# Patient Record
Sex: FEMALE | Race: WHITE | NOT HISPANIC OR LATINO | URBAN - METROPOLITAN AREA
[De-identification: names, ages, dates, MRNs, and addresses within clinical notes are randomized per-mention and may not be internally consistent; named-entity substitution may affect disease eponyms.]

---

## 2019-07-31 ENCOUNTER — EMERGENCY (EMERGENCY)
Facility: HOSPITAL | Age: 84
LOS: 0 days | Discharge: HOME | End: 2019-07-31
Attending: EMERGENCY MEDICINE | Admitting: EMERGENCY MEDICINE
Payer: MEDICARE

## 2019-07-31 VITALS
TEMPERATURE: 98 F | OXYGEN SATURATION: 97 % | WEIGHT: 139.99 LBS | HEART RATE: 66 BPM | SYSTOLIC BLOOD PRESSURE: 151 MMHG | DIASTOLIC BLOOD PRESSURE: 84 MMHG | HEIGHT: 63 IN | RESPIRATION RATE: 20 BRPM

## 2019-07-31 DIAGNOSIS — M79.669 PAIN IN UNSPECIFIED LOWER LEG: ICD-10-CM

## 2019-07-31 DIAGNOSIS — M79.651 PAIN IN RIGHT THIGH: ICD-10-CM

## 2019-07-31 DIAGNOSIS — M25.561 PAIN IN RIGHT KNEE: ICD-10-CM

## 2019-07-31 PROCEDURE — 99284 EMERGENCY DEPT VISIT MOD MDM: CPT

## 2019-07-31 PROCEDURE — 73562 X-RAY EXAM OF KNEE 3: CPT | Mod: 26,50

## 2019-07-31 PROCEDURE — 73552 X-RAY EXAM OF FEMUR 2/>: CPT | Mod: 26,RT

## 2019-07-31 PROCEDURE — 93970 EXTREMITY STUDY: CPT | Mod: 26

## 2019-07-31 NOTE — ED PROVIDER NOTE - CARE PROVIDER_API CALL
Zacarias Diallo (MD)  Orthopaedic Surgery  [address] [place] [address]  Phone: [phone]  Fax: [phone]  Follow Up Time:

## 2019-07-31 NOTE — ED ADULT NURSE NOTE - NSIMPLEMENTINTERV_GEN_ALL_ED
Implemented All Universal Safety Interventions:  Julian to call system. Call bell, personal items and telephone within reach. Instruct patient to call for assistance. Room bathroom lighting operational. Non-slip footwear when patient is off stretcher. Physically safe environment: no spills, clutter or unnecessary equipment. Stretcher in lowest position, wheels locked, appropriate side rails in place.

## 2019-07-31 NOTE — ED PROVIDER NOTE - OBJECTIVE STATEMENT
right thigh and knee pain with wt bearing and movement for 1 week, No pain at rest, No trauma, no fever, no hip or back pain,

## 2019-07-31 NOTE — ED PROVIDER NOTE - MUSCULOSKELETAL MINIMAL EXAM
tenderness over right medial thigh and knee, FROM, ambulating well in ED with normal gait . no swelling, no redness, strong pulses distal

## 2020-12-01 PROBLEM — Z00.00 ENCOUNTER FOR PREVENTIVE HEALTH EXAMINATION: Status: ACTIVE | Noted: 2020-01-01

## 2021-01-01 ENCOUNTER — APPOINTMENT (OUTPATIENT)
Dept: INFUSION THERAPY | Facility: CLINIC | Age: 86
End: 2021-01-01

## 2021-01-01 ENCOUNTER — APPOINTMENT (OUTPATIENT)
Dept: HEMATOLOGY ONCOLOGY | Facility: CLINIC | Age: 86
End: 2021-01-01
Payer: MEDICARE

## 2021-01-01 ENCOUNTER — APPOINTMENT (OUTPATIENT)
Dept: HEMATOLOGY ONCOLOGY | Facility: CLINIC | Age: 86
End: 2021-01-01

## 2021-01-01 ENCOUNTER — LABORATORY RESULT (OUTPATIENT)
Age: 86
End: 2021-01-01

## 2021-01-01 ENCOUNTER — INPATIENT (INPATIENT)
Facility: HOSPITAL | Age: 86
LOS: 58 days | End: 2021-05-08
Attending: STUDENT IN AN ORGANIZED HEALTH CARE EDUCATION/TRAINING PROGRAM | Admitting: INTERNAL MEDICINE
Payer: MEDICARE

## 2021-01-01 ENCOUNTER — NON-APPOINTMENT (OUTPATIENT)
Age: 86
End: 2021-01-01

## 2021-01-01 ENCOUNTER — OUTPATIENT (OUTPATIENT)
Dept: OUTPATIENT SERVICES | Facility: HOSPITAL | Age: 86
LOS: 1 days | Discharge: HOME | End: 2021-01-01

## 2021-01-01 ENCOUNTER — RESULT REVIEW (OUTPATIENT)
Age: 86
End: 2021-01-01

## 2021-01-01 VITALS
SYSTOLIC BLOOD PRESSURE: 95 MMHG | HEART RATE: 97 BPM | RESPIRATION RATE: 20 BRPM | OXYGEN SATURATION: 94 % | TEMPERATURE: 97 F | DIASTOLIC BLOOD PRESSURE: 54 MMHG

## 2021-01-01 VITALS
RESPIRATION RATE: 14 BRPM | SYSTOLIC BLOOD PRESSURE: 112 MMHG | WEIGHT: 132 LBS | TEMPERATURE: 98.2 F | BODY MASS INDEX: 29.69 KG/M2 | HEIGHT: 56 IN | DIASTOLIC BLOOD PRESSURE: 73 MMHG | HEART RATE: 80 BPM

## 2021-01-01 VITALS
RESPIRATION RATE: 18 BRPM | SYSTOLIC BLOOD PRESSURE: 111 MMHG | DIASTOLIC BLOOD PRESSURE: 66 MMHG | TEMPERATURE: 97.5 F | HEART RATE: 82 BPM

## 2021-01-01 DIAGNOSIS — R41.89 OTHER SYMPTOMS AND SIGNS INVOLVING COGNITIVE FUNCTIONS AND AWARENESS: ICD-10-CM

## 2021-01-01 DIAGNOSIS — R06.00 DYSPNEA, UNSPECIFIED: ICD-10-CM

## 2021-01-01 DIAGNOSIS — Z78.9 OTHER SPECIFIED HEALTH STATUS: ICD-10-CM

## 2021-01-01 DIAGNOSIS — R63.4 ABNORMAL WEIGHT LOSS: ICD-10-CM

## 2021-01-01 DIAGNOSIS — J96.01 ACUTE RESPIRATORY FAILURE WITH HYPOXIA: ICD-10-CM

## 2021-01-01 DIAGNOSIS — D64.9 ANEMIA, UNSPECIFIED: ICD-10-CM

## 2021-01-01 DIAGNOSIS — A41.9 SEPSIS, UNSPECIFIED ORGANISM: ICD-10-CM

## 2021-01-01 DIAGNOSIS — R53.83 OTHER FATIGUE: ICD-10-CM

## 2021-01-01 DIAGNOSIS — Z51.5 ENCOUNTER FOR PALLIATIVE CARE: ICD-10-CM

## 2021-01-01 DIAGNOSIS — R63.0 ANOREXIA: ICD-10-CM

## 2021-01-01 DIAGNOSIS — Z11.59 ENCOUNTER FOR SCREENING FOR OTHER VIRAL DISEASES: ICD-10-CM

## 2021-01-01 LAB
-  IMIPENEM: SIGNIFICANT CHANGE UP
-  PIPERACILLIN/TAZOBACTAM: SIGNIFICANT CHANGE UP
A1C WITH ESTIMATED AVERAGE GLUCOSE RESULT: 6.1 % — HIGH (ref 4–5.6)
ALBUMIN SERPL ELPH-MCNC: 1.2 G/DL — LOW (ref 3.5–5.2)
ALBUMIN SERPL ELPH-MCNC: 1.6 G/DL — LOW (ref 3.5–5.2)
ALBUMIN SERPL ELPH-MCNC: 1.7 G/DL — LOW (ref 3.5–5.2)
ALBUMIN SERPL ELPH-MCNC: 1.8 G/DL — LOW (ref 3.5–5.2)
ALBUMIN SERPL ELPH-MCNC: 1.9 G/DL — LOW (ref 3.5–5.2)
ALBUMIN SERPL ELPH-MCNC: 2 G/DL — LOW (ref 3.5–5.2)
ALBUMIN SERPL ELPH-MCNC: 2.1 G/DL — LOW (ref 3.5–5.2)
ALBUMIN SERPL ELPH-MCNC: 2.2 G/DL — LOW (ref 3.5–5.2)
ALBUMIN SERPL ELPH-MCNC: 2.3 G/DL — LOW (ref 3.5–5.2)
ALBUMIN SERPL ELPH-MCNC: 2.4 G/DL — LOW (ref 3.5–5.2)
ALBUMIN SERPL ELPH-MCNC: 2.5 G/DL — LOW (ref 3.5–5.2)
ALBUMIN SERPL ELPH-MCNC: 2.6 G/DL — LOW (ref 3.5–5.2)
ALBUMIN SERPL ELPH-MCNC: 2.7 G/DL — LOW (ref 3.5–5.2)
ALBUMIN SERPL ELPH-MCNC: 3 G/DL — LOW (ref 3.5–5.2)
ALBUMIN SERPL ELPH-MCNC: 3 G/DL — LOW (ref 3.5–5.2)
ALBUMIN SERPL ELPH-MCNC: 3.1 G/DL — LOW (ref 3.5–5.2)
ALBUMIN SERPL ELPH-MCNC: 3.2 G/DL — LOW (ref 3.5–5.2)
ALBUMIN SERPL ELPH-MCNC: 3.5 G/DL — SIGNIFICANT CHANGE UP (ref 3.5–5.2)
ALBUMIN SERPL ELPH-MCNC: 4 G/DL
ALBUMIN SERPL ELPH-MCNC: 4.1 G/DL — SIGNIFICANT CHANGE UP (ref 3.5–5.2)
ALP BLD-CCNC: 73 U/L
ALP SERPL-CCNC: 100 U/L — SIGNIFICANT CHANGE UP (ref 30–115)
ALP SERPL-CCNC: 125 U/L — HIGH (ref 30–115)
ALP SERPL-CCNC: 135 U/L — HIGH (ref 30–115)
ALP SERPL-CCNC: 151 U/L — HIGH (ref 30–115)
ALP SERPL-CCNC: 171 U/L — HIGH (ref 30–115)
ALP SERPL-CCNC: 176 U/L — HIGH (ref 30–115)
ALP SERPL-CCNC: 191 U/L — HIGH (ref 30–115)
ALP SERPL-CCNC: 201 U/L — HIGH (ref 30–115)
ALP SERPL-CCNC: 205 U/L — HIGH (ref 30–115)
ALP SERPL-CCNC: 217 U/L — HIGH (ref 30–115)
ALP SERPL-CCNC: 222 U/L — HIGH (ref 30–115)
ALP SERPL-CCNC: 224 U/L — HIGH (ref 30–115)
ALP SERPL-CCNC: 231 U/L — HIGH (ref 30–115)
ALP SERPL-CCNC: 235 U/L — HIGH (ref 30–115)
ALP SERPL-CCNC: 251 U/L — HIGH (ref 30–115)
ALP SERPL-CCNC: 260 U/L — HIGH (ref 30–115)
ALP SERPL-CCNC: 262 U/L — HIGH (ref 30–115)
ALP SERPL-CCNC: 266 U/L — HIGH (ref 30–115)
ALP SERPL-CCNC: 269 U/L — HIGH (ref 30–115)
ALP SERPL-CCNC: 273 U/L — HIGH (ref 30–115)
ALP SERPL-CCNC: 284 U/L — HIGH (ref 30–115)
ALP SERPL-CCNC: 291 U/L — HIGH (ref 30–115)
ALP SERPL-CCNC: 295 U/L — HIGH (ref 30–115)
ALP SERPL-CCNC: 300 U/L — HIGH (ref 30–115)
ALP SERPL-CCNC: 325 U/L — HIGH (ref 30–115)
ALP SERPL-CCNC: 330 U/L — HIGH (ref 30–115)
ALP SERPL-CCNC: 335 U/L — HIGH (ref 30–115)
ALP SERPL-CCNC: 338 U/L — HIGH (ref 30–115)
ALP SERPL-CCNC: 342 U/L — HIGH (ref 30–115)
ALP SERPL-CCNC: 356 U/L — HIGH (ref 30–115)
ALP SERPL-CCNC: 361 U/L — HIGH (ref 30–115)
ALP SERPL-CCNC: 367 U/L — HIGH (ref 30–115)
ALP SERPL-CCNC: 377 U/L — HIGH (ref 30–115)
ALP SERPL-CCNC: 392 U/L — HIGH (ref 30–115)
ALP SERPL-CCNC: 393 U/L — HIGH (ref 30–115)
ALP SERPL-CCNC: 398 U/L — HIGH (ref 30–115)
ALP SERPL-CCNC: 416 U/L — HIGH (ref 30–115)
ALP SERPL-CCNC: 46 U/L — SIGNIFICANT CHANGE UP (ref 30–115)
ALP SERPL-CCNC: 60 U/L — SIGNIFICANT CHANGE UP (ref 30–115)
ALP SERPL-CCNC: 75 U/L — SIGNIFICANT CHANGE UP (ref 30–115)
ALP SERPL-CCNC: 79 U/L — SIGNIFICANT CHANGE UP (ref 30–115)
ALP SERPL-CCNC: 81 U/L — SIGNIFICANT CHANGE UP (ref 30–115)
ALP SERPL-CCNC: 82 U/L — SIGNIFICANT CHANGE UP (ref 30–115)
ALP SERPL-CCNC: 84 U/L — SIGNIFICANT CHANGE UP (ref 30–115)
ALP SERPL-CCNC: 94 U/L — SIGNIFICANT CHANGE UP (ref 30–115)
ALP SERPL-CCNC: 97 U/L — SIGNIFICANT CHANGE UP (ref 30–115)
ALP SERPL-CCNC: 98 U/L — SIGNIFICANT CHANGE UP (ref 30–115)
ALT FLD-CCNC: 10 U/L — SIGNIFICANT CHANGE UP (ref 0–41)
ALT FLD-CCNC: 11 U/L — SIGNIFICANT CHANGE UP (ref 0–41)
ALT FLD-CCNC: 11 U/L — SIGNIFICANT CHANGE UP (ref 0–41)
ALT FLD-CCNC: 12 U/L — SIGNIFICANT CHANGE UP (ref 0–41)
ALT FLD-CCNC: 13 U/L — SIGNIFICANT CHANGE UP (ref 0–41)
ALT FLD-CCNC: 15 U/L — SIGNIFICANT CHANGE UP (ref 0–41)
ALT FLD-CCNC: 16 U/L — SIGNIFICANT CHANGE UP (ref 0–41)
ALT FLD-CCNC: 17 U/L — SIGNIFICANT CHANGE UP (ref 0–41)
ALT FLD-CCNC: 18 U/L — SIGNIFICANT CHANGE UP (ref 0–41)
ALT FLD-CCNC: 19 U/L — SIGNIFICANT CHANGE UP (ref 0–41)
ALT FLD-CCNC: 20 U/L — SIGNIFICANT CHANGE UP (ref 0–41)
ALT FLD-CCNC: 21 U/L — SIGNIFICANT CHANGE UP (ref 0–41)
ALT FLD-CCNC: 21 U/L — SIGNIFICANT CHANGE UP (ref 0–41)
ALT FLD-CCNC: 22 U/L — SIGNIFICANT CHANGE UP (ref 0–41)
ALT FLD-CCNC: 23 U/L — SIGNIFICANT CHANGE UP (ref 0–41)
ALT FLD-CCNC: 24 U/L — SIGNIFICANT CHANGE UP (ref 0–41)
ALT FLD-CCNC: 24 U/L — SIGNIFICANT CHANGE UP (ref 0–41)
ALT FLD-CCNC: 25 U/L — SIGNIFICANT CHANGE UP (ref 0–41)
ALT FLD-CCNC: 27 U/L — SIGNIFICANT CHANGE UP (ref 0–41)
ALT FLD-CCNC: 29 U/L — SIGNIFICANT CHANGE UP (ref 0–41)
ALT FLD-CCNC: 29 U/L — SIGNIFICANT CHANGE UP (ref 0–41)
ALT FLD-CCNC: 30 U/L — SIGNIFICANT CHANGE UP (ref 0–41)
ALT FLD-CCNC: 31 U/L — SIGNIFICANT CHANGE UP (ref 0–41)
ALT FLD-CCNC: 32 U/L — SIGNIFICANT CHANGE UP (ref 0–41)
ALT FLD-CCNC: 32 U/L — SIGNIFICANT CHANGE UP (ref 0–41)
ALT FLD-CCNC: 39 U/L — SIGNIFICANT CHANGE UP (ref 0–41)
ALT FLD-CCNC: 41 U/L — SIGNIFICANT CHANGE UP (ref 0–41)
ALT FLD-CCNC: <5 U/L — SIGNIFICANT CHANGE UP (ref 0–41)
ALT SERPL-CCNC: 16 U/L
ANION GAP SERPL CALC-SCNC: 10 MMOL/L — SIGNIFICANT CHANGE UP (ref 7–14)
ANION GAP SERPL CALC-SCNC: 11 MMOL/L — SIGNIFICANT CHANGE UP (ref 7–14)
ANION GAP SERPL CALC-SCNC: 12 MMOL/L — SIGNIFICANT CHANGE UP (ref 7–14)
ANION GAP SERPL CALC-SCNC: 13 MMOL/L — SIGNIFICANT CHANGE UP (ref 7–14)
ANION GAP SERPL CALC-SCNC: 14 MMOL/L
ANION GAP SERPL CALC-SCNC: 14 MMOL/L — SIGNIFICANT CHANGE UP (ref 7–14)
ANION GAP SERPL CALC-SCNC: 15 MMOL/L — HIGH (ref 7–14)
ANION GAP SERPL CALC-SCNC: 16 MMOL/L — HIGH (ref 7–14)
ANION GAP SERPL CALC-SCNC: 5 MMOL/L — LOW (ref 7–14)
ANION GAP SERPL CALC-SCNC: 6 MMOL/L — LOW (ref 7–14)
ANION GAP SERPL CALC-SCNC: 7 MMOL/L — SIGNIFICANT CHANGE UP (ref 7–14)
ANION GAP SERPL CALC-SCNC: 8 MMOL/L — SIGNIFICANT CHANGE UP (ref 7–14)
ANION GAP SERPL CALC-SCNC: 9 MMOL/L — SIGNIFICANT CHANGE UP (ref 7–14)
ANISOCYTOSIS BLD QL: SLIGHT — SIGNIFICANT CHANGE UP
ANISOCYTOSIS BLD QL: SLIGHT — SIGNIFICANT CHANGE UP
APPEARANCE UR: ABNORMAL
APPEARANCE UR: CLEAR — SIGNIFICANT CHANGE UP
APTT BLD: 33.7 SEC — SIGNIFICANT CHANGE UP (ref 27–39.2)
APTT BLD: 33.8 SEC — SIGNIFICANT CHANGE UP (ref 27–39.2)
APTT BLD: 34.4 SEC — SIGNIFICANT CHANGE UP (ref 27–39.2)
APTT BLD: 35.4 SEC — SIGNIFICANT CHANGE UP (ref 27–39.2)
APTT BLD: 36.4 SEC — SIGNIFICANT CHANGE UP (ref 27–39.2)
APTT BLD: 36.8 SEC — SIGNIFICANT CHANGE UP (ref 27–39.2)
APTT BLD: 37.9 SEC — SIGNIFICANT CHANGE UP (ref 27–39.2)
APTT BLD: 41.1 SEC — HIGH (ref 27–39.2)
APTT BLD: 46 SEC — HIGH (ref 27–39.2)
AST SERPL-CCNC: 20 U/L — SIGNIFICANT CHANGE UP (ref 0–41)
AST SERPL-CCNC: 22 U/L — SIGNIFICANT CHANGE UP (ref 0–41)
AST SERPL-CCNC: 23 U/L — SIGNIFICANT CHANGE UP (ref 0–41)
AST SERPL-CCNC: 23 U/L — SIGNIFICANT CHANGE UP (ref 0–41)
AST SERPL-CCNC: 24 U/L — SIGNIFICANT CHANGE UP (ref 0–41)
AST SERPL-CCNC: 24 U/L — SIGNIFICANT CHANGE UP (ref 0–41)
AST SERPL-CCNC: 25 U/L — SIGNIFICANT CHANGE UP (ref 0–41)
AST SERPL-CCNC: 27 U/L — SIGNIFICANT CHANGE UP (ref 0–41)
AST SERPL-CCNC: 28 U/L — SIGNIFICANT CHANGE UP (ref 0–41)
AST SERPL-CCNC: 28 U/L — SIGNIFICANT CHANGE UP (ref 0–41)
AST SERPL-CCNC: 30 U/L — SIGNIFICANT CHANGE UP (ref 0–41)
AST SERPL-CCNC: 31 U/L — SIGNIFICANT CHANGE UP (ref 0–41)
AST SERPL-CCNC: 31 U/L — SIGNIFICANT CHANGE UP (ref 0–41)
AST SERPL-CCNC: 32 U/L
AST SERPL-CCNC: 32 U/L — SIGNIFICANT CHANGE UP (ref 0–41)
AST SERPL-CCNC: 32 U/L — SIGNIFICANT CHANGE UP (ref 0–41)
AST SERPL-CCNC: 33 U/L — SIGNIFICANT CHANGE UP (ref 0–41)
AST SERPL-CCNC: 34 U/L — SIGNIFICANT CHANGE UP (ref 0–41)
AST SERPL-CCNC: 35 U/L — SIGNIFICANT CHANGE UP (ref 0–41)
AST SERPL-CCNC: 35 U/L — SIGNIFICANT CHANGE UP (ref 0–41)
AST SERPL-CCNC: 37 U/L — SIGNIFICANT CHANGE UP (ref 0–41)
AST SERPL-CCNC: 38 U/L — SIGNIFICANT CHANGE UP (ref 0–41)
AST SERPL-CCNC: 38 U/L — SIGNIFICANT CHANGE UP (ref 0–41)
AST SERPL-CCNC: 40 U/L — SIGNIFICANT CHANGE UP (ref 0–41)
AST SERPL-CCNC: 42 U/L — HIGH (ref 0–41)
AST SERPL-CCNC: 42 U/L — HIGH (ref 0–41)
AST SERPL-CCNC: 45 U/L — HIGH (ref 0–41)
AST SERPL-CCNC: 47 U/L — HIGH (ref 0–41)
AST SERPL-CCNC: 49 U/L — HIGH (ref 0–41)
AST SERPL-CCNC: 50 U/L — HIGH (ref 0–41)
AST SERPL-CCNC: 51 U/L — HIGH (ref 0–41)
AST SERPL-CCNC: 51 U/L — HIGH (ref 0–41)
AST SERPL-CCNC: 52 U/L — HIGH (ref 0–41)
AST SERPL-CCNC: 54 U/L — HIGH (ref 0–41)
AST SERPL-CCNC: 56 U/L — HIGH (ref 0–41)
AST SERPL-CCNC: 56 U/L — HIGH (ref 0–41)
AST SERPL-CCNC: 57 U/L — HIGH (ref 0–41)
AST SERPL-CCNC: 58 U/L — HIGH (ref 0–41)
AST SERPL-CCNC: 58 U/L — HIGH (ref 0–41)
AST SERPL-CCNC: 60 U/L — HIGH (ref 0–41)
AST SERPL-CCNC: 63 U/L — HIGH (ref 0–41)
AST SERPL-CCNC: 67 U/L — HIGH (ref 0–41)
AST SERPL-CCNC: 68 U/L — HIGH (ref 0–41)
AST SERPL-CCNC: 89 U/L — HIGH (ref 0–41)
BACTERIA # UR AUTO: NEGATIVE — SIGNIFICANT CHANGE UP
BACTERIA # UR AUTO: NEGATIVE — SIGNIFICANT CHANGE UP
BASE EXCESS BLDA CALC-SCNC: -2 MMOL/L — SIGNIFICANT CHANGE UP (ref -2–2)
BASE EXCESS BLDA CALC-SCNC: -2.3 MMOL/L — LOW (ref -2–2)
BASE EXCESS BLDA CALC-SCNC: -2.7 MMOL/L — LOW (ref -2–2)
BASE EXCESS BLDA CALC-SCNC: -2.7 MMOL/L — LOW (ref -2–2)
BASE EXCESS BLDA CALC-SCNC: -3.2 MMOL/L — LOW (ref -2–2)
BASE EXCESS BLDA CALC-SCNC: -4.5 MMOL/L — LOW (ref -2–2)
BASE EXCESS BLDA CALC-SCNC: -5.9 MMOL/L — LOW (ref -2–2)
BASE EXCESS BLDA CALC-SCNC: 1.6 MMOL/L — SIGNIFICANT CHANGE UP (ref -2–2)
BASE EXCESS BLDA CALC-SCNC: 12.3 MMOL/L — HIGH (ref -2–2)
BASE EXCESS BLDA CALC-SCNC: 13.9 MMOL/L — HIGH (ref -2–2)
BASE EXCESS BLDA CALC-SCNC: 18.8 MMOL/L — HIGH (ref -2–2)
BASE EXCESS BLDA CALC-SCNC: 20.1 MMOL/L — HIGH (ref -2–2)
BASE EXCESS BLDA CALC-SCNC: 22.9 MMOL/L — HIGH (ref -2–2)
BASE EXCESS BLDA CALC-SCNC: 23.2 MMOL/L — HIGH (ref -2–2)
BASE EXCESS BLDA CALC-SCNC: 3.3 MMOL/L — HIGH (ref -2–2)
BASE EXCESS BLDA CALC-SCNC: 4.1 MMOL/L — HIGH (ref -2–2)
BASE EXCESS BLDA CALC-SCNC: 4.2 MMOL/L — HIGH (ref -2–2)
BASE EXCESS BLDA CALC-SCNC: 4.3 MMOL/L — HIGH (ref -2–2)
BASE EXCESS BLDA CALC-SCNC: 4.8 MMOL/L — HIGH (ref -2–2)
BASE EXCESS BLDA CALC-SCNC: 5 MMOL/L — HIGH (ref -2–2)
BASE EXCESS BLDA CALC-SCNC: 5.1 MMOL/L — HIGH (ref -2–2)
BASE EXCESS BLDA CALC-SCNC: 5.2 MMOL/L — HIGH (ref -2–2)
BASE EXCESS BLDA CALC-SCNC: 5.6 MMOL/L — HIGH (ref -2–2)
BASE EXCESS BLDA CALC-SCNC: 6.3 MMOL/L — HIGH (ref -2–2)
BASE EXCESS BLDA CALC-SCNC: 8.9 MMOL/L — HIGH (ref -2–2)
BASE EXCESS BLDA CALC-SCNC: 9.2 MMOL/L — HIGH (ref -2–2)
BASE EXCESS BLDA CALC-SCNC: 9.9 MMOL/L — HIGH (ref -2–2)
BASE EXCESS BLDV CALC-SCNC: 1.1 MMOL/L — SIGNIFICANT CHANGE UP (ref -2–2)
BASOPHILS # BLD AUTO: 0 K/UL — SIGNIFICANT CHANGE UP (ref 0–0.2)
BASOPHILS # BLD AUTO: 0.01 K/UL — SIGNIFICANT CHANGE UP (ref 0–0.2)
BASOPHILS # BLD AUTO: 0.02 K/UL — SIGNIFICANT CHANGE UP (ref 0–0.2)
BASOPHILS # BLD AUTO: 0.03 K/UL — SIGNIFICANT CHANGE UP (ref 0–0.2)
BASOPHILS # BLD AUTO: 0.04 K/UL — SIGNIFICANT CHANGE UP (ref 0–0.2)
BASOPHILS # BLD AUTO: 0.05 K/UL — SIGNIFICANT CHANGE UP (ref 0–0.2)
BASOPHILS # BLD AUTO: 0.06 K/UL — SIGNIFICANT CHANGE UP (ref 0–0.2)
BASOPHILS # BLD AUTO: 0.07 K/UL — SIGNIFICANT CHANGE UP (ref 0–0.2)
BASOPHILS # BLD AUTO: 0.07 K/UL — SIGNIFICANT CHANGE UP (ref 0–0.2)
BASOPHILS # BLD AUTO: 0.08 K/UL — SIGNIFICANT CHANGE UP (ref 0–0.2)
BASOPHILS # BLD AUTO: 0.09 K/UL — SIGNIFICANT CHANGE UP (ref 0–0.2)
BASOPHILS # BLD AUTO: 0.1 K/UL — SIGNIFICANT CHANGE UP (ref 0–0.2)
BASOPHILS # BLD AUTO: 0.15 K/UL — SIGNIFICANT CHANGE UP (ref 0–0.2)
BASOPHILS # BLD AUTO: 0.17 K/UL — SIGNIFICANT CHANGE UP (ref 0–0.2)
BASOPHILS # BLD AUTO: 0.19 K/UL — SIGNIFICANT CHANGE UP (ref 0–0.2)
BASOPHILS # BLD AUTO: 0.22 K/UL — HIGH (ref 0–0.2)
BASOPHILS # BLD AUTO: 0.28 K/UL — HIGH (ref 0–0.2)
BASOPHILS # BLD AUTO: 0.29 K/UL — HIGH (ref 0–0.2)
BASOPHILS NFR BLD AUTO: 0 % — SIGNIFICANT CHANGE UP (ref 0–1)
BASOPHILS NFR BLD AUTO: 0.1 % — SIGNIFICANT CHANGE UP (ref 0–1)
BASOPHILS NFR BLD AUTO: 0.2 % — SIGNIFICANT CHANGE UP (ref 0–1)
BASOPHILS NFR BLD AUTO: 0.3 % — SIGNIFICANT CHANGE UP (ref 0–1)
BASOPHILS NFR BLD AUTO: 0.4 % — SIGNIFICANT CHANGE UP (ref 0–1)
BASOPHILS NFR BLD AUTO: 0.5 % — SIGNIFICANT CHANGE UP (ref 0–1)
BASOPHILS NFR BLD AUTO: 0.9 % — SIGNIFICANT CHANGE UP (ref 0–1)
BASOPHILS NFR BLD AUTO: 0.9 % — SIGNIFICANT CHANGE UP (ref 0–1)
BILIRUB DIRECT SERPL-MCNC: 0.3 MG/DL — HIGH (ref 0–0.2)
BILIRUB DIRECT SERPL-MCNC: 0.4 MG/DL — HIGH (ref 0–0.2)
BILIRUB DIRECT SERPL-MCNC: 0.5 MG/DL — HIGH (ref 0–0.2)
BILIRUB DIRECT SERPL-MCNC: 0.5 MG/DL — HIGH (ref 0–0.2)
BILIRUB DIRECT SERPL-MCNC: 0.6 MG/DL — HIGH (ref 0–0.2)
BILIRUB DIRECT SERPL-MCNC: 1.3 MG/DL — HIGH (ref 0–0.2)
BILIRUB DIRECT SERPL-MCNC: 1.7 MG/DL — HIGH (ref 0–0.2)
BILIRUB DIRECT SERPL-MCNC: 2 MG/DL — HIGH (ref 0–0.2)
BILIRUB INDIRECT FLD-MCNC: 0 MG/DL — LOW (ref 0.2–1.2)
BILIRUB INDIRECT FLD-MCNC: 0.1 MG/DL — LOW (ref 0.2–1.2)
BILIRUB INDIRECT FLD-MCNC: 0.2 MG/DL — SIGNIFICANT CHANGE UP (ref 0.2–1.2)
BILIRUB INDIRECT FLD-MCNC: 0.2 MG/DL — SIGNIFICANT CHANGE UP (ref 0.2–1.2)
BILIRUB INDIRECT FLD-MCNC: 0.3 MG/DL — SIGNIFICANT CHANGE UP (ref 0.2–1.2)
BILIRUB INDIRECT FLD-MCNC: 0.3 MG/DL — SIGNIFICANT CHANGE UP (ref 0.2–1.2)
BILIRUB INDIRECT FLD-MCNC: 0.6 MG/DL — SIGNIFICANT CHANGE UP (ref 0.2–1.2)
BILIRUB INDIRECT FLD-MCNC: 0.6 MG/DL — SIGNIFICANT CHANGE UP (ref 0.2–1.2)
BILIRUB SERPL-MCNC: 0.2 MG/DL — SIGNIFICANT CHANGE UP (ref 0.2–1.2)
BILIRUB SERPL-MCNC: 0.3 MG/DL
BILIRUB SERPL-MCNC: 0.3 MG/DL — SIGNIFICANT CHANGE UP (ref 0.2–1.2)
BILIRUB SERPL-MCNC: 0.4 MG/DL — SIGNIFICANT CHANGE UP (ref 0.2–1.2)
BILIRUB SERPL-MCNC: 0.5 MG/DL — SIGNIFICANT CHANGE UP (ref 0.2–1.2)
BILIRUB SERPL-MCNC: 0.6 MG/DL — SIGNIFICANT CHANGE UP (ref 0.2–1.2)
BILIRUB SERPL-MCNC: 0.7 MG/DL — SIGNIFICANT CHANGE UP (ref 0.2–1.2)
BILIRUB SERPL-MCNC: 0.7 MG/DL — SIGNIFICANT CHANGE UP (ref 0.2–1.2)
BILIRUB SERPL-MCNC: 0.8 MG/DL — SIGNIFICANT CHANGE UP (ref 0.2–1.2)
BILIRUB SERPL-MCNC: 0.9 MG/DL — SIGNIFICANT CHANGE UP (ref 0.2–1.2)
BILIRUB SERPL-MCNC: 1.4 MG/DL — HIGH (ref 0.2–1.2)
BILIRUB SERPL-MCNC: 1.6 MG/DL — HIGH (ref 0.2–1.2)
BILIRUB SERPL-MCNC: 2.3 MG/DL — HIGH (ref 0.2–1.2)
BILIRUB SERPL-MCNC: 2.6 MG/DL — HIGH (ref 0.2–1.2)
BILIRUB UR-MCNC: NEGATIVE — SIGNIFICANT CHANGE UP
BILIRUB UR-MCNC: NEGATIVE — SIGNIFICANT CHANGE UP
BLD GP AB SCN SERPL QL: SIGNIFICANT CHANGE UP
BUN SERPL-MCNC: 100 MG/DL — CRITICAL HIGH (ref 10–20)
BUN SERPL-MCNC: 101 MG/DL — CRITICAL HIGH (ref 10–20)
BUN SERPL-MCNC: 102 MG/DL — CRITICAL HIGH (ref 10–20)
BUN SERPL-MCNC: 104 MG/DL — CRITICAL HIGH (ref 10–20)
BUN SERPL-MCNC: 105 MG/DL — CRITICAL HIGH (ref 10–20)
BUN SERPL-MCNC: 107 MG/DL — CRITICAL HIGH (ref 10–20)
BUN SERPL-MCNC: 107 MG/DL — CRITICAL HIGH (ref 10–20)
BUN SERPL-MCNC: 108 MG/DL — CRITICAL HIGH (ref 10–20)
BUN SERPL-MCNC: 109 MG/DL — CRITICAL HIGH (ref 10–20)
BUN SERPL-MCNC: 11 MG/DL — SIGNIFICANT CHANGE UP (ref 10–20)
BUN SERPL-MCNC: 111 MG/DL — CRITICAL HIGH (ref 10–20)
BUN SERPL-MCNC: 112 MG/DL — CRITICAL HIGH (ref 10–20)
BUN SERPL-MCNC: 115 MG/DL — CRITICAL HIGH (ref 10–20)
BUN SERPL-MCNC: 117 MG/DL — CRITICAL HIGH (ref 10–20)
BUN SERPL-MCNC: 119 MG/DL — CRITICAL HIGH (ref 10–20)
BUN SERPL-MCNC: 12 MG/DL — SIGNIFICANT CHANGE UP (ref 10–20)
BUN SERPL-MCNC: 13 MG/DL — SIGNIFICANT CHANGE UP (ref 10–20)
BUN SERPL-MCNC: 14 MG/DL — SIGNIFICANT CHANGE UP (ref 10–20)
BUN SERPL-MCNC: 15 MG/DL — SIGNIFICANT CHANGE UP (ref 10–20)
BUN SERPL-MCNC: 16 MG/DL — SIGNIFICANT CHANGE UP (ref 10–20)
BUN SERPL-MCNC: 17 MG/DL — SIGNIFICANT CHANGE UP (ref 10–20)
BUN SERPL-MCNC: 17 MG/DL — SIGNIFICANT CHANGE UP (ref 10–20)
BUN SERPL-MCNC: 18 MG/DL — SIGNIFICANT CHANGE UP (ref 10–20)
BUN SERPL-MCNC: 19 MG/DL — SIGNIFICANT CHANGE UP (ref 10–20)
BUN SERPL-MCNC: 20 MG/DL
BUN SERPL-MCNC: 20 MG/DL — SIGNIFICANT CHANGE UP (ref 10–20)
BUN SERPL-MCNC: 20 MG/DL — SIGNIFICANT CHANGE UP (ref 10–20)
BUN SERPL-MCNC: 21 MG/DL — HIGH (ref 10–20)
BUN SERPL-MCNC: 22 MG/DL — HIGH (ref 10–20)
BUN SERPL-MCNC: 22 MG/DL — HIGH (ref 10–20)
BUN SERPL-MCNC: 25 MG/DL — HIGH (ref 10–20)
BUN SERPL-MCNC: 27 MG/DL — HIGH (ref 10–20)
BUN SERPL-MCNC: 39 MG/DL — HIGH (ref 10–20)
BUN SERPL-MCNC: 49 MG/DL — HIGH (ref 10–20)
BUN SERPL-MCNC: 50 MG/DL — HIGH (ref 10–20)
BUN SERPL-MCNC: 50 MG/DL — HIGH (ref 10–20)
BUN SERPL-MCNC: 52 MG/DL — HIGH (ref 10–20)
BUN SERPL-MCNC: 53 MG/DL — HIGH (ref 10–20)
BUN SERPL-MCNC: 55 MG/DL — HIGH (ref 10–20)
BUN SERPL-MCNC: 56 MG/DL — HIGH (ref 10–20)
BUN SERPL-MCNC: 56 MG/DL — HIGH (ref 10–20)
BUN SERPL-MCNC: 57 MG/DL — HIGH (ref 10–20)
BUN SERPL-MCNC: 58 MG/DL — HIGH (ref 10–20)
BUN SERPL-MCNC: 59 MG/DL — HIGH (ref 10–20)
BUN SERPL-MCNC: 64 MG/DL — CRITICAL HIGH (ref 10–20)
BUN SERPL-MCNC: 65 MG/DL — CRITICAL HIGH (ref 10–20)
BUN SERPL-MCNC: 66 MG/DL — CRITICAL HIGH (ref 10–20)
BUN SERPL-MCNC: 73 MG/DL — CRITICAL HIGH (ref 10–20)
BUN SERPL-MCNC: 74 MG/DL — CRITICAL HIGH (ref 10–20)
BUN SERPL-MCNC: 76 MG/DL — CRITICAL HIGH (ref 10–20)
BUN SERPL-MCNC: 76 MG/DL — CRITICAL HIGH (ref 10–20)
BUN SERPL-MCNC: 77 MG/DL — CRITICAL HIGH (ref 10–20)
BUN SERPL-MCNC: 79 MG/DL — CRITICAL HIGH (ref 10–20)
BUN SERPL-MCNC: 79 MG/DL — CRITICAL HIGH (ref 10–20)
BUN SERPL-MCNC: 80 MG/DL — CRITICAL HIGH (ref 10–20)
BUN SERPL-MCNC: 82 MG/DL — CRITICAL HIGH (ref 10–20)
BUN SERPL-MCNC: 86 MG/DL — CRITICAL HIGH (ref 10–20)
BUN SERPL-MCNC: 88 MG/DL — CRITICAL HIGH (ref 10–20)
BUN SERPL-MCNC: 88 MG/DL — CRITICAL HIGH (ref 10–20)
BUN SERPL-MCNC: 93 MG/DL — CRITICAL HIGH (ref 10–20)
BUN SERPL-MCNC: 95 MG/DL — CRITICAL HIGH (ref 10–20)
BUN SERPL-MCNC: 98 MG/DL — CRITICAL HIGH (ref 10–20)
BUN SERPL-MCNC: 99 MG/DL — CRITICAL HIGH (ref 10–20)
BUN SERPL-MCNC: 99 MG/DL — CRITICAL HIGH (ref 10–20)
BURR CELLS BLD QL SMEAR: PRESENT — SIGNIFICANT CHANGE UP
BURR CELLS BLD QL SMEAR: PRESENT — SIGNIFICANT CHANGE UP
C DIFF BY PCR RESULT: NEGATIVE — SIGNIFICANT CHANGE UP
C DIFF TOX GENS STL QL NAA+PROBE: SIGNIFICANT CHANGE UP
CA-I SERPL-SCNC: 1.11 MMOL/L — LOW (ref 1.12–1.3)
CALCIUM SERPL-MCNC: 3.3 MG/DL — CRITICAL LOW (ref 8.5–10.1)
CALCIUM SERPL-MCNC: 6.5 MG/DL — LOW (ref 8.5–10.1)
CALCIUM SERPL-MCNC: 7.2 MG/DL — LOW (ref 8.5–10.1)
CALCIUM SERPL-MCNC: 7.4 MG/DL — LOW (ref 8.5–10.1)
CALCIUM SERPL-MCNC: 7.6 MG/DL — LOW (ref 8.5–10.1)
CALCIUM SERPL-MCNC: 7.7 MG/DL — LOW (ref 8.5–10.1)
CALCIUM SERPL-MCNC: 7.8 MG/DL — LOW (ref 8.5–10.1)
CALCIUM SERPL-MCNC: 7.9 MG/DL — LOW (ref 8.5–10.1)
CALCIUM SERPL-MCNC: 8 MG/DL — LOW (ref 8.5–10.1)
CALCIUM SERPL-MCNC: 8.1 MG/DL — LOW (ref 8.5–10.1)
CALCIUM SERPL-MCNC: 8.2 MG/DL — LOW (ref 8.5–10.1)
CALCIUM SERPL-MCNC: 8.3 MG/DL — LOW (ref 8.5–10.1)
CALCIUM SERPL-MCNC: 8.4 MG/DL — LOW (ref 8.5–10.1)
CALCIUM SERPL-MCNC: 8.5 MG/DL — SIGNIFICANT CHANGE UP (ref 8.5–10.1)
CALCIUM SERPL-MCNC: 8.5 MG/DL — SIGNIFICANT CHANGE UP (ref 8.5–10.1)
CALCIUM SERPL-MCNC: 8.6 MG/DL — SIGNIFICANT CHANGE UP (ref 8.5–10.1)
CALCIUM SERPL-MCNC: 8.6 MG/DL — SIGNIFICANT CHANGE UP (ref 8.5–10.1)
CALCIUM SERPL-MCNC: 8.7 MG/DL — SIGNIFICANT CHANGE UP (ref 8.5–10.1)
CALCIUM SERPL-MCNC: 8.8 MG/DL
CALCIUM SERPL-MCNC: 8.8 MG/DL — SIGNIFICANT CHANGE UP (ref 8.5–10.1)
CALCIUM SERPL-MCNC: 8.9 MG/DL — SIGNIFICANT CHANGE UP (ref 8.5–10.1)
CALCIUM SERPL-MCNC: 9 MG/DL — SIGNIFICANT CHANGE UP (ref 8.5–10.1)
CALCIUM SERPL-MCNC: 9.2 MG/DL — SIGNIFICANT CHANGE UP (ref 8.5–10.1)
CHLORIDE SERPL-SCNC: 100 MMOL/L
CHLORIDE SERPL-SCNC: 101 MMOL/L — SIGNIFICANT CHANGE UP (ref 98–110)
CHLORIDE SERPL-SCNC: 101 MMOL/L — SIGNIFICANT CHANGE UP (ref 98–110)
CHLORIDE SERPL-SCNC: 102 MMOL/L — SIGNIFICANT CHANGE UP (ref 98–110)
CHLORIDE SERPL-SCNC: 103 MMOL/L — SIGNIFICANT CHANGE UP (ref 98–110)
CHLORIDE SERPL-SCNC: 103 MMOL/L — SIGNIFICANT CHANGE UP (ref 98–110)
CHLORIDE SERPL-SCNC: 104 MMOL/L — SIGNIFICANT CHANGE UP (ref 98–110)
CHLORIDE SERPL-SCNC: 105 MMOL/L — SIGNIFICANT CHANGE UP (ref 98–110)
CHLORIDE SERPL-SCNC: 106 MMOL/L — SIGNIFICANT CHANGE UP (ref 98–110)
CHLORIDE SERPL-SCNC: 107 MMOL/L — SIGNIFICANT CHANGE UP (ref 98–110)
CHLORIDE SERPL-SCNC: 108 MMOL/L — SIGNIFICANT CHANGE UP (ref 98–110)
CHLORIDE SERPL-SCNC: 108 MMOL/L — SIGNIFICANT CHANGE UP (ref 98–110)
CHLORIDE SERPL-SCNC: 110 MMOL/L — SIGNIFICANT CHANGE UP (ref 98–110)
CHLORIDE SERPL-SCNC: 111 MMOL/L — HIGH (ref 98–110)
CHLORIDE SERPL-SCNC: 112 MMOL/L — HIGH (ref 98–110)
CHLORIDE SERPL-SCNC: 113 MMOL/L — HIGH (ref 98–110)
CHLORIDE SERPL-SCNC: 114 MMOL/L — HIGH (ref 98–110)
CHLORIDE SERPL-SCNC: 115 MMOL/L — HIGH (ref 98–110)
CHLORIDE SERPL-SCNC: 116 MMOL/L — HIGH (ref 98–110)
CHLORIDE SERPL-SCNC: 117 MMOL/L — HIGH (ref 98–110)
CHLORIDE SERPL-SCNC: 118 MMOL/L — HIGH (ref 98–110)
CHLORIDE SERPL-SCNC: 118 MMOL/L — HIGH (ref 98–110)
CHLORIDE SERPL-SCNC: 122 MMOL/L — HIGH (ref 98–110)
CHLORIDE SERPL-SCNC: 130 MMOL/L — HIGH (ref 98–110)
CHLORIDE SERPL-SCNC: 93 MMOL/L — LOW (ref 98–110)
CHLORIDE SERPL-SCNC: 93 MMOL/L — LOW (ref 98–110)
CHLORIDE SERPL-SCNC: 94 MMOL/L — LOW (ref 98–110)
CHLORIDE SERPL-SCNC: 94 MMOL/L — LOW (ref 98–110)
CHLORIDE SERPL-SCNC: 96 MMOL/L — LOW (ref 98–110)
CHLORIDE SERPL-SCNC: 97 MMOL/L — LOW (ref 98–110)
CHLORIDE SERPL-SCNC: 97 MMOL/L — LOW (ref 98–110)
CHLORIDE SERPL-SCNC: 98 MMOL/L — SIGNIFICANT CHANGE UP (ref 98–110)
CHLORIDE SERPL-SCNC: 99 MMOL/L — SIGNIFICANT CHANGE UP (ref 98–110)
CHLORIDE UR-SCNC: 103 — SIGNIFICANT CHANGE UP
CO2 SERPL-SCNC: 10 MMOL/L — LOW (ref 17–32)
CO2 SERPL-SCNC: 15 MMOL/L — LOW (ref 17–32)
CO2 SERPL-SCNC: 19 MMOL/L — SIGNIFICANT CHANGE UP (ref 17–32)
CO2 SERPL-SCNC: 19 MMOL/L — SIGNIFICANT CHANGE UP (ref 17–32)
CO2 SERPL-SCNC: 20 MMOL/L — SIGNIFICANT CHANGE UP (ref 17–32)
CO2 SERPL-SCNC: 21 MMOL/L — SIGNIFICANT CHANGE UP (ref 17–32)
CO2 SERPL-SCNC: 22 MMOL/L — SIGNIFICANT CHANGE UP (ref 17–32)
CO2 SERPL-SCNC: 23 MMOL/L
CO2 SERPL-SCNC: 23 MMOL/L — SIGNIFICANT CHANGE UP (ref 17–32)
CO2 SERPL-SCNC: 24 MMOL/L — SIGNIFICANT CHANGE UP (ref 17–32)
CO2 SERPL-SCNC: 25 MMOL/L — SIGNIFICANT CHANGE UP (ref 17–32)
CO2 SERPL-SCNC: 26 MMOL/L — SIGNIFICANT CHANGE UP (ref 17–32)
CO2 SERPL-SCNC: 27 MMOL/L — SIGNIFICANT CHANGE UP (ref 17–32)
CO2 SERPL-SCNC: 28 MMOL/L — SIGNIFICANT CHANGE UP (ref 17–32)
CO2 SERPL-SCNC: 29 MMOL/L — SIGNIFICANT CHANGE UP (ref 17–32)
CO2 SERPL-SCNC: 30 MMOL/L — SIGNIFICANT CHANGE UP (ref 17–32)
CO2 SERPL-SCNC: 30 MMOL/L — SIGNIFICANT CHANGE UP (ref 17–32)
CO2 SERPL-SCNC: 31 MMOL/L — SIGNIFICANT CHANGE UP (ref 17–32)
CO2 SERPL-SCNC: 31 MMOL/L — SIGNIFICANT CHANGE UP (ref 17–32)
CO2 SERPL-SCNC: 32 MMOL/L — SIGNIFICANT CHANGE UP (ref 17–32)
CO2 SERPL-SCNC: 34 MMOL/L — HIGH (ref 17–32)
CO2 SERPL-SCNC: 36 MMOL/L — HIGH (ref 17–32)
CO2 SERPL-SCNC: 39 MMOL/L — HIGH (ref 17–32)
CO2 SERPL-SCNC: 42 MMOL/L — CRITICAL HIGH (ref 17–32)
CO2 SERPL-SCNC: 43 MMOL/L — CRITICAL HIGH (ref 17–32)
CO2 SERPL-SCNC: 45 MMOL/L — CRITICAL HIGH (ref 17–32)
COLOR SPEC: YELLOW — SIGNIFICANT CHANGE UP
COLOR SPEC: YELLOW — SIGNIFICANT CHANGE UP
CORTICOSTEROID BINDING GLOBULIN RESULT: 1.7 MG/DL — SIGNIFICANT CHANGE UP
CORTIS F/TOTAL MFR SERPL: 58 % — SIGNIFICANT CHANGE UP
CORTIS SERPL-MCNC: 27 UG/DL — HIGH
CORTISOL, FREE RESULT: 16 UG/DL — HIGH
CREAT ?TM UR-MCNC: 166 MG/DL — SIGNIFICANT CHANGE UP
CREAT ?TM UR-MCNC: 6 MG/DL — SIGNIFICANT CHANGE UP
CREAT SERPL-MCNC: 0.5 MG/DL — LOW (ref 0.7–1.5)
CREAT SERPL-MCNC: 0.6 MG/DL — LOW (ref 0.7–1.5)
CREAT SERPL-MCNC: 0.7 MG/DL — SIGNIFICANT CHANGE UP (ref 0.7–1.5)
CREAT SERPL-MCNC: 0.8 MG/DL — SIGNIFICANT CHANGE UP (ref 0.7–1.5)
CREAT SERPL-MCNC: 0.9 MG/DL — SIGNIFICANT CHANGE UP (ref 0.7–1.5)
CREAT SERPL-MCNC: 0.9 MG/DL — SIGNIFICANT CHANGE UP (ref 0.7–1.5)
CREAT SERPL-MCNC: 1 MG/DL — SIGNIFICANT CHANGE UP (ref 0.7–1.5)
CREAT SERPL-MCNC: 1.1 MG/DL — SIGNIFICANT CHANGE UP (ref 0.7–1.5)
CREAT SERPL-MCNC: 1.2 MG/DL
CREAT SERPL-MCNC: 1.2 MG/DL — SIGNIFICANT CHANGE UP (ref 0.7–1.5)
CREAT SERPL-MCNC: 1.2 MG/DL — SIGNIFICANT CHANGE UP (ref 0.7–1.5)
CREAT SERPL-MCNC: 1.3 MG/DL — SIGNIFICANT CHANGE UP (ref 0.7–1.5)
CREAT SERPL-MCNC: 1.3 MG/DL — SIGNIFICANT CHANGE UP (ref 0.7–1.5)
CREAT SERPL-MCNC: 1.4 MG/DL — SIGNIFICANT CHANGE UP (ref 0.7–1.5)
CREAT SERPL-MCNC: 1.4 MG/DL — SIGNIFICANT CHANGE UP (ref 0.7–1.5)
CREAT SERPL-MCNC: 1.5 MG/DL — SIGNIFICANT CHANGE UP (ref 0.7–1.5)
CREAT SERPL-MCNC: 1.5 MG/DL — SIGNIFICANT CHANGE UP (ref 0.7–1.5)
CREAT SERPL-MCNC: 1.6 MG/DL — HIGH (ref 0.7–1.5)
CREAT SERPL-MCNC: 1.7 MG/DL — HIGH (ref 0.7–1.5)
CREAT SERPL-MCNC: 1.8 MG/DL — HIGH (ref 0.7–1.5)
CREAT SERPL-MCNC: 1.9 MG/DL — HIGH (ref 0.7–1.5)
CREAT SERPL-MCNC: 2 MG/DL — HIGH (ref 0.7–1.5)
CREAT SERPL-MCNC: 2 MG/DL — HIGH (ref 0.7–1.5)
CREAT SERPL-MCNC: 2.1 MG/DL — HIGH (ref 0.7–1.5)
CREAT SERPL-MCNC: 2.2 MG/DL — HIGH (ref 0.7–1.5)
CREAT SERPL-MCNC: 2.3 MG/DL — HIGH (ref 0.7–1.5)
CREAT SERPL-MCNC: 2.3 MG/DL — HIGH (ref 0.7–1.5)
CREAT SERPL-MCNC: 2.4 MG/DL — HIGH (ref 0.7–1.5)
CREAT SERPL-MCNC: 2.5 MG/DL — HIGH (ref 0.7–1.5)
CREAT SERPL-MCNC: 2.6 MG/DL — HIGH (ref 0.7–1.5)
CREAT SERPL-MCNC: 2.7 MG/DL — HIGH (ref 0.7–1.5)
CREAT SERPL-MCNC: 2.7 MG/DL — HIGH (ref 0.7–1.5)
CREAT SERPL-MCNC: 2.8 MG/DL — HIGH (ref 0.7–1.5)
CREAT SERPL-MCNC: <0.5 MG/DL — LOW (ref 0.7–1.5)
CULTURE RESULTS: SIGNIFICANT CHANGE UP
D DIMER BLD IA.RAPID-MCNC: 3198 NG/ML DDU — HIGH (ref 0–230)
D DIMER BLD IA.RAPID-MCNC: 3605 NG/ML DDU — HIGH (ref 0–230)
DIFF PNL FLD: ABNORMAL
DIFF PNL FLD: SIGNIFICANT CHANGE UP
EOSINOPHIL # BLD AUTO: 0 K/UL — SIGNIFICANT CHANGE UP (ref 0–0.7)
EOSINOPHIL # BLD AUTO: 0 K/UL — SIGNIFICANT CHANGE UP (ref 0–0.7)
EOSINOPHIL # BLD AUTO: 0.02 K/UL — SIGNIFICANT CHANGE UP (ref 0–0.7)
EOSINOPHIL # BLD AUTO: 0.03 K/UL — SIGNIFICANT CHANGE UP (ref 0–0.7)
EOSINOPHIL # BLD AUTO: 0.03 K/UL — SIGNIFICANT CHANGE UP (ref 0–0.7)
EOSINOPHIL # BLD AUTO: 0.04 K/UL — SIGNIFICANT CHANGE UP (ref 0–0.7)
EOSINOPHIL # BLD AUTO: 0.04 K/UL — SIGNIFICANT CHANGE UP (ref 0–0.7)
EOSINOPHIL # BLD AUTO: 0.06 K/UL — SIGNIFICANT CHANGE UP (ref 0–0.7)
EOSINOPHIL # BLD AUTO: 0.07 K/UL — SIGNIFICANT CHANGE UP (ref 0–0.7)
EOSINOPHIL # BLD AUTO: 0.07 K/UL — SIGNIFICANT CHANGE UP (ref 0–0.7)
EOSINOPHIL # BLD AUTO: 0.08 K/UL — SIGNIFICANT CHANGE UP (ref 0–0.7)
EOSINOPHIL # BLD AUTO: 0.09 K/UL — SIGNIFICANT CHANGE UP (ref 0–0.7)
EOSINOPHIL # BLD AUTO: 0.1 K/UL — SIGNIFICANT CHANGE UP (ref 0–0.7)
EOSINOPHIL # BLD AUTO: 0.12 K/UL — SIGNIFICANT CHANGE UP (ref 0–0.7)
EOSINOPHIL # BLD AUTO: 0.13 K/UL — SIGNIFICANT CHANGE UP (ref 0–0.7)
EOSINOPHIL # BLD AUTO: 0.17 K/UL — SIGNIFICANT CHANGE UP (ref 0–0.7)
EOSINOPHIL # BLD AUTO: 0.18 K/UL — SIGNIFICANT CHANGE UP (ref 0–0.7)
EOSINOPHIL # BLD AUTO: 0.18 K/UL — SIGNIFICANT CHANGE UP (ref 0–0.7)
EOSINOPHIL # BLD AUTO: 0.19 K/UL — SIGNIFICANT CHANGE UP (ref 0–0.7)
EOSINOPHIL # BLD AUTO: 0.2 K/UL — SIGNIFICANT CHANGE UP (ref 0–0.7)
EOSINOPHIL # BLD AUTO: 0.2 K/UL — SIGNIFICANT CHANGE UP (ref 0–0.7)
EOSINOPHIL # BLD AUTO: 0.22 K/UL — SIGNIFICANT CHANGE UP (ref 0–0.7)
EOSINOPHIL # BLD AUTO: 0.22 K/UL — SIGNIFICANT CHANGE UP (ref 0–0.7)
EOSINOPHIL # BLD AUTO: 0.23 K/UL — SIGNIFICANT CHANGE UP (ref 0–0.7)
EOSINOPHIL # BLD AUTO: 0.25 K/UL — SIGNIFICANT CHANGE UP (ref 0–0.7)
EOSINOPHIL # BLD AUTO: 0.25 K/UL — SIGNIFICANT CHANGE UP (ref 0–0.7)
EOSINOPHIL # BLD AUTO: 0.27 K/UL — SIGNIFICANT CHANGE UP (ref 0–0.7)
EOSINOPHIL # BLD AUTO: 0.28 K/UL — SIGNIFICANT CHANGE UP (ref 0–0.7)
EOSINOPHIL # BLD AUTO: 0.29 K/UL — SIGNIFICANT CHANGE UP (ref 0–0.7)
EOSINOPHIL # BLD AUTO: 0.3 K/UL — SIGNIFICANT CHANGE UP (ref 0–0.7)
EOSINOPHIL # BLD AUTO: 0.31 K/UL — SIGNIFICANT CHANGE UP (ref 0–0.7)
EOSINOPHIL # BLD AUTO: 0.34 K/UL — SIGNIFICANT CHANGE UP (ref 0–0.7)
EOSINOPHIL # BLD AUTO: 0.42 K/UL — SIGNIFICANT CHANGE UP (ref 0–0.7)
EOSINOPHIL # BLD AUTO: 0.44 K/UL — SIGNIFICANT CHANGE UP (ref 0–0.7)
EOSINOPHIL # BLD AUTO: 0.45 K/UL — SIGNIFICANT CHANGE UP (ref 0–0.7)
EOSINOPHIL # BLD AUTO: 0.46 K/UL — SIGNIFICANT CHANGE UP (ref 0–0.7)
EOSINOPHIL # BLD AUTO: 0.47 K/UL — SIGNIFICANT CHANGE UP (ref 0–0.7)
EOSINOPHIL # BLD AUTO: 0.54 K/UL — SIGNIFICANT CHANGE UP (ref 0–0.7)
EOSINOPHIL # BLD AUTO: 0.59 K/UL — SIGNIFICANT CHANGE UP (ref 0–0.7)
EOSINOPHIL # BLD AUTO: 0.6 K/UL — SIGNIFICANT CHANGE UP (ref 0–0.7)
EOSINOPHIL # BLD AUTO: 0.61 K/UL — SIGNIFICANT CHANGE UP (ref 0–0.7)
EOSINOPHIL # BLD AUTO: 0.66 K/UL — SIGNIFICANT CHANGE UP (ref 0–0.7)
EOSINOPHIL # BLD AUTO: 0.67 K/UL — SIGNIFICANT CHANGE UP (ref 0–0.7)
EOSINOPHIL # BLD AUTO: 0.67 K/UL — SIGNIFICANT CHANGE UP (ref 0–0.7)
EOSINOPHIL # BLD AUTO: 0.71 K/UL — HIGH (ref 0–0.7)
EOSINOPHIL # BLD AUTO: 0.75 K/UL — HIGH (ref 0–0.7)
EOSINOPHIL # BLD AUTO: 0.76 K/UL — HIGH (ref 0–0.7)
EOSINOPHIL # BLD AUTO: 0.77 K/UL — HIGH (ref 0–0.7)
EOSINOPHIL # BLD AUTO: 0.81 K/UL — HIGH (ref 0–0.7)
EOSINOPHIL # BLD AUTO: 0.82 K/UL — HIGH (ref 0–0.7)
EOSINOPHIL # BLD AUTO: 0.9 K/UL — HIGH (ref 0–0.7)
EOSINOPHIL # BLD AUTO: 1.15 K/UL — HIGH (ref 0–0.7)
EOSINOPHIL # BLD AUTO: 1.2 K/UL — HIGH (ref 0–0.7)
EOSINOPHIL # BLD AUTO: 1.38 K/UL — HIGH (ref 0–0.7)
EOSINOPHIL # BLD AUTO: 1.43 K/UL — HIGH (ref 0–0.7)
EOSINOPHIL NFR BLD AUTO: 0 % — SIGNIFICANT CHANGE UP (ref 0–8)
EOSINOPHIL NFR BLD AUTO: 0.1 % — SIGNIFICANT CHANGE UP (ref 0–8)
EOSINOPHIL NFR BLD AUTO: 0.2 % — SIGNIFICANT CHANGE UP (ref 0–8)
EOSINOPHIL NFR BLD AUTO: 0.3 % — SIGNIFICANT CHANGE UP (ref 0–8)
EOSINOPHIL NFR BLD AUTO: 0.4 % — SIGNIFICANT CHANGE UP (ref 0–8)
EOSINOPHIL NFR BLD AUTO: 0.5 % — SIGNIFICANT CHANGE UP (ref 0–8)
EOSINOPHIL NFR BLD AUTO: 0.6 % — SIGNIFICANT CHANGE UP (ref 0–8)
EOSINOPHIL NFR BLD AUTO: 0.6 % — SIGNIFICANT CHANGE UP (ref 0–8)
EOSINOPHIL NFR BLD AUTO: 0.7 % — SIGNIFICANT CHANGE UP (ref 0–8)
EOSINOPHIL NFR BLD AUTO: 0.8 % — SIGNIFICANT CHANGE UP (ref 0–8)
EOSINOPHIL NFR BLD AUTO: 0.8 % — SIGNIFICANT CHANGE UP (ref 0–8)
EOSINOPHIL NFR BLD AUTO: 0.9 % — SIGNIFICANT CHANGE UP (ref 0–8)
EOSINOPHIL NFR BLD AUTO: 0.9 % — SIGNIFICANT CHANGE UP (ref 0–8)
EOSINOPHIL NFR BLD AUTO: 1 % — SIGNIFICANT CHANGE UP (ref 0–8)
EOSINOPHIL NFR BLD AUTO: 1.1 % — SIGNIFICANT CHANGE UP (ref 0–8)
EOSINOPHIL NFR BLD AUTO: 1.2 % — SIGNIFICANT CHANGE UP (ref 0–8)
EOSINOPHIL NFR BLD AUTO: 1.3 % — SIGNIFICANT CHANGE UP (ref 0–8)
EOSINOPHIL NFR BLD AUTO: 1.6 % — SIGNIFICANT CHANGE UP (ref 0–8)
EOSINOPHIL NFR BLD AUTO: 1.8 % — SIGNIFICANT CHANGE UP (ref 0–8)
EOSINOPHIL NFR BLD AUTO: 2.1 % — SIGNIFICANT CHANGE UP (ref 0–8)
EOSINOPHIL NFR BLD AUTO: 2.1 % — SIGNIFICANT CHANGE UP (ref 0–8)
EOSINOPHIL NFR BLD AUTO: 2.3 % — SIGNIFICANT CHANGE UP (ref 0–8)
EOSINOPHIL NFR BLD AUTO: 2.4 % — SIGNIFICANT CHANGE UP (ref 0–8)
EOSINOPHIL NFR BLD AUTO: 2.4 % — SIGNIFICANT CHANGE UP (ref 0–8)
EOSINOPHIL NFR BLD AUTO: 2.6 % — SIGNIFICANT CHANGE UP (ref 0–8)
EOSINOPHIL NFR BLD AUTO: 2.8 % — SIGNIFICANT CHANGE UP (ref 0–8)
EOSINOPHIL NFR BLD AUTO: 3.3 % — SIGNIFICANT CHANGE UP (ref 0–8)
EOSINOPHIL NFR BLD AUTO: 3.4 % — SIGNIFICANT CHANGE UP (ref 0–8)
EOSINOPHIL NFR BLD AUTO: 3.5 % — SIGNIFICANT CHANGE UP (ref 0–8)
EOSINOPHIL NFR BLD AUTO: 3.7 % — SIGNIFICANT CHANGE UP (ref 0–8)
EOSINOPHIL NFR BLD AUTO: 3.8 % — SIGNIFICANT CHANGE UP (ref 0–8)
EOSINOPHIL NFR BLD AUTO: 3.9 % — SIGNIFICANT CHANGE UP (ref 0–8)
EOSINOPHIL NFR BLD AUTO: 3.9 % — SIGNIFICANT CHANGE UP (ref 0–8)
EOSINOPHIL NFR BLD AUTO: 4 % — SIGNIFICANT CHANGE UP (ref 0–8)
EOSINOPHIL NFR BLD AUTO: 4.1 % — SIGNIFICANT CHANGE UP (ref 0–8)
EOSINOPHIL NFR BLD AUTO: 4.2 % — SIGNIFICANT CHANGE UP (ref 0–8)
EOSINOPHIL NFR BLD AUTO: 4.4 % — SIGNIFICANT CHANGE UP (ref 0–8)
EOSINOPHIL NFR BLD AUTO: 4.6 % — SIGNIFICANT CHANGE UP (ref 0–8)
EOSINOPHIL NFR BLD AUTO: 4.6 % — SIGNIFICANT CHANGE UP (ref 0–8)
EOSINOPHIL NFR BLD AUTO: 4.7 % — SIGNIFICANT CHANGE UP (ref 0–8)
EOSINOPHIL NFR BLD AUTO: 5.1 % — SIGNIFICANT CHANGE UP (ref 0–8)
EOSINOPHIL NFR BLD AUTO: 5.5 % — SIGNIFICANT CHANGE UP (ref 0–8)
EOSINOPHIL NFR BLD AUTO: 5.9 % — SIGNIFICANT CHANGE UP (ref 0–8)
EOSINOPHIL NFR BLD AUTO: 6.3 % — SIGNIFICANT CHANGE UP (ref 0–8)
EOSINOPHIL NFR BLD AUTO: 7.8 % — SIGNIFICANT CHANGE UP (ref 0–8)
EPI CELLS # UR: 1 /HPF — SIGNIFICANT CHANGE UP (ref 0–5)
EPI CELLS # UR: 1 /HPF — SIGNIFICANT CHANGE UP (ref 0–5)
ESTIMATED AVERAGE GLUCOSE: 128 MG/DL — HIGH (ref 68–114)
FERRITIN SERPL-MCNC: 1281 NG/ML — HIGH (ref 15–150)
FERRITIN SERPL-MCNC: 90 NG/ML
FIBRINOGEN PPP-MCNC: 574 MG/DL — HIGH (ref 204.4–570.6)
FOLATE SERPL-MCNC: 8.4 NG/ML — SIGNIFICANT CHANGE UP
FOLATE SERPL-MCNC: >20 NG/ML
FUNGITELL: 46 PG/ML — SIGNIFICANT CHANGE UP
FUNGITELL: <31 PG/ML — SIGNIFICANT CHANGE UP
GALACTOMANNAN AG SERPL-ACNC: <0.5 INDEX — SIGNIFICANT CHANGE UP
GAS PNL BLDA: SIGNIFICANT CHANGE UP
GAS PNL BLDV: 137 MMOL/L — SIGNIFICANT CHANGE UP (ref 136–145)
GAS PNL BLDV: SIGNIFICANT CHANGE UP
GIANT PLATELETS BLD QL SMEAR: PRESENT — SIGNIFICANT CHANGE UP
GIANT PLATELETS BLD QL SMEAR: PRESENT — SIGNIFICANT CHANGE UP
GLUCOSE BLDC GLUCOMTR-MCNC: 107 MG/DL — HIGH (ref 70–99)
GLUCOSE BLDC GLUCOMTR-MCNC: 109 MG/DL — HIGH (ref 70–99)
GLUCOSE BLDC GLUCOMTR-MCNC: 111 MG/DL — HIGH (ref 70–99)
GLUCOSE BLDC GLUCOMTR-MCNC: 115 MG/DL — HIGH (ref 70–99)
GLUCOSE BLDC GLUCOMTR-MCNC: 117 MG/DL — HIGH (ref 70–99)
GLUCOSE BLDC GLUCOMTR-MCNC: 117 MG/DL — HIGH (ref 70–99)
GLUCOSE BLDC GLUCOMTR-MCNC: 120 MG/DL — HIGH (ref 70–99)
GLUCOSE BLDC GLUCOMTR-MCNC: 125 MG/DL — HIGH (ref 70–99)
GLUCOSE BLDC GLUCOMTR-MCNC: 126 MG/DL — HIGH (ref 70–99)
GLUCOSE BLDC GLUCOMTR-MCNC: 126 MG/DL — HIGH (ref 70–99)
GLUCOSE BLDC GLUCOMTR-MCNC: 128 MG/DL — HIGH (ref 70–99)
GLUCOSE BLDC GLUCOMTR-MCNC: 129 MG/DL — HIGH (ref 70–99)
GLUCOSE BLDC GLUCOMTR-MCNC: 129 MG/DL — HIGH (ref 70–99)
GLUCOSE BLDC GLUCOMTR-MCNC: 131 MG/DL — HIGH (ref 70–99)
GLUCOSE BLDC GLUCOMTR-MCNC: 132 MG/DL — HIGH (ref 70–99)
GLUCOSE BLDC GLUCOMTR-MCNC: 132 MG/DL — HIGH (ref 70–99)
GLUCOSE BLDC GLUCOMTR-MCNC: 133 MG/DL — HIGH (ref 70–99)
GLUCOSE BLDC GLUCOMTR-MCNC: 134 MG/DL — HIGH (ref 70–99)
GLUCOSE BLDC GLUCOMTR-MCNC: 135 MG/DL — HIGH (ref 70–99)
GLUCOSE BLDC GLUCOMTR-MCNC: 135 MG/DL — HIGH (ref 70–99)
GLUCOSE BLDC GLUCOMTR-MCNC: 136 MG/DL — HIGH (ref 70–99)
GLUCOSE BLDC GLUCOMTR-MCNC: 137 MG/DL — HIGH (ref 70–99)
GLUCOSE BLDC GLUCOMTR-MCNC: 138 MG/DL — HIGH (ref 70–99)
GLUCOSE BLDC GLUCOMTR-MCNC: 139 MG/DL — HIGH (ref 70–99)
GLUCOSE BLDC GLUCOMTR-MCNC: 140 MG/DL — HIGH (ref 70–99)
GLUCOSE BLDC GLUCOMTR-MCNC: 141 MG/DL — HIGH (ref 70–99)
GLUCOSE BLDC GLUCOMTR-MCNC: 141 MG/DL — HIGH (ref 70–99)
GLUCOSE BLDC GLUCOMTR-MCNC: 142 MG/DL — HIGH (ref 70–99)
GLUCOSE BLDC GLUCOMTR-MCNC: 142 MG/DL — HIGH (ref 70–99)
GLUCOSE BLDC GLUCOMTR-MCNC: 143 MG/DL — HIGH (ref 70–99)
GLUCOSE BLDC GLUCOMTR-MCNC: 143 MG/DL — HIGH (ref 70–99)
GLUCOSE BLDC GLUCOMTR-MCNC: 146 MG/DL — HIGH (ref 70–99)
GLUCOSE BLDC GLUCOMTR-MCNC: 147 MG/DL — HIGH (ref 70–99)
GLUCOSE BLDC GLUCOMTR-MCNC: 148 MG/DL — HIGH (ref 70–99)
GLUCOSE BLDC GLUCOMTR-MCNC: 149 MG/DL — HIGH (ref 70–99)
GLUCOSE BLDC GLUCOMTR-MCNC: 149 MG/DL — HIGH (ref 70–99)
GLUCOSE BLDC GLUCOMTR-MCNC: 150 MG/DL — HIGH (ref 70–99)
GLUCOSE BLDC GLUCOMTR-MCNC: 150 MG/DL — HIGH (ref 70–99)
GLUCOSE BLDC GLUCOMTR-MCNC: 151 MG/DL — HIGH (ref 70–99)
GLUCOSE BLDC GLUCOMTR-MCNC: 152 MG/DL — HIGH (ref 70–99)
GLUCOSE BLDC GLUCOMTR-MCNC: 154 MG/DL — HIGH (ref 70–99)
GLUCOSE BLDC GLUCOMTR-MCNC: 155 MG/DL — HIGH (ref 70–99)
GLUCOSE BLDC GLUCOMTR-MCNC: 156 MG/DL — HIGH (ref 70–99)
GLUCOSE BLDC GLUCOMTR-MCNC: 156 MG/DL — HIGH (ref 70–99)
GLUCOSE BLDC GLUCOMTR-MCNC: 157 MG/DL — HIGH (ref 70–99)
GLUCOSE BLDC GLUCOMTR-MCNC: 158 MG/DL — HIGH (ref 70–99)
GLUCOSE BLDC GLUCOMTR-MCNC: 160 MG/DL — HIGH (ref 70–99)
GLUCOSE BLDC GLUCOMTR-MCNC: 160 MG/DL — HIGH (ref 70–99)
GLUCOSE BLDC GLUCOMTR-MCNC: 161 MG/DL — HIGH (ref 70–99)
GLUCOSE BLDC GLUCOMTR-MCNC: 161 MG/DL — HIGH (ref 70–99)
GLUCOSE BLDC GLUCOMTR-MCNC: 162 MG/DL — HIGH (ref 70–99)
GLUCOSE BLDC GLUCOMTR-MCNC: 163 MG/DL — HIGH (ref 70–99)
GLUCOSE BLDC GLUCOMTR-MCNC: 163 MG/DL — HIGH (ref 70–99)
GLUCOSE BLDC GLUCOMTR-MCNC: 164 MG/DL — HIGH (ref 70–99)
GLUCOSE BLDC GLUCOMTR-MCNC: 165 MG/DL — HIGH (ref 70–99)
GLUCOSE BLDC GLUCOMTR-MCNC: 166 MG/DL — HIGH (ref 70–99)
GLUCOSE BLDC GLUCOMTR-MCNC: 167 MG/DL — HIGH (ref 70–99)
GLUCOSE BLDC GLUCOMTR-MCNC: 168 MG/DL — HIGH (ref 70–99)
GLUCOSE BLDC GLUCOMTR-MCNC: 169 MG/DL — HIGH (ref 70–99)
GLUCOSE BLDC GLUCOMTR-MCNC: 170 MG/DL — HIGH (ref 70–99)
GLUCOSE BLDC GLUCOMTR-MCNC: 171 MG/DL — HIGH (ref 70–99)
GLUCOSE BLDC GLUCOMTR-MCNC: 172 MG/DL — HIGH (ref 70–99)
GLUCOSE BLDC GLUCOMTR-MCNC: 174 MG/DL — HIGH (ref 70–99)
GLUCOSE BLDC GLUCOMTR-MCNC: 175 MG/DL — HIGH (ref 70–99)
GLUCOSE BLDC GLUCOMTR-MCNC: 176 MG/DL — HIGH (ref 70–99)
GLUCOSE BLDC GLUCOMTR-MCNC: 178 MG/DL — HIGH (ref 70–99)
GLUCOSE BLDC GLUCOMTR-MCNC: 181 MG/DL — HIGH (ref 70–99)
GLUCOSE BLDC GLUCOMTR-MCNC: 181 MG/DL — HIGH (ref 70–99)
GLUCOSE BLDC GLUCOMTR-MCNC: 182 MG/DL — HIGH (ref 70–99)
GLUCOSE BLDC GLUCOMTR-MCNC: 182 MG/DL — HIGH (ref 70–99)
GLUCOSE BLDC GLUCOMTR-MCNC: 183 MG/DL — HIGH (ref 70–99)
GLUCOSE BLDC GLUCOMTR-MCNC: 184 MG/DL — HIGH (ref 70–99)
GLUCOSE BLDC GLUCOMTR-MCNC: 185 MG/DL — HIGH (ref 70–99)
GLUCOSE BLDC GLUCOMTR-MCNC: 185 MG/DL — HIGH (ref 70–99)
GLUCOSE BLDC GLUCOMTR-MCNC: 186 MG/DL — HIGH (ref 70–99)
GLUCOSE BLDC GLUCOMTR-MCNC: 187 MG/DL — HIGH (ref 70–99)
GLUCOSE BLDC GLUCOMTR-MCNC: 188 MG/DL — HIGH (ref 70–99)
GLUCOSE BLDC GLUCOMTR-MCNC: 189 MG/DL — HIGH (ref 70–99)
GLUCOSE BLDC GLUCOMTR-MCNC: 190 MG/DL — HIGH (ref 70–99)
GLUCOSE BLDC GLUCOMTR-MCNC: 191 MG/DL — HIGH (ref 70–99)
GLUCOSE BLDC GLUCOMTR-MCNC: 191 MG/DL — HIGH (ref 70–99)
GLUCOSE BLDC GLUCOMTR-MCNC: 192 MG/DL — HIGH (ref 70–99)
GLUCOSE BLDC GLUCOMTR-MCNC: 192 MG/DL — HIGH (ref 70–99)
GLUCOSE BLDC GLUCOMTR-MCNC: 193 MG/DL — HIGH (ref 70–99)
GLUCOSE BLDC GLUCOMTR-MCNC: 193 MG/DL — HIGH (ref 70–99)
GLUCOSE BLDC GLUCOMTR-MCNC: 194 MG/DL — HIGH (ref 70–99)
GLUCOSE BLDC GLUCOMTR-MCNC: 195 MG/DL — HIGH (ref 70–99)
GLUCOSE BLDC GLUCOMTR-MCNC: 196 MG/DL — HIGH (ref 70–99)
GLUCOSE BLDC GLUCOMTR-MCNC: 197 MG/DL — HIGH (ref 70–99)
GLUCOSE BLDC GLUCOMTR-MCNC: 198 MG/DL — HIGH (ref 70–99)
GLUCOSE BLDC GLUCOMTR-MCNC: 199 MG/DL — HIGH (ref 70–99)
GLUCOSE BLDC GLUCOMTR-MCNC: 200 MG/DL — HIGH (ref 70–99)
GLUCOSE BLDC GLUCOMTR-MCNC: 201 MG/DL — HIGH (ref 70–99)
GLUCOSE BLDC GLUCOMTR-MCNC: 202 MG/DL — HIGH (ref 70–99)
GLUCOSE BLDC GLUCOMTR-MCNC: 203 MG/DL — HIGH (ref 70–99)
GLUCOSE BLDC GLUCOMTR-MCNC: 205 MG/DL — HIGH (ref 70–99)
GLUCOSE BLDC GLUCOMTR-MCNC: 206 MG/DL — HIGH (ref 70–99)
GLUCOSE BLDC GLUCOMTR-MCNC: 207 MG/DL — HIGH (ref 70–99)
GLUCOSE BLDC GLUCOMTR-MCNC: 207 MG/DL — HIGH (ref 70–99)
GLUCOSE BLDC GLUCOMTR-MCNC: 209 MG/DL — HIGH (ref 70–99)
GLUCOSE BLDC GLUCOMTR-MCNC: 213 MG/DL — HIGH (ref 70–99)
GLUCOSE BLDC GLUCOMTR-MCNC: 215 MG/DL — HIGH (ref 70–99)
GLUCOSE BLDC GLUCOMTR-MCNC: 216 MG/DL — HIGH (ref 70–99)
GLUCOSE BLDC GLUCOMTR-MCNC: 217 MG/DL — HIGH (ref 70–99)
GLUCOSE BLDC GLUCOMTR-MCNC: 219 MG/DL — HIGH (ref 70–99)
GLUCOSE BLDC GLUCOMTR-MCNC: 219 MG/DL — HIGH (ref 70–99)
GLUCOSE BLDC GLUCOMTR-MCNC: 221 MG/DL — HIGH (ref 70–99)
GLUCOSE BLDC GLUCOMTR-MCNC: 222 MG/DL — HIGH (ref 70–99)
GLUCOSE BLDC GLUCOMTR-MCNC: 225 MG/DL — HIGH (ref 70–99)
GLUCOSE BLDC GLUCOMTR-MCNC: 228 MG/DL — HIGH (ref 70–99)
GLUCOSE BLDC GLUCOMTR-MCNC: 231 MG/DL — HIGH (ref 70–99)
GLUCOSE BLDC GLUCOMTR-MCNC: 231 MG/DL — HIGH (ref 70–99)
GLUCOSE BLDC GLUCOMTR-MCNC: 234 MG/DL — HIGH (ref 70–99)
GLUCOSE BLDC GLUCOMTR-MCNC: 235 MG/DL — HIGH (ref 70–99)
GLUCOSE BLDC GLUCOMTR-MCNC: 238 MG/DL — HIGH (ref 70–99)
GLUCOSE BLDC GLUCOMTR-MCNC: 240 MG/DL — HIGH (ref 70–99)
GLUCOSE BLDC GLUCOMTR-MCNC: 252 MG/DL — HIGH (ref 70–99)
GLUCOSE BLDC GLUCOMTR-MCNC: 253 MG/DL — HIGH (ref 70–99)
GLUCOSE BLDC GLUCOMTR-MCNC: 260 MG/DL — HIGH (ref 70–99)
GLUCOSE BLDC GLUCOMTR-MCNC: 261 MG/DL — HIGH (ref 70–99)
GLUCOSE BLDC GLUCOMTR-MCNC: 278 MG/DL — HIGH (ref 70–99)
GLUCOSE BLDC GLUCOMTR-MCNC: 280 MG/DL — HIGH (ref 70–99)
GLUCOSE BLDC GLUCOMTR-MCNC: 299 MG/DL — HIGH (ref 70–99)
GLUCOSE BLDC GLUCOMTR-MCNC: 31 MG/DL — CRITICAL LOW (ref 70–99)
GLUCOSE BLDC GLUCOMTR-MCNC: 44 MG/DL — CRITICAL LOW (ref 70–99)
GLUCOSE BLDC GLUCOMTR-MCNC: 584 MG/DL — CRITICAL HIGH (ref 70–99)
GLUCOSE BLDC GLUCOMTR-MCNC: 66 MG/DL — LOW (ref 70–99)
GLUCOSE BLDC GLUCOMTR-MCNC: 74 MG/DL — SIGNIFICANT CHANGE UP (ref 70–99)
GLUCOSE BLDC GLUCOMTR-MCNC: 79 MG/DL — SIGNIFICANT CHANGE UP (ref 70–99)
GLUCOSE BLDC GLUCOMTR-MCNC: 80 MG/DL — SIGNIFICANT CHANGE UP (ref 70–99)
GLUCOSE BLDC GLUCOMTR-MCNC: 83 MG/DL — SIGNIFICANT CHANGE UP (ref 70–99)
GLUCOSE BLDC GLUCOMTR-MCNC: 86 MG/DL — SIGNIFICANT CHANGE UP (ref 70–99)
GLUCOSE BLDC GLUCOMTR-MCNC: 86 MG/DL — SIGNIFICANT CHANGE UP (ref 70–99)
GLUCOSE BLDC GLUCOMTR-MCNC: 88 MG/DL — SIGNIFICANT CHANGE UP (ref 70–99)
GLUCOSE BLDC GLUCOMTR-MCNC: 88 MG/DL — SIGNIFICANT CHANGE UP (ref 70–99)
GLUCOSE BLDC GLUCOMTR-MCNC: 89 MG/DL — SIGNIFICANT CHANGE UP (ref 70–99)
GLUCOSE BLDC GLUCOMTR-MCNC: 89 MG/DL — SIGNIFICANT CHANGE UP (ref 70–99)
GLUCOSE BLDC GLUCOMTR-MCNC: 91 MG/DL — SIGNIFICANT CHANGE UP (ref 70–99)
GLUCOSE BLDC GLUCOMTR-MCNC: 91 MG/DL — SIGNIFICANT CHANGE UP (ref 70–99)
GLUCOSE BLDC GLUCOMTR-MCNC: 92 MG/DL — SIGNIFICANT CHANGE UP (ref 70–99)
GLUCOSE BLDC GLUCOMTR-MCNC: 95 MG/DL — SIGNIFICANT CHANGE UP (ref 70–99)
GLUCOSE SERPL-MCNC: 104 MG/DL — HIGH (ref 70–99)
GLUCOSE SERPL-MCNC: 105 MG/DL — HIGH (ref 70–99)
GLUCOSE SERPL-MCNC: 107 MG/DL — HIGH (ref 70–99)
GLUCOSE SERPL-MCNC: 108 MG/DL — HIGH (ref 70–99)
GLUCOSE SERPL-MCNC: 108 MG/DL — HIGH (ref 70–99)
GLUCOSE SERPL-MCNC: 116 MG/DL — HIGH (ref 70–99)
GLUCOSE SERPL-MCNC: 117 MG/DL — HIGH (ref 70–99)
GLUCOSE SERPL-MCNC: 117 MG/DL — HIGH (ref 70–99)
GLUCOSE SERPL-MCNC: 118 MG/DL
GLUCOSE SERPL-MCNC: 118 MG/DL — HIGH (ref 70–99)
GLUCOSE SERPL-MCNC: 120 MG/DL — HIGH (ref 70–99)
GLUCOSE SERPL-MCNC: 123 MG/DL — HIGH (ref 70–99)
GLUCOSE SERPL-MCNC: 125 MG/DL — HIGH (ref 70–99)
GLUCOSE SERPL-MCNC: 126 MG/DL — HIGH (ref 70–99)
GLUCOSE SERPL-MCNC: 128 MG/DL — HIGH (ref 70–99)
GLUCOSE SERPL-MCNC: 129 MG/DL — HIGH (ref 70–99)
GLUCOSE SERPL-MCNC: 130 MG/DL — HIGH (ref 70–99)
GLUCOSE SERPL-MCNC: 136 MG/DL — HIGH (ref 70–99)
GLUCOSE SERPL-MCNC: 138 MG/DL — HIGH (ref 70–99)
GLUCOSE SERPL-MCNC: 143 MG/DL — HIGH (ref 70–99)
GLUCOSE SERPL-MCNC: 143 MG/DL — HIGH (ref 70–99)
GLUCOSE SERPL-MCNC: 146 MG/DL — HIGH (ref 70–99)
GLUCOSE SERPL-MCNC: 152 MG/DL — HIGH (ref 70–99)
GLUCOSE SERPL-MCNC: 153 MG/DL — HIGH (ref 70–99)
GLUCOSE SERPL-MCNC: 154 MG/DL — HIGH (ref 70–99)
GLUCOSE SERPL-MCNC: 156 MG/DL — HIGH (ref 70–99)
GLUCOSE SERPL-MCNC: 157 MG/DL — HIGH (ref 70–99)
GLUCOSE SERPL-MCNC: 157 MG/DL — HIGH (ref 70–99)
GLUCOSE SERPL-MCNC: 158 MG/DL — HIGH (ref 70–99)
GLUCOSE SERPL-MCNC: 159 MG/DL — HIGH (ref 70–99)
GLUCOSE SERPL-MCNC: 160 MG/DL — HIGH (ref 70–99)
GLUCOSE SERPL-MCNC: 164 MG/DL — HIGH (ref 70–99)
GLUCOSE SERPL-MCNC: 166 MG/DL — HIGH (ref 70–99)
GLUCOSE SERPL-MCNC: 166 MG/DL — HIGH (ref 70–99)
GLUCOSE SERPL-MCNC: 167 MG/DL — HIGH (ref 70–99)
GLUCOSE SERPL-MCNC: 167 MG/DL — HIGH (ref 70–99)
GLUCOSE SERPL-MCNC: 169 MG/DL — HIGH (ref 70–99)
GLUCOSE SERPL-MCNC: 170 MG/DL — HIGH (ref 70–99)
GLUCOSE SERPL-MCNC: 172 MG/DL — HIGH (ref 70–99)
GLUCOSE SERPL-MCNC: 173 MG/DL — HIGH (ref 70–99)
GLUCOSE SERPL-MCNC: 174 MG/DL — HIGH (ref 70–99)
GLUCOSE SERPL-MCNC: 175 MG/DL — HIGH (ref 70–99)
GLUCOSE SERPL-MCNC: 176 MG/DL — HIGH (ref 70–99)
GLUCOSE SERPL-MCNC: 176 MG/DL — HIGH (ref 70–99)
GLUCOSE SERPL-MCNC: 177 MG/DL — HIGH (ref 70–99)
GLUCOSE SERPL-MCNC: 178 MG/DL — HIGH (ref 70–99)
GLUCOSE SERPL-MCNC: 180 MG/DL — HIGH (ref 70–99)
GLUCOSE SERPL-MCNC: 182 MG/DL — HIGH (ref 70–99)
GLUCOSE SERPL-MCNC: 183 MG/DL — HIGH (ref 70–99)
GLUCOSE SERPL-MCNC: 187 MG/DL — HIGH (ref 70–99)
GLUCOSE SERPL-MCNC: 190 MG/DL — HIGH (ref 70–99)
GLUCOSE SERPL-MCNC: 191 MG/DL — HIGH (ref 70–99)
GLUCOSE SERPL-MCNC: 194 MG/DL — HIGH (ref 70–99)
GLUCOSE SERPL-MCNC: 198 MG/DL — HIGH (ref 70–99)
GLUCOSE SERPL-MCNC: 199 MG/DL — HIGH (ref 70–99)
GLUCOSE SERPL-MCNC: 202 MG/DL — HIGH (ref 70–99)
GLUCOSE SERPL-MCNC: 203 MG/DL — HIGH (ref 70–99)
GLUCOSE SERPL-MCNC: 205 MG/DL — HIGH (ref 70–99)
GLUCOSE SERPL-MCNC: 207 MG/DL — HIGH (ref 70–99)
GLUCOSE SERPL-MCNC: 210 MG/DL — HIGH (ref 70–99)
GLUCOSE SERPL-MCNC: 212 MG/DL — HIGH (ref 70–99)
GLUCOSE SERPL-MCNC: 213 MG/DL — HIGH (ref 70–99)
GLUCOSE SERPL-MCNC: 225 MG/DL — HIGH (ref 70–99)
GLUCOSE SERPL-MCNC: 260 MG/DL — HIGH (ref 70–99)
GLUCOSE SERPL-MCNC: 32 MG/DL — CRITICAL LOW (ref 70–99)
GLUCOSE SERPL-MCNC: 86 MG/DL — SIGNIFICANT CHANGE UP (ref 70–99)
GLUCOSE SERPL-MCNC: 86 MG/DL — SIGNIFICANT CHANGE UP (ref 70–99)
GLUCOSE UR QL: NEGATIVE — SIGNIFICANT CHANGE UP
GLUCOSE UR QL: NEGATIVE — SIGNIFICANT CHANGE UP
GRAM STN FLD: SIGNIFICANT CHANGE UP
HCO3 BLDA-SCNC: 20 MMOL/L — LOW (ref 23–27)
HCO3 BLDA-SCNC: 22 MMOL/L — LOW (ref 23–27)
HCO3 BLDA-SCNC: 22 MMOL/L — LOW (ref 23–27)
HCO3 BLDA-SCNC: 24 MMOL/L — SIGNIFICANT CHANGE UP (ref 21–29)
HCO3 BLDA-SCNC: 25 MMOL/L — SIGNIFICANT CHANGE UP (ref 23–27)
HCO3 BLDA-SCNC: 28 MMOL/L — HIGH (ref 23–27)
HCO3 BLDA-SCNC: 28 MMOL/L — HIGH (ref 23–27)
HCO3 BLDA-SCNC: 29 MMOL/L — HIGH (ref 23–27)
HCO3 BLDA-SCNC: 29 MMOL/L — HIGH (ref 23–27)
HCO3 BLDA-SCNC: 30 MMOL/L — HIGH (ref 23–27)
HCO3 BLDA-SCNC: 31 MMOL/L — HIGH (ref 21–29)
HCO3 BLDA-SCNC: 32 MMOL/L — HIGH (ref 23–27)
HCO3 BLDA-SCNC: 33 MMOL/L — HIGH (ref 23–27)
HCO3 BLDA-SCNC: 33 MMOL/L — HIGH (ref 23–27)
HCO3 BLDA-SCNC: 36 MMOL/L — HIGH (ref 23–27)
HCO3 BLDA-SCNC: 39 MMOL/L — HIGH (ref 23–27)
HCO3 BLDA-SCNC: 39 MMOL/L — HIGH (ref 23–27)
HCO3 BLDA-SCNC: 44 MMOL/L — CRITICAL HIGH (ref 23–27)
HCO3 BLDA-SCNC: 48 MMOL/L — CRITICAL HIGH (ref 23–27)
HCO3 BLDA-SCNC: 50 MMOL/L — CRITICAL HIGH (ref 23–27)
HCO3 BLDA-SCNC: 50 MMOL/L — CRITICAL HIGH (ref 23–27)
HCO3 BLDV-SCNC: 29 MMOL/L — SIGNIFICANT CHANGE UP (ref 22–29)
HCT VFR BLD CALC: 21.9 % — LOW (ref 37–47)
HCT VFR BLD CALC: 22 % — LOW (ref 37–47)
HCT VFR BLD CALC: 22.7 % — LOW (ref 37–47)
HCT VFR BLD CALC: 23 % — LOW (ref 37–47)
HCT VFR BLD CALC: 23.1 % — LOW (ref 37–47)
HCT VFR BLD CALC: 23.4 % — LOW (ref 37–47)
HCT VFR BLD CALC: 23.7 % — LOW (ref 37–47)
HCT VFR BLD CALC: 23.7 % — LOW (ref 37–47)
HCT VFR BLD CALC: 23.9 % — LOW (ref 37–47)
HCT VFR BLD CALC: 24 % — LOW (ref 37–47)
HCT VFR BLD CALC: 24 % — LOW (ref 37–47)
HCT VFR BLD CALC: 24.1 % — LOW (ref 37–47)
HCT VFR BLD CALC: 24.3 % — LOW (ref 37–47)
HCT VFR BLD CALC: 24.4 % — LOW (ref 37–47)
HCT VFR BLD CALC: 24.5 % — LOW (ref 37–47)
HCT VFR BLD CALC: 24.8 % — LOW (ref 37–47)
HCT VFR BLD CALC: 24.8 % — LOW (ref 37–47)
HCT VFR BLD CALC: 24.9 % — LOW (ref 37–47)
HCT VFR BLD CALC: 25 % — LOW (ref 37–47)
HCT VFR BLD CALC: 25.2 % — LOW (ref 37–47)
HCT VFR BLD CALC: 25.2 % — LOW (ref 37–47)
HCT VFR BLD CALC: 25.3 % — LOW (ref 37–47)
HCT VFR BLD CALC: 25.4 % — LOW (ref 37–47)
HCT VFR BLD CALC: 25.8 % — LOW (ref 37–47)
HCT VFR BLD CALC: 25.9 % — LOW (ref 37–47)
HCT VFR BLD CALC: 26.2 % — LOW (ref 37–47)
HCT VFR BLD CALC: 26.3 % — LOW (ref 37–47)
HCT VFR BLD CALC: 26.3 % — LOW (ref 37–47)
HCT VFR BLD CALC: 26.5 % — LOW (ref 37–47)
HCT VFR BLD CALC: 26.9 % — LOW (ref 37–47)
HCT VFR BLD CALC: 27.4 % — LOW (ref 37–47)
HCT VFR BLD CALC: 27.5 % — LOW (ref 37–47)
HCT VFR BLD CALC: 27.6 % — LOW (ref 37–47)
HCT VFR BLD CALC: 27.7 % — LOW (ref 37–47)
HCT VFR BLD CALC: 27.8 % — LOW (ref 37–47)
HCT VFR BLD CALC: 28 % — LOW (ref 37–47)
HCT VFR BLD CALC: 28.2 % — LOW (ref 37–47)
HCT VFR BLD CALC: 28.2 % — LOW (ref 37–47)
HCT VFR BLD CALC: 28.3 % — LOW (ref 37–47)
HCT VFR BLD CALC: 28.6 % — LOW (ref 37–47)
HCT VFR BLD CALC: 28.9 % — LOW (ref 37–47)
HCT VFR BLD CALC: 29 % — LOW (ref 37–47)
HCT VFR BLD CALC: 29.1 % — LOW (ref 37–47)
HCT VFR BLD CALC: 29.2 % — LOW (ref 37–47)
HCT VFR BLD CALC: 29.5 % — LOW (ref 37–47)
HCT VFR BLD CALC: 29.7 % — LOW (ref 37–47)
HCT VFR BLD CALC: 30.3 % — LOW (ref 37–47)
HCT VFR BLD CALC: 31 % — LOW (ref 37–47)
HCT VFR BLD CALC: 31.9 % — LOW (ref 37–47)
HCT VFR BLD CALC: 32 % — LOW (ref 37–47)
HCT VFR BLD CALC: 32.3 % — LOW (ref 37–47)
HCT VFR BLD CALC: 32.3 % — LOW (ref 37–47)
HCT VFR BLD CALC: 32.4 % — LOW (ref 37–47)
HCT VFR BLD CALC: 32.7 % — LOW (ref 37–47)
HCT VFR BLD CALC: 32.8 % — LOW (ref 37–47)
HCT VFR BLD CALC: 33 % — LOW (ref 37–47)
HCT VFR BLD CALC: 33 % — LOW (ref 37–47)
HCT VFR BLD CALC: 33.3 % — LOW (ref 37–47)
HCT VFR BLD CALC: 33.4 % — LOW (ref 37–47)
HCT VFR BLD CALC: 33.5 % — LOW (ref 37–47)
HCT VFR BLD CALC: 33.8 % — LOW (ref 37–47)
HCT VFR BLD CALC: 33.8 % — LOW (ref 37–47)
HCT VFR BLD CALC: 34 % — LOW (ref 37–47)
HCT VFR BLD CALC: 35 % — LOW (ref 37–47)
HCT VFR BLD CALC: 35.6 %
HCT VFR BLDA CALC: 55.2 % — HIGH (ref 34–44)
HEPARIN-PF4 AB RESULT: <0.6 U/ML — SIGNIFICANT CHANGE UP (ref 0–0.9)
HGB A MFR BLD: 97.5 %
HGB A2 MFR BLD: 2.5 %
HGB BLD CALC-MCNC: 18 G/DL — SIGNIFICANT CHANGE UP (ref 14–18)
HGB BLD-MCNC: 10 G/DL — LOW (ref 12–16)
HGB BLD-MCNC: 10.2 G/DL — LOW (ref 12–16)
HGB BLD-MCNC: 10.3 G/DL — LOW (ref 12–16)
HGB BLD-MCNC: 10.3 G/DL — LOW (ref 12–16)
HGB BLD-MCNC: 10.4 G/DL — LOW (ref 12–16)
HGB BLD-MCNC: 10.5 G/DL — LOW (ref 12–16)
HGB BLD-MCNC: 10.6 G/DL — LOW (ref 12–16)
HGB BLD-MCNC: 10.8 G/DL — LOW (ref 12–16)
HGB BLD-MCNC: 10.8 G/DL — LOW (ref 12–16)
HGB BLD-MCNC: 10.9 G/DL
HGB BLD-MCNC: 11.2 G/DL — LOW (ref 12–16)
HGB BLD-MCNC: 6.4 G/DL — CRITICAL LOW (ref 12–16)
HGB BLD-MCNC: 6.7 G/DL — CRITICAL LOW (ref 12–16)
HGB BLD-MCNC: 6.8 G/DL — CRITICAL LOW (ref 12–16)
HGB BLD-MCNC: 6.9 G/DL — CRITICAL LOW (ref 12–16)
HGB BLD-MCNC: 7 G/DL — LOW (ref 12–16)
HGB BLD-MCNC: 7.1 G/DL — LOW (ref 12–16)
HGB BLD-MCNC: 7.2 G/DL — LOW (ref 12–16)
HGB BLD-MCNC: 7.3 G/DL — LOW (ref 12–16)
HGB BLD-MCNC: 7.4 G/DL — LOW (ref 12–16)
HGB BLD-MCNC: 7.4 G/DL — LOW (ref 12–16)
HGB BLD-MCNC: 7.5 G/DL — LOW (ref 12–16)
HGB BLD-MCNC: 7.6 G/DL — LOW (ref 12–16)
HGB BLD-MCNC: 7.7 G/DL — LOW (ref 12–16)
HGB BLD-MCNC: 7.8 G/DL — LOW (ref 12–16)
HGB BLD-MCNC: 7.9 G/DL — LOW (ref 12–16)
HGB BLD-MCNC: 8 G/DL — LOW (ref 12–16)
HGB BLD-MCNC: 8.1 G/DL — LOW (ref 12–16)
HGB BLD-MCNC: 8.2 G/DL — LOW (ref 12–16)
HGB BLD-MCNC: 8.3 G/DL — LOW (ref 12–16)
HGB BLD-MCNC: 8.4 G/DL — LOW (ref 12–16)
HGB BLD-MCNC: 8.4 G/DL — LOW (ref 12–16)
HGB BLD-MCNC: 8.5 G/DL — LOW (ref 12–16)
HGB BLD-MCNC: 8.6 G/DL — LOW (ref 12–16)
HGB BLD-MCNC: 8.7 G/DL — LOW (ref 12–16)
HGB BLD-MCNC: 8.8 G/DL — LOW (ref 12–16)
HGB BLD-MCNC: 8.9 G/DL — LOW (ref 12–16)
HGB BLD-MCNC: 9 G/DL — LOW (ref 12–16)
HGB BLD-MCNC: 9 G/DL — LOW (ref 12–16)
HGB BLD-MCNC: 9.2 G/DL — LOW (ref 12–16)
HGB BLD-MCNC: 9.5 G/DL — LOW (ref 12–16)
HGB BLD-MCNC: 9.7 G/DL — LOW (ref 12–16)
HGB BLD-MCNC: 9.8 G/DL — LOW (ref 12–16)
HGB BLD-MCNC: 9.8 G/DL — LOW (ref 12–16)
HGB BLD-MCNC: 9.9 G/DL — LOW (ref 12–16)
HGB BLD-MCNC: 9.9 G/DL — LOW (ref 12–16)
HGB FRACT BLD-IMP: NORMAL
HOROWITZ INDEX BLDA+IHG-RTO: 100 — SIGNIFICANT CHANGE UP
HOROWITZ INDEX BLDA+IHG-RTO: 100 — SIGNIFICANT CHANGE UP
HOROWITZ INDEX BLDA+IHG-RTO: 21 — SIGNIFICANT CHANGE UP
HOROWITZ INDEX BLDA+IHG-RTO: 40 — SIGNIFICANT CHANGE UP
HOROWITZ INDEX BLDA+IHG-RTO: 50 — SIGNIFICANT CHANGE UP
HOROWITZ INDEX BLDA+IHG-RTO: 75 — SIGNIFICANT CHANGE UP
HOROWITZ INDEX BLDA+IHG-RTO: 80 — SIGNIFICANT CHANGE UP
HYALINE CASTS # UR AUTO: 3 /LPF — SIGNIFICANT CHANGE UP (ref 0–7)
HYALINE CASTS # UR AUTO: 6 /LPF — SIGNIFICANT CHANGE UP (ref 0–7)
HYPOCHROMIA BLD QL: SLIGHT — SIGNIFICANT CHANGE UP
IMM GRANULOCYTES NFR BLD AUTO: 0.4 % — HIGH (ref 0.1–0.3)
IMM GRANULOCYTES NFR BLD AUTO: 0.5 % — HIGH (ref 0.1–0.3)
IMM GRANULOCYTES NFR BLD AUTO: 0.6 % — HIGH (ref 0.1–0.3)
IMM GRANULOCYTES NFR BLD AUTO: 0.7 % — HIGH (ref 0.1–0.3)
IMM GRANULOCYTES NFR BLD AUTO: 0.8 % — HIGH (ref 0.1–0.3)
IMM GRANULOCYTES NFR BLD AUTO: 0.9 % — HIGH (ref 0.1–0.3)
IMM GRANULOCYTES NFR BLD AUTO: 1 % — HIGH (ref 0.1–0.3)
IMM GRANULOCYTES NFR BLD AUTO: 1.1 % — HIGH (ref 0.1–0.3)
IMM GRANULOCYTES NFR BLD AUTO: 1.2 % — HIGH (ref 0.1–0.3)
IMM GRANULOCYTES NFR BLD AUTO: 1.3 % — HIGH (ref 0.1–0.3)
IMM GRANULOCYTES NFR BLD AUTO: 1.4 % — HIGH (ref 0.1–0.3)
IMM GRANULOCYTES NFR BLD AUTO: 1.4 % — HIGH (ref 0.1–0.3)
IMM GRANULOCYTES NFR BLD AUTO: 1.6 % — HIGH (ref 0.1–0.3)
IMM GRANULOCYTES NFR BLD AUTO: 1.7 % — HIGH (ref 0.1–0.3)
IMM GRANULOCYTES NFR BLD AUTO: 1.8 % — HIGH (ref 0.1–0.3)
IMM GRANULOCYTES NFR BLD AUTO: 1.9 % — HIGH (ref 0.1–0.3)
IMM GRANULOCYTES NFR BLD AUTO: 2 % — HIGH (ref 0.1–0.3)
IMM GRANULOCYTES NFR BLD AUTO: 2.1 % — HIGH (ref 0.1–0.3)
IMM GRANULOCYTES NFR BLD AUTO: 2.2 % — HIGH (ref 0.1–0.3)
IMM GRANULOCYTES NFR BLD AUTO: 2.4 % — HIGH (ref 0.1–0.3)
IMM GRANULOCYTES NFR BLD AUTO: 2.5 % — HIGH (ref 0.1–0.3)
IMM GRANULOCYTES NFR BLD AUTO: 2.6 % — HIGH (ref 0.1–0.3)
IMM GRANULOCYTES NFR BLD AUTO: 3.1 % — HIGH (ref 0.1–0.3)
IMM GRANULOCYTES NFR BLD AUTO: 3.5 % — HIGH (ref 0.1–0.3)
IMM GRANULOCYTES NFR BLD AUTO: 4.2 % — HIGH (ref 0.1–0.3)
INR BLD: 1.02 RATIO — SIGNIFICANT CHANGE UP (ref 0.65–1.3)
INR BLD: 1.04 RATIO — SIGNIFICANT CHANGE UP (ref 0.65–1.3)
INR BLD: 1.06 RATIO — SIGNIFICANT CHANGE UP (ref 0.65–1.3)
INR BLD: 1.06 RATIO — SIGNIFICANT CHANGE UP (ref 0.65–1.3)
INR BLD: 1.07 RATIO — SIGNIFICANT CHANGE UP (ref 0.65–1.3)
INR BLD: 1.08 RATIO — SIGNIFICANT CHANGE UP (ref 0.65–1.3)
INR BLD: 1.1 RATIO — SIGNIFICANT CHANGE UP (ref 0.65–1.3)
INR BLD: 1.15 RATIO — SIGNIFICANT CHANGE UP (ref 0.65–1.3)
INR BLD: 1.25 RATIO — SIGNIFICANT CHANGE UP (ref 0.65–1.3)
INR BLD: 1.29 RATIO — SIGNIFICANT CHANGE UP (ref 0.65–1.3)
INR BLD: 1.3 RATIO — SIGNIFICANT CHANGE UP (ref 0.65–1.3)
INR BLD: 1.31 RATIO — HIGH (ref 0.65–1.3)
INR BLD: 1.5 RATIO — HIGH (ref 0.65–1.3)
INR BLD: 1.57 RATIO — HIGH (ref 0.65–1.3)
INR BLD: 1.59 RATIO — HIGH (ref 0.65–1.3)
INR BLD: 1.63 RATIO — HIGH (ref 0.65–1.3)
INR BLD: 1.63 RATIO — HIGH (ref 0.65–1.3)
IRON SATN MFR SERPL: 10 % — LOW (ref 15–50)
IRON SATN MFR SERPL: 13 UG/DL — LOW (ref 35–150)
IRON SATN MFR SERPL: 13 UG/DL — LOW (ref 35–150)
IRON SATN MFR SERPL: 28 % — SIGNIFICANT CHANGE UP (ref 15–50)
IRON SATN MFR SERPL: 30 UG/DL — LOW (ref 35–150)
IRON SATN MFR SERPL: 5 %
IRON SERPL-MCNC: 17 UG/DL
KETONES UR-MCNC: NEGATIVE — SIGNIFICANT CHANGE UP
KETONES UR-MCNC: SIGNIFICANT CHANGE UP
LACTATE BLDV-MCNC: 3.1 MMOL/L — HIGH (ref 0.5–1.6)
LACTATE SERPL-SCNC: 1.1 MMOL/L — SIGNIFICANT CHANGE UP (ref 0.7–2)
LDH SERPL-CCNC: 475
LEGIONELLA AG UR QL: NEGATIVE — SIGNIFICANT CHANGE UP
LEGIONELLA AG UR QL: NEGATIVE — SIGNIFICANT CHANGE UP
LEUKOCYTE ESTERASE UR-ACNC: NEGATIVE — SIGNIFICANT CHANGE UP
LEUKOCYTE ESTERASE UR-ACNC: NEGATIVE — SIGNIFICANT CHANGE UP
LYMPHOCYTES # BLD AUTO: 0.28 K/UL — LOW (ref 1.2–3.4)
LYMPHOCYTES # BLD AUTO: 0.9 % — LOW (ref 20.5–51.1)
LYMPHOCYTES # BLD AUTO: 1.23 K/UL — SIGNIFICANT CHANGE UP (ref 1.2–3.4)
LYMPHOCYTES # BLD AUTO: 1.32 K/UL — SIGNIFICANT CHANGE UP (ref 1.2–3.4)
LYMPHOCYTES # BLD AUTO: 1.34 K/UL — SIGNIFICANT CHANGE UP (ref 1.2–3.4)
LYMPHOCYTES # BLD AUTO: 1.36 K/UL — SIGNIFICANT CHANGE UP (ref 1.2–3.4)
LYMPHOCYTES # BLD AUTO: 1.45 K/UL — SIGNIFICANT CHANGE UP (ref 1.2–3.4)
LYMPHOCYTES # BLD AUTO: 1.48 K/UL — SIGNIFICANT CHANGE UP (ref 1.2–3.4)
LYMPHOCYTES # BLD AUTO: 1.52 K/UL — SIGNIFICANT CHANGE UP (ref 1.2–3.4)
LYMPHOCYTES # BLD AUTO: 1.63 K/UL — SIGNIFICANT CHANGE UP (ref 1.2–3.4)
LYMPHOCYTES # BLD AUTO: 1.74 K/UL — SIGNIFICANT CHANGE UP (ref 1.2–3.4)
LYMPHOCYTES # BLD AUTO: 1.76 K/UL — SIGNIFICANT CHANGE UP (ref 1.2–3.4)
LYMPHOCYTES # BLD AUTO: 1.81 K/UL — SIGNIFICANT CHANGE UP (ref 1.2–3.4)
LYMPHOCYTES # BLD AUTO: 1.87 K/UL — SIGNIFICANT CHANGE UP (ref 1.2–3.4)
LYMPHOCYTES # BLD AUTO: 1.92 K/UL — SIGNIFICANT CHANGE UP (ref 1.2–3.4)
LYMPHOCYTES # BLD AUTO: 1.95 K/UL — SIGNIFICANT CHANGE UP (ref 1.2–3.4)
LYMPHOCYTES # BLD AUTO: 1.95 K/UL — SIGNIFICANT CHANGE UP (ref 1.2–3.4)
LYMPHOCYTES # BLD AUTO: 1.96 K/UL — SIGNIFICANT CHANGE UP (ref 1.2–3.4)
LYMPHOCYTES # BLD AUTO: 1.97 K/UL — SIGNIFICANT CHANGE UP (ref 1.2–3.4)
LYMPHOCYTES # BLD AUTO: 10.4 % — LOW (ref 20.5–51.1)
LYMPHOCYTES # BLD AUTO: 10.7 % — LOW (ref 20.5–51.1)
LYMPHOCYTES # BLD AUTO: 10.8 % — LOW (ref 20.5–51.1)
LYMPHOCYTES # BLD AUTO: 10.9 % — LOW (ref 20.5–51.1)
LYMPHOCYTES # BLD AUTO: 10.9 % — LOW (ref 20.5–51.1)
LYMPHOCYTES # BLD AUTO: 11 % — LOW (ref 20.5–51.1)
LYMPHOCYTES # BLD AUTO: 11.1 % — LOW (ref 20.5–51.1)
LYMPHOCYTES # BLD AUTO: 11.1 % — LOW (ref 20.5–51.1)
LYMPHOCYTES # BLD AUTO: 11.4 % — LOW (ref 20.5–51.1)
LYMPHOCYTES # BLD AUTO: 11.6 % — LOW (ref 20.5–51.1)
LYMPHOCYTES # BLD AUTO: 11.8 % — LOW (ref 20.5–51.1)
LYMPHOCYTES # BLD AUTO: 11.9 % — LOW (ref 20.5–51.1)
LYMPHOCYTES # BLD AUTO: 12.2 % — LOW (ref 20.5–51.1)
LYMPHOCYTES # BLD AUTO: 12.3 % — LOW (ref 20.5–51.1)
LYMPHOCYTES # BLD AUTO: 12.4 % — LOW (ref 20.5–51.1)
LYMPHOCYTES # BLD AUTO: 12.7 % — LOW (ref 20.5–51.1)
LYMPHOCYTES # BLD AUTO: 13 % — LOW (ref 20.5–51.1)
LYMPHOCYTES # BLD AUTO: 13.4 % — LOW (ref 20.5–51.1)
LYMPHOCYTES # BLD AUTO: 13.4 % — LOW (ref 20.5–51.1)
LYMPHOCYTES # BLD AUTO: 13.5 % — LOW (ref 20.5–51.1)
LYMPHOCYTES # BLD AUTO: 13.6 % — LOW (ref 20.5–51.1)
LYMPHOCYTES # BLD AUTO: 13.7 % — LOW (ref 20.5–51.1)
LYMPHOCYTES # BLD AUTO: 13.9 % — LOW (ref 20.5–51.1)
LYMPHOCYTES # BLD AUTO: 14 % — LOW (ref 20.5–51.1)
LYMPHOCYTES # BLD AUTO: 14.4 % — LOW (ref 20.5–51.1)
LYMPHOCYTES # BLD AUTO: 14.4 % — LOW (ref 20.5–51.1)
LYMPHOCYTES # BLD AUTO: 14.7 % — LOW (ref 20.5–51.1)
LYMPHOCYTES # BLD AUTO: 15.2 % — LOW (ref 20.5–51.1)
LYMPHOCYTES # BLD AUTO: 15.3 % — LOW (ref 20.5–51.1)
LYMPHOCYTES # BLD AUTO: 15.6 % — LOW (ref 20.5–51.1)
LYMPHOCYTES # BLD AUTO: 16.6 % — LOW (ref 20.5–51.1)
LYMPHOCYTES # BLD AUTO: 17 % — LOW (ref 20.5–51.1)
LYMPHOCYTES # BLD AUTO: 17.7 % — LOW (ref 20.5–51.1)
LYMPHOCYTES # BLD AUTO: 17.7 % — LOW (ref 20.5–51.1)
LYMPHOCYTES # BLD AUTO: 2.06 K/UL — SIGNIFICANT CHANGE UP (ref 1.2–3.4)
LYMPHOCYTES # BLD AUTO: 2.07 K/UL — SIGNIFICANT CHANGE UP (ref 1.2–3.4)
LYMPHOCYTES # BLD AUTO: 2.07 K/UL — SIGNIFICANT CHANGE UP (ref 1.2–3.4)
LYMPHOCYTES # BLD AUTO: 2.16 K/UL — SIGNIFICANT CHANGE UP (ref 1.2–3.4)
LYMPHOCYTES # BLD AUTO: 2.19 K/UL — SIGNIFICANT CHANGE UP (ref 1.2–3.4)
LYMPHOCYTES # BLD AUTO: 2.21 K/UL — SIGNIFICANT CHANGE UP (ref 1.2–3.4)
LYMPHOCYTES # BLD AUTO: 2.22 K/UL — SIGNIFICANT CHANGE UP (ref 1.2–3.4)
LYMPHOCYTES # BLD AUTO: 2.24 K/UL — SIGNIFICANT CHANGE UP (ref 1.2–3.4)
LYMPHOCYTES # BLD AUTO: 2.25 K/UL — SIGNIFICANT CHANGE UP (ref 1.2–3.4)
LYMPHOCYTES # BLD AUTO: 2.34 K/UL — SIGNIFICANT CHANGE UP (ref 1.2–3.4)
LYMPHOCYTES # BLD AUTO: 2.35 K/UL — SIGNIFICANT CHANGE UP (ref 1.2–3.4)
LYMPHOCYTES # BLD AUTO: 2.37 K/UL — SIGNIFICANT CHANGE UP (ref 1.2–3.4)
LYMPHOCYTES # BLD AUTO: 2.39 K/UL — SIGNIFICANT CHANGE UP (ref 1.2–3.4)
LYMPHOCYTES # BLD AUTO: 2.41 K/UL — SIGNIFICANT CHANGE UP (ref 1.2–3.4)
LYMPHOCYTES # BLD AUTO: 2.42 K/UL — SIGNIFICANT CHANGE UP (ref 1.2–3.4)
LYMPHOCYTES # BLD AUTO: 2.44 K/UL — SIGNIFICANT CHANGE UP (ref 1.2–3.4)
LYMPHOCYTES # BLD AUTO: 2.46 K/UL — SIGNIFICANT CHANGE UP (ref 1.2–3.4)
LYMPHOCYTES # BLD AUTO: 2.49 K/UL — SIGNIFICANT CHANGE UP (ref 1.2–3.4)
LYMPHOCYTES # BLD AUTO: 2.53 K/UL — SIGNIFICANT CHANGE UP (ref 1.2–3.4)
LYMPHOCYTES # BLD AUTO: 2.54 K/UL — SIGNIFICANT CHANGE UP (ref 1.2–3.4)
LYMPHOCYTES # BLD AUTO: 2.6 K/UL — SIGNIFICANT CHANGE UP (ref 1.2–3.4)
LYMPHOCYTES # BLD AUTO: 2.64 K/UL — SIGNIFICANT CHANGE UP (ref 1.2–3.4)
LYMPHOCYTES # BLD AUTO: 2.64 K/UL — SIGNIFICANT CHANGE UP (ref 1.2–3.4)
LYMPHOCYTES # BLD AUTO: 2.65 K/UL — SIGNIFICANT CHANGE UP (ref 1.2–3.4)
LYMPHOCYTES # BLD AUTO: 2.66 K/UL — SIGNIFICANT CHANGE UP (ref 1.2–3.4)
LYMPHOCYTES # BLD AUTO: 2.67 K/UL — SIGNIFICANT CHANGE UP (ref 1.2–3.4)
LYMPHOCYTES # BLD AUTO: 2.75 K/UL — SIGNIFICANT CHANGE UP (ref 1.2–3.4)
LYMPHOCYTES # BLD AUTO: 2.75 K/UL — SIGNIFICANT CHANGE UP (ref 1.2–3.4)
LYMPHOCYTES # BLD AUTO: 2.79 K/UL — SIGNIFICANT CHANGE UP (ref 1.2–3.4)
LYMPHOCYTES # BLD AUTO: 2.79 K/UL — SIGNIFICANT CHANGE UP (ref 1.2–3.4)
LYMPHOCYTES # BLD AUTO: 2.9 K/UL — SIGNIFICANT CHANGE UP (ref 1.2–3.4)
LYMPHOCYTES # BLD AUTO: 2.93 K/UL — SIGNIFICANT CHANGE UP (ref 1.2–3.4)
LYMPHOCYTES # BLD AUTO: 3.14 K/UL — SIGNIFICANT CHANGE UP (ref 1.2–3.4)
LYMPHOCYTES # BLD AUTO: 3.17 K/UL — SIGNIFICANT CHANGE UP (ref 1.2–3.4)
LYMPHOCYTES # BLD AUTO: 3.21 K/UL — SIGNIFICANT CHANGE UP (ref 1.2–3.4)
LYMPHOCYTES # BLD AUTO: 3.72 K/UL — HIGH (ref 1.2–3.4)
LYMPHOCYTES # BLD AUTO: 3.83 K/UL — HIGH (ref 1.2–3.4)
LYMPHOCYTES # BLD AUTO: 4.4 K/UL — HIGH (ref 1.2–3.4)
LYMPHOCYTES # BLD AUTO: 5.02 K/UL — HIGH (ref 1.2–3.4)
LYMPHOCYTES # BLD AUTO: 5.4 % — LOW (ref 20.5–51.1)
LYMPHOCYTES # BLD AUTO: 6 % — LOW (ref 20.5–51.1)
LYMPHOCYTES # BLD AUTO: 6.3 % — LOW (ref 20.5–51.1)
LYMPHOCYTES # BLD AUTO: 6.4 % — LOW (ref 20.5–51.1)
LYMPHOCYTES # BLD AUTO: 6.9 % — LOW (ref 20.5–51.1)
LYMPHOCYTES # BLD AUTO: 7 % — LOW (ref 20.5–51.1)
LYMPHOCYTES # BLD AUTO: 7.1 % — LOW (ref 20.5–51.1)
LYMPHOCYTES # BLD AUTO: 7.2 % — LOW (ref 20.5–51.1)
LYMPHOCYTES # BLD AUTO: 7.3 % — LOW (ref 20.5–51.1)
LYMPHOCYTES # BLD AUTO: 7.9 % — LOW (ref 20.5–51.1)
LYMPHOCYTES # BLD AUTO: 8 % — LOW (ref 20.5–51.1)
LYMPHOCYTES # BLD AUTO: 9.4 % — LOW (ref 20.5–51.1)
LYMPHOCYTES # BLD AUTO: 9.4 % — LOW (ref 20.5–51.1)
LYMPHOCYTES # BLD AUTO: 9.5 % — LOW (ref 20.5–51.1)
LYMPHOCYTES # BLD AUTO: 9.6 % — LOW (ref 20.5–51.1)
LYMPHOCYTES # BLD AUTO: 9.7 % — LOW (ref 20.5–51.1)
MACROCYTES BLD QL: SLIGHT — SIGNIFICANT CHANGE UP
MAGNESIUM SERPL-MCNC: 1 MG/DL — LOW (ref 1.8–2.4)
MAGNESIUM SERPL-MCNC: 1.6 MG/DL — LOW (ref 1.8–2.4)
MAGNESIUM SERPL-MCNC: 1.6 MG/DL — LOW (ref 1.8–2.4)
MAGNESIUM SERPL-MCNC: 1.7 MG/DL — LOW (ref 1.8–2.4)
MAGNESIUM SERPL-MCNC: 1.8 MG/DL — SIGNIFICANT CHANGE UP (ref 1.8–2.4)
MAGNESIUM SERPL-MCNC: 1.9 MG/DL — SIGNIFICANT CHANGE UP (ref 1.8–2.4)
MAGNESIUM SERPL-MCNC: 2 MG/DL — SIGNIFICANT CHANGE UP (ref 1.8–2.4)
MAGNESIUM SERPL-MCNC: 2.1 MG/DL — SIGNIFICANT CHANGE UP (ref 1.8–2.4)
MAGNESIUM SERPL-MCNC: 2.2 MG/DL — SIGNIFICANT CHANGE UP (ref 1.8–2.4)
MAGNESIUM SERPL-MCNC: 2.3 MG/DL — SIGNIFICANT CHANGE UP (ref 1.8–2.4)
MAGNESIUM SERPL-MCNC: 2.4 MG/DL — SIGNIFICANT CHANGE UP (ref 1.8–2.4)
MAGNESIUM SERPL-MCNC: 2.5 MG/DL — HIGH (ref 1.8–2.4)
MAGNESIUM SERPL-MCNC: 2.5 MG/DL — HIGH (ref 1.8–2.4)
MANUAL SMEAR VERIFICATION: SIGNIFICANT CHANGE UP
MCHC RBC-ENTMCNC: 22.5 PG
MCHC RBC-ENTMCNC: 22.7 PG — LOW (ref 27–31)
MCHC RBC-ENTMCNC: 22.7 PG — LOW (ref 27–31)
MCHC RBC-ENTMCNC: 22.8 PG — LOW (ref 27–31)
MCHC RBC-ENTMCNC: 22.9 PG — LOW (ref 27–31)
MCHC RBC-ENTMCNC: 23.1 PG — LOW (ref 27–31)
MCHC RBC-ENTMCNC: 23.2 PG — LOW (ref 27–31)
MCHC RBC-ENTMCNC: 23.4 PG — LOW (ref 27–31)
MCHC RBC-ENTMCNC: 23.5 PG — LOW (ref 27–31)
MCHC RBC-ENTMCNC: 24.2 PG — LOW (ref 27–31)
MCHC RBC-ENTMCNC: 24.2 PG — LOW (ref 27–31)
MCHC RBC-ENTMCNC: 24.7 PG — LOW (ref 27–31)
MCHC RBC-ENTMCNC: 24.8 PG — LOW (ref 27–31)
MCHC RBC-ENTMCNC: 24.9 PG — LOW (ref 27–31)
MCHC RBC-ENTMCNC: 25 PG — LOW (ref 27–31)
MCHC RBC-ENTMCNC: 25.1 PG — LOW (ref 27–31)
MCHC RBC-ENTMCNC: 25.2 PG — LOW (ref 27–31)
MCHC RBC-ENTMCNC: 25.3 PG — LOW (ref 27–31)
MCHC RBC-ENTMCNC: 25.5 PG — LOW (ref 27–31)
MCHC RBC-ENTMCNC: 26.1 PG — LOW (ref 27–31)
MCHC RBC-ENTMCNC: 26.5 PG — LOW (ref 27–31)
MCHC RBC-ENTMCNC: 26.5 PG — LOW (ref 27–31)
MCHC RBC-ENTMCNC: 26.9 PG — LOW (ref 27–31)
MCHC RBC-ENTMCNC: 27.1 PG — SIGNIFICANT CHANGE UP (ref 27–31)
MCHC RBC-ENTMCNC: 27.1 PG — SIGNIFICANT CHANGE UP (ref 27–31)
MCHC RBC-ENTMCNC: 27.2 PG — SIGNIFICANT CHANGE UP (ref 27–31)
MCHC RBC-ENTMCNC: 27.2 PG — SIGNIFICANT CHANGE UP (ref 27–31)
MCHC RBC-ENTMCNC: 27.5 PG — SIGNIFICANT CHANGE UP (ref 27–31)
MCHC RBC-ENTMCNC: 27.7 PG — SIGNIFICANT CHANGE UP (ref 27–31)
MCHC RBC-ENTMCNC: 27.7 PG — SIGNIFICANT CHANGE UP (ref 27–31)
MCHC RBC-ENTMCNC: 27.8 PG — SIGNIFICANT CHANGE UP (ref 27–31)
MCHC RBC-ENTMCNC: 27.8 PG — SIGNIFICANT CHANGE UP (ref 27–31)
MCHC RBC-ENTMCNC: 27.9 PG — SIGNIFICANT CHANGE UP (ref 27–31)
MCHC RBC-ENTMCNC: 28 PG — SIGNIFICANT CHANGE UP (ref 27–31)
MCHC RBC-ENTMCNC: 28.1 PG — SIGNIFICANT CHANGE UP (ref 27–31)
MCHC RBC-ENTMCNC: 28.2 G/DL — LOW (ref 32–37)
MCHC RBC-ENTMCNC: 28.2 PG — SIGNIFICANT CHANGE UP (ref 27–31)
MCHC RBC-ENTMCNC: 28.3 G/DL — LOW (ref 32–37)
MCHC RBC-ENTMCNC: 28.3 PG — SIGNIFICANT CHANGE UP (ref 27–31)
MCHC RBC-ENTMCNC: 28.4 PG — SIGNIFICANT CHANGE UP (ref 27–31)
MCHC RBC-ENTMCNC: 28.4 PG — SIGNIFICANT CHANGE UP (ref 27–31)
MCHC RBC-ENTMCNC: 28.5 G/DL — LOW (ref 32–37)
MCHC RBC-ENTMCNC: 28.5 G/DL — LOW (ref 32–37)
MCHC RBC-ENTMCNC: 28.5 PG — SIGNIFICANT CHANGE UP (ref 27–31)
MCHC RBC-ENTMCNC: 28.8 PG — SIGNIFICANT CHANGE UP (ref 27–31)
MCHC RBC-ENTMCNC: 28.9 PG — SIGNIFICANT CHANGE UP (ref 27–31)
MCHC RBC-ENTMCNC: 28.9 PG — SIGNIFICANT CHANGE UP (ref 27–31)
MCHC RBC-ENTMCNC: 29 G/DL — LOW (ref 32–37)
MCHC RBC-ENTMCNC: 29 PG — SIGNIFICANT CHANGE UP (ref 27–31)
MCHC RBC-ENTMCNC: 29.1 PG — SIGNIFICANT CHANGE UP (ref 27–31)
MCHC RBC-ENTMCNC: 29.2 G/DL — LOW (ref 32–37)
MCHC RBC-ENTMCNC: 29.2 G/DL — LOW (ref 32–37)
MCHC RBC-ENTMCNC: 29.2 PG — SIGNIFICANT CHANGE UP (ref 27–31)
MCHC RBC-ENTMCNC: 29.4 G/DL — LOW (ref 32–37)
MCHC RBC-ENTMCNC: 29.5 G/DL — LOW (ref 32–37)
MCHC RBC-ENTMCNC: 29.6 G/DL — LOW (ref 32–37)
MCHC RBC-ENTMCNC: 29.6 G/DL — LOW (ref 32–37)
MCHC RBC-ENTMCNC: 29.7 G/DL — LOW (ref 32–37)
MCHC RBC-ENTMCNC: 29.8 G/DL — LOW (ref 32–37)
MCHC RBC-ENTMCNC: 29.9 G/DL — LOW (ref 32–37)
MCHC RBC-ENTMCNC: 29.9 G/DL — LOW (ref 32–37)
MCHC RBC-ENTMCNC: 30 G/DL — LOW (ref 32–37)
MCHC RBC-ENTMCNC: 30.1 G/DL — LOW (ref 32–37)
MCHC RBC-ENTMCNC: 30.2 G/DL — LOW (ref 32–37)
MCHC RBC-ENTMCNC: 30.3 G/DL — LOW (ref 32–37)
MCHC RBC-ENTMCNC: 30.4 G/DL — LOW (ref 32–37)
MCHC RBC-ENTMCNC: 30.5 G/DL — LOW (ref 32–37)
MCHC RBC-ENTMCNC: 30.6 G/DL
MCHC RBC-ENTMCNC: 30.6 G/DL — LOW (ref 32–37)
MCHC RBC-ENTMCNC: 30.7 G/DL — LOW (ref 32–37)
MCHC RBC-ENTMCNC: 30.8 G/DL — LOW (ref 32–37)
MCHC RBC-ENTMCNC: 30.9 G/DL — LOW (ref 32–37)
MCHC RBC-ENTMCNC: 31 G/DL — LOW (ref 32–37)
MCHC RBC-ENTMCNC: 31 G/DL — LOW (ref 32–37)
MCHC RBC-ENTMCNC: 31.3 G/DL — LOW (ref 32–37)
MCHC RBC-ENTMCNC: 31.4 G/DL — LOW (ref 32–37)
MCHC RBC-ENTMCNC: 31.5 G/DL — LOW (ref 32–37)
MCHC RBC-ENTMCNC: 31.6 G/DL — LOW (ref 32–37)
MCHC RBC-ENTMCNC: 31.7 G/DL — LOW (ref 32–37)
MCHC RBC-ENTMCNC: 31.8 G/DL — LOW (ref 32–37)
MCHC RBC-ENTMCNC: 31.9 G/DL — LOW (ref 32–37)
MCHC RBC-ENTMCNC: 32 G/DL — SIGNIFICANT CHANGE UP (ref 32–37)
MCHC RBC-ENTMCNC: 32.2 G/DL — SIGNIFICANT CHANGE UP (ref 32–37)
MCHC RBC-ENTMCNC: 32.8 G/DL — SIGNIFICANT CHANGE UP (ref 32–37)
MCHC RBC-ENTMCNC: 32.8 G/DL — SIGNIFICANT CHANGE UP (ref 32–37)
MCV RBC AUTO: 72.7 FL — LOW (ref 81–99)
MCV RBC AUTO: 72.8 FL — LOW (ref 81–99)
MCV RBC AUTO: 73.2 FL — LOW (ref 81–99)
MCV RBC AUTO: 73.2 FL — LOW (ref 81–99)
MCV RBC AUTO: 73.4 FL
MCV RBC AUTO: 73.6 FL — LOW (ref 81–99)
MCV RBC AUTO: 73.7 FL — LOW (ref 81–99)
MCV RBC AUTO: 73.8 FL — LOW (ref 81–99)
MCV RBC AUTO: 73.8 FL — LOW (ref 81–99)
MCV RBC AUTO: 74 FL — LOW (ref 81–99)
MCV RBC AUTO: 74.2 FL — LOW (ref 81–99)
MCV RBC AUTO: 74.3 FL — LOW (ref 81–99)
MCV RBC AUTO: 74.7 FL — LOW (ref 81–99)
MCV RBC AUTO: 75.5 FL — LOW (ref 81–99)
MCV RBC AUTO: 76.5 FL — LOW (ref 81–99)
MCV RBC AUTO: 76.8 FL — LOW (ref 81–99)
MCV RBC AUTO: 78 FL — LOW (ref 81–99)
MCV RBC AUTO: 78.1 FL — LOW (ref 81–99)
MCV RBC AUTO: 78.2 FL — LOW (ref 81–99)
MCV RBC AUTO: 78.3 FL — LOW (ref 81–99)
MCV RBC AUTO: 78.4 FL — LOW (ref 81–99)
MCV RBC AUTO: 78.6 FL — LOW (ref 81–99)
MCV RBC AUTO: 79.9 FL — LOW (ref 81–99)
MCV RBC AUTO: 83 FL — SIGNIFICANT CHANGE UP (ref 81–99)
MCV RBC AUTO: 85.6 FL — SIGNIFICANT CHANGE UP (ref 81–99)
MCV RBC AUTO: 85.9 FL — SIGNIFICANT CHANGE UP (ref 81–99)
MCV RBC AUTO: 87.3 FL — SIGNIFICANT CHANGE UP (ref 81–99)
MCV RBC AUTO: 88.3 FL — SIGNIFICANT CHANGE UP (ref 81–99)
MCV RBC AUTO: 88.6 FL — SIGNIFICANT CHANGE UP (ref 81–99)
MCV RBC AUTO: 88.7 FL — SIGNIFICANT CHANGE UP (ref 81–99)
MCV RBC AUTO: 88.7 FL — SIGNIFICANT CHANGE UP (ref 81–99)
MCV RBC AUTO: 89 FL — SIGNIFICANT CHANGE UP (ref 81–99)
MCV RBC AUTO: 89.3 FL — SIGNIFICANT CHANGE UP (ref 81–99)
MCV RBC AUTO: 89.4 FL — SIGNIFICANT CHANGE UP (ref 81–99)
MCV RBC AUTO: 89.6 FL — SIGNIFICANT CHANGE UP (ref 81–99)
MCV RBC AUTO: 90 FL — SIGNIFICANT CHANGE UP (ref 81–99)
MCV RBC AUTO: 90.2 FL — SIGNIFICANT CHANGE UP (ref 81–99)
MCV RBC AUTO: 90.5 FL — SIGNIFICANT CHANGE UP (ref 81–99)
MCV RBC AUTO: 91.3 FL — SIGNIFICANT CHANGE UP (ref 81–99)
MCV RBC AUTO: 91.9 FL — SIGNIFICANT CHANGE UP (ref 81–99)
MCV RBC AUTO: 92 FL — SIGNIFICANT CHANGE UP (ref 81–99)
MCV RBC AUTO: 92.1 FL — SIGNIFICANT CHANGE UP (ref 81–99)
MCV RBC AUTO: 92.2 FL — SIGNIFICANT CHANGE UP (ref 81–99)
MCV RBC AUTO: 92.4 FL — SIGNIFICANT CHANGE UP (ref 81–99)
MCV RBC AUTO: 92.7 FL — SIGNIFICANT CHANGE UP (ref 81–99)
MCV RBC AUTO: 92.7 FL — SIGNIFICANT CHANGE UP (ref 81–99)
MCV RBC AUTO: 92.8 FL — SIGNIFICANT CHANGE UP (ref 81–99)
MCV RBC AUTO: 92.9 FL — SIGNIFICANT CHANGE UP (ref 81–99)
MCV RBC AUTO: 92.9 FL — SIGNIFICANT CHANGE UP (ref 81–99)
MCV RBC AUTO: 93 FL — SIGNIFICANT CHANGE UP (ref 81–99)
MCV RBC AUTO: 93.6 FL — SIGNIFICANT CHANGE UP (ref 81–99)
MCV RBC AUTO: 93.8 FL — SIGNIFICANT CHANGE UP (ref 81–99)
MCV RBC AUTO: 94.1 FL — SIGNIFICANT CHANGE UP (ref 81–99)
MCV RBC AUTO: 94.8 FL — SIGNIFICANT CHANGE UP (ref 81–99)
MCV RBC AUTO: 95.1 FL — SIGNIFICANT CHANGE UP (ref 81–99)
MCV RBC AUTO: 95.1 FL — SIGNIFICANT CHANGE UP (ref 81–99)
MCV RBC AUTO: 95.3 FL — SIGNIFICANT CHANGE UP (ref 81–99)
MCV RBC AUTO: 95.4 FL — SIGNIFICANT CHANGE UP (ref 81–99)
MCV RBC AUTO: 95.4 FL — SIGNIFICANT CHANGE UP (ref 81–99)
MCV RBC AUTO: 96.1 FL — SIGNIFICANT CHANGE UP (ref 81–99)
MCV RBC AUTO: 96.3 FL — SIGNIFICANT CHANGE UP (ref 81–99)
MCV RBC AUTO: 96.3 FL — SIGNIFICANT CHANGE UP (ref 81–99)
MCV RBC AUTO: 96.5 FL — SIGNIFICANT CHANGE UP (ref 81–99)
MCV RBC AUTO: 96.6 FL — SIGNIFICANT CHANGE UP (ref 81–99)
MCV RBC AUTO: 96.8 FL — SIGNIFICANT CHANGE UP (ref 81–99)
MCV RBC AUTO: 97.3 FL — SIGNIFICANT CHANGE UP (ref 81–99)
MCV RBC AUTO: 97.5 FL — SIGNIFICANT CHANGE UP (ref 81–99)
MCV RBC AUTO: 98.3 FL — SIGNIFICANT CHANGE UP (ref 81–99)
MCV RBC AUTO: 98.8 FL — SIGNIFICANT CHANGE UP (ref 81–99)
METAMYELOCYTES # FLD: 0.9 % — HIGH (ref 0–0)
METAMYELOCYTES # FLD: 1.7 % — HIGH (ref 0–0)
METHOD TYPE: SIGNIFICANT CHANGE UP
MONOCYTES # BLD AUTO: 0.26 K/UL — SIGNIFICANT CHANGE UP (ref 0.1–0.6)
MONOCYTES # BLD AUTO: 0.4 K/UL — SIGNIFICANT CHANGE UP (ref 0.1–0.6)
MONOCYTES # BLD AUTO: 0.41 K/UL — SIGNIFICANT CHANGE UP (ref 0.1–0.6)
MONOCYTES # BLD AUTO: 0.43 K/UL — SIGNIFICANT CHANGE UP (ref 0.1–0.6)
MONOCYTES # BLD AUTO: 0.46 K/UL — SIGNIFICANT CHANGE UP (ref 0.1–0.6)
MONOCYTES # BLD AUTO: 0.5 K/UL — SIGNIFICANT CHANGE UP (ref 0.1–0.6)
MONOCYTES # BLD AUTO: 0.52 K/UL — SIGNIFICANT CHANGE UP (ref 0.1–0.6)
MONOCYTES # BLD AUTO: 0.57 K/UL — SIGNIFICANT CHANGE UP (ref 0.1–0.6)
MONOCYTES # BLD AUTO: 0.63 K/UL — HIGH (ref 0.1–0.6)
MONOCYTES # BLD AUTO: 0.65 K/UL — HIGH (ref 0.1–0.6)
MONOCYTES # BLD AUTO: 0.73 K/UL — HIGH (ref 0.1–0.6)
MONOCYTES # BLD AUTO: 0.8 K/UL — HIGH (ref 0.1–0.6)
MONOCYTES # BLD AUTO: 0.8 K/UL — HIGH (ref 0.1–0.6)
MONOCYTES # BLD AUTO: 0.81 K/UL — HIGH (ref 0.1–0.6)
MONOCYTES # BLD AUTO: 0.83 K/UL — HIGH (ref 0.1–0.6)
MONOCYTES # BLD AUTO: 0.85 K/UL — HIGH (ref 0.1–0.6)
MONOCYTES # BLD AUTO: 0.87 K/UL — HIGH (ref 0.1–0.6)
MONOCYTES # BLD AUTO: 0.92 K/UL — HIGH (ref 0.1–0.6)
MONOCYTES # BLD AUTO: 0.93 K/UL — HIGH (ref 0.1–0.6)
MONOCYTES # BLD AUTO: 0.96 K/UL — HIGH (ref 0.1–0.6)
MONOCYTES # BLD AUTO: 0.97 K/UL — HIGH (ref 0.1–0.6)
MONOCYTES # BLD AUTO: 0.98 K/UL — HIGH (ref 0.1–0.6)
MONOCYTES # BLD AUTO: 1 K/UL — HIGH (ref 0.1–0.6)
MONOCYTES # BLD AUTO: 1 K/UL — HIGH (ref 0.1–0.6)
MONOCYTES # BLD AUTO: 1.02 K/UL — HIGH (ref 0.1–0.6)
MONOCYTES # BLD AUTO: 1.03 K/UL — HIGH (ref 0.1–0.6)
MONOCYTES # BLD AUTO: 1.06 K/UL — HIGH (ref 0.1–0.6)
MONOCYTES # BLD AUTO: 1.07 K/UL — HIGH (ref 0.1–0.6)
MONOCYTES # BLD AUTO: 1.07 K/UL — HIGH (ref 0.1–0.6)
MONOCYTES # BLD AUTO: 1.08 K/UL — HIGH (ref 0.1–0.6)
MONOCYTES # BLD AUTO: 1.11 K/UL — HIGH (ref 0.1–0.6)
MONOCYTES # BLD AUTO: 1.11 K/UL — HIGH (ref 0.1–0.6)
MONOCYTES # BLD AUTO: 1.15 K/UL — HIGH (ref 0.1–0.6)
MONOCYTES # BLD AUTO: 1.16 K/UL — HIGH (ref 0.1–0.6)
MONOCYTES # BLD AUTO: 1.19 K/UL — HIGH (ref 0.1–0.6)
MONOCYTES # BLD AUTO: 1.21 K/UL — HIGH (ref 0.1–0.6)
MONOCYTES # BLD AUTO: 1.23 K/UL — HIGH (ref 0.1–0.6)
MONOCYTES # BLD AUTO: 1.39 K/UL — HIGH (ref 0.1–0.6)
MONOCYTES # BLD AUTO: 1.45 K/UL — HIGH (ref 0.1–0.6)
MONOCYTES # BLD AUTO: 1.49 K/UL — HIGH (ref 0.1–0.6)
MONOCYTES # BLD AUTO: 1.53 K/UL — HIGH (ref 0.1–0.6)
MONOCYTES # BLD AUTO: 1.58 K/UL — HIGH (ref 0.1–0.6)
MONOCYTES # BLD AUTO: 1.61 K/UL — HIGH (ref 0.1–0.6)
MONOCYTES # BLD AUTO: 1.87 K/UL — HIGH (ref 0.1–0.6)
MONOCYTES # BLD AUTO: 1.9 K/UL — HIGH (ref 0.1–0.6)
MONOCYTES # BLD AUTO: 1.91 K/UL — HIGH (ref 0.1–0.6)
MONOCYTES # BLD AUTO: 1.92 K/UL — HIGH (ref 0.1–0.6)
MONOCYTES # BLD AUTO: 1.98 K/UL — HIGH (ref 0.1–0.6)
MONOCYTES # BLD AUTO: 2.03 K/UL — HIGH (ref 0.1–0.6)
MONOCYTES # BLD AUTO: 2.04 K/UL — HIGH (ref 0.1–0.6)
MONOCYTES # BLD AUTO: 2.13 K/UL — HIGH (ref 0.1–0.6)
MONOCYTES # BLD AUTO: 2.37 K/UL — HIGH (ref 0.1–0.6)
MONOCYTES # BLD AUTO: 2.64 K/UL — HIGH (ref 0.1–0.6)
MONOCYTES # BLD AUTO: 3.56 K/UL — HIGH (ref 0.1–0.6)
MONOCYTES NFR BLD AUTO: 1.5 % — LOW (ref 1.7–9.3)
MONOCYTES NFR BLD AUTO: 1.8 % — SIGNIFICANT CHANGE UP (ref 1.7–9.3)
MONOCYTES NFR BLD AUTO: 10.2 % — HIGH (ref 1.7–9.3)
MONOCYTES NFR BLD AUTO: 2.1 % — SIGNIFICANT CHANGE UP (ref 1.7–9.3)
MONOCYTES NFR BLD AUTO: 2.1 % — SIGNIFICANT CHANGE UP (ref 1.7–9.3)
MONOCYTES NFR BLD AUTO: 2.3 % — SIGNIFICANT CHANGE UP (ref 1.7–9.3)
MONOCYTES NFR BLD AUTO: 2.6 % — SIGNIFICANT CHANGE UP (ref 1.7–9.3)
MONOCYTES NFR BLD AUTO: 2.6 % — SIGNIFICANT CHANGE UP (ref 1.7–9.3)
MONOCYTES NFR BLD AUTO: 3.2 % — SIGNIFICANT CHANGE UP (ref 1.7–9.3)
MONOCYTES NFR BLD AUTO: 3.3 % — SIGNIFICANT CHANGE UP (ref 1.7–9.3)
MONOCYTES NFR BLD AUTO: 3.5 % — SIGNIFICANT CHANGE UP (ref 1.7–9.3)
MONOCYTES NFR BLD AUTO: 3.9 % — SIGNIFICANT CHANGE UP (ref 1.7–9.3)
MONOCYTES NFR BLD AUTO: 4.2 % — SIGNIFICANT CHANGE UP (ref 1.7–9.3)
MONOCYTES NFR BLD AUTO: 4.2 % — SIGNIFICANT CHANGE UP (ref 1.7–9.3)
MONOCYTES NFR BLD AUTO: 4.3 % — SIGNIFICANT CHANGE UP (ref 1.7–9.3)
MONOCYTES NFR BLD AUTO: 4.3 % — SIGNIFICANT CHANGE UP (ref 1.7–9.3)
MONOCYTES NFR BLD AUTO: 4.4 % — SIGNIFICANT CHANGE UP (ref 1.7–9.3)
MONOCYTES NFR BLD AUTO: 4.5 % — SIGNIFICANT CHANGE UP (ref 1.7–9.3)
MONOCYTES NFR BLD AUTO: 4.8 % — SIGNIFICANT CHANGE UP (ref 1.7–9.3)
MONOCYTES NFR BLD AUTO: 4.9 % — SIGNIFICANT CHANGE UP (ref 1.7–9.3)
MONOCYTES NFR BLD AUTO: 5 % — SIGNIFICANT CHANGE UP (ref 1.7–9.3)
MONOCYTES NFR BLD AUTO: 5 % — SIGNIFICANT CHANGE UP (ref 1.7–9.3)
MONOCYTES NFR BLD AUTO: 5.1 % — SIGNIFICANT CHANGE UP (ref 1.7–9.3)
MONOCYTES NFR BLD AUTO: 5.3 % — SIGNIFICANT CHANGE UP (ref 1.7–9.3)
MONOCYTES NFR BLD AUTO: 5.3 % — SIGNIFICANT CHANGE UP (ref 1.7–9.3)
MONOCYTES NFR BLD AUTO: 5.4 % — SIGNIFICANT CHANGE UP (ref 1.7–9.3)
MONOCYTES NFR BLD AUTO: 5.5 % — SIGNIFICANT CHANGE UP (ref 1.7–9.3)
MONOCYTES NFR BLD AUTO: 5.6 % — SIGNIFICANT CHANGE UP (ref 1.7–9.3)
MONOCYTES NFR BLD AUTO: 5.6 % — SIGNIFICANT CHANGE UP (ref 1.7–9.3)
MONOCYTES NFR BLD AUTO: 5.7 % — SIGNIFICANT CHANGE UP (ref 1.7–9.3)
MONOCYTES NFR BLD AUTO: 5.7 % — SIGNIFICANT CHANGE UP (ref 1.7–9.3)
MONOCYTES NFR BLD AUTO: 5.8 % — SIGNIFICANT CHANGE UP (ref 1.7–9.3)
MONOCYTES NFR BLD AUTO: 5.8 % — SIGNIFICANT CHANGE UP (ref 1.7–9.3)
MONOCYTES NFR BLD AUTO: 5.9 % — SIGNIFICANT CHANGE UP (ref 1.7–9.3)
MONOCYTES NFR BLD AUTO: 5.9 % — SIGNIFICANT CHANGE UP (ref 1.7–9.3)
MONOCYTES NFR BLD AUTO: 6.1 % — SIGNIFICANT CHANGE UP (ref 1.7–9.3)
MONOCYTES NFR BLD AUTO: 6.4 % — SIGNIFICANT CHANGE UP (ref 1.7–9.3)
MONOCYTES NFR BLD AUTO: 6.5 % — SIGNIFICANT CHANGE UP (ref 1.7–9.3)
MONOCYTES NFR BLD AUTO: 6.7 % — SIGNIFICANT CHANGE UP (ref 1.7–9.3)
MONOCYTES NFR BLD AUTO: 7 % — SIGNIFICANT CHANGE UP (ref 1.7–9.3)
MONOCYTES NFR BLD AUTO: 7 % — SIGNIFICANT CHANGE UP (ref 1.7–9.3)
MONOCYTES NFR BLD AUTO: 7.3 % — SIGNIFICANT CHANGE UP (ref 1.7–9.3)
MONOCYTES NFR BLD AUTO: 7.4 % — SIGNIFICANT CHANGE UP (ref 1.7–9.3)
MONOCYTES NFR BLD AUTO: 7.5 % — SIGNIFICANT CHANGE UP (ref 1.7–9.3)
MONOCYTES NFR BLD AUTO: 7.9 % — SIGNIFICANT CHANGE UP (ref 1.7–9.3)
MONOCYTES NFR BLD AUTO: 8.1 % — SIGNIFICANT CHANGE UP (ref 1.7–9.3)
MONOCYTES NFR BLD AUTO: 8.2 % — SIGNIFICANT CHANGE UP (ref 1.7–9.3)
MONOCYTES NFR BLD AUTO: 9.4 % — HIGH (ref 1.7–9.3)
MONOCYTES NFR BLD AUTO: 9.6 % — HIGH (ref 1.7–9.3)
MRSA PCR RESULT.: NEGATIVE — SIGNIFICANT CHANGE UP
MYELOCYTES NFR BLD: 0.9 % — HIGH (ref 0–0)
NEUTROPHILS # BLD AUTO: 10.62 K/UL — HIGH (ref 1.4–6.5)
NEUTROPHILS # BLD AUTO: 10.69 K/UL — HIGH (ref 1.4–6.5)
NEUTROPHILS # BLD AUTO: 11.11 K/UL — HIGH (ref 1.4–6.5)
NEUTROPHILS # BLD AUTO: 11.38 K/UL — HIGH (ref 1.4–6.5)
NEUTROPHILS # BLD AUTO: 11.98 K/UL — HIGH (ref 1.4–6.5)
NEUTROPHILS # BLD AUTO: 12.33 K/UL — HIGH (ref 1.4–6.5)
NEUTROPHILS # BLD AUTO: 12.36 K/UL — HIGH (ref 1.4–6.5)
NEUTROPHILS # BLD AUTO: 12.47 K/UL — HIGH (ref 1.4–6.5)
NEUTROPHILS # BLD AUTO: 12.78 K/UL — HIGH (ref 1.4–6.5)
NEUTROPHILS # BLD AUTO: 12.87 K/UL — HIGH (ref 1.4–6.5)
NEUTROPHILS # BLD AUTO: 12.95 K/UL — HIGH (ref 1.4–6.5)
NEUTROPHILS # BLD AUTO: 13.31 K/UL — HIGH (ref 1.4–6.5)
NEUTROPHILS # BLD AUTO: 13.38 K/UL — HIGH (ref 1.4–6.5)
NEUTROPHILS # BLD AUTO: 13.45 K/UL — HIGH (ref 1.4–6.5)
NEUTROPHILS # BLD AUTO: 13.46 K/UL — HIGH (ref 1.4–6.5)
NEUTROPHILS # BLD AUTO: 13.68 K/UL — HIGH (ref 1.4–6.5)
NEUTROPHILS # BLD AUTO: 13.87 K/UL — HIGH (ref 1.4–6.5)
NEUTROPHILS # BLD AUTO: 14.03 K/UL — HIGH (ref 1.4–6.5)
NEUTROPHILS # BLD AUTO: 14.29 K/UL — HIGH (ref 1.4–6.5)
NEUTROPHILS # BLD AUTO: 14.37 K/UL — HIGH (ref 1.4–6.5)
NEUTROPHILS # BLD AUTO: 14.76 K/UL — HIGH (ref 1.4–6.5)
NEUTROPHILS # BLD AUTO: 15.52 K/UL — HIGH (ref 1.4–6.5)
NEUTROPHILS # BLD AUTO: 15.68 K/UL — HIGH (ref 1.4–6.5)
NEUTROPHILS # BLD AUTO: 15.76 K/UL — HIGH (ref 1.4–6.5)
NEUTROPHILS # BLD AUTO: 15.86 K/UL — HIGH (ref 1.4–6.5)
NEUTROPHILS # BLD AUTO: 16.19 K/UL — HIGH (ref 1.4–6.5)
NEUTROPHILS # BLD AUTO: 16.55 K/UL — HIGH (ref 1.4–6.5)
NEUTROPHILS # BLD AUTO: 17.04 K/UL — HIGH (ref 1.4–6.5)
NEUTROPHILS # BLD AUTO: 17.05 K/UL — HIGH (ref 1.4–6.5)
NEUTROPHILS # BLD AUTO: 17.61 K/UL — HIGH (ref 1.4–6.5)
NEUTROPHILS # BLD AUTO: 18.53 K/UL — HIGH (ref 1.4–6.5)
NEUTROPHILS # BLD AUTO: 18.67 K/UL — HIGH (ref 1.4–6.5)
NEUTROPHILS # BLD AUTO: 19.03 K/UL — HIGH (ref 1.4–6.5)
NEUTROPHILS # BLD AUTO: 19.47 K/UL — HIGH (ref 1.4–6.5)
NEUTROPHILS # BLD AUTO: 20 K/UL — HIGH (ref 1.4–6.5)
NEUTROPHILS # BLD AUTO: 20.23 K/UL — HIGH (ref 1.4–6.5)
NEUTROPHILS # BLD AUTO: 21.66 K/UL — HIGH (ref 1.4–6.5)
NEUTROPHILS # BLD AUTO: 22.17 K/UL — HIGH (ref 1.4–6.5)
NEUTROPHILS # BLD AUTO: 22.26 K/UL — HIGH (ref 1.4–6.5)
NEUTROPHILS # BLD AUTO: 23.4 K/UL — HIGH (ref 1.4–6.5)
NEUTROPHILS # BLD AUTO: 23.97 K/UL — HIGH (ref 1.4–6.5)
NEUTROPHILS # BLD AUTO: 25.08 K/UL — HIGH (ref 1.4–6.5)
NEUTROPHILS # BLD AUTO: 25.95 K/UL — HIGH (ref 1.4–6.5)
NEUTROPHILS # BLD AUTO: 26.75 K/UL — HIGH (ref 1.4–6.5)
NEUTROPHILS # BLD AUTO: 27.03 K/UL — HIGH (ref 1.4–6.5)
NEUTROPHILS # BLD AUTO: 27.45 K/UL — HIGH (ref 1.4–6.5)
NEUTROPHILS # BLD AUTO: 29.35 K/UL — HIGH (ref 1.4–6.5)
NEUTROPHILS # BLD AUTO: 30.06 K/UL — HIGH (ref 1.4–6.5)
NEUTROPHILS # BLD AUTO: 32.18 K/UL — HIGH (ref 1.4–6.5)
NEUTROPHILS # BLD AUTO: 32.7 K/UL — HIGH (ref 1.4–6.5)
NEUTROPHILS # BLD AUTO: 43.15 K/UL — HIGH (ref 1.4–6.5)
NEUTROPHILS # BLD AUTO: 52.26 K/UL — HIGH (ref 1.4–6.5)
NEUTROPHILS # BLD AUTO: 7.41 K/UL — HIGH (ref 1.4–6.5)
NEUTROPHILS # BLD AUTO: 7.57 K/UL — HIGH (ref 1.4–6.5)
NEUTROPHILS # BLD AUTO: 7.68 K/UL — HIGH (ref 1.4–6.5)
NEUTROPHILS # BLD AUTO: 9.55 K/UL — HIGH (ref 1.4–6.5)
NEUTROPHILS # BLD AUTO: 9.57 K/UL — HIGH (ref 1.4–6.5)
NEUTROPHILS NFR BLD AUTO: 65.8 % — SIGNIFICANT CHANGE UP (ref 42.2–75.2)
NEUTROPHILS NFR BLD AUTO: 69.8 % — SIGNIFICANT CHANGE UP (ref 42.2–75.2)
NEUTROPHILS NFR BLD AUTO: 69.8 % — SIGNIFICANT CHANGE UP (ref 42.2–75.2)
NEUTROPHILS NFR BLD AUTO: 69.9 % — SIGNIFICANT CHANGE UP (ref 42.2–75.2)
NEUTROPHILS NFR BLD AUTO: 70.2 % — SIGNIFICANT CHANGE UP (ref 42.2–75.2)
NEUTROPHILS NFR BLD AUTO: 71.5 % — SIGNIFICANT CHANGE UP (ref 42.2–75.2)
NEUTROPHILS NFR BLD AUTO: 71.6 % — SIGNIFICANT CHANGE UP (ref 42.2–75.2)
NEUTROPHILS NFR BLD AUTO: 73.1 % — SIGNIFICANT CHANGE UP (ref 42.2–75.2)
NEUTROPHILS NFR BLD AUTO: 73.3 % — SIGNIFICANT CHANGE UP (ref 42.2–75.2)
NEUTROPHILS NFR BLD AUTO: 74.1 % — SIGNIFICANT CHANGE UP (ref 42.2–75.2)
NEUTROPHILS NFR BLD AUTO: 74.4 % — SIGNIFICANT CHANGE UP (ref 42.2–75.2)
NEUTROPHILS NFR BLD AUTO: 74.9 % — SIGNIFICANT CHANGE UP (ref 42.2–75.2)
NEUTROPHILS NFR BLD AUTO: 75.5 % — HIGH (ref 42.2–75.2)
NEUTROPHILS NFR BLD AUTO: 75.6 % — HIGH (ref 42.2–75.2)
NEUTROPHILS NFR BLD AUTO: 75.7 % — HIGH (ref 42.2–75.2)
NEUTROPHILS NFR BLD AUTO: 75.8 % — HIGH (ref 42.2–75.2)
NEUTROPHILS NFR BLD AUTO: 76.1 % — HIGH (ref 42.2–75.2)
NEUTROPHILS NFR BLD AUTO: 76.6 % — HIGH (ref 42.2–75.2)
NEUTROPHILS NFR BLD AUTO: 77.4 % — HIGH (ref 42.2–75.2)
NEUTROPHILS NFR BLD AUTO: 77.5 % — HIGH (ref 42.2–75.2)
NEUTROPHILS NFR BLD AUTO: 77.6 % — HIGH (ref 42.2–75.2)
NEUTROPHILS NFR BLD AUTO: 78 % — HIGH (ref 42.2–75.2)
NEUTROPHILS NFR BLD AUTO: 78.7 % — HIGH (ref 42.2–75.2)
NEUTROPHILS NFR BLD AUTO: 79 % — HIGH (ref 42.2–75.2)
NEUTROPHILS NFR BLD AUTO: 79.2 % — HIGH (ref 42.2–75.2)
NEUTROPHILS NFR BLD AUTO: 79.2 % — HIGH (ref 42.2–75.2)
NEUTROPHILS NFR BLD AUTO: 79.6 % — HIGH (ref 42.2–75.2)
NEUTROPHILS NFR BLD AUTO: 79.6 % — HIGH (ref 42.2–75.2)
NEUTROPHILS NFR BLD AUTO: 79.9 % — HIGH (ref 42.2–75.2)
NEUTROPHILS NFR BLD AUTO: 80.5 % — HIGH (ref 42.2–75.2)
NEUTROPHILS NFR BLD AUTO: 80.6 % — HIGH (ref 42.2–75.2)
NEUTROPHILS NFR BLD AUTO: 80.8 % — HIGH (ref 42.2–75.2)
NEUTROPHILS NFR BLD AUTO: 80.9 % — HIGH (ref 42.2–75.2)
NEUTROPHILS NFR BLD AUTO: 81 % — HIGH (ref 42.2–75.2)
NEUTROPHILS NFR BLD AUTO: 81.6 % — HIGH (ref 42.2–75.2)
NEUTROPHILS NFR BLD AUTO: 81.7 % — HIGH (ref 42.2–75.2)
NEUTROPHILS NFR BLD AUTO: 82.3 % — HIGH (ref 42.2–75.2)
NEUTROPHILS NFR BLD AUTO: 82.5 % — HIGH (ref 42.2–75.2)
NEUTROPHILS NFR BLD AUTO: 82.6 % — HIGH (ref 42.2–75.2)
NEUTROPHILS NFR BLD AUTO: 82.7 % — HIGH (ref 42.2–75.2)
NEUTROPHILS NFR BLD AUTO: 83.3 % — HIGH (ref 42.2–75.2)
NEUTROPHILS NFR BLD AUTO: 83.6 % — HIGH (ref 42.2–75.2)
NEUTROPHILS NFR BLD AUTO: 83.8 % — HIGH (ref 42.2–75.2)
NEUTROPHILS NFR BLD AUTO: 84.3 % — HIGH (ref 42.2–75.2)
NEUTROPHILS NFR BLD AUTO: 84.3 % — HIGH (ref 42.2–75.2)
NEUTROPHILS NFR BLD AUTO: 84.9 % — HIGH (ref 42.2–75.2)
NEUTROPHILS NFR BLD AUTO: 85.6 % — HIGH (ref 42.2–75.2)
NEUTROPHILS NFR BLD AUTO: 86.3 % — HIGH (ref 42.2–75.2)
NEUTROPHILS NFR BLD AUTO: 87.1 % — HIGH (ref 42.2–75.2)
NEUTROPHILS NFR BLD AUTO: 87.5 % — HIGH (ref 42.2–75.2)
NEUTROPHILS NFR BLD AUTO: 87.7 % — HIGH (ref 42.2–75.2)
NEUTROPHILS NFR BLD AUTO: 88.2 % — HIGH (ref 42.2–75.2)
NEUTROPHILS NFR BLD AUTO: 88.2 % — HIGH (ref 42.2–75.2)
NEUTROPHILS NFR BLD AUTO: 89.2 % — HIGH (ref 42.2–75.2)
NEUTROPHILS NFR BLD AUTO: 90.8 % — HIGH (ref 42.2–75.2)
NEUTS BAND # BLD: 9.5 % — HIGH (ref 0–6)
NIGHT BLUE STAIN TISS: SIGNIFICANT CHANGE UP
NITRITE UR-MCNC: NEGATIVE — SIGNIFICANT CHANGE UP
NITRITE UR-MCNC: NEGATIVE — SIGNIFICANT CHANGE UP
NRBC # BLD: 0 /100 WBCS — SIGNIFICANT CHANGE UP (ref 0–0)
NRBC # BLD: 1 /100 WBCS — HIGH (ref 0–0)
NRBC # BLD: 1 /100 — HIGH (ref 0–0)
NRBC # BLD: 10 /100 WBCS — HIGH (ref 0–0)
NRBC # BLD: 2 /100 WBCS — HIGH (ref 0–0)
NRBC # BLD: 3 /100 WBCS — HIGH (ref 0–0)
NRBC # BLD: 4 /100 WBCS — HIGH (ref 0–0)
NRBC # BLD: 6 /100 WBCS — HIGH (ref 0–0)
NRBC # BLD: 6 /100 WBCS — HIGH (ref 0–0)
NRBC # BLD: 8 /100 WBCS — HIGH (ref 0–0)
NRBC # BLD: SIGNIFICANT CHANGE UP /100 WBCS (ref 0–0)
NT-PROBNP SERPL-SCNC: 1169 PG/ML — HIGH (ref 0–300)
NT-PROBNP SERPL-SCNC: HIGH PG/ML (ref 0–300)
OB PNL STL: NEGATIVE — SIGNIFICANT CHANGE UP
OB PNL STL: POSITIVE
OSMOLALITY UR: 324 MOS/KG — SIGNIFICANT CHANGE UP (ref 50–1200)
PCO2 BLDA: 36 MMHG — LOW (ref 38–42)
PCO2 BLDA: 42 MMHG — SIGNIFICANT CHANGE UP
PCO2 BLDA: 42 MMHG — SIGNIFICANT CHANGE UP (ref 38–42)
PCO2 BLDA: 43 MMHG — HIGH (ref 38–42)
PCO2 BLDA: 44 MMHG — HIGH (ref 38–42)
PCO2 BLDA: 45 MMHG — HIGH (ref 38–42)
PCO2 BLDA: 48 MMHG — HIGH (ref 38–42)
PCO2 BLDA: 48 MMHG — HIGH (ref 38–42)
PCO2 BLDA: 52 MMHG — HIGH (ref 38–42)
PCO2 BLDA: 53 MMHG — HIGH (ref 38–42)
PCO2 BLDA: 53 MMHG — HIGH (ref 38–42)
PCO2 BLDA: 54 MMHG — HIGH (ref 38–42)
PCO2 BLDA: 55 MMHG — HIGH (ref 38–42)
PCO2 BLDA: 55 MMHG — HIGH (ref 38–42)
PCO2 BLDA: 58 MMHG — HIGH (ref 38–42)
PCO2 BLDA: 60 MMHG — HIGH (ref 38–42)
PCO2 BLDA: 61 MMHG — CRITICAL HIGH (ref 38–42)
PCO2 BLDA: 61 MMHG — CRITICAL HIGH (ref 38–42)
PCO2 BLDA: 62 MMHG — CRITICAL HIGH (ref 38–42)
PCO2 BLDA: 63 MMHG — CRITICAL HIGH (ref 38–42)
PCO2 BLDA: 64 MMHG — CRITICAL HIGH (ref 38–42)
PCO2 BLDA: 72 MMHG — CRITICAL HIGH (ref 38–42)
PCO2 BLDA: 74 MMHG — CRITICAL HIGH (ref 38–42)
PCO2 BLDA: 74 MMHG — CRITICAL HIGH (ref 38–42)
PCO2 BLDA: 87 MMHG — CRITICAL HIGH (ref 38–42)
PCO2 BLDV: 56 MMHG — HIGH (ref 41–51)
PF4 HEPARIN CMPLX AB SER-ACNC: NEGATIVE — SIGNIFICANT CHANGE UP
PH BLD: 7.43 — HIGH (ref 7.38–7.42)
PH BLD: 7.45 — HIGH (ref 7.38–7.42)
PH BLDA: 7.21 — LOW (ref 7.38–7.42)
PH BLDA: 7.25 — LOW (ref 7.38–7.42)
PH BLDA: 7.25 — LOW (ref 7.38–7.42)
PH BLDA: 7.26 — LOW (ref 7.38–7.42)
PH BLDA: 7.26 — LOW (ref 7.38–7.42)
PH BLDA: 7.27 — LOW (ref 7.38–7.42)
PH BLDA: 7.28 — LOW (ref 7.38–7.42)
PH BLDA: 7.31 — LOW (ref 7.38–7.42)
PH BLDA: 7.31 — LOW (ref 7.38–7.42)
PH BLDA: 7.34 — LOW (ref 7.38–7.42)
PH BLDA: 7.35 — LOW (ref 7.38–7.42)
PH BLDA: 7.36 — LOW (ref 7.38–7.42)
PH BLDA: 7.36 — LOW (ref 7.38–7.42)
PH BLDA: 7.37 — LOW (ref 7.38–7.42)
PH BLDA: 7.38 — SIGNIFICANT CHANGE UP (ref 7.38–7.42)
PH BLDA: 7.38 — SIGNIFICANT CHANGE UP (ref 7.38–7.42)
PH BLDA: 7.4 — SIGNIFICANT CHANGE UP (ref 7.38–7.42)
PH BLDA: 7.41 — SIGNIFICANT CHANGE UP (ref 7.38–7.42)
PH BLDA: 7.41 — SIGNIFICANT CHANGE UP (ref 7.38–7.42)
PH BLDA: 7.43 — HIGH (ref 7.38–7.42)
PH BLDA: 7.44 — HIGH (ref 7.38–7.42)
PH BLDA: 7.44 — HIGH (ref 7.38–7.42)
PH BLDA: 7.45 — HIGH (ref 7.38–7.42)
PH BLDA: 7.48 — HIGH (ref 7.38–7.42)
PH BLDA: 7.48 — HIGH (ref 7.38–7.42)
PH BLDV: 7.32 — SIGNIFICANT CHANGE UP (ref 7.26–7.43)
PH UR: 6 — SIGNIFICANT CHANGE UP (ref 5–8)
PH UR: 6 — SIGNIFICANT CHANGE UP (ref 5–8)
PHOSPHATE SERPL-MCNC: 3.1 MG/DL — SIGNIFICANT CHANGE UP (ref 2.1–4.9)
PHOSPHATE SERPL-MCNC: 3.2 MG/DL — SIGNIFICANT CHANGE UP (ref 2.1–4.9)
PHOSPHATE SERPL-MCNC: 3.4 MG/DL — SIGNIFICANT CHANGE UP (ref 2.1–4.9)
PHOSPHATE SERPL-MCNC: 3.5 MG/DL — SIGNIFICANT CHANGE UP (ref 2.1–4.9)
PHOSPHATE SERPL-MCNC: 3.6 MG/DL — SIGNIFICANT CHANGE UP (ref 2.1–4.9)
PHOSPHATE SERPL-MCNC: 4.2 MG/DL — SIGNIFICANT CHANGE UP (ref 2.1–4.9)
PHOSPHATE SERPL-MCNC: 4.2 MG/DL — SIGNIFICANT CHANGE UP (ref 2.1–4.9)
PHOSPHATE SERPL-MCNC: 4.4 MG/DL — SIGNIFICANT CHANGE UP (ref 2.1–4.9)
PHOSPHATE SERPL-MCNC: 4.8 MG/DL — SIGNIFICANT CHANGE UP (ref 2.1–4.9)
PHOSPHATE SERPL-MCNC: 4.8 MG/DL — SIGNIFICANT CHANGE UP (ref 2.1–4.9)
PHOSPHATE SERPL-MCNC: 5.3 MG/DL — HIGH (ref 2.1–4.9)
PHOSPHATE SERPL-MCNC: 5.9 MG/DL — HIGH (ref 2.1–4.9)
PLAT MORPH BLD: ABNORMAL
PLAT MORPH BLD: NORMAL — SIGNIFICANT CHANGE UP
PLAT MORPH BLD: NORMAL — SIGNIFICANT CHANGE UP
PLATELET # BLD AUTO: 106 K/UL — LOW (ref 130–400)
PLATELET # BLD AUTO: 116 K/UL — LOW (ref 130–400)
PLATELET # BLD AUTO: 131 K/UL — SIGNIFICANT CHANGE UP (ref 130–400)
PLATELET # BLD AUTO: 138 K/UL — SIGNIFICANT CHANGE UP (ref 130–400)
PLATELET # BLD AUTO: 148 K/UL — SIGNIFICANT CHANGE UP (ref 130–400)
PLATELET # BLD AUTO: 151 K/UL — SIGNIFICANT CHANGE UP (ref 130–400)
PLATELET # BLD AUTO: 166 K/UL — SIGNIFICANT CHANGE UP (ref 130–400)
PLATELET # BLD AUTO: 174 K/UL — SIGNIFICANT CHANGE UP (ref 130–400)
PLATELET # BLD AUTO: 192 K/UL — SIGNIFICANT CHANGE UP (ref 130–400)
PLATELET # BLD AUTO: 201 K/UL — SIGNIFICANT CHANGE UP (ref 130–400)
PLATELET # BLD AUTO: 209 K/UL — SIGNIFICANT CHANGE UP (ref 130–400)
PLATELET # BLD AUTO: 218 K/UL — SIGNIFICANT CHANGE UP (ref 130–400)
PLATELET # BLD AUTO: 219 K/UL — SIGNIFICANT CHANGE UP (ref 130–400)
PLATELET # BLD AUTO: 232 K/UL — SIGNIFICANT CHANGE UP (ref 130–400)
PLATELET # BLD AUTO: 235 K/UL — SIGNIFICANT CHANGE UP (ref 130–400)
PLATELET # BLD AUTO: 244 K/UL — SIGNIFICANT CHANGE UP (ref 130–400)
PLATELET # BLD AUTO: 251 K/UL — SIGNIFICANT CHANGE UP (ref 130–400)
PLATELET # BLD AUTO: 251 K/UL — SIGNIFICANT CHANGE UP (ref 130–400)
PLATELET # BLD AUTO: 268 K/UL — SIGNIFICANT CHANGE UP (ref 130–400)
PLATELET # BLD AUTO: 274 K/UL — SIGNIFICANT CHANGE UP (ref 130–400)
PLATELET # BLD AUTO: 280 K/UL — SIGNIFICANT CHANGE UP (ref 130–400)
PLATELET # BLD AUTO: 282 K/UL
PLATELET # BLD AUTO: 295 K/UL — SIGNIFICANT CHANGE UP (ref 130–400)
PLATELET # BLD AUTO: 298 K/UL — SIGNIFICANT CHANGE UP (ref 130–400)
PLATELET # BLD AUTO: 300 K/UL — SIGNIFICANT CHANGE UP (ref 130–400)
PLATELET # BLD AUTO: 314 K/UL — SIGNIFICANT CHANGE UP (ref 130–400)
PLATELET # BLD AUTO: 318 K/UL — SIGNIFICANT CHANGE UP (ref 130–400)
PLATELET # BLD AUTO: 322 K/UL — SIGNIFICANT CHANGE UP (ref 130–400)
PLATELET # BLD AUTO: 323 K/UL — SIGNIFICANT CHANGE UP (ref 130–400)
PLATELET # BLD AUTO: 334 K/UL — SIGNIFICANT CHANGE UP (ref 130–400)
PLATELET # BLD AUTO: 336 K/UL — SIGNIFICANT CHANGE UP (ref 130–400)
PLATELET # BLD AUTO: 343 K/UL — SIGNIFICANT CHANGE UP (ref 130–400)
PLATELET # BLD AUTO: 347 K/UL — SIGNIFICANT CHANGE UP (ref 130–400)
PLATELET # BLD AUTO: 352 K/UL — SIGNIFICANT CHANGE UP (ref 130–400)
PLATELET # BLD AUTO: 362 K/UL — SIGNIFICANT CHANGE UP (ref 130–400)
PLATELET # BLD AUTO: 377 K/UL — SIGNIFICANT CHANGE UP (ref 130–400)
PLATELET # BLD AUTO: 380 K/UL — SIGNIFICANT CHANGE UP (ref 130–400)
PLATELET # BLD AUTO: 42 K/UL — LOW (ref 130–400)
PLATELET # BLD AUTO: 420 K/UL — HIGH (ref 130–400)
PLATELET # BLD AUTO: 430 K/UL — HIGH (ref 130–400)
PLATELET # BLD AUTO: 452 K/UL — HIGH (ref 130–400)
PLATELET # BLD AUTO: 456 K/UL — HIGH (ref 130–400)
PLATELET # BLD AUTO: 462 K/UL — HIGH (ref 130–400)
PLATELET # BLD AUTO: 467 K/UL — HIGH (ref 130–400)
PLATELET # BLD AUTO: 47 K/UL — LOW (ref 130–400)
PLATELET # BLD AUTO: 48 K/UL — LOW (ref 130–400)
PLATELET # BLD AUTO: 482 K/UL — HIGH (ref 130–400)
PLATELET # BLD AUTO: 484 K/UL — HIGH (ref 130–400)
PLATELET # BLD AUTO: 499 K/UL — HIGH (ref 130–400)
PLATELET # BLD AUTO: 504 K/UL — HIGH (ref 130–400)
PLATELET # BLD AUTO: 523 K/UL — HIGH (ref 130–400)
PLATELET # BLD AUTO: 526 K/UL — HIGH (ref 130–400)
PLATELET # BLD AUTO: 53 K/UL — LOW (ref 130–400)
PLATELET # BLD AUTO: 536 K/UL — HIGH (ref 130–400)
PLATELET # BLD AUTO: 567 K/UL — HIGH (ref 130–400)
PLATELET # BLD AUTO: 567 K/UL — HIGH (ref 130–400)
PLATELET # BLD AUTO: 57 K/UL — LOW (ref 130–400)
PLATELET # BLD AUTO: 60 K/UL — LOW (ref 130–400)
PLATELET # BLD AUTO: 61 K/UL — LOW (ref 130–400)
PLATELET # BLD AUTO: 639 K/UL — HIGH (ref 130–400)
PLATELET # BLD AUTO: 643 K/UL — HIGH (ref 130–400)
PLATELET # BLD AUTO: 680 K/UL — HIGH (ref 130–400)
PLATELET # BLD AUTO: 694 K/UL — HIGH (ref 130–400)
PLATELET # BLD AUTO: 698 K/UL — HIGH (ref 130–400)
PLATELET # BLD AUTO: 715 K/UL — HIGH (ref 130–400)
PLATELET # BLD AUTO: 724 K/UL — HIGH (ref 130–400)
PLATELET # BLD AUTO: 74 K/UL — LOW (ref 130–400)
PLATELET # BLD AUTO: 740 K/UL — HIGH (ref 130–400)
PLATELET # BLD AUTO: 76 K/UL — LOW (ref 130–400)
PLATELET # BLD AUTO: 761 K/UL — HIGH (ref 130–400)
PLATELET # BLD AUTO: 773 K/UL — HIGH (ref 130–400)
PLATELET # BLD AUTO: 825 K/UL — HIGH (ref 130–400)
PLATELET # BLD AUTO: 96 K/UL — LOW (ref 130–400)
PMV BLD: 10.3 FL
PO2 BLDA: 101 MMHG — HIGH (ref 78–95)
PO2 BLDA: 113 MMHG — HIGH (ref 78–95)
PO2 BLDA: 116 MMHG — HIGH (ref 78–95)
PO2 BLDA: 120 MMHG — HIGH (ref 78–95)
PO2 BLDA: 242 MMHG — HIGH (ref 78–95)
PO2 BLDA: 45 MMHG — LOW (ref 78–95)
PO2 BLDA: 53 MMHG — LOW (ref 78–95)
PO2 BLDA: 65 MMHG — LOW (ref 78–95)
PO2 BLDA: 67 MMHG — LOW (ref 78–95)
PO2 BLDA: 68 MMHG — LOW (ref 78–95)
PO2 BLDA: 69 MMHG — LOW (ref 78–95)
PO2 BLDA: 71 MMHG — LOW (ref 78–95)
PO2 BLDA: 73 MMHG — LOW (ref 78–95)
PO2 BLDA: 75 MMHG — LOW (ref 78–95)
PO2 BLDA: 79 MMHG — SIGNIFICANT CHANGE UP (ref 78–95)
PO2 BLDA: 80 MMHG — SIGNIFICANT CHANGE UP (ref 78–95)
PO2 BLDA: 82 MMHG — SIGNIFICANT CHANGE UP (ref 78–95)
PO2 BLDA: 84 MMHG — SIGNIFICANT CHANGE UP (ref 78–95)
PO2 BLDA: 85 MMHG — SIGNIFICANT CHANGE UP (ref 78–95)
PO2 BLDA: 86 MMHG — SIGNIFICANT CHANGE UP (ref 78–95)
PO2 BLDA: 86 MMHG — SIGNIFICANT CHANGE UP (ref 78–95)
PO2 BLDA: 88 MMHG — SIGNIFICANT CHANGE UP (ref 78–95)
PO2 BLDA: 90 MMHG — SIGNIFICANT CHANGE UP (ref 78–95)
PO2 BLDA: 97 MMHG — HIGH (ref 78–95)
PO2 BLDV: 16 MMHG — LOW (ref 20–40)
POIKILOCYTOSIS BLD QL AUTO: SIGNIFICANT CHANGE UP
POIKILOCYTOSIS BLD QL AUTO: SLIGHT — SIGNIFICANT CHANGE UP
POLYCHROMASIA BLD QL SMEAR: SIGNIFICANT CHANGE UP
POLYCHROMASIA BLD QL SMEAR: SIGNIFICANT CHANGE UP
POLYCHROMASIA BLD QL SMEAR: SLIGHT — SIGNIFICANT CHANGE UP
POTASSIUM BLDV-SCNC: 4.4 MMOL/L — SIGNIFICANT CHANGE UP (ref 3.3–5.6)
POTASSIUM SERPL-MCNC: 1.9 MMOL/L — CRITICAL LOW (ref 3.5–5)
POTASSIUM SERPL-MCNC: 3.1 MMOL/L — LOW (ref 3.5–5)
POTASSIUM SERPL-MCNC: 3.3 MMOL/L — LOW (ref 3.5–5)
POTASSIUM SERPL-MCNC: 3.3 MMOL/L — LOW (ref 3.5–5)
POTASSIUM SERPL-MCNC: 3.4 MMOL/L — LOW (ref 3.5–5)
POTASSIUM SERPL-MCNC: 3.4 MMOL/L — LOW (ref 3.5–5)
POTASSIUM SERPL-MCNC: 3.5 MMOL/L — SIGNIFICANT CHANGE UP (ref 3.5–5)
POTASSIUM SERPL-MCNC: 3.6 MMOL/L — SIGNIFICANT CHANGE UP (ref 3.5–5)
POTASSIUM SERPL-MCNC: 3.8 MMOL/L — SIGNIFICANT CHANGE UP (ref 3.5–5)
POTASSIUM SERPL-MCNC: 3.9 MMOL/L — SIGNIFICANT CHANGE UP (ref 3.5–5)
POTASSIUM SERPL-MCNC: 3.9 MMOL/L — SIGNIFICANT CHANGE UP (ref 3.5–5)
POTASSIUM SERPL-MCNC: 4 MMOL/L — SIGNIFICANT CHANGE UP (ref 3.5–5)
POTASSIUM SERPL-MCNC: 4.1 MMOL/L — SIGNIFICANT CHANGE UP (ref 3.5–5)
POTASSIUM SERPL-MCNC: 4.2 MMOL/L — SIGNIFICANT CHANGE UP (ref 3.5–5)
POTASSIUM SERPL-MCNC: 4.3 MMOL/L — SIGNIFICANT CHANGE UP (ref 3.5–5)
POTASSIUM SERPL-MCNC: 4.4 MMOL/L — SIGNIFICANT CHANGE UP (ref 3.5–5)
POTASSIUM SERPL-MCNC: 4.5 MMOL/L — SIGNIFICANT CHANGE UP (ref 3.5–5)
POTASSIUM SERPL-MCNC: 4.6 MMOL/L — SIGNIFICANT CHANGE UP (ref 3.5–5)
POTASSIUM SERPL-MCNC: 4.7 MMOL/L — SIGNIFICANT CHANGE UP (ref 3.5–5)
POTASSIUM SERPL-MCNC: 4.8 MMOL/L — SIGNIFICANT CHANGE UP (ref 3.5–5)
POTASSIUM SERPL-MCNC: 4.9 MMOL/L — SIGNIFICANT CHANGE UP (ref 3.5–5)
POTASSIUM SERPL-MCNC: 4.9 MMOL/L — SIGNIFICANT CHANGE UP (ref 3.5–5)
POTASSIUM SERPL-MCNC: 5 MMOL/L — SIGNIFICANT CHANGE UP (ref 3.5–5)
POTASSIUM SERPL-MCNC: 5.2 MMOL/L — HIGH (ref 3.5–5)
POTASSIUM SERPL-MCNC: 6 MMOL/L — CRITICAL HIGH (ref 3.5–5)
POTASSIUM SERPL-SCNC: 1.9 MMOL/L — CRITICAL LOW (ref 3.5–5)
POTASSIUM SERPL-SCNC: 3.1 MMOL/L — LOW (ref 3.5–5)
POTASSIUM SERPL-SCNC: 3.3 MMOL/L — LOW (ref 3.5–5)
POTASSIUM SERPL-SCNC: 3.3 MMOL/L — LOW (ref 3.5–5)
POTASSIUM SERPL-SCNC: 3.4 MMOL/L — LOW (ref 3.5–5)
POTASSIUM SERPL-SCNC: 3.4 MMOL/L — LOW (ref 3.5–5)
POTASSIUM SERPL-SCNC: 3.5 MMOL/L — SIGNIFICANT CHANGE UP (ref 3.5–5)
POTASSIUM SERPL-SCNC: 3.6 MMOL/L — SIGNIFICANT CHANGE UP (ref 3.5–5)
POTASSIUM SERPL-SCNC: 3.8 MMOL/L — SIGNIFICANT CHANGE UP (ref 3.5–5)
POTASSIUM SERPL-SCNC: 3.9 MMOL/L — SIGNIFICANT CHANGE UP (ref 3.5–5)
POTASSIUM SERPL-SCNC: 3.9 MMOL/L — SIGNIFICANT CHANGE UP (ref 3.5–5)
POTASSIUM SERPL-SCNC: 4 MMOL/L
POTASSIUM SERPL-SCNC: 4 MMOL/L — SIGNIFICANT CHANGE UP (ref 3.5–5)
POTASSIUM SERPL-SCNC: 4.1 MMOL/L — SIGNIFICANT CHANGE UP (ref 3.5–5)
POTASSIUM SERPL-SCNC: 4.2 MMOL/L — SIGNIFICANT CHANGE UP (ref 3.5–5)
POTASSIUM SERPL-SCNC: 4.3 MMOL/L — SIGNIFICANT CHANGE UP (ref 3.5–5)
POTASSIUM SERPL-SCNC: 4.4 MMOL/L — SIGNIFICANT CHANGE UP (ref 3.5–5)
POTASSIUM SERPL-SCNC: 4.5 MMOL/L — SIGNIFICANT CHANGE UP (ref 3.5–5)
POTASSIUM SERPL-SCNC: 4.6 MMOL/L — SIGNIFICANT CHANGE UP (ref 3.5–5)
POTASSIUM SERPL-SCNC: 4.7 MMOL/L — SIGNIFICANT CHANGE UP (ref 3.5–5)
POTASSIUM SERPL-SCNC: 4.8 MMOL/L — SIGNIFICANT CHANGE UP (ref 3.5–5)
POTASSIUM SERPL-SCNC: 4.9 MMOL/L — SIGNIFICANT CHANGE UP (ref 3.5–5)
POTASSIUM SERPL-SCNC: 4.9 MMOL/L — SIGNIFICANT CHANGE UP (ref 3.5–5)
POTASSIUM SERPL-SCNC: 5 MMOL/L — SIGNIFICANT CHANGE UP (ref 3.5–5)
POTASSIUM SERPL-SCNC: 5.2 MMOL/L — HIGH (ref 3.5–5)
POTASSIUM SERPL-SCNC: 6 MMOL/L — CRITICAL HIGH (ref 3.5–5)
PREALB SERPL-MCNC: 10 MG/DL — LOW (ref 20–40)
PREALB SERPL-MCNC: 5 MG/DL — LOW (ref 20–40)
PROCALCITONIN SERPL-MCNC: 0.29 NG/ML — HIGH (ref 0.02–0.1)
PROCALCITONIN SERPL-MCNC: 0.31 NG/ML — HIGH (ref 0.02–0.1)
PROCALCITONIN SERPL-MCNC: 0.34 NG/ML — HIGH (ref 0.02–0.1)
PROCALCITONIN SERPL-MCNC: 0.49 NG/ML — HIGH (ref 0.02–0.1)
PROCALCITONIN SERPL-MCNC: 0.58 NG/ML — HIGH (ref 0.02–0.1)
PROCALCITONIN SERPL-MCNC: 0.6 NG/ML — HIGH (ref 0.02–0.1)
PROCALCITONIN SERPL-MCNC: 1.75 NG/ML — HIGH (ref 0.02–0.1)
PROCALCITONIN SERPL-MCNC: 2.29 NG/ML — HIGH (ref 0.02–0.1)
PROMYELOCYTES # FLD: 0.9 % — HIGH (ref 0–0)
PROT ?TM UR-MCNC: 16 MG/DLG/24H — SIGNIFICANT CHANGE UP
PROT ?TM UR-MCNC: 16 MG/DLG/24H — SIGNIFICANT CHANGE UP
PROT SERPL-MCNC: 2 G/DL — LOW (ref 6–8)
PROT SERPL-MCNC: 4 G/DL — LOW (ref 6–8)
PROT SERPL-MCNC: 4.7 G/DL — LOW (ref 6–8)
PROT SERPL-MCNC: 4.8 G/DL — LOW (ref 6–8)
PROT SERPL-MCNC: 4.9 G/DL — LOW (ref 6–8)
PROT SERPL-MCNC: 5 G/DL — LOW (ref 6–8)
PROT SERPL-MCNC: 5.1 G/DL — LOW (ref 6–8)
PROT SERPL-MCNC: 5.3 G/DL — LOW (ref 6–8)
PROT SERPL-MCNC: 5.4 G/DL — LOW (ref 6–8)
PROT SERPL-MCNC: 5.5 G/DL — LOW (ref 6–8)
PROT SERPL-MCNC: 5.5 G/DL — LOW (ref 6–8)
PROT SERPL-MCNC: 5.6 G/DL — LOW (ref 6–8)
PROT SERPL-MCNC: 5.7 G/DL — LOW (ref 6–8)
PROT SERPL-MCNC: 5.7 G/DL — LOW (ref 6–8)
PROT SERPL-MCNC: 5.8 G/DL — LOW (ref 6–8)
PROT SERPL-MCNC: 5.9 G/DL — LOW (ref 6–8)
PROT SERPL-MCNC: 5.9 G/DL — LOW (ref 6–8)
PROT SERPL-MCNC: 6 G/DL — SIGNIFICANT CHANGE UP (ref 6–8)
PROT SERPL-MCNC: 6.1 G/DL — SIGNIFICANT CHANGE UP (ref 6–8)
PROT SERPL-MCNC: 6.1 G/DL — SIGNIFICANT CHANGE UP (ref 6–8)
PROT SERPL-MCNC: 6.2 G/DL — SIGNIFICANT CHANGE UP (ref 6–8)
PROT SERPL-MCNC: 6.5 G/DL — SIGNIFICANT CHANGE UP (ref 6–8)
PROT SERPL-MCNC: 7.2 G/DL — SIGNIFICANT CHANGE UP (ref 6–8)
PROT SERPL-MCNC: 7.8 G/DL — SIGNIFICANT CHANGE UP (ref 6–8)
PROT SERPL-MCNC: 7.9 G/DL
PROT UR-MCNC: ABNORMAL
PROT UR-MCNC: ABNORMAL
PROT/CREAT UR-RTO: 2.7 RATIO — HIGH (ref 0–0.2)
PROTHROM AB SERPL-ACNC: 11.7 SEC — SIGNIFICANT CHANGE UP (ref 9.95–12.87)
PROTHROM AB SERPL-ACNC: 12 SEC — SIGNIFICANT CHANGE UP (ref 9.95–12.87)
PROTHROM AB SERPL-ACNC: 12.2 SEC — SIGNIFICANT CHANGE UP (ref 9.95–12.87)
PROTHROM AB SERPL-ACNC: 12.2 SEC — SIGNIFICANT CHANGE UP (ref 9.95–12.87)
PROTHROM AB SERPL-ACNC: 12.3 SEC — SIGNIFICANT CHANGE UP (ref 9.95–12.87)
PROTHROM AB SERPL-ACNC: 12.4 SEC — SIGNIFICANT CHANGE UP (ref 9.95–12.87)
PROTHROM AB SERPL-ACNC: 12.6 SEC — SIGNIFICANT CHANGE UP (ref 9.95–12.87)
PROTHROM AB SERPL-ACNC: 13.2 SEC — HIGH (ref 9.95–12.87)
PROTHROM AB SERPL-ACNC: 14.4 SEC — HIGH (ref 9.95–12.87)
PROTHROM AB SERPL-ACNC: 14.8 SEC — HIGH (ref 9.95–12.87)
PROTHROM AB SERPL-ACNC: 14.9 SEC — HIGH (ref 9.95–12.87)
PROTHROM AB SERPL-ACNC: 15.1 SEC — HIGH (ref 9.95–12.87)
PROTHROM AB SERPL-ACNC: 17.2 SEC — HIGH (ref 9.95–12.87)
PROTHROM AB SERPL-ACNC: 18 SEC — HIGH (ref 9.95–12.87)
PROTHROM AB SERPL-ACNC: 18.3 SEC — HIGH (ref 9.95–12.87)
PROTHROM AB SERPL-ACNC: 18.7 SEC — HIGH (ref 9.95–12.87)
PROTHROM AB SERPL-ACNC: 18.8 SEC — HIGH (ref 9.95–12.87)
RAPID RVP RESULT: SIGNIFICANT CHANGE UP
RBC # BLD: 2.31 M/UL — LOW (ref 4.2–5.4)
RBC # BLD: 2.37 M/UL — LOW (ref 4.2–5.4)
RBC # BLD: 2.39 M/UL — LOW (ref 4.2–5.4)
RBC # BLD: 2.41 M/UL — LOW (ref 4.2–5.4)
RBC # BLD: 2.42 M/UL — LOW (ref 4.2–5.4)
RBC # BLD: 2.46 M/UL — LOW (ref 4.2–5.4)
RBC # BLD: 2.46 M/UL — LOW (ref 4.2–5.4)
RBC # BLD: 2.51 M/UL — LOW (ref 4.2–5.4)
RBC # BLD: 2.54 M/UL — LOW (ref 4.2–5.4)
RBC # BLD: 2.57 M/UL — LOW (ref 4.2–5.4)
RBC # BLD: 2.57 M/UL — LOW (ref 4.2–5.4)
RBC # BLD: 2.58 M/UL — LOW (ref 4.2–5.4)
RBC # BLD: 2.59 M/UL — LOW (ref 4.2–5.4)
RBC # BLD: 2.59 M/UL — LOW (ref 4.2–5.4)
RBC # BLD: 2.6 M/UL — LOW (ref 4.2–5.4)
RBC # BLD: 2.63 M/UL — LOW (ref 4.2–5.4)
RBC # BLD: 2.67 M/UL — LOW (ref 4.2–5.4)
RBC # BLD: 2.67 M/UL — LOW (ref 4.2–5.4)
RBC # BLD: 2.68 M/UL — LOW (ref 4.2–5.4)
RBC # BLD: 2.7 M/UL — LOW (ref 4.2–5.4)
RBC # BLD: 2.7 M/UL — LOW (ref 4.2–5.4)
RBC # BLD: 2.73 M/UL — LOW (ref 4.2–5.4)
RBC # BLD: 2.73 M/UL — LOW (ref 4.2–5.4)
RBC # BLD: 2.74 M/UL — LOW (ref 4.2–5.4)
RBC # BLD: 2.75 M/UL — LOW (ref 4.2–5.4)
RBC # BLD: 2.76 M/UL — LOW (ref 4.2–5.4)
RBC # BLD: 2.78 M/UL — LOW (ref 4.2–5.4)
RBC # BLD: 2.83 M/UL — LOW (ref 4.2–5.4)
RBC # BLD: 2.85 M/UL — LOW (ref 4.2–5.4)
RBC # BLD: 2.85 M/UL — LOW (ref 4.2–5.4)
RBC # BLD: 2.88 M/UL — LOW (ref 4.2–5.4)
RBC # BLD: 2.91 M/UL — LOW (ref 4.2–5.4)
RBC # BLD: 2.97 M/UL — LOW (ref 4.2–5.4)
RBC # BLD: 3.05 M/UL — LOW (ref 4.2–5.4)
RBC # BLD: 3.07 M/UL — LOW (ref 4.2–5.4)
RBC # BLD: 3.09 M/UL — LOW (ref 4.2–5.4)
RBC # BLD: 3.09 M/UL — LOW (ref 4.2–5.4)
RBC # BLD: 3.1 M/UL — LOW (ref 4.2–5.4)
RBC # BLD: 3.1 M/UL — LOW (ref 4.2–5.4)
RBC # BLD: 3.17 M/UL — LOW (ref 4.2–5.4)
RBC # BLD: 3.18 M/UL — LOW (ref 4.2–5.4)
RBC # BLD: 3.21 M/UL — LOW (ref 4.2–5.4)
RBC # BLD: 3.23 M/UL — LOW (ref 4.2–5.4)
RBC # BLD: 3.24 M/UL — LOW (ref 4.2–5.4)
RBC # BLD: 3.27 M/UL — LOW (ref 4.2–5.4)
RBC # BLD: 3.33 M/UL — LOW (ref 4.2–5.4)
RBC # BLD: 3.41 M/UL — LOW (ref 4.2–5.4)
RBC # BLD: 3.45 M/UL — LOW (ref 4.2–5.4)
RBC # BLD: 3.47 M/UL — LOW (ref 4.2–5.4)
RBC # BLD: 3.57 M/UL — LOW (ref 4.2–5.4)
RBC # BLD: 3.77 M/UL — LOW (ref 4.2–5.4)
RBC # BLD: 3.89 M/UL — LOW (ref 4.2–5.4)
RBC # BLD: 3.93 M/UL — LOW (ref 4.2–5.4)
RBC # BLD: 3.94 M/UL — LOW (ref 4.2–5.4)
RBC # BLD: 4.1 M/UL — LOW (ref 4.2–5.4)
RBC # BLD: 4.13 M/UL — LOW (ref 4.2–5.4)
RBC # BLD: 4.14 M/UL — LOW (ref 4.2–5.4)
RBC # BLD: 4.18 M/UL — LOW (ref 4.2–5.4)
RBC # BLD: 4.32 M/UL — SIGNIFICANT CHANGE UP (ref 4.2–5.4)
RBC # BLD: 4.32 M/UL — SIGNIFICANT CHANGE UP (ref 4.2–5.4)
RBC # BLD: 4.34 M/UL — SIGNIFICANT CHANGE UP (ref 4.2–5.4)
RBC # BLD: 4.34 M/UL — SIGNIFICANT CHANGE UP (ref 4.2–5.4)
RBC # BLD: 4.35 M/UL — SIGNIFICANT CHANGE UP (ref 4.2–5.4)
RBC # BLD: 4.4 M/UL — SIGNIFICANT CHANGE UP (ref 4.2–5.4)
RBC # BLD: 4.41 M/UL — SIGNIFICANT CHANGE UP (ref 4.2–5.4)
RBC # BLD: 4.46 M/UL — SIGNIFICANT CHANGE UP (ref 4.2–5.4)
RBC # BLD: 4.48 M/UL — SIGNIFICANT CHANGE UP (ref 4.2–5.4)
RBC # BLD: 4.51 M/UL — SIGNIFICANT CHANGE UP (ref 4.2–5.4)
RBC # BLD: 4.55 M/UL — SIGNIFICANT CHANGE UP (ref 4.2–5.4)
RBC # BLD: 4.61 M/UL — SIGNIFICANT CHANGE UP (ref 4.2–5.4)
RBC # BLD: 4.85 M/UL
RBC # FLD: 17.2 % — HIGH (ref 11.5–14.5)
RBC # FLD: 17.3 % — HIGH (ref 11.5–14.5)
RBC # FLD: 17.3 % — HIGH (ref 11.5–14.5)
RBC # FLD: 17.4 % — HIGH (ref 11.5–14.5)
RBC # FLD: 17.4 % — HIGH (ref 11.5–14.5)
RBC # FLD: 17.5 % — HIGH (ref 11.5–14.5)
RBC # FLD: 17.5 % — HIGH (ref 11.5–14.5)
RBC # FLD: 17.6 % — HIGH (ref 11.5–14.5)
RBC # FLD: 17.7 % — HIGH (ref 11.5–14.5)
RBC # FLD: 17.8 % — HIGH (ref 11.5–14.5)
RBC # FLD: 17.9 % — HIGH (ref 11.5–14.5)
RBC # FLD: 17.9 % — HIGH (ref 11.5–14.5)
RBC # FLD: 18 % — HIGH (ref 11.5–14.5)
RBC # FLD: 18.3 %
RBC # FLD: 18.5 % — HIGH (ref 11.5–14.5)
RBC # FLD: 18.6 % — HIGH (ref 11.5–14.5)
RBC # FLD: 18.6 % — HIGH (ref 11.5–14.5)
RBC # FLD: 18.7 % — HIGH (ref 11.5–14.5)
RBC # FLD: 18.7 % — HIGH (ref 11.5–14.5)
RBC # FLD: 18.8 % — HIGH (ref 11.5–14.5)
RBC # FLD: 18.8 % — HIGH (ref 11.5–14.5)
RBC # FLD: 19 % — HIGH (ref 11.5–14.5)
RBC # FLD: 19.1 % — HIGH (ref 11.5–14.5)
RBC # FLD: 19.1 % — HIGH (ref 11.5–14.5)
RBC # FLD: 19.3 % — HIGH (ref 11.5–14.5)
RBC # FLD: 19.4 % — HIGH (ref 11.5–14.5)
RBC # FLD: 19.6 % — HIGH (ref 11.5–14.5)
RBC # FLD: 19.6 % — HIGH (ref 11.5–14.5)
RBC # FLD: 19.8 % — HIGH (ref 11.5–14.5)
RBC # FLD: 19.9 % — HIGH (ref 11.5–14.5)
RBC # FLD: 20 % — HIGH (ref 11.5–14.5)
RBC # FLD: 20.2 % — HIGH (ref 11.5–14.5)
RBC # FLD: 20.3 % — HIGH (ref 11.5–14.5)
RBC # FLD: 20.3 % — HIGH (ref 11.5–14.5)
RBC # FLD: 20.4 % — HIGH (ref 11.5–14.5)
RBC # FLD: 20.5 % — HIGH (ref 11.5–14.5)
RBC # FLD: 20.5 % — HIGH (ref 11.5–14.5)
RBC # FLD: 20.6 % — HIGH (ref 11.5–14.5)
RBC # FLD: 20.7 % — HIGH (ref 11.5–14.5)
RBC # FLD: 20.9 % — HIGH (ref 11.5–14.5)
RBC # FLD: 21.1 % — HIGH (ref 11.5–14.5)
RBC # FLD: 21.2 % — HIGH (ref 11.5–14.5)
RBC # FLD: 21.6 % — HIGH (ref 11.5–14.5)
RBC # FLD: 21.8 % — HIGH (ref 11.5–14.5)
RBC # FLD: 22.4 % — HIGH (ref 11.5–14.5)
RBC # FLD: 22.5 % — HIGH (ref 11.5–14.5)
RBC # FLD: 24.2 % — HIGH (ref 11.5–14.5)
RBC # FLD: 24.7 % — HIGH (ref 11.5–14.5)
RBC # FLD: 24.7 % — HIGH (ref 11.5–14.5)
RBC # FLD: 24.8 % — HIGH (ref 11.5–14.5)
RBC # FLD: 26.1 % — HIGH (ref 11.5–14.5)
RBC # FLD: 27.9 % — HIGH (ref 11.5–14.5)
RBC # FLD: 28 % — HIGH (ref 11.5–14.5)
RBC # FLD: 28.1 % — HIGH (ref 11.5–14.5)
RBC # FLD: 29.2 % — HIGH (ref 11.5–14.5)
RBC BLD AUTO: ABNORMAL
RBC BLD AUTO: ABNORMAL
RBC BLD AUTO: NORMAL — SIGNIFICANT CHANGE UP
RBC CASTS # UR COMP ASSIST: 1 /HPF — SIGNIFICANT CHANGE UP (ref 0–4)
RBC CASTS # UR COMP ASSIST: 13 /HPF — HIGH (ref 0–4)
RETICS # AUTO: 0.7 %
RETICS AGGREG/RBC NFR: 31.5 K/UL
S PNEUM AG UR QL: NEGATIVE — SIGNIFICANT CHANGE UP
S PNEUM AG UR QL: NEGATIVE — SIGNIFICANT CHANGE UP
SAO2 % BLDA: 100 % — HIGH (ref 94–98)
SAO2 % BLDA: 63 % — CRITICAL LOW (ref 94–98)
SAO2 % BLDA: 81 % — LOW (ref 94–98)
SAO2 % BLDA: 86 % — LOW (ref 94–98)
SAO2 % BLDA: 94 % — SIGNIFICANT CHANGE UP (ref 94–98)
SAO2 % BLDA: 94 % — SIGNIFICANT CHANGE UP (ref 94–98)
SAO2 % BLDA: 95 % — SIGNIFICANT CHANGE UP (ref 94–98)
SAO2 % BLDA: 96 % — SIGNIFICANT CHANGE UP (ref 92–96)
SAO2 % BLDA: 96 % — SIGNIFICANT CHANGE UP (ref 94–98)
SAO2 % BLDA: 96 % — SIGNIFICANT CHANGE UP (ref 94–98)
SAO2 % BLDA: 97 % — SIGNIFICANT CHANGE UP (ref 94–98)
SAO2 % BLDA: 98 % — SIGNIFICANT CHANGE UP (ref 94–98)
SAO2 % BLDA: 99 % — HIGH (ref 92–96)
SAO2 % BLDA: 99 % — HIGH (ref 94–98)
SAO2 % BLDA: 99 % — HIGH (ref 94–98)
SAO2 % BLDV: 11 % — SIGNIFICANT CHANGE UP
SARS-COV-2 RNA SPEC QL NAA+PROBE: SIGNIFICANT CHANGE UP
SODIUM SERPL-SCNC: 132 MMOL/L — LOW (ref 135–146)
SODIUM SERPL-SCNC: 133 MMOL/L — LOW (ref 135–146)
SODIUM SERPL-SCNC: 134 MMOL/L — LOW (ref 135–146)
SODIUM SERPL-SCNC: 135 MMOL/L — SIGNIFICANT CHANGE UP (ref 135–146)
SODIUM SERPL-SCNC: 136 MMOL/L — SIGNIFICANT CHANGE UP (ref 135–146)
SODIUM SERPL-SCNC: 137 MMOL/L
SODIUM SERPL-SCNC: 137 MMOL/L — SIGNIFICANT CHANGE UP (ref 135–146)
SODIUM SERPL-SCNC: 138 MMOL/L — SIGNIFICANT CHANGE UP (ref 135–146)
SODIUM SERPL-SCNC: 139 MMOL/L — SIGNIFICANT CHANGE UP (ref 135–146)
SODIUM SERPL-SCNC: 140 MMOL/L — SIGNIFICANT CHANGE UP (ref 135–146)
SODIUM SERPL-SCNC: 141 MMOL/L — SIGNIFICANT CHANGE UP (ref 135–146)
SODIUM SERPL-SCNC: 142 MMOL/L — SIGNIFICANT CHANGE UP (ref 135–146)
SODIUM SERPL-SCNC: 142 MMOL/L — SIGNIFICANT CHANGE UP (ref 135–146)
SODIUM SERPL-SCNC: 143 MMOL/L — SIGNIFICANT CHANGE UP (ref 135–146)
SODIUM SERPL-SCNC: 144 MMOL/L — SIGNIFICANT CHANGE UP (ref 135–146)
SODIUM SERPL-SCNC: 145 MMOL/L — SIGNIFICANT CHANGE UP (ref 135–146)
SODIUM SERPL-SCNC: 146 MMOL/L — SIGNIFICANT CHANGE UP (ref 135–146)
SODIUM SERPL-SCNC: 147 MMOL/L — HIGH (ref 135–146)
SODIUM SERPL-SCNC: 147 MMOL/L — HIGH (ref 135–146)
SODIUM SERPL-SCNC: 148 MMOL/L — HIGH (ref 135–146)
SODIUM SERPL-SCNC: 149 MMOL/L — HIGH (ref 135–146)
SODIUM SERPL-SCNC: 150 MMOL/L — HIGH (ref 135–146)
SODIUM SERPL-SCNC: 151 MMOL/L — HIGH (ref 135–146)
SODIUM SERPL-SCNC: 152 MMOL/L — HIGH (ref 135–146)
SODIUM SERPL-SCNC: 154 MMOL/L — HIGH (ref 135–146)
SODIUM SERPL-SCNC: 154 MMOL/L — HIGH (ref 135–146)
SODIUM SERPL-SCNC: 156 MMOL/L — HIGH (ref 135–146)
SODIUM SERPL-SCNC: 156 MMOL/L — HIGH (ref 135–146)
SODIUM UR-SCNC: 93 MMOL/L — SIGNIFICANT CHANGE UP
SODIUM UR-SCNC: <20 MMOL/L — SIGNIFICANT CHANGE UP
SP GR SPEC: 1.02 — SIGNIFICANT CHANGE UP (ref 1.01–1.03)
SP GR SPEC: 1.03 — SIGNIFICANT CHANGE UP (ref 1.01–1.03)
SPECIMEN SOURCE: SIGNIFICANT CHANGE UP
SURGICAL PATHOLOGY STUDY: SIGNIFICANT CHANGE UP
T3 SERPL-MCNC: 60 NG/DL — LOW (ref 80–200)
T4 AB SER-ACNC: 4.7 UG/DL — SIGNIFICANT CHANGE UP (ref 4.6–12)
TIBC SERPL-MCNC: 107 UG/DL — LOW (ref 220–430)
TIBC SERPL-MCNC: 125 UG/DL — LOW (ref 220–430)
TIBC SERPL-MCNC: 352 UG/DL
TROPONIN T SERPL-MCNC: <0.01 NG/ML — SIGNIFICANT CHANGE UP
TSH SERPL-ACNC: 0.71 UIU/ML
TSH SERPL-MCNC: 1.76 UIU/ML — SIGNIFICANT CHANGE UP (ref 0.27–4.2)
UIBC SERPL-MCNC: 112 UG/DL — SIGNIFICANT CHANGE UP (ref 110–370)
UIBC SERPL-MCNC: 335 UG/DL
UIBC SERPL-MCNC: 77 UG/DL — LOW (ref 110–370)
UROBILINOGEN FLD QL: ABNORMAL
UROBILINOGEN FLD QL: SIGNIFICANT CHANGE UP
VANCOMYCIN TROUGH SERPL-MCNC: 16.9 UG/ML — HIGH (ref 5–10)
VANCOMYCIN TROUGH SERPL-MCNC: 26.9 UG/ML — HIGH (ref 5–10)
VANCOMYCIN TROUGH SERPL-MCNC: 6.7 UG/ML — SIGNIFICANT CHANGE UP (ref 5–10)
VARIANT LYMPHS # BLD: 2.6 % — SIGNIFICANT CHANGE UP (ref 0–5)
VIT B12 SERPL-MCNC: 1383 PG/ML
VIT B12 SERPL-MCNC: >2000 PG/ML — HIGH (ref 232–1245)
WBC # BLD: 10.62 K/UL — SIGNIFICANT CHANGE UP (ref 4.8–10.8)
WBC # BLD: 10.84 K/UL — HIGH (ref 4.8–10.8)
WBC # BLD: 11.67 K/UL — HIGH (ref 4.8–10.8)
WBC # BLD: 12.24 K/UL — HIGH (ref 4.8–10.8)
WBC # BLD: 12.58 K/UL — HIGH (ref 4.8–10.8)
WBC # BLD: 12.68 K/UL — HIGH (ref 4.8–10.8)
WBC # BLD: 13.49 K/UL — HIGH (ref 4.8–10.8)
WBC # BLD: 13.7 K/UL — HIGH (ref 4.8–10.8)
WBC # BLD: 13.8 K/UL — HIGH (ref 4.8–10.8)
WBC # BLD: 15.03 K/UL — HIGH (ref 4.8–10.8)
WBC # BLD: 15.4 K/UL — HIGH (ref 4.8–10.8)
WBC # BLD: 15.54 K/UL — HIGH (ref 4.8–10.8)
WBC # BLD: 15.81 K/UL — HIGH (ref 4.8–10.8)
WBC # BLD: 15.91 K/UL — HIGH (ref 4.8–10.8)
WBC # BLD: 16.39 K/UL — HIGH (ref 4.8–10.8)
WBC # BLD: 16.49 K/UL — HIGH (ref 4.8–10.8)
WBC # BLD: 16.5 K/UL — HIGH (ref 4.8–10.8)
WBC # BLD: 16.74 K/UL — HIGH (ref 4.8–10.8)
WBC # BLD: 16.89 K/UL — HIGH (ref 4.8–10.8)
WBC # BLD: 16.94 K/UL — HIGH (ref 4.8–10.8)
WBC # BLD: 17.28 K/UL — HIGH (ref 4.8–10.8)
WBC # BLD: 17.32 K/UL — HIGH (ref 4.8–10.8)
WBC # BLD: 17.35 K/UL — HIGH (ref 4.8–10.8)
WBC # BLD: 17.37 K/UL — HIGH (ref 4.8–10.8)
WBC # BLD: 17.59 K/UL — HIGH (ref 4.8–10.8)
WBC # BLD: 17.63 K/UL — HIGH (ref 4.8–10.8)
WBC # BLD: 17.65 K/UL — HIGH (ref 4.8–10.8)
WBC # BLD: 17.87 K/UL — HIGH (ref 4.8–10.8)
WBC # BLD: 17.95 K/UL — HIGH (ref 4.8–10.8)
WBC # BLD: 18.08 K/UL — HIGH (ref 4.8–10.8)
WBC # BLD: 18.52 K/UL — HIGH (ref 4.8–10.8)
WBC # BLD: 18.93 K/UL — HIGH (ref 4.8–10.8)
WBC # BLD: 19.39 K/UL — HIGH (ref 4.8–10.8)
WBC # BLD: 19.88 K/UL — HIGH (ref 4.8–10.8)
WBC # BLD: 19.9 K/UL — HIGH (ref 4.8–10.8)
WBC # BLD: 19.99 K/UL — HIGH (ref 4.8–10.8)
WBC # BLD: 20.04 K/UL — HIGH (ref 4.8–10.8)
WBC # BLD: 20.45 K/UL — HIGH (ref 4.8–10.8)
WBC # BLD: 20.63 K/UL — HIGH (ref 4.8–10.8)
WBC # BLD: 22.03 K/UL — HIGH (ref 4.8–10.8)
WBC # BLD: 22.28 K/UL — HIGH (ref 4.8–10.8)
WBC # BLD: 22.52 K/UL — HIGH (ref 4.8–10.8)
WBC # BLD: 22.71 K/UL — HIGH (ref 4.8–10.8)
WBC # BLD: 22.87 K/UL — HIGH (ref 4.8–10.8)
WBC # BLD: 23.02 K/UL — HIGH (ref 4.8–10.8)
WBC # BLD: 23.52 K/UL — HIGH (ref 4.8–10.8)
WBC # BLD: 23.58 K/UL — HIGH (ref 4.8–10.8)
WBC # BLD: 23.6 K/UL — HIGH (ref 4.8–10.8)
WBC # BLD: 23.98 K/UL — HIGH (ref 4.8–10.8)
WBC # BLD: 24.78 K/UL — HIGH (ref 4.8–10.8)
WBC # BLD: 24.84 K/UL — HIGH (ref 4.8–10.8)
WBC # BLD: 25.59 K/UL — HIGH (ref 4.8–10.8)
WBC # BLD: 27.18 K/UL — HIGH (ref 4.8–10.8)
WBC # BLD: 27.35 K/UL — HIGH (ref 4.8–10.8)
WBC # BLD: 27.92 K/UL — HIGH (ref 4.8–10.8)
WBC # BLD: 28.4 K/UL — HIGH (ref 4.8–10.8)
WBC # BLD: 29.29 K/UL — HIGH (ref 4.8–10.8)
WBC # BLD: 29.41 K/UL — HIGH (ref 4.8–10.8)
WBC # BLD: 30.22 K/UL — HIGH (ref 4.8–10.8)
WBC # BLD: 30.85 K/UL — HIGH (ref 4.8–10.8)
WBC # BLD: 31.29 K/UL — HIGH (ref 4.8–10.8)
WBC # BLD: 31.29 K/UL — HIGH (ref 4.8–10.8)
WBC # BLD: 31.76 K/UL — HIGH (ref 4.8–10.8)
WBC # BLD: 34.51 K/UL — HIGH (ref 4.8–10.8)
WBC # BLD: 35.31 K/UL — HIGH (ref 4.8–10.8)
WBC # BLD: 35.35 K/UL — HIGH (ref 4.8–10.8)
WBC # BLD: 35.37 K/UL — HIGH (ref 4.8–10.8)
WBC # BLD: 38.79 K/UL — HIGH (ref 4.8–10.8)
WBC # BLD: 40.32 K/UL — CRITICAL HIGH (ref 4.8–10.8)
WBC # BLD: 45.51 K/UL — CRITICAL HIGH (ref 4.8–10.8)
WBC # BLD: 49.96 K/UL — CRITICAL HIGH (ref 4.8–10.8)
WBC # BLD: 62.73 K/UL — CRITICAL HIGH (ref 4.8–10.8)
WBC # FLD AUTO: 10.62 K/UL — SIGNIFICANT CHANGE UP (ref 4.8–10.8)
WBC # FLD AUTO: 10.84 K/UL — HIGH (ref 4.8–10.8)
WBC # FLD AUTO: 11.67 K/UL — HIGH (ref 4.8–10.8)
WBC # FLD AUTO: 12.24 K/UL — HIGH (ref 4.8–10.8)
WBC # FLD AUTO: 12.58 K/UL — HIGH (ref 4.8–10.8)
WBC # FLD AUTO: 12.68 K/UL — HIGH (ref 4.8–10.8)
WBC # FLD AUTO: 13.49 K/UL — HIGH (ref 4.8–10.8)
WBC # FLD AUTO: 13.7 K/UL — HIGH (ref 4.8–10.8)
WBC # FLD AUTO: 13.8 K/UL — HIGH (ref 4.8–10.8)
WBC # FLD AUTO: 15.03 K/UL — HIGH (ref 4.8–10.8)
WBC # FLD AUTO: 15.4 K/UL — HIGH (ref 4.8–10.8)
WBC # FLD AUTO: 15.54 K/UL — HIGH (ref 4.8–10.8)
WBC # FLD AUTO: 15.81 K/UL — HIGH (ref 4.8–10.8)
WBC # FLD AUTO: 15.91 K/UL — HIGH (ref 4.8–10.8)
WBC # FLD AUTO: 16.39 K/UL — HIGH (ref 4.8–10.8)
WBC # FLD AUTO: 16.49 K/UL — HIGH (ref 4.8–10.8)
WBC # FLD AUTO: 16.5 K/UL — HIGH (ref 4.8–10.8)
WBC # FLD AUTO: 16.74 K/UL — HIGH (ref 4.8–10.8)
WBC # FLD AUTO: 16.89 K/UL — HIGH (ref 4.8–10.8)
WBC # FLD AUTO: 16.94 K/UL — HIGH (ref 4.8–10.8)
WBC # FLD AUTO: 17.28 K/UL — HIGH (ref 4.8–10.8)
WBC # FLD AUTO: 17.32 K/UL — HIGH (ref 4.8–10.8)
WBC # FLD AUTO: 17.35 K/UL — HIGH (ref 4.8–10.8)
WBC # FLD AUTO: 17.37 K/UL — HIGH (ref 4.8–10.8)
WBC # FLD AUTO: 17.59 K/UL — HIGH (ref 4.8–10.8)
WBC # FLD AUTO: 17.63 K/UL — HIGH (ref 4.8–10.8)
WBC # FLD AUTO: 17.65 K/UL — HIGH (ref 4.8–10.8)
WBC # FLD AUTO: 17.87 K/UL — HIGH (ref 4.8–10.8)
WBC # FLD AUTO: 17.95 K/UL — HIGH (ref 4.8–10.8)
WBC # FLD AUTO: 18.08 K/UL — HIGH (ref 4.8–10.8)
WBC # FLD AUTO: 18.52 K/UL — HIGH (ref 4.8–10.8)
WBC # FLD AUTO: 18.93 K/UL — HIGH (ref 4.8–10.8)
WBC # FLD AUTO: 19.39 K/UL — HIGH (ref 4.8–10.8)
WBC # FLD AUTO: 19.88 K/UL — HIGH (ref 4.8–10.8)
WBC # FLD AUTO: 19.9 K/UL — HIGH (ref 4.8–10.8)
WBC # FLD AUTO: 19.99 K/UL — HIGH (ref 4.8–10.8)
WBC # FLD AUTO: 20.04 K/UL — HIGH (ref 4.8–10.8)
WBC # FLD AUTO: 20.45 K/UL — HIGH (ref 4.8–10.8)
WBC # FLD AUTO: 20.63 K/UL — HIGH (ref 4.8–10.8)
WBC # FLD AUTO: 22.03 K/UL — HIGH (ref 4.8–10.8)
WBC # FLD AUTO: 22.28 K/UL — HIGH (ref 4.8–10.8)
WBC # FLD AUTO: 22.52 K/UL — HIGH (ref 4.8–10.8)
WBC # FLD AUTO: 22.71 K/UL — HIGH (ref 4.8–10.8)
WBC # FLD AUTO: 22.87 K/UL — HIGH (ref 4.8–10.8)
WBC # FLD AUTO: 23.02 K/UL — HIGH (ref 4.8–10.8)
WBC # FLD AUTO: 23.52 K/UL — HIGH (ref 4.8–10.8)
WBC # FLD AUTO: 23.58 K/UL — HIGH (ref 4.8–10.8)
WBC # FLD AUTO: 23.6 K/UL — HIGH (ref 4.8–10.8)
WBC # FLD AUTO: 23.98 K/UL — HIGH (ref 4.8–10.8)
WBC # FLD AUTO: 24.78 K/UL — HIGH (ref 4.8–10.8)
WBC # FLD AUTO: 24.84 K/UL — HIGH (ref 4.8–10.8)
WBC # FLD AUTO: 25.59 K/UL — HIGH (ref 4.8–10.8)
WBC # FLD AUTO: 27.18 K/UL — HIGH (ref 4.8–10.8)
WBC # FLD AUTO: 27.35 K/UL — HIGH (ref 4.8–10.8)
WBC # FLD AUTO: 27.92 K/UL — HIGH (ref 4.8–10.8)
WBC # FLD AUTO: 28.4 K/UL — HIGH (ref 4.8–10.8)
WBC # FLD AUTO: 29.29 K/UL — HIGH (ref 4.8–10.8)
WBC # FLD AUTO: 29.41 K/UL — HIGH (ref 4.8–10.8)
WBC # FLD AUTO: 30.22 K/UL — HIGH (ref 4.8–10.8)
WBC # FLD AUTO: 30.85 K/UL — HIGH (ref 4.8–10.8)
WBC # FLD AUTO: 31.29 K/UL — HIGH (ref 4.8–10.8)
WBC # FLD AUTO: 31.29 K/UL — HIGH (ref 4.8–10.8)
WBC # FLD AUTO: 31.76 K/UL — HIGH (ref 4.8–10.8)
WBC # FLD AUTO: 34.51 K/UL — HIGH (ref 4.8–10.8)
WBC # FLD AUTO: 35.31 K/UL — HIGH (ref 4.8–10.8)
WBC # FLD AUTO: 35.35 K/UL — HIGH (ref 4.8–10.8)
WBC # FLD AUTO: 35.37 K/UL — HIGH (ref 4.8–10.8)
WBC # FLD AUTO: 38.79 K/UL — HIGH (ref 4.8–10.8)
WBC # FLD AUTO: 40.32 K/UL — CRITICAL HIGH (ref 4.8–10.8)
WBC # FLD AUTO: 45.51 K/UL — CRITICAL HIGH (ref 4.8–10.8)
WBC # FLD AUTO: 49.96 K/UL — CRITICAL HIGH (ref 4.8–10.8)
WBC # FLD AUTO: 62.73 K/UL — CRITICAL HIGH (ref 4.8–10.8)
WBC # FLD AUTO: 9.51 K/UL
WBC UR QL: 5 /HPF — SIGNIFICANT CHANGE UP (ref 0–5)
WBC UR QL: 6 /HPF — HIGH (ref 0–5)

## 2021-01-01 PROCEDURE — 71045 X-RAY EXAM CHEST 1 VIEW: CPT | Mod: 26

## 2021-01-01 PROCEDURE — 71045 X-RAY EXAM CHEST 1 VIEW: CPT | Mod: 26,77

## 2021-01-01 PROCEDURE — 99232 SBSQ HOSP IP/OBS MODERATE 35: CPT

## 2021-01-01 PROCEDURE — 31600 PLANNED TRACHEOSTOMY: CPT | Mod: 78

## 2021-01-01 PROCEDURE — 71250 CT THORAX DX C-: CPT | Mod: 26

## 2021-01-01 PROCEDURE — 99291 CRITICAL CARE FIRST HOUR: CPT

## 2021-01-01 PROCEDURE — 93010 ELECTROCARDIOGRAM REPORT: CPT

## 2021-01-01 PROCEDURE — 99497 ADVNCD CARE PLAN 30 MIN: CPT | Mod: 25

## 2021-01-01 PROCEDURE — 74176 CT ABD & PELVIS W/O CONTRAST: CPT | Mod: 26

## 2021-01-01 PROCEDURE — 99233 SBSQ HOSP IP/OBS HIGH 50: CPT

## 2021-01-01 PROCEDURE — 32652 THORACOSCOPY REM TOTL CORTEX: CPT | Mod: AS

## 2021-01-01 PROCEDURE — 99498 ADVNCD CARE PLAN ADDL 30 MIN: CPT

## 2021-01-01 PROCEDURE — 35600 OPEN HRV UXTR ART 1 SGM CAB: CPT | Mod: 80,LT

## 2021-01-01 PROCEDURE — 99231 SBSQ HOSP IP/OBS SF/LOW 25: CPT

## 2021-01-01 PROCEDURE — 76705 ECHO EXAM OF ABDOMEN: CPT | Mod: 26

## 2021-01-01 PROCEDURE — 36556 INSERT NON-TUNNEL CV CATH: CPT

## 2021-01-01 PROCEDURE — 11042 DBRDMT SUBQ TIS 1ST 20SQCM/<: CPT

## 2021-01-01 PROCEDURE — 31645 BRNCHSC W/THER ASPIR 1ST: CPT

## 2021-01-01 PROCEDURE — 99291 CRITICAL CARE FIRST HOUR: CPT | Mod: 25

## 2021-01-01 PROCEDURE — 33534 CABG ARTERIAL TWO: CPT | Mod: 80

## 2021-01-01 PROCEDURE — 93306 TTE W/DOPPLER COMPLETE: CPT | Mod: 26

## 2021-01-01 PROCEDURE — ZZZZZ: CPT

## 2021-01-01 PROCEDURE — 99221 1ST HOSP IP/OBS SF/LOW 40: CPT

## 2021-01-01 PROCEDURE — 36620 INSERTION CATHETER ARTERY: CPT

## 2021-01-01 PROCEDURE — 99222 1ST HOSP IP/OBS MODERATE 55: CPT

## 2021-01-01 PROCEDURE — 31624 DX BRONCHOSCOPE/LAVAGE: CPT

## 2021-01-01 PROCEDURE — 32652 THORACOSCOPY REM TOTL CORTEX: CPT

## 2021-01-01 PROCEDURE — 31646 BRNCHSC W/THER ASPIR SBSQ: CPT

## 2021-01-01 PROCEDURE — 70450 CT HEAD/BRAIN W/O DYE: CPT | Mod: 26

## 2021-01-01 PROCEDURE — 33517 CABG ARTERY-VEIN SINGLE: CPT | Mod: 80

## 2021-01-01 PROCEDURE — 99204 OFFICE O/P NEW MOD 45 MIN: CPT

## 2021-01-01 PROCEDURE — 43246 EGD PLACE GASTROSTOMY TUBE: CPT | Mod: 78

## 2021-01-01 PROCEDURE — 99223 1ST HOSP IP/OBS HIGH 75: CPT

## 2021-01-01 PROCEDURE — 93970 EXTREMITY STUDY: CPT | Mod: 26

## 2021-01-01 PROCEDURE — 88305 TISSUE EXAM BY PATHOLOGIST: CPT | Mod: 26

## 2021-01-01 RX ORDER — VANCOMYCIN HCL 1 G
1250 VIAL (EA) INTRAVENOUS ONCE
Refills: 0 | Status: COMPLETED | OUTPATIENT
Start: 2021-01-01 | End: 2021-01-01

## 2021-01-01 RX ORDER — LINEZOLID 600 MG/300ML
600 INJECTION, SOLUTION INTRAVENOUS EVERY 12 HOURS
Refills: 0 | Status: DISCONTINUED | OUTPATIENT
Start: 2021-01-01 | End: 2021-01-01

## 2021-01-01 RX ORDER — FENTANYL CITRATE 50 UG/ML
0.5 INJECTION INTRAVENOUS
Qty: 2500 | Refills: 0 | Status: DISCONTINUED | OUTPATIENT
Start: 2021-01-01 | End: 2021-01-01

## 2021-01-01 RX ORDER — ONDANSETRON 8 MG/1
4 TABLET, FILM COATED ORAL EVERY 4 HOURS
Refills: 0 | Status: DISCONTINUED | OUTPATIENT
Start: 2021-01-01 | End: 2021-01-01

## 2021-01-01 RX ORDER — ACETAMINOPHEN 500 MG
975 TABLET ORAL ONCE
Refills: 0 | Status: COMPLETED | OUTPATIENT
Start: 2021-01-01 | End: 2021-01-01

## 2021-01-01 RX ORDER — DEXTROSE 50 % IN WATER 50 %
15 SYRINGE (ML) INTRAVENOUS ONCE
Refills: 0 | Status: DISCONTINUED | OUTPATIENT
Start: 2021-01-01 | End: 2021-01-01

## 2021-01-01 RX ORDER — CEFAZOLIN SODIUM 1 G
1000 VIAL (EA) INJECTION EVERY 8 HOURS
Refills: 0 | Status: COMPLETED | OUTPATIENT
Start: 2021-01-01 | End: 2021-01-01

## 2021-01-01 RX ORDER — LINEZOLID 600 MG/300ML
600 INJECTION, SOLUTION INTRAVENOUS ONCE
Refills: 0 | Status: COMPLETED | OUTPATIENT
Start: 2021-01-01 | End: 2021-01-01

## 2021-01-01 RX ORDER — IRON SUCROSE 20 MG/ML
200 INJECTION, SOLUTION INTRAVENOUS ONCE
Refills: 0 | Status: COMPLETED | OUTPATIENT
Start: 2021-01-01 | End: 2021-01-01

## 2021-01-01 RX ORDER — DEXMEDETOMIDINE HYDROCHLORIDE IN 0.9% SODIUM CHLORIDE 4 UG/ML
0.2 INJECTION INTRAVENOUS
Qty: 400 | Refills: 0 | Status: DISCONTINUED | OUTPATIENT
Start: 2021-01-01 | End: 2021-01-01

## 2021-01-01 RX ORDER — ENOXAPARIN SODIUM 100 MG/ML
40 INJECTION SUBCUTANEOUS DAILY
Refills: 0 | Status: DISCONTINUED | OUTPATIENT
Start: 2021-01-01 | End: 2021-01-01

## 2021-01-01 RX ORDER — POTASSIUM CHLORIDE 20 MEQ
20 PACKET (EA) ORAL
Refills: 0 | Status: COMPLETED | OUTPATIENT
Start: 2021-01-01 | End: 2021-01-01

## 2021-01-01 RX ORDER — CASPOFUNGIN ACETATE 7 MG/ML
INJECTION, POWDER, LYOPHILIZED, FOR SOLUTION INTRAVENOUS
Refills: 0 | Status: DISCONTINUED | OUTPATIENT
Start: 2021-01-01 | End: 2021-01-01

## 2021-01-01 RX ORDER — DEXTROSE 50 % IN WATER 50 %
25 SYRINGE (ML) INTRAVENOUS ONCE
Refills: 0 | Status: DISCONTINUED | OUTPATIENT
Start: 2021-01-01 | End: 2021-01-01

## 2021-01-01 RX ORDER — DONEPEZIL HYDROCHLORIDE 10 MG/1
1 TABLET, FILM COATED ORAL
Qty: 0 | Refills: 0 | DISCHARGE

## 2021-01-01 RX ORDER — VASOPRESSIN 20 [USP'U]/ML
0.03 INJECTION INTRAVENOUS
Qty: 50 | Refills: 0 | Status: DISCONTINUED | OUTPATIENT
Start: 2021-01-01 | End: 2021-01-01

## 2021-01-01 RX ORDER — MIDAZOLAM HYDROCHLORIDE 1 MG/ML
2 INJECTION, SOLUTION INTRAMUSCULAR; INTRAVENOUS ONCE
Refills: 0 | Status: DISCONTINUED | OUTPATIENT
Start: 2021-01-01 | End: 2021-01-01

## 2021-01-01 RX ORDER — POTASSIUM CHLORIDE 20 MEQ
20 PACKET (EA) ORAL ONCE
Refills: 0 | Status: COMPLETED | OUTPATIENT
Start: 2021-01-01 | End: 2021-01-01

## 2021-01-01 RX ORDER — FERROUS SULFATE 325(65) MG
325 TABLET ORAL DAILY
Refills: 0 | Status: DISCONTINUED | OUTPATIENT
Start: 2021-01-01 | End: 2021-01-01

## 2021-01-01 RX ORDER — MEROPENEM 1 G/30ML
1000 INJECTION INTRAVENOUS ONCE
Refills: 0 | Status: COMPLETED | OUTPATIENT
Start: 2021-01-01 | End: 2021-01-01

## 2021-01-01 RX ORDER — ALBUMIN HUMAN 25 %
500 VIAL (ML) INTRAVENOUS ONCE
Refills: 0 | Status: COMPLETED | OUTPATIENT
Start: 2021-01-01

## 2021-01-01 RX ORDER — VANCOMYCIN HCL 1 G
1000 VIAL (EA) INTRAVENOUS ONCE
Refills: 0 | Status: COMPLETED | OUTPATIENT
Start: 2021-01-01 | End: 2021-01-01

## 2021-01-01 RX ORDER — QUETIAPINE FUMARATE 200 MG/1
50 TABLET, FILM COATED ORAL AT BEDTIME
Refills: 0 | Status: DISCONTINUED | OUTPATIENT
Start: 2021-01-01 | End: 2021-01-01

## 2021-01-01 RX ORDER — FUROSEMIDE 40 MG
40 TABLET ORAL ONCE
Refills: 0 | Status: COMPLETED | OUTPATIENT
Start: 2021-01-01 | End: 2021-01-01

## 2021-01-01 RX ORDER — VASOPRESSIN 20 [USP'U]/ML
0.04 INJECTION INTRAVENOUS
Qty: 50 | Refills: 0 | Status: DISCONTINUED | OUTPATIENT
Start: 2021-01-01 | End: 2021-01-01

## 2021-01-01 RX ORDER — FENTANYL CITRATE 50 UG/ML
50 INJECTION INTRAVENOUS ONCE
Refills: 0 | Status: DISCONTINUED | OUTPATIENT
Start: 2021-01-01 | End: 2021-01-01

## 2021-01-01 RX ORDER — ENOXAPARIN SODIUM 100 MG/ML
40 INJECTION SUBCUTANEOUS ONCE
Refills: 0 | Status: COMPLETED | OUTPATIENT
Start: 2021-01-01 | End: 2021-01-01

## 2021-01-01 RX ORDER — CHLORHEXIDINE GLUCONATE 213 G/1000ML
1 SOLUTION TOPICAL
Refills: 0 | Status: COMPLETED | OUTPATIENT
Start: 2021-01-01 | End: 2021-01-01

## 2021-01-01 RX ORDER — MAGNESIUM SULFATE 500 MG/ML
1 VIAL (ML) INJECTION ONCE
Refills: 0 | Status: COMPLETED | OUTPATIENT
Start: 2021-01-01 | End: 2021-01-01

## 2021-01-01 RX ORDER — SODIUM CHLORIDE 9 MG/ML
1000 INJECTION, SOLUTION INTRAVENOUS
Refills: 0 | Status: DISCONTINUED | OUTPATIENT
Start: 2021-01-01 | End: 2021-01-01

## 2021-01-01 RX ORDER — ALBUMIN HUMAN 25 %
500 VIAL (ML) INTRAVENOUS ONCE
Refills: 0 | Status: COMPLETED | OUTPATIENT
Start: 2021-01-01 | End: 2021-01-01

## 2021-01-01 RX ORDER — IPRATROPIUM BROMIDE 0.2 MG/ML
2 SOLUTION, NON-ORAL INHALATION EVERY 6 HOURS
Refills: 0 | Status: DISCONTINUED | OUTPATIENT
Start: 2021-01-01 | End: 2021-01-01

## 2021-01-01 RX ORDER — VANCOMYCIN HCL 1 G
1250 VIAL (EA) INTRAVENOUS EVERY 12 HOURS
Refills: 0 | Status: DISCONTINUED | OUTPATIENT
Start: 2021-01-01 | End: 2021-01-01

## 2021-01-01 RX ORDER — MIDAZOLAM HYDROCHLORIDE 1 MG/ML
2 INJECTION, SOLUTION INTRAMUSCULAR; INTRAVENOUS
Refills: 0 | Status: DISCONTINUED | OUTPATIENT
Start: 2021-01-01 | End: 2021-01-01

## 2021-01-01 RX ORDER — FENTANYL CITRATE 50 UG/ML
0.51 INJECTION INTRAVENOUS
Qty: 2500 | Refills: 0 | Status: DISCONTINUED | OUTPATIENT
Start: 2021-01-01 | End: 2021-01-01

## 2021-01-01 RX ORDER — DEXMEDETOMIDINE HYDROCHLORIDE IN 0.9% SODIUM CHLORIDE 4 UG/ML
0.2 INJECTION INTRAVENOUS
Qty: 200 | Refills: 0 | Status: DISCONTINUED | OUTPATIENT
Start: 2021-01-01 | End: 2021-01-01

## 2021-01-01 RX ORDER — SODIUM CHLORIDE 9 MG/ML
1000 INJECTION, SOLUTION INTRAVENOUS ONCE
Refills: 0 | Status: COMPLETED | OUTPATIENT
Start: 2021-01-01 | End: 2021-01-01

## 2021-01-01 RX ORDER — FENTANYL CITRATE 50 UG/ML
1 INJECTION INTRAVENOUS
Refills: 0 | Status: DISCONTINUED | OUTPATIENT
Start: 2021-01-01 | End: 2021-01-01

## 2021-01-01 RX ORDER — DEXTROSE 50 % IN WATER 50 %
12.5 SYRINGE (ML) INTRAVENOUS ONCE
Refills: 0 | Status: DISCONTINUED | OUTPATIENT
Start: 2021-01-01 | End: 2021-01-01

## 2021-01-01 RX ORDER — GLUCAGON INJECTION, SOLUTION 0.5 MG/.1ML
1 INJECTION, SOLUTION SUBCUTANEOUS ONCE
Refills: 0 | Status: DISCONTINUED | OUTPATIENT
Start: 2021-01-01 | End: 2021-01-01

## 2021-01-01 RX ORDER — MEROPENEM 1 G/30ML
1000 INJECTION INTRAVENOUS EVERY 8 HOURS
Refills: 0 | Status: DISCONTINUED | OUTPATIENT
Start: 2021-01-01 | End: 2021-01-01

## 2021-01-01 RX ORDER — MEROPENEM 1 G/30ML
1000 INJECTION INTRAVENOUS EVERY 12 HOURS
Refills: 0 | Status: DISCONTINUED | OUTPATIENT
Start: 2021-01-01 | End: 2021-01-01

## 2021-01-01 RX ORDER — CEFTRIAXONE 500 MG/1
1000 INJECTION, POWDER, FOR SOLUTION INTRAMUSCULAR; INTRAVENOUS ONCE
Refills: 0 | Status: COMPLETED | OUTPATIENT
Start: 2021-01-01 | End: 2021-01-01

## 2021-01-01 RX ORDER — METRONIDAZOLE 500 MG
500 TABLET ORAL EVERY 8 HOURS
Refills: 0 | Status: DISCONTINUED | OUTPATIENT
Start: 2021-01-01 | End: 2021-01-01

## 2021-01-01 RX ORDER — HEPARIN SODIUM 5000 [USP'U]/ML
5000 INJECTION INTRAVENOUS; SUBCUTANEOUS EVERY 8 HOURS
Refills: 0 | Status: DISCONTINUED | OUTPATIENT
Start: 2021-01-01 | End: 2021-01-01

## 2021-01-01 RX ORDER — SODIUM CHLORIDE 9 MG/ML
1000 INJECTION INTRAMUSCULAR; INTRAVENOUS; SUBCUTANEOUS ONCE
Refills: 0 | Status: COMPLETED | OUTPATIENT
Start: 2021-01-01 | End: 2021-01-01

## 2021-01-01 RX ORDER — MELOXICAM 15 MG/1
1 TABLET ORAL
Qty: 0 | Refills: 0 | DISCHARGE

## 2021-01-01 RX ORDER — FERROUS SULFATE 325(65) MG
300 TABLET ORAL DAILY
Refills: 0 | Status: DISCONTINUED | OUTPATIENT
Start: 2021-01-01 | End: 2021-01-01

## 2021-01-01 RX ORDER — FUROSEMIDE 40 MG
20 TABLET ORAL ONCE
Refills: 0 | Status: COMPLETED | OUTPATIENT
Start: 2021-01-01 | End: 2021-01-01

## 2021-01-01 RX ORDER — MEROPENEM 1 G/30ML
INJECTION INTRAVENOUS
Refills: 0 | Status: DISCONTINUED | OUTPATIENT
Start: 2021-01-01 | End: 2021-01-01

## 2021-01-01 RX ORDER — VANCOMYCIN HCL 1 G
750 VIAL (EA) INTRAVENOUS ONCE
Refills: 0 | Status: DISCONTINUED | OUTPATIENT
Start: 2021-01-01 | End: 2021-01-01

## 2021-01-01 RX ORDER — ZOLPIDEM TARTRATE 10 MG/1
5 TABLET ORAL ONCE
Refills: 0 | Status: DISCONTINUED | OUTPATIENT
Start: 2021-01-01 | End: 2021-01-01

## 2021-01-01 RX ORDER — CEFEPIME 1 G/1
2000 INJECTION, POWDER, FOR SOLUTION INTRAMUSCULAR; INTRAVENOUS ONCE
Refills: 0 | Status: COMPLETED | OUTPATIENT
Start: 2021-01-01 | End: 2021-01-01

## 2021-01-01 RX ORDER — GABAPENTIN 400 MG/1
100 CAPSULE ORAL THREE TIMES A DAY
Refills: 0 | Status: DISCONTINUED | OUTPATIENT
Start: 2021-01-01 | End: 2021-01-01

## 2021-01-01 RX ORDER — POTASSIUM CHLORIDE 20 MEQ
40 PACKET (EA) ORAL EVERY 4 HOURS
Refills: 0 | Status: COMPLETED | OUTPATIENT
Start: 2021-01-01 | End: 2021-01-01

## 2021-01-01 RX ORDER — CEFEPIME 1 G/1
INJECTION, POWDER, FOR SOLUTION INTRAMUSCULAR; INTRAVENOUS
Refills: 0 | Status: DISCONTINUED | OUTPATIENT
Start: 2021-01-01 | End: 2021-01-01

## 2021-01-01 RX ORDER — LANOLIN ALCOHOL/MO/W.PET/CERES
3 CREAM (GRAM) TOPICAL AT BEDTIME
Refills: 0 | Status: DISCONTINUED | OUTPATIENT
Start: 2021-01-01 | End: 2021-01-01

## 2021-01-01 RX ORDER — CISATRACURIUM BESYLATE 2 MG/ML
3 INJECTION INTRAVENOUS
Qty: 200 | Refills: 0 | Status: DISCONTINUED | OUTPATIENT
Start: 2021-01-01 | End: 2021-01-01

## 2021-01-01 RX ORDER — NYSTATIN CREAM 100000 [USP'U]/G
1 CREAM TOPICAL
Refills: 0 | Status: DISCONTINUED | OUTPATIENT
Start: 2021-01-01 | End: 2021-01-01

## 2021-01-01 RX ORDER — VECURONIUM BROMIDE 20 MG/1
5 INJECTION, POWDER, FOR SOLUTION INTRAVENOUS ONCE
Refills: 0 | Status: COMPLETED | OUTPATIENT
Start: 2021-01-01 | End: 2021-01-01

## 2021-01-01 RX ORDER — CHLORHEXIDINE GLUCONATE 213 G/1000ML
5 SOLUTION TOPICAL
Refills: 0 | Status: DISCONTINUED | OUTPATIENT
Start: 2021-01-01 | End: 2021-01-01

## 2021-01-01 RX ORDER — SODIUM CHLORIDE 9 MG/ML
500 INJECTION, SOLUTION INTRAVENOUS ONCE
Refills: 0 | Status: COMPLETED | OUTPATIENT
Start: 2021-01-01 | End: 2021-01-01

## 2021-01-01 RX ORDER — CASPOFUNGIN ACETATE 7 MG/ML
50 INJECTION, POWDER, LYOPHILIZED, FOR SOLUTION INTRAVENOUS ONCE
Refills: 0 | Status: DISCONTINUED | OUTPATIENT
Start: 2021-01-01 | End: 2021-01-01

## 2021-01-01 RX ORDER — ZOLPIDEM TARTRATE 10 MG/1
1 TABLET ORAL
Qty: 0 | Refills: 0 | DISCHARGE

## 2021-01-01 RX ORDER — POLYETHYLENE GLYCOL 3350 17 G/17G
17 POWDER, FOR SOLUTION ORAL DAILY
Refills: 0 | Status: DISCONTINUED | OUTPATIENT
Start: 2021-01-01 | End: 2021-01-01

## 2021-01-01 RX ORDER — SODIUM BICARBONATE 1 MEQ/ML
50 SYRINGE (ML) INTRAVENOUS ONCE
Refills: 0 | Status: COMPLETED | OUTPATIENT
Start: 2021-01-01 | End: 2021-01-01

## 2021-01-01 RX ORDER — VANCOMYCIN HCL 1 G
125 VIAL (EA) INTRAVENOUS EVERY 6 HOURS
Refills: 0 | Status: DISCONTINUED | OUTPATIENT
Start: 2021-01-01 | End: 2021-01-01

## 2021-01-01 RX ORDER — FUROSEMIDE 40 MG
40 TABLET ORAL DAILY
Refills: 0 | Status: DISCONTINUED | OUTPATIENT
Start: 2021-01-01 | End: 2021-01-01

## 2021-01-01 RX ORDER — PHENYLEPHRINE HYDROCHLORIDE 10 MG/ML
0.1 INJECTION INTRAVENOUS
Qty: 40 | Refills: 0 | Status: DISCONTINUED | OUTPATIENT
Start: 2021-01-01 | End: 2021-01-01

## 2021-01-01 RX ORDER — POTASSIUM CHLORIDE 20 MEQ
20 PACKET (EA) ORAL DAILY
Refills: 0 | Status: DISCONTINUED | OUTPATIENT
Start: 2021-01-01 | End: 2021-01-01

## 2021-01-01 RX ORDER — VANCOMYCIN HCL 1 G
750 VIAL (EA) INTRAVENOUS EVERY 12 HOURS
Refills: 0 | Status: DISCONTINUED | OUTPATIENT
Start: 2021-01-01 | End: 2021-01-01

## 2021-01-01 RX ORDER — QUETIAPINE FUMARATE 200 MG/1
25 TABLET, FILM COATED ORAL AT BEDTIME
Refills: 0 | Status: DISCONTINUED | OUTPATIENT
Start: 2021-01-01 | End: 2021-01-01

## 2021-01-01 RX ORDER — CEFEPIME 1 G/1
2000 INJECTION, POWDER, FOR SOLUTION INTRAMUSCULAR; INTRAVENOUS EVERY 12 HOURS
Refills: 0 | Status: DISCONTINUED | OUTPATIENT
Start: 2021-01-01 | End: 2021-01-01

## 2021-01-01 RX ORDER — ACETAMINOPHEN 500 MG
650 TABLET ORAL ONCE
Refills: 0 | Status: COMPLETED | OUTPATIENT
Start: 2021-01-01 | End: 2021-01-01

## 2021-01-01 RX ORDER — MIDAZOLAM HYDROCHLORIDE 1 MG/ML
0.02 INJECTION, SOLUTION INTRAMUSCULAR; INTRAVENOUS
Qty: 100 | Refills: 0 | Status: DISCONTINUED | OUTPATIENT
Start: 2021-01-01 | End: 2021-01-01

## 2021-01-01 RX ORDER — FENTANYL CITRATE 50 UG/ML
25 INJECTION INTRAVENOUS
Refills: 0 | Status: DISCONTINUED | OUTPATIENT
Start: 2021-01-01 | End: 2021-01-01

## 2021-01-01 RX ORDER — FENTANYL CITRATE 50 UG/ML
20 INJECTION INTRAVENOUS ONCE
Refills: 0 | Status: DISCONTINUED | OUTPATIENT
Start: 2021-01-01 | End: 2021-01-01

## 2021-01-01 RX ORDER — ALBUTEROL 90 UG/1
2 AEROSOL, METERED ORAL EVERY 6 HOURS
Refills: 0 | Status: DISCONTINUED | OUTPATIENT
Start: 2021-01-01 | End: 2021-01-01

## 2021-01-01 RX ORDER — FENTANYL CITRATE 50 UG/ML
50 INJECTION INTRAVENOUS EVERY 4 HOURS
Refills: 0 | Status: DISCONTINUED | OUTPATIENT
Start: 2021-01-01 | End: 2021-01-01

## 2021-01-01 RX ORDER — DEXTROSE 50 % IN WATER 50 %
50 SYRINGE (ML) INTRAVENOUS ONCE
Refills: 0 | Status: COMPLETED | OUTPATIENT
Start: 2021-01-01 | End: 2021-01-01

## 2021-01-01 RX ORDER — PANTOPRAZOLE SODIUM 20 MG/1
40 TABLET, DELAYED RELEASE ORAL
Refills: 0 | Status: DISCONTINUED | OUTPATIENT
Start: 2021-01-01 | End: 2021-01-01

## 2021-01-01 RX ORDER — NOREPINEPHRINE BITARTRATE/D5W 8 MG/250ML
0.05 PLASTIC BAG, INJECTION (ML) INTRAVENOUS
Qty: 8 | Refills: 0 | Status: DISCONTINUED | OUTPATIENT
Start: 2021-01-01 | End: 2021-01-01

## 2021-01-01 RX ORDER — MEPERIDINE HYDROCHLORIDE 50 MG/ML
25 INJECTION INTRAMUSCULAR; INTRAVENOUS; SUBCUTANEOUS ONCE
Refills: 0 | Status: DISCONTINUED | OUTPATIENT
Start: 2021-01-01 | End: 2021-01-01

## 2021-01-01 RX ORDER — GUAIFENESIN/DEXTROMETHORPHAN 600MG-30MG
10 TABLET, EXTENDED RELEASE 12 HR ORAL EVERY 4 HOURS
Refills: 0 | Status: DISCONTINUED | OUTPATIENT
Start: 2021-01-01 | End: 2021-01-01

## 2021-01-01 RX ORDER — NOREPINEPHRINE BITARTRATE/D5W 8 MG/250ML
0.09 PLASTIC BAG, INJECTION (ML) INTRAVENOUS
Qty: 16 | Refills: 0 | Status: DISCONTINUED | OUTPATIENT
Start: 2021-01-01 | End: 2021-01-01

## 2021-01-01 RX ORDER — MORPHINE SULFATE 50 MG/1
2 CAPSULE, EXTENDED RELEASE ORAL EVERY 6 HOURS
Refills: 0 | Status: DISCONTINUED | OUTPATIENT
Start: 2021-01-01 | End: 2021-01-01

## 2021-01-01 RX ORDER — INSULIN LISPRO 100/ML
VIAL (ML) SUBCUTANEOUS EVERY 4 HOURS
Refills: 0 | Status: DISCONTINUED | OUTPATIENT
Start: 2021-01-01 | End: 2021-01-01

## 2021-01-01 RX ORDER — HEPARIN SODIUM 5000 [USP'U]/ML
5000 INJECTION INTRAVENOUS; SUBCUTANEOUS EVERY 12 HOURS
Refills: 0 | Status: DISCONTINUED | OUTPATIENT
Start: 2021-01-01 | End: 2021-01-01

## 2021-01-01 RX ORDER — MAGNESIUM SULFATE 500 MG/ML
2 VIAL (ML) INJECTION
Refills: 0 | Status: COMPLETED | OUTPATIENT
Start: 2021-01-01 | End: 2021-01-01

## 2021-01-01 RX ORDER — FAMOTIDINE 10 MG/ML
20 INJECTION INTRAVENOUS EVERY 12 HOURS
Refills: 0 | Status: DISCONTINUED | OUTPATIENT
Start: 2021-01-01 | End: 2021-01-01

## 2021-01-01 RX ORDER — MAGNESIUM SULFATE 500 MG/ML
2 VIAL (ML) INJECTION ONCE
Refills: 0 | Status: COMPLETED | OUTPATIENT
Start: 2021-01-01 | End: 2021-01-01

## 2021-01-01 RX ORDER — MORPHINE SULFATE 50 MG/1
4 CAPSULE, EXTENDED RELEASE ORAL EVERY 4 HOURS
Refills: 0 | Status: DISCONTINUED | OUTPATIENT
Start: 2021-01-01 | End: 2021-01-01

## 2021-01-01 RX ORDER — SODIUM CHLORIDE 9 MG/ML
2000 INJECTION, SOLUTION INTRAVENOUS ONCE
Refills: 0 | Status: COMPLETED | OUTPATIENT
Start: 2021-01-01 | End: 2021-01-01

## 2021-01-01 RX ORDER — BUPIVACAINE 13.3 MG/ML
20 INJECTION, SUSPENSION, LIPOSOMAL INFILTRATION ONCE
Refills: 0 | Status: DISCONTINUED | OUTPATIENT
Start: 2021-01-01 | End: 2021-01-01

## 2021-01-01 RX ORDER — LINEZOLID 600 MG/300ML
INJECTION, SOLUTION INTRAVENOUS
Refills: 0 | Status: DISCONTINUED | OUTPATIENT
Start: 2021-01-01 | End: 2021-01-01

## 2021-01-01 RX ORDER — DONEPEZIL HYDROCHLORIDE 10 MG/1
5 TABLET, FILM COATED ORAL AT BEDTIME
Refills: 0 | Status: DISCONTINUED | OUTPATIENT
Start: 2021-01-01 | End: 2021-01-01

## 2021-01-01 RX ORDER — MEROPENEM 1 G/30ML
1000 INJECTION INTRAVENOUS EVERY 24 HOURS
Refills: 0 | Status: DISCONTINUED | OUTPATIENT
Start: 2021-01-01 | End: 2021-01-01

## 2021-01-01 RX ORDER — ACETAMINOPHEN 500 MG
1000 TABLET ORAL ONCE
Refills: 0 | Status: COMPLETED | OUTPATIENT
Start: 2021-01-01 | End: 2021-01-01

## 2021-01-01 RX ORDER — PHENYLEPHRINE HYDROCHLORIDE 10 MG/ML
0.1 INJECTION INTRAVENOUS
Qty: 160 | Refills: 0 | Status: DISCONTINUED | OUTPATIENT
Start: 2021-01-01 | End: 2021-01-01

## 2021-01-01 RX ORDER — GUAIFENESIN/DEXTROMETHORPHAN 600MG-30MG
5 TABLET, EXTENDED RELEASE 12 HR ORAL ONCE
Refills: 0 | Status: COMPLETED | OUTPATIENT
Start: 2021-01-01 | End: 2021-01-01

## 2021-01-01 RX ORDER — VANCOMYCIN HCL 1 G
VIAL (EA) INTRAVENOUS
Refills: 0 | Status: DISCONTINUED | OUTPATIENT
Start: 2021-01-01 | End: 2021-01-01

## 2021-01-01 RX ORDER — QUETIAPINE FUMARATE 200 MG/1
50 TABLET, FILM COATED ORAL
Refills: 0 | Status: DISCONTINUED | OUTPATIENT
Start: 2021-01-01 | End: 2021-01-01

## 2021-01-01 RX ORDER — VANCOMYCIN HCL 1 G
1000 VIAL (EA) INTRAVENOUS DAILY
Refills: 0 | Status: DISCONTINUED | OUTPATIENT
Start: 2021-01-01 | End: 2021-01-01

## 2021-01-01 RX ORDER — ALBUMIN HUMAN 25 %
1000 VIAL (ML) INTRAVENOUS ONCE
Refills: 0 | Status: COMPLETED | OUTPATIENT
Start: 2021-01-01 | End: 2021-01-01

## 2021-01-01 RX ORDER — FENTANYL CITRATE 50 UG/ML
2 INJECTION INTRAVENOUS ONCE
Refills: 0 | Status: DISCONTINUED | OUTPATIENT
Start: 2021-01-01 | End: 2021-01-01

## 2021-01-01 RX ORDER — CHLORHEXIDINE GLUCONATE 213 G/1000ML
5 SOLUTION TOPICAL
Refills: 0 | Status: COMPLETED | OUTPATIENT
Start: 2021-01-01 | End: 2021-01-01

## 2021-01-01 RX ORDER — PHENYLEPHRINE HYDROCHLORIDE 10 MG/ML
1 INJECTION INTRAVENOUS
Qty: 160 | Refills: 0 | Status: DISCONTINUED | OUTPATIENT
Start: 2021-01-01 | End: 2021-01-01

## 2021-01-01 RX ORDER — AMIODARONE HYDROCHLORIDE 400 MG/1
1 TABLET ORAL
Qty: 900 | Refills: 0 | Status: DISCONTINUED | OUTPATIENT
Start: 2021-01-01 | End: 2021-01-01

## 2021-01-01 RX ORDER — PANTOPRAZOLE SODIUM 20 MG/1
8 TABLET, DELAYED RELEASE ORAL
Qty: 80 | Refills: 0 | Status: DISCONTINUED | OUTPATIENT
Start: 2021-01-01 | End: 2021-01-01

## 2021-01-01 RX ORDER — CISATRACURIUM BESYLATE 2 MG/ML
7 INJECTION INTRAVENOUS ONCE
Refills: 0 | Status: COMPLETED | OUTPATIENT
Start: 2021-01-01 | End: 2021-01-01

## 2021-01-01 RX ORDER — PROPOFOL 10 MG/ML
10.1 INJECTION, EMULSION INTRAVENOUS
Qty: 1000 | Refills: 0 | Status: DISCONTINUED | OUTPATIENT
Start: 2021-01-01 | End: 2021-01-01

## 2021-01-01 RX ORDER — DIATRIZOATE MEGLUMINE 180 MG/ML
900 INJECTION, SOLUTION INTRAVESICAL ONCE
Refills: 0 | Status: DISCONTINUED | OUTPATIENT
Start: 2021-01-01 | End: 2021-01-01

## 2021-01-01 RX ORDER — CASPOFUNGIN ACETATE 7 MG/ML
70 INJECTION, POWDER, LYOPHILIZED, FOR SOLUTION INTRAVENOUS ONCE
Refills: 0 | Status: COMPLETED | OUTPATIENT
Start: 2021-01-01 | End: 2021-01-01

## 2021-01-01 RX ORDER — AZITHROMYCIN 500 MG/1
500 TABLET, FILM COATED ORAL ONCE
Refills: 0 | Status: COMPLETED | OUTPATIENT
Start: 2021-01-01 | End: 2021-01-01

## 2021-01-01 RX ORDER — INSULIN LISPRO 100/ML
VIAL (ML) SUBCUTANEOUS
Refills: 0 | Status: DISCONTINUED | OUTPATIENT
Start: 2021-01-01 | End: 2021-01-01

## 2021-01-01 RX ORDER — SENNA PLUS 8.6 MG/1
2 TABLET ORAL AT BEDTIME
Refills: 0 | Status: DISCONTINUED | OUTPATIENT
Start: 2021-01-01 | End: 2021-01-01

## 2021-01-01 RX ORDER — GABAPENTIN 400 MG/1
1 CAPSULE ORAL
Qty: 0 | Refills: 0 | DISCHARGE

## 2021-01-01 RX ORDER — FLUCONAZOLE 150 MG/1
400 TABLET ORAL EVERY 24 HOURS
Refills: 0 | Status: DISCONTINUED | OUTPATIENT
Start: 2021-01-01 | End: 2021-01-01

## 2021-01-01 RX ORDER — SODIUM CHLORIDE 9 MG/ML
1000 INJECTION INTRAMUSCULAR; INTRAVENOUS; SUBCUTANEOUS
Refills: 0 | Status: DISCONTINUED | OUTPATIENT
Start: 2021-01-01 | End: 2021-01-01

## 2021-01-01 RX ORDER — INSULIN HUMAN 100 [IU]/ML
1 INJECTION, SOLUTION SUBCUTANEOUS
Qty: 100 | Refills: 0 | Status: DISCONTINUED | OUTPATIENT
Start: 2021-01-01 | End: 2021-01-01

## 2021-01-01 RX ORDER — GABAPENTIN 400 MG/1
300 CAPSULE ORAL EVERY 8 HOURS
Refills: 0 | Status: DISCONTINUED | OUTPATIENT
Start: 2021-01-01 | End: 2021-01-01

## 2021-01-01 RX ORDER — FLUCONAZOLE 150 MG/1
TABLET ORAL
Refills: 0 | Status: ACTIVE | OUTPATIENT
Start: 2021-01-01

## 2021-01-01 RX ORDER — COLLAGENASE CLOSTRIDIUM HIST. 250 UNIT/G
1 OINTMENT (GRAM) TOPICAL DAILY
Refills: 0 | Status: DISCONTINUED | OUTPATIENT
Start: 2021-01-01 | End: 2021-01-01

## 2021-01-01 RX ORDER — INSULIN LISPRO 100/ML
VIAL (ML) SUBCUTANEOUS EVERY 6 HOURS
Refills: 0 | Status: DISCONTINUED | OUTPATIENT
Start: 2021-01-01 | End: 2021-01-01

## 2021-01-01 RX ORDER — MULTIVIT-MIN/FERROUS GLUCONATE 9 MG/15 ML
15 LIQUID (ML) ORAL DAILY
Refills: 0 | Status: DISCONTINUED | OUTPATIENT
Start: 2021-01-01 | End: 2021-01-01

## 2021-01-01 RX ORDER — IOHEXOL 300 MG/ML
30 INJECTION, SOLUTION INTRAVENOUS ONCE
Refills: 0 | Status: COMPLETED | OUTPATIENT
Start: 2021-01-01 | End: 2021-01-01

## 2021-01-01 RX ORDER — INSULIN HUMAN 100 [IU]/ML
10 INJECTION, SOLUTION SUBCUTANEOUS ONCE
Refills: 0 | Status: COMPLETED | OUTPATIENT
Start: 2021-01-01 | End: 2021-01-01

## 2021-01-01 RX ORDER — PANTOPRAZOLE SODIUM 20 MG/1
40 TABLET, DELAYED RELEASE ORAL EVERY 12 HOURS
Refills: 0 | Status: DISCONTINUED | OUTPATIENT
Start: 2021-01-01 | End: 2021-01-01

## 2021-01-01 RX ORDER — SODIUM CHLORIDE 9 MG/ML
500 INJECTION INTRAMUSCULAR; INTRAVENOUS; SUBCUTANEOUS ONCE
Refills: 0 | Status: COMPLETED | OUTPATIENT
Start: 2021-01-01 | End: 2021-01-01

## 2021-01-01 RX ORDER — PROPOFOL 10 MG/ML
10 INJECTION, EMULSION INTRAVENOUS
Qty: 1000 | Refills: 0 | Status: DISCONTINUED | OUTPATIENT
Start: 2021-01-01 | End: 2021-01-01

## 2021-01-01 RX ORDER — FLUCONAZOLE 150 MG/1
400 TABLET ORAL ONCE
Refills: 0 | Status: COMPLETED | OUTPATIENT
Start: 2021-01-01 | End: 2021-01-01

## 2021-01-01 RX ORDER — AMIODARONE HYDROCHLORIDE 400 MG/1
150 TABLET ORAL ONCE
Refills: 0 | Status: COMPLETED | OUTPATIENT
Start: 2021-01-01 | End: 2021-01-01

## 2021-01-01 RX ORDER — CHLORHEXIDINE GLUCONATE 213 G/1000ML
1 SOLUTION TOPICAL
Refills: 0 | Status: DISCONTINUED | OUTPATIENT
Start: 2021-01-01 | End: 2021-01-01

## 2021-01-01 RX ORDER — CHLORHEXIDINE GLUCONATE 213 G/1000ML
15 SOLUTION TOPICAL EVERY 12 HOURS
Refills: 0 | Status: DISCONTINUED | OUTPATIENT
Start: 2021-01-01 | End: 2021-01-01

## 2021-01-01 RX ORDER — SODIUM CHLORIDE 9 MG/ML
1000 INJECTION INTRAMUSCULAR; INTRAVENOUS; SUBCUTANEOUS
Refills: 0 | Status: ACTIVE | OUTPATIENT
Start: 2021-01-01 | End: 2022-02-26

## 2021-01-01 RX ORDER — GABAPENTIN 400 MG/1
200 CAPSULE ORAL THREE TIMES A DAY
Refills: 0 | Status: DISCONTINUED | OUTPATIENT
Start: 2021-01-01 | End: 2021-01-01

## 2021-01-01 RX ORDER — SODIUM CHLORIDE 9 MG/ML
1000 INJECTION, SOLUTION INTRAVENOUS ONCE
Refills: 0 | Status: DISCONTINUED | OUTPATIENT
Start: 2021-01-01 | End: 2021-01-01

## 2021-01-01 RX ORDER — SODIUM BICARBONATE 1 MEQ/ML
50 SYRINGE (ML) INTRAVENOUS
Refills: 0 | Status: DISCONTINUED | OUTPATIENT
Start: 2021-01-01 | End: 2021-01-01

## 2021-01-01 RX ORDER — POTASSIUM CHLORIDE 20 MEQ
40 PACKET (EA) ORAL ONCE
Refills: 0 | Status: COMPLETED | OUTPATIENT
Start: 2021-01-01 | End: 2021-01-01

## 2021-01-01 RX ORDER — ALBUMIN HUMAN 25 %
100 VIAL (ML) INTRAVENOUS EVERY 6 HOURS
Refills: 0 | Status: COMPLETED | OUTPATIENT
Start: 2021-01-01 | End: 2021-01-01

## 2021-01-01 RX ORDER — INSULIN GLARGINE 100 [IU]/ML
20 INJECTION, SOLUTION SUBCUTANEOUS EVERY MORNING
Refills: 0 | Status: DISCONTINUED | OUTPATIENT
Start: 2021-01-01 | End: 2021-01-01

## 2021-01-01 RX ORDER — SODIUM ZIRCONIUM CYCLOSILICATE 10 G/10G
10 POWDER, FOR SUSPENSION ORAL ONCE
Refills: 0 | Status: COMPLETED | OUTPATIENT
Start: 2021-01-01 | End: 2021-01-01

## 2021-01-01 RX ORDER — CASPOFUNGIN ACETATE 7 MG/ML
50 INJECTION, POWDER, LYOPHILIZED, FOR SOLUTION INTRAVENOUS EVERY 24 HOURS
Refills: 0 | Status: DISCONTINUED | OUTPATIENT
Start: 2021-01-01 | End: 2021-01-01

## 2021-01-01 RX ORDER — FUROSEMIDE 40 MG
40 TABLET ORAL
Refills: 0 | Status: DISCONTINUED | OUTPATIENT
Start: 2021-01-01 | End: 2021-01-01

## 2021-01-01 RX ORDER — ACETAMINOPHEN 500 MG
650 TABLET ORAL EVERY 6 HOURS
Refills: 0 | Status: DISCONTINUED | OUTPATIENT
Start: 2021-01-01 | End: 2021-01-01

## 2021-01-01 RX ORDER — VANCOMYCIN HCL 1 G
1500 VIAL (EA) INTRAVENOUS ONCE
Refills: 0 | Status: COMPLETED | OUTPATIENT
Start: 2021-01-01 | End: 2021-01-01

## 2021-01-01 RX ORDER — NOREPINEPHRINE BITARTRATE/D5W 8 MG/250ML
0.1 PLASTIC BAG, INJECTION (ML) INTRAVENOUS
Qty: 16 | Refills: 0 | Status: DISCONTINUED | OUTPATIENT
Start: 2021-01-01 | End: 2021-01-01

## 2021-01-01 RX ADMIN — HEPARIN SODIUM 5000 UNIT(S): 5000 INJECTION INTRAVENOUS; SUBCUTANEOUS at 21:15

## 2021-01-01 RX ADMIN — Medication 100 MILLIGRAM(S): at 21:52

## 2021-01-01 RX ADMIN — NYSTATIN CREAM 1 APPLICATION(S): 100000 CREAM TOPICAL at 17:17

## 2021-01-01 RX ADMIN — Medication 650 MILLIGRAM(S): at 19:30

## 2021-01-01 RX ADMIN — FAMOTIDINE 20 MILLIGRAM(S): 10 INJECTION INTRAVENOUS at 06:10

## 2021-01-01 RX ADMIN — Medication 100 MILLIGRAM(S): at 13:14

## 2021-01-01 RX ADMIN — Medication 1 APPLICATION(S): at 12:48

## 2021-01-01 RX ADMIN — CHLORHEXIDINE GLUCONATE 1 APPLICATION(S): 213 SOLUTION TOPICAL at 06:24

## 2021-01-01 RX ADMIN — GABAPENTIN 100 MILLIGRAM(S): 400 CAPSULE ORAL at 06:42

## 2021-01-01 RX ADMIN — CHLORHEXIDINE GLUCONATE 5 MILLILITER(S): 213 SOLUTION TOPICAL at 05:38

## 2021-01-01 RX ADMIN — GABAPENTIN 100 MILLIGRAM(S): 400 CAPSULE ORAL at 16:04

## 2021-01-01 RX ADMIN — Medication 1 APPLICATION(S): at 16:00

## 2021-01-01 RX ADMIN — Medication 3 MILLIGRAM(S): at 21:34

## 2021-01-01 RX ADMIN — FENTANYL CITRATE 3.38 MICROGRAM(S)/KG/HR: 50 INJECTION INTRAVENOUS at 00:13

## 2021-01-01 RX ADMIN — LINEZOLID 300 MILLIGRAM(S): 600 INJECTION, SOLUTION INTRAVENOUS at 17:09

## 2021-01-01 RX ADMIN — GABAPENTIN 100 MILLIGRAM(S): 400 CAPSULE ORAL at 05:20

## 2021-01-01 RX ADMIN — MORPHINE SULFATE 2 MILLIGRAM(S): 50 CAPSULE, EXTENDED RELEASE ORAL at 18:22

## 2021-01-01 RX ADMIN — Medication 2: at 17:16

## 2021-01-01 RX ADMIN — Medication 40 MILLIGRAM(S): at 15:00

## 2021-01-01 RX ADMIN — SENNA PLUS 2 TABLET(S): 8.6 TABLET ORAL at 21:09

## 2021-01-01 RX ADMIN — MIDAZOLAM HYDROCHLORIDE 2 MILLIGRAM(S): 1 INJECTION, SOLUTION INTRAMUSCULAR; INTRAVENOUS at 03:44

## 2021-01-01 RX ADMIN — Medication 15 MILLILITER(S): at 15:03

## 2021-01-01 RX ADMIN — FENTANYL CITRATE 1 PATCH: 50 INJECTION INTRAVENOUS at 19:41

## 2021-01-01 RX ADMIN — SENNA PLUS 2 TABLET(S): 8.6 TABLET ORAL at 21:42

## 2021-01-01 RX ADMIN — MEROPENEM 100 MILLIGRAM(S): 1 INJECTION INTRAVENOUS at 04:29

## 2021-01-01 RX ADMIN — Medication 300 MILLIGRAM(S): at 12:49

## 2021-01-01 RX ADMIN — FENTANYL CITRATE 50 MICROGRAM(S): 50 INJECTION INTRAVENOUS at 20:34

## 2021-01-01 RX ADMIN — ALBUTEROL 2 PUFF(S): 90 AEROSOL, METERED ORAL at 17:19

## 2021-01-01 RX ADMIN — CHLORHEXIDINE GLUCONATE 1 APPLICATION(S): 213 SOLUTION TOPICAL at 06:27

## 2021-01-01 RX ADMIN — ALBUTEROL 2 PUFF(S): 90 AEROSOL, METERED ORAL at 21:02

## 2021-01-01 RX ADMIN — Medication 250 MILLILITER(S): at 17:05

## 2021-01-01 RX ADMIN — Medication 15 MILLILITER(S): at 11:49

## 2021-01-01 RX ADMIN — FAMOTIDINE 20 MILLIGRAM(S): 10 INJECTION INTRAVENOUS at 18:38

## 2021-01-01 RX ADMIN — Medication 50 MILLILITER(S): at 05:11

## 2021-01-01 RX ADMIN — CHLORHEXIDINE GLUCONATE 1 APPLICATION(S): 213 SOLUTION TOPICAL at 06:21

## 2021-01-01 RX ADMIN — Medication 1 APPLICATION(S): at 05:08

## 2021-01-01 RX ADMIN — Medication 2: at 06:27

## 2021-01-01 RX ADMIN — Medication 1 APPLICATION(S): at 17:19

## 2021-01-01 RX ADMIN — CEFEPIME 100 MILLIGRAM(S): 1 INJECTION, POWDER, FOR SOLUTION INTRAMUSCULAR; INTRAVENOUS at 17:50

## 2021-01-01 RX ADMIN — Medication 1 APPLICATION(S): at 06:28

## 2021-01-01 RX ADMIN — ALBUTEROL 2 PUFF(S): 90 AEROSOL, METERED ORAL at 05:04

## 2021-01-01 RX ADMIN — FAMOTIDINE 20 MILLIGRAM(S): 10 INJECTION INTRAVENOUS at 06:36

## 2021-01-01 RX ADMIN — Medication 650 MILLIGRAM(S): at 11:30

## 2021-01-01 RX ADMIN — MEROPENEM 100 MILLIGRAM(S): 1 INJECTION INTRAVENOUS at 22:40

## 2021-01-01 RX ADMIN — FAMOTIDINE 20 MILLIGRAM(S): 10 INJECTION INTRAVENOUS at 05:30

## 2021-01-01 RX ADMIN — CASPOFUNGIN ACETATE 260 MILLIGRAM(S): 7 INJECTION, POWDER, LYOPHILIZED, FOR SOLUTION INTRAVENOUS at 10:10

## 2021-01-01 RX ADMIN — SODIUM ZIRCONIUM CYCLOSILICATE 10 GRAM(S): 10 POWDER, FOR SUSPENSION ORAL at 12:16

## 2021-01-01 RX ADMIN — CEFEPIME 100 MILLIGRAM(S): 1 INJECTION, POWDER, FOR SOLUTION INTRAMUSCULAR; INTRAVENOUS at 05:41

## 2021-01-01 RX ADMIN — Medication 2: at 02:15

## 2021-01-01 RX ADMIN — QUETIAPINE FUMARATE 50 MILLIGRAM(S): 200 TABLET, FILM COATED ORAL at 05:05

## 2021-01-01 RX ADMIN — Medication 15 MILLILITER(S): at 18:53

## 2021-01-01 RX ADMIN — Medication 100 MILLIGRAM(S): at 21:51

## 2021-01-01 RX ADMIN — Medication 100 MILLIGRAM(S): at 06:02

## 2021-01-01 RX ADMIN — SENNA PLUS 2 TABLET(S): 8.6 TABLET ORAL at 21:34

## 2021-01-01 RX ADMIN — Medication 1 APPLICATION(S): at 05:21

## 2021-01-01 RX ADMIN — GABAPENTIN 100 MILLIGRAM(S): 400 CAPSULE ORAL at 05:26

## 2021-01-01 RX ADMIN — Medication 1 TABLET(S): at 13:58

## 2021-01-01 RX ADMIN — CHLORHEXIDINE GLUCONATE 5 MILLILITER(S): 213 SOLUTION TOPICAL at 17:01

## 2021-01-01 RX ADMIN — ALBUTEROL 2 PUFF(S): 90 AEROSOL, METERED ORAL at 07:56

## 2021-01-01 RX ADMIN — CASPOFUNGIN ACETATE 260 MILLIGRAM(S): 7 INJECTION, POWDER, LYOPHILIZED, FOR SOLUTION INTRAVENOUS at 09:35

## 2021-01-01 RX ADMIN — Medication 2: at 11:44

## 2021-01-01 RX ADMIN — Medication 1 APPLICATION(S): at 21:33

## 2021-01-01 RX ADMIN — CEFEPIME 100 MILLIGRAM(S): 1 INJECTION, POWDER, FOR SOLUTION INTRAMUSCULAR; INTRAVENOUS at 06:29

## 2021-01-01 RX ADMIN — MORPHINE SULFATE 4 MILLIGRAM(S): 50 CAPSULE, EXTENDED RELEASE ORAL at 17:41

## 2021-01-01 RX ADMIN — FENTANYL CITRATE 1 PATCH: 50 INJECTION INTRAVENOUS at 11:00

## 2021-01-01 RX ADMIN — Medication 20 MILLIEQUIVALENT(S): at 17:24

## 2021-01-01 RX ADMIN — Medication 2 PUFF(S): at 15:54

## 2021-01-01 RX ADMIN — ALBUTEROL 2 PUFF(S): 90 AEROSOL, METERED ORAL at 18:42

## 2021-01-01 RX ADMIN — ENOXAPARIN SODIUM 40 MILLIGRAM(S): 100 INJECTION SUBCUTANEOUS at 13:33

## 2021-01-01 RX ADMIN — Medication 1 APPLICATION(S): at 14:15

## 2021-01-01 RX ADMIN — PANTOPRAZOLE SODIUM 40 MILLIGRAM(S): 20 TABLET, DELAYED RELEASE ORAL at 17:57

## 2021-01-01 RX ADMIN — PANTOPRAZOLE SODIUM 40 MILLIGRAM(S): 20 TABLET, DELAYED RELEASE ORAL at 17:08

## 2021-01-01 RX ADMIN — QUETIAPINE FUMARATE 50 MILLIGRAM(S): 200 TABLET, FILM COATED ORAL at 17:17

## 2021-01-01 RX ADMIN — Medication 1 APPLICATION(S): at 13:25

## 2021-01-01 RX ADMIN — Medication 15 MILLILITER(S): at 11:37

## 2021-01-01 RX ADMIN — Medication 4: at 17:39

## 2021-01-01 RX ADMIN — Medication 2: at 05:28

## 2021-01-01 RX ADMIN — Medication 2: at 06:41

## 2021-01-01 RX ADMIN — Medication 50 MILLIEQUIVALENT(S): at 05:41

## 2021-01-01 RX ADMIN — Medication 40 MILLIEQUIVALENT(S): at 22:41

## 2021-01-01 RX ADMIN — FENTANYL CITRATE 3.38 MICROGRAM(S)/KG/HR: 50 INJECTION INTRAVENOUS at 14:46

## 2021-01-01 RX ADMIN — FAMOTIDINE 20 MILLIGRAM(S): 10 INJECTION INTRAVENOUS at 17:58

## 2021-01-01 RX ADMIN — Medication 1 APPLICATION(S): at 05:09

## 2021-01-01 RX ADMIN — Medication 100 MILLIGRAM(S): at 21:41

## 2021-01-01 RX ADMIN — Medication 2 PUFF(S): at 14:54

## 2021-01-01 RX ADMIN — Medication 2 PUFF(S): at 02:55

## 2021-01-01 RX ADMIN — Medication 2: at 14:45

## 2021-01-01 RX ADMIN — SODIUM CHLORIDE 100 MILLILITER(S): 9 INJECTION, SOLUTION INTRAVENOUS at 06:34

## 2021-01-01 RX ADMIN — PANTOPRAZOLE SODIUM 40 MILLIGRAM(S): 20 TABLET, DELAYED RELEASE ORAL at 06:01

## 2021-01-01 RX ADMIN — Medication 100 MILLIGRAM(S): at 06:13

## 2021-01-01 RX ADMIN — Medication 1 APPLICATION(S): at 23:15

## 2021-01-01 RX ADMIN — GABAPENTIN 100 MILLIGRAM(S): 400 CAPSULE ORAL at 06:11

## 2021-01-01 RX ADMIN — Medication 100 MILLIEQUIVALENT(S): at 05:39

## 2021-01-01 RX ADMIN — NYSTATIN CREAM 1 APPLICATION(S): 100000 CREAM TOPICAL at 17:59

## 2021-01-01 RX ADMIN — SODIUM CHLORIDE 100 MILLILITER(S): 9 INJECTION, SOLUTION INTRAVENOUS at 20:38

## 2021-01-01 RX ADMIN — Medication 1 APPLICATION(S): at 17:11

## 2021-01-01 RX ADMIN — PANTOPRAZOLE SODIUM 40 MILLIGRAM(S): 20 TABLET, DELAYED RELEASE ORAL at 06:43

## 2021-01-01 RX ADMIN — Medication 40 MILLIGRAM(S): at 05:39

## 2021-01-01 RX ADMIN — Medication 100 MILLIGRAM(S): at 05:44

## 2021-01-01 RX ADMIN — CHLORHEXIDINE GLUCONATE 5 MILLILITER(S): 213 SOLUTION TOPICAL at 17:24

## 2021-01-01 RX ADMIN — Medication 1 APPLICATION(S): at 22:42

## 2021-01-01 RX ADMIN — Medication 2 PUFF(S): at 21:04

## 2021-01-01 RX ADMIN — Medication 2 PUFF(S): at 20:06

## 2021-01-01 RX ADMIN — Medication 50 GRAM(S): at 10:29

## 2021-01-01 RX ADMIN — ALBUTEROL 2 PUFF(S): 90 AEROSOL, METERED ORAL at 00:30

## 2021-01-01 RX ADMIN — DEXMEDETOMIDINE HYDROCHLORIDE IN 0.9% SODIUM CHLORIDE 3 MICROGRAM(S)/KG/HR: 4 INJECTION INTRAVENOUS at 22:00

## 2021-01-01 RX ADMIN — CEFEPIME 100 MILLIGRAM(S): 1 INJECTION, POWDER, FOR SOLUTION INTRAMUSCULAR; INTRAVENOUS at 05:00

## 2021-01-01 RX ADMIN — Medication 100 MILLIGRAM(S): at 13:31

## 2021-01-01 RX ADMIN — Medication 2 PUFF(S): at 14:39

## 2021-01-01 RX ADMIN — Medication 1 APPLICATION(S): at 12:45

## 2021-01-01 RX ADMIN — DEXMEDETOMIDINE HYDROCHLORIDE IN 0.9% SODIUM CHLORIDE 3 MICROGRAM(S)/KG/HR: 4 INJECTION INTRAVENOUS at 21:41

## 2021-01-01 RX ADMIN — Medication 2 PUFF(S): at 21:15

## 2021-01-01 RX ADMIN — Medication 1 APPLICATION(S): at 01:03

## 2021-01-01 RX ADMIN — ALBUTEROL 2 PUFF(S): 90 AEROSOL, METERED ORAL at 11:21

## 2021-01-01 RX ADMIN — NYSTATIN CREAM 1 APPLICATION(S): 100000 CREAM TOPICAL at 05:48

## 2021-01-01 RX ADMIN — Medication 1 APPLICATION(S): at 13:50

## 2021-01-01 RX ADMIN — Medication 4: at 18:14

## 2021-01-01 RX ADMIN — Medication 1: at 02:38

## 2021-01-01 RX ADMIN — NYSTATIN CREAM 1 APPLICATION(S): 100000 CREAM TOPICAL at 06:43

## 2021-01-01 RX ADMIN — MEROPENEM 100 MILLIGRAM(S): 1 INJECTION INTRAVENOUS at 22:00

## 2021-01-01 RX ADMIN — CEFEPIME 100 MILLIGRAM(S): 1 INJECTION, POWDER, FOR SOLUTION INTRAMUSCULAR; INTRAVENOUS at 05:45

## 2021-01-01 RX ADMIN — ALBUTEROL 2 PUFF(S): 90 AEROSOL, METERED ORAL at 09:02

## 2021-01-01 RX ADMIN — PANTOPRAZOLE SODIUM 40 MILLIGRAM(S): 20 TABLET, DELAYED RELEASE ORAL at 05:41

## 2021-01-01 RX ADMIN — ALBUTEROL 2 PUFF(S): 90 AEROSOL, METERED ORAL at 08:45

## 2021-01-01 RX ADMIN — LINEZOLID 300 MILLIGRAM(S): 600 INJECTION, SOLUTION INTRAVENOUS at 17:58

## 2021-01-01 RX ADMIN — FENTANYL CITRATE 3.38 MICROGRAM(S)/KG/HR: 50 INJECTION INTRAVENOUS at 06:28

## 2021-01-01 RX ADMIN — MEROPENEM 100 MILLIGRAM(S): 1 INJECTION INTRAVENOUS at 05:22

## 2021-01-01 RX ADMIN — Medication 1 APPLICATION(S): at 17:37

## 2021-01-01 RX ADMIN — Medication 50 MILLIEQUIVALENT(S): at 05:31

## 2021-01-01 RX ADMIN — Medication 40 MILLIGRAM(S): at 05:40

## 2021-01-01 RX ADMIN — POLYETHYLENE GLYCOL 3350 17 GRAM(S): 17 POWDER, FOR SOLUTION ORAL at 11:13

## 2021-01-01 RX ADMIN — DONEPEZIL HYDROCHLORIDE 5 MILLIGRAM(S): 10 TABLET, FILM COATED ORAL at 21:31

## 2021-01-01 RX ADMIN — Medication 100 MILLIGRAM(S): at 14:21

## 2021-01-01 RX ADMIN — NYSTATIN CREAM 1 APPLICATION(S): 100000 CREAM TOPICAL at 18:17

## 2021-01-01 RX ADMIN — Medication 1 APPLICATION(S): at 07:19

## 2021-01-01 RX ADMIN — CHLORHEXIDINE GLUCONATE 1 APPLICATION(S): 213 SOLUTION TOPICAL at 06:11

## 2021-01-01 RX ADMIN — Medication 1 APPLICATION(S): at 05:28

## 2021-01-01 RX ADMIN — PANTOPRAZOLE SODIUM 40 MILLIGRAM(S): 20 TABLET, DELAYED RELEASE ORAL at 18:27

## 2021-01-01 RX ADMIN — VASOPRESSIN 2.4 UNIT(S)/MIN: 20 INJECTION INTRAVENOUS at 07:45

## 2021-01-01 RX ADMIN — PANTOPRAZOLE SODIUM 40 MILLIGRAM(S): 20 TABLET, DELAYED RELEASE ORAL at 06:31

## 2021-01-01 RX ADMIN — Medication 1 APPLICATION(S): at 06:25

## 2021-01-01 RX ADMIN — Medication 2: at 14:56

## 2021-01-01 RX ADMIN — PANTOPRAZOLE SODIUM 40 MILLIGRAM(S): 20 TABLET, DELAYED RELEASE ORAL at 05:25

## 2021-01-01 RX ADMIN — Medication 1 APPLICATION(S): at 06:02

## 2021-01-01 RX ADMIN — QUETIAPINE FUMARATE 50 MILLIGRAM(S): 200 TABLET, FILM COATED ORAL at 05:10

## 2021-01-01 RX ADMIN — Medication 100 MILLIGRAM(S): at 05:40

## 2021-01-01 RX ADMIN — LINEZOLID 300 MILLIGRAM(S): 600 INJECTION, SOLUTION INTRAVENOUS at 01:00

## 2021-01-01 RX ADMIN — Medication 300 MILLIGRAM(S): at 12:09

## 2021-01-01 RX ADMIN — Medication 1 APPLICATION(S): at 13:05

## 2021-01-01 RX ADMIN — CHLORHEXIDINE GLUCONATE 1 APPLICATION(S): 213 SOLUTION TOPICAL at 05:09

## 2021-01-01 RX ADMIN — Medication 1 APPLICATION(S): at 05:25

## 2021-01-01 RX ADMIN — Medication 1 APPLICATION(S): at 14:54

## 2021-01-01 RX ADMIN — Medication 1 APPLICATION(S): at 06:39

## 2021-01-01 RX ADMIN — Medication 325 MILLIGRAM(S): at 11:16

## 2021-01-01 RX ADMIN — ALBUTEROL 2 PUFF(S): 90 AEROSOL, METERED ORAL at 20:06

## 2021-01-01 RX ADMIN — ENOXAPARIN SODIUM 40 MILLIGRAM(S): 100 INJECTION SUBCUTANEOUS at 11:18

## 2021-01-01 RX ADMIN — Medication 2 MILLIGRAM(S): at 22:00

## 2021-01-01 RX ADMIN — CHLORHEXIDINE GLUCONATE 15 MILLILITER(S): 213 SOLUTION TOPICAL at 17:47

## 2021-01-01 RX ADMIN — ALBUTEROL 2 PUFF(S): 90 AEROSOL, METERED ORAL at 09:32

## 2021-01-01 RX ADMIN — Medication 3 MILLIGRAM(S): at 21:42

## 2021-01-01 RX ADMIN — Medication 2 PUFF(S): at 08:09

## 2021-01-01 RX ADMIN — Medication 15 MILLILITER(S): at 11:20

## 2021-01-01 RX ADMIN — Medication 1 APPLICATION(S): at 17:15

## 2021-01-01 RX ADMIN — CHLORHEXIDINE GLUCONATE 5 MILLILITER(S): 213 SOLUTION TOPICAL at 17:14

## 2021-01-01 RX ADMIN — Medication 100 MILLIGRAM(S): at 05:35

## 2021-01-01 RX ADMIN — Medication 1 APPLICATION(S): at 05:07

## 2021-01-01 RX ADMIN — LINEZOLID 300 MILLIGRAM(S): 600 INJECTION, SOLUTION INTRAVENOUS at 06:34

## 2021-01-01 RX ADMIN — Medication 650 MILLIGRAM(S): at 00:49

## 2021-01-01 RX ADMIN — PANTOPRAZOLE SODIUM 40 MILLIGRAM(S): 20 TABLET, DELAYED RELEASE ORAL at 18:24

## 2021-01-01 RX ADMIN — FENTANYL CITRATE 50 MICROGRAM(S): 50 INJECTION INTRAVENOUS at 11:15

## 2021-01-01 RX ADMIN — Medication 2 MILLIGRAM(S): at 18:42

## 2021-01-01 RX ADMIN — ENOXAPARIN SODIUM 40 MILLIGRAM(S): 100 INJECTION SUBCUTANEOUS at 12:46

## 2021-01-01 RX ADMIN — ALBUTEROL 2 PUFF(S): 90 AEROSOL, METERED ORAL at 20:31

## 2021-01-01 RX ADMIN — Medication 1 APPLICATION(S): at 14:44

## 2021-01-01 RX ADMIN — CHLORHEXIDINE GLUCONATE 5 MILLILITER(S): 213 SOLUTION TOPICAL at 06:29

## 2021-01-01 RX ADMIN — QUETIAPINE FUMARATE 50 MILLIGRAM(S): 200 TABLET, FILM COATED ORAL at 18:38

## 2021-01-01 RX ADMIN — Medication 100 MILLIGRAM(S): at 06:36

## 2021-01-01 RX ADMIN — HEPARIN SODIUM 5000 UNIT(S): 5000 INJECTION INTRAVENOUS; SUBCUTANEOUS at 08:10

## 2021-01-01 RX ADMIN — CEFEPIME 100 MILLIGRAM(S): 1 INJECTION, POWDER, FOR SOLUTION INTRAMUSCULAR; INTRAVENOUS at 15:15

## 2021-01-01 RX ADMIN — Medication 2 PUFF(S): at 08:35

## 2021-01-01 RX ADMIN — Medication 1: at 05:39

## 2021-01-01 RX ADMIN — Medication 300 MILLIGRAM(S): at 11:14

## 2021-01-01 RX ADMIN — Medication 1: at 05:25

## 2021-01-01 RX ADMIN — PROPOFOL 3.6 MICROGRAM(S)/KG/MIN: 10 INJECTION, EMULSION INTRAVENOUS at 20:37

## 2021-01-01 RX ADMIN — CHLORHEXIDINE GLUCONATE 1 APPLICATION(S): 213 SOLUTION TOPICAL at 05:32

## 2021-01-01 RX ADMIN — HEPARIN SODIUM 5000 UNIT(S): 5000 INJECTION INTRAVENOUS; SUBCUTANEOUS at 05:45

## 2021-01-01 RX ADMIN — Medication 250 MILLILITER(S): at 14:42

## 2021-01-01 RX ADMIN — Medication 100 MILLIGRAM(S): at 22:01

## 2021-01-01 RX ADMIN — DONEPEZIL HYDROCHLORIDE 5 MILLIGRAM(S): 10 TABLET, FILM COATED ORAL at 23:30

## 2021-01-01 RX ADMIN — HEPARIN SODIUM 5000 UNIT(S): 5000 INJECTION INTRAVENOUS; SUBCUTANEOUS at 17:08

## 2021-01-01 RX ADMIN — Medication 2: at 06:04

## 2021-01-01 RX ADMIN — PANTOPRAZOLE SODIUM 40 MILLIGRAM(S): 20 TABLET, DELAYED RELEASE ORAL at 17:50

## 2021-01-01 RX ADMIN — MORPHINE SULFATE 2 MILLIGRAM(S): 50 CAPSULE, EXTENDED RELEASE ORAL at 18:31

## 2021-01-01 RX ADMIN — QUETIAPINE FUMARATE 50 MILLIGRAM(S): 200 TABLET, FILM COATED ORAL at 18:13

## 2021-01-01 RX ADMIN — Medication 1 APPLICATION(S): at 14:48

## 2021-01-01 RX ADMIN — DONEPEZIL HYDROCHLORIDE 5 MILLIGRAM(S): 10 TABLET, FILM COATED ORAL at 22:37

## 2021-01-01 RX ADMIN — VASOPRESSIN 2.4 UNIT(S)/MIN: 20 INJECTION INTRAVENOUS at 08:05

## 2021-01-01 RX ADMIN — HEPARIN SODIUM 5000 UNIT(S): 5000 INJECTION INTRAVENOUS; SUBCUTANEOUS at 06:37

## 2021-01-01 RX ADMIN — Medication 1 APPLICATION(S): at 18:23

## 2021-01-01 RX ADMIN — GABAPENTIN 100 MILLIGRAM(S): 400 CAPSULE ORAL at 15:13

## 2021-01-01 RX ADMIN — Medication 300 MILLIGRAM(S): at 12:39

## 2021-01-01 RX ADMIN — QUETIAPINE FUMARATE 50 MILLIGRAM(S): 200 TABLET, FILM COATED ORAL at 06:31

## 2021-01-01 RX ADMIN — QUETIAPINE FUMARATE 50 MILLIGRAM(S): 200 TABLET, FILM COATED ORAL at 06:25

## 2021-01-01 RX ADMIN — ALBUTEROL 2 PUFF(S): 90 AEROSOL, METERED ORAL at 09:22

## 2021-01-01 RX ADMIN — ALBUTEROL 2 PUFF(S): 90 AEROSOL, METERED ORAL at 05:57

## 2021-01-01 RX ADMIN — Medication 2 PUFF(S): at 20:32

## 2021-01-01 RX ADMIN — CHLORHEXIDINE GLUCONATE 1 APPLICATION(S): 213 SOLUTION TOPICAL at 06:32

## 2021-01-01 RX ADMIN — POLYETHYLENE GLYCOL 3350 17 GRAM(S): 17 POWDER, FOR SOLUTION ORAL at 11:37

## 2021-01-01 RX ADMIN — Medication 2 PUFF(S): at 19:36

## 2021-01-01 RX ADMIN — Medication 50 MILLILITER(S): at 06:38

## 2021-01-01 RX ADMIN — NYSTATIN CREAM 1 APPLICATION(S): 100000 CREAM TOPICAL at 05:42

## 2021-01-01 RX ADMIN — CHLORHEXIDINE GLUCONATE 5 MILLILITER(S): 213 SOLUTION TOPICAL at 05:20

## 2021-01-01 RX ADMIN — NYSTATIN CREAM 1 APPLICATION(S): 100000 CREAM TOPICAL at 06:15

## 2021-01-01 RX ADMIN — MEROPENEM 100 MILLIGRAM(S): 1 INJECTION INTRAVENOUS at 04:47

## 2021-01-01 RX ADMIN — Medication 2: at 15:09

## 2021-01-01 RX ADMIN — NYSTATIN CREAM 1 APPLICATION(S): 100000 CREAM TOPICAL at 18:03

## 2021-01-01 RX ADMIN — ALBUTEROL 2 PUFF(S): 90 AEROSOL, METERED ORAL at 02:56

## 2021-01-01 RX ADMIN — Medication 50 MILLILITER(S): at 17:01

## 2021-01-01 RX ADMIN — Medication 1 APPLICATION(S): at 18:17

## 2021-01-01 RX ADMIN — FENTANYL CITRATE 3 MICROGRAM(S)/KG/HR: 50 INJECTION INTRAVENOUS at 14:00

## 2021-01-01 RX ADMIN — CEFEPIME 100 MILLIGRAM(S): 1 INJECTION, POWDER, FOR SOLUTION INTRAMUSCULAR; INTRAVENOUS at 17:24

## 2021-01-01 RX ADMIN — PANTOPRAZOLE SODIUM 40 MILLIGRAM(S): 20 TABLET, DELAYED RELEASE ORAL at 18:14

## 2021-01-01 RX ADMIN — Medication 1 APPLICATION(S): at 13:08

## 2021-01-01 RX ADMIN — VECURONIUM BROMIDE 5 MILLIGRAM(S): 20 INJECTION, POWDER, FOR SOLUTION INTRAVENOUS at 07:45

## 2021-01-01 RX ADMIN — Medication 15 MILLILITER(S): at 17:57

## 2021-01-01 RX ADMIN — Medication 1 APPLICATION(S): at 18:06

## 2021-01-01 RX ADMIN — NYSTATIN CREAM 1 APPLICATION(S): 100000 CREAM TOPICAL at 06:02

## 2021-01-01 RX ADMIN — ALBUTEROL 2 PUFF(S): 90 AEROSOL, METERED ORAL at 12:11

## 2021-01-01 RX ADMIN — MEROPENEM 100 MILLIGRAM(S): 1 INJECTION INTRAVENOUS at 04:57

## 2021-01-01 RX ADMIN — CHLORHEXIDINE GLUCONATE 1 APPLICATION(S): 213 SOLUTION TOPICAL at 05:27

## 2021-01-01 RX ADMIN — PANTOPRAZOLE SODIUM 40 MILLIGRAM(S): 20 TABLET, DELAYED RELEASE ORAL at 18:17

## 2021-01-01 RX ADMIN — CHLORHEXIDINE GLUCONATE 1 APPLICATION(S): 213 SOLUTION TOPICAL at 05:41

## 2021-01-01 RX ADMIN — PHENYLEPHRINE HYDROCHLORIDE 2.25 MICROGRAM(S)/KG/MIN: 10 INJECTION INTRAVENOUS at 14:22

## 2021-01-01 RX ADMIN — Medication 1 APPLICATION(S): at 14:53

## 2021-01-01 RX ADMIN — LINEZOLID 300 MILLIGRAM(S): 600 INJECTION, SOLUTION INTRAVENOUS at 17:56

## 2021-01-01 RX ADMIN — CHLORHEXIDINE GLUCONATE 5 MILLILITER(S): 213 SOLUTION TOPICAL at 05:50

## 2021-01-01 RX ADMIN — Medication 1 APPLICATION(S): at 21:44

## 2021-01-01 RX ADMIN — Medication 100 MILLIGRAM(S): at 21:38

## 2021-01-01 RX ADMIN — Medication 1: at 17:39

## 2021-01-01 RX ADMIN — IRON SUCROSE 200 MILLIGRAM(S): 20 INJECTION, SOLUTION INTRAVENOUS at 15:20

## 2021-01-01 RX ADMIN — CHLORHEXIDINE GLUCONATE 1 APPLICATION(S): 213 SOLUTION TOPICAL at 05:36

## 2021-01-01 RX ADMIN — FAMOTIDINE 20 MILLIGRAM(S): 10 INJECTION INTRAVENOUS at 06:09

## 2021-01-01 RX ADMIN — Medication 1 APPLICATION(S): at 12:30

## 2021-01-01 RX ADMIN — NYSTATIN CREAM 1 APPLICATION(S): 100000 CREAM TOPICAL at 05:12

## 2021-01-01 RX ADMIN — Medication 50 MILLIEQUIVALENT(S): at 05:23

## 2021-01-01 RX ADMIN — Medication 2: at 18:20

## 2021-01-01 RX ADMIN — MORPHINE SULFATE 2 MILLIGRAM(S): 50 CAPSULE, EXTENDED RELEASE ORAL at 10:25

## 2021-01-01 RX ADMIN — FENTANYL CITRATE 3.38 MICROGRAM(S)/KG/HR: 50 INJECTION INTRAVENOUS at 05:27

## 2021-01-01 RX ADMIN — Medication 1 APPLICATION(S): at 22:29

## 2021-01-01 RX ADMIN — Medication 2 PUFF(S): at 13:46

## 2021-01-01 RX ADMIN — Medication 1 APPLICATION(S): at 14:46

## 2021-01-01 RX ADMIN — Medication 2 PUFF(S): at 07:27

## 2021-01-01 RX ADMIN — Medication 1 APPLICATION(S): at 06:43

## 2021-01-01 RX ADMIN — FAMOTIDINE 20 MILLIGRAM(S): 10 INJECTION INTRAVENOUS at 05:06

## 2021-01-01 RX ADMIN — Medication 1 APPLICATION(S): at 05:47

## 2021-01-01 RX ADMIN — NYSTATIN CREAM 1 APPLICATION(S): 100000 CREAM TOPICAL at 18:30

## 2021-01-01 RX ADMIN — CEFEPIME 100 MILLIGRAM(S): 1 INJECTION, POWDER, FOR SOLUTION INTRAMUSCULAR; INTRAVENOUS at 10:58

## 2021-01-01 RX ADMIN — ALBUTEROL 2 PUFF(S): 90 AEROSOL, METERED ORAL at 10:20

## 2021-01-01 RX ADMIN — Medication 325 MILLIGRAM(S): at 13:33

## 2021-01-01 RX ADMIN — Medication 100 MILLIGRAM(S): at 21:15

## 2021-01-01 RX ADMIN — Medication 325 MILLIGRAM(S): at 12:44

## 2021-01-01 RX ADMIN — SENNA PLUS 2 TABLET(S): 8.6 TABLET ORAL at 22:21

## 2021-01-01 RX ADMIN — Medication 0: at 11:49

## 2021-01-01 RX ADMIN — CHLORHEXIDINE GLUCONATE 5 MILLILITER(S): 213 SOLUTION TOPICAL at 05:36

## 2021-01-01 RX ADMIN — Medication 50 MILLIEQUIVALENT(S): at 05:54

## 2021-01-01 RX ADMIN — LINEZOLID 300 MILLIGRAM(S): 600 INJECTION, SOLUTION INTRAVENOUS at 17:51

## 2021-01-01 RX ADMIN — Medication 100 MILLIGRAM(S): at 21:44

## 2021-01-01 RX ADMIN — QUETIAPINE FUMARATE 50 MILLIGRAM(S): 200 TABLET, FILM COATED ORAL at 18:56

## 2021-01-01 RX ADMIN — Medication 15 MILLILITER(S): at 18:05

## 2021-01-01 RX ADMIN — CHLORHEXIDINE GLUCONATE 5 MILLILITER(S): 213 SOLUTION TOPICAL at 05:35

## 2021-01-01 RX ADMIN — NYSTATIN CREAM 1 APPLICATION(S): 100000 CREAM TOPICAL at 17:02

## 2021-01-01 RX ADMIN — Medication 1 APPLICATION(S): at 05:23

## 2021-01-01 RX ADMIN — ALBUTEROL 2 PUFF(S): 90 AEROSOL, METERED ORAL at 22:11

## 2021-01-01 RX ADMIN — HEPARIN SODIUM 5000 UNIT(S): 5000 INJECTION INTRAVENOUS; SUBCUTANEOUS at 17:57

## 2021-01-01 RX ADMIN — Medication 2 MILLIGRAM(S): at 05:36

## 2021-01-01 RX ADMIN — Medication 1 APPLICATION(S): at 05:38

## 2021-01-01 RX ADMIN — Medication 20 MILLIGRAM(S): at 10:28

## 2021-01-01 RX ADMIN — CHLORHEXIDINE GLUCONATE 5 MILLILITER(S): 213 SOLUTION TOPICAL at 06:11

## 2021-01-01 RX ADMIN — Medication 2: at 06:48

## 2021-01-01 RX ADMIN — Medication 20 MILLIEQUIVALENT(S): at 11:23

## 2021-01-01 RX ADMIN — Medication 1 APPLICATION(S): at 05:10

## 2021-01-01 RX ADMIN — CHLORHEXIDINE GLUCONATE 1 APPLICATION(S): 213 SOLUTION TOPICAL at 05:07

## 2021-01-01 RX ADMIN — Medication 2: at 11:30

## 2021-01-01 RX ADMIN — Medication 100 MILLIGRAM(S): at 05:51

## 2021-01-01 RX ADMIN — Medication 300 MILLIGRAM(S): at 12:31

## 2021-01-01 RX ADMIN — CHLORHEXIDINE GLUCONATE 1 APPLICATION(S): 213 SOLUTION TOPICAL at 07:02

## 2021-01-01 RX ADMIN — Medication 1 APPLICATION(S): at 22:41

## 2021-01-01 RX ADMIN — Medication 100 MILLIGRAM(S): at 07:00

## 2021-01-01 RX ADMIN — PANTOPRAZOLE SODIUM 40 MILLIGRAM(S): 20 TABLET, DELAYED RELEASE ORAL at 18:53

## 2021-01-01 RX ADMIN — Medication 2: at 06:36

## 2021-01-01 RX ADMIN — PANTOPRAZOLE SODIUM 40 MILLIGRAM(S): 20 TABLET, DELAYED RELEASE ORAL at 18:54

## 2021-01-01 RX ADMIN — Medication 50 GRAM(S): at 04:32

## 2021-01-01 RX ADMIN — SODIUM CHLORIDE 1000 MILLILITER(S): 9 INJECTION, SOLUTION INTRAVENOUS at 17:00

## 2021-01-01 RX ADMIN — Medication 650 MILLIGRAM(S): at 11:00

## 2021-01-01 RX ADMIN — Medication 100 MILLIEQUIVALENT(S): at 06:04

## 2021-01-01 RX ADMIN — LINEZOLID 300 MILLIGRAM(S): 600 INJECTION, SOLUTION INTRAVENOUS at 06:05

## 2021-01-01 RX ADMIN — Medication 15 MILLILITER(S): at 11:59

## 2021-01-01 RX ADMIN — Medication 50 MILLIEQUIVALENT(S): at 08:00

## 2021-01-01 RX ADMIN — CEFEPIME 100 MILLIGRAM(S): 1 INJECTION, POWDER, FOR SOLUTION INTRAMUSCULAR; INTRAVENOUS at 06:11

## 2021-01-01 RX ADMIN — QUETIAPINE FUMARATE 50 MILLIGRAM(S): 200 TABLET, FILM COATED ORAL at 06:52

## 2021-01-01 RX ADMIN — Medication 300 MILLIGRAM(S): at 12:00

## 2021-01-01 RX ADMIN — PANTOPRAZOLE SODIUM 40 MILLIGRAM(S): 20 TABLET, DELAYED RELEASE ORAL at 06:14

## 2021-01-01 RX ADMIN — DONEPEZIL HYDROCHLORIDE 5 MILLIGRAM(S): 10 TABLET, FILM COATED ORAL at 22:03

## 2021-01-01 RX ADMIN — Medication 1: at 18:01

## 2021-01-01 RX ADMIN — Medication 1 TABLET(S): at 11:17

## 2021-01-01 RX ADMIN — Medication 1 APPLICATION(S): at 18:52

## 2021-01-01 RX ADMIN — Medication 325 MILLIGRAM(S): at 13:17

## 2021-01-01 RX ADMIN — Medication 1 APPLICATION(S): at 05:36

## 2021-01-01 RX ADMIN — CHLORHEXIDINE GLUCONATE 1 APPLICATION(S): 213 SOLUTION TOPICAL at 05:04

## 2021-01-01 RX ADMIN — Medication 300 MILLIGRAM(S): at 12:27

## 2021-01-01 RX ADMIN — Medication 300 MILLIGRAM(S): at 11:31

## 2021-01-01 RX ADMIN — Medication 100 MILLIGRAM(S): at 14:23

## 2021-01-01 RX ADMIN — ENOXAPARIN SODIUM 40 MILLIGRAM(S): 100 INJECTION SUBCUTANEOUS at 11:31

## 2021-01-01 RX ADMIN — Medication 2 MILLIGRAM(S): at 06:31

## 2021-01-01 RX ADMIN — Medication 15 MILLILITER(S): at 11:18

## 2021-01-01 RX ADMIN — Medication 2 PUFF(S): at 15:02

## 2021-01-01 RX ADMIN — Medication 250 MILLILITER(S): at 09:30

## 2021-01-01 RX ADMIN — Medication 1 APPLICATION(S): at 17:18

## 2021-01-01 RX ADMIN — GABAPENTIN 100 MILLIGRAM(S): 400 CAPSULE ORAL at 22:29

## 2021-01-01 RX ADMIN — CHLORHEXIDINE GLUCONATE 5 MILLILITER(S): 213 SOLUTION TOPICAL at 17:30

## 2021-01-01 RX ADMIN — LINEZOLID 300 MILLIGRAM(S): 600 INJECTION, SOLUTION INTRAVENOUS at 18:47

## 2021-01-01 RX ADMIN — LINEZOLID 300 MILLIGRAM(S): 600 INJECTION, SOLUTION INTRAVENOUS at 18:31

## 2021-01-01 RX ADMIN — Medication 1: at 11:43

## 2021-01-01 RX ADMIN — CHLORHEXIDINE GLUCONATE 5 MILLILITER(S): 213 SOLUTION TOPICAL at 17:59

## 2021-01-01 RX ADMIN — Medication 2 PUFF(S): at 09:08

## 2021-01-01 RX ADMIN — Medication 0: at 18:37

## 2021-01-01 RX ADMIN — QUETIAPINE FUMARATE 50 MILLIGRAM(S): 200 TABLET, FILM COATED ORAL at 18:32

## 2021-01-01 RX ADMIN — PANTOPRAZOLE SODIUM 40 MILLIGRAM(S): 20 TABLET, DELAYED RELEASE ORAL at 17:00

## 2021-01-01 RX ADMIN — FENTANYL CITRATE 50 MICROGRAM(S): 50 INJECTION INTRAVENOUS at 20:45

## 2021-01-01 RX ADMIN — QUETIAPINE FUMARATE 50 MILLIGRAM(S): 200 TABLET, FILM COATED ORAL at 06:13

## 2021-01-01 RX ADMIN — Medication 2 PUFF(S): at 14:22

## 2021-01-01 RX ADMIN — ALBUTEROL 2 PUFF(S): 90 AEROSOL, METERED ORAL at 14:38

## 2021-01-01 RX ADMIN — MEROPENEM 100 MILLIGRAM(S): 1 INJECTION INTRAVENOUS at 06:02

## 2021-01-01 RX ADMIN — Medication 2: at 17:38

## 2021-01-01 RX ADMIN — Medication 1 APPLICATION(S): at 18:30

## 2021-01-01 RX ADMIN — Medication 10 MILLILITER(S): at 23:24

## 2021-01-01 RX ADMIN — Medication 1 APPLICATION(S): at 05:39

## 2021-01-01 RX ADMIN — CEFEPIME 100 MILLIGRAM(S): 1 INJECTION, POWDER, FOR SOLUTION INTRAMUSCULAR; INTRAVENOUS at 17:02

## 2021-01-01 RX ADMIN — Medication 300 MILLIGRAM(S): at 11:45

## 2021-01-01 RX ADMIN — Medication 1 APPLICATION(S): at 05:30

## 2021-01-01 RX ADMIN — Medication 2: at 00:00

## 2021-01-01 RX ADMIN — Medication 50 MILLIEQUIVALENT(S): at 06:31

## 2021-01-01 RX ADMIN — Medication 1 TABLET(S): at 11:16

## 2021-01-01 RX ADMIN — SENNA PLUS 2 TABLET(S): 8.6 TABLET ORAL at 21:40

## 2021-01-01 RX ADMIN — Medication 2: at 23:52

## 2021-01-01 RX ADMIN — SENNA PLUS 2 TABLET(S): 8.6 TABLET ORAL at 22:24

## 2021-01-01 RX ADMIN — MEROPENEM 100 MILLIGRAM(S): 1 INJECTION INTRAVENOUS at 05:40

## 2021-01-01 RX ADMIN — MEROPENEM 100 MILLIGRAM(S): 1 INJECTION INTRAVENOUS at 04:53

## 2021-01-01 RX ADMIN — Medication 4: at 18:28

## 2021-01-01 RX ADMIN — Medication 1 APPLICATION(S): at 05:19

## 2021-01-01 RX ADMIN — Medication 20 MILLIGRAM(S): at 09:33

## 2021-01-01 RX ADMIN — NYSTATIN CREAM 1 APPLICATION(S): 100000 CREAM TOPICAL at 17:40

## 2021-01-01 RX ADMIN — Medication 2: at 01:15

## 2021-01-01 RX ADMIN — Medication 1: at 03:18

## 2021-01-01 RX ADMIN — HEPARIN SODIUM 5000 UNIT(S): 5000 INJECTION INTRAVENOUS; SUBCUTANEOUS at 13:01

## 2021-01-01 RX ADMIN — CHLORHEXIDINE GLUCONATE 15 MILLILITER(S): 213 SOLUTION TOPICAL at 06:29

## 2021-01-01 RX ADMIN — Medication 2 PUFF(S): at 09:31

## 2021-01-01 RX ADMIN — MEROPENEM 100 MILLIGRAM(S): 1 INJECTION INTRAVENOUS at 05:05

## 2021-01-01 RX ADMIN — Medication 1 APPLICATION(S): at 22:21

## 2021-01-01 RX ADMIN — MORPHINE SULFATE 2 MILLIGRAM(S): 50 CAPSULE, EXTENDED RELEASE ORAL at 03:53

## 2021-01-01 RX ADMIN — CHLORHEXIDINE GLUCONATE 1 APPLICATION(S): 213 SOLUTION TOPICAL at 05:50

## 2021-01-01 RX ADMIN — HEPARIN SODIUM 5000 UNIT(S): 5000 INJECTION INTRAVENOUS; SUBCUTANEOUS at 05:35

## 2021-01-01 RX ADMIN — MORPHINE SULFATE 2 MILLIGRAM(S): 50 CAPSULE, EXTENDED RELEASE ORAL at 11:00

## 2021-01-01 RX ADMIN — IOHEXOL 30 MILLILITER(S): 300 INJECTION, SOLUTION INTRAVENOUS at 13:51

## 2021-01-01 RX ADMIN — FAMOTIDINE 20 MILLIGRAM(S): 10 INJECTION INTRAVENOUS at 05:27

## 2021-01-01 RX ADMIN — FAMOTIDINE 20 MILLIGRAM(S): 10 INJECTION INTRAVENOUS at 18:44

## 2021-01-01 RX ADMIN — SENNA PLUS 2 TABLET(S): 8.6 TABLET ORAL at 21:38

## 2021-01-01 RX ADMIN — Medication 125 MILLIGRAM(S): at 23:32

## 2021-01-01 RX ADMIN — ALBUTEROL 2 PUFF(S): 90 AEROSOL, METERED ORAL at 20:28

## 2021-01-01 RX ADMIN — CHLORHEXIDINE GLUCONATE 1 APPLICATION(S): 213 SOLUTION TOPICAL at 05:51

## 2021-01-01 RX ADMIN — MEROPENEM 100 MILLIGRAM(S): 1 INJECTION INTRAVENOUS at 05:06

## 2021-01-01 RX ADMIN — GABAPENTIN 100 MILLIGRAM(S): 400 CAPSULE ORAL at 14:41

## 2021-01-01 RX ADMIN — SODIUM CHLORIDE 100 MILLILITER(S): 9 INJECTION, SOLUTION INTRAVENOUS at 02:03

## 2021-01-01 RX ADMIN — ENOXAPARIN SODIUM 40 MILLIGRAM(S): 100 INJECTION SUBCUTANEOUS at 11:49

## 2021-01-01 RX ADMIN — GABAPENTIN 100 MILLIGRAM(S): 400 CAPSULE ORAL at 14:24

## 2021-01-01 RX ADMIN — Medication 50 MILLILITER(S): at 06:30

## 2021-01-01 RX ADMIN — Medication 250 MILLIGRAM(S): at 17:18

## 2021-01-01 RX ADMIN — Medication 2: at 18:55

## 2021-01-01 RX ADMIN — HEPARIN SODIUM 5000 UNIT(S): 5000 INJECTION INTRAVENOUS; SUBCUTANEOUS at 14:06

## 2021-01-01 RX ADMIN — CEFEPIME 100 MILLIGRAM(S): 1 INJECTION, POWDER, FOR SOLUTION INTRAMUSCULAR; INTRAVENOUS at 06:07

## 2021-01-01 RX ADMIN — HEPARIN SODIUM 5000 UNIT(S): 5000 INJECTION INTRAVENOUS; SUBCUTANEOUS at 17:10

## 2021-01-01 RX ADMIN — PANTOPRAZOLE SODIUM 40 MILLIGRAM(S): 20 TABLET, DELAYED RELEASE ORAL at 06:29

## 2021-01-01 RX ADMIN — Medication 1 APPLICATION(S): at 22:52

## 2021-01-01 RX ADMIN — FENTANYL CITRATE 3 MICROGRAM(S)/KG/HR: 50 INJECTION INTRAVENOUS at 08:16

## 2021-01-01 RX ADMIN — Medication 1 APPLICATION(S): at 11:31

## 2021-01-01 RX ADMIN — POLYETHYLENE GLYCOL 3350 17 GRAM(S): 17 POWDER, FOR SOLUTION ORAL at 12:45

## 2021-01-01 RX ADMIN — Medication 1: at 22:24

## 2021-01-01 RX ADMIN — GABAPENTIN 100 MILLIGRAM(S): 400 CAPSULE ORAL at 05:40

## 2021-01-01 RX ADMIN — Medication 1 APPLICATION(S): at 18:20

## 2021-01-01 RX ADMIN — Medication 100 MILLIEQUIVALENT(S): at 05:00

## 2021-01-01 RX ADMIN — ONDANSETRON 4 MILLIGRAM(S): 8 TABLET, FILM COATED ORAL at 06:08

## 2021-01-01 RX ADMIN — MIDAZOLAM HYDROCHLORIDE 2 MILLIGRAM(S): 1 INJECTION, SOLUTION INTRAMUSCULAR; INTRAVENOUS at 12:15

## 2021-01-01 RX ADMIN — SENNA PLUS 2 TABLET(S): 8.6 TABLET ORAL at 21:54

## 2021-01-01 RX ADMIN — Medication 2: at 13:39

## 2021-01-01 RX ADMIN — CEFEPIME 100 MILLIGRAM(S): 1 INJECTION, POWDER, FOR SOLUTION INTRAMUSCULAR; INTRAVENOUS at 17:20

## 2021-01-01 RX ADMIN — FAMOTIDINE 20 MILLIGRAM(S): 10 INJECTION INTRAVENOUS at 06:08

## 2021-01-01 RX ADMIN — GABAPENTIN 100 MILLIGRAM(S): 400 CAPSULE ORAL at 13:57

## 2021-01-01 RX ADMIN — POLYETHYLENE GLYCOL 3350 17 GRAM(S): 17 POWDER, FOR SOLUTION ORAL at 11:49

## 2021-01-01 RX ADMIN — Medication 2 PUFF(S): at 01:03

## 2021-01-01 RX ADMIN — ALBUTEROL 2 PUFF(S): 90 AEROSOL, METERED ORAL at 08:36

## 2021-01-01 RX ADMIN — QUETIAPINE FUMARATE 50 MILLIGRAM(S): 200 TABLET, FILM COATED ORAL at 18:03

## 2021-01-01 RX ADMIN — ALBUTEROL 2 PUFF(S): 90 AEROSOL, METERED ORAL at 09:08

## 2021-01-01 RX ADMIN — Medication 250 MILLIGRAM(S): at 03:57

## 2021-01-01 RX ADMIN — Medication 50 MILLIEQUIVALENT(S): at 06:33

## 2021-01-01 RX ADMIN — Medication 100 MILLIGRAM(S): at 22:37

## 2021-01-01 RX ADMIN — Medication 2 PUFF(S): at 08:48

## 2021-01-01 RX ADMIN — NYSTATIN CREAM 1 APPLICATION(S): 100000 CREAM TOPICAL at 18:56

## 2021-01-01 RX ADMIN — Medication 1 APPLICATION(S): at 21:45

## 2021-01-01 RX ADMIN — Medication 2: at 00:34

## 2021-01-01 RX ADMIN — ALBUTEROL 2 PUFF(S): 90 AEROSOL, METERED ORAL at 07:29

## 2021-01-01 RX ADMIN — VASOPRESSIN 2.4 UNIT(S)/MIN: 20 INJECTION INTRAVENOUS at 18:19

## 2021-01-01 RX ADMIN — HEPARIN SODIUM 5000 UNIT(S): 5000 INJECTION INTRAVENOUS; SUBCUTANEOUS at 15:35

## 2021-01-01 RX ADMIN — Medication 1 APPLICATION(S): at 18:51

## 2021-01-01 RX ADMIN — CHLORHEXIDINE GLUCONATE 1 APPLICATION(S): 213 SOLUTION TOPICAL at 05:31

## 2021-01-01 RX ADMIN — Medication 1 APPLICATION(S): at 21:19

## 2021-01-01 RX ADMIN — CEFEPIME 100 MILLIGRAM(S): 1 INJECTION, POWDER, FOR SOLUTION INTRAMUSCULAR; INTRAVENOUS at 17:54

## 2021-01-01 RX ADMIN — Medication 50 MILLIEQUIVALENT(S): at 07:15

## 2021-01-01 RX ADMIN — IRON SUCROSE 200 MILLIGRAM(S): 20 INJECTION, SOLUTION INTRAVENOUS at 16:00

## 2021-01-01 RX ADMIN — Medication 2 PUFF(S): at 11:22

## 2021-01-01 RX ADMIN — CHLORHEXIDINE GLUCONATE 5 MILLILITER(S): 213 SOLUTION TOPICAL at 17:51

## 2021-01-01 RX ADMIN — Medication 1 APPLICATION(S): at 17:27

## 2021-01-01 RX ADMIN — NYSTATIN CREAM 1 APPLICATION(S): 100000 CREAM TOPICAL at 05:26

## 2021-01-01 RX ADMIN — NYSTATIN CREAM 1 APPLICATION(S): 100000 CREAM TOPICAL at 18:31

## 2021-01-01 RX ADMIN — Medication 15 MILLILITER(S): at 12:37

## 2021-01-01 RX ADMIN — Medication 1 TABLET(S): at 12:26

## 2021-01-01 RX ADMIN — NYSTATIN CREAM 1 APPLICATION(S): 100000 CREAM TOPICAL at 05:09

## 2021-01-01 RX ADMIN — HEPARIN SODIUM 5000 UNIT(S): 5000 INJECTION INTRAVENOUS; SUBCUTANEOUS at 13:07

## 2021-01-01 RX ADMIN — Medication 100 MILLIGRAM(S): at 13:17

## 2021-01-01 RX ADMIN — Medication 2: at 11:39

## 2021-01-01 RX ADMIN — Medication 100 MILLIGRAM(S): at 06:11

## 2021-01-01 RX ADMIN — Medication 2 MILLIGRAM(S): at 10:45

## 2021-01-01 RX ADMIN — FENTANYL CITRATE 1 PATCH: 50 INJECTION INTRAVENOUS at 12:31

## 2021-01-01 RX ADMIN — MEROPENEM 100 MILLIGRAM(S): 1 INJECTION INTRAVENOUS at 14:20

## 2021-01-01 RX ADMIN — MORPHINE SULFATE 2 MILLIGRAM(S): 50 CAPSULE, EXTENDED RELEASE ORAL at 23:51

## 2021-01-01 RX ADMIN — Medication 2 PUFF(S): at 21:10

## 2021-01-01 RX ADMIN — HEPARIN SODIUM 5000 UNIT(S): 5000 INJECTION INTRAVENOUS; SUBCUTANEOUS at 15:46

## 2021-01-01 RX ADMIN — FENTANYL CITRATE 50 MICROGRAM(S): 50 INJECTION INTRAVENOUS at 08:45

## 2021-01-01 RX ADMIN — SODIUM CHLORIDE 10 MILLILITER(S): 9 INJECTION INTRAMUSCULAR; INTRAVENOUS; SUBCUTANEOUS at 21:05

## 2021-01-01 RX ADMIN — Medication 1 APPLICATION(S): at 05:40

## 2021-01-01 RX ADMIN — NYSTATIN CREAM 1 APPLICATION(S): 100000 CREAM TOPICAL at 06:25

## 2021-01-01 RX ADMIN — Medication 20 MILLIEQUIVALENT(S): at 14:08

## 2021-01-01 RX ADMIN — LINEZOLID 300 MILLIGRAM(S): 600 INJECTION, SOLUTION INTRAVENOUS at 05:26

## 2021-01-01 RX ADMIN — CHLORHEXIDINE GLUCONATE 5 MILLILITER(S): 213 SOLUTION TOPICAL at 17:58

## 2021-01-01 RX ADMIN — Medication 1: at 18:03

## 2021-01-01 RX ADMIN — QUETIAPINE FUMARATE 50 MILLIGRAM(S): 200 TABLET, FILM COATED ORAL at 17:48

## 2021-01-01 RX ADMIN — CEFEPIME 100 MILLIGRAM(S): 1 INJECTION, POWDER, FOR SOLUTION INTRAMUSCULAR; INTRAVENOUS at 17:11

## 2021-01-01 RX ADMIN — ALBUTEROL 2 PUFF(S): 90 AEROSOL, METERED ORAL at 15:01

## 2021-01-01 RX ADMIN — CHLORHEXIDINE GLUCONATE 1 APPLICATION(S): 213 SOLUTION TOPICAL at 06:10

## 2021-01-01 RX ADMIN — NYSTATIN CREAM 1 APPLICATION(S): 100000 CREAM TOPICAL at 17:28

## 2021-01-01 RX ADMIN — GABAPENTIN 100 MILLIGRAM(S): 400 CAPSULE ORAL at 05:24

## 2021-01-01 RX ADMIN — Medication 100 MILLIGRAM(S): at 13:23

## 2021-01-01 RX ADMIN — Medication 2 PUFF(S): at 19:30

## 2021-01-01 RX ADMIN — Medication 5.63 MICROGRAM(S)/KG/MIN: at 03:58

## 2021-01-01 RX ADMIN — NYSTATIN CREAM 1 APPLICATION(S): 100000 CREAM TOPICAL at 05:30

## 2021-01-01 RX ADMIN — Medication 100 MILLIEQUIVALENT(S): at 11:15

## 2021-01-01 RX ADMIN — Medication 100 MILLIGRAM(S): at 21:05

## 2021-01-01 RX ADMIN — SODIUM CHLORIDE 1000 MILLILITER(S): 9 INJECTION, SOLUTION INTRAVENOUS at 22:27

## 2021-01-01 RX ADMIN — Medication 2 MILLIGRAM(S): at 05:06

## 2021-01-01 RX ADMIN — Medication 1: at 07:00

## 2021-01-01 RX ADMIN — Medication 1: at 17:25

## 2021-01-01 RX ADMIN — Medication 4: at 17:00

## 2021-01-01 RX ADMIN — Medication 1 APPLICATION(S): at 18:37

## 2021-01-01 RX ADMIN — FENTANYL CITRATE 3.38 MICROGRAM(S)/KG/HR: 50 INJECTION INTRAVENOUS at 20:55

## 2021-01-01 RX ADMIN — Medication 4: at 23:48

## 2021-01-01 RX ADMIN — Medication 4: at 11:54

## 2021-01-01 RX ADMIN — FAMOTIDINE 20 MILLIGRAM(S): 10 INJECTION INTRAVENOUS at 17:12

## 2021-01-01 RX ADMIN — CEFEPIME 100 MILLIGRAM(S): 1 INJECTION, POWDER, FOR SOLUTION INTRAMUSCULAR; INTRAVENOUS at 17:44

## 2021-01-01 RX ADMIN — MEROPENEM 100 MILLIGRAM(S): 1 INJECTION INTRAVENOUS at 19:33

## 2021-01-01 RX ADMIN — Medication 1 APPLICATION(S): at 21:16

## 2021-01-01 RX ADMIN — MEROPENEM 100 MILLIGRAM(S): 1 INJECTION INTRAVENOUS at 06:23

## 2021-01-01 RX ADMIN — DEXMEDETOMIDINE HYDROCHLORIDE IN 0.9% SODIUM CHLORIDE 3 MICROGRAM(S)/KG/HR: 4 INJECTION INTRAVENOUS at 23:51

## 2021-01-01 RX ADMIN — Medication 2 PUFF(S): at 05:57

## 2021-01-01 RX ADMIN — ALBUTEROL 2 PUFF(S): 90 AEROSOL, METERED ORAL at 21:16

## 2021-01-01 RX ADMIN — Medication 100 MILLIGRAM(S): at 15:17

## 2021-01-01 RX ADMIN — Medication 4: at 06:25

## 2021-01-01 RX ADMIN — Medication 2: at 18:51

## 2021-01-01 RX ADMIN — Medication 2: at 12:32

## 2021-01-01 RX ADMIN — Medication 2: at 23:09

## 2021-01-01 RX ADMIN — ALBUTEROL 2 PUFF(S): 90 AEROSOL, METERED ORAL at 08:17

## 2021-01-01 RX ADMIN — Medication 2 PUFF(S): at 09:17

## 2021-01-01 RX ADMIN — NYSTATIN CREAM 1 APPLICATION(S): 100000 CREAM TOPICAL at 18:23

## 2021-01-01 RX ADMIN — LINEZOLID 300 MILLIGRAM(S): 600 INJECTION, SOLUTION INTRAVENOUS at 06:54

## 2021-01-01 RX ADMIN — Medication 4: at 11:50

## 2021-01-01 RX ADMIN — Medication 4: at 18:52

## 2021-01-01 RX ADMIN — PHENYLEPHRINE HYDROCHLORIDE 11.1 MICROGRAM(S)/KG/MIN: 10 INJECTION INTRAVENOUS at 18:24

## 2021-01-01 RX ADMIN — Medication 1 TABLET(S): at 11:42

## 2021-01-01 RX ADMIN — QUETIAPINE FUMARATE 50 MILLIGRAM(S): 200 TABLET, FILM COATED ORAL at 05:34

## 2021-01-01 RX ADMIN — ALBUTEROL 2 PUFF(S): 90 AEROSOL, METERED ORAL at 17:07

## 2021-01-01 RX ADMIN — PANTOPRAZOLE SODIUM 40 MILLIGRAM(S): 20 TABLET, DELAYED RELEASE ORAL at 17:39

## 2021-01-01 RX ADMIN — SODIUM CHLORIDE 100 MILLILITER(S): 9 INJECTION, SOLUTION INTRAVENOUS at 10:00

## 2021-01-01 RX ADMIN — Medication 50 MILLIEQUIVALENT(S): at 06:43

## 2021-01-01 RX ADMIN — Medication 1 APPLICATION(S): at 11:14

## 2021-01-01 RX ADMIN — CEFEPIME 100 MILLIGRAM(S): 1 INJECTION, POWDER, FOR SOLUTION INTRAMUSCULAR; INTRAVENOUS at 19:00

## 2021-01-01 RX ADMIN — Medication 40 MILLIGRAM(S): at 05:50

## 2021-01-01 RX ADMIN — AMIODARONE HYDROCHLORIDE 600 MILLIGRAM(S): 400 TABLET ORAL at 12:20

## 2021-01-01 RX ADMIN — Medication 1 APPLICATION(S): at 14:25

## 2021-01-01 RX ADMIN — Medication 1 APPLICATION(S): at 05:43

## 2021-01-01 RX ADMIN — Medication 50 MILLILITER(S): at 23:00

## 2021-01-01 RX ADMIN — Medication 325 MILLIGRAM(S): at 11:32

## 2021-01-01 RX ADMIN — Medication 2: at 19:04

## 2021-01-01 RX ADMIN — Medication 2 PUFF(S): at 07:56

## 2021-01-01 RX ADMIN — CHLORHEXIDINE GLUCONATE 1 APPLICATION(S): 213 SOLUTION TOPICAL at 06:12

## 2021-01-01 RX ADMIN — MEROPENEM 100 MILLIGRAM(S): 1 INJECTION INTRAVENOUS at 21:14

## 2021-01-01 RX ADMIN — Medication 2: at 11:52

## 2021-01-01 RX ADMIN — Medication 1 APPLICATION(S): at 12:05

## 2021-01-01 RX ADMIN — Medication 300 MILLIGRAM(S): at 11:17

## 2021-01-01 RX ADMIN — SODIUM CHLORIDE 1000 MILLILITER(S): 9 INJECTION INTRAMUSCULAR; INTRAVENOUS; SUBCUTANEOUS at 14:00

## 2021-01-01 RX ADMIN — Medication 1 APPLICATION(S): at 00:15

## 2021-01-01 RX ADMIN — Medication 1 APPLICATION(S): at 05:41

## 2021-01-01 RX ADMIN — Medication 50 GRAM(S): at 06:43

## 2021-01-01 RX ADMIN — MORPHINE SULFATE 2 MILLIGRAM(S): 50 CAPSULE, EXTENDED RELEASE ORAL at 00:30

## 2021-01-01 RX ADMIN — Medication 975 MILLIGRAM(S): at 02:13

## 2021-01-01 RX ADMIN — Medication 100 MILLIGRAM(S): at 21:13

## 2021-01-01 RX ADMIN — CHLORHEXIDINE GLUCONATE 15 MILLILITER(S): 213 SOLUTION TOPICAL at 18:37

## 2021-01-01 RX ADMIN — Medication 1 APPLICATION(S): at 15:14

## 2021-01-01 RX ADMIN — Medication 4: at 11:37

## 2021-01-01 RX ADMIN — CEFEPIME 100 MILLIGRAM(S): 1 INJECTION, POWDER, FOR SOLUTION INTRAMUSCULAR; INTRAVENOUS at 17:42

## 2021-01-01 RX ADMIN — Medication 50 MILLIEQUIVALENT(S): at 06:37

## 2021-01-01 RX ADMIN — Medication 3: at 14:37

## 2021-01-01 RX ADMIN — ALBUTEROL 2 PUFF(S): 90 AEROSOL, METERED ORAL at 08:54

## 2021-01-01 RX ADMIN — CHLORHEXIDINE GLUCONATE 1 APPLICATION(S): 213 SOLUTION TOPICAL at 06:02

## 2021-01-01 RX ADMIN — Medication 1 TABLET(S): at 13:16

## 2021-01-01 RX ADMIN — Medication 1 TABLET(S): at 11:01

## 2021-01-01 RX ADMIN — Medication 2 PUFF(S): at 08:30

## 2021-01-01 RX ADMIN — Medication 40 MILLIGRAM(S): at 05:26

## 2021-01-01 RX ADMIN — Medication 50 MILLILITER(S): at 00:27

## 2021-01-01 RX ADMIN — MEROPENEM 100 MILLIGRAM(S): 1 INJECTION INTRAVENOUS at 05:25

## 2021-01-01 RX ADMIN — Medication 40 MILLIGRAM(S): at 05:41

## 2021-01-01 RX ADMIN — Medication 50 MILLIEQUIVALENT(S): at 05:55

## 2021-01-01 RX ADMIN — Medication 2 MILLIGRAM(S): at 03:30

## 2021-01-01 RX ADMIN — GABAPENTIN 100 MILLIGRAM(S): 400 CAPSULE ORAL at 06:29

## 2021-01-01 RX ADMIN — Medication 1 APPLICATION(S): at 06:10

## 2021-01-01 RX ADMIN — MORPHINE SULFATE 2 MILLIGRAM(S): 50 CAPSULE, EXTENDED RELEASE ORAL at 10:32

## 2021-01-01 RX ADMIN — MEROPENEM 100 MILLIGRAM(S): 1 INJECTION INTRAVENOUS at 21:37

## 2021-01-01 RX ADMIN — Medication 650 MILLIGRAM(S): at 20:53

## 2021-01-01 RX ADMIN — Medication 1 APPLICATION(S): at 06:24

## 2021-01-01 RX ADMIN — ENOXAPARIN SODIUM 40 MILLIGRAM(S): 100 INJECTION SUBCUTANEOUS at 13:21

## 2021-01-01 RX ADMIN — PANTOPRAZOLE SODIUM 40 MILLIGRAM(S): 20 TABLET, DELAYED RELEASE ORAL at 05:09

## 2021-01-01 RX ADMIN — Medication 2: at 23:32

## 2021-01-01 RX ADMIN — Medication 6: at 00:29

## 2021-01-01 RX ADMIN — Medication 100 MILLIGRAM(S): at 05:39

## 2021-01-01 RX ADMIN — Medication 2 PUFF(S): at 17:06

## 2021-01-01 RX ADMIN — MEROPENEM 100 MILLIGRAM(S): 1 INJECTION INTRAVENOUS at 06:42

## 2021-01-01 RX ADMIN — SODIUM CHLORIDE 75 MILLILITER(S): 9 INJECTION, SOLUTION INTRAVENOUS at 02:51

## 2021-01-01 RX ADMIN — Medication 15 MILLILITER(S): at 13:37

## 2021-01-01 RX ADMIN — ALBUTEROL 2 PUFF(S): 90 AEROSOL, METERED ORAL at 21:00

## 2021-01-01 RX ADMIN — Medication 300 MILLIGRAM(S): at 11:24

## 2021-01-01 RX ADMIN — NYSTATIN CREAM 1 APPLICATION(S): 100000 CREAM TOPICAL at 18:38

## 2021-01-01 RX ADMIN — FENTANYL CITRATE 3 MICROGRAM(S)/KG/HR: 50 INJECTION INTRAVENOUS at 20:08

## 2021-01-01 RX ADMIN — Medication 15 MILLILITER(S): at 14:11

## 2021-01-01 RX ADMIN — Medication 50 MILLILITER(S): at 13:56

## 2021-01-01 RX ADMIN — CHLORHEXIDINE GLUCONATE 5 MILLILITER(S): 213 SOLUTION TOPICAL at 06:33

## 2021-01-01 RX ADMIN — Medication 325 MILLIGRAM(S): at 11:37

## 2021-01-01 RX ADMIN — Medication 1 APPLICATION(S): at 13:18

## 2021-01-01 RX ADMIN — MORPHINE SULFATE 2 MILLIGRAM(S): 50 CAPSULE, EXTENDED RELEASE ORAL at 18:14

## 2021-01-01 RX ADMIN — ALBUTEROL 2 PUFF(S): 90 AEROSOL, METERED ORAL at 14:54

## 2021-01-01 RX ADMIN — Medication 1 APPLICATION(S): at 05:37

## 2021-01-01 RX ADMIN — LINEZOLID 300 MILLIGRAM(S): 600 INJECTION, SOLUTION INTRAVENOUS at 17:07

## 2021-01-01 RX ADMIN — CHLORHEXIDINE GLUCONATE 1 APPLICATION(S): 213 SOLUTION TOPICAL at 05:13

## 2021-01-01 RX ADMIN — INSULIN GLARGINE 20 UNIT(S): 100 INJECTION, SOLUTION SUBCUTANEOUS at 13:47

## 2021-01-01 RX ADMIN — MEROPENEM 100 MILLIGRAM(S): 1 INJECTION INTRAVENOUS at 06:30

## 2021-01-01 RX ADMIN — MEROPENEM 100 MILLIGRAM(S): 1 INJECTION INTRAVENOUS at 04:09

## 2021-01-01 RX ADMIN — GABAPENTIN 100 MILLIGRAM(S): 400 CAPSULE ORAL at 05:35

## 2021-01-01 RX ADMIN — PROPOFOL 3.6 MICROGRAM(S)/KG/MIN: 10 INJECTION, EMULSION INTRAVENOUS at 08:47

## 2021-01-01 RX ADMIN — NYSTATIN CREAM 1 APPLICATION(S): 100000 CREAM TOPICAL at 18:16

## 2021-01-01 RX ADMIN — Medication 1 APPLICATION(S): at 17:59

## 2021-01-01 RX ADMIN — CHLORHEXIDINE GLUCONATE 5 MILLILITER(S): 213 SOLUTION TOPICAL at 17:33

## 2021-01-01 RX ADMIN — QUETIAPINE FUMARATE 50 MILLIGRAM(S): 200 TABLET, FILM COATED ORAL at 19:20

## 2021-01-01 RX ADMIN — MEROPENEM 100 MILLIGRAM(S): 1 INJECTION INTRAVENOUS at 05:18

## 2021-01-01 RX ADMIN — PANTOPRAZOLE SODIUM 40 MILLIGRAM(S): 20 TABLET, DELAYED RELEASE ORAL at 18:31

## 2021-01-01 RX ADMIN — DEXMEDETOMIDINE HYDROCHLORIDE IN 0.9% SODIUM CHLORIDE 3 MICROGRAM(S)/KG/HR: 4 INJECTION INTRAVENOUS at 06:51

## 2021-01-01 RX ADMIN — Medication 2: at 06:33

## 2021-01-01 RX ADMIN — POLYETHYLENE GLYCOL 3350 17 GRAM(S): 17 POWDER, FOR SOLUTION ORAL at 12:23

## 2021-01-01 RX ADMIN — Medication 50 MILLIEQUIVALENT(S): at 05:35

## 2021-01-01 RX ADMIN — Medication 325 MILLIGRAM(S): at 11:17

## 2021-01-01 RX ADMIN — Medication 100 MILLIGRAM(S): at 22:20

## 2021-01-01 RX ADMIN — Medication 125 MILLIGRAM(S): at 00:41

## 2021-01-01 RX ADMIN — QUETIAPINE FUMARATE 50 MILLIGRAM(S): 200 TABLET, FILM COATED ORAL at 06:29

## 2021-01-01 RX ADMIN — CHLORHEXIDINE GLUCONATE 1 APPLICATION(S): 213 SOLUTION TOPICAL at 06:33

## 2021-01-01 RX ADMIN — Medication 50 MILLIEQUIVALENT(S): at 06:49

## 2021-01-01 RX ADMIN — SENNA PLUS 2 TABLET(S): 8.6 TABLET ORAL at 21:50

## 2021-01-01 RX ADMIN — Medication 100 MILLIGRAM(S): at 23:03

## 2021-01-01 RX ADMIN — Medication 2 PUFF(S): at 02:20

## 2021-01-01 RX ADMIN — FAMOTIDINE 20 MILLIGRAM(S): 10 INJECTION INTRAVENOUS at 17:01

## 2021-01-01 RX ADMIN — ENOXAPARIN SODIUM 40 MILLIGRAM(S): 100 INJECTION SUBCUTANEOUS at 11:16

## 2021-01-01 RX ADMIN — LINEZOLID 300 MILLIGRAM(S): 600 INJECTION, SOLUTION INTRAVENOUS at 11:30

## 2021-01-01 RX ADMIN — Medication 300 MILLIGRAM(S): at 12:24

## 2021-01-01 RX ADMIN — ENOXAPARIN SODIUM 40 MILLIGRAM(S): 100 INJECTION SUBCUTANEOUS at 11:22

## 2021-01-01 RX ADMIN — CHLORHEXIDINE GLUCONATE 1 APPLICATION(S): 213 SOLUTION TOPICAL at 05:40

## 2021-01-01 RX ADMIN — ENOXAPARIN SODIUM 40 MILLIGRAM(S): 100 INJECTION SUBCUTANEOUS at 12:23

## 2021-01-01 RX ADMIN — Medication 50 MILLIEQUIVALENT(S): at 17:15

## 2021-01-01 RX ADMIN — DONEPEZIL HYDROCHLORIDE 5 MILLIGRAM(S): 10 TABLET, FILM COATED ORAL at 21:09

## 2021-01-01 RX ADMIN — ONDANSETRON 4 MILLIGRAM(S): 8 TABLET, FILM COATED ORAL at 18:40

## 2021-01-01 RX ADMIN — HEPARIN SODIUM 5000 UNIT(S): 5000 INJECTION INTRAVENOUS; SUBCUTANEOUS at 05:20

## 2021-01-01 RX ADMIN — NYSTATIN CREAM 1 APPLICATION(S): 100000 CREAM TOPICAL at 05:28

## 2021-01-01 RX ADMIN — DEXMEDETOMIDINE HYDROCHLORIDE IN 0.9% SODIUM CHLORIDE 3 MICROGRAM(S)/KG/HR: 4 INJECTION INTRAVENOUS at 06:33

## 2021-01-01 RX ADMIN — Medication 2 MILLIGRAM(S): at 06:54

## 2021-01-01 RX ADMIN — MEROPENEM 100 MILLIGRAM(S): 1 INJECTION INTRAVENOUS at 05:38

## 2021-01-01 RX ADMIN — Medication 1 APPLICATION(S): at 14:07

## 2021-01-01 RX ADMIN — Medication 100 MILLIEQUIVALENT(S): at 06:32

## 2021-01-01 RX ADMIN — Medication 40 MILLIGRAM(S): at 06:51

## 2021-01-01 RX ADMIN — Medication 300 MILLIGRAM(S): at 11:38

## 2021-01-01 RX ADMIN — Medication 2: at 10:18

## 2021-01-01 RX ADMIN — FENTANYL CITRATE 20 MICROGRAM(S): 50 INJECTION INTRAVENOUS at 12:31

## 2021-01-01 RX ADMIN — Medication 2 PUFF(S): at 03:40

## 2021-01-01 RX ADMIN — HEPARIN SODIUM 5000 UNIT(S): 5000 INJECTION INTRAVENOUS; SUBCUTANEOUS at 21:43

## 2021-01-01 RX ADMIN — Medication 100 MILLIGRAM(S): at 05:26

## 2021-01-01 RX ADMIN — Medication 1: at 15:16

## 2021-01-01 RX ADMIN — MEROPENEM 100 MILLIGRAM(S): 1 INJECTION INTRAVENOUS at 05:00

## 2021-01-01 RX ADMIN — Medication 100 MILLIEQUIVALENT(S): at 04:00

## 2021-01-01 RX ADMIN — PANTOPRAZOLE SODIUM 40 MILLIGRAM(S): 20 TABLET, DELAYED RELEASE ORAL at 06:22

## 2021-01-01 RX ADMIN — HEPARIN SODIUM 5000 UNIT(S): 5000 INJECTION INTRAVENOUS; SUBCUTANEOUS at 17:00

## 2021-01-01 RX ADMIN — Medication 1 APPLICATION(S): at 23:00

## 2021-01-01 RX ADMIN — Medication 40 MILLIGRAM(S): at 06:29

## 2021-01-01 RX ADMIN — PANTOPRAZOLE SODIUM 40 MILLIGRAM(S): 20 TABLET, DELAYED RELEASE ORAL at 06:24

## 2021-01-01 RX ADMIN — Medication 40 MILLIGRAM(S): at 18:31

## 2021-01-01 RX ADMIN — Medication 1 APPLICATION(S): at 06:15

## 2021-01-01 RX ADMIN — Medication 1: at 10:00

## 2021-01-01 RX ADMIN — ALBUTEROL 2 PUFF(S): 90 AEROSOL, METERED ORAL at 14:31

## 2021-01-01 RX ADMIN — CHLORHEXIDINE GLUCONATE 1 APPLICATION(S): 213 SOLUTION TOPICAL at 06:07

## 2021-01-01 RX ADMIN — Medication 1 APPLICATION(S): at 12:23

## 2021-01-01 RX ADMIN — Medication 125 MILLIGRAM(S): at 05:38

## 2021-01-01 RX ADMIN — MEROPENEM 100 MILLIGRAM(S): 1 INJECTION INTRAVENOUS at 22:20

## 2021-01-01 RX ADMIN — ALBUTEROL 2 PUFF(S): 90 AEROSOL, METERED ORAL at 21:10

## 2021-01-01 RX ADMIN — PANTOPRAZOLE SODIUM 40 MILLIGRAM(S): 20 TABLET, DELAYED RELEASE ORAL at 05:28

## 2021-01-01 RX ADMIN — CEFEPIME 100 MILLIGRAM(S): 1 INJECTION, POWDER, FOR SOLUTION INTRAMUSCULAR; INTRAVENOUS at 06:18

## 2021-01-01 RX ADMIN — CHLORHEXIDINE GLUCONATE 15 MILLILITER(S): 213 SOLUTION TOPICAL at 06:15

## 2021-01-01 RX ADMIN — GABAPENTIN 100 MILLIGRAM(S): 400 CAPSULE ORAL at 22:30

## 2021-01-01 RX ADMIN — ALBUTEROL 2 PUFF(S): 90 AEROSOL, METERED ORAL at 13:42

## 2021-01-01 RX ADMIN — MEROPENEM 100 MILLIGRAM(S): 1 INJECTION INTRAVENOUS at 04:38

## 2021-01-01 RX ADMIN — DONEPEZIL HYDROCHLORIDE 5 MILLIGRAM(S): 10 TABLET, FILM COATED ORAL at 21:38

## 2021-01-01 RX ADMIN — Medication 100 MILLIGRAM(S): at 22:52

## 2021-01-01 RX ADMIN — Medication 20 MILLIEQUIVALENT(S): at 12:05

## 2021-01-01 RX ADMIN — Medication 100 MILLIGRAM(S): at 16:53

## 2021-01-01 RX ADMIN — Medication 2: at 01:06

## 2021-01-01 RX ADMIN — ALBUTEROL 2 PUFF(S): 90 AEROSOL, METERED ORAL at 12:00

## 2021-01-01 RX ADMIN — CHLORHEXIDINE GLUCONATE 1 APPLICATION(S): 213 SOLUTION TOPICAL at 06:25

## 2021-01-01 RX ADMIN — HEPARIN SODIUM 5000 UNIT(S): 5000 INJECTION INTRAVENOUS; SUBCUTANEOUS at 21:30

## 2021-01-01 RX ADMIN — PANTOPRAZOLE SODIUM 40 MILLIGRAM(S): 20 TABLET, DELAYED RELEASE ORAL at 06:37

## 2021-01-01 RX ADMIN — ALBUTEROL 2 PUFF(S): 90 AEROSOL, METERED ORAL at 11:10

## 2021-01-01 RX ADMIN — NYSTATIN CREAM 1 APPLICATION(S): 100000 CREAM TOPICAL at 05:40

## 2021-01-01 RX ADMIN — Medication 325 MILLIGRAM(S): at 11:48

## 2021-01-01 RX ADMIN — Medication 20 MILLIEQUIVALENT(S): at 13:34

## 2021-01-01 RX ADMIN — PANTOPRAZOLE SODIUM 40 MILLIGRAM(S): 20 TABLET, DELAYED RELEASE ORAL at 05:48

## 2021-01-01 RX ADMIN — Medication 2: at 12:20

## 2021-01-01 RX ADMIN — Medication 100 GRAM(S): at 09:54

## 2021-01-01 RX ADMIN — ALBUTEROL 2 PUFF(S): 90 AEROSOL, METERED ORAL at 03:40

## 2021-01-01 RX ADMIN — NYSTATIN CREAM 1 APPLICATION(S): 100000 CREAM TOPICAL at 05:08

## 2021-01-01 RX ADMIN — Medication 5.57 MICROGRAM(S)/KG/MIN: at 15:13

## 2021-01-01 RX ADMIN — PANTOPRAZOLE SODIUM 40 MILLIGRAM(S): 20 TABLET, DELAYED RELEASE ORAL at 17:26

## 2021-01-01 RX ADMIN — MEROPENEM 100 MILLIGRAM(S): 1 INJECTION INTRAVENOUS at 06:16

## 2021-01-01 RX ADMIN — MORPHINE SULFATE 2 MILLIGRAM(S): 50 CAPSULE, EXTENDED RELEASE ORAL at 19:03

## 2021-01-01 RX ADMIN — CHLORHEXIDINE GLUCONATE 5 MILLILITER(S): 213 SOLUTION TOPICAL at 05:30

## 2021-01-01 RX ADMIN — Medication 1 APPLICATION(S): at 17:02

## 2021-01-01 RX ADMIN — Medication 1: at 18:24

## 2021-01-01 RX ADMIN — CHLORHEXIDINE GLUCONATE 1 APPLICATION(S): 213 SOLUTION TOPICAL at 05:38

## 2021-01-01 RX ADMIN — Medication 2 PUFF(S): at 13:38

## 2021-01-01 RX ADMIN — Medication 300 MILLIGRAM(S): at 12:48

## 2021-01-01 RX ADMIN — Medication 300 MILLIGRAM(S): at 13:47

## 2021-01-01 RX ADMIN — PANTOPRAZOLE SODIUM 40 MILLIGRAM(S): 20 TABLET, DELAYED RELEASE ORAL at 05:12

## 2021-01-01 RX ADMIN — QUETIAPINE FUMARATE 50 MILLIGRAM(S): 200 TABLET, FILM COATED ORAL at 17:57

## 2021-01-01 RX ADMIN — PANTOPRAZOLE SODIUM 40 MILLIGRAM(S): 20 TABLET, DELAYED RELEASE ORAL at 05:10

## 2021-01-01 RX ADMIN — ALBUTEROL 2 PUFF(S): 90 AEROSOL, METERED ORAL at 15:20

## 2021-01-01 RX ADMIN — Medication 125 MILLIGRAM(S): at 11:48

## 2021-01-01 RX ADMIN — GABAPENTIN 100 MILLIGRAM(S): 400 CAPSULE ORAL at 13:32

## 2021-01-01 RX ADMIN — Medication 1 APPLICATION(S): at 18:29

## 2021-01-01 RX ADMIN — INSULIN HUMAN 1 UNIT(S)/HR: 100 INJECTION, SOLUTION SUBCUTANEOUS at 09:35

## 2021-01-01 RX ADMIN — Medication 1: at 14:29

## 2021-01-01 RX ADMIN — Medication 1: at 22:17

## 2021-01-01 RX ADMIN — FENTANYL CITRATE 3 MICROGRAM(S)/KG/HR: 50 INJECTION INTRAVENOUS at 22:26

## 2021-01-01 RX ADMIN — Medication 5 MILLILITER(S): at 00:12

## 2021-01-01 RX ADMIN — HEPARIN SODIUM 5000 UNIT(S): 5000 INJECTION INTRAVENOUS; SUBCUTANEOUS at 05:42

## 2021-01-01 RX ADMIN — Medication 3 MILLIGRAM(S): at 21:30

## 2021-01-01 RX ADMIN — NYSTATIN CREAM 1 APPLICATION(S): 100000 CREAM TOPICAL at 17:10

## 2021-01-01 RX ADMIN — Medication 50 MILLIEQUIVALENT(S): at 05:34

## 2021-01-01 RX ADMIN — ALBUTEROL 2 PUFF(S): 90 AEROSOL, METERED ORAL at 01:58

## 2021-01-01 RX ADMIN — Medication 1 APPLICATION(S): at 11:55

## 2021-01-01 RX ADMIN — PANTOPRAZOLE SODIUM 40 MILLIGRAM(S): 20 TABLET, DELAYED RELEASE ORAL at 18:32

## 2021-01-01 RX ADMIN — PANTOPRAZOLE SODIUM 40 MILLIGRAM(S): 20 TABLET, DELAYED RELEASE ORAL at 06:55

## 2021-01-01 RX ADMIN — GABAPENTIN 100 MILLIGRAM(S): 400 CAPSULE ORAL at 13:17

## 2021-01-01 RX ADMIN — GABAPENTIN 100 MILLIGRAM(S): 400 CAPSULE ORAL at 23:45

## 2021-01-01 RX ADMIN — GABAPENTIN 100 MILLIGRAM(S): 400 CAPSULE ORAL at 13:37

## 2021-01-01 RX ADMIN — FENTANYL CITRATE 3.38 MICROGRAM(S)/KG/HR: 50 INJECTION INTRAVENOUS at 08:45

## 2021-01-01 RX ADMIN — Medication 5.63 MICROGRAM(S)/KG/MIN: at 16:41

## 2021-01-01 RX ADMIN — Medication 1: at 17:03

## 2021-01-01 RX ADMIN — Medication 2 PUFF(S): at 18:42

## 2021-01-01 RX ADMIN — ALBUTEROL 2 PUFF(S): 90 AEROSOL, METERED ORAL at 15:38

## 2021-01-01 RX ADMIN — Medication 1 APPLICATION(S): at 22:05

## 2021-01-01 RX ADMIN — HEPARIN SODIUM 5000 UNIT(S): 5000 INJECTION INTRAVENOUS; SUBCUTANEOUS at 05:33

## 2021-01-01 RX ADMIN — DEXMEDETOMIDINE HYDROCHLORIDE IN 0.9% SODIUM CHLORIDE 3 MICROGRAM(S)/KG/HR: 4 INJECTION INTRAVENOUS at 21:01

## 2021-01-01 RX ADMIN — NYSTATIN CREAM 1 APPLICATION(S): 100000 CREAM TOPICAL at 17:26

## 2021-01-01 RX ADMIN — PANTOPRAZOLE SODIUM 40 MILLIGRAM(S): 20 TABLET, DELAYED RELEASE ORAL at 18:56

## 2021-01-01 RX ADMIN — CEFEPIME 100 MILLIGRAM(S): 1 INJECTION, POWDER, FOR SOLUTION INTRAMUSCULAR; INTRAVENOUS at 05:48

## 2021-01-01 RX ADMIN — DONEPEZIL HYDROCHLORIDE 5 MILLIGRAM(S): 10 TABLET, FILM COATED ORAL at 22:55

## 2021-01-01 RX ADMIN — CHLORHEXIDINE GLUCONATE 5 MILLILITER(S): 213 SOLUTION TOPICAL at 18:00

## 2021-01-01 RX ADMIN — Medication 15 MILLILITER(S): at 12:10

## 2021-01-01 RX ADMIN — Medication 1 APPLICATION(S): at 21:58

## 2021-01-01 RX ADMIN — PANTOPRAZOLE SODIUM 40 MILLIGRAM(S): 20 TABLET, DELAYED RELEASE ORAL at 05:05

## 2021-01-01 RX ADMIN — SODIUM CHLORIDE 1000 MILLILITER(S): 9 INJECTION INTRAMUSCULAR; INTRAVENOUS; SUBCUTANEOUS at 17:30

## 2021-01-01 RX ADMIN — Medication 2 PUFF(S): at 14:44

## 2021-01-01 RX ADMIN — Medication 1 APPLICATION(S): at 15:17

## 2021-01-01 RX ADMIN — Medication 50 MILLIEQUIVALENT(S): at 06:55

## 2021-01-01 RX ADMIN — Medication 300 MILLIGRAM(S): at 12:06

## 2021-01-01 RX ADMIN — Medication 15 MILLILITER(S): at 11:14

## 2021-01-01 RX ADMIN — Medication 50 MILLILITER(S): at 19:00

## 2021-01-01 RX ADMIN — Medication 3 MILLIGRAM(S): at 23:04

## 2021-01-01 RX ADMIN — MORPHINE SULFATE 2 MILLIGRAM(S): 50 CAPSULE, EXTENDED RELEASE ORAL at 11:57

## 2021-01-01 RX ADMIN — Medication 2 PUFF(S): at 09:55

## 2021-01-01 RX ADMIN — INSULIN GLARGINE 20 UNIT(S): 100 INJECTION, SOLUTION SUBCUTANEOUS at 13:41

## 2021-01-01 RX ADMIN — Medication 1: at 07:29

## 2021-01-01 RX ADMIN — SODIUM CHLORIDE 1000 MILLILITER(S): 9 INJECTION, SOLUTION INTRAVENOUS at 03:50

## 2021-01-01 RX ADMIN — CHLORHEXIDINE GLUCONATE 5 MILLILITER(S): 213 SOLUTION TOPICAL at 17:55

## 2021-01-01 RX ADMIN — PANTOPRAZOLE SODIUM 40 MILLIGRAM(S): 20 TABLET, DELAYED RELEASE ORAL at 17:17

## 2021-01-01 RX ADMIN — Medication 325 MILLIGRAM(S): at 11:12

## 2021-01-01 RX ADMIN — Medication 1 APPLICATION(S): at 13:21

## 2021-01-01 RX ADMIN — Medication 15 MILLILITER(S): at 11:45

## 2021-01-01 RX ADMIN — Medication 2: at 13:00

## 2021-01-01 RX ADMIN — Medication 3 MILLIGRAM(S): at 21:39

## 2021-01-01 RX ADMIN — QUETIAPINE FUMARATE 50 MILLIGRAM(S): 200 TABLET, FILM COATED ORAL at 18:53

## 2021-01-01 RX ADMIN — Medication 300 MILLIGRAM(S): at 11:29

## 2021-01-01 RX ADMIN — Medication 2 PUFF(S): at 05:03

## 2021-01-01 RX ADMIN — QUETIAPINE FUMARATE 50 MILLIGRAM(S): 200 TABLET, FILM COATED ORAL at 06:14

## 2021-01-01 RX ADMIN — Medication 1 APPLICATION(S): at 12:34

## 2021-01-01 RX ADMIN — LINEZOLID 300 MILLIGRAM(S): 600 INJECTION, SOLUTION INTRAVENOUS at 17:00

## 2021-01-01 RX ADMIN — NYSTATIN CREAM 1 APPLICATION(S): 100000 CREAM TOPICAL at 17:51

## 2021-01-01 RX ADMIN — Medication 1 APPLICATION(S): at 17:57

## 2021-01-01 RX ADMIN — NYSTATIN CREAM 1 APPLICATION(S): 100000 CREAM TOPICAL at 17:18

## 2021-01-01 RX ADMIN — Medication 15 MILLILITER(S): at 12:27

## 2021-01-01 RX ADMIN — Medication 1 APPLICATION(S): at 06:32

## 2021-01-01 RX ADMIN — Medication 100 MILLIGRAM(S): at 13:32

## 2021-01-01 RX ADMIN — CEFEPIME 100 MILLIGRAM(S): 1 INJECTION, POWDER, FOR SOLUTION INTRAMUSCULAR; INTRAVENOUS at 06:08

## 2021-01-01 RX ADMIN — Medication 10 MILLILITER(S): at 05:59

## 2021-01-01 RX ADMIN — FENTANYL CITRATE 3 MICROGRAM(S)/KG/HR: 50 INJECTION INTRAVENOUS at 14:59

## 2021-01-01 RX ADMIN — HEPARIN SODIUM 5000 UNIT(S): 5000 INJECTION INTRAVENOUS; SUBCUTANEOUS at 14:47

## 2021-01-01 RX ADMIN — Medication 300 MILLIGRAM(S): at 11:52

## 2021-01-01 RX ADMIN — CEFEPIME 100 MILLIGRAM(S): 1 INJECTION, POWDER, FOR SOLUTION INTRAMUSCULAR; INTRAVENOUS at 18:11

## 2021-01-01 RX ADMIN — DEXMEDETOMIDINE HYDROCHLORIDE IN 0.9% SODIUM CHLORIDE 3 MICROGRAM(S)/KG/HR: 4 INJECTION INTRAVENOUS at 06:44

## 2021-01-01 RX ADMIN — ALBUTEROL 2 PUFF(S): 90 AEROSOL, METERED ORAL at 13:45

## 2021-01-01 RX ADMIN — Medication 2: at 07:14

## 2021-01-01 RX ADMIN — Medication 1 APPLICATION(S): at 06:35

## 2021-01-01 RX ADMIN — Medication 0: at 11:18

## 2021-01-01 RX ADMIN — PANTOPRAZOLE SODIUM 40 MILLIGRAM(S): 20 TABLET, DELAYED RELEASE ORAL at 06:51

## 2021-01-01 RX ADMIN — ALBUTEROL 2 PUFF(S): 90 AEROSOL, METERED ORAL at 19:44

## 2021-01-01 RX ADMIN — PANTOPRAZOLE SODIUM 40 MILLIGRAM(S): 20 TABLET, DELAYED RELEASE ORAL at 05:33

## 2021-01-01 RX ADMIN — ALBUTEROL 2 PUFF(S): 90 AEROSOL, METERED ORAL at 13:38

## 2021-01-01 RX ADMIN — Medication 2 MILLIGRAM(S): at 11:37

## 2021-01-01 RX ADMIN — Medication 1 APPLICATION(S): at 21:17

## 2021-01-01 RX ADMIN — PANTOPRAZOLE SODIUM 40 MILLIGRAM(S): 20 TABLET, DELAYED RELEASE ORAL at 05:06

## 2021-01-01 RX ADMIN — LINEZOLID 300 MILLIGRAM(S): 600 INJECTION, SOLUTION INTRAVENOUS at 06:33

## 2021-01-01 RX ADMIN — GABAPENTIN 100 MILLIGRAM(S): 400 CAPSULE ORAL at 21:51

## 2021-01-01 RX ADMIN — CHLORHEXIDINE GLUCONATE 5 MILLILITER(S): 213 SOLUTION TOPICAL at 06:39

## 2021-01-01 RX ADMIN — Medication 1 APPLICATION(S): at 17:36

## 2021-01-01 RX ADMIN — FENTANYL CITRATE 1 PATCH: 50 INJECTION INTRAVENOUS at 17:17

## 2021-01-01 RX ADMIN — CHLORHEXIDINE GLUCONATE 1 APPLICATION(S): 213 SOLUTION TOPICAL at 05:23

## 2021-01-01 RX ADMIN — MEROPENEM 100 MILLIGRAM(S): 1 INJECTION INTRAVENOUS at 11:02

## 2021-01-01 RX ADMIN — DEXMEDETOMIDINE HYDROCHLORIDE IN 0.9% SODIUM CHLORIDE 3.38 MICROGRAM(S)/KG/HR: 4 INJECTION INTRAVENOUS at 16:24

## 2021-01-01 RX ADMIN — FENTANYL CITRATE 1 PATCH: 50 INJECTION INTRAVENOUS at 09:21

## 2021-01-01 RX ADMIN — Medication 1 APPLICATION(S): at 12:38

## 2021-01-01 RX ADMIN — Medication 100 MILLIGRAM(S): at 14:01

## 2021-01-01 RX ADMIN — ENOXAPARIN SODIUM 40 MILLIGRAM(S): 100 INJECTION SUBCUTANEOUS at 13:16

## 2021-01-01 RX ADMIN — MEROPENEM 100 MILLIGRAM(S): 1 INJECTION INTRAVENOUS at 06:51

## 2021-01-01 RX ADMIN — CHLORHEXIDINE GLUCONATE 5 MILLILITER(S): 213 SOLUTION TOPICAL at 17:25

## 2021-01-01 RX ADMIN — Medication 325 MILLIGRAM(S): at 11:22

## 2021-01-01 RX ADMIN — Medication 100 MILLIGRAM(S): at 06:29

## 2021-01-01 RX ADMIN — ALBUTEROL 2 PUFF(S): 90 AEROSOL, METERED ORAL at 14:22

## 2021-01-01 RX ADMIN — CHLORHEXIDINE GLUCONATE 5 MILLILITER(S): 213 SOLUTION TOPICAL at 05:05

## 2021-01-01 RX ADMIN — Medication 50 MILLIEQUIVALENT(S): at 05:28

## 2021-01-01 RX ADMIN — Medication 2: at 12:16

## 2021-01-01 RX ADMIN — Medication 4: at 22:38

## 2021-01-01 RX ADMIN — MEROPENEM 100 MILLIGRAM(S): 1 INJECTION INTRAVENOUS at 17:48

## 2021-01-01 RX ADMIN — Medication 40 MILLIGRAM(S): at 07:35

## 2021-01-01 RX ADMIN — CHLORHEXIDINE GLUCONATE 5 MILLILITER(S): 213 SOLUTION TOPICAL at 17:50

## 2021-01-01 RX ADMIN — Medication 975 MILLIGRAM(S): at 22:27

## 2021-01-01 RX ADMIN — Medication 2 PUFF(S): at 02:52

## 2021-01-01 RX ADMIN — Medication 100 MILLIEQUIVALENT(S): at 09:54

## 2021-01-01 RX ADMIN — ALBUTEROL 2 PUFF(S): 90 AEROSOL, METERED ORAL at 07:55

## 2021-01-01 RX ADMIN — Medication 2 PUFF(S): at 21:00

## 2021-01-01 RX ADMIN — NYSTATIN CREAM 1 APPLICATION(S): 100000 CREAM TOPICAL at 19:06

## 2021-01-01 RX ADMIN — Medication 100 MILLIGRAM(S): at 21:09

## 2021-01-01 RX ADMIN — Medication 1 APPLICATION(S): at 22:39

## 2021-01-01 RX ADMIN — ALBUTEROL 2 PUFF(S): 90 AEROSOL, METERED ORAL at 20:43

## 2021-01-01 RX ADMIN — FAMOTIDINE 20 MILLIGRAM(S): 10 INJECTION INTRAVENOUS at 18:18

## 2021-01-01 RX ADMIN — Medication 1 APPLICATION(S): at 06:41

## 2021-01-01 RX ADMIN — Medication 650 MILLIGRAM(S): at 17:01

## 2021-01-01 RX ADMIN — QUETIAPINE FUMARATE 50 MILLIGRAM(S): 200 TABLET, FILM COATED ORAL at 05:06

## 2021-01-01 RX ADMIN — NYSTATIN CREAM 1 APPLICATION(S): 100000 CREAM TOPICAL at 06:39

## 2021-01-01 RX ADMIN — Medication 2: at 11:19

## 2021-01-01 RX ADMIN — Medication 300 MILLIGRAM(S): at 10:26

## 2021-01-01 RX ADMIN — GABAPENTIN 100 MILLIGRAM(S): 400 CAPSULE ORAL at 05:12

## 2021-01-01 RX ADMIN — Medication 2 PUFF(S): at 17:58

## 2021-01-01 RX ADMIN — VASOPRESSIN 2.4 UNIT(S)/MIN: 20 INJECTION INTRAVENOUS at 21:05

## 2021-01-01 RX ADMIN — Medication 1 APPLICATION(S): at 17:52

## 2021-01-01 RX ADMIN — Medication 250 MILLIGRAM(S): at 05:00

## 2021-01-01 RX ADMIN — Medication 1 APPLICATION(S): at 06:42

## 2021-01-01 RX ADMIN — Medication 50 GRAM(S): at 10:45

## 2021-01-01 RX ADMIN — HEPARIN SODIUM 5000 UNIT(S): 5000 INJECTION INTRAVENOUS; SUBCUTANEOUS at 05:25

## 2021-01-01 RX ADMIN — FAMOTIDINE 20 MILLIGRAM(S): 10 INJECTION INTRAVENOUS at 17:20

## 2021-01-01 RX ADMIN — CHLORHEXIDINE GLUCONATE 5 MILLILITER(S): 213 SOLUTION TOPICAL at 05:27

## 2021-01-01 RX ADMIN — Medication 1: at 06:45

## 2021-01-01 RX ADMIN — Medication 1 TABLET(S): at 12:49

## 2021-01-01 RX ADMIN — Medication 1 APPLICATION(S): at 18:16

## 2021-01-01 RX ADMIN — Medication 2 PUFF(S): at 14:16

## 2021-01-01 RX ADMIN — Medication 125 MILLIGRAM(S): at 19:32

## 2021-01-01 RX ADMIN — Medication 100 MILLIGRAM(S): at 16:03

## 2021-01-01 RX ADMIN — GABAPENTIN 100 MILLIGRAM(S): 400 CAPSULE ORAL at 13:05

## 2021-01-01 RX ADMIN — Medication 1: at 14:06

## 2021-01-01 RX ADMIN — ALBUTEROL 2 PUFF(S): 90 AEROSOL, METERED ORAL at 20:42

## 2021-01-01 RX ADMIN — Medication 1 APPLICATION(S): at 18:03

## 2021-01-01 RX ADMIN — FENTANYL CITRATE 3 MICROGRAM(S)/KG/HR: 50 INJECTION INTRAVENOUS at 02:19

## 2021-01-01 RX ADMIN — Medication 1 APPLICATION(S): at 13:46

## 2021-01-01 RX ADMIN — INSULIN GLARGINE 20 UNIT(S): 100 INJECTION, SOLUTION SUBCUTANEOUS at 12:44

## 2021-01-01 RX ADMIN — FAMOTIDINE 20 MILLIGRAM(S): 10 INJECTION INTRAVENOUS at 17:26

## 2021-01-01 RX ADMIN — Medication 325 MILLIGRAM(S): at 11:00

## 2021-01-01 RX ADMIN — POLYETHYLENE GLYCOL 3350 17 GRAM(S): 17 POWDER, FOR SOLUTION ORAL at 11:42

## 2021-01-01 RX ADMIN — Medication 1 APPLICATION(S): at 22:31

## 2021-01-01 RX ADMIN — HEPARIN SODIUM 5000 UNIT(S): 5000 INJECTION INTRAVENOUS; SUBCUTANEOUS at 21:00

## 2021-01-01 RX ADMIN — Medication 1 APPLICATION(S): at 05:50

## 2021-01-01 RX ADMIN — Medication 100 MILLIEQUIVALENT(S): at 04:32

## 2021-01-01 RX ADMIN — CHLORHEXIDINE GLUCONATE 5 MILLILITER(S): 213 SOLUTION TOPICAL at 17:02

## 2021-01-01 RX ADMIN — CHLORHEXIDINE GLUCONATE 5 MILLILITER(S): 213 SOLUTION TOPICAL at 18:20

## 2021-01-01 RX ADMIN — Medication 10 MILLILITER(S): at 21:31

## 2021-01-01 RX ADMIN — NYSTATIN CREAM 1 APPLICATION(S): 100000 CREAM TOPICAL at 06:24

## 2021-01-01 RX ADMIN — DONEPEZIL HYDROCHLORIDE 5 MILLIGRAM(S): 10 TABLET, FILM COATED ORAL at 21:51

## 2021-01-01 RX ADMIN — LINEZOLID 300 MILLIGRAM(S): 600 INJECTION, SOLUTION INTRAVENOUS at 17:48

## 2021-01-01 RX ADMIN — CEFEPIME 100 MILLIGRAM(S): 1 INJECTION, POWDER, FOR SOLUTION INTRAMUSCULAR; INTRAVENOUS at 17:59

## 2021-01-01 RX ADMIN — Medication 1 APPLICATION(S): at 13:57

## 2021-01-01 RX ADMIN — GABAPENTIN 100 MILLIGRAM(S): 400 CAPSULE ORAL at 06:08

## 2021-01-01 RX ADMIN — Medication 2 PUFF(S): at 09:22

## 2021-01-01 RX ADMIN — CHLORHEXIDINE GLUCONATE 5 MILLILITER(S): 213 SOLUTION TOPICAL at 06:08

## 2021-01-01 RX ADMIN — INSULIN GLARGINE 20 UNIT(S): 100 INJECTION, SOLUTION SUBCUTANEOUS at 11:19

## 2021-01-01 RX ADMIN — Medication 15 MILLILITER(S): at 18:29

## 2021-01-01 RX ADMIN — MEROPENEM 100 MILLIGRAM(S): 1 INJECTION INTRAVENOUS at 13:23

## 2021-01-01 RX ADMIN — Medication 50 MILLILITER(S): at 12:45

## 2021-01-01 RX ADMIN — Medication 1 APPLICATION(S): at 18:01

## 2021-01-01 RX ADMIN — CHLORHEXIDINE GLUCONATE 1 APPLICATION(S): 213 SOLUTION TOPICAL at 05:48

## 2021-01-01 RX ADMIN — DEXMEDETOMIDINE HYDROCHLORIDE IN 0.9% SODIUM CHLORIDE 3 MICROGRAM(S)/KG/HR: 4 INJECTION INTRAVENOUS at 03:00

## 2021-01-01 RX ADMIN — CHLORHEXIDINE GLUCONATE 1 APPLICATION(S): 213 SOLUTION TOPICAL at 05:47

## 2021-01-01 RX ADMIN — Medication 2 PUFF(S): at 13:41

## 2021-01-01 RX ADMIN — CEFEPIME 100 MILLIGRAM(S): 1 INJECTION, POWDER, FOR SOLUTION INTRAMUSCULAR; INTRAVENOUS at 17:18

## 2021-01-01 RX ADMIN — GABAPENTIN 100 MILLIGRAM(S): 400 CAPSULE ORAL at 22:44

## 2021-01-01 RX ADMIN — Medication 2: at 02:00

## 2021-01-01 RX ADMIN — Medication 250 MILLIGRAM(S): at 12:51

## 2021-01-01 RX ADMIN — CEFEPIME 100 MILLIGRAM(S): 1 INJECTION, POWDER, FOR SOLUTION INTRAMUSCULAR; INTRAVENOUS at 05:50

## 2021-01-01 RX ADMIN — Medication 1: at 14:17

## 2021-01-01 RX ADMIN — PANTOPRAZOLE SODIUM 40 MILLIGRAM(S): 20 TABLET, DELAYED RELEASE ORAL at 18:49

## 2021-01-01 RX ADMIN — Medication 2 PUFF(S): at 22:12

## 2021-01-01 RX ADMIN — QUETIAPINE FUMARATE 50 MILLIGRAM(S): 200 TABLET, FILM COATED ORAL at 06:37

## 2021-01-01 RX ADMIN — POLYETHYLENE GLYCOL 3350 17 GRAM(S): 17 POWDER, FOR SOLUTION ORAL at 13:57

## 2021-01-01 RX ADMIN — LINEZOLID 300 MILLIGRAM(S): 600 INJECTION, SOLUTION INTRAVENOUS at 06:51

## 2021-01-01 RX ADMIN — Medication 650 MILLIGRAM(S): at 19:14

## 2021-01-01 RX ADMIN — GABAPENTIN 100 MILLIGRAM(S): 400 CAPSULE ORAL at 05:36

## 2021-01-01 RX ADMIN — Medication 1 APPLICATION(S): at 13:45

## 2021-01-01 RX ADMIN — PANTOPRAZOLE SODIUM 40 MILLIGRAM(S): 20 TABLET, DELAYED RELEASE ORAL at 05:40

## 2021-01-01 RX ADMIN — Medication 100 MILLIGRAM(S): at 12:23

## 2021-01-01 RX ADMIN — Medication 15 MILLILITER(S): at 12:39

## 2021-01-01 RX ADMIN — Medication 50 MILLILITER(S): at 10:11

## 2021-01-01 RX ADMIN — PANTOPRAZOLE SODIUM 40 MILLIGRAM(S): 20 TABLET, DELAYED RELEASE ORAL at 19:20

## 2021-01-01 RX ADMIN — Medication 1 APPLICATION(S): at 14:00

## 2021-01-01 RX ADMIN — Medication 325 MILLIGRAM(S): at 11:49

## 2021-01-01 RX ADMIN — NYSTATIN CREAM 1 APPLICATION(S): 100000 CREAM TOPICAL at 17:57

## 2021-01-01 RX ADMIN — SENNA PLUS 2 TABLET(S): 8.6 TABLET ORAL at 22:37

## 2021-01-01 RX ADMIN — Medication 4: at 07:07

## 2021-01-01 RX ADMIN — PANTOPRAZOLE SODIUM 40 MILLIGRAM(S): 20 TABLET, DELAYED RELEASE ORAL at 18:39

## 2021-01-01 RX ADMIN — Medication 1 APPLICATION(S): at 12:36

## 2021-01-01 RX ADMIN — ALBUTEROL 2 PUFF(S): 90 AEROSOL, METERED ORAL at 17:58

## 2021-01-01 RX ADMIN — Medication 10 MILLILITER(S): at 20:40

## 2021-01-01 RX ADMIN — NYSTATIN CREAM 1 APPLICATION(S): 100000 CREAM TOPICAL at 17:06

## 2021-01-01 RX ADMIN — Medication 1 APPLICATION(S): at 22:03

## 2021-01-01 RX ADMIN — Medication 2: at 12:04

## 2021-01-01 RX ADMIN — QUETIAPINE FUMARATE 50 MILLIGRAM(S): 200 TABLET, FILM COATED ORAL at 05:55

## 2021-01-01 RX ADMIN — Medication 50 MILLIEQUIVALENT(S): at 07:06

## 2021-01-01 RX ADMIN — ALBUTEROL 2 PUFF(S): 90 AEROSOL, METERED ORAL at 02:20

## 2021-01-01 RX ADMIN — SENNA PLUS 2 TABLET(S): 8.6 TABLET ORAL at 23:04

## 2021-01-01 RX ADMIN — CASPOFUNGIN ACETATE 70 MILLIGRAM(S): 7 INJECTION, POWDER, LYOPHILIZED, FOR SOLUTION INTRAVENOUS at 11:21

## 2021-01-01 RX ADMIN — DEXMEDETOMIDINE HYDROCHLORIDE IN 0.9% SODIUM CHLORIDE 3 MICROGRAM(S)/KG/HR: 4 INJECTION INTRAVENOUS at 18:41

## 2021-01-01 RX ADMIN — NYSTATIN CREAM 1 APPLICATION(S): 100000 CREAM TOPICAL at 18:53

## 2021-01-01 RX ADMIN — Medication 100 MILLIGRAM(S): at 05:22

## 2021-01-01 RX ADMIN — Medication 50 MILLIEQUIVALENT(S): at 10:00

## 2021-01-01 RX ADMIN — Medication 650 MILLIGRAM(S): at 19:55

## 2021-01-01 RX ADMIN — Medication 15 MILLILITER(S): at 11:29

## 2021-01-01 RX ADMIN — Medication 300 MILLIGRAM(S): at 11:37

## 2021-01-01 RX ADMIN — Medication 100 MILLIGRAM(S): at 13:51

## 2021-01-01 RX ADMIN — FENTANYL CITRATE 50 MICROGRAM(S): 50 INJECTION INTRAVENOUS at 04:45

## 2021-01-01 RX ADMIN — Medication 1 APPLICATION(S): at 16:28

## 2021-01-01 RX ADMIN — CASPOFUNGIN ACETATE 260 MILLIGRAM(S): 7 INJECTION, POWDER, LYOPHILIZED, FOR SOLUTION INTRAVENOUS at 11:21

## 2021-01-01 RX ADMIN — CEFEPIME 100 MILLIGRAM(S): 1 INJECTION, POWDER, FOR SOLUTION INTRAMUSCULAR; INTRAVENOUS at 05:04

## 2021-01-01 RX ADMIN — ALBUTEROL 2 PUFF(S): 90 AEROSOL, METERED ORAL at 19:46

## 2021-01-01 RX ADMIN — CASPOFUNGIN ACETATE 260 MILLIGRAM(S): 7 INJECTION, POWDER, LYOPHILIZED, FOR SOLUTION INTRAVENOUS at 10:16

## 2021-01-01 RX ADMIN — ALBUTEROL 2 PUFF(S): 90 AEROSOL, METERED ORAL at 20:49

## 2021-01-01 RX ADMIN — PANTOPRAZOLE SODIUM 40 MILLIGRAM(S): 20 TABLET, DELAYED RELEASE ORAL at 06:13

## 2021-01-01 RX ADMIN — LINEZOLID 300 MILLIGRAM(S): 600 INJECTION, SOLUTION INTRAVENOUS at 05:25

## 2021-01-01 RX ADMIN — DONEPEZIL HYDROCHLORIDE 5 MILLIGRAM(S): 10 TABLET, FILM COATED ORAL at 22:00

## 2021-01-01 RX ADMIN — Medication 125 MILLIGRAM(S): at 05:27

## 2021-01-01 RX ADMIN — NYSTATIN CREAM 1 APPLICATION(S): 100000 CREAM TOPICAL at 06:36

## 2021-01-01 RX ADMIN — Medication 100 MILLIGRAM(S): at 05:49

## 2021-01-01 RX ADMIN — Medication 2 PUFF(S): at 08:13

## 2021-01-01 RX ADMIN — NYSTATIN CREAM 1 APPLICATION(S): 100000 CREAM TOPICAL at 05:39

## 2021-01-01 RX ADMIN — Medication 1 APPLICATION(S): at 11:27

## 2021-01-01 RX ADMIN — Medication 2 PUFF(S): at 20:51

## 2021-01-01 RX ADMIN — Medication 1 APPLICATION(S): at 11:17

## 2021-01-01 RX ADMIN — Medication 2 PUFF(S): at 21:01

## 2021-01-01 RX ADMIN — Medication 50 GRAM(S): at 16:45

## 2021-01-01 RX ADMIN — Medication 1: at 10:46

## 2021-01-01 RX ADMIN — GABAPENTIN 100 MILLIGRAM(S): 400 CAPSULE ORAL at 13:47

## 2021-01-01 RX ADMIN — Medication 500 MILLILITER(S): at 15:51

## 2021-01-01 RX ADMIN — Medication 2 PUFF(S): at 14:41

## 2021-01-01 RX ADMIN — Medication 2 MILLIGRAM(S): at 22:40

## 2021-01-01 RX ADMIN — SODIUM CHLORIDE 50 MILLILITER(S): 9 INJECTION, SOLUTION INTRAVENOUS at 11:00

## 2021-01-01 RX ADMIN — ENOXAPARIN SODIUM 40 MILLIGRAM(S): 100 INJECTION SUBCUTANEOUS at 11:42

## 2021-01-01 RX ADMIN — GABAPENTIN 100 MILLIGRAM(S): 400 CAPSULE ORAL at 22:54

## 2021-01-01 RX ADMIN — Medication 1 APPLICATION(S): at 17:51

## 2021-01-01 RX ADMIN — CEFEPIME 100 MILLIGRAM(S): 1 INJECTION, POWDER, FOR SOLUTION INTRAMUSCULAR; INTRAVENOUS at 06:40

## 2021-01-01 RX ADMIN — Medication 3: at 01:51

## 2021-01-01 RX ADMIN — Medication 5.63 MICROGRAM(S)/KG/MIN: at 05:41

## 2021-01-01 RX ADMIN — PANTOPRAZOLE SODIUM 40 MILLIGRAM(S): 20 TABLET, DELAYED RELEASE ORAL at 05:35

## 2021-01-01 RX ADMIN — Medication 2: at 06:57

## 2021-01-01 RX ADMIN — VECURONIUM BROMIDE 5 MILLIGRAM(S): 20 INJECTION, POWDER, FOR SOLUTION INTRAVENOUS at 08:45

## 2021-01-01 RX ADMIN — CISATRACURIUM BESYLATE 10.7 MICROGRAM(S)/KG/MIN: 2 INJECTION INTRAVENOUS at 10:11

## 2021-01-01 RX ADMIN — ALBUTEROL 2 PUFF(S): 90 AEROSOL, METERED ORAL at 08:27

## 2021-01-01 RX ADMIN — FENTANYL CITRATE 3 MICROGRAM(S)/KG/HR: 50 INJECTION INTRAVENOUS at 16:28

## 2021-01-01 RX ADMIN — PANTOPRAZOLE SODIUM 40 MILLIGRAM(S): 20 TABLET, DELAYED RELEASE ORAL at 17:56

## 2021-01-01 RX ADMIN — Medication 1 APPLICATION(S): at 03:31

## 2021-01-01 RX ADMIN — NYSTATIN CREAM 1 APPLICATION(S): 100000 CREAM TOPICAL at 05:37

## 2021-01-01 RX ADMIN — ALBUTEROL 2 PUFF(S): 90 AEROSOL, METERED ORAL at 08:39

## 2021-01-01 RX ADMIN — Medication 100 MILLIGRAM(S): at 05:37

## 2021-01-01 RX ADMIN — Medication 2 PUFF(S): at 08:21

## 2021-01-01 RX ADMIN — Medication 2 PUFF(S): at 12:02

## 2021-01-01 RX ADMIN — Medication 10 MILLILITER(S): at 17:00

## 2021-01-01 RX ADMIN — Medication 125 MILLIGRAM(S): at 12:48

## 2021-01-01 RX ADMIN — Medication 2 MILLIGRAM(S): at 14:20

## 2021-01-01 RX ADMIN — Medication 650 MILLIGRAM(S): at 21:31

## 2021-01-01 RX ADMIN — Medication 20 MILLIEQUIVALENT(S): at 09:20

## 2021-01-01 RX ADMIN — Medication 3: at 21:18

## 2021-01-01 RX ADMIN — FENTANYL CITRATE 50 MICROGRAM(S): 50 INJECTION INTRAVENOUS at 00:45

## 2021-01-01 RX ADMIN — CHLORHEXIDINE GLUCONATE 1 APPLICATION(S): 213 SOLUTION TOPICAL at 06:39

## 2021-01-01 RX ADMIN — MORPHINE SULFATE 4 MILLIGRAM(S): 50 CAPSULE, EXTENDED RELEASE ORAL at 08:14

## 2021-01-01 RX ADMIN — NYSTATIN CREAM 1 APPLICATION(S): 100000 CREAM TOPICAL at 05:36

## 2021-01-01 RX ADMIN — Medication 1 APPLICATION(S): at 14:11

## 2021-01-01 RX ADMIN — Medication 2: at 18:14

## 2021-01-01 RX ADMIN — Medication 300 MILLIGRAM(S): at 12:54

## 2021-01-01 RX ADMIN — ALBUTEROL 2 PUFF(S): 90 AEROSOL, METERED ORAL at 13:46

## 2021-01-01 RX ADMIN — CHLORHEXIDINE GLUCONATE 1 APPLICATION(S): 213 SOLUTION TOPICAL at 06:37

## 2021-01-01 RX ADMIN — Medication 50 MILLIEQUIVALENT(S): at 05:43

## 2021-01-01 RX ADMIN — CEFTRIAXONE 1000 MILLIGRAM(S): 500 INJECTION, POWDER, FOR SOLUTION INTRAMUSCULAR; INTRAVENOUS at 02:13

## 2021-01-01 RX ADMIN — ALBUTEROL 2 PUFF(S): 90 AEROSOL, METERED ORAL at 07:26

## 2021-01-01 RX ADMIN — Medication 1 APPLICATION(S): at 06:33

## 2021-01-01 RX ADMIN — Medication 100 MILLIEQUIVALENT(S): at 09:36

## 2021-01-01 RX ADMIN — GABAPENTIN 100 MILLIGRAM(S): 400 CAPSULE ORAL at 21:14

## 2021-01-01 RX ADMIN — MEROPENEM 100 MILLIGRAM(S): 1 INJECTION INTRAVENOUS at 13:22

## 2021-01-01 RX ADMIN — ALBUTEROL 2 PUFF(S): 90 AEROSOL, METERED ORAL at 02:55

## 2021-01-01 RX ADMIN — LINEZOLID 300 MILLIGRAM(S): 600 INJECTION, SOLUTION INTRAVENOUS at 05:34

## 2021-01-01 RX ADMIN — LINEZOLID 300 MILLIGRAM(S): 600 INJECTION, SOLUTION INTRAVENOUS at 21:13

## 2021-01-01 RX ADMIN — Medication 1 APPLICATION(S): at 13:11

## 2021-01-01 RX ADMIN — PANTOPRAZOLE SODIUM 40 MILLIGRAM(S): 20 TABLET, DELAYED RELEASE ORAL at 17:18

## 2021-01-01 RX ADMIN — Medication 1 APPLICATION(S): at 12:00

## 2021-01-01 RX ADMIN — Medication 1 APPLICATION(S): at 13:22

## 2021-01-01 RX ADMIN — Medication 1 APPLICATION(S): at 18:56

## 2021-01-01 RX ADMIN — QUETIAPINE FUMARATE 50 MILLIGRAM(S): 200 TABLET, FILM COATED ORAL at 06:22

## 2021-01-01 RX ADMIN — Medication 50 MILLIEQUIVALENT(S): at 06:09

## 2021-01-01 RX ADMIN — Medication 1 APPLICATION(S): at 13:55

## 2021-01-01 RX ADMIN — Medication 2: at 02:16

## 2021-01-01 RX ADMIN — Medication 100 MILLIGRAM(S): at 22:15

## 2021-01-01 RX ADMIN — Medication 100 MILLIGRAM(S): at 13:38

## 2021-01-01 RX ADMIN — PANTOPRAZOLE SODIUM 40 MILLIGRAM(S): 20 TABLET, DELAYED RELEASE ORAL at 05:55

## 2021-01-01 RX ADMIN — ALBUTEROL 2 PUFF(S): 90 AEROSOL, METERED ORAL at 14:14

## 2021-01-01 RX ADMIN — Medication 3: at 10:07

## 2021-01-01 RX ADMIN — PHENYLEPHRINE HYDROCHLORIDE 11.1 MICROGRAM(S)/KG/MIN: 10 INJECTION INTRAVENOUS at 13:36

## 2021-01-01 RX ADMIN — Medication 15 MILLILITER(S): at 12:34

## 2021-01-01 RX ADMIN — QUETIAPINE FUMARATE 50 MILLIGRAM(S): 200 TABLET, FILM COATED ORAL at 06:49

## 2021-01-01 RX ADMIN — ALBUTEROL 2 PUFF(S): 90 AEROSOL, METERED ORAL at 09:54

## 2021-01-01 RX ADMIN — Medication 125 MILLIGRAM(S): at 00:40

## 2021-01-01 RX ADMIN — CHLORHEXIDINE GLUCONATE 1 APPLICATION(S): 213 SOLUTION TOPICAL at 21:34

## 2021-01-01 RX ADMIN — CEFEPIME 100 MILLIGRAM(S): 1 INJECTION, POWDER, FOR SOLUTION INTRAMUSCULAR; INTRAVENOUS at 06:27

## 2021-01-01 RX ADMIN — Medication 1 APPLICATION(S): at 11:21

## 2021-01-01 RX ADMIN — SODIUM CHLORIDE 2000 MILLILITER(S): 9 INJECTION, SOLUTION INTRAVENOUS at 02:01

## 2021-01-01 RX ADMIN — Medication 1 APPLICATION(S): at 21:39

## 2021-01-01 RX ADMIN — Medication 2 PUFF(S): at 15:20

## 2021-01-01 RX ADMIN — Medication 2 PUFF(S): at 17:55

## 2021-01-01 RX ADMIN — Medication 50 MILLIEQUIVALENT(S): at 07:08

## 2021-01-01 RX ADMIN — Medication 2 PUFF(S): at 20:42

## 2021-01-01 RX ADMIN — IRON SUCROSE 110 MILLIGRAM(S): 20 INJECTION, SOLUTION INTRAVENOUS at 11:55

## 2021-01-01 RX ADMIN — Medication 1 APPLICATION(S): at 20:29

## 2021-01-01 RX ADMIN — Medication 15 MILLILITER(S): at 15:07

## 2021-01-01 RX ADMIN — Medication 300 MILLIGRAM(S): at 17:00

## 2021-01-01 RX ADMIN — Medication 10 MILLILITER(S): at 12:43

## 2021-01-01 RX ADMIN — CHLORHEXIDINE GLUCONATE 5 MILLILITER(S): 213 SOLUTION TOPICAL at 17:08

## 2021-01-01 RX ADMIN — Medication 650 MILLIGRAM(S): at 03:49

## 2021-01-01 RX ADMIN — NYSTATIN CREAM 1 APPLICATION(S): 100000 CREAM TOPICAL at 05:33

## 2021-01-01 RX ADMIN — CHLORHEXIDINE GLUCONATE 5 MILLILITER(S): 213 SOLUTION TOPICAL at 05:46

## 2021-01-01 RX ADMIN — CHLORHEXIDINE GLUCONATE 5 MILLILITER(S): 213 SOLUTION TOPICAL at 17:11

## 2021-01-01 RX ADMIN — Medication 100 MILLIGRAM(S): at 21:50

## 2021-01-01 RX ADMIN — MEROPENEM 100 MILLIGRAM(S): 1 INJECTION INTRAVENOUS at 23:15

## 2021-01-01 RX ADMIN — Medication 1 TABLET(S): at 13:17

## 2021-01-01 RX ADMIN — CHLORHEXIDINE GLUCONATE 5 MILLILITER(S): 213 SOLUTION TOPICAL at 05:39

## 2021-01-01 RX ADMIN — Medication 1 APPLICATION(S): at 12:49

## 2021-01-01 RX ADMIN — Medication 2: at 18:02

## 2021-01-01 RX ADMIN — CHLORHEXIDINE GLUCONATE 1 APPLICATION(S): 213 SOLUTION TOPICAL at 06:42

## 2021-01-01 RX ADMIN — Medication 2: at 06:25

## 2021-01-01 RX ADMIN — Medication 1 APPLICATION(S): at 21:40

## 2021-01-01 RX ADMIN — PANTOPRAZOLE SODIUM 40 MILLIGRAM(S): 20 TABLET, DELAYED RELEASE ORAL at 05:21

## 2021-01-01 RX ADMIN — Medication 2 PUFF(S): at 14:33

## 2021-01-01 RX ADMIN — Medication 15 MILLILITER(S): at 11:32

## 2021-01-01 RX ADMIN — MIDAZOLAM HYDROCHLORIDE 2 MILLIGRAM(S): 1 INJECTION, SOLUTION INTRAMUSCULAR; INTRAVENOUS at 07:40

## 2021-01-01 RX ADMIN — MEROPENEM 100 MILLIGRAM(S): 1 INJECTION INTRAVENOUS at 17:38

## 2021-01-01 RX ADMIN — Medication 50 MILLIEQUIVALENT(S): at 09:00

## 2021-01-01 RX ADMIN — Medication 1 APPLICATION(S): at 06:13

## 2021-01-01 RX ADMIN — Medication 2 PUFF(S): at 09:02

## 2021-01-01 RX ADMIN — Medication 2 PUFF(S): at 02:43

## 2021-01-01 RX ADMIN — CASPOFUNGIN ACETATE 260 MILLIGRAM(S): 7 INJECTION, POWDER, LYOPHILIZED, FOR SOLUTION INTRAVENOUS at 09:08

## 2021-01-01 RX ADMIN — Medication 1: at 03:04

## 2021-01-01 RX ADMIN — Medication 1 APPLICATION(S): at 22:01

## 2021-01-01 RX ADMIN — Medication 15 MILLILITER(S): at 18:07

## 2021-01-01 RX ADMIN — Medication 50 MILLILITER(S): at 00:31

## 2021-01-01 RX ADMIN — GABAPENTIN 100 MILLIGRAM(S): 400 CAPSULE ORAL at 22:03

## 2021-01-01 RX ADMIN — Medication 325 MILLIGRAM(S): at 12:23

## 2021-01-01 RX ADMIN — Medication 2 PUFF(S): at 15:31

## 2021-01-01 RX ADMIN — QUETIAPINE FUMARATE 50 MILLIGRAM(S): 200 TABLET, FILM COATED ORAL at 18:49

## 2021-01-01 RX ADMIN — Medication 650 MILLIGRAM(S): at 18:59

## 2021-01-01 RX ADMIN — ENOXAPARIN SODIUM 40 MILLIGRAM(S): 100 INJECTION SUBCUTANEOUS at 11:01

## 2021-01-01 RX ADMIN — CHLORHEXIDINE GLUCONATE 1 APPLICATION(S): 213 SOLUTION TOPICAL at 06:38

## 2021-01-01 RX ADMIN — CEFEPIME 100 MILLIGRAM(S): 1 INJECTION, POWDER, FOR SOLUTION INTRAMUSCULAR; INTRAVENOUS at 17:08

## 2021-01-01 RX ADMIN — Medication 2 PUFF(S): at 02:21

## 2021-01-01 RX ADMIN — QUETIAPINE FUMARATE 50 MILLIGRAM(S): 200 TABLET, FILM COATED ORAL at 17:50

## 2021-01-01 RX ADMIN — PROPOFOL 3.6 MICROGRAM(S)/KG/MIN: 10 INJECTION, EMULSION INTRAVENOUS at 07:11

## 2021-01-01 RX ADMIN — Medication 325 MILLIGRAM(S): at 13:01

## 2021-01-01 RX ADMIN — ALBUTEROL 2 PUFF(S): 90 AEROSOL, METERED ORAL at 13:12

## 2021-01-01 RX ADMIN — PANTOPRAZOLE SODIUM 40 MILLIGRAM(S): 20 TABLET, DELAYED RELEASE ORAL at 17:38

## 2021-01-01 RX ADMIN — DEXMEDETOMIDINE HYDROCHLORIDE IN 0.9% SODIUM CHLORIDE 3 MICROGRAM(S)/KG/HR: 4 INJECTION INTRAVENOUS at 06:12

## 2021-01-01 RX ADMIN — ALBUTEROL 2 PUFF(S): 90 AEROSOL, METERED ORAL at 13:34

## 2021-01-01 RX ADMIN — MEROPENEM 100 MILLIGRAM(S): 1 INJECTION INTRAVENOUS at 05:56

## 2021-01-01 RX ADMIN — MORPHINE SULFATE 2 MILLIGRAM(S): 50 CAPSULE, EXTENDED RELEASE ORAL at 10:12

## 2021-01-01 RX ADMIN — CHLORHEXIDINE GLUCONATE 5 MILLILITER(S): 213 SOLUTION TOPICAL at 06:01

## 2021-01-01 RX ADMIN — CASPOFUNGIN ACETATE 260 MILLIGRAM(S): 7 INJECTION, POWDER, LYOPHILIZED, FOR SOLUTION INTRAVENOUS at 13:01

## 2021-01-01 RX ADMIN — GABAPENTIN 100 MILLIGRAM(S): 400 CAPSULE ORAL at 21:42

## 2021-01-01 RX ADMIN — ALBUTEROL 2 PUFF(S): 90 AEROSOL, METERED ORAL at 02:59

## 2021-01-01 RX ADMIN — NYSTATIN CREAM 1 APPLICATION(S): 100000 CREAM TOPICAL at 18:52

## 2021-01-01 RX ADMIN — Medication 2: at 18:23

## 2021-01-01 RX ADMIN — Medication 2 PUFF(S): at 07:29

## 2021-01-01 RX ADMIN — Medication 100 MILLIGRAM(S): at 14:59

## 2021-01-01 RX ADMIN — MEROPENEM 100 MILLIGRAM(S): 1 INJECTION INTRAVENOUS at 06:05

## 2021-01-01 RX ADMIN — Medication 1 APPLICATION(S): at 11:36

## 2021-01-01 RX ADMIN — Medication 100 MILLIGRAM(S): at 22:28

## 2021-01-01 RX ADMIN — MORPHINE SULFATE 2 MILLIGRAM(S): 50 CAPSULE, EXTENDED RELEASE ORAL at 12:55

## 2021-01-01 RX ADMIN — LINEZOLID 300 MILLIGRAM(S): 600 INJECTION, SOLUTION INTRAVENOUS at 18:02

## 2021-01-01 RX ADMIN — NYSTATIN CREAM 1 APPLICATION(S): 100000 CREAM TOPICAL at 18:28

## 2021-01-01 RX ADMIN — Medication 300 MILLIGRAM(S): at 12:38

## 2021-01-01 RX ADMIN — FAMOTIDINE 20 MILLIGRAM(S): 10 INJECTION INTRAVENOUS at 05:49

## 2021-01-01 RX ADMIN — CHLORHEXIDINE GLUCONATE 15 MILLILITER(S): 213 SOLUTION TOPICAL at 18:42

## 2021-01-01 RX ADMIN — Medication 40 MILLIGRAM(S): at 08:19

## 2021-01-01 RX ADMIN — Medication 50 MILLIEQUIVALENT(S): at 06:24

## 2021-01-01 RX ADMIN — Medication 2 PUFF(S): at 19:44

## 2021-01-01 RX ADMIN — CHLORHEXIDINE GLUCONATE 5 MILLILITER(S): 213 SOLUTION TOPICAL at 18:10

## 2021-01-01 RX ADMIN — Medication 2 PUFF(S): at 08:54

## 2021-01-01 RX ADMIN — ALBUTEROL 2 PUFF(S): 90 AEROSOL, METERED ORAL at 02:52

## 2021-01-01 RX ADMIN — DONEPEZIL HYDROCHLORIDE 5 MILLIGRAM(S): 10 TABLET, FILM COATED ORAL at 21:14

## 2021-01-01 RX ADMIN — Medication 2: at 06:43

## 2021-01-01 RX ADMIN — Medication 125 MILLIGRAM(S): at 06:10

## 2021-01-01 RX ADMIN — Medication 2: at 17:26

## 2021-01-01 RX ADMIN — Medication 1 APPLICATION(S): at 18:15

## 2021-01-01 RX ADMIN — LINEZOLID 300 MILLIGRAM(S): 600 INJECTION, SOLUTION INTRAVENOUS at 06:38

## 2021-01-01 RX ADMIN — Medication 2 MILLIGRAM(S): at 10:27

## 2021-01-01 RX ADMIN — PHENYLEPHRINE HYDROCHLORIDE 11.1 MICROGRAM(S)/KG/MIN: 10 INJECTION INTRAVENOUS at 07:06

## 2021-01-01 RX ADMIN — PROPOFOL 3.6 MICROGRAM(S)/KG/MIN: 10 INJECTION, EMULSION INTRAVENOUS at 18:45

## 2021-01-01 RX ADMIN — Medication 2 PUFF(S): at 08:45

## 2021-01-01 RX ADMIN — Medication 2: at 13:43

## 2021-01-01 RX ADMIN — CEFEPIME 100 MILLIGRAM(S): 1 INJECTION, POWDER, FOR SOLUTION INTRAMUSCULAR; INTRAVENOUS at 17:01

## 2021-01-01 RX ADMIN — Medication 2 MILLIGRAM(S): at 19:16

## 2021-01-01 RX ADMIN — Medication 1 APPLICATION(S): at 15:51

## 2021-01-01 RX ADMIN — Medication 2: at 21:39

## 2021-01-01 RX ADMIN — Medication 1 APPLICATION(S): at 21:13

## 2021-01-01 RX ADMIN — NYSTATIN CREAM 1 APPLICATION(S): 100000 CREAM TOPICAL at 17:38

## 2021-01-01 RX ADMIN — QUETIAPINE FUMARATE 50 MILLIGRAM(S): 200 TABLET, FILM COATED ORAL at 18:21

## 2021-01-01 RX ADMIN — FENTANYL CITRATE 20 MICROGRAM(S): 50 INJECTION INTRAVENOUS at 12:37

## 2021-01-01 RX ADMIN — Medication 2: at 11:38

## 2021-01-01 RX ADMIN — MORPHINE SULFATE 2 MILLIGRAM(S): 50 CAPSULE, EXTENDED RELEASE ORAL at 19:15

## 2021-01-01 RX ADMIN — MEROPENEM 100 MILLIGRAM(S): 1 INJECTION INTRAVENOUS at 14:01

## 2021-01-01 RX ADMIN — ENOXAPARIN SODIUM 40 MILLIGRAM(S): 100 INJECTION SUBCUTANEOUS at 12:44

## 2021-01-01 RX ADMIN — PANTOPRAZOLE SODIUM 40 MILLIGRAM(S): 20 TABLET, DELAYED RELEASE ORAL at 06:34

## 2021-01-01 RX ADMIN — POLYETHYLENE GLYCOL 3350 17 GRAM(S): 17 POWDER, FOR SOLUTION ORAL at 11:17

## 2021-01-01 RX ADMIN — CHLORHEXIDINE GLUCONATE 1 APPLICATION(S): 213 SOLUTION TOPICAL at 05:18

## 2021-01-01 RX ADMIN — Medication 1 APPLICATION(S): at 17:07

## 2021-01-01 RX ADMIN — CHLORHEXIDINE GLUCONATE 5 MILLILITER(S): 213 SOLUTION TOPICAL at 05:49

## 2021-01-01 RX ADMIN — QUETIAPINE FUMARATE 50 MILLIGRAM(S): 200 TABLET, FILM COATED ORAL at 18:31

## 2021-01-01 RX ADMIN — Medication 3: at 18:45

## 2021-01-01 RX ADMIN — Medication 1 APPLICATION(S): at 18:49

## 2021-01-01 RX ADMIN — Medication 1 APPLICATION(S): at 05:29

## 2021-01-01 RX ADMIN — Medication 15 MILLILITER(S): at 11:38

## 2021-01-01 RX ADMIN — Medication 1 APPLICATION(S): at 12:54

## 2021-01-01 RX ADMIN — SODIUM CHLORIDE 50 MILLILITER(S): 9 INJECTION, SOLUTION INTRAVENOUS at 01:41

## 2021-01-01 RX ADMIN — Medication 50 MILLIEQUIVALENT(S): at 05:12

## 2021-01-01 RX ADMIN — Medication 15 MILLILITER(S): at 11:07

## 2021-01-01 RX ADMIN — Medication 1 APPLICATION(S): at 21:54

## 2021-01-01 RX ADMIN — LINEZOLID 300 MILLIGRAM(S): 600 INJECTION, SOLUTION INTRAVENOUS at 06:02

## 2021-01-01 RX ADMIN — SODIUM CHLORIDE 75 MILLILITER(S): 9 INJECTION, SOLUTION INTRAVENOUS at 08:20

## 2021-01-01 RX ADMIN — GABAPENTIN 100 MILLIGRAM(S): 400 CAPSULE ORAL at 06:39

## 2021-01-01 RX ADMIN — ALBUTEROL 2 PUFF(S): 90 AEROSOL, METERED ORAL at 20:16

## 2021-01-01 RX ADMIN — ALBUTEROL 2 PUFF(S): 90 AEROSOL, METERED ORAL at 14:40

## 2021-01-01 RX ADMIN — Medication 100 MILLIGRAM(S): at 21:32

## 2021-01-01 RX ADMIN — Medication 15 MILLILITER(S): at 11:47

## 2021-01-01 RX ADMIN — FENTANYL CITRATE 3.38 MICROGRAM(S)/KG/HR: 50 INJECTION INTRAVENOUS at 12:06

## 2021-01-01 RX ADMIN — LINEZOLID 300 MILLIGRAM(S): 600 INJECTION, SOLUTION INTRAVENOUS at 05:22

## 2021-01-01 RX ADMIN — FAMOTIDINE 20 MILLIGRAM(S): 10 INJECTION INTRAVENOUS at 17:33

## 2021-01-01 RX ADMIN — FENTANYL CITRATE 50 MICROGRAM(S): 50 INJECTION INTRAVENOUS at 20:30

## 2021-01-01 RX ADMIN — CASPOFUNGIN ACETATE 260 MILLIGRAM(S): 7 INJECTION, POWDER, LYOPHILIZED, FOR SOLUTION INTRAVENOUS at 09:42

## 2021-01-01 RX ADMIN — Medication 125 MILLIGRAM(S): at 12:35

## 2021-01-01 RX ADMIN — CHLORHEXIDINE GLUCONATE 15 MILLILITER(S): 213 SOLUTION TOPICAL at 18:38

## 2021-01-01 RX ADMIN — Medication 2: at 05:29

## 2021-01-01 RX ADMIN — CEFEPIME 100 MILLIGRAM(S): 1 INJECTION, POWDER, FOR SOLUTION INTRAMUSCULAR; INTRAVENOUS at 07:48

## 2021-01-01 RX ADMIN — CEFEPIME 100 MILLIGRAM(S): 1 INJECTION, POWDER, FOR SOLUTION INTRAMUSCULAR; INTRAVENOUS at 05:54

## 2021-01-01 RX ADMIN — SENNA PLUS 2 TABLET(S): 8.6 TABLET ORAL at 21:00

## 2021-01-01 RX ADMIN — Medication 40 MILLIEQUIVALENT(S): at 14:57

## 2021-01-01 RX ADMIN — HEPARIN SODIUM 5000 UNIT(S): 5000 INJECTION INTRAVENOUS; SUBCUTANEOUS at 01:48

## 2021-01-01 RX ADMIN — ALBUTEROL 2 PUFF(S): 90 AEROSOL, METERED ORAL at 15:53

## 2021-01-01 RX ADMIN — Medication 325 MILLIGRAM(S): at 11:42

## 2021-01-01 RX ADMIN — Medication 1 APPLICATION(S): at 05:11

## 2021-01-01 RX ADMIN — Medication 300 MILLIGRAM(S): at 11:49

## 2021-01-01 RX ADMIN — Medication 2: at 21:44

## 2021-01-01 RX ADMIN — ALBUTEROL 2 PUFF(S): 90 AEROSOL, METERED ORAL at 14:43

## 2021-01-01 RX ADMIN — Medication 1: at 10:05

## 2021-01-01 RX ADMIN — GABAPENTIN 100 MILLIGRAM(S): 400 CAPSULE ORAL at 22:05

## 2021-01-01 RX ADMIN — Medication 1 APPLICATION(S): at 06:31

## 2021-01-01 RX ADMIN — Medication 1 APPLICATION(S): at 22:00

## 2021-01-01 RX ADMIN — Medication 300 MILLIGRAM(S): at 12:33

## 2021-01-01 RX ADMIN — MORPHINE SULFATE 2 MILLIGRAM(S): 50 CAPSULE, EXTENDED RELEASE ORAL at 07:26

## 2021-01-01 RX ADMIN — Medication 2 MILLIGRAM(S): at 03:03

## 2021-01-01 RX ADMIN — Medication 2 PUFF(S): at 20:28

## 2021-01-01 RX ADMIN — Medication 1 APPLICATION(S): at 06:36

## 2021-01-01 RX ADMIN — Medication 2: at 22:00

## 2021-01-01 RX ADMIN — NYSTATIN CREAM 1 APPLICATION(S): 100000 CREAM TOPICAL at 18:08

## 2021-01-01 RX ADMIN — NYSTATIN CREAM 1 APPLICATION(S): 100000 CREAM TOPICAL at 06:13

## 2021-01-01 RX ADMIN — HEPARIN SODIUM 5000 UNIT(S): 5000 INJECTION INTRAVENOUS; SUBCUTANEOUS at 21:55

## 2021-01-01 RX ADMIN — FAMOTIDINE 20 MILLIGRAM(S): 10 INJECTION INTRAVENOUS at 05:40

## 2021-01-01 RX ADMIN — Medication 1 APPLICATION(S): at 21:02

## 2021-01-01 RX ADMIN — MORPHINE SULFATE 2 MILLIGRAM(S): 50 CAPSULE, EXTENDED RELEASE ORAL at 20:18

## 2021-01-01 RX ADMIN — Medication 1 APPLICATION(S): at 06:40

## 2021-01-01 RX ADMIN — Medication 1 APPLICATION(S): at 11:59

## 2021-01-01 RX ADMIN — DEXMEDETOMIDINE HYDROCHLORIDE IN 0.9% SODIUM CHLORIDE 3 MICROGRAM(S)/KG/HR: 4 INJECTION INTRAVENOUS at 05:02

## 2021-01-01 RX ADMIN — GABAPENTIN 100 MILLIGRAM(S): 400 CAPSULE ORAL at 22:22

## 2021-01-01 RX ADMIN — Medication 1 APPLICATION(S): at 06:26

## 2021-01-01 RX ADMIN — MEROPENEM 100 MILLIGRAM(S): 1 INJECTION INTRAVENOUS at 06:07

## 2021-01-01 RX ADMIN — MEROPENEM 100 MILLIGRAM(S): 1 INJECTION INTRAVENOUS at 14:11

## 2021-01-01 RX ADMIN — Medication 4: at 23:53

## 2021-01-01 RX ADMIN — PANTOPRAZOLE SODIUM 40 MILLIGRAM(S): 20 TABLET, DELAYED RELEASE ORAL at 18:30

## 2021-01-01 RX ADMIN — Medication 2: at 00:53

## 2021-01-01 RX ADMIN — Medication 1 TABLET(S): at 11:48

## 2021-01-01 RX ADMIN — Medication 15 MILLILITER(S): at 13:08

## 2021-01-01 RX ADMIN — MEROPENEM 100 MILLIGRAM(S): 1 INJECTION INTRAVENOUS at 18:16

## 2021-01-01 RX ADMIN — Medication 1 APPLICATION(S): at 21:52

## 2021-01-01 RX ADMIN — Medication 1 APPLICATION(S): at 05:33

## 2021-01-01 RX ADMIN — MORPHINE SULFATE 2 MILLIGRAM(S): 50 CAPSULE, EXTENDED RELEASE ORAL at 21:43

## 2021-01-01 RX ADMIN — Medication 1 APPLICATION(S): at 13:43

## 2021-01-01 RX ADMIN — CHLORHEXIDINE GLUCONATE 1 APPLICATION(S): 213 SOLUTION TOPICAL at 05:30

## 2021-01-01 RX ADMIN — Medication 100 MILLIEQUIVALENT(S): at 13:50

## 2021-01-01 RX ADMIN — Medication 1 APPLICATION(S): at 14:21

## 2021-01-01 RX ADMIN — GABAPENTIN 100 MILLIGRAM(S): 400 CAPSULE ORAL at 22:27

## 2021-01-01 RX ADMIN — Medication 1 APPLICATION(S): at 11:38

## 2021-01-01 RX ADMIN — MEROPENEM 100 MILLIGRAM(S): 1 INJECTION INTRAVENOUS at 06:27

## 2021-01-01 RX ADMIN — Medication 1 APPLICATION(S): at 21:31

## 2021-01-01 RX ADMIN — Medication 1 APPLICATION(S): at 15:13

## 2021-01-01 RX ADMIN — Medication 125 MILLIGRAM(S): at 17:50

## 2021-01-01 RX ADMIN — ALBUTEROL 2 PUFF(S): 90 AEROSOL, METERED ORAL at 20:26

## 2021-01-01 RX ADMIN — MEROPENEM 100 MILLIGRAM(S): 1 INJECTION INTRAVENOUS at 11:55

## 2021-01-01 RX ADMIN — Medication 50 MILLIEQUIVALENT(S): at 06:01

## 2021-01-01 RX ADMIN — QUETIAPINE FUMARATE 50 MILLIGRAM(S): 200 TABLET, FILM COATED ORAL at 05:41

## 2021-01-01 RX ADMIN — Medication 15 MILLILITER(S): at 12:47

## 2021-01-01 RX ADMIN — Medication 2 PUFF(S): at 01:58

## 2021-01-01 RX ADMIN — Medication 1 APPLICATION(S): at 20:24

## 2021-01-01 RX ADMIN — MEROPENEM 100 MILLIGRAM(S): 1 INJECTION INTRAVENOUS at 04:30

## 2021-01-01 RX ADMIN — PANTOPRAZOLE SODIUM 40 MILLIGRAM(S): 20 TABLET, DELAYED RELEASE ORAL at 05:42

## 2021-01-01 RX ADMIN — SODIUM CHLORIDE 500 MILLILITER(S): 9 INJECTION, SOLUTION INTRAVENOUS at 22:33

## 2021-01-01 RX ADMIN — Medication 50 MILLILITER(S): at 13:02

## 2021-01-01 RX ADMIN — MORPHINE SULFATE 2 MILLIGRAM(S): 50 CAPSULE, EXTENDED RELEASE ORAL at 00:29

## 2021-01-01 RX ADMIN — GABAPENTIN 100 MILLIGRAM(S): 400 CAPSULE ORAL at 18:10

## 2021-01-01 RX ADMIN — Medication 15 MILLILITER(S): at 12:53

## 2021-01-01 RX ADMIN — QUETIAPINE FUMARATE 50 MILLIGRAM(S): 200 TABLET, FILM COATED ORAL at 05:26

## 2021-01-01 RX ADMIN — POLYETHYLENE GLYCOL 3350 17 GRAM(S): 17 POWDER, FOR SOLUTION ORAL at 13:01

## 2021-01-01 RX ADMIN — ALBUTEROL 2 PUFF(S): 90 AEROSOL, METERED ORAL at 14:03

## 2021-01-01 RX ADMIN — NYSTATIN CREAM 1 APPLICATION(S): 100000 CREAM TOPICAL at 06:31

## 2021-01-01 RX ADMIN — Medication 100 MILLIEQUIVALENT(S): at 14:53

## 2021-01-01 RX ADMIN — Medication 20 MILLIEQUIVALENT(S): at 09:00

## 2021-01-01 RX ADMIN — Medication 50 MILLILITER(S): at 18:12

## 2021-01-01 RX ADMIN — VASOPRESSIN 2.4 UNIT(S)/MIN: 20 INJECTION INTRAVENOUS at 05:41

## 2021-01-01 RX ADMIN — AZITHROMYCIN 255 MILLIGRAM(S): 500 TABLET, FILM COATED ORAL at 01:01

## 2021-01-01 RX ADMIN — SODIUM CHLORIDE 50 MILLILITER(S): 9 INJECTION, SOLUTION INTRAVENOUS at 12:24

## 2021-01-01 RX ADMIN — Medication 2 PUFF(S): at 08:47

## 2021-01-01 RX ADMIN — Medication 300 MILLIGRAM(S): at 11:06

## 2021-01-01 RX ADMIN — Medication 1 APPLICATION(S): at 21:36

## 2021-01-01 RX ADMIN — Medication 15 MILLILITER(S): at 18:13

## 2021-01-01 RX ADMIN — FENTANYL CITRATE 1 PATCH: 50 INJECTION INTRAVENOUS at 19:28

## 2021-01-01 RX ADMIN — FENTANYL CITRATE 50 MICROGRAM(S): 50 INJECTION INTRAVENOUS at 09:00

## 2021-01-01 RX ADMIN — Medication 3 MILLIGRAM(S): at 21:54

## 2021-01-01 RX ADMIN — Medication 15 MILLILITER(S): at 10:27

## 2021-01-01 RX ADMIN — FENTANYL CITRATE 1 PATCH: 50 INJECTION INTRAVENOUS at 06:05

## 2021-01-01 RX ADMIN — Medication 300 MILLIGRAM(S): at 11:16

## 2021-01-01 RX ADMIN — Medication 400 MILLIGRAM(S): at 14:00

## 2021-01-01 RX ADMIN — Medication 1: at 22:36

## 2021-01-01 RX ADMIN — MIDAZOLAM HYDROCHLORIDE 2 MILLIGRAM(S): 1 INJECTION, SOLUTION INTRAMUSCULAR; INTRAVENOUS at 23:45

## 2021-01-01 RX ADMIN — Medication 2 MILLIGRAM(S): at 09:13

## 2021-01-01 RX ADMIN — SODIUM CHLORIDE 50 MILLILITER(S): 9 INJECTION, SOLUTION INTRAVENOUS at 06:13

## 2021-01-01 RX ADMIN — Medication 2: at 23:58

## 2021-01-01 RX ADMIN — Medication 400 MILLIGRAM(S): at 17:15

## 2021-01-01 RX ADMIN — CASPOFUNGIN ACETATE 260 MILLIGRAM(S): 7 INJECTION, POWDER, LYOPHILIZED, FOR SOLUTION INTRAVENOUS at 13:57

## 2021-01-01 RX ADMIN — Medication 1: at 11:16

## 2021-01-01 RX ADMIN — PANTOPRAZOLE SODIUM 40 MILLIGRAM(S): 20 TABLET, DELAYED RELEASE ORAL at 05:34

## 2021-01-01 RX ADMIN — Medication 100 MILLIGRAM(S): at 22:22

## 2021-01-01 RX ADMIN — MORPHINE SULFATE 4 MILLIGRAM(S): 50 CAPSULE, EXTENDED RELEASE ORAL at 10:27

## 2021-01-01 RX ADMIN — CASPOFUNGIN ACETATE 260 MILLIGRAM(S): 7 INJECTION, POWDER, LYOPHILIZED, FOR SOLUTION INTRAVENOUS at 08:48

## 2021-01-01 RX ADMIN — ENOXAPARIN SODIUM 40 MILLIGRAM(S): 100 INJECTION SUBCUTANEOUS at 11:37

## 2021-01-01 RX ADMIN — IRON SUCROSE 220 MILLIGRAM(S): 20 INJECTION, SOLUTION INTRAVENOUS at 14:50

## 2021-01-01 RX ADMIN — Medication 1 APPLICATION(S): at 13:04

## 2021-01-01 RX ADMIN — Medication 250 MILLIGRAM(S): at 19:32

## 2021-01-01 RX ADMIN — FENTANYL CITRATE 50 MICROGRAM(S): 50 INJECTION INTRAVENOUS at 10:26

## 2021-01-01 RX ADMIN — Medication 50 MILLIEQUIVALENT(S): at 06:22

## 2021-01-01 RX ADMIN — Medication 166.67 MILLIGRAM(S): at 08:45

## 2021-01-01 RX ADMIN — FENTANYL CITRATE 50 MICROGRAM(S): 50 INJECTION INTRAVENOUS at 11:00

## 2021-01-01 RX ADMIN — ALBUTEROL 2 PUFF(S): 90 AEROSOL, METERED ORAL at 01:31

## 2021-01-01 RX ADMIN — Medication 2 MILLIGRAM(S): at 14:41

## 2021-01-01 RX ADMIN — FENTANYL CITRATE 3.38 MICROGRAM(S)/KG/HR: 50 INJECTION INTRAVENOUS at 06:27

## 2021-01-01 RX ADMIN — GABAPENTIN 100 MILLIGRAM(S): 400 CAPSULE ORAL at 13:24

## 2021-01-01 RX ADMIN — Medication 2 MILLIGRAM(S): at 02:30

## 2021-01-01 RX ADMIN — ALBUTEROL 2 PUFF(S): 90 AEROSOL, METERED ORAL at 08:13

## 2021-01-01 RX ADMIN — MEROPENEM 100 MILLIGRAM(S): 1 INJECTION INTRAVENOUS at 05:46

## 2021-01-01 RX ADMIN — PANTOPRAZOLE SODIUM 40 MILLIGRAM(S): 20 TABLET, DELAYED RELEASE ORAL at 17:47

## 2021-01-01 RX ADMIN — CHLORHEXIDINE GLUCONATE 5 MILLILITER(S): 213 SOLUTION TOPICAL at 05:40

## 2021-01-01 RX ADMIN — LINEZOLID 300 MILLIGRAM(S): 600 INJECTION, SOLUTION INTRAVENOUS at 05:39

## 2021-01-01 RX ADMIN — FAMOTIDINE 20 MILLIGRAM(S): 10 INJECTION INTRAVENOUS at 05:50

## 2021-01-01 RX ADMIN — Medication 1 APPLICATION(S): at 06:03

## 2021-01-01 RX ADMIN — Medication 2: at 22:36

## 2021-01-01 RX ADMIN — Medication 2 MILLIGRAM(S): at 13:23

## 2021-01-01 RX ADMIN — FENTANYL CITRATE 3.38 MICROGRAM(S)/KG/HR: 50 INJECTION INTRAVENOUS at 01:51

## 2021-01-01 RX ADMIN — Medication 100 MILLIEQUIVALENT(S): at 11:06

## 2021-01-01 RX ADMIN — Medication 1 APPLICATION(S): at 10:26

## 2021-01-01 RX ADMIN — PANTOPRAZOLE SODIUM 40 MILLIGRAM(S): 20 TABLET, DELAYED RELEASE ORAL at 18:00

## 2021-01-01 RX ADMIN — ENOXAPARIN SODIUM 40 MILLIGRAM(S): 100 INJECTION SUBCUTANEOUS at 11:12

## 2021-01-01 RX ADMIN — Medication 650 MILLIGRAM(S): at 21:25

## 2021-01-01 RX ADMIN — ALBUTEROL 2 PUFF(S): 90 AEROSOL, METERED ORAL at 15:31

## 2021-01-01 RX ADMIN — CEFTRIAXONE 100 MILLIGRAM(S): 500 INJECTION, POWDER, FOR SOLUTION INTRAMUSCULAR; INTRAVENOUS at 22:28

## 2021-01-01 RX ADMIN — GABAPENTIN 100 MILLIGRAM(S): 400 CAPSULE ORAL at 21:09

## 2021-01-01 RX ADMIN — Medication 1: at 13:28

## 2021-01-01 RX ADMIN — GABAPENTIN 100 MILLIGRAM(S): 400 CAPSULE ORAL at 21:50

## 2021-01-01 RX ADMIN — Medication 1 APPLICATION(S): at 21:20

## 2021-01-01 RX ADMIN — Medication 4: at 23:34

## 2021-01-01 RX ADMIN — HEPARIN SODIUM 5000 UNIT(S): 5000 INJECTION INTRAVENOUS; SUBCUTANEOUS at 05:27

## 2021-01-01 RX ADMIN — Medication 100 MILLIGRAM(S): at 13:27

## 2021-01-01 RX ADMIN — Medication 2: at 05:17

## 2021-01-01 RX ADMIN — CHLORHEXIDINE GLUCONATE 15 MILLILITER(S): 213 SOLUTION TOPICAL at 05:06

## 2021-01-01 RX ADMIN — ALBUTEROL 2 PUFF(S): 90 AEROSOL, METERED ORAL at 08:18

## 2021-01-01 RX ADMIN — Medication 300 MILLIGRAM(S): at 13:00

## 2021-01-01 RX ADMIN — MEROPENEM 100 MILLIGRAM(S): 1 INJECTION INTRAVENOUS at 03:58

## 2021-01-01 RX ADMIN — FAMOTIDINE 20 MILLIGRAM(S): 10 INJECTION INTRAVENOUS at 05:39

## 2021-01-01 RX ADMIN — Medication 325 MILLIGRAM(S): at 12:47

## 2021-01-01 RX ADMIN — Medication 2: at 12:30

## 2021-01-01 RX ADMIN — Medication 650 MILLIGRAM(S): at 18:44

## 2021-01-01 RX ADMIN — Medication 1 APPLICATION(S): at 14:59

## 2021-01-01 RX ADMIN — Medication 50 GRAM(S): at 14:57

## 2021-01-01 RX ADMIN — CHLORHEXIDINE GLUCONATE 1 APPLICATION(S): 213 SOLUTION TOPICAL at 05:37

## 2021-01-01 RX ADMIN — Medication 100 MILLIGRAM(S): at 14:47

## 2021-01-01 RX ADMIN — FENTANYL CITRATE 50 MICROGRAM(S): 50 INJECTION INTRAVENOUS at 04:28

## 2021-01-01 RX ADMIN — QUETIAPINE FUMARATE 50 MILLIGRAM(S): 200 TABLET, FILM COATED ORAL at 17:18

## 2021-01-01 RX ADMIN — Medication 250 MILLIGRAM(S): at 11:33

## 2021-01-01 RX ADMIN — GABAPENTIN 100 MILLIGRAM(S): 400 CAPSULE ORAL at 14:23

## 2021-01-01 RX ADMIN — CEFEPIME 100 MILLIGRAM(S): 1 INJECTION, POWDER, FOR SOLUTION INTRAMUSCULAR; INTRAVENOUS at 05:33

## 2021-01-01 RX ADMIN — Medication 1: at 03:32

## 2021-01-01 RX ADMIN — ALBUTEROL 2 PUFF(S): 90 AEROSOL, METERED ORAL at 01:03

## 2021-01-01 RX ADMIN — ALBUTEROL 2 PUFF(S): 90 AEROSOL, METERED ORAL at 02:43

## 2021-01-01 RX ADMIN — Medication 3 MILLIGRAM(S): at 21:40

## 2021-01-01 RX ADMIN — FAMOTIDINE 20 MILLIGRAM(S): 10 INJECTION INTRAVENOUS at 17:50

## 2021-01-01 RX ADMIN — Medication 2: at 03:24

## 2021-01-01 RX ADMIN — Medication 325 MILLIGRAM(S): at 13:16

## 2021-01-01 RX ADMIN — Medication 4: at 05:38

## 2021-01-01 RX ADMIN — LINEZOLID 300 MILLIGRAM(S): 600 INJECTION, SOLUTION INTRAVENOUS at 06:43

## 2021-01-01 RX ADMIN — DONEPEZIL HYDROCHLORIDE 5 MILLIGRAM(S): 10 TABLET, FILM COATED ORAL at 21:50

## 2021-01-01 RX ADMIN — Medication 400 MILLIGRAM(S): at 18:00

## 2021-01-01 RX ADMIN — Medication 2: at 00:58

## 2021-01-01 RX ADMIN — Medication 1 APPLICATION(S): at 18:00

## 2021-01-01 RX ADMIN — Medication 50 MILLIEQUIVALENT(S): at 06:13

## 2021-01-01 RX ADMIN — Medication 2 PUFF(S): at 14:14

## 2021-01-01 RX ADMIN — FENTANYL CITRATE 1 PATCH: 50 INJECTION INTRAVENOUS at 06:29

## 2021-01-01 RX ADMIN — Medication 100 MILLIEQUIVALENT(S): at 10:18

## 2021-01-01 RX ADMIN — Medication 2: at 22:45

## 2021-01-01 RX ADMIN — Medication 2 PUFF(S): at 00:29

## 2021-01-01 RX ADMIN — QUETIAPINE FUMARATE 50 MILLIGRAM(S): 200 TABLET, FILM COATED ORAL at 18:24

## 2021-01-01 RX ADMIN — Medication 650 MILLIGRAM(S): at 21:23

## 2021-01-01 RX ADMIN — PANTOPRAZOLE SODIUM 40 MILLIGRAM(S): 20 TABLET, DELAYED RELEASE ORAL at 17:37

## 2021-01-01 RX ADMIN — PROPOFOL 3.6 MICROGRAM(S)/KG/MIN: 10 INJECTION, EMULSION INTRAVENOUS at 05:31

## 2021-01-01 RX ADMIN — HEPARIN SODIUM 5000 UNIT(S): 5000 INJECTION INTRAVENOUS; SUBCUTANEOUS at 17:56

## 2021-01-01 RX ADMIN — Medication 1 TABLET(S): at 13:35

## 2021-01-01 RX ADMIN — SODIUM CHLORIDE 10 MILLILITER(S): 9 INJECTION INTRAMUSCULAR; INTRAVENOUS; SUBCUTANEOUS at 14:00

## 2021-01-01 RX ADMIN — CHLORHEXIDINE GLUCONATE 1 APPLICATION(S): 213 SOLUTION TOPICAL at 06:15

## 2021-01-01 RX ADMIN — ALBUTEROL 2 PUFF(S): 90 AEROSOL, METERED ORAL at 19:32

## 2021-01-01 RX ADMIN — Medication 325 MILLIGRAM(S): at 12:46

## 2021-01-01 RX ADMIN — FAMOTIDINE 20 MILLIGRAM(S): 10 INJECTION INTRAVENOUS at 18:11

## 2021-01-01 RX ADMIN — CHLORHEXIDINE GLUCONATE 5 MILLILITER(S): 213 SOLUTION TOPICAL at 17:41

## 2021-01-01 RX ADMIN — Medication 3 MILLIGRAM(S): at 22:24

## 2021-01-01 RX ADMIN — Medication 1 APPLICATION(S): at 17:32

## 2021-01-01 RX ADMIN — GABAPENTIN 100 MILLIGRAM(S): 400 CAPSULE ORAL at 05:38

## 2021-01-01 RX ADMIN — PROPOFOL 3.6 MICROGRAM(S)/KG/MIN: 10 INJECTION, EMULSION INTRAVENOUS at 21:05

## 2021-01-01 RX ADMIN — SENNA PLUS 2 TABLET(S): 8.6 TABLET ORAL at 21:31

## 2021-01-01 RX ADMIN — Medication 300 MILLIGRAM(S): at 13:02

## 2021-01-01 RX ADMIN — Medication 1 APPLICATION(S): at 11:06

## 2021-01-01 RX ADMIN — CHLORHEXIDINE GLUCONATE 1 APPLICATION(S): 213 SOLUTION TOPICAL at 06:34

## 2021-01-01 RX ADMIN — GABAPENTIN 100 MILLIGRAM(S): 400 CAPSULE ORAL at 06:30

## 2021-01-01 RX ADMIN — Medication 2: at 22:59

## 2021-01-01 RX ADMIN — NYSTATIN CREAM 1 APPLICATION(S): 100000 CREAM TOPICAL at 18:42

## 2021-01-01 RX ADMIN — CEFEPIME 100 MILLIGRAM(S): 1 INJECTION, POWDER, FOR SOLUTION INTRAMUSCULAR; INTRAVENOUS at 17:30

## 2021-01-01 RX ADMIN — ALBUTEROL 2 PUFF(S): 90 AEROSOL, METERED ORAL at 19:29

## 2021-01-01 RX ADMIN — Medication 100 MILLIEQUIVALENT(S): at 11:02

## 2021-01-01 RX ADMIN — MEROPENEM 100 MILLIGRAM(S): 1 INJECTION INTRAVENOUS at 14:26

## 2021-01-01 RX ADMIN — MIDAZOLAM HYDROCHLORIDE 1.35 MG/KG/HR: 1 INJECTION, SOLUTION INTRAMUSCULAR; INTRAVENOUS at 11:38

## 2021-01-01 RX ADMIN — Medication 1 TABLET(S): at 11:22

## 2021-01-01 RX ADMIN — Medication 50 MILLIEQUIVALENT(S): at 05:50

## 2021-01-01 RX ADMIN — DONEPEZIL HYDROCHLORIDE 5 MILLIGRAM(S): 10 TABLET, FILM COATED ORAL at 21:05

## 2021-01-01 RX ADMIN — VASOPRESSIN 2.4 UNIT(S)/MIN: 20 INJECTION INTRAVENOUS at 16:53

## 2021-01-01 RX ADMIN — Medication 250 MILLIGRAM(S): at 11:17

## 2021-01-01 RX ADMIN — ALBUTEROL 2 PUFF(S): 90 AEROSOL, METERED ORAL at 19:36

## 2021-01-01 RX ADMIN — Medication 2: at 18:27

## 2021-01-01 RX ADMIN — Medication 2: at 17:51

## 2021-01-01 RX ADMIN — GABAPENTIN 100 MILLIGRAM(S): 400 CAPSULE ORAL at 21:31

## 2021-01-01 RX ADMIN — Medication 2 PUFF(S): at 08:39

## 2021-01-01 RX ADMIN — Medication 2: at 13:09

## 2021-01-01 RX ADMIN — PANTOPRAZOLE SODIUM 10 MG/HR: 20 TABLET, DELAYED RELEASE ORAL at 23:59

## 2021-01-01 RX ADMIN — ALBUTEROL 2 PUFF(S): 90 AEROSOL, METERED ORAL at 14:16

## 2021-01-01 RX ADMIN — Medication 1000 MILLIGRAM(S): at 18:15

## 2021-01-01 RX ADMIN — Medication 40 MILLIGRAM(S): at 05:10

## 2021-01-01 RX ADMIN — Medication 40 MILLIGRAM(S): at 05:06

## 2021-01-01 RX ADMIN — PANTOPRAZOLE SODIUM 40 MILLIGRAM(S): 20 TABLET, DELAYED RELEASE ORAL at 18:12

## 2021-01-01 RX ADMIN — Medication 50 GRAM(S): at 18:19

## 2021-01-01 RX ADMIN — CHLORHEXIDINE GLUCONATE 1 APPLICATION(S): 213 SOLUTION TOPICAL at 05:39

## 2021-01-01 RX ADMIN — MORPHINE SULFATE 4 MILLIGRAM(S): 50 CAPSULE, EXTENDED RELEASE ORAL at 13:55

## 2021-01-01 RX ADMIN — Medication 50 MILLIEQUIVALENT(S): at 09:36

## 2021-01-01 RX ADMIN — Medication 2: at 06:00

## 2021-01-01 RX ADMIN — Medication 100 MILLIGRAM(S): at 05:31

## 2021-01-01 RX ADMIN — QUETIAPINE FUMARATE 50 MILLIGRAM(S): 200 TABLET, FILM COATED ORAL at 05:28

## 2021-01-01 RX ADMIN — CHLORHEXIDINE GLUCONATE 15 MILLILITER(S): 213 SOLUTION TOPICAL at 18:30

## 2021-01-01 RX ADMIN — MEROPENEM 100 MILLIGRAM(S): 1 INJECTION INTRAVENOUS at 04:37

## 2021-01-01 RX ADMIN — QUETIAPINE FUMARATE 50 MILLIGRAM(S): 200 TABLET, FILM COATED ORAL at 21:15

## 2021-01-01 RX ADMIN — Medication 650 MILLIGRAM(S): at 05:33

## 2021-01-01 RX ADMIN — ENOXAPARIN SODIUM 40 MILLIGRAM(S): 100 INJECTION SUBCUTANEOUS at 22:14

## 2021-01-01 RX ADMIN — CHLORHEXIDINE GLUCONATE 1 APPLICATION(S): 213 SOLUTION TOPICAL at 05:33

## 2021-01-01 RX ADMIN — AMIODARONE HYDROCHLORIDE 33.3 MG/MIN: 400 TABLET ORAL at 13:00

## 2021-01-01 RX ADMIN — Medication 2 PUFF(S): at 06:38

## 2021-01-01 RX ADMIN — ALBUTEROL 2 PUFF(S): 90 AEROSOL, METERED ORAL at 21:04

## 2021-01-01 RX ADMIN — Medication 300 MILLIGRAM(S): at 13:07

## 2021-01-01 RX ADMIN — Medication 100 MILLIGRAM(S): at 05:54

## 2021-01-01 RX ADMIN — ALBUTEROL 2 PUFF(S): 90 AEROSOL, METERED ORAL at 09:31

## 2021-01-01 RX ADMIN — PANTOPRAZOLE SODIUM 40 MILLIGRAM(S): 20 TABLET, DELAYED RELEASE ORAL at 17:10

## 2021-01-01 RX ADMIN — LINEZOLID 300 MILLIGRAM(S): 600 INJECTION, SOLUTION INTRAVENOUS at 18:16

## 2021-01-01 RX ADMIN — ZOLPIDEM TARTRATE 5 MILLIGRAM(S): 10 TABLET ORAL at 23:30

## 2021-01-01 RX ADMIN — Medication 1 APPLICATION(S): at 13:15

## 2021-01-01 RX ADMIN — Medication 4: at 01:18

## 2021-01-01 RX ADMIN — CHLORHEXIDINE GLUCONATE 15 MILLILITER(S): 213 SOLUTION TOPICAL at 06:40

## 2021-01-01 RX ADMIN — Medication 1 APPLICATION(S): at 05:35

## 2021-01-01 RX ADMIN — HEPARIN SODIUM 5000 UNIT(S): 5000 INJECTION INTRAVENOUS; SUBCUTANEOUS at 05:00

## 2021-01-01 RX ADMIN — GABAPENTIN 100 MILLIGRAM(S): 400 CAPSULE ORAL at 22:40

## 2021-01-01 RX ADMIN — Medication 1: at 06:09

## 2021-01-01 RX ADMIN — PHENYLEPHRINE HYDROCHLORIDE 2.25 MICROGRAM(S)/KG/MIN: 10 INJECTION INTRAVENOUS at 21:05

## 2021-01-01 RX ADMIN — ALBUTEROL 2 PUFF(S): 90 AEROSOL, METERED ORAL at 08:30

## 2021-01-01 RX ADMIN — Medication 1 APPLICATION(S): at 05:22

## 2021-01-01 RX ADMIN — CEFEPIME 100 MILLIGRAM(S): 1 INJECTION, POWDER, FOR SOLUTION INTRAMUSCULAR; INTRAVENOUS at 05:31

## 2021-01-01 RX ADMIN — Medication 650 MILLIGRAM(S): at 17:50

## 2021-01-01 RX ADMIN — MEROPENEM 100 MILLIGRAM(S): 1 INJECTION INTRAVENOUS at 05:26

## 2021-01-01 RX ADMIN — SENNA PLUS 2 TABLET(S): 8.6 TABLET ORAL at 21:05

## 2021-01-01 RX ADMIN — Medication 2 PUFF(S): at 15:38

## 2021-01-01 RX ADMIN — Medication 2: at 17:27

## 2021-01-01 RX ADMIN — FLUCONAZOLE 100 MILLIGRAM(S): 150 TABLET ORAL at 10:10

## 2021-01-01 RX ADMIN — NYSTATIN CREAM 1 APPLICATION(S): 100000 CREAM TOPICAL at 18:14

## 2021-01-01 RX ADMIN — Medication 100 MILLIGRAM(S): at 14:31

## 2021-01-01 RX ADMIN — ALBUTEROL 2 PUFF(S): 90 AEROSOL, METERED ORAL at 06:38

## 2021-01-01 RX ADMIN — Medication 2: at 12:52

## 2021-01-01 RX ADMIN — Medication 1 APPLICATION(S): at 11:44

## 2021-01-01 RX ADMIN — Medication 2 PUFF(S): at 01:31

## 2021-01-01 RX ADMIN — GABAPENTIN 100 MILLIGRAM(S): 400 CAPSULE ORAL at 15:39

## 2021-01-01 RX ADMIN — MEROPENEM 100 MILLIGRAM(S): 1 INJECTION INTRAVENOUS at 06:24

## 2021-01-01 RX ADMIN — Medication 50 MILLIEQUIVALENT(S): at 06:34

## 2021-01-01 RX ADMIN — CEFEPIME 100 MILLIGRAM(S): 1 INJECTION, POWDER, FOR SOLUTION INTRAMUSCULAR; INTRAVENOUS at 17:57

## 2021-01-01 RX ADMIN — Medication 50 MILLILITER(S): at 00:57

## 2021-01-01 RX ADMIN — PANTOPRAZOLE SODIUM 40 MILLIGRAM(S): 20 TABLET, DELAYED RELEASE ORAL at 18:38

## 2021-01-01 RX ADMIN — NYSTATIN CREAM 1 APPLICATION(S): 100000 CREAM TOPICAL at 05:25

## 2021-01-01 RX ADMIN — Medication 300 MILLIGRAM(S): at 18:00

## 2021-01-01 RX ADMIN — Medication 2 PUFF(S): at 09:32

## 2021-01-01 RX ADMIN — Medication 2: at 02:34

## 2021-01-01 RX ADMIN — INSULIN HUMAN 10 UNIT(S): 100 INJECTION, SOLUTION SUBCUTANEOUS at 10:11

## 2021-01-01 RX ADMIN — MEROPENEM 100 MILLIGRAM(S): 1 INJECTION INTRAVENOUS at 04:43

## 2021-01-01 RX ADMIN — POLYETHYLENE GLYCOL 3350 17 GRAM(S): 17 POWDER, FOR SOLUTION ORAL at 11:16

## 2021-01-01 RX ADMIN — ALBUTEROL 2 PUFF(S): 90 AEROSOL, METERED ORAL at 08:48

## 2021-01-01 RX ADMIN — Medication 1 APPLICATION(S): at 11:47

## 2021-01-01 RX ADMIN — Medication 1: at 17:11

## 2021-01-01 RX ADMIN — Medication 1 APPLICATION(S): at 17:17

## 2021-01-01 RX ADMIN — QUETIAPINE FUMARATE 50 MILLIGRAM(S): 200 TABLET, FILM COATED ORAL at 18:40

## 2021-01-01 RX ADMIN — Medication 1 APPLICATION(S): at 19:06

## 2021-01-01 RX ADMIN — DEXMEDETOMIDINE HYDROCHLORIDE IN 0.9% SODIUM CHLORIDE 3 MICROGRAM(S)/KG/HR: 4 INJECTION INTRAVENOUS at 16:40

## 2021-01-01 RX ADMIN — POLYETHYLENE GLYCOL 3350 17 GRAM(S): 17 POWDER, FOR SOLUTION ORAL at 11:01

## 2021-01-01 RX ADMIN — Medication 40 MILLIGRAM(S): at 09:20

## 2021-01-01 RX ADMIN — Medication 2: at 10:39

## 2021-01-01 RX ADMIN — ALBUTEROL 2 PUFF(S): 90 AEROSOL, METERED ORAL at 17:55

## 2021-01-01 RX ADMIN — Medication 2: at 05:49

## 2021-01-01 RX ADMIN — DEXMEDETOMIDINE HYDROCHLORIDE IN 0.9% SODIUM CHLORIDE 3 MICROGRAM(S)/KG/HR: 4 INJECTION INTRAVENOUS at 21:04

## 2021-01-01 RX ADMIN — DONEPEZIL HYDROCHLORIDE 5 MILLIGRAM(S): 10 TABLET, FILM COATED ORAL at 21:43

## 2021-01-01 RX ADMIN — Medication 1 APPLICATION(S): at 22:27

## 2021-01-01 RX ADMIN — Medication 2 PUFF(S): at 08:19

## 2021-01-01 RX ADMIN — Medication 325 MILLIGRAM(S): at 13:58

## 2021-01-01 RX ADMIN — IRON SUCROSE 220 MILLIGRAM(S): 20 INJECTION, SOLUTION INTRAVENOUS at 15:27

## 2021-01-01 RX ADMIN — MEROPENEM 100 MILLIGRAM(S): 1 INJECTION INTRAVENOUS at 03:27

## 2021-01-01 RX ADMIN — LINEZOLID 300 MILLIGRAM(S): 600 INJECTION, SOLUTION INTRAVENOUS at 17:15

## 2021-01-01 RX ADMIN — Medication 2: at 21:38

## 2021-01-01 RX ADMIN — ALBUTEROL 2 PUFF(S): 90 AEROSOL, METERED ORAL at 08:47

## 2021-01-01 RX ADMIN — SODIUM CHLORIDE 1000 MILLILITER(S): 9 INJECTION INTRAMUSCULAR; INTRAVENOUS; SUBCUTANEOUS at 10:00

## 2021-01-01 RX ADMIN — FAMOTIDINE 20 MILLIGRAM(S): 10 INJECTION INTRAVENOUS at 17:30

## 2021-01-01 RX ADMIN — ALBUTEROL 2 PUFF(S): 90 AEROSOL, METERED ORAL at 09:17

## 2021-01-01 RX ADMIN — Medication 650 MILLIGRAM(S): at 01:00

## 2021-01-01 RX ADMIN — CASPOFUNGIN ACETATE 260 MILLIGRAM(S): 7 INJECTION, POWDER, LYOPHILIZED, FOR SOLUTION INTRAVENOUS at 10:48

## 2021-01-01 RX ADMIN — SODIUM CHLORIDE 50 MILLILITER(S): 9 INJECTION, SOLUTION INTRAVENOUS at 18:17

## 2021-01-01 RX ADMIN — GABAPENTIN 100 MILLIGRAM(S): 400 CAPSULE ORAL at 05:05

## 2021-01-01 RX ADMIN — SENNA PLUS 2 TABLET(S): 8.6 TABLET ORAL at 21:30

## 2021-01-01 RX ADMIN — FENTANYL CITRATE 50 MICROGRAM(S): 50 INJECTION INTRAVENOUS at 00:30

## 2021-01-01 RX ADMIN — Medication 15 MILLILITER(S): at 12:24

## 2021-01-01 RX ADMIN — Medication 20 MILLIEQUIVALENT(S): at 11:48

## 2021-01-01 RX ADMIN — Medication 2 PUFF(S): at 20:26

## 2021-01-01 RX ADMIN — Medication 40 MILLIGRAM(S): at 05:36

## 2021-01-01 RX ADMIN — Medication 650 MILLIGRAM(S): at 17:45

## 2021-01-01 RX ADMIN — Medication 2: at 12:06

## 2021-01-01 RX ADMIN — Medication 100 MILLIGRAM(S): at 13:26

## 2021-01-01 RX ADMIN — Medication 125 MILLIGRAM(S): at 17:32

## 2021-01-01 RX ADMIN — GABAPENTIN 100 MILLIGRAM(S): 400 CAPSULE ORAL at 21:06

## 2021-01-01 RX ADMIN — Medication 100 MILLIGRAM(S): at 21:42

## 2021-01-01 RX ADMIN — FAMOTIDINE 20 MILLIGRAM(S): 10 INJECTION INTRAVENOUS at 17:39

## 2021-01-01 RX ADMIN — Medication 2 PUFF(S): at 11:11

## 2021-01-01 RX ADMIN — FENTANYL CITRATE 3.38 MICROGRAM(S)/KG/HR: 50 INJECTION INTRAVENOUS at 04:50

## 2021-01-01 RX ADMIN — CASPOFUNGIN ACETATE 260 MILLIGRAM(S): 7 INJECTION, POWDER, LYOPHILIZED, FOR SOLUTION INTRAVENOUS at 09:00

## 2021-01-01 RX ADMIN — Medication 2: at 05:25

## 2021-01-01 RX ADMIN — MIDAZOLAM HYDROCHLORIDE 2 MILLIGRAM(S): 1 INJECTION, SOLUTION INTRAMUSCULAR; INTRAVENOUS at 11:10

## 2021-01-01 RX ADMIN — QUETIAPINE FUMARATE 50 MILLIGRAM(S): 200 TABLET, FILM COATED ORAL at 17:40

## 2021-01-01 RX ADMIN — Medication 2 PUFF(S): at 13:12

## 2021-01-01 RX ADMIN — Medication 2 PUFF(S): at 22:17

## 2021-01-28 PROBLEM — D64.9 ANEMIA: Status: ACTIVE | Noted: 2021-01-01

## 2021-01-28 PROBLEM — R63.0 POOR APPETITE: Status: ACTIVE | Noted: 2021-01-01

## 2021-01-28 PROBLEM — Z78.9 DOES NOT USE TOBACCO: Status: ACTIVE | Noted: 2021-01-01

## 2021-01-28 PROBLEM — R63.4 WEIGHT LOSS: Status: ACTIVE | Noted: 2021-01-01

## 2021-01-28 NOTE — HISTORY OF PRESENT ILLNESS
[de-identified] : Saniya  is a jsesee 86 yo lady who was referred by her PMD for evaluation of anemia. \par She reports fatigue, no chest discomfort, dizziness, SOB, PACHECO. She denies overt bleeding symptoms, no blood in the stool or urine, no black stool, no postmenopausal vaginal bleeding \par She reports no family h/o GYN malignancy, no breast cancer. Her sister had colon cancer in her 70s. \par She denies h/o stomach surgery. She has never has a GI evaluation, she is not up to date with GYN follow up. \par She has never received IV iron or blood transfusions in the past.\par As per PMD referral she was also noted to have thrombocytosis, that was not noted at the time of evaluation on 1/28/2021. \par

## 2021-01-28 NOTE — ASSESSMENT
[FreeTextEntry1] : Microcytic anemia, isolated, mild, symptomatic with fatigue\par --Daughter denies overt bleeding symptoms\par --Never had a colonoscopy, PAP smear, patient has always declined\par --Bloodwork today\par --Will replete deficiencies if identified \par \par H/o thrombocytosis\par --Not present on CBC from 1/28/2021\par --Will monitor \par \par Weight loss, decreased appetite\par --Started since beginning of quarantine, may be related to depression \par --Patient is not up todate on any preventive cancer screening \par \par Follow up in 3-6 weeks with CBC

## 2021-01-28 NOTE — CONSULT LETTER
[Dear  ___] : Dear  [unfilled], [Consult Letter:] : I had the pleasure of evaluating your patient, [unfilled]. [( Thank you for referring [unfilled] for consultation for _____ )] : Thank you for referring [unfilled] for consultation for [unfilled] [Please see my note below.] : Please see my note below. [Consult Closing:] : Thank you very much for allowing me to participate in the care of this patient.  If you have any questions, please do not hesitate to contact me. [Sincerely,] : Sincerely, [FreeTextEntry3] : Amira Babb MD

## 2021-01-28 NOTE — REASON FOR VISIT
[Initial Consultation] : an initial consultation for [Family Member] : family member [Patient Declined  Services] : - None: Patient declined  services [FreeTextEntry2] : Anemia, thrombocytosis, referred by Dr. Yee [FreeTextEntry3] : Daughter helps translate from Japanese

## 2021-01-28 NOTE — PHYSICAL EXAM
[Ambulatory and capable of all self care but unable to carry out any work activities] : Status 2- Ambulatory and capable of all self care but unable to carry out any work activities. Up and about more than 50% of waking hours [Normal] : affect appropriate [de-identified] : Kyphosis

## 2021-01-28 NOTE — REVIEW OF SYSTEMS
[Fatigue] : fatigue [Recent Change In Weight] : ~T recent weight change [Constipation] : constipation [Joint Pain] : joint pain [Negative] : Allergic/Immunologic [Fever] : no fever [Chills] : no chills [Night Sweats] : no night sweats [Abdominal Pain] : no abdominal pain [Vomiting] : no vomiting [Diarrhea] : no diarrhea [Joint Stiffness] : no joint stiffness [Muscle Pain] : no muscle pain [Muscle Weakness] : no muscle weakness [FreeTextEntry2] : poor appetite  [FreeTextEntry7] : c [FreeTextEntry9] : Back pain, joint pain is chronic

## 2021-03-10 NOTE — ED ADULT NURSE NOTE - OBJECTIVE STATEMENT
Patient presents to ED with c/o generalized weakness and body aches, patient's daughter at bedside states patient was exposed to covid.

## 2021-03-10 NOTE — ED PROVIDER NOTE - PHYSICAL EXAMINATION
CONSTITUTIONAL: ill-appearing, but in no apparent distress.    CARDIOVASCULAR: Normal S1, S2; no murmurs, rubs, or gallops.   RESPIRATORY: decreased BS, coarse right side; Normal chest excursion with respiration  GI/: non-distended; non-tender; no palpable organomegaly.   SKIN: Normal for age and race; warm; dry; good turgor; no apparent lesions or exudate.   NEURO/PSYCH: A & O x 4; grossly unremarkable. mood and manner are appropriate.

## 2021-03-10 NOTE — ED PROVIDER NOTE - ATTENDING CONTRIBUTION TO CARE
I personally evaluated the patient. I reviewed the Resident’s or Physician Assistant’s note (as assigned above), and agree with the findings and plan except as documented in my note.  85 F with hx of dementia, RA, speaks mainly Arabic, was brought in by daughter for evaluation of 2 months of coughing and 1 day of fever. Pt also reports progressing generalized weakness with decreased appetite. No SOB, CP, LE pain or swelling. VS reviewed, pt non-toxic appearing, NAD. Head ncat, MMM, neck supple, normal ROM, normal s1s2 without any murmurs, Lungs with right sided crackles with normal work of breathing. abd +BS, s/nd/nt, extremities wnl, neuro exam grossly normal. No acute skin rashes. Plan is ekg, labs, imaging and manage accordingly.

## 2021-03-10 NOTE — ED PROVIDER NOTE - CARE PLAN
Principal Discharge DX:	Sepsis  Secondary Diagnosis:	Pneumonia   Principal Discharge DX:	Sepsis  Secondary Diagnosis:	Pneumonia  Secondary Diagnosis:	Empyema  Secondary Diagnosis:	MENDOZA (acute kidney injury)

## 2021-03-10 NOTE — ED PROVIDER NOTE - CLINICAL SUMMARY MEDICAL DECISION MAKING FREE TEXT BOX
Pt presented with coughing and fever. Was hypotensive on presentation. Required IV, monitor, labs, imaging. Was found to have wbc of 48k, lactate of 3.1. PNA on CXR. CT done with massive right sided PNA with fluid levels. ICU consulted. Pt to be admitted for further management.

## 2021-03-10 NOTE — ED PROVIDER NOTE - NS ED ROS FT
Constitutional: no recent weight loss, change in appetite   Eyes: no redness/discharge/pain/vision changes  ENT: no rhinorrhea/ear pain/sore throat  Cardiac: No chest pain, SOB or edema.  Respiratory: No respiratory distress  GI: No nausea, vomiting, diarrhea or abdominal pain.  : No dysuria, frequency, urgency or hematuria  MS: no pain to back or extremities, no loss of ROM, no weakness  Neuro: No headache or weakness. No LOC.  Skin: No skin rash.  Endocrine: No history of thyroid disease or diabetes.  Except as documented in the HPI, all other systems are negative.

## 2021-03-10 NOTE — ED PROVIDER NOTE - OBJECTIVE STATEMENT
hx obtained from daughter as pt with dementia and Botswanan speaking, requesting daughter interpret; pt with pmhx RA and dementia presents to ED for eval of PNA dx at . pt with 2 month h/o cough, now recently accompanied by fever for 1-2 days. went to  today, covid19 neg, but CXR was abnl- sent to ED for further eval. Denies HA/dizziness/chest pain/palpitation/abd pain/n/v/d/ black stool/bloody stool/urinary sxs

## 2021-03-11 NOTE — H&P ADULT - HISTORY OF PRESENT ILLNESS
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    As per daughter, patient has been having dry consistent cough since 2 months. Today, she was being evaluated for knee injections when she was found to be febrile. She also reports progressing generalized weakness with decreased appetite and PO intake.  No URT symptoms, no chest pain, no leg pain, no diarrhea, no urinary symptoms no sick contacts, no recent travel. 85 year old lady known to have dementia, osteoarithtis, Maori-speaking presenting with cough and fever.    As per daughter, patient has been having dry consistent cough since 2 months. Today, she was being evaluated for knee injections when she was found to be febrile. She also reports progressing generalized weakness with decreased appetite and PO intake.  No URT symptoms, no chest pain, no leg pain, no diarrhea, no urinary symptoms no sick contacts, no recent travel. 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    As per daughter, patient has been having dry consistent cough since 2 months. Today, she was being evaluated for knee injections when she was found to be febrile. She also reports progressing generalized weakness with decreased appetite and PO intake.  No URT symptoms, no chest pain, no leg pain, no diarrhea, no urinary symptoms no sick contacts, no recent travel. In ED was hypotensive, was given IVF, started on levophed 0.04 called to evaluate

## 2021-03-11 NOTE — SWALLOW BEDSIDE ASSESSMENT ADULT - SLP GENERAL OBSERVATIONS
Pt received sitting upright in bed, alert and oriented x3, no c/o pain. +3L o2 NC. Pt able to follow commands.

## 2021-03-11 NOTE — H&P ADULT - NSHPLABSRESULTS_GEN_ALL_CORE
LABS:  cret                        10.6   38.79 )-----------( 567      ( 10 Mar 2021 21:00 )             33.3     03-10    137  |  98  |  57<H>  ----------------------------<  156<H>  4.8   |  24  |  1.7<H>    Ca    8.9      10 Mar 2021 21:00    TPro  7.8  /  Alb  3.0<L>  /  TBili  0.4  /  DBili  x   /  AST  89<H>  /  ALT  41  /  AlkPhos  98  03-10    PT/INR - ( 10 Mar 2021 21:00 )   PT: 14.80 sec;   INR: 1.29 ratio         PTT - ( 10 Mar 2021 21:00 )  PTT:37.9 sec        ct< from: CT Chest No Cont (03.11.21 @ 00:54) >      IMPRESSION:    Areas of right lung consolidation which can be seen in pneumonia. Numerous air-fluid levels throughout the right lung compatible with an infectious process. Suggestion of split pleura at the lung base for which empyema is favored although inherently limited on this noncontrast exam. Consideration can be given to contrast administration if feasible for better delineation.    Areas of bilateral interlobular septal thickening and mild layering groundglass attenuation may reflect a component of edema.    < end of copied text > LABS:                          10.6   38.79 )-----------( 567      ( 10 Mar 2021 21:00 )             33.3     03-10    137  |  98  |  57<H>  ----------------------------<  156<H>  4.8   |  24  |  1.7<H>    Ca    8.9      10 Mar 2021 21:00    TPro  7.8  /  Alb  3.0<L>  /  TBili  0.4  /  DBili  x   /  AST  89<H>  /  ALT  41  /  AlkPhos  98  03-10    PT/INR - ( 10 Mar 2021 21:00 )   PT: 14.80 sec;   INR: 1.29 ratio         PTT - ( 10 Mar 2021 21:00 )  PTT:37.9 sec        ct< from: CT Chest No Cont (03.11.21 @ 00:54) >      IMPRESSION:    Areas of right lung consolidation which can be seen in pneumonia. Numerous air-fluid levels throughout the right lung compatible with an infectious process. Suggestion of split pleura at the lung base for which empyema is favored although inherently limited on this noncontrast exam. Consideration can be given to contrast administration if feasible for better delineation.    Areas of bilateral interlobular septal thickening and mild layering groundglass attenuation may reflect a component of edema.    < end of copied text >

## 2021-03-11 NOTE — H&P ADULT - ASSESSMENT
Impression    # Severe Sepsis due to community-acquired pneumonia/Empyema  # MENDOZA ( Likely Prerenal, sepsis-induced, NSAID-associated)    PLAN:    CNS: Avoid Sedatives    HEENT: Oral care, HOB at 45, speech and swallow evaluation    PULMONARY:   - Currently on RA with no signs of respiratory distress  - Assessment for thoracocentesis (bedside or IR guided) and chest tube if consistent with empyema    CARDIOVASCULAR:  - Continue IV Fluids (LR @100 cc/hr)  - Start pressors if needed ( maintain MAP>65); left IJ inserted in ED  - ECHO    GI: GI prophylaxis, NPO for now    RENAL: monitor in/out and BMP, check UA and lytes    INFECTIOUS DISEASE:   - IV Meropenem and Vancomycin (check trough level tomorrow)  - Pending Blood and sputum cx  - Check urine legionella and strep Ag    HEMATOLOGICAL:  DVT prophylaxis    ENDOCRINE:  Follow up FS.  Insulin protocol if needed.     MUSCULOSKELETAL: Pt/rehab    Code status= Full for now, Daughter to discuss with her son ( Roldan, 3E nurse) whether to make DNR/DNI     Impression    # Severe Sepsis due to community-acquired pneumonia/Empyema  # MENDOZA ( Likely Prerenal, sepsis-induced, NSAID-associated)    PLAN:    CNS: Avoid Sedatives    HEENT: Oral care, HOB at 45, speech and swallow evaluation    PULMONARY:   - Currently on RA with no signs of respiratory distress  - Assessment for thoracocentesis (bedside or IR guided) and chest tube if consistent with empyema    CARDIOVASCULAR:  - Continue IV Fluids (LR @100 cc/hr)  - Start pressors if needed ( maintain MAP>65); right IJ inserted in ED  - ECHO    GI: GI prophylaxis, NPO for now    RENAL: monitor in/out and BMP, check UA and lytes    INFECTIOUS DISEASE:   - IV Meropenem and Vancomycin (check trough level tomorrow)  - Pending Blood and sputum cx  - Check urine legionella and strep Ag    HEMATOLOGICAL:  DVT prophylaxis    ENDOCRINE:  Follow up FS.  Insulin protocol if needed.     MUSCULOSKELETAL: Pt/rehab    Code status= Full for now, Daughter to discuss with her son ( Roldan, 3E nurse) whether to make DNR/DNI     Impression    # Severe Sepsis present on admission due to community-acquired pneumonia/necrotizing on levophed  # MENDOZA ( Likely Prerenal, sepsis-induced, NSAID-associated)    PLAN:    CNS: Avoid Sedatives    HEENT: Oral care, HOB at 45, speech and swallow evaluation    PULMONARY:     sputum gram stain/ cx  keep Sao2 88 to 94%    CARDIOVASCULAR:  - Continue IV Fluids (LR @100 cc/hr)  - Start pressors if needed ( maintain MAP>65); right IJ inserted in ED  - ECHO    GI: GI prophylaxis, NPO for now    RENAL: monitor in/out and BMP, check UA and lytes    INFECTIOUS DISEASE:   - IV Meropenem/ levaquin and Vancomycin (check trough level tomorrow)  - Pending Blood and sputum cx  - Check urine legionella and strep Ag    HEMATOLOGICAL:  DVT prophylaxis, LE doppler    ENDOCRINE:  Follow up FS.  Insulin protocol if needed.     MUSCULOSKELETAL: Pt/rehab    Code status= Full for now, Daughter to discuss with her son ( Roldan, 3E nurse) whether to make DNR/DNI

## 2021-03-11 NOTE — H&P ADULT - NSHPPHYSICALEXAM_GEN_ALL_CORE
PHYSICAL EXAM:  GENERAL: NAD, lying in bed comfortably  HEAD:  Atraumatic, Normocephalic  EYES: EOMI, PERRLA, conjunctiva and sclera clear  ENT: Dry mucous membranes  NECK: Supple, No JVD  CHEST/LUNG: Right sided ronchi;  Unlabored respirations  HEART: Regular rate and rhythm; No murmurs, rubs, or gallops  ABDOMEN: Bowel sounds present; Soft, Nontender, Nondistended. No hepatomegaly  EXTREMITIES:  2+ Peripheral Pulses, brisk capillary refill. No clubbing, cyanosis, or edema  NERVOUS SYSTEM:  Alert & Oriented X3, speech clear. No deficits   MSK: FROM all 4 extremities, full and equal strength  SKIN: Dry PHYSICAL EXAM:  GENERAL: ill looking, tachypneic  HEAD:  Atraumatic, Normocephalic  EYES: EOMI, PERRLA, conjunctiva and sclera clear  ENT: Dry mucous membranes  NECK: Supple, No JVD  CHEST/LUNG: Right sided rhonchi;  Unlabored respirations  HEART: Regular rate and rhythm; No murmurs, rubs, or gallops  ABDOMEN: Bowel sounds present; Soft, Nontender, Nondistended. No hepatomegaly  EXTREMITIES:  2+ Peripheral Pulses, brisk capillary refill. No clubbing, cyanosis, or edema  NERVOUS SYSTEM:  Alert & Oriented X3, speech clear. No deficits   MSK: FROM all 4 extremities, full and equal strength  SKIN: Dry

## 2021-03-11 NOTE — SWALLOW BEDSIDE ASSESSMENT ADULT - SWALLOW EVAL: DIAGNOSIS
+toleration for thin liquids, puree and soft solids w/o overt s/s penetration/aspiration. Hard solids not trialed 2' incomplete dentition.

## 2021-03-11 NOTE — SWALLOW BEDSIDE ASSESSMENT ADULT - COMMENTS
[facility]  ID #[id number] utilized for Syriac/English translation. Patient able to appropriately participate w/  and SLP.

## 2021-03-11 NOTE — H&P ADULT - ATTENDING COMMENTS
Patient seen and examined, agree with above, severe CAP/ MENDOZA, on pressors, IV ABX, IVF, pancx, procal, speech and swallow, f/up CMP, SDU

## 2021-03-12 NOTE — PROGRESS NOTE ADULT - ASSESSMENT
IMPRESSION:    Severe CAP   Sepsis present on admission   Septic shock resolved     PLAN:    CNS:  no depressants     HEENT: Oral care    PULMONARY:  HOB @ 45 degrees.  Aspiration precautions.  Weano2     CARDIOVASCULAR:  NS for now     GI: GI prophylaxis.  Feeding per speech.  Bowel regimen     RENAL:  Follow up lytes.  Correct as needed    INFECTIOUS DISEASE: Follow up cultures.  Urine legionella and strep Ag.  Nasal MRSA./  Cefepime, Van, and Levaquin.      HEMATOLOGICAL:  DVT prophylaxis.      ENDOCRINE:  Follow up FS.     MUSCULOSKELETAL:  OOB to chair     PT OT

## 2021-03-12 NOTE — PROGRESS NOTE ADULT - SUBJECTIVE AND OBJECTIVE BOX
Patient is a 85y old  Female who presents with a chief complaint of       Over Night Events: m Feels better.  Cough.  Off levophed         ROS:     All ROS are negative except HPI         PHYSICAL EXAM    ICU Vital Signs Last 24 Hrs  T(C): 37.1 (12 Mar 2021 08:00), Max: 38.6 (12 Mar 2021 03:00)  T(F): 98.7 (12 Mar 2021 08:00), Max: 101.4 (12 Mar 2021 03:00)  HR: 93 (12 Mar 2021 08:00) (79 - 121)  BP: 118/56 (12 Mar 2021 08:00) (93/52 - 126/59)  BP(mean): 77 (12 Mar 2021 08:00) (68 - 87)  ABP: --  ABP(mean): --  RR: 18 (12 Mar 2021 08:00) (17 - 42)  SpO2: 94% (12 Mar 2021 08:00) (93% - 98%)      CONSTITUTIONAL:  Well nourished.  NAD    ENT:   Airway patent,   Mouth with normal mucosa.   No thrush    EYES:   Pupils equal,   Round and reactive to light.    CARDIAC:   Normal rate,   Regular rhythm.     edema      Vascular:  Normal systolic impulse  No Carotid bruits    RESPIRATORY:   No wheezing  Bilateral BS  Normal chest expansion  Not tachypneic,  No use of accessory muscles    GASTROINTESTINAL:  Abdomen soft,   Non-tender,   No guarding,   + BS    MUSCULOSKELETAL:   Range of motion is not limited,  No clubbing, cyanosis    NEUROLOGICAL:   Alert and oriented   No motor  deficits.    SKIN:   Skin normal color for race,   Warm and dry   No evidence of rash.      HEMATOLOGICAL:  No cervical  lymphadenopathy.  no inguinal lymphadenopathy      21 @ 07:01  -  21 @ 07:00  --------------------------------------------------------  IN:    IV PiggyBack: 50 mL    Lactated Ringers: 500 mL    Norepinephrine: 21.5 mL  Total IN: 571.5 mL    OUT:    Indwelling Catheter - Urethral (mL): 1305 mL  Total OUT: 1305 mL    Total NET: -733.5 mL      21 @ 07:01  -  03-12-21 @ 09:04  --------------------------------------------------------  IN:  Total IN: 0 mL    OUT:    Indwelling Catheter - Urethral (mL): 75 mL  Total OUT: 75 mL    Total NET: -75 mL          LABS:                            8.6    34.51 )-----------( 467      ( 11 Mar 2021 06:31 )             27.8                                               03-11    136  |  102  |  50<H>  ----------------------------<  198<H>  4.0   |  24  |  1.2    Ca    7.9<L>      11 Mar 2021 06:31    TPro  5.9<L>  /  Alb  2.2<L>  /  TBili  0.3  /  DBili  x   /  AST  67<H>  /  ALT  32  /  AlkPhos  84  03-11      PT/INR - ( 11 Mar 2021 06:31 )   PT: 15.10 sec;   INR: 1.31 ratio         PTT - ( 11 Mar 2021 06:31 )  PTT:33.8 sec                                       Urinalysis Basic - ( 11 Mar 2021 08:14 )    Color: Yellow / Appearance: Slightly Turbid / S.026 / pH: x  Gluc: x / Ketone: Trace  / Bili: Negative / Urobili: 3 mg/dL   Blood: x / Protein: 30 mg/dL / Nitrite: Negative   Leuk Esterase: Negative / RBC: 13 /HPF / WBC 5 /HPF   Sq Epi: x / Non Sq Epi: 1 /HPF / Bacteria: Negative        CARDIAC MARKERS ( 10 Mar 2021 21:00 )  x     / <0.01 ng/mL / x     / x     / x                                                LIVER FUNCTIONS - ( 11 Mar 2021 06:31 )  Alb: 2.2 g/dL / Pro: 5.9 g/dL / ALK PHOS: 84 U/L / ALT: 32 U/L / AST: 67 U/L / GGT: x                                                  Culture - Blood (collected 10 Mar 2021 21:00)  Source: .Blood Blood-Peripheral  Preliminary Report (12 Mar 2021 03:01):    No growth to date.    Culture - Blood (collected 10 Mar 2021 21:00)  Source: .Blood Blood-Peripheral  Preliminary Report (12 Mar 2021 03:01):    No growth to date.                                                                                           MEDICATIONS  (STANDING):  heparin   Injectable 5000 Unit(s) SubCutaneous every 8 hours  levoFLOXacin IVPB      meropenem  IVPB 1000 milliGRAM(s) IV Intermittent every 12 hours  meropenem  IVPB      norepinephrine Infusion 0.05 MICROgram(s)/kG/Min (5.63 mL/Hr) IV Continuous <Continuous>    MEDICATIONS  (PRN):  acetaminophen   Tablet .. 650 milliGRAM(s) Oral every 6 hours PRN Temp greater or equal to 38C (100.4F)      New X-rays reviewed:           Right sided infiltrate                                                                        ECHO    CXR interpreted by me:

## 2021-03-13 NOTE — PROGRESS NOTE ADULT - SUBJECTIVE AND OBJECTIVE BOX
Patient is a 85y old  Female who presents with a chief complaint of       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, osteoarithtis, Luxembourgish-speaking presenting with cough and fever.    As per daughter, patient has been having dry consistent cough since 2 months. Today, she was being evaluated for knee injections when she was found to be febrile. She also reports progressing generalized weakness with decreased appetite and PO intake.  No URT symptoms, no chest pain, no leg pain, no diarrhea, no urinary symptoms no sick contacts, no recent travel. In ED was hypotensive, was given IVF, started on levophed 0.04 called to evaluate (11 Mar 2021 03:21)      Pt evaluated on AM rounds.  I reviewed the radiology tests and hospital record prior to visiting the patient.    Interval Events: No overnight events.    REVIEW OF SYSTEMS:   see HPI      OBJECTIVE:  ICU Vital Signs Last 24 Hrs  T(C): 36.2 (13 Mar 2021 06:00), Max: 38.6 (12 Mar 2021 17:00)  T(F): 97.2 (13 Mar 2021 06:00), Max: 101.4 (12 Mar 2021 17:00)  HR: 89 (13 Mar 2021 06:00) (80 - 115)  BP: 118/59 (13 Mar 2021 06:00) (100/55 - 142/66)  BP(mean): 87 (12 Mar 2021 11:00) (75 - 87)  RR: 21 (13 Mar 2021 06:00) (18 - 36)  SpO2: 93% (12 Mar 2021 18:00) (92% - 99%)         @ : @ 07:00  --------------------------------------------------------  IN: 571.5 mL / OUT: 1305 mL / NET: -733.5 mL     @ 07:  -   @ 06:50  --------------------------------------------------------  IN: 500 mL / OUT: 1375 mL / NET: -875 mL      CAPILLARY BLOOD GLUCOSE            PHYSICAL EXAM:     · CONSTITUTIONAL:   septic appearing,   well nourished,   NAD    · ENMT:   Airway patent,   Nasal mucosa clear.  Mouth with normal mucosa.   No thrush    · EYES:   Clear bilaterally,   pupils equal,   round and reactive to light.    · CARDIAC:   Normal rate,   regular rhythm.    Heart sounds S1, S2.   No murmurs, no rubs or gallops on auscultation  no edema        CAROTID:   normal systolic impulse  no bruits    · RESPIRATORY:   rales  normal chest expansion  no retractions or use of accessory muscles  palpation of chest is normal with no fremitus  percussion of chest demonstrates no hyperresonance or dullness    · GASTROINTESTINAL:  Abdomen soft,   non-tender,   + BS  liver/spleen not palpable    · MUSCULOSKELETAL:   no clubbing, cyanosis      · NEUROLOGICAL:   awake alert oriented  no obvious cranial nerve abnormalities      · SKIN:   Skin normal color for race,   warm, dry   No evidence of rash.        · HEME LYMPH:   no splenomegaly.  No cervical  lymphadenopathy.  no inguinal lymphadenopathy    HOSPITAL MEDICATIONS:  MEDICATIONS  (STANDING):  cefepime   IVPB      cefepime   IVPB 2000 milliGRAM(s) IV Intermittent every 12 hours  heparin   Injectable 5000 Unit(s) SubCutaneous every 8 hours  levoFLOXacin IVPB 750 milliGRAM(s) IV Intermittent every 48 hours  polyethylene glycol 3350 17 Gram(s) Oral daily  senna 2 Tablet(s) Oral at bedtime  vancomycin  IVPB      vancomycin  IVPB 1250 milliGRAM(s) IV Intermittent every 12 hours    MEDICATIONS  (PRN):  acetaminophen   Tablet .. 650 milliGRAM(s) Oral every 6 hours PRN Temp greater or equal to 38C (100.4F)    lactated ringers Bolus:   500 milliLiter(s), IV Bolus, once, infuse over 1 Hour(s), Stop After 1 Doses  lactated ringers Bolus:   1000 milliLiter(s), IV Bolus, once, infuse over 60 Minute(s), Stop After 1 Doses  lactated ringers Bolus:   1000 milliLiter(s), IV Bolus, once, infuse over 1 Hour(s), Stop After 1 Doses  lactated ringers.: Solution, 1000 milliLiter(s) infuse at 100 mL/Hr  Provider's Contact #: [phone]  lactated ringers Bolus:   2000 milliLiter(s), IV Bolus, once, infuse over 120 Minute(s), Stop After 1 Doses  Provider's Contact #: [phone]      LABS:                        9.2    29.29 )-----------( 536      ( 12 Mar 2021 12:45 )             29.0     03-12    140  |  103  |  25<H>  ----------------------------<  160<H>  4.1   |  25  |  0.8    Ca    7.9<L>      12 Mar 2021 12:45    TPro  6.0  /  Alb  2.2<L>  /  TBili  0.3  /  DBili  x   /  AST  50<H>  /  ALT  32  /  AlkPhos  100  12      Urinalysis Basic - ( 11 Mar 2021 08:14 )    Color: Yellow / Appearance: Slightly Turbid / S.026 / pH: x  Gluc: x / Ketone: Trace  / Bili: Negative / Urobili: 3 mg/dL   Blood: x / Protein: 30 mg/dL / Nitrite: Negative   Leuk Esterase: Negative / RBC: 13 /HPF / WBC 5 /HPF   Sq Epi: x / Non Sq Epi: 1 /HPF / Bacteria: Negative                        RADIOLOGY: Today I personally reviewed latest CXR and other pertinent films.

## 2021-03-13 NOTE — PROGRESS NOTE ADULT - ASSESSMENT
IMPRESSION:    Severe CAP   Sepsis present on admission   Septic shock resolved     PLAN:    CNS:  no depressants     HEENT: Oral care    PULMONARY:  HOB @ 45 degrees.  Aspiration precautions.  Weano2     CARDIOVASCULAR:  NS for now     GI: GI prophylaxis.  Feeding per speech.  Bowel regimen     RENAL:  Follow up lytes.  Correct as needed    INFECTIOUS DISEASE: Follow up cultures.    Urine legionella and  Nasal MRSA negative  Cefepime, Van, and Levaquin.  until all cultures return    HEMATOLOGICAL:  DVT prophylaxis.      ENDOCRINE:  Follow up FS.     MUSCULOSKELETAL:  OOB to chair     PT OT

## 2021-03-14 NOTE — PROGRESS NOTE ADULT - SUBJECTIVE AND OBJECTIVE BOX
DESTIN TERRY 85y Female  MRN#: [id number]   Hospital Day: 3d    SUBJECTIVE  HOSPITAL COURSE:     85 year old woman known to have dementia, osteoarthritis, Turkmen-speaking presented with cough and fever.    As per daughter, patient had been having dry consistent cough for 2 months. On day of admission, she was being evaluated for knee injections when she was found to be febrile. She also reported progressive generalized weakness with decreased appetite and PO intake.  She reported no URT symptoms, no chest pain, no leg pain, no diarrhea, no urinary symptoms no sick contacts, no recent travel. In ED was hypotensive, was given IVF, started on levophed 0.04 and evaluated by ICU. Found to have patchy bilateral opacities, right greater than left suggestive of community acquired PNA. CT chest consistent with these findings. She was treated for severe sepsis and MENDOZA. MENDOZA has resolved with intravenous fluid administration. WBC count is downtrending. No longer requiring pressor support or supplemental oxygen. Remains on levofloxacin and cefepime as per critical care team. Blood cultures, legionella, and MRSA swab have been negative. Since MRSA PCR is negative, will discontinue vancomycin. Strep pneumo studies are pending. Echo showed EF 50-55% with grade I diastolic dysfunction. Some mild tricuspid and pulmonic valve regurgitation noted alongside borderline pulmonary HTN.     Overall patient appears stable on 3/13 and can be downgraded to medical floor.      INTERVAL HPI AND OVERNIGHT EVENTS:  Patient was examined and seen at bedside. This morning she is resting comfortably in bed and reports no issues or overnight events.    REVIEW OF SYMPTOMS:  CONSTITUTIONAL: No weakness, fevers or chills; No headaches  EYES: No visual changes, eye pain, or discharge  ENT: No vertigo; No ear pain or change in hearing; No sore throat or difficulty swallowing  NECK: No pain or stiffness  RESPIRATORY: No cough, wheezing, or hemoptysis; No shortness of breath  CARDIOVASCULAR: No chest pain or palpitations  GASTROINTESTINAL: No abdominal or epigastric pain; No nausea, vomiting, or hematemesis; No diarrhea or constipation; No melena or hematochezia  GENITOURINARY: No dysuria, frequency or hematuria  MUSCULOSKELETAL: No joint pain, no muscle pain, no weakness  NEUROLOGICAL: No numbness or weakness  SKIN: No itching or rashes    OBJECTIVE  PAST MEDICAL & SURGICAL HISTORY  Osteoarthritis    Dementia    No significant past surgical history      ALLERGIES:  clindamycin (Hives)    MEDICATIONS:  STANDING MEDICATIONS  cefepime   IVPB      cefepime   IVPB 2000 milliGRAM(s) IV Intermittent every 12 hours  ferrous    sulfate 325 milliGRAM(s) Oral daily  heparin   Injectable 5000 Unit(s) SubCutaneous every 8 hours  levoFLOXacin IVPB 750 milliGRAM(s) IV Intermittent every 48 hours  melatonin 3 milliGRAM(s) Oral at bedtime  polyethylene glycol 3350 17 Gram(s) Oral daily  senna 2 Tablet(s) Oral at bedtime  vancomycin  IVPB      vancomycin  IVPB 1250 milliGRAM(s) IV Intermittent every 12 hours    PRN MEDICATIONS  acetaminophen   Tablet .. 650 milliGRAM(s) Oral every 6 hours PRN  guaifenesin/dextromethorphan  Syrup 10 milliLiter(s) Oral every 4 hours PRN      VITAL SIGNS: Last 24 Hours  T(C): 36 (14 Mar 2021 00:00), Max: 37.8 (13 Mar 2021 19:45)  T(F): 96.8 (14 Mar 2021 00:00), Max: 100.1 (13 Mar 2021 19:45)  HR: 91 (14 Mar 2021 00:00) (89 - 119)  BP: 127/62 (14 Mar 2021 00:00) (118/59 - 145/69)  BP(mean): 98 (13 Mar 2021 19:45) (81 - 98)  RR: 18 (14 Mar 2021 00:00) (18 - 30)  SpO2: 94% (13 Mar 2021 19:45) (94% - 98%)    LABS:                        9.0    23.60 )-----------( 567      ( 13 Mar 2021 07:33 )             28.6     03-13    135  |  101  |  17  ----------------------------<  166<H>  4.4   |  25  |  0.8    Ca    8.1<L>      13 Mar 2021 07:33  Mg     1.9     03-13    TPro  5.8<L>  /  Alb  2.2<L>  /  TBili  0.3  /  DBili  x   /  AST  52<H>  /  ALT  30  /  AlkPhos  79  03-13                  RADIOLOGY:      PHYSICAL EXAM:  General: NAD, gives one word answers to questions, no signs of respiratory distress  HEAD: Atraumatic  NECK: Supple  CHEST/LUNG: Clear to auscultation bilaterally; No wheeze or crackles  HEART: S1, S2; RRR; No murmurs, rubs, or gallops  ABDOMEN: BS+; Soft, Non-tender, Non-distended  EXTREMITIES:  2+ Peripheral Pulses, No clubbing, cyanosis, or edema  PSYCH: AAOx1  NEUROLOGY: not fully participatory   SKIN: No rashes or lesions

## 2021-03-14 NOTE — PROGRESS NOTE ADULT - ATTENDING COMMENTS
Patient is a 85 year old woman known to have dementia, osteoarthritis, Slovenian-speaking presented with cough and fever. Found to be in sepsis with evidence of CAP. Patient was downgraded from IICU. Patient is currently on IV abx; stable on R/A.  Patient was seen and examined at bedside this AM.

## 2021-03-14 NOTE — PROGRESS NOTE ADULT - ASSESSMENT
85 year old woman known to have dementia, osteoarthritis, Nigerien-speaking presented with cough and fever. Found to be in sepsis with evidence of CAP.     #) Sepsis secondary to community acquired pneumonia (CAP)   - WBC 38k on admission > WBC 23k as of 3/14  - Procal elevated (1.75)   - No longer requiring pressor support   - Stable on room air   - WBC count downtrending  - Continue cefepime and levofloxacin   - Discontinued vancomycin as MRSA negative     #) MENDOZA   - Cr 1.7 on admission > Cr 0.8 as of 3/14  - Resolved with IV fluids   - No longer receiving IV fluids   - Monitor with daily labs    #) Microcytic anemia   - Iron studies:  Iron total low 13  % Iron Saturation low 10  TIBC low 125  - C/w Ferrous Sulfate 325mg PO Daily    #) Known dementia  - Patient appears a bit confused on exam  - Not noted to be different from baseline    #) Osteoarthritis  - Does not appear to be in any pain  - Monitor for now    #) Diet - Dysphagia III soft  #) DVT Prophylaxis - Heparin 5,000 Q8  #) Disposition- Floor  #) Activity- increase as tolerated  #) Code status - Full.

## 2021-03-15 NOTE — CONSULT NOTE ADULT - SUBJECTIVE AND OBJECTIVE BOX
DESTIN TERRY  85y, Female  Allergy: clindamycin (Hives)      CHIEF COMPLAINT:     LOS  4d    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, osteoarithtis, Mongolian-speaking presenting with cough and fever.    As per daughter, patient has been having dry consistent cough since 2 months. Today, she was being evaluated for knee injections when she was found to be febrile. She also reports progressing generalized weakness with decreased appetite and PO intake.  No URT symptoms, no chest pain, no leg pain, no diarrhea, no urinary symptoms no sick contacts, no recent travel. In ED was hypotensive, was given IVF, started on levophed 0.04 called to evaluate (11 Mar 2021 03:21)      INFECTIOUS DISEASE HISTORY:  History as above.   Treated with vancomycin/Cefepime.   Now improved, currently on room air.   Urine legionella negative.   Urine Strep negative.       PAST MEDICAL & SURGICAL HISTORY:  Osteoarthritis    Dementia    No significant past surgical history        FAMILY HISTORY  Family Hx reviewed and non-contributory     SOCIAL HISTORY  Social History:  Nonsmoker, no alcohol (11 Mar 2021 03:21)        ROS  General: Denies rigors, nightsweats  HEENT: Denies headache, rhinorrhea, sore throat, eye pain  CV: Denies CP, palpitations  PULM: Denies wheezing, hemoptysis  GI: Denies hematemesis, hematochezia, melena  : Denies discharge, hematuria  MSK: Denies arthralgias, myalgias  SKIN: Denies rash, lesions  NEURO: Denies paresthesias, weakness  PSYCH: Denies depression, anxiety    VITALS:  T(F): 99.9, Max: 99.9 (03-15-21 @ 08:10)  HR: 99  BP: 142/67  RR: 18Vital Signs Last 24 Hrs  T(C): 37.7 (15 Mar 2021 08:10), Max: 37.7 (15 Mar 2021 08:10)  T(F): 99.9 (15 Mar 2021 08:10), Max: 99.9 (15 Mar 2021 08:10)  HR: 99 (15 Mar 2021 08:10) (99 - 99)  BP: 142/67 (15 Mar 2021 08:10) (142/67 - 146/77)  BP(mean): --  RR: 18 (15 Mar 2021 08:10) (18 - 18)  SpO2: --    PHYSICAL EXAM:  Gen: NAD, resting in bed  HEENT: Normocephalic, atraumatic  Neck: supple, no lymphadenopathy  CV: Regular rate & regular rhythm  Lungs: decreased BS at bases, no fremitus  Abdomen: Soft, BS present  Ext: Warm, well perfused  Neuro: non focal, awake  Skin: no rash, no erythema  Lines: no phlebitis    TESTS & MEASUREMENTS:                        9.5    17.28 )-----------( 643      ( 15 Mar 2021 05:56 )             30.3     03-15    138  |  102  |  14  ----------------------------<  128<H>  4.3   |  25  |  0.8    Ca    8.4<L>      15 Mar 2021 05:56  Phos  3.5     03-15  Mg     1.9     03-15      eGFR if Non African American: 68 mL/min/1.73M2 (03-15-21 @ 05:56)  eGFR if : 79 mL/min/1.73M2 (03-15-21 @ 05:56)          Culture - Blood (collected 03-13-21 @ 07:33)  Source: .Blood None  Preliminary Report (03-14-21 @ 18:01):    No growth to date.    Culture - Blood (collected 03-10-21 @ 21:00)  Source: .Blood Blood-Peripheral  Preliminary Report (03-12-21 @ 03:01):    No growth to date.    Culture - Blood (collected 03-10-21 @ 21:00)  Source: .Blood Blood-Peripheral  Preliminary Report (03-12-21 @ 03:01):    No growth to date.        Lactate, Blood: 1.1 mmol/L (03-11-21 @ 06:31)  Blood Gas Venous - Lactate: 3.1 mmoL/L (03-10-21 @ 19:47)      INFECTIOUS DISEASES TESTING  Legionella Antigen, Urine: Negative (03-12-21 @ 10:30)  Streptococcus Pneumoniae Ag Urine: Negative (03-12-21 @ 10:30)  MRSA PCR Result.: Negative (03-12-21 @ 10:30)  Legionella Antigen, Urine: Negative (03-11-21 @ 08:10)  Streptococcus Pneumoniae Ag Urine: Negative (03-11-21 @ 08:10)  Procalcitonin, Serum: 1.75 ng/mL (03-11-21 @ 06:31)      RADIOLOGY & ADDITIONAL TESTS:  I have personally reviewed the last Chest xray  CXR      CT      CARDIOLOGY TESTING  12 Lead ECG:   Ventricular Rate 102 BPM    Atrial Rate 102 BPM    P-R Interval 140 ms    QRS Duration 88 ms    Q-T Interval 324 ms    QTC Calculation(Bazett) 422 ms    P Axis 32 degrees    R Axis 49 degrees    T Axis 39 degrees    Diagnosis Line Sinus tachycardia  Otherwise normal ECG    Confirmed by Job Bains ([id number]) on 3/10/2021 11:06:24 PM (03-10-21 @ 20:30)      MEDICATIONS  cefepime   IVPB     cefepime   IVPB 2000 IV Intermittent every 12 hours  chlorhexidine 4% Liquid 1 Topical <User Schedule>  enoxaparin Injectable 40 SubCutaneous daily  ferrous    sulfate 325 Oral daily  levoFLOXacin IVPB 750 IV Intermittent every 48 hours  melatonin 3 Oral at bedtime  polyethylene glycol 3350 17 Oral daily  senna 2 Oral at bedtime      Weight  Weight (kg): 60 (03-11-21 @ 01:17)    ANTIBIOTICS:  cefepime   IVPB      cefepime   IVPB 2000 milliGRAM(s) IV Intermittent every 12 hours  levoFLOXacin IVPB 750 milliGRAM(s) IV Intermittent every 48 hours      ALLERGIES:  clindamycin (Hives)

## 2021-03-15 NOTE — PROGRESS NOTE ADULT - ASSESSMENT
85 year old woman known to have dementia, osteoarthritis, Turkish-speaking presented with cough and fever. Found to be in sepsis with evidence of CAP.     #) Sepsis secondary to Necrotizing PNA vs Empyema  - WBC 38k on admission > WBC today 17K  - CT Chest: PNA w/ numerous AF levels, possible empyema  - BP stable, no longer requiring pressors, sat well on RA  - ABx: Cefepime and Levaquin, d/c'ed Vanco (MRSA nares (-))  - Procal elevated (1.75)   - f/u ID consult    #) MENDOZA - resolved s/p IVF  - Cr 1.7 on admission > currently stable at 0.8    #) Microcytic anemia   - Iron studies:      - Iron total low 13      - % Iron Saturation low 10      - TIBC low 125  - C/w Ferrous Sulfate 325mg PO Daily    #) Known dementia  - Patient appears a bit confused on exam  - Not noted to be different from baseline    #) Osteoarthritis  - Does not appear to be in any pain  - Monitor for now    #) Diet - Dysphagia III soft  #) DVT Prophylaxis - Heparin 5,000 Q8  #) Disposition- Floor  #) Activity- increase as tolerated  #) Code status - Full.

## 2021-03-15 NOTE — PROGRESS NOTE ADULT - SUBJECTIVE AND OBJECTIVE BOX
SUBJECTIVE:  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, osteoarithtis, Anguillan-speaking presenting with cough and fever.    As per daughter, patient has been having dry consistent cough since 2 months. Today, she was being evaluated for knee injections when she was found to be febrile. She also reports progressing generalized weakness with decreased appetite and PO intake.  No URT symptoms, no chest pain, no leg pain, no diarrhea, no urinary symptoms no sick contacts, no recent travel. In ED was hypotensive, was given IVF, started on levophed 0.04 called to evaluate (11 Mar 2021 03:21)      PAST MEDICAL & SURGICAL HISTORY  PAST MEDICAL & SURGICAL HISTORY:  Osteoarthritis    Dementia    No significant past surgical history      SOCIAL HISTORY:    ALLERGIES:  clindamycin (Hives)    MEDICATIONS:  STANDING MEDICATIONS  cefepime   IVPB      cefepime   IVPB 2000 milliGRAM(s) IV Intermittent every 12 hours  chlorhexidine 4% Liquid 1 Application(s) Topical <User Schedule>  enoxaparin Injectable 40 milliGRAM(s) SubCutaneous daily  ferrous    sulfate 325 milliGRAM(s) Oral daily  levoFLOXacin IVPB 750 milliGRAM(s) IV Intermittent every 48 hours  melatonin 3 milliGRAM(s) Oral at bedtime  polyethylene glycol 3350 17 Gram(s) Oral daily  senna 2 Tablet(s) Oral at bedtime    PRN MEDICATIONS  acetaminophen   Tablet .. 650 milliGRAM(s) Oral every 6 hours PRN  guaifenesin/dextromethorphan  Syrup 10 milliLiter(s) Oral every 4 hours PRN    VITALS:   T(F): 99.9  HR: 99  BP: 142/67  RR: 18  SpO2: --    LABS:                        9.5    17.28 )-----------( 643      ( 15 Mar 2021 05:56 )             30.3     03-15    138  |  102  |  14  ----------------------------<  128<H>  4.3   |  25  |  0.8    Ca    8.4<L>      15 Mar 2021 05:56  Phos  3.5     03-15  Mg     1.9     03-15                Culture - Blood (collected 13 Mar 2021 07:33)  Source: .Blood None  Preliminary Report (14 Mar 2021 18:01):    No growth to date.          RADIOLOGY:    PHYSICAL EXAM:  GEN: No acute distress  HEENT: AT/NC PEERLA, EOMI  LUNGS: Clear to auscultation bilaterally,   HEART: S1/S2 present. RRR. no rubs, murmurs or gallops  ABD: Soft, b/l lower abd tenderness of palpation, non-distended, unable to asses for CVA tenderness  EXT: No edema, no rashes, no cyanosis  NEURO: AAOX3 SUBJECTIVE:  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, osteoarithtis, Burmese-speaking presenting with cough and fever.    As per daughter, patient has been having dry consistent cough since 2 months. Today, she was being evaluated for knee injections when she was found to be febrile. She also reports progressing generalized weakness with decreased appetite and PO intake.  No URT symptoms, no chest pain, no leg pain, no diarrhea, no urinary symptoms no sick contacts, no recent travel. In ED was hypotensive, was given IVF, started on levophed 0.04 called to evaluate (11 Mar 2021 03:21)      PAST MEDICAL & SURGICAL HISTORY  PAST MEDICAL & SURGICAL HISTORY:  Osteoarthritis    Dementia    No significant past surgical history      SOCIAL HISTORY:    ALLERGIES:  clindamycin (Hives)    MEDICATIONS:  STANDING MEDICATIONS  cefepime   IVPB      cefepime   IVPB 2000 milliGRAM(s) IV Intermittent every 12 hours  chlorhexidine 4% Liquid 1 Application(s) Topical <User Schedule>  enoxaparin Injectable 40 milliGRAM(s) SubCutaneous daily  ferrous    sulfate 325 milliGRAM(s) Oral daily  levoFLOXacin IVPB 750 milliGRAM(s) IV Intermittent every 48 hours  melatonin 3 milliGRAM(s) Oral at bedtime  polyethylene glycol 3350 17 Gram(s) Oral daily  senna 2 Tablet(s) Oral at bedtime    PRN MEDICATIONS  acetaminophen   Tablet .. 650 milliGRAM(s) Oral every 6 hours PRN  guaifenesin/dextromethorphan  Syrup 10 milliLiter(s) Oral every 4 hours PRN    VITALS:   T(F): 99.9  HR: 99  BP: 142/67  RR: 18  SpO2: --    LABS:                        9.5    17.28 )-----------( 643      ( 15 Mar 2021 05:56 )             30.3     03-15    138  |  102  |  14  ----------------------------<  128<H>  4.3   |  25  |  0.8    Ca    8.4<L>      15 Mar 2021 05:56  Phos  3.5     03-15  Mg     1.9     03-15                Culture - Blood (collected 13 Mar 2021 07:33)  Source: .Blood None  Preliminary Report (14 Mar 2021 18:01):    No growth to date.          RADIOLOGY:    PHYSICAL EXAM:  GEN: No acute distress  HEENT: AT/NC PEERLA, EOMI  LUNGS: Unable to assess  HEART: S1/S2 present  ABD: Soft, non-tender, BS +  EXT: No edema, no rashes, no cyanosis  NEURO: AAOX3

## 2021-03-15 NOTE — CONSULT NOTE ADULT - ASSESSMENT
ASSESSMENT  85 year old lady known to have dementia, osteoarithtis, Botswanan-speaking presenting with cough and fever.      IMPRESSION  #Sepsis present on admission - secondary to CAP  -  CT Chest No Cont (03.11.21 @ 00:54): Areas of right lung consolidation which can be seen in pneumonia. Numerous air-fluid levels throughout the right lung compatible with an infectious process. Suggestion of split pleura at the lung base for which empyema is favored although inherently limited on this noncontrast exam. Consideration can be given to contrast administration if feasiblefor better delineation. Areas of bilateral interlobular septal thickening and mild layering groundglass attenuation may reflect a component of edema.  - Urine Legionella negative  - Urine Strep negative  - BLood Cx 3/10 and 3/13 negative  - Procalcitonin 1.15     #Dementia  #Osteoarthritis  #Obesity BMI (kg/m2): 23.4  #Abx allergy: clindamycin (Hives)    Creatinine, Serum: 0.8 mg/dL (03.15.21 @ 05:56)  Weight (kg): 60 (11 Mar 2021 01:17)  CrCl 50      RECOMMENDATIONS  - can stop cefepime  - continue levofloxacin 750 mg q 48 hours  - plan for 7 day course of antibiotics, end date 3/18  - repeat procalcitonin     Please call or message on [employer] Teams if with any questions.  Spectra [phone]

## 2021-03-15 NOTE — PROGRESS NOTE ADULT - ATTENDING COMMENTS
Darcie Kirby MD  Hospitalist   Spectra: [phone]    Patient is a 85y old  Female who presents with a chief complaint of     INTERVAL HPI/OVERNIGHT EVENTS: no acute overnight events noted   patient was transferred to  overnight   initally admitted to ICU with septic shock 2/2 pneumonia  currently comfortably sitting in chair - no distress noted     REVIEW OF SYSTEMS: negative  Vital Signs Last 24 Hrs  T(C): 37.2 (15 Mar 2021 15:20), Max: 37.7 (15 Mar 2021 08:10)  T(F): 98.9 (15 Mar 2021 15:20), Max: 99.9 (15 Mar 2021 08:10)  HR: 96 (15 Mar 2021 15:20) (96 - 99)  BP: 123/56 (15 Mar 2021 15:20) (123/56 - 146/77)  BP(mean): --  RR: 18 (15 Mar 2021 15:20) (18 - 18)  SpO2: --    PHYSICAL EXAM:   NAD; Normocephalic;   LUNGS - no wheezing  HEART: S1 S2+   ABDOMEN: Soft, Nontender, non distended  EXTREMITIES: no cyanosis; no edema  NERVOUS SYSTEM:  Awake and alert; no focal neuro deficits appreciated    LABS:                        9.5    17.28 )-----------( 643      ( 15 Mar 2021 05:56 )             30.3     03-15    138  |  102  |  14  ----------------------------<  128<H>  4.3   |  25  |  0.8    Ca    8.4<L>      15 Mar 2021 05:56  Phos  3.5     03-15  Mg     1.9     03-15    A/P:-  Pt is seen and evaluated by bedside.   1. Septic shock (resolved) 2/2 gram negative pneumonia   2. MENDOZA - resolved   3. Microcytic anemia   4. Dementia   5. OA     - CT Chest: PNA w/ numerous AF levels, possible empyema  - s/p pressure support and icu admission   - s/p vanco, cefepime  - currently on levoflox       Plan of care was discussed with patient ; all questions and concerns were addressed and care was aligned with patient's wishes. Darcie Kirby MD  Hospitalist   Spectra: [phone]    Patient is a 85y old  Female who presents with a chief complaint of     INTERVAL HPI/OVERNIGHT EVENTS: no acute overnight events noted   patient was transferred to  overnight   initally admitted to ICU with septic shock 2/2 pneumonia  currently comfortably sitting in chair - no distress noted     REVIEW OF SYSTEMS: negative  Vital Signs Last 24 Hrs  T(C): 37.2 (15 Mar 2021 15:20), Max: 37.7 (15 Mar 2021 08:10)  T(F): 98.9 (15 Mar 2021 15:20), Max: 99.9 (15 Mar 2021 08:10)  HR: 96 (15 Mar 2021 15:20) (96 - 99)  BP: 123/56 (15 Mar 2021 15:20) (123/56 - 146/77)  BP(mean): --  RR: 18 (15 Mar 2021 15:20) (18 - 18)  SpO2: --    PHYSICAL EXAM:   NAD; Normocephalic;   LUNGS - no wheezing  HEART: S1 S2+   ABDOMEN: Soft, Nontender, non distended  EXTREMITIES: no cyanosis; no edema  NERVOUS SYSTEM:  Awake and alert; no focal neuro deficits appreciated    LABS:                        9.5    17.28 )-----------( 643      ( 15 Mar 2021 05:56 )             30.3     03-15    138  |  102  |  14  ----------------------------<  128<H>  4.3   |  25  |  0.8    Ca    8.4<L>      15 Mar 2021 05:56  Phos  3.5     03-15  Mg     1.9     03-15    A/P:-  Pt is seen and evaluated by bedside.   1. Septic shock (resolved) 2/2 gram negative pneumonia   2. MENDOZA - resolved   3. Microcytic anemia   4. Dementia   5. OA     - CT Chest: PNA w/ numerous AF levels, possible empyema  - s/p pressure support and icu admission   - s/p vanco, cefepime  - currently on levoflox q48 last dose on 3/18  - no distress on exam - comfortably sitting   - renal function back to baseline   - will obtain nutrition consult as daughter is reporting decrese po intake   - encourage patient for small frequent meals   - ensure added to diet   - home medications reviewed and restarted as indicated     Dispo: discharge home with VNS/PT, likely tomorrow after dose of levaquin   Plan of care was discussed with patient daughter over phone ; all questions and concerns were addressed and care was aligned with patient's wishes.

## 2021-03-16 NOTE — PROGRESS NOTE ADULT - ATTENDING COMMENTS
Darcie Kirby MD  Hospitalist   Spectra: [phone]    Patient is a 85y old  Female who presents with a chief complaint of     INTERVAL HPI/OVERNIGHT EVENTS: no acute overnight events noted   patient is comfortably sitting in bed - eating breakfast   wbc raise to 22k this am     REVIEW OF SYSTEMS: negative  Vital Signs Last 24 Hrs  T(C): 35.4 (16 Mar 2021 08:00), Max: 37.3 (16 Mar 2021 00:00)  T(F): 95.8 (16 Mar 2021 08:00), Max: 99.1 (16 Mar 2021 00:00)  HR: 104 (16 Mar 2021 08:00) (93 - 104)  BP: 115/59 (16 Mar 2021 08:00) (115/59 - 136/93)  BP(mean): --  RR: 20 (16 Mar 2021 08:00) (16 - 20)  SpO2: --    PHYSICAL EXAM:   NAD; Normocephalic;   LUNGS - no wheezing  HEART: S1 S2+   ABDOMEN: Soft, Nontender, non distended  EXTREMITIES: no cyanosis; no edema  NERVOUS SYSTEM:  Awake and alert; no focal neuro deficits appreciated    LABS:                        10.0   22.52 )-----------( 680      ( 16 Mar 2021 05:28 )             32.7     03-16    137  |  99  |  13  ----------------------------<  105<H>  4.7   |  26  |  0.8    Ca    8.7      16 Mar 2021 05:28  Phos  3.6     03-16  Mg     2.1     03-16    A/P:-  Pt is seen and evaluated by bedside.   1. Septic shock (resolved) 2/2 gram negative pneumonia   2. MENDOZA - resolved   3. Microcytic anemia - iron deficiency   4. Dementia   5. OA     - CT Chest: PNA w/ numerous AF levels, possible empyema  - s/p pressure support and icu admission   - s/p vanco, cefepime  - currently on levoflox q48 last dose on 3/18  - bump in wbc count today 17-> 22 (likely hemoconcentration as all 3 cell lines are elevated)  - renal function back to baseline   - will obtain nutrition consult as daughter is reporting decrease po intake   - encourage patient for small frequent meals   - ensure added to diet   - patient was taking iron infusion as outpatient - here on oral supplement --> will give 1 iv iron sucrose   - home medications reviewed and restarted as indicated   - dc montaño - TOV     Dispo: discharge home with VNS/PT, likely tomorrow if wbc trending down   plan discussed with daughter over phone - agreeable   Plan of care was discussed with patient ; all questions and concerns were addressed and care was aligned with patient's wishes.

## 2021-03-16 NOTE — PROGRESS NOTE ADULT - SUBJECTIVE AND OBJECTIVE BOX
SUBJECTIVE:  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, osteoarithtis, [place]adian-speaking presenting with cough and fever.    As per daughter, patient has been having dry consistent cough since 2 months. Today, she was being evaluated for knee injections when she was found to be febrile. She also reports progressing generalized weakness with decreased appetite and PO intake.  No URT symptoms, no chest pain, no leg pain, no diarrhea, no urinary symptoms no sick contacts, no recent travel. In ED was hypotensive, was given IVF, started on levophed 0.04 called to evaluate (11 Mar 2021 03:21)      PAST MEDICAL & SURGICAL HISTORY  PAST MEDICAL & SURGICAL HISTORY:  Osteoarthritis    Dementia    No significant past surgical history      SOCIAL HISTORY:    ALLERGIES:  clindamycin (Hives)    MEDICATIONS:  STANDING MEDICATIONS  chlorhexidine 4% Liquid 1 Application(s) Topical <User Schedule>  enoxaparin Injectable 40 milliGRAM(s) SubCutaneous daily  ferrous    sulfate 325 milliGRAM(s) Oral daily  levoFLOXacin IVPB 750 milliGRAM(s) IV Intermittent every 48 hours  melatonin 3 milliGRAM(s) Oral at bedtime  polyethylene glycol 3350 17 Gram(s) Oral daily  senna 2 Tablet(s) Oral at bedtime    PRN MEDICATIONS  acetaminophen   Tablet .. 650 milliGRAM(s) Oral every 6 hours PRN  guaifenesin/dextromethorphan  Syrup 10 milliLiter(s) Oral every 4 hours PRN    VITALS:   T(F): 95.8  HR: 104  BP: 115/59  RR: 20  SpO2: --    LABS:                        10.0   22.52 )-----------( 680      ( 16 Mar 2021 05:28 )             32.7     03-16    137  |  99  |  13  ----------------------------<  105<H>  4.7   |  26  |  0.8    Ca    8.7      16 Mar 2021 05:28  Phos  3.6     03-16  Mg     2.1     03-16                    RADIOLOGY:    PHYSICAL EXAM:  GEN: No acute distress  HEENT: AT/NC PEERLA, EOMI  LUNGS: Unable to assess  HEART: S1/S2 present  ABD: Soft, non-tender, BS +  EXT: No edema, no rashes, no cyanosis  NEURO: AAOX3

## 2021-03-16 NOTE — PHYSICAL THERAPY INITIAL EVALUATION ADULT - GENERAL OBSERVATIONS, REHAB EVAL
PT IE 10-10:30am. Pt supine in NAD. +call bell, +bed alarm, +montaño. Marshallese speaking. Therapist speaks language fluently and communicated effectively with patient. Patient however can understand basic english.

## 2021-03-16 NOTE — PROGRESS NOTE ADULT - ASSESSMENT
85 year old woman known to have dementia, osteoarthritis, North Korean-speaking presented with cough and fever. Found to be in sepsis with evidence of CAP.     #) Sepsis secondary to Necrotizing PNA vs Empyema  - WBC 38k on admission > WBC was trending down (17K yesterday) however today at 22.5K, will monitor for improvement  - CT Chest: PNA w/ numerous AF levels, possible empyema  - BP stable, no longer requiring pressors, sat well on RA  - ABx: Levaquin 750 mg IV q48, Continue until 03/18, d/c'ed Vanco (MRSA nares (-)) and Cefepime as per ID  - Procal elevated (1.75), f/u repeat    #) MENDOZA - resolved s/p IVF  - Cr 1.7 on admission > currently stable at 0.8    #) Microcytic anemia   - Iron studies:     - Iron total low 13     - % Iron Saturation low 10     - TIBC low 125  - C/w Ferrous Sulfate 325mg PO Daily    #) Known dementia  - Patient appears a bit confused on exam  - Not noted to be different from baseline    #) Osteoarthritis  - Does not appear to be in any pain  - Monitor for now    #) Diet - Dysphagia III soft  #) DVT Prophylaxis - Heparin 5,000 Q8  #) Disposition- Floor  #) Activity- increase as tolerated  #) Code status - Full.

## 2021-03-17 NOTE — PROGRESS NOTE ADULT - ASSESSMENT
ASSESSMENT  85 year old lady known to have dementia, osteoarithtis, [place]er-speaking presenting with cough and fever.      IMPRESSION  #Sepsis present on admission - secondary to CAP  -  CT Chest No Cont (03.11.21 @ 00:54): Areas of right lung consolidation which can be seen in pneumonia. Numerous air-fluid levels throughout the right lung compatible with an infectious process. Suggestion of split pleura at the lung base for which empyema is favored although inherently limited on this noncontrast exam. Consideration can be given to contrast administration if feasiblefor better delineation. Areas of bilateral interlobular septal thickening and mild layering groundglass attenuation may reflect a component of edema.  - Urine Legionella negative  - Urine Strep negative  - BLood Cx 3/10 and 3/13 negative  - Procalcitonin 1.15     #Dementia  #Osteoarthritis  #Obesity BMI (kg/m2): 23.4  #Abx allergy: clindamycin (Hives)    Creatinine, Serum: 0.8 mg/dL (03.15.21 @ 05:56)  Weight (kg): 60 (11 Mar 2021 01:17)  CrCl 50      RECOMMENDATIONS  - cefepime 2g q 12 hours  - continue levofloxacin 750 mg q 48 hours  - follow-up repeat CT chest     Please call or message on [employer] Teams if with any questions.  Spectra [phone]

## 2021-03-17 NOTE — PROGRESS NOTE ADULT - ASSESSMENT
Impression  Severe CAP  Sepsis present on admission   Septic shock, resolved     Recommendations  Repeat procal  Repeat CT Chest NC  Cefepime and levofloxacin for now  Repeat Blood and sputum cultures  Fungitell  HOB at 45  Aspiration precautions  DVT ppx Impression  Severe necrotizing CAP  Sepsis present on admission   Septic shock, resolved     Recommendations  Restart Cefepime for now  Repeat Blood and sputum cultures  HOB at 45  Aspiration precautions  DVT ppx  Dimer

## 2021-03-17 NOTE — PROGRESS NOTE ADULT - SUBJECTIVE AND OBJECTIVE BOX
DESTIN TERRY  85y, Female  Allergy: clindamycin (Hives)      LOS  6d    CHIEF COMPLAINT:     INTERVAL EVENTS/HPI  - No acute events overnight  - T(F): , Max: 100.5 (03-17-21 @ 00:00)  - Febrile overnight - remains on room air, deneis any new respiratory symptoms   - no diarrhea, dysuria    - Denies any worsening symptoms  - Tolerating medication  - WBC Count: 27.35 (03-17-21 @ 06:52)  WBC Count: 22.52 (03-16-21 @ 05:28)     - Creatinine, Serum: 0.8 (03-17-21 @ 06:52)  Creatinine, Serum: 0.8 (03-16-21 @ 05:28)       ROS  General: Denies rigors, nightsweats  HEENT: Denies headache, rhinorrhea, sore throat, eye pain  CV: Denies CP, palpitations  PULM: Denies wheezing, hemoptysis  GI: Denies hematemesis, hematochezia, melena  : Denies discharge, hematuria  MSK: Denies arthralgias, myalgias  SKIN: Denies rash, lesions  NEURO: Denies paresthesias, weakness  PSYCH: Denies depression, anxiety    VITALS:  T(F): 98.4, Max: 100.5 (03-17-21 @ 00:00)  HR: 91  BP: 101/63  RR: 18Vital Signs Last 24 Hrs  T(C): 36.9 (17 Mar 2021 08:00), Max: 38.1 (17 Mar 2021 00:00)  T(F): 98.4 (17 Mar 2021 08:00), Max: 100.5 (17 Mar 2021 00:00)  HR: 91 (17 Mar 2021 08:00) (91 - 104)  BP: 101/63 (17 Mar 2021 08:00) (101/63 - 142/60)  BP(mean): --  RR: 18 (17 Mar 2021 08:00) (16 - 18)  SpO2: --    PHYSICAL EXAM:  Gen: NAD, resting in bed  HEENT: Normocephalic, atraumatic  Neck: supple, no lymphadenopathy  CV: Regular rate & regular rhythm  Lungs: decreased BS at bases, no fremitus  Abdomen: Soft, BS present  Ext: Warm, well perfused  Neuro: non focal, awake  Skin: no rash, no erythema  Lines: no phlebitis    FH: Non-contributory  Social Hx: Non-contributory    TESTS & MEASUREMENTS:                        9.9    27.35 )-----------( 715      ( 17 Mar 2021 06:52 )             32.4     03-17    134<L>  |  97<L>  |  12  ----------------------------<  126<H>  4.2   |  27  |  0.8    Ca    8.4<L>      17 Mar 2021 06:52  Phos  3.6     03-16  Mg     2.0     03-17      eGFR if Non African American: 68 mL/min/1.73M2 (03-17-21 @ 06:52)  eGFR if : 79 mL/min/1.73M2 (03-17-21 @ 06:52)          Culture - Blood (collected 03-13-21 @ 07:33)  Source: .Blood None  Preliminary Report (03-14-21 @ 18:01):    No growth to date.    Culture - Blood (collected 03-10-21 @ 21:00)  Source: .Blood Blood-Peripheral  Final Report (03-16-21 @ 04:01):    No Growth Final    Culture - Blood (collected 03-10-21 @ 21:00)  Source: .Blood Blood-Peripheral  Final Report (03-16-21 @ 04:01):    No Growth Final            INFECTIOUS DISEASES TESTING  Procalcitonin, Serum: 0.31 (03-16-21 @ 10:15)  Legionella Antigen, Urine: Negative (03-12-21 @ 10:30)  Streptococcus Pneumoniae Ag Urine: Negative (03-12-21 @ 10:30)  MRSA PCR Result.: Negative (03-12-21 @ 10:30)  Legionella Antigen, Urine: Negative (03-11-21 @ 08:10)  Streptococcus Pneumoniae Ag Urine: Negative (03-11-21 @ 08:10)  Procalcitonin, Serum: 1.75 (03-11-21 @ 06:31)  Rapid RVP Result: NotDetec (03-10-21 @ 22:10)      INFLAMMATORY MARKERS      RADIOLOGY & ADDITIONAL TESTS:  I have personally reviewed the last available Chest xray  CXR  Xray Chest 1 View- PORTABLE-Urgent:   EXAM:  XR CHEST PORTABLE URGENT 1V            PROCEDURE DATE:  03/17/2021            INTERPRETATION:  STUDY INDICATION: sepsis    Comparison: XR  3/11/2021.    Technique/Positioning: Frontal view of the chest.    Findings:    Support devices: Interval removal of right IJ venous catheter.    Cardiac/mediastinum/hilum: Stable appearance of the cardiomediastinal silhouette.    Lung parenchyma/Pleura: Increased right mid to lower lung consolidation. Additional bilateral patchy opacities similar to slightly increased. No pneumothorax.    Skeleton/soft tissues: Unchanged.    Impression:    Increased right mid to lower lung consolidation. Additional bilateral patchy opacities similar to slightly increased. No pneumothorax.              OLIVE HENRY; Attending Radiologist  This document has been electronically signed. Mar 17 2021  1:45PM (03-17-21 @ 11:39)      CT      CARDIOLOGY TESTING  12 Lead ECG:   Ventricular Rate 102 BPM    Atrial Rate 102 BPM    P-R Interval 140 ms    QRS Duration 88 ms    Q-T Interval 324 ms    QTC Calculation(Bazett) 422 ms    P Axis 32 degrees    R Axis 49 degrees    T Axis 39 degrees    Diagnosis Line Sinus tachycardia  Otherwise normal ECG    Confirmed by Job Bains ([id number]) on 3/10/2021 11:06:24 PM (03-10-21 @ 20:30)      MEDICATIONS  cefepime   IVPB 2000 IV Intermittent every 12 hours  chlorhexidine 4% Liquid 1 Topical <User Schedule>  enoxaparin Injectable 40 SubCutaneous daily  ferrous    sulfate 325 Oral daily  levoFLOXacin IVPB 750 IV Intermittent every 48 hours  melatonin 3 Oral at bedtime  polyethylene glycol 3350 17 Oral daily  senna 2 Oral at bedtime      WEIGHT  Weight (kg): 60 (03-11-21 @ 01:17)  Creatinine, Serum: 0.8 mg/dL (03-17-21 @ 06:52)      ANTIBIOTICS:  cefepime   IVPB 2000 milliGRAM(s) IV Intermittent every 12 hours  levoFLOXacin IVPB 750 milliGRAM(s) IV Intermittent every 48 hours      All available historical records have been reviewed

## 2021-03-17 NOTE — DIETITIAN INITIAL EVALUATION ADULT. - NAME AND PHONE
Nutrition Interventions: meals and snacks, medical food supplements RD to monitor diet order, energy intake, body composition, NFPF, electrolytes

## 2021-03-17 NOTE — PROGRESS NOTE ADULT - SUBJECTIVE AND OBJECTIVE BOX
SUBJECTIVE:    85 year old lady known to have dementia, osteoarithtis, Nepalese-speaking presenting with cough and fever. As per daughter, patient has been having dry consistent cough since 2 months. Today, she was being evaluated for knee injections when she was found to be febrile. She also reports progressing generalized weakness with decreased appetite and PO intake.  No URT symptoms, no chest pain, no leg pain, no diarrhea, no urinary symptoms no sick contacts, no recent travel. In ED was hypotensive, was given IVF, started on levophed 0.04 called to evaluate (11 Mar 2021 03:21)    PAST MEDICAL & SURGICAL HISTORY  PAST MEDICAL & SURGICAL HISTORY:  Osteoarthritis    Dementia    No significant past surgical history      SOCIAL HISTORY:    ALLERGIES:  clindamycin (Hives)    MEDICATIONS:  STANDING MEDICATIONS  chlorhexidine 4% Liquid 1 Application(s) Topical <User Schedule>  enoxaparin Injectable 40 milliGRAM(s) SubCutaneous daily  ferrous    sulfate 325 milliGRAM(s) Oral daily  levoFLOXacin IVPB 750 milliGRAM(s) IV Intermittent every 48 hours  melatonin 3 milliGRAM(s) Oral at bedtime  polyethylene glycol 3350 17 Gram(s) Oral daily  senna 2 Tablet(s) Oral at bedtime    PRN MEDICATIONS  acetaminophen   Tablet .. 650 milliGRAM(s) Oral every 6 hours PRN  guaifenesin/dextromethorphan  Syrup 10 milliLiter(s) Oral every 4 hours PRN    VITALS:   T(F): 98.2  HR: 104  BP: 142/60  RR: 16  SpO2: --    LABS:                        9.9    27.35 )-----------( 715      ( 17 Mar 2021 06:52 )             32.4     03-16    137  |  99  |  13  ----------------------------<  105<H>  4.7   |  26  |  0.8    Ca    8.7      16 Mar 2021 05:28  Phos  3.6     03-16  Mg     2.1     03-16                    RADIOLOGY:    PHYSICAL EXAM:  GEN: No acute distress  HEENT: AT/NC PEERLA, EOMI  LUNGS: Clear to auscultation bilaterally,   HEART: S1/S2 present. RRR. no rubs, murmurs or gallops  ABD: Soft, non-tender, non-distended. Bowel sounds present in all 4 quadrants  EXT: No edema, no rashes, no cyanosis  NEURO: AAOX3, CN 2-12 intact SUBJECTIVE:    85 year old lady known to have dementia, osteoarithtis, [place]awian-speaking presenting with cough and fever. As per daughter, patient has been having dry consistent cough since 2 months. Today, she was being evaluated for knee injections when she was found to be febrile. She also reports progressing generalized weakness with decreased appetite and PO intake.  No URT symptoms, no chest pain, no leg pain, no diarrhea, no urinary symptoms no sick contacts, no recent travel. In ED was hypotensive, was given IVF, started on levophed 0.04 called to evaluate (11 Mar 2021 03:21)    PAST MEDICAL & SURGICAL HISTORY  PAST MEDICAL & SURGICAL HISTORY:  Osteoarthritis    Dementia    No significant past surgical history      SOCIAL HISTORY:    ALLERGIES:  clindamycin (Hives)    MEDICATIONS:  STANDING MEDICATIONS  chlorhexidine 4% Liquid 1 Application(s) Topical <User Schedule>  enoxaparin Injectable 40 milliGRAM(s) SubCutaneous daily  ferrous    sulfate 325 milliGRAM(s) Oral daily  levoFLOXacin IVPB 750 milliGRAM(s) IV Intermittent every 48 hours  melatonin 3 milliGRAM(s) Oral at bedtime  polyethylene glycol 3350 17 Gram(s) Oral daily  senna 2 Tablet(s) Oral at bedtime    PRN MEDICATIONS  acetaminophen   Tablet .. 650 milliGRAM(s) Oral every 6 hours PRN  guaifenesin/dextromethorphan  Syrup 10 milliLiter(s) Oral every 4 hours PRN    VITALS:   T(F): 98.2  HR: 104  BP: 142/60  RR: 16  SpO2: --    LABS:                        9.9    27.35 )-----------( 715      ( 17 Mar 2021 06:52 )             32.4     03-16    137  |  99  |  13  ----------------------------<  105<H>  4.7   |  26  |  0.8    Ca    8.7      16 Mar 2021 05:28  Phos  3.6     03-16  Mg     2.1     03-16                    RADIOLOGY:    PHYSICAL EXAM:  GEN: No acute distress  HEENT: AT/NC PEERLA, EOMI  LUNGS: Unable to assess  HEART: S1/S2 present  ABD: Soft, non-tender, BS +  EXT: No edema, no rashes, no cyanosis  NEURO: AAOX3

## 2021-03-17 NOTE — PROGRESS NOTE ADULT - SUBJECTIVE AND OBJECTIVE BOX
Patient is a 85y old  Female who presents with a chief complaint of       SUBJECTIVE:  Mongolian  ID: [id number]. Patient states she feel fine, denies any shortness of breath or pain anywhere. Febrile to 100.5 overnight.       PHYSICAL EXAM  Vital Signs Last 24 Hrs  T(C): 36.9 (17 Mar 2021 08:00), Max: 38.1 (17 Mar 2021 00:00)  T(F): 98.4 (17 Mar 2021 08:00), Max: 100.5 (17 Mar 2021 00:00)  HR: 91 (17 Mar 2021 08:00) (91 - 117)  BP: 101/63 (17 Mar 2021 08:00) (101/63 - 142/60)  RR: 18 (17 Mar 2021 08:00) (16 - 18)    CONSTITUTIONAL:  Elderly, frail appearing female. NAD    ENT:   Airway patent,   No thrush    EYES:   Clear bilaterally,   pupils equal, round and reactive to light.    CARDIAC:   Normal rate,   regular rhythm.    no edema    CAROTID:   normal systolic impulse  no bruits    RESPIRATORY:   RA  Inspection- normal chest wall symmetry  Palpation- normal chest wall expansion  Auscultation- decreased breath sounds on right  Lung US- right sided consolidation appreciated  No wheezing  Not tachypneic,  No use of accessory muscles    GASTROINTESTINAL:  Abdomen soft,   non-tender,   no guarding,   + BS    MUSCULOSKELETAL:   range of motion is not limited,  no clubbing, cyanosis    NEUROLOGICAL:   AOx4  Follows commands  Moves all extremities   Alert and oriented   no motor  deficits.    SKIN:   Skin normal color for race,   No evidence of rash.    PSYCHIATRIC:   normal mood and affect.   no apparent risk to self or others.      03-16-21 @ 07:01  -  03-17-21 @ 07:00  --------------------------------------------------------  IN:    Oral Fluid: 240 mL  Total IN: 240 mL    OUT:    Indwelling Catheter - Urethral (mL): 350 mL    Voided (mL): 600 mL  Total OUT: 950 mL    Total NET: -710 mL      03-17-21 @ 07:01  -  03-17-21 @ 10:48  --------------------------------------------------------  IN:    Oral Fluid: 200 mL  Total IN: 200 mL    OUT:  Total OUT: 0 mL    Total NET: 200 mL          LABS:                          9.9    27.35 )-----------( 715      ( 17 Mar 2021 06:52 )             32.4                                               03-17    134<L>  |  97<L>  |  12  ----------------------------<  126<H>  4.2   |  27  |  0.8    Ca    8.4<L>      17 Mar 2021 06:52  Phos  3.6     03-16  Mg     2.0     03-17                                                                                                                                                                                    MEDICATIONS  (STANDING):  chlorhexidine 4% Liquid 1 Application(s) Topical <User Schedule>  enoxaparin Injectable 40 milliGRAM(s) SubCutaneous daily  ferrous    sulfate 325 milliGRAM(s) Oral daily  levoFLOXacin IVPB 750 milliGRAM(s) IV Intermittent every 48 hours  melatonin 3 milliGRAM(s) Oral at bedtime  polyethylene glycol 3350 17 Gram(s) Oral daily  senna 2 Tablet(s) Oral at bedtime    MEDICATIONS  (PRN):  acetaminophen   Tablet .. 650 milliGRAM(s) Oral every 6 hours PRN Temp greater or equal to 38C (100.4F)  guaifenesin/dextromethorphan  Syrup 10 milliLiter(s) Oral every 4 hours PRN Cough/throat discomfort      X-Rays reviewed    CXR interpreted by me: right middle lobe opacity

## 2021-03-17 NOTE — PROGRESS NOTE ADULT - ATTENDING COMMENTS
Patient seen and examined independently. Agree with resident note.  # Sepsis  s/p septic shock sec to necrotizing PNA due to klebsiella.  # MENDOZA s/p fluids.  # severe iron def anemia--  hb is stable  # Dementia-- confused at present but able to talk and wanted to know when she was going home.

## 2021-03-17 NOTE — DIETITIAN INITIAL EVALUATION ADULT. - ADD RECOMMEND
Continue current diet order as tolerated. Continue with ensure enlive BID and add ensure pudding BID per daughter's request. If not contraindicated, order Multivitamin with minerals q24hrs

## 2021-03-17 NOTE — DIETITIAN INITIAL EVALUATION ADULT. - OTHER CALCULATIONS
wt used: 60kg CBW kcal: 1236-1340kcal (MSJ x 1.2-1.3AF) protein: 60-72g (1-1.2g/kg CBW) fluids: 1ml/kcal or per LIP

## 2021-03-17 NOTE — PROGRESS NOTE ADULT - ASSESSMENT
85 year old woman known to have dementia, osteoarthritis, Guatemalan-speaking presented with cough and fever. Found to be in sepsis with evidence of CAP.     #) Sepsis secondary to Necrotizing PNA vs Empyema  - WBC 38k on admission > WBC was trending down (17K yesterday) however today at 22.5K, will monitor for improvement  - CT Chest: PNA w/ numerous AF levels, possible empyema  - BP stable, no longer requiring pressors, sat well on RA  - ABx: Levaquin 750 mg IV q48, Continue until 03/18, d/c'ed Vanco (MRSA nares (-)) and Cefepime as per ID  - Procal elevated (1.75), f/u repeat    #) MENDOZA - resolved s/p IVF  - Cr 1.7 on admission > currently stable at 0.8    #) Microcytic anemia   - Iron studies:     - Iron total low 13     - % Iron Saturation low 10     - TIBC low 125  - C/w Ferrous Sulfate 325mg PO Daily    #) Known dementia  - Patient appears a bit confused on exam  - Not noted to be different from baseline    #) Osteoarthritis  - Does not appear to be in any pain  - Monitor for now    #) Diet - Dysphagia III soft  #) DVT Prophylaxis - Heparin 5,000 Q8  #) Disposition- Floor  #) Activity- increase as tolerated  #) Code status - Full.   85 year old woman known to have dementia, osteoarthritis, Cape Verdean-speaking presented with cough and fever. Found to be in sepsis with evidence of CAP.     #) Sepsis secondary to Necrotizing PNA vs Empyema  - WBC 38k on admission > WBC was trending down (17K yesterday) however yesterday at 22.5K, today 27.5K  - CT Chest: PNA w/ numerous AF levels, possible empyema  - BP stable, no longer requiring pressors, sat well on RA  - ABx: Levaquin 750 mg IV q48, Continue until 03/18, d/c'ed Vanco (MRSA nares (-)) and Cefepime as per ID  - Procal elevated (1.75), repeat 0.31  - Pulm consulted: Repeat procal, Repeat CT Chest NC, Cefepime and levofloxacin for now, Repeat Blood and sputum cultures, Fungitell    #) MENDOZA - resolved s/p IVF  - Cr 1.7 on admission > currently stable at 0.8    #) Microcytic anemia   - Iron studies:     - Iron total low 13     - % Iron Saturation low 10     - TIBC low 125  - C/w Ferrous Sulfate 325mg PO Daily    #) Known dementia  - Patient appears a bit confused on exam  - Not noted to be different from baseline    #) Osteoarthritis  - Does not appear to be in any pain  - Monitor for now    #) Diet - Dysphagia III soft  #) DVT Prophylaxis - Heparin 5,000 Q8  #) Disposition- Floor  #) Activity- increase as tolerated  #) Code status - Full.

## 2021-03-17 NOTE — DIETITIAN INITIAL EVALUATION ADULT. - OTHER INFO
86 yo F with PMH dementia, presented with cough and fever. Admitted for sepsis secondary to necrotizing PNA vs empyema. MENDOZA resolved. s/s bedside eval (3/11) recommends dysphagia 3 (soft, cut up) w/ thin liquids.     Nutrition hx obtained from pt's daughter as pt confused. Reports appetite and po intake declined the past week. Denies use of  vitamins or minerals. Recently started drinking ensure enlive, likes the supplements. Confirms NKFA. No Rastafari or cultural food preferences. Exhibited some chewing/swallowing difficulties in home.     In house appetite is fair per RN, eating ~50% of her meals with encouragement. Drinks her ensure enlive if encouraged. Prefers Greek food, family brings food from home. LBM 3/16, no c/o N+V, constipation or diarrhea.     UBW 135lbs. Stated UBW consistent with current ABW.     Ht:  63"   Wt:  132lbs   IBW:  115lbs +/-10%    BMI: 23.4  kg/m2     Skin: No pressure injuries per RN flow sheets  Edema: None noted per RN flow sheets

## 2021-03-18 NOTE — PROGRESS NOTE ADULT - SUBJECTIVE AND OBJECTIVE BOX
SUBJECTIVE:    Patient is a 85y old Female who presents with a chief complaint of   Currently admitted to medicine with the primary diagnosis of Sepsis       Today is hospital day 7d.     PAST MEDICAL & SURGICAL HISTORY  Osteoarthritis    Dementia    No significant past surgical history      ALLERGIES:  clindamycin (Hives)    MEDICATIONS:  STANDING MEDICATIONS  cefepime   IVPB 2000 milliGRAM(s) IV Intermittent every 12 hours  enoxaparin Injectable 40 milliGRAM(s) SubCutaneous daily  ferrous    sulfate 325 milliGRAM(s) Oral daily  levoFLOXacin IVPB 750 milliGRAM(s) IV Intermittent every 48 hours  melatonin 3 milliGRAM(s) Oral at bedtime  polyethylene glycol 3350 17 Gram(s) Oral daily  senna 2 Tablet(s) Oral at bedtime    PRN MEDICATIONS  acetaminophen   Tablet .. 650 milliGRAM(s) Oral every 6 hours PRN  guaifenesin/dextromethorphan  Syrup 10 milliLiter(s) Oral every 4 hours PRN    VITALS:   T(F): 97.3  HR: 107  BP: 107/55  RR: 18  SpO2: --    LABS:                        9.9    29.41 )-----------( 740      ( 18 Mar 2021 05:46 )             32.3     03-18    136  |  101  |  15  ----------------------------<  117<H>  4.5   |  24  |  0.8    Ca    8.3<L>      18 Mar 2021 05:46  Mg     2.2     03-18                    RADIOLOGY:    PHYSICAL EXAM:  GEN: No acute distress  LUNGS: Clear to auscultation bilaterally   HEART: S1/S2 present. RRR.   ABD/ GI: Soft, non-tender, non-distended. Bowel sounds present  EXT: NC/NC/NE/2+PP/LANCE  NEURO: AAOX3

## 2021-03-18 NOTE — PROGRESS NOTE ADULT - ASSESSMENT
85 year old woman known to have dementia, osteoarthritis, Prydeinig-speaking presented with cough and fever. Found to be in sepsis with evidence of CAP.     #) Sepsis secondary to Necrotizing PNA vs Empyema  - BP stable, no longer requiring pressors, sat well on RA  - WBC 38k on admission > WBC was trending down (17K yesterday) however now trending back up, today 29K  - CT Chest: PNA w/ numerous AF levels, possible empyema  - repeat CT Chest shows enlarging loculated right pleural fluid collection with multiple air bubbles consistent with empyema. Associated compressive atelectasis right lung is seen.  - Pulm consult appreciated  - Will continue to treat w/ ABx as per pulm. No need to chest tube at this time as not appreciated on beside US, will monitor tomorrow  - ABx: Levaquin 750 mg IV q48, Continue until 03/18, d/c'ed Vanco (MRSA nares (-)) and Cefepime as per ID  - Procal elevated (1.75) - repeat 0.29  - f/u repeat Blood and sputum cultures, Fungitell    #) MENDOZA - resolved s/p IVF  - Cr 1.7 on admission > currently stable at 0.8    #) Microcytic anemia   - Iron studies:     - Iron total low 13     - % Iron Saturation low 10     - TIBC low 125  - C/w Ferrous Sulfate 325mg PO Daily    #) Known dementia  - Patient appears a bit confused on exam  - Not noted to be different from baseline    #) Osteoarthritis  - Does not appear to be in any pain  - Monitor for now    #) Diet - Dysphagia III soft  #) DVT Prophylaxis - Heparin 5,000 Q8  #) Disposition- Floor  #) Activity- increase as tolerated  #) Code status - Full.

## 2021-03-18 NOTE — PROGRESS NOTE ADULT - ASSESSMENT
# Sepsis  s/p septic shock sec to necrotizing PNA due to klebsiella on cefepime and levoflox.  Ct chest--  # MENDOAZ s/p fluids.  # severe iron def anemia--  hb is stable  # Dementia--  patient talking and making sense.< from: CT Chest No Cont (03.17.21 @ 16:19) >  Enlarging loculated right pleural fluid collection with multiple air bubbles consistent with empyema.   Associated compressive atelectasis right lung is seen.    Scattered groundglass opacities involving multiple lumbar segments.      Right upper lobe wedge-shaped consolidation, (series 5/116), unchanged from earlier study.      Case was discussed with CT surgery and pulm-- no intervention planned currently.

## 2021-03-18 NOTE — PROGRESS NOTE ADULT - SUBJECTIVE AND OBJECTIVE BOX
SUBJECTIVE:  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, osteoarithtis, Nauruan-speaking presenting with cough and fever.    As per daughter, patient has been having dry consistent cough since 2 months. Today, she was being evaluated for knee injections when she was found to be febrile. She also reports progressing generalized weakness with decreased appetite and PO intake.  No URT symptoms, no chest pain, no leg pain, no diarrhea, no urinary symptoms no sick contacts, no recent travel. In ED was hypotensive, was given IVF, started on levophed 0.04 called to evaluate (11 Mar 2021 03:21)      PAST MEDICAL & SURGICAL HISTORY  PAST MEDICAL & SURGICAL HISTORY:  Osteoarthritis    Dementia    No significant past surgical history      SOCIAL HISTORY:    ALLERGIES:  clindamycin (Hives)    MEDICATIONS:  STANDING MEDICATIONS  cefepime   IVPB 2000 milliGRAM(s) IV Intermittent every 12 hours  enoxaparin Injectable 40 milliGRAM(s) SubCutaneous daily  ferrous    sulfate 325 milliGRAM(s) Oral daily  levoFLOXacin IVPB 750 milliGRAM(s) IV Intermittent every 48 hours  melatonin 3 milliGRAM(s) Oral at bedtime  polyethylene glycol 3350 17 Gram(s) Oral daily  senna 2 Tablet(s) Oral at bedtime    PRN MEDICATIONS  acetaminophen   Tablet .. 650 milliGRAM(s) Oral every 6 hours PRN  guaifenesin/dextromethorphan  Syrup 10 milliLiter(s) Oral every 4 hours PRN    VITALS:   T(F): 97.3  HR: 107  BP: 107/55  RR: 18  SpO2: --    LABS:                        9.9    29.41 )-----------( 740      ( 18 Mar 2021 05:46 )             32.3     03-18    136  |  101  |  15  ----------------------------<  117<H>  4.5   |  24  |  0.8    Ca    8.3<L>      18 Mar 2021 05:46  Mg     2.2     03-18                    RADIOLOGY:    PHYSICAL EXAM:  GEN: No acute distress  HEENT: AT/NC PEERLA, EOMI  LUNGS: unable to assess  HEART: S1/S2 present.  ABD: Soft, non-tender, non-distended. Bowel sounds present in all 4 quadrants  EXT: No edema, no rashes, no cyanosis  NEURO: AAOX3

## 2021-03-18 NOTE — PROGRESS NOTE ADULT - SUBJECTIVE AND OBJECTIVE BOX
DESTIN TERRY  85y, Female  Allergy: clindamycin (Hives)      LOS  7d    CHIEF COMPLAINT:     INTERVAL EVENTS/HPI  - No acute events overnight  - T(F): , Max: 98.9 (03-18-21 @ 16:17)  - daughter at beside -- patient continues report that she is feeling well; still with productive cough   - no diarrhea   - Denies any worsening symptoms  - Tolerating medication  - WBC Count: 29.41 (03-18-21 @ 05:46)  WBC Count: 27.35 (03-17-21 @ 06:52)     - Creatinine, Serum: 0.8 (03-18-21 @ 05:46)  Creatinine, Serum: 0.8 (03-17-21 @ 06:52)       ROS  General: Denies rigors, nightsweats  HEENT: Denies headache, rhinorrhea, sore throat, eye pain  CV: Denies CP, palpitations  PULM: Denies wheezing, hemoptysis  GI: Denies hematemesis, hematochezia, melena  : Denies discharge, hematuria  MSK: Denies arthralgias, myalgias  SKIN: Denies rash, lesions  NEURO: Denies paresthesias, weakness  PSYCH: Denies depression, anxiety    VITALS:  T(F): 98.9, Max: 98.9 (03-18-21 @ 16:17)  HR: 95  BP: 117/58  RR: 18Vital Signs Last 24 Hrs  T(C): 37.2 (18 Mar 2021 16:17), Max: 37.2 (18 Mar 2021 16:17)  T(F): 98.9 (18 Mar 2021 16:17), Max: 98.9 (18 Mar 2021 16:17)  HR: 95 (18 Mar 2021 16:17) (95 - 107)  BP: 117/58 (18 Mar 2021 16:17) (107/55 - 117/58)  BP(mean): --  RR: 18 (18 Mar 2021 16:17) (18 - 18)  SpO2: --    PHYSICAL EXAM:  Gen: NAD, resting in bed  HEENT: Normocephalic, atraumatic  Neck: supple, no lymphadenopathy  CV: Regular rate & regular rhythm  Lungs: decreased BS at bases, no fremitus  Abdomen: Soft, BS present  Ext: Warm, well perfused  Neuro: non focal, awake  Skin: no rash, no erythema  Lines: no phlebitis    FH: Non-contributory  Social Hx: Non-contributory    TESTS & MEASUREMENTS:                        9.9    29.41 )-----------( 740      ( 18 Mar 2021 05:46 )             32.3     03-18    136  |  101  |  15  ----------------------------<  117<H>  4.5   |  24  |  0.8    Ca    8.3<L>      18 Mar 2021 05:46  Mg     2.2     03-18      eGFR if Non African American: 68 mL/min/1.73M2 (03-18-21 @ 05:46)  eGFR if : 79 mL/min/1.73M2 (03-18-21 @ 05:46)          Culture - Blood (collected 03-13-21 @ 07:33)  Source: .Blood None  Final Report (03-18-21 @ 18:01):    No Growth Final    Culture - Blood (collected 03-10-21 @ 21:00)  Source: .Blood Blood-Peripheral  Final Report (03-16-21 @ 04:01):    No Growth Final    Culture - Blood (collected 03-10-21 @ 21:00)  Source: .Blood Blood-Peripheral  Final Report (03-16-21 @ 04:01):    No Growth Final            INFECTIOUS DISEASES TESTING  Procalcitonin, Serum: 0.29 (03-17-21 @ 11:30)  Procalcitonin, Serum: 0.31 (03-16-21 @ 10:15)  Legionella Antigen, Urine: Negative (03-12-21 @ 10:30)  Streptococcus Pneumoniae Ag Urine: Negative (03-12-21 @ 10:30)  MRSA PCR Result.: Negative (03-12-21 @ 10:30)  Legionella Antigen, Urine: Negative (03-11-21 @ 08:10)  Streptococcus Pneumoniae Ag Urine: Negative (03-11-21 @ 08:10)  Procalcitonin, Serum: 1.75 (03-11-21 @ 06:31)  Rapid RVP Result: NotDetec (03-10-21 @ 22:10)      INFLAMMATORY MARKERS      RADIOLOGY & ADDITIONAL TESTS:  I have personally reviewed the last available Chest xray  CXR  Xray Chest 1 View- PORTABLE-Urgent:   EXAM:  XR CHEST PORTABLE URGENT 1V            PROCEDURE DATE:  03/17/2021            INTERPRETATION:  STUDY INDICATION: sepsis    Comparison: XR  3/11/2021.    Technique/Positioning: Frontal view of the chest.    Findings:    Support devices: Interval removal of right IJ venous catheter.    Cardiac/mediastinum/hilum: Stable appearance of the cardiomediastinal silhouette.    Lung parenchyma/Pleura: Increased right mid to lower lung consolidation. Additional bilateral patchy opacities similar to slightly increased. No pneumothorax.    Skeleton/soft tissues: Unchanged.    Impression:    Increased right mid to lower lung consolidation. Additional bilateral patchy opacities similar to slightly increased. No pneumothorax.              OLIVE HENRY; Attending Radiologist  This document has been electronically signed. Mar 17 2021  1:45PM (03-17-21 @ 11:39)      CT  CT Chest No Cont:   EXAM:  CT CHEST            PROCEDURE DATE:  03/17/2021            INTERPRETATION:  Reason for Exam:  Pneumonia  CT of the chest was performed from the thoracic inlet to the level of the adrenal glands without contrast injection. Coronal and sagittal images have been submitted.    Comparison: Chest x-ray-3/17/2021. Chest CT-3/11/2021    Findings:    Tubes/Lines: None    Lungs, Pleura, and Airways: Breathing artifact limits lung parenchymal assessment. Patent central tracheobronchial tree. Enlarging loculated right pleural fluid collection with multiple air bubbles consistent with empyema is noted. Associated compressive atelectasis right lung is seen.    Scattered groundglass opacities involving multiple lumbar segments are noted. Right upper lobe wedge-shaped consolidation is also noted (series 5/116), unchanged from earlier study.    Mediastinum/Lymph Nodes: Stable enlarged mediastinal lymph nodes including 1.5 cm right paratracheal node, likely reactive.    Heart/Great Vessels: No pericardial effusions. Mild ectasia thoracic arch measuring 37 mm, series 3/38.    Abdomen: Stable punctate calcifications liver. No current evidence of suspicious upper abdominal lesions on noncontrast imaging.    Bones and soft tissues: Degenerative changes thoracic spine with upper thoracic scoliosis.    IMPRESSION:    Since 3/11/2021.    Enlarging loculated right pleural fluid collection with multiple air bubbles consistent with empyema.   Associated compressive atelectasis right lung is seen.    Scattered groundglass opacities involving multiple lumbar segments.      Right upper lobe wedge-shaped consolidation, (series 5/116), unchanged from earlier study.      Spoke with ALLYSON RAMOS on 3/18/2021 9:56 AM with readback.              SANDRA MOODY MD; Attending Radiologist  This document has been electronically signed. Mar 18 2021  9:57AM (03-17-21 @ 16:19)      CARDIOLOGY TESTING  12 Lead ECG:   Ventricular Rate 102 BPM    Atrial Rate 102 BPM    P-R Interval 140 ms    QRS Duration 88 ms    Q-T Interval 324 ms    QTC Calculation(Bazett) 422 ms    P Axis 32 degrees    R Axis 49 degrees    T Axis 39 degrees    Diagnosis Line Sinus tachycardia  Otherwise normal ECG    Confirmed by Job Bains ([id number]) on 3/10/2021 11:06:24 PM (03-10-21 @ 20:30)      MEDICATIONS  cefepime   IVPB 2000 IV Intermittent every 12 hours  enoxaparin Injectable 40 SubCutaneous daily  ferrous    sulfate 325 Oral daily  levoFLOXacin IVPB 750 IV Intermittent every 48 hours  melatonin 3 Oral at bedtime  polyethylene glycol 3350 17 Oral daily  senna 2 Oral at bedtime      WEIGHT  Weight (kg): 60 (03-11-21 @ 01:17)  Creatinine, Serum: 0.8 mg/dL (03-18-21 @ 05:46)      ANTIBIOTICS:  cefepime   IVPB 2000 milliGRAM(s) IV Intermittent every 12 hours  levoFLOXacin IVPB 750 milliGRAM(s) IV Intermittent every 48 hours      All available historical records have been reviewed

## 2021-03-18 NOTE — PROGRESS NOTE ADULT - ASSESSMENT
ASSESSMENT  85 year old lady known to have dementia, osteoarithtis, Burmese-speaking presenting with cough and fever.      IMPRESSION  #Sepsis present on admission - secondary to CAP  -  CT Chest No Cont (03.11.21 @ 00:54): Areas of right lung consolidation which can be seen in pneumonia. Numerous air-fluid levels throughout the right lung compatible with an infectious process. Suggestion of split pleura at the lung base for which empyema is favored although inherently limited on this noncontrast exam. Consideration can be given to contrast administration if feasiblefor better delineation. Areas of bilateral interlobular septal thickening and mild layering groundglass attenuation may reflect a component of edema.  - Urine Legionella negative  - Urine Strep negative  - BLood Cx 3/10 and 3/13 negative  - Procalcitonin 1.15   - CT Chest No Cont (03.17.21 @ 16:19): Since 3/11/2021. Enlarging loculated right pleural fluid collection with multiple air bubbles consistent with empyema.   Associated compressive atelectasis right lung is seen. Scattered groundglass opacities involving multiple lumbar segments.    #Dementia  #Osteoarthritis  #Obesity BMI (kg/m2): 23.4  #Abx allergy: clindamycin (Hives)    Creatinine, Serum: 0.8 mg/dL (03.15.21 @ 05:56)  Weight (kg): 60 (11 Mar 2021 01:17)  CrCl 50      RECOMMENDATIONS  - add flagyl 500 mg TID   - repeat MRSA nares  - please obtain sputum cx  - cefepime 2g q 12 hours  - continue levofloxacin 750 mg q 48 hours  - follow-up CT surgery and Pulm for empyema management    Please call or message on [employer] Teams if with any questions.  Spectra [phone]

## 2021-03-19 NOTE — CONSULT NOTE ADULT - SUBJECTIVE AND OBJECTIVE BOX
DESTIN TERRY [id number]  85y Female    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, osteoarithtis, Armenian-speaking presenting with cough and fever. As per daughter, patient has been having dry consistent cough since 2 months. Today, she was being evaluated for knee injections when she was found to be febrile. She also reports progressing generalized weakness with decreased appetite and PO intake. On admission, WBC 38, CT 3/11 showing Numerous air-fluid levels throughout the right lung compatible with an infectious process. Suggestion of split pleura at the lung base for which empyema is favored although inherently limited on this noncontrast exam. She was admitted for IV antibiotics, but her white count persisted and CT was repeated on 3/17 Enlarging loculated right pleural fluid collection with multiple air bubbles consistent with empyema. She has persistently high WBC but is clinically doing well. Saturating >92% on room air.           PAST MEDICAL & SURGICAL HISTORY:  Osteoarthritis  Dementia    No significant past surgical history        MEDICATIONS  (STANDING):  cefepime   IVPB 2000 milliGRAM(s) IV Intermittent every 12 hours  enoxaparin Injectable 40 milliGRAM(s) SubCutaneous daily  ferrous    sulfate 325 milliGRAM(s) Oral daily  levoFLOXacin IVPB 750 milliGRAM(s) IV Intermittent every 48 hours  melatonin 3 milliGRAM(s) Oral at bedtime  metroNIDAZOLE  IVPB 500 milliGRAM(s) IV Intermittent every 8 hours  polyethylene glycol 3350 17 Gram(s) Oral daily  senna 2 Tablet(s) Oral at bedtime    MEDICATIONS  (PRN):  acetaminophen   Tablet .. 650 milliGRAM(s) Oral every 6 hours PRN Temp greater or equal to 38C (100.4F)  guaifenesin/dextromethorphan  Syrup 10 milliLiter(s) Oral every 4 hours PRN Cough/throat discomfort      Allergies  clindamycin (Hives)    REVIEW OF SYSTEMS  [x] A ten-point review of systems was otherwise negative except as noted.  [ ] Due to altered mental status/intubation, subjective information were not able to be obtained from the patient. History was obtained, to the extent possible, from review of the chart and collateral sources of information.      Vital Signs Last 24 Hrs  T(C): 36.4 (19 Mar 2021 08:08), Max: 38.2 (18 Mar 2021 18:59)  T(F): 97.6 (19 Mar 2021 08:08), Max: 100.7 (18 Mar 2021 18:59)  HR: 98 (19 Mar 2021 08:08) (77 - 98)  BP: 152/59 (19 Mar 2021 08:08) (104/51 - 152/59)  BP(mean): --  RR: 19 (19 Mar 2021 08:08) (19 - 19)  SpO2: --    PHYSICAL EXAM:  GENERAL: NAD, well-appearing  CHEST/LUNG: Clear to auscultation bilaterally  HEART: Regular rate and rhythm  ABDOMEN: Soft, Nontender, Nondistended;   EXTREMITIES:  No clubbing, cyanosis, or edema      LABS:  Labs:  CAPILLARY BLOOD GLUCOSE                              10.2   27.92 )-----------( 761      ( 19 Mar 2021 05:14 )             33.0       Auto Neutrophil %: 79.6 % (03-19-21 @ 05:14)  Auto Immature Granulocyte %: 1.6 % (03-19-21 @ 05:14)    03-19    135  |  98  |  17  ----------------------------<  107<H>  4.9   |  24  |  0.7      Calcium, Total Serum: 8.7 mg/dL (03-19-21 @ 05:14)    Serum Pro-Brain Natriuretic Peptide: 1169 pg/mL (03-10-21 @ 21:00)          Culture - Blood (collected 17 Mar 2021 16:00)  Source: .Blood Blood-Peripheral  Preliminary Report (19 Mar 2021 02:10):    No growth to date.          RADIOLOGY & ADDITIONAL STUDIES:  < from: CT Chest No Cont (03.11.21 @ 00:54) >  IMPRESSION:    Areas of right lung consolidation which can be seen in pneumonia. Numerous air-fluid levels throughout the right lung compatible with an infectious process. Suggestion of split pleura at the lung base for which empyema is favored although inherently limited on this noncontrast exam. Consideration can be given to contrast administration if feasiblefor better delineation.    Areas of bilateral interlobular septal thickening and mild layering groundglass attenuation may reflect a component of edema.      < end of copied text >  < from: CT Chest No Cont (03.17.21 @ 16:19) >  IMPRESSION:    Since 3/11/2021.    Enlarging loculated right pleural fluid collection with multiple air bubbles consistent with empyema.   Associated compressive atelectasis right lung is seen.    Scattered groundglass opacities involving multiple lumbar segments.      Right upper lobe wedge-shaped consolidation, (series 5/116), unchanged from earlier study.    < end of copied text >

## 2021-03-19 NOTE — PROGRESS NOTE ADULT - ATTENDING COMMENTS
Patient seen and examined independently. Agree with resident note.  # Sepsis  s/p septic shock sec to necrotizing PNA due to klebsiella on cefepime and levoflox-- seen by ID today added flagyl.  .  Ct chest--< from: CT Chest No Cont (03.17.21 @ 16:19) >  Enlarging loculated right pleural fluid collection with multiple air bubbles consistent with empyema.   Associated compressive atelectasis right lung is seen.  Scattered groundglass opacities involving multiple lumbar segments.    Right upper lobe wedge-shaped consolidation, (series 5/116), unchanged from earlier study.     Ct surgery eval requested.    # MENDOZA s/p fluids.  # severe iron def anemia--  hb is stable  # Dementia--  patient talking and making sense-- she said she is Swedish.<

## 2021-03-19 NOTE — PROGRESS NOTE ADULT - ASSESSMENT
85 year old woman known to have dementia, osteoarthritis, Norwegian-speaking presented with cough and fever. Found to be in sepsis with evidence of CAP.     #) Sepsis secondary to Necrotizing PNA vs Empyema  - BP stable, no longer requiring pressors, sat well on RA  - Fever of 100.7 overnight  - WBC 38k on admission > WBC was trending down (17K yesterday) however now trending back up, yest 29K, today 27K  - CT Chest: PNA w/ numerous AF levels, possible empyema  - repeat CT Chest shows enlarging loculated right pleural fluid collection with multiple air bubbles consistent with empyema. Associated compressive atelectasis right lung is seen.  - Pulm and ID consult appreciated  - Will continue to treat w/ ABx as per pulm. No need to chest tube at this time as not appreciated on beside US, will monitor   - ABx: Levaquin 750 mg IV q48, Flagyl 500mg IV TID, Cefepime 2G IV q12, d/c'ed Vanco (MRSA nares (-))  - f/u repeat MRSA Nares, obtain sputum Cx  - f/u CTS to assess need for Chest Tube  - Procal elevated (1.75) - repeat 0.29  - f/u repeat Blood and sputum cultures, Fungitell    #) MENDOZA - resolved s/p IVF  - Cr 1.7 on admission > currently stable at 0.7    #) Microcytic anemia   - Iron studies:     - Iron total low 13     - % Iron Saturation low 10     - TIBC low 125  - C/w Ferrous Sulfate 325mg PO Daily    #) Known dementia  - Patient appears a bit confused on exam  - Not noted to be different from baseline    #) Osteoarthritis  - Does not appear to be in any pain  - Monitor for now    #) Diet - Dysphagia III soft  #) DVT Prophylaxis - Heparin 5,000 Q8  #) Disposition- Floor  #) Activity- increase as tolerated  #) Code status - Full

## 2021-03-19 NOTE — PROGRESS NOTE ADULT - SUBJECTIVE AND OBJECTIVE BOX
SUBJECTIVE:  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, osteoarithtis, Ethiopian-speaking presenting with cough and fever.    As per daughter, patient has been having dry consistent cough since 2 months. Today, she was being evaluated for knee injections when she was found to be febrile. She also reports progressing generalized weakness with decreased appetite and PO intake.  No URT symptoms, no chest pain, no leg pain, no diarrhea, no urinary symptoms no sick contacts, no recent travel. In ED was hypotensive, was given IVF, started on levophed 0.04 called to evaluate (11 Mar 2021 03:21)      PAST MEDICAL & SURGICAL HISTORY  PAST MEDICAL & SURGICAL HISTORY:  Osteoarthritis    Dementia    No significant past surgical history      SOCIAL HISTORY:    ALLERGIES:  clindamycin (Hives)    MEDICATIONS:  STANDING MEDICATIONS  cefepime   IVPB 2000 milliGRAM(s) IV Intermittent every 12 hours  enoxaparin Injectable 40 milliGRAM(s) SubCutaneous daily  ferrous    sulfate 325 milliGRAM(s) Oral daily  levoFLOXacin IVPB 750 milliGRAM(s) IV Intermittent every 48 hours  melatonin 3 milliGRAM(s) Oral at bedtime  metroNIDAZOLE  IVPB 500 milliGRAM(s) IV Intermittent every 8 hours  polyethylene glycol 3350 17 Gram(s) Oral daily  senna 2 Tablet(s) Oral at bedtime    PRN MEDICATIONS  acetaminophen   Tablet .. 650 milliGRAM(s) Oral every 6 hours PRN  guaifenesin/dextromethorphan  Syrup 10 milliLiter(s) Oral every 4 hours PRN    VITALS:   T(F): 97.6  HR: 98  BP: 152/59  RR: 19  SpO2: --    LABS:                        10.2   27.92 )-----------( 761      ( 19 Mar 2021 05:14 )             33.0     03-19    135  |  98  |  17  ----------------------------<  107<H>  4.9   |  24  |  0.7    Ca    8.7      19 Mar 2021 05:14  Mg     2.3     03-19                Culture - Blood (collected 17 Mar 2021 16:00)  Source: .Blood Blood-Peripheral  Preliminary Report (19 Mar 2021 02:10):    No growth to date.          RADIOLOGY:    PHYSICAL EXAM:  GEN: No acute distress  HEENT: AT/NC PEERLA, EOMI  LUNGS: dec b/l BS  HEART: S1/S2 present  ABD: Soft, non-tender, non-distended.  EXT: No edema, no rashes, no cyanosis  NEURO: AAOX3

## 2021-03-19 NOTE — PROGRESS NOTE ADULT - ASSESSMENT
ASSESSMENT  85 year old lady known to have dementia, osteoarithtis, Uzbek-speaking presenting with cough and fever.      IMPRESSION  #Sepsis present on admission - secondary to CAP  -  CT Chest No Cont (03.11.21 @ 00:54): Areas of right lung consolidation which can be seen in pneumonia. Numerous air-fluid levels throughout the right lung compatible with an infectious process. Suggestion of split pleura at the lung base for which empyema is favored although inherently limited on this noncontrast exam. Consideration can be given to contrast administration if feasiblefor better delineation. Areas of bilateral interlobular septal thickening and mild layering groundglass attenuation may reflect a component of edema.  - Urine Legionella negative  - Urine Strep negative  - BLood Cx 3/10 and 3/13 negative  - Procalcitonin 1.15   - CT Chest No Cont (03.17.21 @ 16:19): Since 3/11/2021. Enlarging loculated right pleural fluid collection with multiple air bubbles consistent with empyema.   Associated compressive atelectasis right lung is seen. Scattered groundglass opacities involving multiple lumbar segments.    #Dementia  #Osteoarthritis  #Obesity BMI (kg/m2): 23.4  #Abx allergy: clindamycin (Hives)    Creatinine, Serum: 0.8 mg/dL (03.15.21 @ 05:56)  Weight (kg): 60 (11 Mar 2021 01:17)  CrCl 50      RECOMMENDATIONS  - flagyl 500 mg TID   - repeat MRSA nares  - please obtain sputum cx  - cefepime 2g q 12 hours  - continue levofloxacin 750 mg q 48 hours  - follow-up CT surgery and Pulm for empyema management    Please call or message on [employer] Teams if with any questions.  Spectra [id number]

## 2021-03-19 NOTE — PROGRESS NOTE ADULT - SUBJECTIVE AND OBJECTIVE BOX
DESTIN TERRY  85y, Female  Allergy: clindamycin (Hives)      LOS  8d    CHIEF COMPLAINT:     INTERVAL EVENTS/HPI  - No acute events overnight  - T(F): , Max: 100.7 (03-18-21 @ 18:59)  - Denies any worsening symptoms  - Tolerating medication  - WBC Count: 27.92 (03-19-21 @ 05:14)  WBC Count: 29.41 (03-18-21 @ 05:46)     - Creatinine, Serum: 0.7 (03-19-21 @ 05:14)  Creatinine, Serum: 0.8 (03-18-21 @ 05:46)       ROS  General: Denies rigors, nightsweats  HEENT: Denies headache, rhinorrhea, sore throat, eye pain  CV: Denies CP, palpitations  PULM: Denies wheezing, hemoptysis  GI: Denies hematemesis, hematochezia, melena  : Denies discharge, hematuria  MSK: Denies arthralgias, myalgias  SKIN: Denies rash, lesions  NEURO: Denies paresthesias, weakness  PSYCH: Denies depression, anxiety    VITALS:  T(F): 97.6, Max: 100.7 (03-18-21 @ 18:59)  HR: 98  BP: 152/59  RR: 19Vital Signs Last 24 Hrs  T(C): 36.4 (19 Mar 2021 08:08), Max: 38.2 (18 Mar 2021 18:59)  T(F): 97.6 (19 Mar 2021 08:08), Max: 100.7 (18 Mar 2021 18:59)  HR: 98 (19 Mar 2021 08:08) (77 - 98)  BP: 152/59 (19 Mar 2021 08:08) (104/51 - 152/59)  BP(mean): --  RR: 19 (19 Mar 2021 08:08) (18 - 19)  SpO2: --    PHYSICAL EXAM:  Gen: NAD, resting in bed  HEENT: Normocephalic, atraumatic  Neck: supple, no lymphadenopathy  CV: Regular rate & regular rhythm  Lungs: decreased BS at bases, no fremitus  Abdomen: Soft, BS present  Ext: Warm, well perfused  Neuro: non focal, awake  Skin: no rash, no erythema  Lines: no phlebitis    FH: Non-contributory  Social Hx: Non-contributory    TESTS & MEASUREMENTS:                        10.2   27.92 )-----------( 761      ( 19 Mar 2021 05:14 )             33.0     03-19    135  |  98  |  17  ----------------------------<  107<H>  4.9   |  24  |  0.7    Ca    8.7      19 Mar 2021 05:14  Mg     2.3     03-19      eGFR if Non African American: 80 mL/min/1.73M2 (03-19-21 @ 05:14)  eGFR if : 93 mL/min/1.73M2 (03-19-21 @ 05:14)          Culture - Blood (collected 03-17-21 @ 16:00)  Source: .Blood Blood-Peripheral  Preliminary Report (03-19-21 @ 02:10):    No growth to date.    Culture - Blood (collected 03-13-21 @ 07:33)  Source: .Blood None  Final Report (03-18-21 @ 18:01):    No Growth Final    Culture - Blood (collected 03-10-21 @ 21:00)  Source: .Blood Blood-Peripheral  Final Report (03-16-21 @ 04:01):    No Growth Final    Culture - Blood (collected 03-10-21 @ 21:00)  Source: .Blood Blood-Peripheral  Final Report (03-16-21 @ 04:01):    No Growth Final            INFECTIOUS DISEASES TESTING  Fungitell: 46 (03-17-21 @ 16:00)  Procalcitonin, Serum: 0.29 (03-17-21 @ 11:30)  Procalcitonin, Serum: 0.31 (03-16-21 @ 10:15)  Legionella Antigen, Urine: Negative (03-12-21 @ 10:30)  Streptococcus Pneumoniae Ag Urine: Negative (03-12-21 @ 10:30)  MRSA PCR Result.: Negative (03-12-21 @ 10:30)  Legionella Antigen, Urine: Negative (03-11-21 @ 08:10)  Streptococcus Pneumoniae Ag Urine: Negative (03-11-21 @ 08:10)  Procalcitonin, Serum: 1.75 (03-11-21 @ 06:31)  Rapid RVP Result: NotDetec (03-10-21 @ 22:10)      INFLAMMATORY MARKERS      RADIOLOGY & ADDITIONAL TESTS:  I have personally reviewed the last available Chest xray  CXR  Xray Chest 1 View- PORTABLE-Urgent:   EXAM:  XR CHEST PORTABLE URGENT 1V            PROCEDURE DATE:  03/17/2021            INTERPRETATION:  STUDY INDICATION: sepsis    Comparison: XR  3/11/2021.    Technique/Positioning: Frontal view of the chest.    Findings:    Support devices: Interval removal of right IJ venous catheter.    Cardiac/mediastinum/hilum: Stable appearance of the cardiomediastinal silhouette.    Lung parenchyma/Pleura: Increased right mid to lower lung consolidation. Additional bilateral patchy opacities similar to slightly increased. No pneumothorax.    Skeleton/soft tissues: Unchanged.    Impression:    Increased right mid to lower lung consolidation. Additional bilateral patchy opacities similar to slightly increased. No pneumothorax.              OLIVE HENRY; Attending Radiologist  This document has been electronically signed. Mar 17 2021  1:45PM (03-17-21 @ 11:39)      CT  CT Chest No Cont:   EXAM:  CT CHEST            PROCEDURE DATE:  03/17/2021            INTERPRETATION:  Reason for Exam:  Pneumonia  CT of the chest was performed from the thoracic inlet to the level of the adrenal glands without contrast injection. Coronal and sagittal images have been submitted.    Comparison: Chest x-ray-3/17/2021. Chest CT-3/11/2021    Findings:    Tubes/Lines: None    Lungs, Pleura, and Airways: Breathing artifact limits lung parenchymal assessment. Patent central tracheobronchial tree. Enlarging loculated right pleural fluid collection with multiple air bubbles consistent with empyema is noted. Associated compressive atelectasis right lung is seen.    Scattered groundglass opacities involving multiple lumbar segments are noted. Right upper lobe wedge-shaped consolidation is also noted (series 5/116), unchanged from earlier study.    Mediastinum/Lymph Nodes: Stable enlarged mediastinal lymph nodes including 1.5 cm right paratracheal node, likely reactive.    Heart/Great Vessels: No pericardial effusions. Mild ectasia thoracic arch measuring 37 mm, series 3/38.    Abdomen: Stable punctate calcifications liver. No current evidence of suspicious upper abdominal lesions on noncontrast imaging.    Bones and soft tissues: Degenerative changes thoracic spine with upper thoracic scoliosis.    IMPRESSION:    Since 3/11/2021.    Enlarging loculated right pleural fluid collection with multiple air bubbles consistent with empyema.   Associated compressive atelectasis right lung is seen.    Scattered groundglass opacities involving multiple lumbar segments.      Right upper lobe wedge-shaped consolidation, (series 5/116), unchanged from earlier study.      Spoke with ALLYSON RAMOS on 3/18/2021 9:56 AM with readback.              SANDRA MOODY MD; Attending Radiologist  This document has been electronically signed. Mar 18 2021  9:57AM (03-17-21 @ 16:19)      CARDIOLOGY TESTING  12 Lead ECG:   Ventricular Rate 102 BPM    Atrial Rate 102 BPM    P-R Interval 140 ms    QRS Duration 88 ms    Q-T Interval 324 ms    QTC Calculation(Bazett) 422 ms    P Axis 32 degrees    R Axis 49 degrees    T Axis 39 degrees    Diagnosis Line Sinus tachycardia  Otherwise normal ECG    Confirmed by Job Bains ([id number]) on 3/10/2021 11:06:24 PM (03-10-21 @ 20:30)      MEDICATIONS  cefepime   IVPB 2000 IV Intermittent every 12 hours  enoxaparin Injectable 40 SubCutaneous daily  ferrous    sulfate 325 Oral daily  levoFLOXacin IVPB 750 IV Intermittent every 48 hours  melatonin 3 Oral at bedtime  metroNIDAZOLE  IVPB 500 IV Intermittent every 8 hours  polyethylene glycol 3350 17 Oral daily  senna 2 Oral at bedtime      WEIGHT  Weight (kg): 60 (03-11-21 @ 01:17)  Creatinine, Serum: 0.7 mg/dL (03-19-21 @ 05:14)      ANTIBIOTICS:  cefepime   IVPB 2000 milliGRAM(s) IV Intermittent every 12 hours  levoFLOXacin IVPB 750 milliGRAM(s) IV Intermittent every 48 hours  metroNIDAZOLE  IVPB 500 milliGRAM(s) IV Intermittent every 8 hours      All available historical records have been reviewed

## 2021-03-19 NOTE — CONSULT NOTE ADULT - ASSESSMENT
A/P:  DESTIN TERRY is a 85y year old Female currently admitted for empyema, covid negative. WBC persistently elevated. Repeat CT showing Enlarging loculated right pleural fluid collection with multiple air bubbles consistent with empyema.     Plan:  Plans for OR on Monday for VATS   Continue with IV abx  COVID TEST SAT  NPO Sunday PM  PRe-op labs sunday  Keep sats >92%, o2 if needed  If decompensate, will possible need a chest tube, but she is planned for OR for now  Spoke with family (son and daughter) and are in agreement  CT surgery to follow

## 2021-03-20 NOTE — PROGRESS NOTE ADULT - SUBJECTIVE AND OBJECTIVE BOX
GENERAL SURGERY PROGRESS NOTE     DESTIN TERRY  85y Female  Hospital day :9d    OVERNIGHT EVENTS: No acute events overnight.    T(F): 97.6 (03-19-21 @ 08:08), Max: 97.6 (03-19-21 @ 08:08)  HR: 98 (03-19-21 @ 08:08) (98 - 98)  BP: 152/59 (03-19-21 @ 08:08) (152/59 - 152/59)  RR: 19 (03-19-21 @ 08:08) (19 - 19)    DIET/FLUIDS: ferrous    sulfate 325 milliGRAM(s) Oral daily    AC/ proph: enoxaparin Injectable 40 milliGRAM(s) SubCutaneous daily    ABx: cefepime   IVPB 2000 milliGRAM(s) IV Intermittent every 12 hours  levoFLOXacin IVPB 750 milliGRAM(s) IV Intermittent every 48 hours  metroNIDAZOLE  IVPB 500 milliGRAM(s) IV Intermittent every 8 hours    PHYSICAL EXAM:  GENERAL: Well developed female in no acute distress.  CHEST/LUNG: Decreased breath sounds R-sided compared to left.  HEART: Regular rate and rhythm.  ABDOMEN: Soft, Nontender, Nondistended.   EXTREMITIES:  No clubbing, cyanosis, or edema.    LABS             10.2   27.92 )-----------( 761      ( 19 Mar 2021 05:14 )             33.0       Auto Neutrophil %: 79.6 % (03-19-21 @ 05:14)  Auto Immature Granulocyte %: 1.6 % (03-19-21 @ 05:14)    03-19  135  |  98  |  17  ----------------------------<  107<H>  4.9   |  24  |  0.7    Calcium, Total Serum: 8.7 mg/dL (03-19-21 @ 05:14)    Serum Pro-Brain Natriuretic Peptide: 1169 pg/mL (03-10-21 @ 21:00)    Culture - Blood (collected 17 Mar 2021 16:00)  Source: .Blood Blood-Peripheral  Preliminary Report (19 Mar 2021 02:10):    No growth to date.    RADIOLOGY & ADDITIONAL TESTS:  None within 24h

## 2021-03-20 NOTE — PROGRESS NOTE ADULT - ATTENDING COMMENTS
85 y.o. F with right empyema, hemodynamically stable, no respiratory distress, persistent leukocytosis and episodes of fever.  CXR: right lung opacities and pleural effusion  Plan:   OOB  Incentive spirometry  IV antibiotics  OR on 03/22/21

## 2021-03-20 NOTE — PROGRESS NOTE ADULT - ASSESSMENT
This is an 85 year old female with a PMH of dementia, osteoarthritis found to have worsening right empyema.    PLAN:  -Continue current management as per primary team  -OR for VATS on Mon, 3/22  -Pre-op labs Sunday: CBC, BMP, Mg, Phos, Coags, T&S  -Needs COVID test Sat, 3/20  -Monitor O2 sats, may need chest tube is decompensates  -Continue IV abx

## 2021-03-20 NOTE — PROGRESS NOTE ADULT - ASSESSMENT
Patient seen and examined independently. Agree with resident note.  # Sepsis  s/p septic shock sec to necrotizing PNA due to klebsiella on cefepime and levoflox--added flagyl.sent repeat MRSA  .  Ct chest--< from: CT Chest No Cont (03.17.21 @ 16:19) >  Enlarging loculated right pleural fluid collection with multiple air bubbles consistent with empyema.   Associated compressive atelectasis right lung is seen.  Scattered groundglass opacities involving multiple lumbar segments.    Right upper lobe wedge-shaped consolidation, (series 5/116), unchanged from earlier study.     Ct surgery  wanted VATS procedure to be done on monday    # MENDOZA s/p fluids.  # severe iron def anemia--  hb is stable  # Dementia--  patient talking and making sense-- she said she is Portuguese.Will discuss with family in AM.

## 2021-03-20 NOTE — PROGRESS NOTE ADULT - SUBJECTIVE AND OBJECTIVE BOX
SUBJECTIVE:    Patient is a 85y old Female who presents with a chief complaint of Cough, fever (20 Mar 2021 01:27)    Currently admitted to medicine with the primary diagnosis of Sepsis       Today is hospital day 9d.     PAST MEDICAL & SURGICAL HISTORY  Osteoarthritis    Dementia    No significant past surgical history      ALLERGIES:  clindamycin (Hives)    MEDICATIONS:  STANDING MEDICATIONS  cefepime   IVPB 2000 milliGRAM(s) IV Intermittent every 12 hours  enoxaparin Injectable 40 milliGRAM(s) SubCutaneous daily  ferrous    sulfate 325 milliGRAM(s) Oral daily  levoFLOXacin IVPB 750 milliGRAM(s) IV Intermittent every 48 hours  melatonin 3 milliGRAM(s) Oral at bedtime  metroNIDAZOLE  IVPB 500 milliGRAM(s) IV Intermittent every 8 hours  multivitamin 1 Tablet(s) Oral daily  polyethylene glycol 3350 17 Gram(s) Oral daily  senna 2 Tablet(s) Oral at bedtime    PRN MEDICATIONS  acetaminophen   Tablet .. 650 milliGRAM(s) Oral every 6 hours PRN  guaifenesin/dextromethorphan  Syrup 10 milliLiter(s) Oral every 4 hours PRN    VITALS:   T(F): 98.3  HR: 81  BP: 111/57  RR: 19  SpO2: --    LABS:                        9.8    25.59 )-----------( 773      ( 20 Mar 2021 05:04 )             31.9     03-20    135  |  99  |  16  ----------------------------<  116<H>  4.7   |  25  |  0.7    Ca    8.7      20 Mar 2021 05:04  Mg     2.2     03-20                    RADIOLOGY:    PHYSICAL EXAM:  GEN: No acute distress  LUNGS: Clear to auscultation bilaterally   HEART: S1/S2 present. RRR.   ABD/ GI: Soft, non-tender, non-distended. Bowel sounds present  EXT: NC/NC/NE/2+PP/LANCE  NEURO: AAOX3

## 2021-03-21 NOTE — PROGRESS NOTE ADULT - ATTENDING COMMENTS
85 y.o. F with right empyema, pneumonia.  Hemodynamically stable, no respiratory distress, moderate productive cough.  Had another episode of fever last night.  CXR: Right lung opacity.  Persistent leukocytosis.  Plan:  OR tomorrow right VATS/decortication  Continue IV antibiotics  Incentive spirometry

## 2021-03-21 NOTE — PROGRESS NOTE ADULT - ASSESSMENT
85 year old woman known to have dementia, osteoarthritis, Sri Lankan-speaking presented with cough and fever. Found to be in sepsis with evidence of CAP.     #) Sepsis secondary to Necrotizing PNA vs Empyema  - BP stable, no longer requiring pressors, sat well on RA  - WBC 38k on admission > WBC was trending down, 22K today  - CT Chest: PNA w/ numerous AF levels, possible empyema  - repeat CT Chest shows enlarging loculated right pleural fluid collection with multiple air bubbles consistent with empyema. Associated compressive atelectasis right lung is seen.  - Pulm and ID consult appreciated  - ABx: Levaquin 750 mg IV q48, Flagyl 500mg IV TID, Cefepime 2G IV q12, d/c'ed Vanco (MRSA nares (-) x 2)  -CTS plan for VATS Monday Chest tube if decomp prior to VATS  - Procal elevated (1.75) - repeat 0.29  - BCx NGTD, Fungitell (-)  - Obtain sputum culture  #) MENDOZA - resolved s/p IVF  - Cr 1.7 on admission > currently stable at 0.7    #) Microcytic anemia   - Iron studies:     - Iron total low 13     - % Iron Saturation low 10     - TIBC low 125  - C/w Ferrous Sulfate 325mg PO Daily    #) Known dementia  - Patient appears a bit confused on exam  - Not noted to be different from baseline    #) Osteoarthritis  - Does not appear to be in any pain  - Monitor for now    #) Diet - Dysphagia III soft  #) DVT Prophylaxis - Heparin 5,000 Q8  #) Disposition- Floor  #) Activity- increase as tolerated  #) Code status - Full

## 2021-03-21 NOTE — PROGRESS NOTE ADULT - ATTENDING COMMENTS
Patient seen and examined independently. Agree with resident note.   # Sepsis  s/p septic shock sec to necrotizing PNA due to klebsiella on cefepime and levoflox--added flagyl.sent repeat MRSA  .  Ct chest--< from: CT Chest No Cont (03.17.21 @ 16:19) >  Enlarging loculated right pleural fluid collection with multiple air bubbles consistent with empyema.   Associated compressive atelectasis right lung is seen.  Scattered groundglass opacities involving multiple lumbar segments.    Right upper lobe wedge-shaped consolidation, (series 5/116), unchanged from earlier study.     Ct surgery  wanted VATS procedure to be done on monday    # MENDOZA s/p fluids.  # severe iron def anemia--  hb is stable  # Dementia--  patient talking and making sense-- she said she is Finnish. soke with daughter and she said patient has short term memory--   she is informed of the planned procedure.

## 2021-03-21 NOTE — PROGRESS NOTE ADULT - SUBJECTIVE AND OBJECTIVE BOX
SUBJECTIVE:  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, osteoarithtis, [place]-speaking presenting with cough and fever.    As per daughter, patient has been having dry consistent cough since 2 months. Today, she was being evaluated for knee injections when she was found to be febrile. She also reports progressing generalized weakness with decreased appetite and PO intake.  No URT symptoms, no chest pain, no leg pain, no diarrhea, no urinary symptoms no sick contacts, no recent travel. In ED was hypotensive, was given IVF, started on levophed 0.04 called to evaluate (11 Mar 2021 03:21)      PAST MEDICAL & SURGICAL HISTORY  PAST MEDICAL & SURGICAL HISTORY:  Osteoarthritis    Dementia    No significant past surgical history      SOCIAL HISTORY:    ALLERGIES:  clindamycin (Hives)    MEDICATIONS:  STANDING MEDICATIONS  cefepime   IVPB 2000 milliGRAM(s) IV Intermittent every 12 hours  chlorhexidine 4% Liquid 1 Application(s) Topical <User Schedule>  enoxaparin Injectable 40 milliGRAM(s) SubCutaneous daily  ferrous    sulfate 325 milliGRAM(s) Oral daily  levoFLOXacin IVPB 750 milliGRAM(s) IV Intermittent every 48 hours  melatonin 3 milliGRAM(s) Oral at bedtime  metroNIDAZOLE  IVPB 500 milliGRAM(s) IV Intermittent every 8 hours  multivitamin 1 Tablet(s) Oral daily  polyethylene glycol 3350 17 Gram(s) Oral daily  senna 2 Tablet(s) Oral at bedtime    PRN MEDICATIONS  acetaminophen   Tablet .. 650 milliGRAM(s) Oral every 6 hours PRN  guaifenesin/dextromethorphan  Syrup 10 milliLiter(s) Oral every 4 hours PRN    VITALS:   T(F): 98.6  HR: 86  BP: 139/65  RR: 20  SpO2: 96%    LABS:                        10.4   22.71 )-----------( 639      ( 21 Mar 2021 05:48 )             33.8     03-21    136  |  98  |  18  ----------------------------<  108<H>  4.7   |  25  |  0.7    Ca    9.2      21 Mar 2021 05:48  Mg     2.3     03-21    TPro  7.2  /  Alb  2.6<L>  /  TBili  0.3  /  DBili  x   /  AST  30  /  ALT  31  /  AlkPhos  82  03-21    PT/INR - ( 21 Mar 2021 05:48 )   PT: 17.20 sec;   INR: 1.50 ratio         PTT - ( 21 Mar 2021 05:48 )  PTT:41.1 sec              RADIOLOGY:    PHYSICAL EXAM:  GEN: No acute distress  HEENT: AT/NC PEERLA, EOMI  LUNGS: dec b/l BS  HEART: S1/S2 present  ABD: Soft, non-tender, non-distended.  EXT: No edema, no rashes, no cyanosis

## 2021-03-21 NOTE — PROGRESS NOTE ADULT - ASSESSMENT
Assessment:  85 year old female with a PMH of dementia, osteoarthritis found to have worsening right empyema.    PLAN:  -Continue current management as per primary team  -OR for VATS on Mon, 3/22  -Pre-op labs Sunday: CBC, BMP, Mg, Phos, Coags, T&S  -Monitor O2 sats, may need chest tube is decompensates  -Continue IV abx

## 2021-03-21 NOTE — PROGRESS NOTE ADULT - SUBJECTIVE AND OBJECTIVE BOX
GENERAL SURGERY PROGRESS NOTE     DESTIN TERRY  85y  Female  Hospital day :10d    OVERNIGHT EVENTS: no acute events overnight     T(F): 98 (03-21-21 @ 00:43), Max: 100.9 (03-20-21 @ 20:54)  HR: 90 (03-21-21 @ 00:43) (81 - 90)  BP: 126/62 (03-21-21 @ 00:43) (107/62 - 126/62)    RR: 20 (03-21-21 @ 00:43) (18 - 20)  SpO2: 96% (03-21-21 @ 00:43) (96% - 96%)    DIET/FLUIDS: ferrous    sulfate 325 milliGRAM(s) Oral daily  multivitamin 1 Tablet(s) Oral daily      AC/ proph: enoxaparin Injectable 40 milliGRAM(s) SubCutaneous daily    ABx: cefepime   IVPB 2000 milliGRAM(s) IV Intermittent every 12 hours  levoFLOXacin IVPB 750 milliGRAM(s) IV Intermittent every 48 hours  metroNIDAZOLE  IVPB 500 milliGRAM(s) IV Intermittent every 8 hours      PHYSICAL EXAM:  GENERAL: NAD, well-appearing  CHEST/LUNG: Clear to auscultation bilaterally  HEART: Regular rate and rhythm  ABDOMEN: Soft, Nontender, Nondistended;   EXTREMITIES:  No clubbing, cyanosis, or edema      LABS  Labs:  CAPILLARY BLOOD GLUCOSE                              9.8    25.59 )-----------( 773      ( 20 Mar 2021 05:04 )             31.9       Auto Neutrophil %: 79.0 % (03-20-21 @ 05:04)  Auto Immature Granulocyte %: 1.2 % (03-20-21 @ 05:04)    03-20    135  |  99  |  16  ----------------------------<  116<H>  4.7   |  25  |  0.7      Calcium, Total Serum: 8.7 mg/dL (03-20-21 @ 05:04)

## 2021-03-22 NOTE — PROGRESS NOTE ADULT - SUBJECTIVE AND OBJECTIVE BOX
24H events:    Patient is a 85y old Female who presents with a chief complaint of Cough, fever (20 Mar 2021 01:27)    Primary diagnosis of Sepsis       Today is hospital day 11d. This morning patient was seen and examined at bedside, resting comfortably in bed.    No acute or major events overnight.    PAST MEDICAL & SURGICAL HISTORY  Osteoarthritis    Dementia    No significant past surgical history      SOCIAL HISTORY:  Social History:  Nonsmoker, no alcohol (11 Mar 2021 03:21)      ALLERGIES:  clindamycin (Hives)    MEDICATIONS:  STANDING MEDICATIONS  BUpivacaine liposome 1.3% Injectable (no eMAR) 20 milliLiter(s) Local Injection once  cefepime   IVPB 2000 milliGRAM(s) IV Intermittent every 12 hours  chlorhexidine 4% Liquid 1 Application(s) Topical <User Schedule>  dextrose 5% + sodium chloride 0.45%. 1000 milliLiter(s) IV Continuous <Continuous>  enoxaparin Injectable 40 milliGRAM(s) SubCutaneous daily  ferrous    sulfate 325 milliGRAM(s) Oral daily  levoFLOXacin IVPB 750 milliGRAM(s) IV Intermittent every 48 hours  melatonin 3 milliGRAM(s) Oral at bedtime  metroNIDAZOLE  IVPB 500 milliGRAM(s) IV Intermittent every 8 hours  multivitamin 1 Tablet(s) Oral daily  polyethylene glycol 3350 17 Gram(s) Oral daily  senna 2 Tablet(s) Oral at bedtime    PRN MEDICATIONS  acetaminophen   Tablet .. 650 milliGRAM(s) Oral every 6 hours PRN  guaifenesin/dextromethorphan  Syrup 10 milliLiter(s) Oral every 4 hours PRN    VITALS:   T(F): 97  HR: 78  BP: 102/58  RR: 18  SpO2: --    PHYSICAL EXAM:  GENERAL: NAD, well-groomed, well-developed  HEAD:  Atraumatic, Normocephalic  EYES: EOMI  NECK: Supple  NERVOUS SYSTEM:  Alert & Oriented X3, non focal   CHEST/LUNG: Clear to auscultation bilaterally; No rales, rhonchi, wheezing, or rubs  HEART: Regular rate and rhythm; No murmurs, rubs, or gallops  ABDOMEN: Soft, Nontender, Nondistended; Bowel sounds present  EXTREMITIES:  2+ Peripheral Pulses, No clubbing, cyanosis, or edema  LYMPH: No lymphadenopathy noted  SKIN: No rashes or lesions  LABS:                        9.7    20.04 )-----------( 724      ( 22 Mar 2021 05:24 )             31.0     03-22    133<L>  |  99  |  20  ----------------------------<  129<H>  4.6   |  26  |  0.7    Ca    8.8      22 Mar 2021 05:24  Phos  3.2     03-21  Mg     2.2     03-22    TPro  7.2  /  Alb  2.6<L>  /  TBili  0.3  /  DBili  x   /  AST  30  /  ALT  31  /  AlkPhos  82  03-21    PT/INR - ( 21 Mar 2021 22:14 )   PT: 18.00 sec;   INR: 1.57 ratio         PTT - ( 21 Mar 2021 22:14 )  PTT:46.0 sec              RADIOLOGY:

## 2021-03-22 NOTE — PROGRESS NOTE ADULT - SUBJECTIVE AND OBJECTIVE BOX
Progress Note: General Surgery  Patient: DESTIN TERRY , 85y (1935)Female   MRN: [id number]  Location: [address]  Visit: 03-11-21 Inpatient  Date: 03-22-21 @ 10:54    Admit Diagnosis/Chief Complaint: Loculated pleural effusion    Procedure/Diagnosis:  Planning for VATS today    Events/ 24h: No acute events overnight. Pain controlled.    Vitals: T(F): 97 (03-22-21 @ 07:00), Max: 98.6 (03-22-21 @ 00:50)  HR: 78 (03-22-21 @ 07:23)  BP: 102/58 (03-22-21 @ 07:23) (102/58 - 128/62)  RR: 18 (03-22-21 @ 07:23)  SpO2: --    In:   03-21-21 @ 07:01  -  03-22-21 @ 07:00  --------------------------------------------------------  IN: 600 mL      Out:   03-21-21 @ 07:01  -  03-22-21 @ 07:00  --------------------------------------------------------  OUT:  Total OUT: 0 mL        Net:   03-21-21 @ 07:01  -  03-22-21 @ 07:00  --------------------------------------------------------  NET: 600 mL        Diet: Diet, NPO after Midnight:      NPO Start Date: 21-Mar-2021,   NPO Start Time: 23:59 (03-21-21 @ 18:00)  Diet, Dysphagia 3 Soft-Thin Liquids:   Supplement Feeding Modality:  Oral  Ensure Enlive Servings Per Day:  1       Frequency:  Two Times a day  Ensure Pudding Cans or Servings Per Day:  1       Frequency:  Two Times a day (03-17-21 @ 12:31)    IV Fluids: dextrose 5% + sodium chloride 0.45%. 1000 milliLiter(s) (75 mL/Hr) IV Continuous <Continuous>  ferrous    sulfate 325 milliGRAM(s) Oral daily  multivitamin 1 Tablet(s) Oral daily      Physical Examination:  General Appearance: NAD   HEENT: EOMI, sclera non-icteric.  Heart: RRR   Lungs: Bilateral wheezes and crackles  Abdomen:  Soft, ***nontender, nondistended.   MSK/Extremities: Warm & well-perfused.   Skin: Warm, dry. No jaundice.       Medications: [Standing]  BUpivacaine liposome 1.3% Injectable (no eMAR) 20 milliLiter(s) Local Injection once  cefepime   IVPB 2000 milliGRAM(s) IV Intermittent every 12 hours  chlorhexidine 4% Liquid 1 Application(s) Topical <User Schedule>  dextrose 5% + sodium chloride 0.45%. 1000 milliLiter(s) (75 mL/Hr) IV Continuous <Continuous>  enoxaparin Injectable 40 milliGRAM(s) SubCutaneous daily  ferrous    sulfate 325 milliGRAM(s) Oral daily  levoFLOXacin IVPB 750 milliGRAM(s) IV Intermittent every 48 hours  melatonin 3 milliGRAM(s) Oral at bedtime  metroNIDAZOLE  IVPB 500 milliGRAM(s) IV Intermittent every 8 hours  multivitamin 1 Tablet(s) Oral daily  polyethylene glycol 3350 17 Gram(s) Oral daily  senna 2 Tablet(s) Oral at bedtime    DVT Prophylaxis: enoxaparin Injectable 40 milliGRAM(s) SubCutaneous daily    GI Prophylaxis:   Antibiotics: cefepime   IVPB 2000 milliGRAM(s) IV Intermittent every 12 hours  levoFLOXacin IVPB 750 milliGRAM(s) IV Intermittent every 48 hours  metroNIDAZOLE  IVPB 500 milliGRAM(s) IV Intermittent every 8 hours    Anticoagulation:   Medications:[PRN]  acetaminophen   Tablet .. 650 milliGRAM(s) Oral every 6 hours PRN  guaifenesin/dextromethorphan  Syrup 10 milliLiter(s) Oral every 4 hours PRN      Labs:                        9.7    20.04 )-----------( 724      ( 22 Mar 2021 05:24 )             31.0     03-22    133<L>  |  99  |  20  ----------------------------<  129<H>  4.6   |  26  |  0.7    Ca    8.8      22 Mar 2021 05:24  Phos  3.2     03-21  Mg     2.2     03-22    TPro  7.2  /  Alb  2.6<L>  /  TBili  0.3  /  DBili  x   /  AST  30  /  ALT  31  /  AlkPhos  82  03-21    LIVER FUNCTIONS - ( 21 Mar 2021 05:48 )  Alb: 2.6 g/dL / Pro: 7.2 g/dL / ALK PHOS: 82 U/L / ALT: 31 U/L / AST: 30 U/L / GGT: x           PT/INR - ( 21 Mar 2021 22:14 )   PT: 18.00 sec;   INR: 1.57 ratio         PTT - ( 21 Mar 2021 22:14 )  PTT:46.0 sec          Urine/Micro:        Imaging:       < from: Xray Chest 1 View- PORTABLE-Routine (Xray Chest 1 View- PORTABLE-Routine .) (03.20.21 @ 12:38) >    Impression:    Right empyema/bilateral opacities, stable.    < end of copied text >

## 2021-03-22 NOTE — PROGRESS NOTE ADULT - SUBJECTIVE AND OBJECTIVE BOX
CTU Attending Progress Daily Note     22 Mar 2021 16:06  POD#     0          Procedure:  Rt VATS    Patient seen as post-op critical care follow-up    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, osteoarithtis, [place]-speaking presenting with cough and fever.    As per daughter, patient has been having dry consistent cough since 2 months. Today, she was being evaluated for knee injections when she was found to be febrile. She also reports progressing generalized weakness with decreased appetite and PO intake.  No URT symptoms, no chest pain, no leg pain, no diarrhea, no urinary symptoms no sick contacts, no recent travel. In ED was hypotensive, was given IVF, started on levophed 0.04 called to evaluate (11 Mar 2021 03:21)    See preop testing chart H&P    Interval event for past 24 hr:  MIGNON TERRYDULCE  85y had cardiac surgery as above      REVIEW OF SYSTEMS:    [x  ] Unable to assess ROS because : intubated, sedated    OBJECTIVE:  ICU Vital Signs Last 24 Hrs  T(C): 35.9 (22 Mar 2021 13:51), Max: 37 (22 Mar 2021 00:50)  T(F): 96.6 (22 Mar 2021 13:51), Max: 98.6 (22 Mar 2021 00:50)  HR: 84 (22 Mar 2021 16:00) (63 - 86)  BP: 99/63 (22 Mar 2021 15:30) (67/51 - 128/62)  BP(mean): 75 (22 Mar 2021 15:30) (56 - 75)  ABP: 119/61 (22 Mar 2021 16:00) (90/48 - 142/71)  ABP(mean): 84 (22 Mar 2021 16:00) (60 - 97)  RR: 14 (22 Mar 2021 16:00) (14 - 19)  SpO2: 100% (22 Mar 2021 16:00) (97% - 100%)      I&O's Summary    21 Mar 2021 07:01  -  22 Mar 2021 07:00  --------------------------------------------------------  IN: 600 mL / OUT: 0 mL / NET: 600 mL    22 Mar 2021 07:01  -  22 Mar 2021 16:06  --------------------------------------------------------  IN: 109.8 mL / OUT: 33 mL / NET: 76.8 mL      Adult Advanced Hemodynamics Last 24 Hrs  CVP(mm Hg): --  CVP(cm H2O): --  CO: --  CI: --  PA: --  PA(mean): --  PCWP: --  SVR: --  SVRI: --  PVR: --  PVRI: --  Mode: AC/ CMV (Assist Control/ Continuous Mandatory Ventilation)  RR (machine): 18  TV (machine): 500  FiO2: 100  PEEP: 5  ITime: 1  MAP: 12  PIP: 31      PHYSICAL EXAM:  General: WN/WD NAD    HEENT:     [+] NCAT  [+] EOMI  [-] Conjuctival edema   [-] Icterus   [-] Thrush   [+] ETT  [+] NGT/OGT    Neck:         [+] FROM   [-] JVD     [-] Nodes     [-] Masses    [+] Mid-line trachea    [-] Tracheostomy    Chest:         [-] Sternal click   [-] Sternal drainage   [+] Pacing wires   [+] Chest tubes   [-] SubQ emphysema    Lungs:          [+] CTA   [-] Rhonchi   [-] Rales    [-] Wheezing    [-] Decreased BS    [-] Dullness R L    Cardiac:       [+] S1 [+] S2    [+] RRR   [-] Irregular   [-] S3   [-] S4    [-] Murmurs    [-] Rub    Abdomen:    [+] BS    [+] Soft    [+] Non-tender     [-] Distended    [-] Organomegaly  [-] PEG    Extremities:   [-] Cyanosis U/L   [-] Clubbing  U/L  [-] LE/UE Edema   [+] Capillary refill    [+] Pulses     Neuro:           [+] Sedated       Skin:        [-] Rashes    [-] Erythema   [+] Normal incisions   [+] IV sites intact   [-] Sacral decubitus    Tubes: chest  LINES: central    CAPILLARY BLOOD GLUCOSE        CAPILLARY BLOOD GLUCOSE          HOSPITAL MEDICATIONS:  MEDICATIONS  (STANDING):  albumin human  5% IVPB 500 milliLiter(s) IV Intermittent once  ALBUTerol    90 MICROgram(s) HFA Inhaler 2 Puff(s) Inhalation every 6 hours  ceFAZolin   IVPB 1000 milliGRAM(s) IV Intermittent every 8 hours  cefepime   IVPB 2000 milliGRAM(s) IV Intermittent every 12 hours  chlorhexidine 0.12% Liquid 5 milliLiter(s) Oral Mucosa two times a day  chlorhexidine 4% Liquid 1 Application(s) Topical <User Schedule>  dexMEDEtomidine Infusion 0.2 MICROgram(s)/kG/Hr (3 mL/Hr) IV Continuous <Continuous>  famotidine Injectable 20 milliGRAM(s) IV Push every 12 hours  ferrous    sulfate 325 milliGRAM(s) Oral daily  ipratropium 17 MICROgram(s) HFA Inhaler 2 Puff(s) Inhalation every 6 hours  lactated ringers Bolus 1000 milliLiter(s) IV Bolus once  levoFLOXacin IVPB 750 milliGRAM(s) IV Intermittent every 48 hours  meperidine     Injectable 25 milliGRAM(s) IV Push once  metroNIDAZOLE  IVPB 500 milliGRAM(s) IV Intermittent every 8 hours  multivitamin 1 Tablet(s) Oral daily  norepinephrine Infusion 0.05 MICROgram(s)/kG/Min (5.63 mL/Hr) IV Continuous <Continuous>  polyethylene glycol 3350 17 Gram(s) Oral daily  propofol Infusion 10 MICROgram(s)/kG/Min (3.6 mL/Hr) IV Continuous <Continuous>  senna 2 Tablet(s) Oral at bedtime  sodium chloride 0.9%. 1000 milliLiter(s) (10 mL/Hr) IV Continuous <Continuous>  vasopressin Infusion 0.04 Unit(s)/Min (2.4 mL/Hr) IV Continuous <Continuous>    MEDICATIONS  (PRN):  guaifenesin/dextromethorphan  Syrup 10 milliLiter(s) Oral every 4 hours PRN Cough/throat discomfort      LABS:  ABG - ( 22 Mar 2021 14:02 )  pH, Arterial: 7.27  pH, Blood: x     /  pCO2: 49    /  pO2: 113   / HCO3: 22    / Base Excess: -4.9  /  SaO2: 97                                      11.2   31.29 )-----------( 694      ( 22 Mar 2021 14:38 )             35.0     03-22    134<L>  |  102  |  21<H>  ----------------------------<  130<H>  4.6   |  20  |  0.8    Ca    8.1<L>      22 Mar 2021 14:38  Phos  3.2     03-21  Mg     2.1     03-22    TPro  5.3<L>  /  Alb  1.9<L>  /  TBili  0.3  /  DBili  x   /  AST  37  /  ALT  18  /  AlkPhos  60  03-22    PT/INR - ( 22 Mar 2021 14:38 )   PT: 18.70 sec;   INR: 1.63 ratio         PTT - ( 22 Mar 2021 14:38 )  PTT:34.4 sec        RADIOLOGY:  Reviewed and interpreted by me  CXR from 03-22-21 shows [+] congestion, [-] pneumothorax, [+] R/L effusion, [-] cardiomegaly,   NGT in place, TLC in place, R Chest Tubes in place      Assessment:  empyema SP VATS and decortication  septic shock with hypotension on pressors  acute hypoxemic respiratory failure on high FiO2    PAST MEDICAL & SURGICAL HISTORY:  Osteoarthritis    Dementia    No significant past surgical history        PLAN:  Neuro:  sedation and Pain control  Pulm: SBT/ SAT when FiO2 40%, Daily CXR.   Cardio: Monitor telemetry/alarms. Continue pressors and IV fluids  GI:  Continue GI prophylaxis  Renal: monitor urine output, supplement electrolytes as needed  Vasc: Heparin SC/SCDs for DVT prophylaxis  Heme: Monitor H/H.   ID: IV antibiotics, FU CX  Endocrine: Monitor finger stick blood sugar and control hyperglycemia with insulin  Tubes: Monitor Chest tube output      Discussed with Cardiothoracic Team at PM rounds.    45 minutes of critical care time spent providing medical care for patient's acute illness/conditions that impairs at least one vital organ system and/or poses a high risk of imminent or life threatening deterioration in the patient's condition. It includes time spent evaluating and treating the patient's acute illness as well as time spent reviewing labs, radiology, discussing goals of care with patient and/or patient's family, and discussing the case with a multidisciplinary team in an effort to prevent further life threatening deterioration or end organ damage. This time is independent of any procedures performed.

## 2021-03-22 NOTE — PROGRESS NOTE ADULT - ATTENDING COMMENTS
Patient seen and examined independently. Agree with resident note.   # Sepsis  s/p septic shock sec to necrotizing PNA due to klebsiella on cefepime and levoflox--added flagyl.sent repeat MRSA negative  .    Ct surgery performed VATS procedure today-- she had 700cc of pus and 3 chest tubes were placed. patient  hypotensive on pressors today and currently intubated-- but she is waking up abnd responding.    # MENDOZA s/p fluids.  # severe iron def anemia--  hb is stable  # Dementia--  patient has short term memory--    currently daughter at beside in CTU--spoke with her.

## 2021-03-22 NOTE — PATIENT PROFILE ADULT - SURGICAL SITE INCISION
DISPLAY PLAN FREE TEXT DISPLAY PLAN FREE TEXT DISPLAY PLAN FREE TEXT DISPLAY PLAN FREE TEXT DISPLAY PLAN FREE TEXT DISPLAY PLAN FREE TEXT DISPLAY PLAN FREE TEXT no

## 2021-03-22 NOTE — PROGRESS NOTE ADULT - ASSESSMENT
85 year old woman known to have dementia, osteoarthritis, Trinidadian-speaking presented with cough and fever. Found to be in sepsis with evidence of CAP.     # Sepsis secondary to Necrotizing PNA vs Empyema  - BP stable, no longer requiring pressors, sat well on RA  - WBC 38k on admission > WBC was trending down, 22K today  - CT Chest: PNA w/ numerous AF levels, possible empyema  - repeat CT Chest shows enlarging loculated right pleural fluid collection with multiple air bubbles consistent with empyema. Associated compressive atelectasis right lung is seen.  - ABx: Levaquin 750 mg IV q48, Flagyl 500mg IV TID, Cefepime 2G IV q12, d/c'ed Vanco (MRSA nares (-) x 2)  -CTS plan for VATS today - Chest tube if decomp prior to VATS  - Procal elevated (1.75) - repeat 0.29  - BCx NGTD, Fungitell (-)  - Obtain sputum culture    # MENDOZA - resolved s/p IVF  - Cr 1.7 on admission > currently stable at 0.7    # Microcytic anemia - KATIE  - Iron studies:     - Iron total low 13     - % Iron Saturation low 10     - TIBC low 125  - C/w Ferrous Sulfate 325mg PO Daily    # Mild dementia  - Patient appears a bit confused on exam  - Not noted to be different from baseline    # Osteoarthritis  - Does not appear to be in any pain  - Monitor for now    #) Diet - NPO prior to VATS today  #) DVT Prophylaxis - Heparin 5,000 Q8  #) Disposition- Floor  #) Activity- increase as tolerated  #) Code status - Full

## 2021-03-22 NOTE — PROGRESS NOTE ADULT - ASSESSMENT
Assessment:  85y Female patient admitted for large loculated pleural effusion requiring VATS procedure  Patient seen and examined at bedside. NAD.     Plan:    -OR today for VATS  -Pre op labs complete  -Active type and screen  -Covid negative  - Monitor vitals  - Monitor labs and replete as necessary             Date/Time: 03-22-21 @ 10:54

## 2021-03-22 NOTE — BRIEF OPERATIVE NOTE - NSEVIDNCEINFORABSCESSFT_GEN_ALL_CORE
Right empyema, 800 cc of purulent fluid in pleural space, multiple pockets of purulent fluids, severe dense adhesions from lung to chest wall, diaphragm and mediastinum, moderately thick pleural rind with trapped lung, necrotizing pneumonia middle lobe

## 2021-03-22 NOTE — BRIEF OPERATIVE NOTE - COMMENTS
Anand Martin PA-C first assisted in all major parts of the operation in the absence of a qualified surgeon, fellow, or resident physician.

## 2021-03-23 NOTE — PROGRESS NOTE ADULT - SUBJECTIVE AND OBJECTIVE BOX
Patient is a 85y old  Female who presents with a chief complaint of Cough, fever (20 Mar 2021 01:27)        SUBJECTIVE:  S/p VATS and bronchoscopy.       PHYSICAL EXAM  Vital Signs Last 24 Hrs  T(C): 34.9 (23 Mar 2021 09:00), Max: 36.8 (22 Mar 2021 20:00)  T(F): 94.8 (23 Mar 2021 09:00), Max: 98.3 (22 Mar 2021 20:00)  HR: 55 (23 Mar 2021 11:15) (46 - 96)  BP: 83/48 (23 Mar 2021 11:00) (67/51 - 128/60)  BP(mean): 60 (23 Mar 2021 11:00) (56 - 89)  RR: 23 (23 Mar 2021 11:15) (0 - 42)  SpO2: 91% (23 Mar 2021 11:15) (91% - 100%)    CONSTITUTIONAL:  Well nourished.  NAD    ENT:   Airway patent,   No thrush    EYES:   Clear bilaterally,   pupils equal, round and reactive to light.    CARDIAC:   Normal rate,   regular rhythm.    no edema    CAROTID:   normal systolic impulse  no bruits    RESPIRATORY:  On MV   No wheezing  Normal chest expansion  Not tachypneic,  No use of accessory muscles    GASTROINTESTINAL:  Abdomen soft,   non-tender,   no guarding,   + BS    MUSCULOSKELETAL:   range of motion is not limited,  no clubbing, cyanosis    NEUROLOGICAL:   Intubated, sedated    SKIN:   Skin normal color for race,   No evidence of rash.    PSYCHIATRIC:   no apparent risk to self or others.      03-22-21 @ 07:01  -  03-23-21 @ 07:00  --------------------------------------------------------  IN:    Albumin 5%  - 500 mL: 1000 mL    Dexmedetomidine: 205.5 mL    IV PiggyBack: 700 mL    Norepinephrine: 93.1 mL    Propofol: 15.4 mL    sodium chloride 0.9%: 360 mL    Vasopressin: 19.2 mL  Total IN: 2393.2 mL    OUT:    Chest Tube (mL): 55 mL    Chest Tube (mL): 180 mL    Chest Tube (mL): 210 mL    Indwelling Catheter - Urethral (mL): 586 mL  Total OUT: 1031 mL    Total NET: 1362.2 mL      03-23-21 @ 07:01  -  03-23-21 @ 12:07  --------------------------------------------------------  IN:    Dexmedetomidine: 69 mL    IV PiggyBack: 250 mL    Lactated Ringers: 300 mL    Norepinephrine: 11.8 mL    sodium chloride 0.9%: 100 mL  Total IN: 730.8 mL    OUT:    Chest Tube (mL): 5 mL    Chest Tube (mL): 50 mL    Chest Tube (mL): 15 mL    Indwelling Catheter - Urethral (mL): 185 mL    Propofol: 0 mL    Vasopressin: 0 mL  Total OUT: 255 mL    Total NET: 475.8 mL          LABS:                          9.0    30.22 )-----------( 526      ( 23 Mar 2021 03:00 )             27.4                                               03-23    137  |  105  |  22<H>  ----------------------------<  146<H>  3.8   |  19  |  0.7    Ca    8.3<L>      23 Mar 2021 03:00  Phos  3.2     03-21  Mg     2.0     03-23    TPro  5.8<L>  /  Alb  3.0<L>  /  TBili  0.6  /  DBili  x   /  AST  30  /  ALT  13  /  AlkPhos  46  03-23      PT/INR - ( 22 Mar 2021 14:38 )   PT: 18.70 sec;   INR: 1.63 ratio         PTT - ( 22 Mar 2021 14:38 )  PTT:34.4 sec                                                                                     LIVER FUNCTIONS - ( 23 Mar 2021 03:00 )  Alb: 3.0 g/dL / Pro: 5.8 g/dL / ALK PHOS: 46 U/L / ALT: 13 U/L / AST: 30 U/L / GGT: x                                                  Culture - Fungal, Tissue (collected 22 Mar 2021 13:33)  Source: .Tissue None  Preliminary Report (23 Mar 2021 08:01):    Testing in progress    Culture - Tissue with Gram Stain (collected 22 Mar 2021 13:33)  Source: .Tissue None  Gram Stain (23 Mar 2021 04:38):    Rare polymorphonuclear leukocytes seen per low power field    Few White blood cells seen per low power field    Few Gram positive cocci in pairs per oil power field    Culture - Fungal, Bronchial (collected 22 Mar 2021 12:58)  Source: .Bronchial None  Preliminary Report (23 Mar 2021 08:01):    Testing in progress    Culture - Bronchial (collected 22 Mar 2021 12:58)  Source: .Bronchial None  Gram Stain (23 Mar 2021 07:58):    Numerous polymorphonuclear leukocytes per low power field    No Squamous epithelial cells per low power field    No organisms seen per oil power field    Culture - Fungal, Body Fluid (collected 22 Mar 2021 09:26)  Source: .Body Fluid None  Preliminary Report (23 Mar 2021 08:01):    Testing in progress    Culture - Body Fluid with Gram Stain (collected 22 Mar 2021 09:26)  Source: .Body Fluid None  Gram Stain (23 Mar 2021 04:38):    polymorphonuclear leukocytes seen    No organisms seen    by cytocentrifuge                                                ABG - ( 23 Mar 2021 03:41 )  pH, Arterial: 7.44  pH, Blood: x     /  pCO2: 33    /  pO2: 80    / HCO3: 22    / Base Excess: -1.4  /  SaO2: 96                  MEDICATIONS  (STANDING):  ALBUTerol    90 MICROgram(s) HFA Inhaler 2 Puff(s) Inhalation every 6 hours  cefepime   IVPB 2000 milliGRAM(s) IV Intermittent every 12 hours  chlorhexidine 0.12% Liquid 5 milliLiter(s) Oral Mucosa two times a day  chlorhexidine 4% Liquid 1 Application(s) Topical <User Schedule>  dexMEDEtomidine Infusion 0.2 MICROgram(s)/kG/Hr (3 mL/Hr) IV Continuous <Continuous>  famotidine Injectable 20 milliGRAM(s) IV Push every 12 hours  ferrous    sulfate 325 milliGRAM(s) Oral daily  ipratropium 17 MICROgram(s) HFA Inhaler 2 Puff(s) Inhalation every 6 hours  lactated ringers Bolus 1000 milliLiter(s) IV Bolus once  lactated ringers. 1000 milliLiter(s) (100 mL/Hr) IV Continuous <Continuous>  meperidine     Injectable 25 milliGRAM(s) IV Push once  metroNIDAZOLE  IVPB 500 milliGRAM(s) IV Intermittent every 8 hours  multivitamin 1 Tablet(s) Oral daily  norepinephrine Infusion 0.05 MICROgram(s)/kG/Min (5.63 mL/Hr) IV Continuous <Continuous>  polyethylene glycol 3350 17 Gram(s) Oral daily  propofol Infusion 10 MICROgram(s)/kG/Min (3.6 mL/Hr) IV Continuous <Continuous>  senna 2 Tablet(s) Oral at bedtime  sodium chloride 0.9%. 1000 milliLiter(s) (10 mL/Hr) IV Continuous <Continuous>  vasopressin Infusion 0.04 Unit(s)/Min (2.4 mL/Hr) IV Continuous <Continuous>    MEDICATIONS  (PRN):  fentaNYL    Injectable 25 MICROGram(s) IV Push every 3 hours PRN Moderate Pain (4 - 6)  guaifenesin/dextromethorphan  Syrup 10 milliLiter(s) Oral every 4 hours PRN Cough/throat discomfort  ondansetron Injectable 4 milliGRAM(s) IV Push every 4 hours PRN Nausea and/or Vomiting      X-Rays reviewed    CXR interpreted by me: bilateral opacities Patient is a 85y old  Female who presents with a chief complaint of Cough, fever (20 Mar 2021 01:27)        SUBJECTIVE:  S/p VATS and bronchoscopy.       PHYSICAL EXAM  Vital Signs Last 24 Hrs  T(C): 34.9 (23 Mar 2021 09:00), Max: 36.8 (22 Mar 2021 20:00)  T(F): 94.8 (23 Mar 2021 09:00), Max: 98.3 (22 Mar 2021 20:00)  HR: 55 (23 Mar 2021 11:15) (46 - 96)  BP: 83/48 (23 Mar 2021 11:00) (67/51 - 128/60)  BP(mean): 60 (23 Mar 2021 11:00) (56 - 89)  RR: 23 (23 Mar 2021 11:15) (0 - 42)  SpO2: 91% (23 Mar 2021 11:15) (91% - 100%)    CONSTITUTIONAL:  Well nourished.  NAD    ENT:   Airway patent,   No thrush    EYES:   Clear bilaterally,   pupils equal, round and reactive to light.    CARDIAC:   Normal rate,   regular rhythm.    no edema    CAROTID:   normal systolic impulse  no bruits    RESPIRATORY:  On MV   No wheezing  Normal chest expansion  Not tachypneic,  No use of accessory muscles    GASTROINTESTINAL:  Abdomen soft,   non-tender,   no guarding,   + BS    MUSCULOSKELETAL:   range of motion is not limited,  no clubbing, cyanosis    NEUROLOGICAL:   Intubated, sedated    SKIN:   Skin normal color for race,   No evidence of rash.    PSYCHIATRIC:   no apparent risk to self or others.      03-22-21 @ 07:01  -  03-23-21 @ 07:00  --------------------------------------------------------  IN:    Albumin 5%  - 500 mL: 1000 mL    Dexmedetomidine: 205.5 mL    IV PiggyBack: 700 mL    Norepinephrine: 93.1 mL    Propofol: 15.4 mL    sodium chloride 0.9%: 360 mL    Vasopressin: 19.2 mL  Total IN: 2393.2 mL    OUT:    Chest Tube (mL): 55 mL    Chest Tube (mL): 180 mL    Chest Tube (mL): 210 mL    Indwelling Catheter - Urethral (mL): 586 mL  Total OUT: 1031 mL    Total NET: 1362.2 mL      03-23-21 @ 07:01  -  03-23-21 @ 12:07  --------------------------------------------------------  IN:    Dexmedetomidine: 69 mL    IV PiggyBack: 250 mL    Lactated Ringers: 300 mL    Norepinephrine: 11.8 mL    sodium chloride 0.9%: 100 mL  Total IN: 730.8 mL    OUT:    Chest Tube (mL): 5 mL    Chest Tube (mL): 50 mL    Chest Tube (mL): 15 mL    Indwelling Catheter - Urethral (mL): 185 mL    Propofol: 0 mL    Vasopressin: 0 mL  Total OUT: 255 mL    Total NET: 475.8 mL          LABS:                          9.0    30.22 )-----------( 526      ( 23 Mar 2021 03:00 )             27.4                                               03-23    137  |  105  |  22<H>  ----------------------------<  146<H>  3.8   |  19  |  0.7    Ca    8.3<L>      23 Mar 2021 03:00  Phos  3.2     03-21  Mg     2.0     03-23    TPro  5.8<L>  /  Alb  3.0<L>  /  TBili  0.6  /  DBili  x   /  AST  30  /  ALT  13  /  AlkPhos  46  03-23      PT/INR - ( 22 Mar 2021 14:38 )   PT: 18.70 sec;   INR: 1.63 ratio         PTT - ( 22 Mar 2021 14:38 )  PTT:34.4 sec                                                                                     LIVER FUNCTIONS - ( 23 Mar 2021 03:00 )  Alb: 3.0 g/dL / Pro: 5.8 g/dL / ALK PHOS: 46 U/L / ALT: 13 U/L / AST: 30 U/L / GGT: x                                                  Culture - Fungal, Tissue (collected 22 Mar 2021 13:33)  Source: .Tissue None  Preliminary Report (23 Mar 2021 08:01):    Testing in progress    Culture - Tissue with Gram Stain (collected 22 Mar 2021 13:33)  Source: .Tissue None  Gram Stain (23 Mar 2021 04:38):    Rare polymorphonuclear leukocytes seen per low power field    Few White blood cells seen per low power field    Few Gram positive cocci in pairs per oil power field    Culture - Fungal, Bronchial (collected 22 Mar 2021 12:58)  Source: .Bronchial None  Preliminary Report (23 Mar 2021 08:01):    Testing in progress    Culture - Bronchial (collected 22 Mar 2021 12:58)  Source: .Bronchial None  Gram Stain (23 Mar 2021 07:58):    Numerous polymorphonuclear leukocytes per low power field    No Squamous epithelial cells per low power field    No organisms seen per oil power field    Culture - Fungal, Body Fluid (collected 22 Mar 2021 09:26)  Source: .Body Fluid None  Preliminary Report (23 Mar 2021 08:01):    Testing in progress    Culture - Body Fluid with Gram Stain (collected 22 Mar 2021 09:26)  Source: .Body Fluid None  Gram Stain (23 Mar 2021 04:38):    polymorphonuclear leukocytes seen    No organisms seen    by cytocentrifuge                                                ABG - ( 23 Mar 2021 03:41 )  pH, Arterial: 7.44  pH, Blood: x     /  pCO2: 33    /  pO2: 80    / HCO3: 22    / Base Excess: -1.4  /  SaO2: 96                  MEDICATIONS  (STANDING):  ALBUTerol    90 MICROgram(s) HFA Inhaler 2 Puff(s) Inhalation every 6 hours  cefepime   IVPB 2000 milliGRAM(s) IV Intermittent every 12 hours  chlorhexidine 0.12% Liquid 5 milliLiter(s) Oral Mucosa two times a day  chlorhexidine 4% Liquid 1 Application(s) Topical <User Schedule>  dexMEDEtomidine Infusion 0.2 MICROgram(s)/kG/Hr (3 mL/Hr) IV Continuous <Continuous>  famotidine Injectable 20 milliGRAM(s) IV Push every 12 hours  ferrous    sulfate 325 milliGRAM(s) Oral daily  ipratropium 17 MICROgram(s) HFA Inhaler 2 Puff(s) Inhalation every 6 hours  lactated ringers Bolus 1000 milliLiter(s) IV Bolus once  lactated ringers. 1000 milliLiter(s) (100 mL/Hr) IV Continuous <Continuous>  meperidine     Injectable 25 milliGRAM(s) IV Push once  metroNIDAZOLE  IVPB 500 milliGRAM(s) IV Intermittent every 8 hours  multivitamin 1 Tablet(s) Oral daily  norepinephrine Infusion 0.05 MICROgram(s)/kG/Min (5.63 mL/Hr) IV Continuous <Continuous>  polyethylene glycol 3350 17 Gram(s) Oral daily  propofol Infusion 10 MICROgram(s)/kG/Min (3.6 mL/Hr) IV Continuous <Continuous>  senna 2 Tablet(s) Oral at bedtime  sodium chloride 0.9%. 1000 milliLiter(s) (10 mL/Hr) IV Continuous <Continuous>  vasopressin Infusion 0.04 Unit(s)/Min (2.4 mL/Hr) IV Continuous <Continuous>    MEDICATIONS  (PRN):  fentaNYL    Injectable 25 MICROGram(s) IV Push every 3 hours PRN Moderate Pain (4 - 6)  guaifenesin/dextromethorphan  Syrup 10 milliLiter(s) Oral every 4 hours PRN Cough/throat discomfort  ondansetron Injectable 4 milliGRAM(s) IV Push every 4 hours PRN Nausea and/or Vomiting      X-Rays reviewed    CXR interpreted by me: ET OK>  bilateral opacities

## 2021-03-23 NOTE — PROGRESS NOTE ADULT - ASSESSMENT
Impression  Severe necrotizing CAP s/p VATS and chest tube  Sepsis present on admission   Septic shock, resolved     Recommendations  Continue cefepime  FU cultures  HOB at 45  Aspiration precautions  DVT ppx   Impression  Severe necrotizing CAP   Right sided parapneumonic effusion s/p VATS and chest tube  Sepsis present on admission   Septic shock improving     Recommendations  Continue ABX   FU cultures  HOB at 45  Aspiration precautions  DVT ppx  Decrease .    SBT

## 2021-03-23 NOTE — PROGRESS NOTE ADULT - SUBJECTIVE AND OBJECTIVE BOX
OPERATIVE PROCEDURE(s):                POD #                       SURGEON(s): CONCEPCIÓN Kirby  SUBJECTIVE ASSESSMENT:85yFemale patient seen and examined at bedside.    Vital Signs Last 24 Hrs  T(F): 95.5 (23 Mar 2021 04:00), Max: 98.3 (22 Mar 2021 20:00)  HR: 46 (23 Mar 2021 08:00) (46 - 86)  BP: 112/56 (23 Mar 2021 08:00) (67/51 - 126/62)  BP(mean): 80 (23 Mar 2021 08:00) (56 - 89)  ABP: 118/49 (23 Mar 2021 08:00) (90/48 - 142/71)  ABP(mean): 74 (23 Mar 2021 08:00)  RR: 18 (23 Mar 2021 08:00) (0 - 27)  SpO2: 97% (23 Mar 2021 08:00) (96% - 100%)  CVP(mm Hg): --  CVP(cm H2O): --  CO: --  CI: --  PA: --  SVR: --  Mode: AC/ CMV (Assist Control/ Continuous Mandatory Ventilation)  RR (machine): 18  TV (machine): 500  FiO2: 40  PEEP: 5  MAP: 13    I&O's Detail    22 Mar 2021 07:01  -  23 Mar 2021 07:00  --------------------------------------------------------  IN:    Albumin 5%  - 500 mL: 1000 mL    Dexmedetomidine: 205.5 mL    IV PiggyBack: 700 mL    Norepinephrine: 93.1 mL    Propofol: 15.4 mL    sodium chloride 0.9%: 360 mL    Vasopressin: 19.2 mL  Total IN: 2393.2 mL    OUT:    Chest Tube (mL): 55 mL    Chest Tube (mL): 180 mL    Chest Tube (mL): 210 mL    Indwelling Catheter - Urethral (mL): 586 mL  Total OUT: 1031 mL        Net: I&O's Detail    21 Mar 2021 07:01  -  22 Mar 2021 07:00  --------------------------------------------------------  Total NET: 600 mL      22 Mar 2021 07:01  -  23 Mar 2021 07:00  --------------------------------------------------------  Total NET: 1362.2 mL        CAPILLARY BLOOD GLUCOSE          Physical Exam:  General: NAD; A&Ox3  Cardiac: S1/S2, RRR, no murmur, no rubs  Lungs: unlabored respirations, CTA b/l, no wheeze, no rales, no crackles  Abdomen: Soft/NT/ND; positive bowel sounds x 4  Sternum: Intact, no click, incision healing well with no drainage  Incisions: Incisions clean/dry/intact  Extremities: No edema b/l lower extremities; good capillary refill; no cyanosis; palpable 1+ pedal pulses b/l    Central Venous Catheter: Yes[]  No[] , If Yes indication:                    Day #  Adams Catheter: Yes  [] , No  [] , If yes indication:                                 Day #  NGT: Yes [] No [] ,    If Yes Placement:                                                   Day #  EPICARDIAL WIRES:  [] YES [] NO                                                            Day #  BOWEL MOVEMENT:  [] YES [] NO, If No, Timing since last BM Day #  CHEST TUBE(Left/Right):  [] YES [] NO, If yes -  AIR LEAKS:  [] YES [] NO        LABS:                        9.0<L>  30.22<H> )-----------( 526<H>    ( 23 Mar 2021 03:00 )             27.4<L>                        11.2<L>  31.29<H> )-----------( 694<H>    ( 22 Mar 2021 14:38 )             35.0<L>    03-23    137  |  105  |  22<H>  ----------------------------<  146<H>  3.8   |  19  |  0.7  03-22    134<L>  |  102  |  21<H>  ----------------------------<  130<H>  4.6   |  20  |  0.8    Ca    8.3<L>      23 Mar 2021 03:00  Phos  3.2     03-21  Mg     2.0     03-23    TPro  5.8<L> [6.0 - 8.0]  /  Alb  3.0<L> [3.5 - 5.2]  /  TBili  0.6 [0.2 - 1.2]  /  DBili  x   /  AST  30 [0 - 41]  /  ALT  13 [0 - 41]  /  AlkPhos  46 [30 - 115]  03-23    PT/INR - ( 22 Mar 2021 14:38 )   PT: ;   INR: 1.63 ratio         PT/INR - ( 21 Mar 2021 22:14 )   PT: ;   INR: 1.57 ratio         PTT - ( 22 Mar 2021 14:38 )  PTT:34.4 sec, PTT - ( 21 Mar 2021 22:14 )  PTT:46.0 sec    ABG - ( 23 Mar 2021 03:41 )  pH: 7.44  /  pCO2: 33    /  pO2: 80    / HCO3: 22    / Base Excess: -1.4  /  SaO2: 96    /  LA: 1.7              RADIOLOGY & ADDITIONAL TESTS:  CXR:   EKG:  Allergies    clindamycin (Hives)    Intolerances      MEDICATIONS  (STANDING):  ALBUTerol    90 MICROgram(s) HFA Inhaler 2 Puff(s) Inhalation every 6 hours  cefepime   IVPB 2000 milliGRAM(s) IV Intermittent every 12 hours  chlorhexidine 0.12% Liquid 5 milliLiter(s) Oral Mucosa two times a day  chlorhexidine 4% Liquid 1 Application(s) Topical <User Schedule>  dexMEDEtomidine Infusion 0.2 MICROgram(s)/kG/Hr (3 mL/Hr) IV Continuous <Continuous>  famotidine Injectable 20 milliGRAM(s) IV Push every 12 hours  ferrous    sulfate 325 milliGRAM(s) Oral daily  ipratropium 17 MICROgram(s) HFA Inhaler 2 Puff(s) Inhalation every 6 hours  lactated ringers Bolus 1000 milliLiter(s) IV Bolus once  levoFLOXacin IVPB 750 milliGRAM(s) IV Intermittent every 48 hours  meperidine     Injectable 25 milliGRAM(s) IV Push once  metroNIDAZOLE  IVPB 500 milliGRAM(s) IV Intermittent every 8 hours  multivitamin 1 Tablet(s) Oral daily  norepinephrine Infusion 0.05 MICROgram(s)/kG/Min (5.63 mL/Hr) IV Continuous <Continuous>  polyethylene glycol 3350 17 Gram(s) Oral daily  propofol Infusion 10 MICROgram(s)/kG/Min (3.6 mL/Hr) IV Continuous <Continuous>  senna 2 Tablet(s) Oral at bedtime  sodium chloride 0.9%. 1000 milliLiter(s) (10 mL/Hr) IV Continuous <Continuous>  vancomycin  IVPB 1250 milliGRAM(s) IV Intermittent once  vasopressin Infusion 0.04 Unit(s)/Min (2.4 mL/Hr) IV Continuous <Continuous>    MEDICATIONS  (PRN):  fentaNYL    Injectable 25 MICROGram(s) IV Push every 3 hours PRN Moderate Pain (4 - 6)  guaifenesin/dextromethorphan  Syrup 10 milliLiter(s) Oral every 4 hours PRN Cough/throat discomfort  ondansetron Injectable 4 milliGRAM(s) IV Push every 4 hours PRN Nausea and/or Vomiting      Pharmacologic DVT Prophylaxis: [] YES, []NO: Contraindication:   [] HEPARIN: Dose: XX mg  Q24H    [] LOVENOX: Dose: XX mg  Q24H                 SCD's: YES b/l    GI Prophylaxis: Protonix [], Pepcid []    Post-Op Beta-Blockers: []Yes, []No: contraindication:  Post-Op Nitrate: []Yes, []No: contraindication:  Post-Op Aspirin: []Yes,  []No: contraindication:  Post-Op Statin: []Yes, []No: contraindication:      Ambulation/Activity Status:    Assessment/Plan:  85y Female status-post  - Case and plan discussed with CTU Intensivist and CT Surgeon - Dr. Oneal/Ron/Abiel  - Continue CTU supportive care and ongoing plan of care as per continuing CTU rounds.    - Continue DVT/GI prophylaxis  - Incentive Spirometry 10 times an hour  - Continue to advance physical activity as tolerated and continue PT/OT as directed  1. CAD: Continue ASA, statin, BB  2. HTN:   3. A. Fib:   4. COPD/Hypoxia:   5. DM/Glucose Control:     Social Service Disposition:     OPERATIVE PROCEDURE(s): R VATS for parapneumonic empyema                POD #    1                  SURGEON(s): PRO Basilio  SUBJECTIVE ASSESSMENT: 85y Female patient seen and examined at bedside.    Vital Signs Last 24 Hrs  T(F): 95.5 (23 Mar 2021 04:00), Max: 98.3 (22 Mar 2021 20:00)  HR: 46 (23 Mar 2021 08:00) (46 - 86)  BP: 112/56 (23 Mar 2021 08:00) (67/51 - 126/62)  BP(mean): 80 (23 Mar 2021 08:00) (56 - 89)  ABP: 118/49 (23 Mar 2021 08:00) (90/48 - 142/71)  ABP(mean): 74 (23 Mar 2021 08:00)  RR: 18 (23 Mar 2021 08:00) (0 - 27)  SpO2: 97% (23 Mar 2021 08:00) (96% - 100%)    Mode: AC/ CMV (Assist Control/ Continuous Mandatory Ventilation)  RR (machine): 18  TV (machine): 500  FiO2: 40  PEEP: 5  MAP: 13    I&O's Detail    22 Mar 2021 07:01  -  23 Mar 2021 07:00  --------------------------------------------------------  IN:    Albumin 5%  - 500 mL: 1000 mL    Dexmedetomidine: 205.5 mL    IV PiggyBack: 700 mL    Norepinephrine: 93.1 mL    Propofol: 15.4 mL    sodium chloride 0.9%: 360 mL    Vasopressin: 19.2 mL  Total IN: 2393.2 mL    OUT:    Chest Tube (mL): 55 mL    Chest Tube (mL): 180 mL    Chest Tube (mL): 210 mL    Indwelling Catheter - Urethral (mL): 586 mL  Total OUT: 1031 mL      Net: I&O's Detail    21 Mar 2021 07:01  -  22 Mar 2021 07:00  --------------------------------------------------------  Total NET: 600 mL      22 Mar 2021 07:01  -  23 Mar 2021 07:00  --------------------------------------------------------  Total NET: 1362.2 mL      Physical Exam:  General: NAD; A&Ox3  Cardiac: S1/S2, RRR, no murmur, no rubs  Lungs: unlabored respirations, CTA b/l, no wheeze, no rales, no crackles  Abdomen: Soft/NT/ND; positive bowel sounds x 4  Incisions: Incisions clean/dry/intact  Extremities: No edema b/l lower extremities; good capillary refill; no cyanosis; palpable 1+ pedal pulses b/l      Central Venous Catheter: Yes[x]  No[] , If Yes indication: IV Access                  Day # 1  Adams Catheter: Yes  [x] , No  [] , If yes indication: Monitor strict in/out            Day # 1  BOWEL MOVEMENT:  [] YES [x] NO, If No, Timing since last BM Day #  CHEST TUBE(Right):  [x] YES [] NO, If yes -  AIR LEAKS:  [x] YES [] NO        LABS:                        9.0<L>  30.22<H> )-----------( 526<H>    ( 23 Mar 2021 03:00 )             27.4<L>                        11.2<L>  31.29<H> )-----------( 694<H>    ( 22 Mar 2021 14:38 )             35.0<L>    03-23    137  |  105  |  22<H>  ----------------------------<  146<H>  3.8   |  19  |  0.7  03-22    134<L>  |  102  |  21<H>  ----------------------------<  130<H>  4.6   |  20  |  0.8    Ca    8.3<L>      23 Mar 2021 03:00  Phos  3.2     03-21  Mg     2.0     03-23    TPro  5.8<L> [6.0 - 8.0]  /  Alb  3.0<L> [3.5 - 5.2]  /  TBili  0.6 [0.2 - 1.2]  /  DBili  x   /  AST  30 [0 - 41]  /  ALT  13 [0 - 41]  /  AlkPhos  46 [30 - 115]  03-23    PT/INR - ( 22 Mar 2021 14:38 )   PT: ;   INR: 1.63 ratio       PT/INR - ( 21 Mar 2021 22:14 )   PT: ;   INR: 1.57 ratio       PTT - ( 22 Mar 2021 14:38 )  PTT:34.4 sec, PTT - ( 21 Mar 2021 22:14 )  PTT:46.0 sec    ABG - ( 23 Mar 2021 03:41 )  pH: 7.44  /  pCO2: 33    /  pO2: 80    / HCO3: 22    / Base Excess: -1.4  /  SaO2: 96    /  LA: 1.7        Allergies  clindamycin (Hives)  Intolerances      MEDICATIONS  (STANDING):  ALBUTerol    90 MICROgram(s) HFA Inhaler 2 Puff(s) Inhalation every 6 hours  cefepime   IVPB 2000 milliGRAM(s) IV Intermittent every 12 hours  chlorhexidine 0.12% Liquid 5 milliLiter(s) Oral Mucosa two times a day  chlorhexidine 4% Liquid 1 Application(s) Topical <User Schedule>  dexMEDEtomidine Infusion 0.2 MICROgram(s)/kG/Hr (3 mL/Hr) IV Continuous <Continuous>  famotidine Injectable 20 milliGRAM(s) IV Push every 12 hours  ferrous    sulfate 325 milliGRAM(s) Oral daily  ipratropium 17 MICROgram(s) HFA Inhaler 2 Puff(s) Inhalation every 6 hours  lactated ringers Bolus 1000 milliLiter(s) IV Bolus once  levoFLOXacin IVPB 750 milliGRAM(s) IV Intermittent every 48 hours  meperidine     Injectable 25 milliGRAM(s) IV Push once  metroNIDAZOLE  IVPB 500 milliGRAM(s) IV Intermittent every 8 hours  multivitamin 1 Tablet(s) Oral daily  norepinephrine Infusion 0.05 MICROgram(s)/kG/Min (5.63 mL/Hr) IV Continuous <Continuous>  polyethylene glycol 3350 17 Gram(s) Oral daily  propofol Infusion 10 MICROgram(s)/kG/Min (3.6 mL/Hr) IV Continuous <Continuous>  senna 2 Tablet(s) Oral at bedtime  sodium chloride 0.9%. 1000 milliLiter(s) (10 mL/Hr) IV Continuous <Continuous>  vancomycin  IVPB 1250 milliGRAM(s) IV Intermittent once  vasopressin Infusion 0.04 Unit(s)/Min (2.4 mL/Hr) IV Continuous <Continuous>    MEDICATIONS  (PRN):  fentaNYL    Injectable 25 MICROGram(s) IV Push every 3 hours PRN Moderate Pain (4 - 6)  guaifenesin/dextromethorphan  Syrup 10 milliLiter(s) Oral every 4 hours PRN Cough/throat discomfort  ondansetron Injectable 4 milliGRAM(s) IV Push every 4 hours PRN Nausea and/or Vomiting      Assessment/Plan:  85y Female status-post R VATS for parapneumonic empyema                POD #    1  - Case and plan discussed with CTU Intensivist and CT Surgeon - Dr. Basilio  - Continue CTU supportive care and ongoing plan of care as per continuing CTU rounds.    - Continue DVT/GI prophylaxis  - Incentive Spirometry 10 times an hour  - Continue to advance physical activity as tolerated and continue PT/OT as directed  1. Parapneumonic empyema:

## 2021-03-23 NOTE — PROGRESS NOTE ADULT - ASSESSMENT
ASSESSMENT  85 year old lady known to have dementia, osteoarithtis, Kosovan-speaking presenting with cough and fever.      IMPRESSION  #Sepsis present on admission - secondary to CAP  -  CT Chest No Cont (03.11.21 @ 00:54): Areas of right lung consolidation which can be seen in pneumonia. Numerous air-fluid levels throughout the right lung compatible with an infectious process. Suggestion of split pleura at the lung base for which empyema is favored although inherently limited on this noncontrast exam. Consideration can be given to contrast administration if feasiblefor better delineation. Areas of bilateral interlobular septal thickening and mild layering groundglass attenuation may reflect a component of edema.  - Urine Legionella negative  - Urine Strep negative  - BLood Cx 3/10 and 3/13 negative  - Procalcitonin 1.15   - CT Chest No Cont (03.17.21 @ 16:19): Since 3/11/2021. Enlarging loculated right pleural fluid collection with multiple air bubbles consistent with empyema.   Associated compressive atelectasis right lung is seen. Scattered groundglass opacities involving multiple lumbar segments.  - s/p VATS 3/22    #Dementia  #Osteoarthritis  #Obesity BMI (kg/m2): 23.4  #Abx allergy: clindamycin (Hives)    Creatinine, Serum: 0.8 mg/dL (03.15.21 @ 05:56)  Weight (kg): 60 (11 Mar 2021 01:17)  CrCl 50      RECOMMENDATIONS  - noted GPC in pairs/chains - follow-up speciation and susceptibilities  - can start vancomycin 1g q 24 hours for now, please obtain trough prior to 3rd dose  - cefepime 2g q 12 hours  - flagyl 500 mg TID   - stop levofloxacin    Please call or message on [employer] Teams if with any questions.  Spectra [id number]

## 2021-03-23 NOTE — PROGRESS NOTE ADULT - SUBJECTIVE AND OBJECTIVE BOX
MIGNON TERRYDULCE  85y, Female  Allergy: clindamycin (Hives)      LOS  12d    CHIEF COMPLAINT: Cough, fever (20 Mar 2021 01:27)      INTERVAL EVENTS/HPI  - No acute events overnight  - T(F): , Max: 98.3 (03-22-21 @ 20:00)  - s/p VATS/Decortication   - WBC Count: 30.22 (03-23-21 @ 03:00)  WBC Count: 31.29 (03-22-21 @ 14:38)     - Creatinine, Serum: 0.7 (03-23-21 @ 03:00)  Creatinine, Serum: 0.8 (03-22-21 @ 14:38)       ROS  unable to obtain history secondary to patient's mental status and/or sedation      VITALS:  T(F): 97.8, Max: 98.3 (03-22-21 @ 20:00)  HR: 68  BP: 116/57  RR: 15Vital Signs Last 24 Hrs  T(C): 36.6 (23 Mar 2021 16:00), Max: 36.8 (22 Mar 2021 20:00)  T(F): 97.8 (23 Mar 2021 16:00), Max: 98.3 (22 Mar 2021 20:00)  HR: 68 (23 Mar 2021 18:00) (46 - 96)  BP: 116/57 (23 Mar 2021 18:00) (83/48 - 131/63)  BP(mean): 82 (23 Mar 2021 18:00) (60 - 90)  RR: 15 (23 Mar 2021 18:00) (0 - 42)  SpO2: 97% (23 Mar 2021 18:00) (91% - 100%)    PHYSICAL EXAM:  Gen: intubated  HEENT: Normocephalic, atraumatic  Neck: supple, no lymphadenopathy  CV: Regular rate & regular rhythm  Lungs: decreased BS at bases, no fremitus  Abdomen: Soft, BS present  Ext: Warm, well perfused  Neuro: non focal, awake  Skin: no rash, no erythema  Lines: no phlebitis    FH: Non-contributory  Social Hx: Non-contributory    TESTS & MEASUREMENTS:                        9.0    30.22 )-----------( 526      ( 23 Mar 2021 03:00 )             27.4     03-23    137  |  105  |  22<H>  ----------------------------<  146<H>  3.8   |  19  |  0.7    Ca    8.3<L>      23 Mar 2021 03:00  Phos  3.2     03-21  Mg     2.0     03-23    TPro  5.8<L>  /  Alb  3.0<L>  /  TBili  0.6  /  DBili  x   /  AST  30  /  ALT  13  /  AlkPhos  46  03-23    eGFR if Non African American: 80 mL/min/1.73M2 (03-23-21 @ 03:00)  eGFR if : 93 mL/min/1.73M2 (03-23-21 @ 03:00)    LIVER FUNCTIONS - ( 23 Mar 2021 03:00 )  Alb: 3.0 g/dL / Pro: 5.8 g/dL / ALK PHOS: 46 U/L / ALT: 13 U/L / AST: 30 U/L / GGT: x               Culture - Acid Fast - Tissue w/Smear (collected 03-22-21 @ 13:33)  Source: .Tissue PLEURAL PEEL    Culture - Fungal, Tissue (collected 03-22-21 @ 13:33)  Source: .Tissue None  Preliminary Report (03-23-21 @ 08:01):    Testing in progress    Culture - Tissue with Gram Stain (collected 03-22-21 @ 13:33)  Source: .Tissue None  Gram Stain (03-23-21 @ 04:38):    Rare polymorphonuclear leukocytes seen per low power field    Few White blood cells seen per low power field    Few Gram positive cocci in pairs per oil power field  Preliminary Report (03-23-21 @ 18:45):    No growth    Culture - Fungal, Bronchial (collected 03-22-21 @ 12:58)  Source: .Bronchial None  Preliminary Report (03-23-21 @ 08:01):    Testing in progress    Culture - Acid Fast - Bronchial w/Smear (collected 03-22-21 @ 12:58)  Source: Bronch Wash None    Culture - Bronchial (collected 03-22-21 @ 12:58)  Source: .Bronchial None  Gram Stain (03-23-21 @ 07:58):    Numerous polymorphonuclear leukocytes per low power field    No Squamous epithelial cells per low power field    No organisms seen per oil power field    Culture - Fungal, Body Fluid (collected 03-22-21 @ 09:26)  Source: .Body Fluid None  Preliminary Report (03-23-21 @ 08:01):    Testing in progress    Culture - Acid Fast - Body Fluid w/Smear (collected 03-22-21 @ 09:26)  Source: .Body Fluid None    Culture - Body Fluid with Gram Stain (collected 03-22-21 @ 09:26)  Source: .Body Fluid None  Gram Stain (03-23-21 @ 04:38):    polymorphonuclear leukocytes seen    No organisms seen    by cytocentrifuge    Culture - Blood (collected 03-17-21 @ 16:00)  Source: .Blood Blood-Peripheral  Final Report (03-23-21 @ 02:39):    No Growth Final    Culture - Blood (collected 03-13-21 @ 07:33)  Source: .Blood None  Final Report (03-18-21 @ 18:01):    No Growth Final    Culture - Blood (collected 03-10-21 @ 21:00)  Source: .Blood Blood-Peripheral  Final Report (03-16-21 @ 04:01):    No Growth Final    Culture - Blood (collected 03-10-21 @ 21:00)  Source: .Blood Blood-Peripheral  Final Report (03-16-21 @ 04:01):    No Growth Final            INFECTIOUS DISEASES TESTING  MRSA PCR Result.: Negative (03-20-21 @ 21:38)  COVID-19 PCR: NotDetec (03-20-21 @ 12:30)  Fungitell: 46 (03-17-21 @ 16:00)  Procalcitonin, Serum: 0.29 (03-17-21 @ 11:30)  Procalcitonin, Serum: 0.31 (03-16-21 @ 10:15)  Legionella Antigen, Urine: Negative (03-12-21 @ 10:30)  Streptococcus Pneumoniae Ag Urine: Negative (03-12-21 @ 10:30)  MRSA PCR Result.: Negative (03-12-21 @ 10:30)  Legionella Antigen, Urine: Negative (03-11-21 @ 08:10)  Streptococcus Pneumoniae Ag Urine: Negative (03-11-21 @ 08:10)  Procalcitonin, Serum: 1.75 (03-11-21 @ 06:31)  Rapid RVP Result: NotDetec (03-10-21 @ 22:10)      INFLAMMATORY MARKERS      RADIOLOGY & ADDITIONAL TESTS:  I have personally reviewed the last available Chest xray  CXR      CT      CARDIOLOGY TESTING  12 Lead ECG:   Ventricular Rate 56 BPM    Atrial Rate 56 BPM    P-R Interval 172 ms    QRS Duration 102 ms    Q-T Interval 510 ms    QTC Calculation(Bazett) 492 ms    P Axis 46 degrees    R Axis 27 degrees    T Axis 28 degrees    Diagnosis Line Sinus bradycardia  Prolonged QT  Abnormal ECG    Confirmed by SHANNAN MORALES MD ([id number]) on 3/23/2021 8:42:50 AM (03-23-21 @ 07:37)  12 Lead ECG:   Ventricular Rate 102 BPM    Atrial Rate 102 BPM    P-R Interval 140 ms    QRS Duration 88 ms    Q-T Interval 324 ms    QTC Calculation(Bazett) 422 ms    P Axis 32 degrees    R Axis 49 degrees    T Axis 39 degrees    Diagnosis Line Sinus tachycardia  Otherwise normal ECG    Confirmed by Job Bains ([id number]) on 3/10/2021 11:06:24 PM (03-10-21 @ 20:30)      MEDICATIONS  ALBUTerol    90 MICROgram(s) HFA Inhaler 2 Inhalation every 6 hours  cefepime   IVPB 2000 IV Intermittent every 12 hours  chlorhexidine 0.12% Liquid 5 Oral Mucosa two times a day  chlorhexidine 4% Liquid 1 Topical <User Schedule>  dexMEDEtomidine Infusion 0.2 IV Continuous <Continuous>  enoxaparin Injectable 40 SubCutaneous once  famotidine Injectable 20 IV Push every 12 hours  ferrous    sulfate 325 Oral daily  ipratropium 17 MICROgram(s) HFA Inhaler 2 Inhalation every 6 hours  lactated ringers Bolus 1000 IV Bolus once  lactated ringers. 1000 IV Continuous <Continuous>  meperidine     Injectable 25 IV Push once  metroNIDAZOLE  IVPB 500 IV Intermittent every 8 hours  multivitamin 1 Oral daily  norepinephrine Infusion 0.05 IV Continuous <Continuous>  polyethylene glycol 3350 17 Oral daily  propofol Infusion 10 IV Continuous <Continuous>  senna 2 Oral at bedtime  sodium chloride 0.9%. 1000 IV Continuous <Continuous>  vasopressin Infusion 0.04 IV Continuous <Continuous>      WEIGHT  Weight (kg): 60 (03-22-21 @ 07:23)  Creatinine, Serum: 0.7 mg/dL (03-23-21 @ 03:00)      ANTIBIOTICS:  cefepime   IVPB 2000 milliGRAM(s) IV Intermittent every 12 hours  metroNIDAZOLE  IVPB 500 milliGRAM(s) IV Intermittent every 8 hours      All available historical records have been reviewed

## 2021-03-23 NOTE — PROGRESS NOTE ADULT - ATTENDING COMMENTS
Impression  Severe necrotizing CAP   Right sided parapneumonic effusion s/p VATS and chest tube  Sepsis present on admission   Septic shock improving     Recommendations:    Continue ABX   FU cultures  SBT  Dec

## 2021-03-23 NOTE — PROGRESS NOTE ADULT - SUBJECTIVE AND OBJECTIVE BOX
CTU Attending Progress Daily Note     23 Mar 2021 08:06    Procedure:                                                  POD#                   Patient seen as post-op critical care follow-up    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, osteoarithtis, Sami-speaking presenting with cough and fever.    As per daughter, patient has been having dry consistent cough since 2 months. Today, she was being evaluated for knee injections when she was found to be febrile. She also reports progressing generalized weakness with decreased appetite and PO intake.  No URT symptoms, no chest pain, no leg pain, no diarrhea, no urinary symptoms no sick contacts, no recent travel. In ED was hypotensive, was given IVF, started on levophed 0.04 called to evaluate (11 Mar 2021 03:21)    See preop testing chart H&P    Interval event for past 24 hr:  DESTIN TERRY  85y had no event.     Current Complains:  DESTIN TERRY has no new complaints    REVIEW OF SYSTEMS:  CONSTITUTIONAL:  [-] weakness, [-] fevers, [-] chills  EYES/ENT: [-] visual changes, [-] vertigo, [-] throat pain   NECK: [-] pain, [-] stiffness  RESPIRATORY: [-] cough, [-] wheezing, [-] hemoptysis, [-] shortness of breath  CARDIOVASCULAR: [-] chest pain, [-] palpitations, [-] orthopnea  GASTROINTESTINAL:    [-]abdominal pain, [-] nausea, [-] vomiting, [-] hematemesis, [-] diarrhea, [-] constipation, [-] melena, [-] hematochezia.  GENITOURINARY: [-] dysuria, [-] frequency, [-] hematuria  NEUROLOGICAL: [-] numbness, [-] weakness  SKIN: [-] itching, [-] burning, [-] rashes, [-] lesions   All other review of systems is negative unless indicated above.    [  ] Unable to assess ROS because :    OBJECTIVE:  ICU Vital Signs Last 24 Hrs  T(C): 35.3 (23 Mar 2021 04:00), Max: 36.8 (22 Mar 2021 20:00)  T(F): 95.5 (23 Mar 2021 04:00), Max: 98.3 (22 Mar 2021 20:00)  HR: 53 (23 Mar 2021 07:00) (49 - 86)  BP: 126/62 (23 Mar 2021 06:00) (67/51 - 126/62)  BP(mean): 89 (23 Mar 2021 06:00) (56 - 89)  ABP: 113/48 (23 Mar 2021 07:00) (90/48 - 142/71)  ABP(mean): 72 (23 Mar 2021 07:00) (60 - 97)  RR: 19 (23 Mar 2021 07:00) (0 - 20)  SpO2: 97% (23 Mar 2021 07:00) (97% - 100%)      I&O's Summary    22 Mar 2021 07:01  -  23 Mar 2021 07:00  --------------------------------------------------------  IN: 2393.2 mL / OUT: 1031 mL / NET: 1362.2 mL      Adult Advanced Hemodynamics Last 24 Hrs  CVP(mm Hg): --  CVP(cm H2O): --  CO: --  CI: --  PA: --  PA(mean): --  PCWP: --  SVR: --  SVRI: --  PVR: --  PVRI: --  Mode: AC/ CMV (Assist Control/ Continuous Mandatory Ventilation)  RR (machine): 18  TV (machine): 500  FiO2: 40  PEEP: 5  ITime: 1  MAP: 13  PIP: 37      PHYSICAL EXAM:  General: WN/WD NAD    HEENT:     [+] NCAT  [+] EOMI  [-] Conjuctival edema   [-] Icterus   [-] Thrush   [-] ETT  [-] NGT/OGT    Neck:         [+] FROM   [-] JVD     [-] Nodes     [-] Masses    [+] Mid-line trachea    [-] Tracheostomy    Chest:         [-] Sternal click   [-] Sternal drainage   [+] Pacing wires   [+] Chest tubes   [-] SubQ emphysema    Lungs:          [+] CTA   [-] Rhonchi   [-] Rales    [-] Wheezing    [-] Decreased BS    [-] Dullness R L    Cardiac:       [+] S1 [+] S2    [+] RRR   [-] Irregular   [-] S3   [-] S4    [-] Murmurs    [-] Rub    Abdomen:    [+] BS    [+] Soft    [+] Non-tender     [-] Distended    [-] Organomegaly  [-] PEG    Extremities:   [-] Cyanosis U/L   [-] Clubbing  U/L  [-] LE/UE Edema   [+] Capillary refill    [+] Pulses     Neuro:        [+] Awake   [+]  Alert   [-] Confused   [-] Lethargic   [-] Sedated   [-] Generalized Weakness    Skin:        [-] Rashes    [-] Erythema   [+] Normal incisions   [+] IV sites intact   [-] Sacral decubitus    Tubes:  LINES:    CAPILLARY BLOOD GLUCOSE        CAPILLARY BLOOD GLUCOSE          HOSPITAL MEDICATIONS:  MEDICATIONS  (STANDING):  ALBUTerol    90 MICROgram(s) HFA Inhaler 2 Puff(s) Inhalation every 6 hours  cefepime   IVPB 2000 milliGRAM(s) IV Intermittent every 12 hours  chlorhexidine 0.12% Liquid 5 milliLiter(s) Oral Mucosa two times a day  chlorhexidine 4% Liquid 1 Application(s) Topical <User Schedule>  dexMEDEtomidine Infusion 0.2 MICROgram(s)/kG/Hr (3 mL/Hr) IV Continuous <Continuous>  famotidine Injectable 20 milliGRAM(s) IV Push every 12 hours  ferrous    sulfate 325 milliGRAM(s) Oral daily  ipratropium 17 MICROgram(s) HFA Inhaler 2 Puff(s) Inhalation every 6 hours  lactated ringers Bolus 1000 milliLiter(s) IV Bolus once  levoFLOXacin IVPB 750 milliGRAM(s) IV Intermittent every 48 hours  meperidine     Injectable 25 milliGRAM(s) IV Push once  metroNIDAZOLE  IVPB 500 milliGRAM(s) IV Intermittent every 8 hours  multivitamin 1 Tablet(s) Oral daily  norepinephrine Infusion 0.05 MICROgram(s)/kG/Min (5.63 mL/Hr) IV Continuous <Continuous>  polyethylene glycol 3350 17 Gram(s) Oral daily  propofol Infusion 10 MICROgram(s)/kG/Min (3.6 mL/Hr) IV Continuous <Continuous>  senna 2 Tablet(s) Oral at bedtime  sodium chloride 0.9%. 1000 milliLiter(s) (10 mL/Hr) IV Continuous <Continuous>  vancomycin  IVPB 1250 milliGRAM(s) IV Intermittent once  vasopressin Infusion 0.04 Unit(s)/Min (2.4 mL/Hr) IV Continuous <Continuous>    MEDICATIONS  (PRN):  fentaNYL    Injectable 25 MICROGram(s) IV Push every 3 hours PRN Moderate Pain (4 - 6)  guaifenesin/dextromethorphan  Syrup 10 milliLiter(s) Oral every 4 hours PRN Cough/throat discomfort  ondansetron Injectable 4 milliGRAM(s) IV Push every 4 hours PRN Nausea and/or Vomiting      LABS:  ABG - ( 23 Mar 2021 03:41 )  pH, Arterial: 7.44  pH, Blood: x     /  pCO2: 33    /  pO2: 80    / HCO3: 22    / Base Excess: -1.4  /  SaO2: 96                                      9.0    30.22 )-----------( 526      ( 23 Mar 2021 03:00 )             27.4     03-23    137  |  105  |  22<H>  ----------------------------<  146<H>  3.8   |  19  |  0.7    Ca    8.3<L>      23 Mar 2021 03:00  Phos  3.2     03-21  Mg     2.0     03-23    TPro  5.8<L>  /  Alb  3.0<L>  /  TBili  0.6  /  DBili  x   /  AST  30  /  ALT  13  /  AlkPhos  46  03-23    PT/INR - ( 22 Mar 2021 14:38 )   PT: 18.70 sec;   INR: 1.63 ratio         PTT - ( 22 Mar 2021 14:38 )  PTT:34.4 sec      Culture - Fungal, Tissue (collected 22 Mar 2021 13:33)  Source: .Tissue None  Preliminary Report (23 Mar 2021 08:01):    Testing in progress    Culture - Tissue with Gram Stain (collected 22 Mar 2021 13:33)  Source: .Tissue None  Gram Stain (23 Mar 2021 04:38):    Rare polymorphonuclear leukocytes seen per low power field    Few White blood cells seen per low power field    Few Gram positive cocci in pairs per oil power field    Culture - Fungal, Bronchial (collected 22 Mar 2021 12:58)  Source: .Bronchial None  Preliminary Report (23 Mar 2021 08:01):    Testing in progress    Culture - Bronchial (collected 22 Mar 2021 12:58)  Source: .Bronchial None  Gram Stain (23 Mar 2021 07:58):    Numerous polymorphonuclear leukocytes per low power field    No Squamous epithelial cells per low power field    No organisms seen per oil power field    Culture - Fungal, Body Fluid (collected 22 Mar 2021 09:26)  Source: .Body Fluid None  Preliminary Report (23 Mar 2021 08:01):    Testing in progress    Culture - Body Fluid with Gram Stain (collected 22 Mar 2021 09:26)  Source: .Body Fluid None  Gram Stain (23 Mar 2021 04:38):    polymorphonuclear leukocytes seen    No organisms seen    by cytocentrifuge        RADIOLOGY:  Reviewed and interpreted by me  CXR from 03-23-21 shows [+] mild congestion, [-] pneumothorax, [-] R/L effusion, [-] cardiomegaly,   NGT in place, S-G Catheter in place, R/L TLC in place, R/L Chest Tubes in place    ECG:  Reviewed and interpreted by me:   QTC:    Assessment:      PAST MEDICAL & SURGICAL HISTORY:  Osteoarthritis    Dementia    No significant past surgical history        PLAN:  Neuro: Pain control  Pulm: Encourage coughing, deep breathing and use of incentive spirometry. Wean off supplemental oxygen as able. Daily CXR.   Cardio: Monitor telemetry/alarms. Continue cardiac meds  GI: Tolerating diet. Continue stool softeners. Continue GI prophylaxis  Renal: monitor urine output, supplement electrolytes as needed  Vasc: Heparin SC/SCDs for DVT prophylaxis  Heme: Monitor H/H.   ID: Off antibiotics. Stable.  Endocrine: Monitor finger stick blood sugar and control hyperglycemia with insulin  Physical Therapy: OOB/ambulate  Tubes: Monitor Chest tube output      Discussed with Cardiothoracic Team at AM rounds.    45 minutes of critical care time spent providing medical care for patient's acute illness/conditions that impairs at least one vital organ system and/or poses a high risk of imminent or life threatening deterioration in the patient's condition. It includes time spent evaluating and treating the patient's acute illness as well as time spent reviewing labs, radiology, discussing goals of care with patient and/or patient's family, and discussing the case with a multidisciplinary team in an effort to prevent further life threatening deterioration or end organ damage. This time is independent of any procedures performed. CTU Attending Progress Daily Note     23 Mar 2021 08:06    Procedure:          Rt VATS decortication                                        POD#    1               Patient seen as post-op critical care follow-up    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, osteoarithtis, Turkish-speaking presenting with cough and fever.    As per daughter, patient has been having dry consistent cough since 2 months. Today, she was being evaluated for knee injections when she was found to be febrile. She also reports progressing generalized weakness with decreased appetite and PO intake.  No URT symptoms, no chest pain, no leg pain, no diarrhea, no urinary symptoms no sick contacts, no recent travel. In ED was hypotensive, was given IVF, started on levophed 0.04 called to evaluate (11 Mar 2021 03:21)    See preop testing chart H&P    Interval event for past 24 hr:  DESTIN TERRY  85y remains intubated, sedated on mechanical ventilation. + hypotension on levophed    Current Complains:  DESTIN TERRY has no new complaints    REVIEW OF SYSTEMS:    [ x ] Unable to assess ROS because : sedated    OBJECTIVE:  ICU Vital Signs Last 24 Hrs  T(C): 35.3 (23 Mar 2021 04:00), Max: 36.8 (22 Mar 2021 20:00)  T(F): 95.5 (23 Mar 2021 04:00), Max: 98.3 (22 Mar 2021 20:00)  HR: 53 (23 Mar 2021 07:00) (49 - 86)  BP: 126/62 (23 Mar 2021 06:00) (67/51 - 126/62)  BP(mean): 89 (23 Mar 2021 06:00) (56 - 89)  ABP: 113/48 (23 Mar 2021 07:00) (90/48 - 142/71)  ABP(mean): 72 (23 Mar 2021 07:00) (60 - 97)  RR: 19 (23 Mar 2021 07:00) (0 - 20)  SpO2: 97% (23 Mar 2021 07:00) (97% - 100%)      I&O's Summary    22 Mar 2021 07:01  -  23 Mar 2021 07:00  --------------------------------------------------------  IN: 2393.2 mL / OUT: 1031 mL / NET: 1362.2 mL      Adult Advanced Hemodynamics Last 24 Hrs  CVP(mm Hg): --  CVP(cm H2O): --  CO: --  CI: --  PA: --  PA(mean): --  PCWP: --  SVR: --  SVRI: --  PVR: --  PVRI: --  Mode: AC/ CMV (Assist Control/ Continuous Mandatory Ventilation)  RR (machine): 18  TV (machine): 500  FiO2: 40  PEEP: 5  ITime: 1  MAP: 13  PIP: 37      PHYSICAL EXAM:  General: WN/WD NAD    HEENT:     [+] NCAT  [+] EOMI  [-] Conjuctival edema   [-] Icterus   [-] Thrush   [+] ETT  [+] NGT/OGT    Neck:         [+] FROM   [-] JVD     [-] Nodes     [-] Masses    [+] Mid-line trachea    [-] Tracheostomy    Chest:          [+] Chest tubes   [-] SubQ emphysema    Lungs:          [+] CTA   [-] Rhonchi   [-] Rales    [-] Wheezing    [-] Decreased BS    [-] Dullness R L    Cardiac:       [+] S1 [+] S2    [+] RRR   [-] Irregular   [-] S3   [-] S4    [-] Murmurs    [-] Rub    Abdomen:    [+] BS    [+] Soft    [+] Non-tender     [-] Distended    [-] Organomegaly  [-] PEG    Extremities:   [-] Cyanosis U/L   [-] Clubbing  U/L  [-] LE/UE Edema   [+] Capillary refill    [+] Pulses     Neuro:        [+] Awake   [+]  Alert   [-] Confused   [-] Lethargic   [-] Sedated   [-] Generalized Weakness    Skin:        [-] Rashes    [-] Erythema   [+] Normal incisions   [+] IV sites intact   [-] Sacral decubitus    Tubes: pleural  LINES: central      HOSPITAL MEDICATIONS:  MEDICATIONS  (STANDING):  ALBUTerol    90 MICROgram(s) HFA Inhaler 2 Puff(s) Inhalation every 6 hours  cefepime   IVPB 2000 milliGRAM(s) IV Intermittent every 12 hours  chlorhexidine 0.12% Liquid 5 milliLiter(s) Oral Mucosa two times a day  chlorhexidine 4% Liquid 1 Application(s) Topical <User Schedule>  dexMEDEtomidine Infusion 0.2 MICROgram(s)/kG/Hr (3 mL/Hr) IV Continuous <Continuous>  famotidine Injectable 20 milliGRAM(s) IV Push every 12 hours  ferrous    sulfate 325 milliGRAM(s) Oral daily  ipratropium 17 MICROgram(s) HFA Inhaler 2 Puff(s) Inhalation every 6 hours  lactated ringers Bolus 1000 milliLiter(s) IV Bolus once  levoFLOXacin IVPB 750 milliGRAM(s) IV Intermittent every 48 hours  meperidine     Injectable 25 milliGRAM(s) IV Push once  metroNIDAZOLE  IVPB 500 milliGRAM(s) IV Intermittent every 8 hours  multivitamin 1 Tablet(s) Oral daily  norepinephrine Infusion 0.05 MICROgram(s)/kG/Min (5.63 mL/Hr) IV Continuous <Continuous>  polyethylene glycol 3350 17 Gram(s) Oral daily  propofol Infusion 10 MICROgram(s)/kG/Min (3.6 mL/Hr) IV Continuous <Continuous>  senna 2 Tablet(s) Oral at bedtime  sodium chloride 0.9%. 1000 milliLiter(s) (10 mL/Hr) IV Continuous <Continuous>  vancomycin  IVPB 1250 milliGRAM(s) IV Intermittent once  vasopressin Infusion 0.04 Unit(s)/Min (2.4 mL/Hr) IV Continuous <Continuous>    MEDICATIONS  (PRN):  fentaNYL    Injectable 25 MICROGram(s) IV Push every 3 hours PRN Moderate Pain (4 - 6)  guaifenesin/dextromethorphan  Syrup 10 milliLiter(s) Oral every 4 hours PRN Cough/throat discomfort  ondansetron Injectable 4 milliGRAM(s) IV Push every 4 hours PRN Nausea and/or Vomiting      LABS:  ABG - ( 23 Mar 2021 03:41 )  pH, Arterial: 7.44  pH, Blood: x     /  pCO2: 33    /  pO2: 80    / HCO3: 22    / Base Excess: -1.4  /  SaO2: 96                                      9.0    30.22 )-----------( 526      ( 23 Mar 2021 03:00 )             27.4     03-23    137  |  105  |  22<H>  ----------------------------<  146<H>  3.8   |  19  |  0.7    Ca    8.3<L>      23 Mar 2021 03:00  Phos  3.2     03-21  Mg     2.0     03-23    TPro  5.8<L>  /  Alb  3.0<L>  /  TBili  0.6  /  DBili  x   /  AST  30  /  ALT  13  /  AlkPhos  46  03-23    PT/INR - ( 22 Mar 2021 14:38 )   PT: 18.70 sec;   INR: 1.63 ratio         PTT - ( 22 Mar 2021 14:38 )  PTT:34.4 sec      Culture - Fungal, Tissue (collected 22 Mar 2021 13:33)  Source: .Tissue None  Preliminary Report (23 Mar 2021 08:01):    Testing in progress    Culture - Tissue with Gram Stain (collected 22 Mar 2021 13:33)  Source: .Tissue None  Gram Stain (23 Mar 2021 04:38):    Rare polymorphonuclear leukocytes seen per low power field    Few White blood cells seen per low power field    Few Gram positive cocci in pairs per oil power field    Culture - Fungal, Bronchial (collected 22 Mar 2021 12:58)  Source: .Bronchial None  Preliminary Report (23 Mar 2021 08:01):    Testing in progress    Culture - Bronchial (collected 22 Mar 2021 12:58)  Source: .Bronchial None  Gram Stain (23 Mar 2021 07:58):    Numerous polymorphonuclear leukocytes per low power field    No Squamous epithelial cells per low power field    No organisms seen per oil power field    Culture - Fungal, Body Fluid (collected 22 Mar 2021 09:26)  Source: .Body Fluid None  Preliminary Report (23 Mar 2021 08:01):    Testing in progress    Culture - Body Fluid with Gram Stain (collected 22 Mar 2021 09:26)  Source: .Body Fluid None  Gram Stain (23 Mar 2021 04:38):    polymorphonuclear leukocytes seen    No organisms seen    by cytocentrifuge        RADIOLOGY:  Reviewed and interpreted by me  CXR from 03-23-21 shows [+] mild congestion, [-] pneumothorax, [-] R/L effusion, [-] cardiomegaly,   NGT in place, S-G Catheter in place, R/L TLC in place, R/L Chest Tubes in place    ECG:  Reviewed and interpreted by me:   QTC:    Assessment:  empyema SP Rt VATS decortication    septic shock  acute hypoxemic respiratory failure    PAST MEDICAL & SURGICAL HISTORY:  Osteoarthritis    Dementia    No significant past surgical history        PLAN:  Neuro: SAT  Pulm: SBT Daily CXR.   Cardio: Monitor telemetry/alarms. Continue cardiac meds  GI: Continue GI prophylaxis  Renal: monitor urine output, supplement electrolytes as needed  Vasc: Heparin SC/SCDs for DVT prophylaxis  Heme: Monitor H/H.   ID: IV antibiotics.   Endocrine: Monitor finger stick blood sugar and control hyperglycemia with insulin  Physical Therapy: OOB/ambulate once extubated  Tubes: Monitor Chest tube output      Discussed with Cardiothoracic Team at AM rounds.    45 minutes of critical care time spent providing medical care for patient's acute illness/conditions that impairs at least one vital organ system and/or poses a high risk of imminent or life threatening deterioration in the patient's condition. It includes time spent evaluating and treating the patient's acute illness as well as time spent reviewing labs, radiology, discussing goals of care with patient and/or patient's family, and discussing the case with a multidisciplinary team in an effort to prevent further life threatening deterioration or end organ damage. This time is independent of any procedures performed.

## 2021-03-23 NOTE — ANESTHESIA FOLLOW-UP NOTE - NSEVALATIONFT_GEN_ALL_CORE
Pt with daughter. No issues per RN and Daughter. Pt intubated for airway protection. No other issues. VSS. not on pressors. Pt with daughter. No issues per RN and Daughter. Pt intubated for airway protection. No other issues. on low dose levophed 0.03 and sedation precedex 0.6. Calm at [facility] 0.

## 2021-03-24 NOTE — PROGRESS NOTE ADULT - SUBJECTIVE AND OBJECTIVE BOX
OPERATIVE PROCEDURE(s):                POD #                       SURGEON(s): CONCEPCIÓN Kirby  SUBJECTIVE ASSESSMENT:85yFemale patient seen and examined at bedside.    Vital Signs Last 24 Hrs  T(F): 97.7 (24 Mar 2021 04:00), Max: 98.1 (23 Mar 2021 20:00)  HR: 120 (24 Mar 2021 06:00) (46 - 120)  BP: 107/53 (24 Mar 2021 05:00) (83/48 - 164/77)  BP(mean): 76 (24 Mar 2021 05:00) (60 - 110)  ABP: 140/50 (24 Mar 2021 06:00) (63/40 - 157/69)  ABP(mean): 77 (24 Mar 2021 06:00)  RR: 32 (24 Mar 2021 06:00) (0 - 42)  SpO2: 84% (24 Mar 2021 06:00) (84% - 100%)  CVP(mm Hg): --  CVP(cm H2O): --  CO: --  CI: --  PA: --  SVR: --  Mode: AC/ CMV (Assist Control/ Continuous Mandatory Ventilation)  RR (machine): 18  TV (machine): 500  FiO2: 60  PEEP: 5  MAP: 14    I&O's Detail    23 Mar 2021 07:01  -  24 Mar 2021 07:00  --------------------------------------------------------  IN:    Dexmedetomidine: 222 mL    IV PiggyBack: 950 mL    Lactated Ringers: 2200 mL    Norepinephrine: 95 mL    Propofol: 88 mL    sodium chloride 0.9%: 500 mL    Vasopressin: 15.6 mL  Total IN: 4070.6 mL    OUT:    Chest Tube (mL): 70 mL    Chest Tube (mL): 200 mL    Chest Tube (mL): 35 mL    Indwelling Catheter - Urethral (mL): 1330 mL  Total OUT: 1635 mL        Net: I&O's Detail    22 Mar 2021 07:01  -  23 Mar 2021 07:00  --------------------------------------------------------  Total NET: 1362.2 mL      23 Mar 2021 07:01  -  24 Mar 2021 07:00  --------------------------------------------------------  Total NET: 2435.6 mL        CAPILLARY BLOOD GLUCOSE          Physical Exam:  General: NAD; A&Ox3  Cardiac: S1/S2, RRR, no murmur, no rubs  Lungs: unlabored respirations, CTA b/l, no wheeze, no rales, no crackles  Abdomen: Soft/NT/ND; positive bowel sounds x 4  Sternum: Intact, no click, incision healing well with no drainage  Incisions: Incisions clean/dry/intact  Extremities: No edema b/l lower extremities; good capillary refill; no cyanosis; palpable 1+ pedal pulses b/l    Central Venous Catheter: Yes[]  No[] , If Yes indication:                    Day #  Adams Catheter: Yes  [] , No  [] , If yes indication:                                 Day #  NGT: Yes [] No [] ,    If Yes Placement:                                                   Day #  EPICARDIAL WIRES:  [] YES [] NO                                                            Day #  BOWEL MOVEMENT:  [] YES [] NO, If No, Timing since last BM Day #  CHEST TUBE(Left/Right):  [] YES [] NO, If yes -  AIR LEAKS:  [] YES [] NO        LABS:                        8.6<L>  30.85<H> )-----------( 504<H>    ( 24 Mar 2021 02:00 )             26.2<L>                        9.0<L>  30.22<H> )-----------( 526<H>    ( 23 Mar 2021 03:00 )             27.4<L>    03-24    135  |  103  |  16  ----------------------------<  138<H>  3.3<L>   |  20  |  0.5<L>  03-23    137  |  105  |  22<H>  ----------------------------<  146<H>  3.8   |  19  |  0.7    Ca    8.3<L>      24 Mar 2021 02:00  Phos  3.2     03-21  Mg     1.7     03-24    TPro  5.8<L> [6.0 - 8.0]  /  Alb  3.0<L> [3.5 - 5.2]  /  TBili  0.6 [0.2 - 1.2]  /  DBili  x   /  AST  30 [0 - 41]  /  ALT  13 [0 - 41]  /  AlkPhos  46 [30 - 115]  03-23    PT/INR - ( 22 Mar 2021 14:38 )   PT: ;   INR: 1.63 ratio         PTT - ( 22 Mar 2021 14:38 )  PTT:34.4 sec    ABG - ( 24 Mar 2021 04:33 )  pH: 7.48  /  pCO2: 33    /  pO2: 71    / HCO3: 25    / Base Excess: 1.2   /  SaO2: 96    /  LA: 1.5              RADIOLOGY & ADDITIONAL TESTS:  CXR:   EKG:  Allergies    clindamycin (Hives)    Intolerances      MEDICATIONS  (STANDING):  ALBUTerol    90 MICROgram(s) HFA Inhaler 2 Puff(s) Inhalation every 6 hours  cefepime   IVPB 2000 milliGRAM(s) IV Intermittent every 12 hours  chlorhexidine 0.12% Liquid 5 milliLiter(s) Oral Mucosa two times a day  chlorhexidine 4% Liquid 1 Application(s) Topical <User Schedule>  dexMEDEtomidine Infusion 0.2 MICROgram(s)/kG/Hr (3 mL/Hr) IV Continuous <Continuous>  enoxaparin Injectable 40 milliGRAM(s) SubCutaneous daily  famotidine Injectable 20 milliGRAM(s) IV Push every 12 hours  ferrous    sulfate 325 milliGRAM(s) Oral daily  ipratropium 17 MICROgram(s) HFA Inhaler 2 Puff(s) Inhalation every 6 hours  lactated ringers Bolus 1000 milliLiter(s) IV Bolus once  meperidine     Injectable 25 milliGRAM(s) IV Push once  metroNIDAZOLE  IVPB 500 milliGRAM(s) IV Intermittent every 8 hours  multivitamin 1 Tablet(s) Oral daily  norepinephrine Infusion 0.05 MICROgram(s)/kG/Min (5.63 mL/Hr) IV Continuous <Continuous>  polyethylene glycol 3350 17 Gram(s) Oral daily  potassium chloride  20 mEq/100 mL IVPB 20 milliEquivalent(s) IV Intermittent once  propofol Infusion 10 MICROgram(s)/kG/Min (3.6 mL/Hr) IV Continuous <Continuous>  senna 2 Tablet(s) Oral at bedtime  sodium chloride 0.9%. 1000 milliLiter(s) (10 mL/Hr) IV Continuous <Continuous>  vancomycin  IVPB 1000 milliGRAM(s) IV Intermittent daily  vasopressin Infusion 0.04 Unit(s)/Min (2.4 mL/Hr) IV Continuous <Continuous>    MEDICATIONS  (PRN):  guaifenesin/dextromethorphan  Syrup 10 milliLiter(s) Oral every 4 hours PRN Cough/throat discomfort  ondansetron Injectable 4 milliGRAM(s) IV Push every 4 hours PRN Nausea and/or Vomiting      Pharmacologic DVT Prophylaxis: [] YES, []NO: Contraindication:   [] HEPARIN: Dose: XX mg  Q24H    [] LOVENOX: Dose: XX mg  Q24H                 SCD's: YES b/l    GI Prophylaxis: Protonix [], Pepcid []    Post-Op Beta-Blockers: []Yes, []No: contraindication:  Post-Op Nitrate: []Yes, []No: contraindication:  Post-Op Aspirin: []Yes,  []No: contraindication:  Post-Op Statin: []Yes, []No: contraindication:      Ambulation/Activity Status:    Assessment/Plan:  85y Female status-post  - Case and plan discussed with CTU Intensivist and CT Surgeon - Dr. Oneal/Ron/Abiel  - Continue CTU supportive care and ongoing plan of care as per continuing CTU rounds.    - Continue DVT/GI prophylaxis  - Incentive Spirometry 10 times an hour  - Continue to advance physical activity as tolerated and continue PT/OT as directed  1. CAD: Continue ASA, statin, BB  2. HTN:   3. A. Fib:   4. COPD/Hypoxia:   5. DM/Glucose Control:     Social Service Disposition:     OPERATIVE PROCEDURE(s):  R VATS for parapneumonic empyema              POD # 2                      SURGEON(s): PRO Downs  SUBJECTIVE ASSESSMENT: 85y Female patient seen and examined at bedside. Currently Vented and sedated. Failed CPAx3 yesterday.    Vital Signs Last 24 Hrs  T(F): 97.7 (24 Mar 2021 04:00), Max: 98.1 (23 Mar 2021 20:00)  HR: 120 (24 Mar 2021 06:00) (46 - 120)  BP: 107/53 (24 Mar 2021 05:00) (83/48 - 164/77)  BP(mean): 76 (24 Mar 2021 05:00) (60 - 110)  ABP: 140/50 (24 Mar 2021 06:00) (63/40 - 157/69)  ABP(mean): 77 (24 Mar 2021 06:00)  RR: 32 (24 Mar 2021 06:00) (0 - 42)  SpO2: 96% (24 Mar 2021 06:00) (84% - 100%)    Mode: AC/ CMV (Assist Control/ Continuous Mandatory Ventilation)  RR (machine): 18  TV (machine): 500  FiO2: 60  PEEP: 5  MAP: 14    I&O's Detail    23 Mar 2021 07:01  -  24 Mar 2021 07:00  --------------------------------------------------------  IN:    Dexmedetomidine: 222 mL    IV PiggyBack: 950 mL    Lactated Ringers: 2200 mL    Norepinephrine: 95 mL    Propofol: 88 mL    sodium chloride 0.9%: 500 mL    Vasopressin: 15.6 mL  Total IN: 4070.6 mL    OUT:    Chest Tube (mL): 70 mL    Chest Tube (mL): 200 mL    Chest Tube (mL): 35 mL    Indwelling Catheter - Urethral (mL): 1330 mL  Total OUT: 1635 mL    Net: I&O's Detail    22 Mar 2021 07:01  -  23 Mar 2021 07:00  --------------------------------------------------------  Total NET: 1362.2 mL    23 Mar 2021 07:01  -  24 Mar 2021 07:00  --------------------------------------------------------  Total NET: 2435.6 mL      Physical Exam:  General: NAD; A&Ox3  Cardiac: S1/S2, RRR, no murmur, no rubs  Lungs: unlabored respirations, CTA b/l, no wheeze, no rales, no crackles  Abdomen: Soft/NT/ND; positive bowel sounds x 4  Incisions: Incisions clean/dry/intact  Extremities: No edema b/l lower extremities; good capillary refill; no cyanosis; palpable 1+ pedal pulses b/l    Central Venous Catheter: Yes[x]  No[] , If Yes indication: IV Access                  Day # 2  Adams Catheter: Yes  [x] , No  [] , If yes indication: Monitor strict in/out            Day # 2  BOWEL MOVEMENT:  [] YES [x] NO, If No, Timing since last BM Day #  CHEST TUBE(Right):  [x] YES [] NO, If yes -  AIR LEAKS:  [x] YES [] NO          LABS:                        8.6<L>  30.85<H> )-----------( 504<H>    ( 24 Mar 2021 02:00 )             26.2<L>                        9.0<L>  30.22<H> )-----------( 526<H>    ( 23 Mar 2021 03:00 )             27.4<L>    03-24    135  |  103  |  16  ----------------------------<  138<H>  3.3<L>   |  20  |  0.5<L>  03-23    137  |  105  |  22<H>  ----------------------------<  146<H>  3.8   |  19  |  0.7    Ca    8.3<L>      24 Mar 2021 02:00  Phos  3.2     03-21  Mg     1.7     03-24    TPro  5.8<L> [6.0 - 8.0]  /  Alb  3.0<L> [3.5 - 5.2]  /  TBili  0.6 [0.2 - 1.2]  /  DBili  x   /  AST  30 [0 - 41]  /  ALT  13 [0 - 41]  /  AlkPhos  46 [30 - 115]  03-23    PT/INR - ( 22 Mar 2021 14:38 )   PT: ;   INR: 1.63 ratio         PTT - ( 22 Mar 2021 14:38 )  PTT:34.4 sec    ABG - ( 24 Mar 2021 04:33 )  pH: 7.48  /  pCO2: 33    /  pO2: 71    / HCO3: 25    / Base Excess: 1.2   /  SaO2: 96    /  LA: 1.5          Allergies  clindamycin (Hives)  Intolerances      MEDICATIONS  (STANDING):  ALBUTerol    90 MICROgram(s) HFA Inhaler 2 Puff(s) Inhalation every 6 hours  cefepime   IVPB 2000 milliGRAM(s) IV Intermittent every 12 hours  chlorhexidine 0.12% Liquid 5 milliLiter(s) Oral Mucosa two times a day  chlorhexidine 4% Liquid 1 Application(s) Topical <User Schedule>  dexMEDEtomidine Infusion 0.2 MICROgram(s)/kG/Hr (3 mL/Hr) IV Continuous <Continuous>  enoxaparin Injectable 40 milliGRAM(s) SubCutaneous daily  famotidine Injectable 20 milliGRAM(s) IV Push every 12 hours  ferrous    sulfate 325 milliGRAM(s) Oral daily  ipratropium 17 MICROgram(s) HFA Inhaler 2 Puff(s) Inhalation every 6 hours  lactated ringers Bolus 1000 milliLiter(s) IV Bolus once  meperidine     Injectable 25 milliGRAM(s) IV Push once  metroNIDAZOLE  IVPB 500 milliGRAM(s) IV Intermittent every 8 hours  multivitamin 1 Tablet(s) Oral daily  norepinephrine Infusion 0.05 MICROgram(s)/kG/Min (5.63 mL/Hr) IV Continuous <Continuous>  polyethylene glycol 3350 17 Gram(s) Oral daily  potassium chloride  20 mEq/100 mL IVPB 20 milliEquivalent(s) IV Intermittent once  propofol Infusion 10 MICROgram(s)/kG/Min (3.6 mL/Hr) IV Continuous <Continuous>  senna 2 Tablet(s) Oral at bedtime  sodium chloride 0.9%. 1000 milliLiter(s) (10 mL/Hr) IV Continuous <Continuous>  vancomycin  IVPB 1000 milliGRAM(s) IV Intermittent daily  vasopressin Infusion 0.04 Unit(s)/Min (2.4 mL/Hr) IV Continuous <Continuous>    MEDICATIONS  (PRN):  guaifenesin/dextromethorphan  Syrup 10 milliLiter(s) Oral every 4 hours PRN Cough/throat discomfort  ondansetron Injectable 4 milliGRAM(s) IV Push every 4 hours PRN Nausea and/or Vomiting      Assessment/Plan:  85y Female status-post R VATS for parapneumonic empyema              POD # 2   - Case and plan discussed with CTU Intensivist and CT Surgeon - Dr. Cooper Downs  - Continue CTU supportive care and ongoing plan of care as per continuing CTU rounds.    - Continue DVT/GI prophylaxis  - Incentive Spirometry 10 times an hour  - Continue to advance physical activity as tolerated and continue PT/OT as directed  1. Parapneumonic empyema s/p R VATs:   2. ID: GPC in pairs/chains will f/u speciation and susceptibilities. Cont vancomycin 1g q 24 hours for now, please obtain trough prior to 3rd dose w/cefepime 2g q 12 hours and flagyl 500 mg TID   3. Nutrition: Start vital HP     4. Resp: wean vent as tolerated. Cont CPAP trials through out day.

## 2021-03-24 NOTE — PROGRESS NOTE ADULT - SUBJECTIVE AND OBJECTIVE BOX
Hospital Day: 12  Post Operative Day:1  Procedure:s/p right VATS , 3 chest tubes placed  Patient is a 85y old  Female who presents with a chief complaint of Cough, fever (20 Mar 2021 01:27)    PAST MEDICAL & SURGICAL HISTORY:  Osteoarthritis    Dementia    No significant past surgical history        Events of the Last 24h:  Vital Signs Last 24 Hrs  T(C): 36.7 (23 Mar 2021 20:00), Max: 36.7 (23 Mar 2021 20:00)  T(F): 98.1 (23 Mar 2021 20:00), Max: 98.1 (23 Mar 2021 20:00)  HR: 57 (23 Mar 2021 23:00) (46 - 96)  BP: 126/59 (23 Mar 2021 22:00) (83/48 - 164/77)  BP(mean): 85 (23 Mar 2021 22:00) (60 - 110)  RR: 23 (23 Mar 2021 23:00) (0 - 51)  SpO2: 98% (23 Mar 2021 23:00) (91% - 100%)    Mode: AC/ CMV (Assist Control/ Continuous Mandatory Ventilation), RR (machine): 18, TV (machine): 500, FiO2: 60, PEEP: 5, ITime: 1, MAP: 14, PIP: 37    Diet, NPO (21 @ 12:58)      I&O's Summary    22 Mar 2021 07:01  -  23 Mar 2021 07:00  --------------------------------------------------------  IN: 2393.2 mL / OUT: 1031 mL / NET: 1362.2 mL    23 Mar 2021 07:  -  24 Mar 2021 00:16  --------------------------------------------------------  IN: 2856.4 mL / OUT: 850 mL / NET: 2006.4 mL     I&O's Detail    22 Mar 2021 07:  -  23 Mar 2021 07:00  --------------------------------------------------------  IN:    Albumin 5%  - 500 mL: 1000 mL    Dexmedetomidine: 205.5 mL    IV PiggyBack: 700 mL    Norepinephrine: 93.1 mL    Propofol: 15.4 mL    sodium chloride 0.9%: 360 mL    Vasopressin: 19.2 mL  Total IN: 2393.2 mL    OUT:    Chest Tube (mL): 55 mL    Chest Tube (mL): 180 mL    Chest Tube (mL): 210 mL    Indwelling Catheter - Urethral (mL): 586 mL  Total OUT: 1031 mL    Total NET: 1362.2 mL      23 Mar 2021 07:01  -  24 Mar 2021 00:16  --------------------------------------------------------  IN:    Dexmedetomidine: 192 mL    IV PiggyBack: 600 mL    Lactated Ringers: 1600 mL    Norepinephrine: 61 mL    Propofol: 35 mL    sodium chloride 0.9%: 360 mL    Vasopressin: 8.4 mL  Total IN: 2856.4 mL    OUT:    Chest Tube (mL): 15 mL    Chest Tube (mL): 60 mL    Chest Tube (mL): 130 mL    Indwelling Catheter - Urethral (mL): 645 mL  Total OUT: 850 mL    Total NET: 2006.4 mL          MEDICATIONS  (STANDING):  ALBUTerol    90 MICROgram(s) HFA Inhaler 2 Puff(s) Inhalation every 6 hours  cefepime   IVPB 2000 milliGRAM(s) IV Intermittent every 12 hours  chlorhexidine 0.12% Liquid 5 milliLiter(s) Oral Mucosa two times a day  chlorhexidine 4% Liquid 1 Application(s) Topical <User Schedule>  dexMEDEtomidine Infusion 0.2 MICROgram(s)/kG/Hr (3 mL/Hr) IV Continuous <Continuous>  enoxaparin Injectable 40 milliGRAM(s) SubCutaneous daily  famotidine Injectable 20 milliGRAM(s) IV Push every 12 hours  fentaNYL    Injectable 50 MICROGram(s) IV Push every 4 hours  ferrous    sulfate 325 milliGRAM(s) Oral daily  ipratropium 17 MICROgram(s) HFA Inhaler 2 Puff(s) Inhalation every 6 hours  lactated ringers Bolus 1000 milliLiter(s) IV Bolus once  lactated ringers. 1000 milliLiter(s) (100 mL/Hr) IV Continuous <Continuous>  meperidine     Injectable 25 milliGRAM(s) IV Push once  metroNIDAZOLE  IVPB 500 milliGRAM(s) IV Intermittent every 8 hours  multivitamin 1 Tablet(s) Oral daily  norepinephrine Infusion 0.05 MICROgram(s)/kG/Min (5.63 mL/Hr) IV Continuous <Continuous>  polyethylene glycol 3350 17 Gram(s) Oral daily  propofol Infusion 10 MICROgram(s)/kG/Min (3.6 mL/Hr) IV Continuous <Continuous>  senna 2 Tablet(s) Oral at bedtime  sodium chloride 0.9%. 1000 milliLiter(s) (10 mL/Hr) IV Continuous <Continuous>  vancomycin  IVPB 1000 milliGRAM(s) IV Intermittent daily  vasopressin Infusion 0.04 Unit(s)/Min (2.4 mL/Hr) IV Continuous <Continuous>    MEDICATIONS  (PRN):  fentaNYL    Injectable 25 MICROGram(s) IV Push every 3 hours PRN Moderate Pain (4 - 6)  guaifenesin/dextromethorphan  Syrup 10 milliLiter(s) Oral every 4 hours PRN Cough/throat discomfort  ondansetron Injectable 4 milliGRAM(s) IV Push every 4 hours PRN Nausea and/or Vomiting      PHYSICAL EXAM:    GENERAL: NAD    HEENT: NCAT    CHEST/LUNGS: CTAB, 3 chest tubes to suction + airleak all 3 , sero sanguinous outpt     HEART: RRR,  No murmurs, rubs, or gallops    ABDOMEN: SNTND +BS    EXTREMITIES:  FROM, No clubbing, cyanosis, or edema, palpable pulse    NEURO: No focal neurological deficits    SKIN: No rashes or lesions    INCISION/WOUNDS:                          9.0    30.22 )-----------( 526      ( 23 Mar 2021 03:00 )             27.4        CBC Full  -  ( 23 Mar 2021 03:00 )  WBC Count : 30.22 K/uL  RBC Count : 3.57 M/uL  Hemoglobin : 9.0 g/dL  Hematocrit : 27.4 %  Platelet Count - Automated : 526 K/uL  Mean Cell Volume : 76.8 fL  Mean Cell Hemoglobin : 25.2 pg  Mean Cell Hemoglobin Concentration : 32.8 g/dL  Auto Neutrophil # : 27.45 K/uL  Auto Lymphocyte # : 1.63 K/uL  Auto Monocyte # : 0.63 K/uL  Auto Eosinophil # : 0.00 K/uL  Auto Basophil # : 0.04 K/uL  Auto Neutrophil % : 90.8 %  Auto Lymphocyte % : 5.4 %  Auto Monocyte % : 2.1 %  Auto Eosinophil % : 0.0 %  Auto Basophil % : 0.1 %               137   |  105   |  22                 Ca: 8.3    BMP:   ----------------------------< 146    M.0   (21 @ 03:00)             3.8    |  19    | 0.7                Ph: x        LFT:     TPro: 5.8 / Alb: 3.0 / TBili: 0.6 / DBili: x / AST: 30 / ALT: 13 / AlkPhos: 46   (21 @ 03:00)    LIVER FUNCTIONS - ( 23 Mar 2021 03:00 )  Alb: 3.0 g/dL / Pro: 5.8 g/dL / ALK PHOS: 46 U/L / ALT: 13 U/L / AST: 30 U/L / GGT: x           PT/INR - ( 22 Mar 2021 14:38 )   PT: 18.70 sec;   INR: 1.63 ratio         PTT - ( 22 Mar 2021 14:38 )  PTT:34.4 sec          Culture - Acid Fast - Tissue w/Smear (collected 22 Mar 2021 13:33)  Source: .Tissue PLEURAL PEEL    Culture - Fungal, Tissue (collected 22 Mar 2021 13:33)  Source: .Tissue None  Preliminary Report (23 Mar 2021 08:01):    Testing in progress    Culture - Tissue with Gram Stain (collected 22 Mar 2021 13:33)  Source: .Tissue None  Gram Stain (23 Mar 2021 04:38):    Rare polymorphonuclear leukocytes seen per low power field    Few White blood cells seen per low power field    Few Gram positive cocci in pairs per oil power field  Preliminary Report (23 Mar 2021 18:45):    No growth    Culture - Fungal, Bronchial (collected 22 Mar 2021 12:58)  Source: .Bronchial None  Preliminary Report (23 Mar 2021 08:01):    Testing in progress    Culture - Acid Fast - Bronchial w/Smear (collected 22 Mar 2021 12:58)  Source: Bronch Wash None    Culture - Bronchial (collected 22 Mar 2021 12:58)  Source: .Bronchial None  Gram Stain (23 Mar 2021 07:58):    Numerous polymorphonuclear leukocytes per low power field    No Squamous epithelial cells per low power field    No organisms seen per oil power field  Preliminary Report (23 Mar 2021 19:41):    No growth    Culture - Fungal, Body Fluid (collected 22 Mar 2021 09:26)  Source: .Body Fluid None  Preliminary Report (23 Mar 2021 08:01):    Testing in progress    Culture - Acid Fast - Body Fluid w/Smear (collected 22 Mar 2021 09:26)  Source: .Body Fluid None    Culture - Body Fluid with Gram Stain (collected 22 Mar 2021 09:26)  Source: .Body Fluid None  Gram Stain (23 Mar 2021 04:38):    polymorphonuclear leukocytes seen    No organisms seen    by cytocentrifuge  Preliminary Report (23 Mar 2021 21:22):    No growth

## 2021-03-24 NOTE — PROCEDURE NOTE - ADDITIONAL PROCEDURE DETAILS
Flexible bronchoscope was advanced via the endotracheal tube into the trachea, moderate amount of purulent bilateral secretions were suctioned, no complications.
Flexible bronchoscope was advanced via the endotracheal tube into the trachea, moderate amount of purulent bilateral secretions were suctioned, no complications.

## 2021-03-24 NOTE — PROCEDURE NOTE - GENERAL PROCEDURE DETAILS
Flexible bronchoscope performed for clearance of respiratory secretions, patient with necrotizing pneumonia, abundant purulent secretions mainly RLL bronchus, secretions suctioned
Purulent respiratory secretions were suctioned

## 2021-03-24 NOTE — PROGRESS NOTE ADULT - SUBJECTIVE AND OBJECTIVE BOX
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, osteoarithtis, Latvian-speaking presenting with cough and fever.    As per daughter, patient has been having dry consistent cough since 2 months. Today, she was being evaluated for knee injections when she was found to be febrile. She also reports progressing generalized weakness with decreased appetite and PO intake.  No URT symptoms, no chest pain, no leg pain, no diarrhea, no urinary symptoms no sick contacts, no recent travel. In ED was hypotensive, was given IVF, started on levophed 0.04 called to evaluate (11 Mar 2021 03:21)        Over Night Events:  Patient seen and examined.       ROS:  See HPI    PHYSICAL EXAM    ICU Vital Signs Last 24 Hrs  T(C): 36.5 (24 Mar 2021 04:00), Max: 36.7 (23 Mar 2021 20:00)  T(F): 97.7 (24 Mar 2021 04:00), Max: 98.1 (23 Mar 2021 20:00)  HR: 67 (24 Mar 2021 08:45) (52 - 120)  BP: 118/55 (24 Mar 2021 08:45) (83/48 - 164/77)  BP(mean): 79 (24 Mar 2021 08:45) (60 - 110)  ABP: 132/49 (24 Mar 2021 08:45) (63/40 - 140/50)  ABP(mean): 73 (24 Mar 2021 08:45) (36 - 89)  RR: 25 (24 Mar 2021 08:45) (0 - 42)  SpO2: 98% (24 Mar 2021 08:45) (84% - 99%)      General:  HEENT:                Lymph Nodes: NO cervical LN   Lungs: Bilateral BS  Cardiovascular: Regular   Abdomen: Soft, Positive BS  Extremities: No clubbing   Skin:   Neurological: sedated    I&O's Detail    23 Mar 2021 07:01  -  24 Mar 2021 07:00  --------------------------------------------------------  IN:    Dexmedetomidine: 222 mL    IV PiggyBack: 950 mL    Lactated Ringers: 2200 mL    Norepinephrine: 95 mL    Propofol: 88 mL    sodium chloride 0.9%: 500 mL    Vasopressin: 15.6 mL  Total IN: 4070.6 mL    OUT:    Chest Tube (mL): 200 mL    Chest Tube (mL): 35 mL    Chest Tube (mL): 70 mL    Indwelling Catheter - Urethral (mL): 1330 mL  Total OUT: 1635 mL    Total NET: 2435.6 mL      24 Mar 2021 07:01  -  24 Mar 2021 10:05  --------------------------------------------------------  IN:    Dexmedetomidine: 52.5 mL    Norepinephrine: 16 mL    Propofol: 13 mL    sodium chloride 0.9%: 60 mL  Total IN: 141.5 mL    OUT:    Chest Tube (mL): 0 mL    Chest Tube (mL): 20 mL    Chest Tube (mL): 0 mL    Indwelling Catheter - Urethral (mL): 250 mL  Total OUT: 270 mL    Total NET: -128.5 mL          LABS:                          8.6    30.85 )-----------( 504      ( 24 Mar 2021 02:00 )             26.2         24 Mar 2021 02:00    135    |  103    |  16     ----------------------------<  138    3.3     |  20     |  0.5      Ca    8.3        24 Mar 2021 02:00  Mg     1.7       24 Mar 2021 02:00                                               PT/INR - ( 22 Mar 2021 14:38 )   PT: 18.70 sec;   INR: 1.63 ratio         PTT - ( 22 Mar 2021 14:38 )  PTT:34.4 sec                                                                                                   Culture - Acid Fast - Tissue w/Smear (collected 22 Mar 2021 13:33)  Source: .Tissue PLEURAL PEEL    Culture - Fungal, Tissue (collected 22 Mar 2021 13:33)  Source: .Tissue None  Preliminary Report (23 Mar 2021 08:01):    Testing in progress    Culture - Tissue with Gram Stain (collected 22 Mar 2021 13:33)  Source: .Tissue None  Gram Stain (23 Mar 2021 04:38):    Rare polymorphonuclear leukocytes seen per low power field    Few White blood cells seen per low power field    Few Gram positive cocci in pairs per oil power field  Preliminary Report (23 Mar 2021 18:45):    No growth    Culture - Fungal, Bronchial (collected 22 Mar 2021 12:58)  Source: .Bronchial None  Preliminary Report (23 Mar 2021 08:01):    Testing in progress    Culture - Acid Fast - Bronchial w/Smear (collected 22 Mar 2021 12:58)  Source: Bronch Wash None    Culture - Bronchial (collected 22 Mar 2021 12:58)  Source: .Bronchial None  Gram Stain (23 Mar 2021 07:58):    Numerous polymorphonuclear leukocytes per low power field    No Squamous epithelial cells per low power field    No organisms seen per oil power field  Preliminary Report (23 Mar 2021 19:41):    No growth    Culture - Fungal, Body Fluid (collected 22 Mar 2021 09:26)  Source: .Body Fluid None  Preliminary Report (23 Mar 2021 08:01):    Testing in progress    Culture - Acid Fast - Body Fluid w/Smear (collected 22 Mar 2021 09:26)  Source: .Body Fluid None    Culture - Body Fluid with Gram Stain (collected 22 Mar 2021 09:26)  Source: .Body Fluid None  Gram Stain (23 Mar 2021 04:38):    polymorphonuclear leukocytes seen    No organisms seen    by cytocentrifuge  Preliminary Report (23 Mar 2021 21:22):    No growth                                                   Mode: AC/ CMV (Assist Control/ Continuous Mandatory Ventilation)  RR (machine): 18  TV (machine): 500  FiO2: 60  PEEP: 5  ITime: 1  MAP: 14  PIP: 37                                      ABG - ( 24 Mar 2021 04:33 )  pH, Arterial: 7.48  pH, Blood: x     /  pCO2: 33    /  pO2: 71    / HCO3: 25    / Base Excess: 1.2   /  SaO2: 96                  MEDICATIONS  (STANDING):  ALBUTerol    90 MICROgram(s) HFA Inhaler 2 Puff(s) Inhalation every 6 hours  cefepime   IVPB 2000 milliGRAM(s) IV Intermittent every 12 hours  chlorhexidine 0.12% Liquid 5 milliLiter(s) Oral Mucosa two times a day  chlorhexidine 4% Liquid 1 Application(s) Topical <User Schedule>  dexMEDEtomidine Infusion 0.2 MICROgram(s)/kG/Hr (3 mL/Hr) IV Continuous <Continuous>  donepezil 5 milliGRAM(s) Oral at bedtime  enoxaparin Injectable 40 milliGRAM(s) SubCutaneous daily  famotidine Injectable 20 milliGRAM(s) IV Push every 12 hours  ferrous    sulfate 325 milliGRAM(s) Oral daily  gabapentin 300 milliGRAM(s) Oral every 8 hours  ipratropium 17 MICROgram(s) HFA Inhaler 2 Puff(s) Inhalation every 6 hours  lactated ringers Bolus 1000 milliLiter(s) IV Bolus once  meperidine     Injectable 25 milliGRAM(s) IV Push once  metroNIDAZOLE  IVPB 500 milliGRAM(s) IV Intermittent every 8 hours  multivitamin 1 Tablet(s) Oral daily  norepinephrine Infusion 0.05 MICROgram(s)/kG/Min (5.63 mL/Hr) IV Continuous <Continuous>  polyethylene glycol 3350 17 Gram(s) Oral daily  potassium chloride  20 mEq/100 mL IVPB 20 milliEquivalent(s) IV Intermittent once  propofol Infusion 10 MICROgram(s)/kG/Min (3.6 mL/Hr) IV Continuous <Continuous>  senna 2 Tablet(s) Oral at bedtime  sodium chloride 0.9%. 1000 milliLiter(s) (10 mL/Hr) IV Continuous <Continuous>  vancomycin  IVPB 1000 milliGRAM(s) IV Intermittent daily  vasopressin Infusion 0.04 Unit(s)/Min (2.4 mL/Hr) IV Continuous <Continuous>    MEDICATIONS  (PRN):  guaifenesin/dextromethorphan  Syrup 10 milliLiter(s) Oral every 4 hours PRN Cough/throat discomfort  ondansetron Injectable 4 milliGRAM(s) IV Push every 4 hours PRN Nausea and/or Vomiting          Xrays:  TLC:  OG:  ET tube:                                                                                       ECHO:    IMPRESSION:    acute hypoxemic respiratory failure  empyema    PLAN:    CNS: SAT    HEENT: oral care    PULMONARY: SBT    CARDIOVASCULAR:    GI: GI prophylaxis.  Feeding     RENAL: FU lytes    INFECTIOUS DISEASE: IV ABX    HEMATOLOGICAL:  DVT prophylaxis.    ENDOCRINE:  Follow up FS.  Insulin protocol if needed.    MUSCULOSKELETAL: bed rest      45 minutes of critical care time spent providing medical care for patient's acute illness/conditions that impairs at least one vital organ system and/or poses a high risk of imminent or life threatening deterioration in the patient's condition. It includes time spent evaluating and treating the patient's acute illness as well as time spent reviewing labs, radiology, discussing goals of care with patient and/or patient's family, and discussing the case with a multidisciplinary team in an effort to prevent further life threatening deterioration or end organ damage. This time is independent of any procedures performed.

## 2021-03-24 NOTE — PROGRESS NOTE ADULT - ASSESSMENT
Chest tubes to suction  Daily chest xray  Monitor chest tube out put  Monitor for airleaks  Monitor 02 saturation  Incentive spirometry  encourage ambulation  DVT/GI prophylaxis  Pain management  Follow up cultures

## 2021-03-24 NOTE — PROCEDURE NOTE - NSICDXPROCEDURE_GEN_ALL_CORE_FT
PROCEDURES:  Bronchoscopy, at bedside 24-Mar-2021 17:56:39  Piyush Bowers  
PROCEDURES:  Bronchoscopy, at bedside 24-Mar-2021 17:56:39  Piyush Bowers

## 2021-03-25 NOTE — PROGRESS NOTE ADULT - ASSESSMENT
ASSESSMENT  85 year old lady known to have dementia, osteoarithtis, Maltese-speaking presenting with cough and fever.      IMPRESSION  #Sepsis present on admission - secondary to CAP  -  CT Chest No Cont (03.11.21 @ 00:54): Areas of right lung consolidation which can be seen in pneumonia. Numerous air-fluid levels throughout the right lung compatible with an infectious process. Suggestion of split pleura at the lung base for which empyema is favored although inherently limited on this noncontrast exam. Consideration can be given to contrast administration if feasiblefor better delineation. Areas of bilateral interlobular septal thickening and mild layering groundglass attenuation may reflect a component of edema.  - Urine Legionella negative  - Urine Strep negative  - BLood Cx 3/10 and 3/13 negative  - Procalcitonin 1.15   - CT Chest No Cont (03.17.21 @ 16:19): Since 3/11/2021. Enlarging loculated right pleural fluid collection with multiple air bubbles consistent with empyema.   Associated compressive atelectasis right lung is seen. Scattered groundglass opacities involving multiple lumbar segments.  - s/p VATS 3/22 -- right empyema with 800 cc purulent fluid in pleural space, multiple pockers with dense adhesions -- necrotizing pneumonia of the middle lobe    #Dementia  #Osteoarthritis  #Obesity BMI (kg/m2): 23.4  #Abx allergy: clindamycin (Hives)    Creatinine, Serum: 0.8 mg/dL (03.15.21 @ 05:56)  Weight (kg): 60 (11 Mar 2021 01:17)  CrCl 50      RECOMMENDATIONS  - noted GPC in pairs/chains  but now growth -- can stop vancomycin   - continue cefepime 2g q 12 hours  - continue flagyl 500 mg TID   - will await final culture results prior to narrowing    Please call or message on [employer] Teams if with any questions.  Spectra [id number]

## 2021-03-25 NOTE — PROGRESS NOTE ADULT - SUBJECTIVE AND OBJECTIVE BOX
Progress Note: General Surgery  Patient: DESTIN TERRY , 85y (1935)Female   MRN: [id number]  Location: [address]  Visit: 03-11-21 Inpatient  Date: 03-25-21 @ 10:32    Admit Diagnosis/Chief Complaint: lung empyema    Procedure/Diagnosis:  S/P Bronchoscopy, at bedside        Events/ 24h: No acute events overnight. Pain controlled.    Vitals: T(F): 99 (03-25-21 @ 08:00), Max: 99.8 (03-25-21 @ 06:00)  HR: 94 (03-25-21 @ 10:00)  BP: 107/54 (03-25-21 @ 10:00) (89/52 - 181/71)  RR: 26 (03-25-21 @ 10:00)  SpO2: 95% (03-25-21 @ 10:00)  RR (machine): 18, TV (machine): 500, FiO2: 60, PEEP: 5, PIP: 32  In:   03-24-21 @ 07:01  -  03-25-21 @ 07:00  --------------------------------------------------------  IN: 3674.7 mL    03-25-21 @ 07:01  -  03-25-21 @ 10:32  --------------------------------------------------------  IN: 264.6 mL      Out:   03-24-21 @ 07:01  -  03-25-21 @ 07:00  --------------------------------------------------------  OUT:    Chest Tube (mL): 46 mL    Chest Tube (mL): 70 mL    Chest Tube (mL): 130 mL    Indwelling Catheter - Urethral (mL): 2625 mL  Total OUT: 2871 mL      03-25-21 @ 07:01  -  03-25-21 @ 10:32  --------------------------------------------------------  OUT:    Chest Tube (mL): 0 mL    Chest Tube (mL): 10 mL    Chest Tube (mL): 0 mL    Indwelling Catheter - Urethral (mL): 190 mL  Total OUT: 200 mL        Net:   03-24-21 @ 07:01  -  03-25-21 @ 07:00  --------------------------------------------------------  NET: 803.7 mL    03-25-21 @ 07:01  -  03-25-21 @ 10:32  --------------------------------------------------------  NET: 64.6 mL        Diet: Diet, NPO:   Tube Feeding Modality: Orogastric  Vital High Protein  Total Volume for 24 Hours (mL): 960  Continuous  Starting Tube Feed Rate mL per Hour: 10  Increase Tube Feed Rate by (mL): 10     Every 2 hours  Until Goal Tube Feed Rate (mL per Hour): 40  Tube Feed Duration (in Hours): 24  Tube Feed Start Time: 08:45 (03-24-21 @ 08:35)    IV Fluids: ferrous    sulfate 325 milliGRAM(s) Oral daily  lactated ringers Bolus 1000 milliLiter(s) IV Bolus once  multivitamin 1 Tablet(s) Oral daily  sodium chloride 0.9%. 1000 milliLiter(s) (10 mL/Hr) IV Continuous <Continuous>      Physical Exam:  General: NAD; A&Ox3  Cardiac: S1/S2, RRR, no murmur, no rubs  Lungs: unlabored respirations, CTA b/l, no wheeze, no rales, no crackles  Abdomen: Soft/NT/ND; positive bowel sounds x 4  Incisions: Incisions clean/dry/intact  Extremities: No edema b/l lower extremities; good capillary refill; no cyanosis; palpable 1+ pedal pulses b/l        Medications: [Standing]  ALBUTerol    90 MICROgram(s) HFA Inhaler 2 Puff(s) Inhalation every 6 hours  cefepime   IVPB 2000 milliGRAM(s) IV Intermittent every 12 hours  chlorhexidine 0.12% Liquid 5 milliLiter(s) Oral Mucosa two times a day  chlorhexidine 4% Liquid 1 Application(s) Topical <User Schedule>  dexMEDEtomidine Infusion 0.2 MICROgram(s)/kG/Hr (3 mL/Hr) IV Continuous <Continuous>  donepezil 5 milliGRAM(s) Oral at bedtime  enoxaparin Injectable 40 milliGRAM(s) SubCutaneous daily  famotidine Injectable 20 milliGRAM(s) IV Push every 12 hours  fentaNYL   Infusion. 0.5 MICROgram(s)/kG/Hr (3 mL/Hr) IV Continuous <Continuous>  ferrous    sulfate 325 milliGRAM(s) Oral daily  gabapentin   Solution 100 milliGRAM(s) Oral three times a day  ipratropium 17 MICROgram(s) HFA Inhaler 2 Puff(s) Inhalation every 6 hours  lactated ringers Bolus 1000 milliLiter(s) IV Bolus once  meperidine     Injectable 25 milliGRAM(s) IV Push once  metroNIDAZOLE  IVPB 500 milliGRAM(s) IV Intermittent every 8 hours  multivitamin 1 Tablet(s) Oral daily  norepinephrine Infusion 0.05 MICROgram(s)/kG/Min (5.63 mL/Hr) IV Continuous <Continuous>  polyethylene glycol 3350 17 Gram(s) Oral daily  propofol Infusion 10 MICROgram(s)/kG/Min (3.6 mL/Hr) IV Continuous <Continuous>  senna 2 Tablet(s) Oral at bedtime  sodium chloride 0.9%. 1000 milliLiter(s) (10 mL/Hr) IV Continuous <Continuous>  vancomycin  IVPB 1000 milliGRAM(s) IV Intermittent daily  vasopressin Infusion 0.04 Unit(s)/Min (2.4 mL/Hr) IV Continuous <Continuous>    DVT Prophylaxis: enoxaparin Injectable 40 milliGRAM(s) SubCutaneous daily    GI Prophylaxis: famotidine Injectable 20 milliGRAM(s) IV Push every 12 hours    Antibiotics: cefepime   IVPB 2000 milliGRAM(s) IV Intermittent every 12 hours  metroNIDAZOLE  IVPB 500 milliGRAM(s) IV Intermittent every 8 hours  vancomycin  IVPB 1000 milliGRAM(s) IV Intermittent daily    Anticoagulation:   Medications:[PRN]  guaifenesin/dextromethorphan  Syrup 10 milliLiter(s) Oral every 4 hours PRN  ondansetron Injectable 4 milliGRAM(s) IV Push every 4 hours PRN      Labs:                        7.5    27.18 )-----------( 420      ( 25 Mar 2021 01:15 )             23.7     03-25    142  |  110  |  11  ----------------------------<  104<H>  3.5   |  23  |  0.5<L>    Ca    8.2<L>      25 Mar 2021 01:15  Mg     2.0     03-25          ABG - ( 25 Mar 2021 03:56 )  pH: 7.45  /  pCO2: 36    /  pO2: 86    / HCO3: 25    / Base Excess: 1.1   /  SaO2: 98                      Urine/Micro:    Culture - Acid Fast - Tissue w/Smear (collected 22 Mar 2021 13:33)  Source: .Tissue PLEURAL PEEL  Preliminary Report (24 Mar 2021 15:04):    Culture is being performed.    Culture - Fungal, Tissue (collected 22 Mar 2021 13:33)  Source: .Tissue None  Preliminary Report (23 Mar 2021 08:01):    Testing in progress    Culture - Tissue with Gram Stain (collected 22 Mar 2021 13:33)  Source: .Tissue None  Gram Stain (23 Mar 2021 04:38):    Rare polymorphonuclear leukocytes seen per low power field    Few White blood cells seen per low power field    Few Gram positive cocci in pairs per oil power field  Preliminary Report (23 Mar 2021 18:45):    No growth    Culture - Fungal, Bronchial (collected 22 Mar 2021 12:58)  Source: .Bronchial None  Preliminary Report (23 Mar 2021 08:01):    Testing in progress    Culture - Acid Fast - Bronchial w/Smear (collected 22 Mar 2021 12:58)  Source: Bronch Wash None  Preliminary Report (24 Mar 2021 15:03):    Culture is being performed.    Culture - Bronchial (collected 22 Mar 2021 12:58)  Source: .Bronchial None  Gram Stain (23 Mar 2021 07:58):    Numerous polymorphonuclear leukocytes per low power field    No Squamous epithelial cells per low power field    No organisms seen per oil power field  Final Report (24 Mar 2021 22:11):    Normal Respiratory Narda present          Imaging:   ***

## 2021-03-25 NOTE — PROGRESS NOTE ADULT - SUBJECTIVE AND OBJECTIVE BOX
DESTIN TERRY  85y, Female  Allergy: clindamycin (Hives)      LOS  14d    CHIEF COMPLAINT: Cough, fever (20 Mar 2021 01:27)      INTERVAL EVENTS/HPI  - No acute events overnight  - T(F): , Max: 99.8 (03-25-21 @ 06:00)  - remains intubated  - on pressors  - WBC Count: 27.18 (03-25-21 @ 01:15)  WBC Count: 30.85 (03-24-21 @ 02:00)     - Creatinine, Serum: 0.5 (03-25-21 @ 01:15)  Creatinine, Serum: 0.5 (03-24-21 @ 12:54)       ROS  unable to obtain history secondary to patient's mental status    VITALS:  T(F): 99.1, Max: 99.8 (03-25-21 @ 06:00)  HR: 83  BP: 120/76  RR: 24Vital Signs Last 24 Hrs  T(C): 37.3 (25 Mar 2021 12:00), Max: 37.7 (25 Mar 2021 06:00)  T(F): 99.1 (25 Mar 2021 12:00), Max: 99.8 (25 Mar 2021 06:00)  HR: 83 (25 Mar 2021 15:29) (71 - 118)  BP: 120/76 (25 Mar 2021 15:15) (89/52 - 132/58)  BP(mean): 93 (25 Mar 2021 15:15) (67 - 93)  RR: 24 (25 Mar 2021 15:15) (23 - 31)  SpO2: 96% (25 Mar 2021 15:29) (88% - 100%)    PHYSICAL EXAM:  Gen intubated  HEENT: Normocephalic, atraumatic  Neck: supple, no lymphadenopathy  CV: Regular rate & regular rhythm  Lungs: decreased BS at bases, no fremitus; chest tube in place  Abdomen: Soft, BS present  Ext: Warm, well perfused  Neuro: non focal, awake  Skin: no rash, no erythema  Lines: no phlebitis    FH: Non-contributory  Social Hx: Non-contributory    TESTS & MEASUREMENTS:                        7.5    27.18 )-----------( 420      ( 25 Mar 2021 01:15 )             23.7     03-25    142  |  110  |  11  ----------------------------<  104<H>  3.5   |  23  |  0.5<L>    Ca    8.2<L>      25 Mar 2021 01:15  Mg     2.0     03-25      eGFR if Non African American: 90 mL/min/1.73M2 (03-25-21 @ 01:15)  eGFR if African American: 104 mL/min/1.73M2 (03-25-21 @ 01:15)          Culture - Acid Fast - Tissue w/Smear (collected 03-22-21 @ 13:33)  Source: .Tissue PLEURAL PEEL  Preliminary Report (03-24-21 @ 15:04):    Culture is being performed.    Culture - Fungal, Tissue (collected 03-22-21 @ 13:33)  Source: .Tissue None  Preliminary Report (03-23-21 @ 08:01):    Testing in progress    Culture - Tissue with Gram Stain (collected 03-22-21 @ 13:33)  Source: .Tissue None  Gram Stain (03-23-21 @ 04:38):    Rare polymorphonuclear leukocytes seen per low power field    Few White blood cells seen per low power field    Few Gram positive cocci in pairs per oil power field  Preliminary Report (03-23-21 @ 18:45):    No growth    Culture - Fungal, Bronchial (collected 03-22-21 @ 12:58)  Source: .Bronchial None  Preliminary Report (03-25-21 @ 11:20):    Rare Yeast    Culture - Acid Fast - Bronchial w/Smear (collected 03-22-21 @ 12:58)  Source: Bronch Wash None  Preliminary Report (03-24-21 @ 15:03):    Culture is being performed.    Culture - Bronchial (collected 03-22-21 @ 12:58)  Source: .Bronchial None  Gram Stain (03-23-21 @ 07:58):    Numerous polymorphonuclear leukocytes per low power field    No Squamous epithelial cells per low power field    No organisms seen per oil power field  Final Report (03-24-21 @ 22:11):    Normal Respiratory Narda present    Culture - Fungal, Body Fluid (collected 03-22-21 @ 09:26)  Source: .Body Fluid None  Preliminary Report (03-23-21 @ 08:01):    Testing in progress    Culture - Acid Fast - Body Fluid w/Smear (collected 03-22-21 @ 09:26)  Source: .Body Fluid None  Preliminary Report (03-24-21 @ 15:03):    Culture is being performed.    Culture - Body Fluid with Gram Stain (collected 03-22-21 @ 09:26)  Source: .Body Fluid None  Gram Stain (03-23-21 @ 04:38):    polymorphonuclear leukocytes seen    No organisms seen    by cytocentrifuge  Preliminary Report (03-23-21 @ 21:22):    No growth    Culture - Blood (collected 03-17-21 @ 16:00)  Source: .Blood Blood-Peripheral  Final Report (03-23-21 @ 02:39):    No Growth Final    Culture - Blood (collected 03-13-21 @ 07:33)  Source: .Blood None  Final Report (03-18-21 @ 18:01):    No Growth Final    Culture - Blood (collected 03-10-21 @ 21:00)  Source: .Blood Blood-Peripheral  Final Report (03-16-21 @ 04:01):    No Growth Final    Culture - Blood (collected 03-10-21 @ 21:00)  Source: .Blood Blood-Peripheral  Final Report (03-16-21 @ 04:01):    No Growth Final            INFECTIOUS DISEASES TESTING  MRSA PCR Result.: Negative (03-20-21 @ 21:38)  COVID-19 PCR: NotDetec (03-20-21 @ 12:30)  Fungitell: 46 (03-17-21 @ 16:00)  Procalcitonin, Serum: 0.29 (03-17-21 @ 11:30)  Procalcitonin, Serum: 0.31 (03-16-21 @ 10:15)  Legionella Antigen, Urine: Negative (03-12-21 @ 10:30)  Streptococcus Pneumoniae Ag Urine: Negative (03-12-21 @ 10:30)  MRSA PCR Result.: Negative (03-12-21 @ 10:30)  Legionella Antigen, Urine: Negative (03-11-21 @ 08:10)  Streptococcus Pneumoniae Ag Urine: Negative (03-11-21 @ 08:10)  Procalcitonin, Serum: 1.75 (03-11-21 @ 06:31)  Rapid RVP Result: NotDetec (03-10-21 @ 22:10)      INFLAMMATORY MARKERS      RADIOLOGY & ADDITIONAL TESTS:  I have personally reviewed the last available Chest xray  CXR      CT      CARDIOLOGY TESTING  12 Lead ECG:   Ventricular Rate 82 BPM    Atrial Rate 82 BPM    P-R Interval 152 ms    QRS Duration 92 ms    Q-T Interval 408 ms    QTC Calculation(Bazett) 476 ms    P Axis 44 degrees    R Axis 22 degrees    T Axis 26 degrees    Diagnosis Line Normal sinus rhythm  Normal ECG    Confirmed by JUANI DODSON MD ([id number]) on 3/24/2021 8:30:17 PM (03-24-21 @ 13:34)  12 Lead ECG:   Ventricular Rate 56 BPM    Atrial Rate 56 BPM    P-R Interval 172 ms    QRS Duration 102 ms    Q-T Interval 510 ms    QTC Calculation(Bazett) 492 ms    P Axis 46 degrees    R Axis 27 degrees    T Axis 28 degrees    Diagnosis Line Sinus bradycardia  Prolonged QT  Abnormal ECG    Confirmed by SHANNAN MORALES MD ([id number]) on 3/23/2021 8:42:50 AM (03-23-21 @ 07:37)      MEDICATIONS  ALBUTerol    90 MICROgram(s) HFA Inhaler 2 Inhalation every 6 hours  cefepime   IVPB 2000 IV Intermittent every 12 hours  chlorhexidine 0.12% Liquid 5 Oral Mucosa two times a day  chlorhexidine 4% Liquid 1 Topical <User Schedule>  dexMEDEtomidine Infusion 0.2 IV Continuous <Continuous>  donepezil 5 Oral at bedtime  enoxaparin Injectable 40 SubCutaneous daily  famotidine Injectable 20 IV Push every 12 hours  fentaNYL   Infusion. 0.5 IV Continuous <Continuous>  ferrous    sulfate 325 Oral daily  gabapentin   Solution 100 Oral three times a day  ipratropium 17 MICROgram(s) HFA Inhaler 2 Inhalation every 6 hours  lactated ringers Bolus 1000 IV Bolus once  meperidine     Injectable 25 IV Push once  metroNIDAZOLE  IVPB 500 IV Intermittent every 8 hours  multivitamin 1 Oral daily  norepinephrine Infusion 0.05 IV Continuous <Continuous>  polyethylene glycol 3350 17 Oral daily  propofol Infusion 10 IV Continuous <Continuous>  senna 2 Oral at bedtime  sodium chloride 0.9%. 1000 IV Continuous <Continuous>  vancomycin  IVPB 1000 IV Intermittent daily  vasopressin Infusion 0.04 IV Continuous <Continuous>      WEIGHT  Weight (kg): 60 (03-22-21 @ 07:23)  Creatinine, Serum: 0.5 mg/dL (03-25-21 @ 01:15)      ANTIBIOTICS:  cefepime   IVPB 2000 milliGRAM(s) IV Intermittent every 12 hours  metroNIDAZOLE  IVPB 500 milliGRAM(s) IV Intermittent every 8 hours  vancomycin  IVPB 1000 milliGRAM(s) IV Intermittent daily      All available historical records have been reviewed

## 2021-03-25 NOTE — PROGRESS NOTE ADULT - SUBJECTIVE AND OBJECTIVE BOX
OPERATIVE PROCEDURE(s):                POD #                       85yFemale  SURGEON(s): CONCEPCIÓN Kirby  SUBJECTIVE ASSESSMENT:   Vital Signs Last 24 Hrs  T(F): 99.8 (25 Mar 2021 06:00), Max: 99.8 (25 Mar 2021 06:00)  HR: 83 (25 Mar 2021 07:00) (52 - 118)  BP: 89/52 (25 Mar 2021 07:00) (71/39 - 181/71)  BP(mean): 68 (25 Mar 2021 07:00) (51 - 102)  ABP: 97/37 (25 Mar 2021 07:00) (90/59 - 182/88)  ABP(mean): 55 (25 Mar 2021 07:00)  RR: 24 (25 Mar 2021 07:00) (22 - 34)  SpO2: 96% (25 Mar 2021 07:00) (88% - 100%)  CVP(mm Hg): --  CVP(cm H2O): --  CO: --  CI: --  PA: --  SVR: --  Mode: AC/ CMV (Assist Control/ Continuous Mandatory Ventilation)  RR (machine): 18  TV (machine): 500  FiO2: 60  PEEP: 5  MAP: 14    I&O's Detail    24 Mar 2021 07:01  -  25 Mar 2021 07:00  --------------------------------------------------------  IN:    Albumin 5%  - 500 mL: 1000 mL    Dexmedetomidine: 67.5 mL    Enteral Tube Flush: 60 mL    FentaNYL: 389 mL    IV PiggyBack: 1050 mL    Norepinephrine: 136 mL    Propofol: 72.2 mL    sodium chloride 0.9%: 380 mL    Vital High Protein: 520 mL  Total IN: 3674.7 mL    OUT:    Chest Tube (mL): 130 mL    Chest Tube (mL): 46 mL    Chest Tube (mL): 70 mL    Indwelling Catheter - Urethral (mL): 2625 mL  Total OUT: 2871 mL        Net:   I&O's Detail    23 Mar 2021 07:01  -  24 Mar 2021 07:00  --------------------------------------------------------  Total NET: 2435.6 mL      24 Mar 2021 07:01  -  25 Mar 2021 07:00  --------------------------------------------------------  Total NET: 803.7 mL        CAPILLARY BLOOD GLUCOSE        Physical Exam:  General: NAD; A&Ox3/Patient is intubated and sedated  Cardiac: S1/S2, RRR, no murmur, no rubs  Lungs: unlabored respirations, CTA b/l, no wheeze, no rales, no crackles  Abdomen: Soft/NT/ND; positive bowel sounds x 4  Sternum: Intact, no click, incision healing well with no drainage  Incisions: Incisions clean/dry/intact  Extremities: No edema b/l lower extremities; good capillary refill; no cyanosis; palpable 1+ pedal pulses b/l    Central Venous Catheter: Yes[]  No[] , If Yes indication:           Day #  Adams Catheter: Yes  [] , No  [] , If yes indication:                      Day #  NGT: Yes [] No [] ,    If Yes Placement:                                     Day #  EPICARDIAL WIRES:  [] YES [] NO                                              Day #  BOWEL MOVEMENT:  [] YES [] NO, If No, Timing since last BM:      Day #  CHEST TUBE(Left/Right):  [] YES [] NO, If yes -  AIR LEAKS:  [] YES [] NO        LABS:                        7.5<L>  27.18<H> )-----------( 420<H>    ( 25 Mar 2021 01:15 )             23.7<L>                        8.6<L>  30.85<H> )-----------( 504<H>    ( 24 Mar 2021 02:00 )             26.2<L>    03-25    142  |  110  |  11  ----------------------------<  104<H>  3.5   |  23  |  0.5<L>  03-24    139  |  107  |  12  ----------------------------<  187<H>  3.8   |  22  |  0.5<L>    Ca    8.2<L>      25 Mar 2021 01:15  Mg     2.0     03-25    TPro  5.8<L> [6.0 - 8.0]  /  Alb  3.0<L> [3.5 - 5.2]  /  TBili  0.6 [0.2 - 1.2]  /  DBili  x   /  AST  30 [0 - 41]  /  ALT  13 [0 - 41]  /  AlkPhos  46 [30 - 115]  03-23        ABG - ( 25 Mar 2021 03:56 )  pH: 7.45  /  pCO2: 36    /  pO2: 86    / HCO3: 25    / Base Excess: 1.1   /  SaO2: 98    /  LA: 1.4              RADIOLOGY & ADDITIONAL TESTS:  CXR:  EKG:  MEDICATIONS  (STANDING):  ALBUTerol    90 MICROgram(s) HFA Inhaler 2 Puff(s) Inhalation every 6 hours  cefepime   IVPB 2000 milliGRAM(s) IV Intermittent every 12 hours  chlorhexidine 0.12% Liquid 5 milliLiter(s) Oral Mucosa two times a day  chlorhexidine 4% Liquid 1 Application(s) Topical <User Schedule>  dexMEDEtomidine Infusion 0.2 MICROgram(s)/kG/Hr (3 mL/Hr) IV Continuous <Continuous>  donepezil 5 milliGRAM(s) Oral at bedtime  enoxaparin Injectable 40 milliGRAM(s) SubCutaneous daily  famotidine Injectable 20 milliGRAM(s) IV Push every 12 hours  fentaNYL   Infusion. 0.5 MICROgram(s)/kG/Hr (3 mL/Hr) IV Continuous <Continuous>  ferrous    sulfate 325 milliGRAM(s) Oral daily  gabapentin 300 milliGRAM(s) Oral every 8 hours  ipratropium 17 MICROgram(s) HFA Inhaler 2 Puff(s) Inhalation every 6 hours  lactated ringers Bolus 1000 milliLiter(s) IV Bolus once  meperidine     Injectable 25 milliGRAM(s) IV Push once  metroNIDAZOLE  IVPB 500 milliGRAM(s) IV Intermittent every 8 hours  multivitamin 1 Tablet(s) Oral daily  norepinephrine Infusion 0.05 MICROgram(s)/kG/Min (5.63 mL/Hr) IV Continuous <Continuous>  polyethylene glycol 3350 17 Gram(s) Oral daily  propofol Infusion 10 MICROgram(s)/kG/Min (3.6 mL/Hr) IV Continuous <Continuous>  senna 2 Tablet(s) Oral at bedtime  sodium chloride 0.9%. 1000 milliLiter(s) (10 mL/Hr) IV Continuous <Continuous>  vancomycin  IVPB 1000 milliGRAM(s) IV Intermittent daily  vasopressin Infusion 0.04 Unit(s)/Min (2.4 mL/Hr) IV Continuous <Continuous>    MEDICATIONS  (PRN):  guaifenesin/dextromethorphan  Syrup 10 milliLiter(s) Oral every 4 hours PRN Cough/throat discomfort  ondansetron Injectable 4 milliGRAM(s) IV Push every 4 hours PRN Nausea and/or Vomiting    HEPARIN:  [] YES [] NO  Dose: XX UNITS/HR UNITS Q8H  LOVENOX:[] YES [] NO  Dose: XX mg Q24H  COUMADIN: []  YES [] NO  Dose: XX mg  Q24H  SCD's: YES b/l  GI Prophylaxis: Protonix [], Pepcid [], None [], (Contra-indication:.....)    Post-Op Beta-Blockers: Yes [], No[], If No, then contraindication:  Post-Op Aspirin: Yes [],  No [], If No, then contraindication:  Post-Op Statin: Yes [], No[], If No, then contraindication:  Allergies    clindamycin (Hives)    Intolerances      Ambulation/Activity Status:    Assessment/Plan:  85y Female status-post .....  - Case and plan discussed with CTU Intensivist and CT Surgeon - Dr. Oneal/Ron/Abiel  - Continue CTU supportive care    - Continue DVT/GI prophylaxis  - Incentive Spirometry 10 times an hour  - Continue to advance physical activity as tolerated and continue PT/OT as directed  1. CAD: Continue ASA, statin, BB  2. HTN:   3. A. Fib:   4. COPD/Hypoxia:   5. DM/Glucose Control:     Social Service Disposition:

## 2021-03-25 NOTE — PROGRESS NOTE ADULT - ASSESSMENT
Assessment:  85y Female patient admitted status-post R VATS for parapneumonic empyema    Patient seen and examined at bedside. NAD.     Plan:    -Chest tubes to suction  -Daily chest xray  -Monitor chest tube out put  -Monitor for airleaks  -Monitor 02 saturation  -Incentive spirometry  -encourage ambulation  -DVT/GI prophylaxis  -Pain management  -Follow up cultures            Date/Time: 03-25-21 @ 10:32

## 2021-03-26 NOTE — PROGRESS NOTE ADULT - ASSESSMENT
Assessment:  85y Female patient admitted status-post R VATS for parapneumonic empyema    Patient seen and examined at bedside. intubated    Plan:      -Daily bronchoscopies  -Monitor pressor requirements and vent settings  -Chest tubes to suction  -Daily chest xray  -Monitor chest tube out put  -Monitor for airleaks  -Monitor 02 saturation  -Incentive spirometry  -encourage ambulation  -DVT/GI prophylaxis  -Pain management  -Follow up cultures          Date/Time: 03-26-21 @ 09:01

## 2021-03-26 NOTE — PROGRESS NOTE ADULT - SUBJECTIVE AND OBJECTIVE BOX
Progress Note: General Surgery  Patient: DESTIN TERRY , 85y (1935)Female   MRN: [id number]  Location: [address]  Visit: 03-11-21 Inpatient  Date: 03-26-21 @ 09:01    Admit Diagnosis/Chief Complaint:     Procedure/Diagnosis:  S/P Bronchoscopy, at bedside      Events/ 24h: No acute events overnight. Pain controlled.    Vitals: T(F): 99.7 (03-26-21 @ 00:00), Max: 99.7 (03-26-21 @ 00:00)  HR: 67 (03-26-21 @ 08:00)  BP: 127/58 (03-26-21 @ 08:00) (94/54 - 176/74)  RR: 23 (03-26-21 @ 08:00)  SpO2: 99% (03-26-21 @ 08:00)  RR (machine): 18, TV (machine): 5020, FiO2: 60, PEEP: 5, PIP: 45  In:   03-25-21 @ 07:01  -  03-26-21 @ 07:00  --------------------------------------------------------  IN: 2678.7 mL    03-26-21 @ 07:01  -  03-26-21 @ 09:01  --------------------------------------------------------  IN: 88 mL      Out:   03-25-21 @ 07:01  -  03-26-21 @ 07:00  --------------------------------------------------------  OUT:    Chest Tube (mL): 60 mL    Chest Tube (mL): 10 mL    Chest Tube (mL): 54 mL    Indwelling Catheter - Urethral (mL): 1665 mL  Total OUT: 1789 mL      03-26-21 @ 07:01  -  03-26-21 @ 09:01  --------------------------------------------------------  OUT:    Chest Tube (mL): 6 mL    Chest Tube (mL): 30 mL    Chest Tube (mL): 10 mL    Indwelling Catheter - Urethral (mL): 150 mL  Total OUT: 196 mL        Net:   03-25-21 @ 07:01  -  03-26-21 @ 07:00  --------------------------------------------------------  NET: 889.7 mL    03-26-21 @ 07:01  -  03-26-21 @ 09:01  --------------------------------------------------------  NET: -108 mL        Diet: Diet, NPO:   Tube Feeding Modality: Orogastric  Vital High Protein  Total Volume for 24 Hours (mL): 960  Continuous  Starting Tube Feed Rate mL per Hour: 10  Increase Tube Feed Rate by (mL): 10     Every 2 hours  Until Goal Tube Feed Rate (mL per Hour): 40  Tube Feed Duration (in Hours): 24  Tube Feed Start Time: 08:45 (03-24-21 @ 08:35)    IV Fluids: ferrous    sulfate 325 milliGRAM(s) Oral daily  lactated ringers Bolus 1000 milliLiter(s) IV Bolus once  multivitamin 1 Tablet(s) Oral daily  sodium chloride 0.9%. 1000 milliLiter(s) (10 mL/Hr) IV Continuous <Continuous>      Physical Exam:  General: intubated on vent  Cardiac: S1/S2, RRR, no murmur, no rubs  Lungs: unlabored respirations, CTA b/l, no wheeze, no rales, no crackles  Abdomen: Soft/NT/ND; positive bowel sounds x 4  Incisions: Incisions clean/dry/intact  Extremities: No edema b/l lower extremities; good capillary refill; no cyanosis; palpable 1+ pedal pulses b/l        Medications: [Standing]  ALBUTerol    90 MICROgram(s) HFA Inhaler 2 Puff(s) Inhalation every 6 hours  cefepime   IVPB 2000 milliGRAM(s) IV Intermittent every 12 hours  chlorhexidine 0.12% Liquid 5 milliLiter(s) Oral Mucosa two times a day  chlorhexidine 4% Liquid 1 Application(s) Topical <User Schedule>  dexMEDEtomidine Infusion 0.2 MICROgram(s)/kG/Hr (3 mL/Hr) IV Continuous <Continuous>  donepezil 5 milliGRAM(s) Oral at bedtime  enoxaparin Injectable 40 milliGRAM(s) SubCutaneous daily  famotidine Injectable 20 milliGRAM(s) IV Push every 12 hours  fentaNYL   Infusion. 0.5 MICROgram(s)/kG/Hr (3 mL/Hr) IV Continuous <Continuous>  ferrous    sulfate 325 milliGRAM(s) Oral daily  gabapentin   Solution 100 milliGRAM(s) Oral three times a day  ipratropium 17 MICROgram(s) HFA Inhaler 2 Puff(s) Inhalation every 6 hours  lactated ringers Bolus 1000 milliLiter(s) IV Bolus once  meperidine     Injectable 25 milliGRAM(s) IV Push once  metroNIDAZOLE  IVPB 500 milliGRAM(s) IV Intermittent every 8 hours  multivitamin 1 Tablet(s) Oral daily  norepinephrine Infusion 0.05 MICROgram(s)/kG/Min (5.63 mL/Hr) IV Continuous <Continuous>  polyethylene glycol 3350 17 Gram(s) Oral daily  propofol Infusion 10 MICROgram(s)/kG/Min (3.6 mL/Hr) IV Continuous <Continuous>  senna 2 Tablet(s) Oral at bedtime  sodium chloride 0.9%. 1000 milliLiter(s) (10 mL/Hr) IV Continuous <Continuous>  vancomycin  IVPB 1000 milliGRAM(s) IV Intermittent daily  vasopressin Infusion 0.04 Unit(s)/Min (2.4 mL/Hr) IV Continuous <Continuous>    DVT Prophylaxis: enoxaparin Injectable 40 milliGRAM(s) SubCutaneous daily    GI Prophylaxis: famotidine Injectable 20 milliGRAM(s) IV Push every 12 hours    Antibiotics: cefepime   IVPB 2000 milliGRAM(s) IV Intermittent every 12 hours  metroNIDAZOLE  IVPB 500 milliGRAM(s) IV Intermittent every 8 hours  vancomycin  IVPB 1000 milliGRAM(s) IV Intermittent daily    Anticoagulation:   Medications:[PRN]  guaifenesin/dextromethorphan  Syrup 10 milliLiter(s) Oral every 4 hours PRN  ondansetron Injectable 4 milliGRAM(s) IV Push every 4 hours PRN      Labs:                        8.1    19.88 )-----------( 377      ( 26 Mar 2021 04:10 )             25.4     03-26    139  |  106  |  12  ----------------------------<  159<H>  4.0   |  26  |  0.5<L>    Ca    8.2<L>      26 Mar 2021 04:10  Mg     2.0     03-26          ABG - ( 26 Mar 2021 03:30 )  pH: 7.37  /  pCO2: 48    /  pO2: 78    / HCO3: 28    / Base Excess: 2.4   /  SaO2: 96                      Urine/Micro:        Imaging:   ***    < from: Xray Chest 1 View- PORTABLE-Routine (03.25.21 @ 05:49) >      Impression:    Increasing diffuse airspace opacity left lung.    Stable less extensive opacities right lung.    Small right basilar pneumothorax, approximate 14 mm air gap.    < end of copied text >

## 2021-03-27 NOTE — PROGRESS NOTE ADULT - SUBJECTIVE AND OBJECTIVE BOX
CTU Attending Progress Daily Note     27 Mar 2021 07:56    Procedure:                                                  POD#                   Patient seen as post-op critical care follow-up    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, osteoarithtis, [place]ian-speaking presenting with cough and fever.    As per daughter, patient has been having dry consistent cough since 2 months. Today, she was being evaluated for knee injections when she was found to be febrile. She also reports progressing generalized weakness with decreased appetite and PO intake.  No URT symptoms, no chest pain, no leg pain, no diarrhea, no urinary symptoms no sick contacts, no recent travel. In ED was hypotensive, was given IVF, started on levophed 0.04 called to evaluate (11 Mar 2021 03:21)    See preop testing chart H&P    Interval event for past 24 hr:  DESTIN TERRY  85y had no event.     Current Complains:  DESTIN TERRY has no new complaints    REVIEW OF SYSTEMS:  CONSTITUTIONAL:  [-] weakness, [-] fevers, [-] chills  EYES/ENT: [-] visual changes, [-] vertigo, [-] throat pain   NECK: [-] pain, [-] stiffness  RESPIRATORY: [-] cough, [-] wheezing, [-] hemoptysis, [-] shortness of breath  CARDIOVASCULAR: [-] chest pain, [-] palpitations, [-] orthopnea  GASTROINTESTINAL:    [-]abdominal pain, [-] nausea, [-] vomiting, [-] hematemesis, [-] diarrhea, [-] constipation, [-] melena, [-] hematochezia.  GENITOURINARY: [-] dysuria, [-] frequency, [-] hematuria  NEUROLOGICAL: [-] numbness, [-] weakness  SKIN: [-] itching, [-] burning, [-] rashes, [-] lesions   All other review of systems is negative unless indicated above.    [  ] Unable to assess ROS because :    OBJECTIVE:  ICU Vital Signs Last 24 Hrs  T(C): 36.4 (27 Mar 2021 05:00), Max: 37.3 (26 Mar 2021 08:00)  T(F): 97.6 (27 Mar 2021 05:00), Max: 99.1 (26 Mar 2021 08:00)  HR: 53 (27 Mar 2021 06:00) (47 - 154)  BP: 113/56 (27 Mar 2021 03:00) (69/42 - 165/104)  BP(mean): 80 (27 Mar 2021 03:00) (51 - 125)  ABP: 92/37 (27 Mar 2021 06:00) (72/57 - 152/90)  ABP(mean): 54 (27 Mar 2021 06:00) (54 - 117)  RR: 18 (27 Mar 2021 06:00) (18 - 31)  SpO2: 100% (27 Mar 2021 06:00) (90% - 100%)      I&O's Summary    26 Mar 2021 07:01  -  27 Mar 2021 07:00  --------------------------------------------------------  IN: 1635.5 mL / OUT: 1375 mL / NET: 260.5 mL      Adult Advanced Hemodynamics Last 24 Hrs  CVP(mm Hg): --  CVP(cm H2O): --  CO: --  CI: --  PA: --  PA(mean): --  PCWP: --  SVR: --  SVRI: --  PVR: --  PVRI: --  Mode: AC/ CMV (Assist Control/ Continuous Mandatory Ventilation)  RR (machine): 18  TV (machine): 500  FiO2: 50  PEEP: 5  ITime: 1  MAP: 14  PIP: 40      PHYSICAL EXAM:  General: WN/WD NAD    HEENT:     [+] NCAT  [+] EOMI  [-] Conjuctival edema   [-] Icterus   [-] Thrush   [-] ETT  [-] NGT/OGT    Neck:         [+] FROM   [-] JVD     [-] Nodes     [-] Masses    [+] Mid-line trachea    [-] Tracheostomy    Chest:         [-] Sternal click   [-] Sternal drainage   [+] Pacing wires   [+] Chest tubes   [-] SubQ emphysema    Lungs:          [+] CTA   [-] Rhonchi   [-] Rales    [-] Wheezing    [-] Decreased BS    [-] Dullness R L    Cardiac:       [+] S1 [+] S2    [+] RRR   [-] Irregular   [-] S3   [-] S4    [-] Murmurs    [-] Rub    Abdomen:    [+] BS    [+] Soft    [+] Non-tender     [-] Distended    [-] Organomegaly  [-] PEG    Extremities:   [-] Cyanosis U/L   [-] Clubbing  U/L  [-] LE/UE Edema   [+] Capillary refill    [+] Pulses     Neuro:        [+] Awake   [+]  Alert   [-] Confused   [-] Lethargic   [-] Sedated   [-] Generalized Weakness    Skin:        [-] Rashes    [-] Erythema   [+] Normal incisions   [+] IV sites intact   [-] Sacral decubitus    Tubes:  LINES:    CAPILLARY BLOOD GLUCOSE      POCT Blood Glucose.: 152 mg/dL (27 Mar 2021 06:25)    CAPILLARY BLOOD GLUCOSE      POCT Blood Glucose.: 152 mg/dL (27 Mar 2021 06:25)  POCT Blood Glucose.: 160 mg/dL (26 Mar 2021 23:35)  POCT Blood Glucose.: 171 mg/dL (26 Mar 2021 16:51)  POCT Blood Glucose.: 190 mg/dL (26 Mar 2021 13:18)      HOSPITAL MEDICATIONS:  MEDICATIONS  (STANDING):  ALBUTerol    90 MICROgram(s) HFA Inhaler 2 Puff(s) Inhalation every 6 hours  aMIOdarone Infusion 1 mG/Min (33.3 mL/Hr) IV Continuous <Continuous>  cefepime   IVPB 2000 milliGRAM(s) IV Intermittent every 12 hours  chlorhexidine 0.12% Liquid 5 milliLiter(s) Oral Mucosa two times a day  chlorhexidine 4% Liquid 1 Application(s) Topical <User Schedule>  dexMEDEtomidine Infusion 0.2 MICROgram(s)/kG/Hr (3 mL/Hr) IV Continuous <Continuous>  dextrose 40% Gel 15 Gram(s) Oral once  dextrose 5%. 1000 milliLiter(s) (100 mL/Hr) IV Continuous <Continuous>  dextrose 5%. 1000 milliLiter(s) (50 mL/Hr) IV Continuous <Continuous>  dextrose 50% Injectable 25 Gram(s) IV Push once  dextrose 50% Injectable 12.5 Gram(s) IV Push once  dextrose 50% Injectable 25 Gram(s) IV Push once  donepezil 5 milliGRAM(s) Oral at bedtime  enoxaparin Injectable 40 milliGRAM(s) SubCutaneous daily  famotidine Injectable 20 milliGRAM(s) IV Push every 12 hours  fentaNYL   Infusion. 0.5 MICROgram(s)/kG/Hr (3 mL/Hr) IV Continuous <Continuous>  ferrous    sulfate 325 milliGRAM(s) Oral daily  gabapentin   Solution 100 milliGRAM(s) Oral three times a day  glucagon  Injectable 1 milliGRAM(s) IntraMuscular once  insulin lispro (ADMELOG) corrective regimen sliding scale   SubCutaneous three times a day before meals  ipratropium 17 MICROgram(s) HFA Inhaler 2 Puff(s) Inhalation every 6 hours  lactated ringers Bolus 1000 milliLiter(s) IV Bolus once  meperidine     Injectable 25 milliGRAM(s) IV Push once  metroNIDAZOLE  IVPB 500 milliGRAM(s) IV Intermittent every 8 hours  multivitamin 1 Tablet(s) Oral daily  norepinephrine Infusion 0.05 MICROgram(s)/kG/Min (5.63 mL/Hr) IV Continuous <Continuous>  phenylephrine    Infusion 0.1 MICROgram(s)/kG/Min (2.25 mL/Hr) IV Continuous <Continuous>  polyethylene glycol 3350 17 Gram(s) Oral daily  propofol Infusion 10 MICROgram(s)/kG/Min (3.6 mL/Hr) IV Continuous <Continuous>  senna 2 Tablet(s) Oral at bedtime  sodium chloride 0.9%. 1000 milliLiter(s) (10 mL/Hr) IV Continuous <Continuous>  vasopressin Infusion 0.04 Unit(s)/Min (2.4 mL/Hr) IV Continuous <Continuous>    MEDICATIONS  (PRN):  guaifenesin/dextromethorphan  Syrup 10 milliLiter(s) Oral every 4 hours PRN Cough/throat discomfort  ondansetron Injectable 4 milliGRAM(s) IV Push every 4 hours PRN Nausea and/or Vomiting      LABS:  ABG - ( 27 Mar 2021 04:19 )  pH, Arterial: 7.40  pH, Blood: x     /  pCO2: 49    /  pO2: 59    / HCO3: 30    / Base Excess: 4.8   /  SaO2: 92                                      7.7    16.49 )-----------( 347      ( 27 Mar 2021 01:36 )             25.0     03-27    140  |  104  |  16  ----------------------------<  157<H>  3.6   |  27  |  0.5<L>    Ca    8.3<L>      27 Mar 2021 01:36  Mg     2.1     03-27              RADIOLOGY:  Reviewed and interpreted by me  CXR from 03-27-21 shows [+] mild congestion, [-] pneumothorax, [-] R/L effusion, [-] cardiomegaly,   NGT in place, S-G Catheter in place, R/L TLC in place, R/L Chest Tubes in place    ECG:  Reviewed and interpreted by me:   QTC:    Assessment:      PAST MEDICAL & SURGICAL HISTORY:  Osteoarthritis    Dementia    No significant past surgical history        PLAN:  Neuro: Pain control  Pulm: Encourage coughing, deep breathing and use of incentive spirometry. Wean off supplemental oxygen as able. Daily CXR.   Cardio: Monitor telemetry/alarms. Continue cardiac meds  GI: Tolerating diet. Continue stool softeners. Continue GI prophylaxis  Renal: monitor urine output, supplement electrolytes as needed  Vasc: Heparin SC/SCDs for DVT prophylaxis  Heme: Monitor H/H.   ID: Off antibiotics. Stable.  Endocrine: Monitor finger stick blood sugar and control hyperglycemia with insulin  Physical Therapy: OOB/ambulate  Tubes: Monitor Chest tube output      Discussed with Cardiothoracic Team at AM rounds.    45 minutes of critical care time spent providing medical care for patient's acute illness/conditions that impairs at least one vital organ system and/or poses a high risk of imminent or life threatening deterioration in the patient's condition. It includes time spent evaluating and treating the patient's acute illness as well as time spent reviewing labs, radiology, discussing goals of care with patient and/or patient's family, and discussing the case with a multidisciplinary team in an effort to prevent further life threatening deterioration or end organ damage. This time is independent of any procedures performed. 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    As per daughter, patient has been having dry consistent cough since 2 months. Today, she was being evaluated for knee injections when she was found to be febrile. She also reports progressing generalized weakness with decreased appetite and PO intake.  No URT symptoms, no chest pain, no leg pain, no diarrhea, no urinary symptoms no sick contacts, no recent travel. In ED was hypotensive, was given IVF, started on levophed 0.04 called to evaluate (11 Mar 2021 03:21)    See preop testing chart H&P    Interval event for past 24 hr:  DESTIN TERRY  85y remains on vent    Current Complains:  DESTIN TERRY has no new complaints    REVIEW OF SYSTEMS:  [ x ] Unable to assess ROS because : sedated    OBJECTIVE:  ICU Vital Signs Last 24 Hrs  T(C): 36.4 (27 Mar 2021 05:00), Max: 37.3 (26 Mar 2021 08:00)  T(F): 97.6 (27 Mar 2021 05:00), Max: 99.1 (26 Mar 2021 08:00)  HR: 53 (27 Mar 2021 06:00) (47 - 154)  BP: 113/56 (27 Mar 2021 03:00) (69/42 - 165/104)  BP(mean): 80 (27 Mar 2021 03:00) (51 - 125)  ABP: 92/37 (27 Mar 2021 06:00) (72/57 - 152/90)  ABP(mean): 54 (27 Mar 2021 06:00) (54 - 117)  RR: 18 (27 Mar 2021 06:00) (18 - 31)  SpO2: 100% (27 Mar 2021 06:00) (90% - 100%)      I&O's Summary    26 Mar 2021 07:01  -  27 Mar 2021 07:00  --------------------------------------------------------  IN: 1635.5 mL / OUT: 1375 mL / NET: 260.5 mL      Adult Advanced Hemodynamics Last 24 Hrs  CVP(mm Hg): --  CVP(cm H2O): --  CO: --  CI: --  PA: --  PA(mean): --  PCWP: --  SVR: --  SVRI: --  PVR: --  PVRI: --  Mode: AC/ CMV (Assist Control/ Continuous Mandatory Ventilation)  RR (machine): 18  TV (machine): 500  FiO2: 50  PEEP: 5  ITime: 1  MAP: 14  PIP: 40      PHYSICAL EXAM:  General: WN/WD NAD    HEENT:     [+] NCAT  [+] EOMI  [-] Conjuctival edema   [-] Icterus   [-] Thrush   [-] ETT  [-] NGT/OGT    Neck:         [+] FROM   [-] JVD     [-] Nodes     [-] Masses    [+] Mid-line trachea    [-] Tracheostomy    Chest:          [+] Chest tubes   [-] SubQ emphysema    Lungs:          [+] CTA   [-] Rhonchi   [-] Rales    [-] Wheezing    [-] Decreased BS    [-] Dullness R L    Cardiac:       [+] S1 [+] S2    [+] RRR   [-] Irregular   [-] S3   [-] S4    [-] Murmurs    [-] Rub    Abdomen:    [+] BS    [+] Soft    [+] Non-tender     [-] Distended    [-] Organomegaly  [-] PEG    Extremities:   [-] Cyanosis U/L   [-] Clubbing  U/L  [-] LE/UE Edema   [+] Capillary refill    [+] Pulses     Neuro:          [+] Sedated     Skin:        [-] Rashes    [-] Erythema   [+] Normal incisions   [+] IV sites intact   [-] Sacral decubitus    Tubes:  LINES:    CAPILLARY BLOOD GLUCOSE      POCT Blood Glucose.: 152 mg/dL (27 Mar 2021 06:25)    CAPILLARY BLOOD GLUCOSE      POCT Blood Glucose.: 152 mg/dL (27 Mar 2021 06:25)  POCT Blood Glucose.: 160 mg/dL (26 Mar 2021 23:35)  POCT Blood Glucose.: 171 mg/dL (26 Mar 2021 16:51)  POCT Blood Glucose.: 190 mg/dL (26 Mar 2021 13:18)      HOSPITAL MEDICATIONS:  MEDICATIONS  (STANDING):  ALBUTerol    90 MICROgram(s) HFA Inhaler 2 Puff(s) Inhalation every 6 hours  aMIOdarone Infusion 1 mG/Min (33.3 mL/Hr) IV Continuous <Continuous>  cefepime   IVPB 2000 milliGRAM(s) IV Intermittent every 12 hours  chlorhexidine 0.12% Liquid 5 milliLiter(s) Oral Mucosa two times a day  chlorhexidine 4% Liquid 1 Application(s) Topical <User Schedule>  dexMEDEtomidine Infusion 0.2 MICROgram(s)/kG/Hr (3 mL/Hr) IV Continuous <Continuous>  dextrose 40% Gel 15 Gram(s) Oral once  dextrose 5%. 1000 milliLiter(s) (100 mL/Hr) IV Continuous <Continuous>  dextrose 5%. 1000 milliLiter(s) (50 mL/Hr) IV Continuous <Continuous>  dextrose 50% Injectable 25 Gram(s) IV Push once  dextrose 50% Injectable 12.5 Gram(s) IV Push once  dextrose 50% Injectable 25 Gram(s) IV Push once  donepezil 5 milliGRAM(s) Oral at bedtime  enoxaparin Injectable 40 milliGRAM(s) SubCutaneous daily  famotidine Injectable 20 milliGRAM(s) IV Push every 12 hours  fentaNYL   Infusion. 0.5 MICROgram(s)/kG/Hr (3 mL/Hr) IV Continuous <Continuous>  ferrous    sulfate 325 milliGRAM(s) Oral daily  gabapentin   Solution 100 milliGRAM(s) Oral three times a day  glucagon  Injectable 1 milliGRAM(s) IntraMuscular once  insulin lispro (ADMELOG) corrective regimen sliding scale   SubCutaneous three times a day before meals  ipratropium 17 MICROgram(s) HFA Inhaler 2 Puff(s) Inhalation every 6 hours  lactated ringers Bolus 1000 milliLiter(s) IV Bolus once  meperidine     Injectable 25 milliGRAM(s) IV Push once  metroNIDAZOLE  IVPB 500 milliGRAM(s) IV Intermittent every 8 hours  multivitamin 1 Tablet(s) Oral daily  norepinephrine Infusion 0.05 MICROgram(s)/kG/Min (5.63 mL/Hr) IV Continuous <Continuous>  phenylephrine    Infusion 0.1 MICROgram(s)/kG/Min (2.25 mL/Hr) IV Continuous <Continuous>  polyethylene glycol 3350 17 Gram(s) Oral daily  propofol Infusion 10 MICROgram(s)/kG/Min (3.6 mL/Hr) IV Continuous <Continuous>  senna 2 Tablet(s) Oral at bedtime  sodium chloride 0.9%. 1000 milliLiter(s) (10 mL/Hr) IV Continuous <Continuous>  vasopressin Infusion 0.04 Unit(s)/Min (2.4 mL/Hr) IV Continuous <Continuous>    MEDICATIONS  (PRN):  guaifenesin/dextromethorphan  Syrup 10 milliLiter(s) Oral every 4 hours PRN Cough/throat discomfort  ondansetron Injectable 4 milliGRAM(s) IV Push every 4 hours PRN Nausea and/or Vomiting      LABS:  ABG - ( 27 Mar 2021 04:19 )  pH, Arterial: 7.40  pH, Blood: x     /  pCO2: 49    /  pO2: 59    / HCO3: 30    / Base Excess: 4.8   /  SaO2: 92                                      7.7    16.49 )-----------( 347      ( 27 Mar 2021 01:36 )             25.0     03-27    140  |  104  |  16  ----------------------------<  157<H>  3.6   |  27  |  0.5<L>    Ca    8.3<L>      27 Mar 2021 01:36  Mg     2.1     03-27              RADIOLOGY:  Reviewed and interpreted by me  CXR from 03-27-21 shows [+] congestion, NGT in place,  Chest Tubes in place      < from: Xray Chest 1 View- PORTABLE-Routine (Xray Chest 1 View- PORTABLE-Routine in AM.) (03.26.21 @ 06:01) >    EXAM:  XR CHEST PORTABLE ROUTINE 1V            PROCEDURE DATE:  03/26/2021            INTERPRETATION:  Clinical History / Reason for exam: Line placement.    Comparison : Chest radiograph March 25, 2021.    Technique/Positioning: Single frontal chest x-ray obtained.    Findings:    Support devices: Stable endotracheal tube, enteric tube, right chest tubes    Cardiac/mediastinum/hilum: Unchanged.    Lung parenchyma/Pleura: Stable to decreased small right basilar pneumothorax. Unchanged bilateralparenchymal airspace opacities.    Skeleton/soft tissues: Unchanged.    Impression:    Stable to decreased small right basilar pneumothorax.  Unchanged bilateral parenchymal airspace opacities.  Stable support devices.              TOM METCALF MD; Attending Radiologist  This document has been electronically signed. Mar 26 2021  9:05AM    < end of copied text >        ECG:  Reviewed and interpreted by me:   QTC:    Assessment:  empyema SP VATS  acute hypoxemic respiratory failure  anemia      PAST MEDICAL & SURGICAL HISTORY:  Osteoarthritis    Dementia    No significant past surgical history        PLAN:  Neuro: Pain control, SAT  Pulm: SBT, Daily CXR.   Cardio: Monitor telemetry/alarms. Continue cardiac meds  GI: Tolerating diet. Continue stool softeners. Continue GI prophylaxis  Renal: monitor urine output, supplement electrolytes as needed  Vasc: Heparin SC/SCDs for DVT prophylaxis  Heme: Monitor H/H.   ID: IV ABX FU CX  Endocrine: Monitor finger stick blood sugar and control hyperglycemia with insulin  Tubes: Monitor Chest tube output      Discussed with Cardiothoracic Team at AM rounds.    45 minutes of critical care time spent providing medical care for patient's acute illness/conditions that impairs at least one vital organ system and/or poses a high risk of imminent or life threatening deterioration in the patient's condition. It includes time spent evaluating and treating the patient's acute illness as well as time spent reviewing labs, radiology, discussing goals of care with patient and/or patient's family, and discussing the case with a multidisciplinary team in an effort to prevent further life threatening deterioration or end organ damage. This time is independent of any procedures performed.

## 2021-03-27 NOTE — PROGRESS NOTE ADULT - ASSESSMENT
84 y/o F POD#4 s/p Right VATS for empyema. Vitally stable in bed, mechanically ventilated, on pressors.     Plan:   CTU management   Pain control   monitor CT outputs  monitor pressor & vent requirements   daily CXRs

## 2021-03-27 NOTE — PROGRESS NOTE ADULT - SUBJECTIVE AND OBJECTIVE BOX
THORACIC SURGERY PROGRESS NOTE     DESTIN TERRY  85y  Female  Hospital day :16d  POD: 5  Procedure: Bronchoscopy, at bedside; s/p Right sided VATS for evacuation of empyema      OVERNIGHT EVENTS:    T(F): 98.1 (03-26-21 @ 20:00), Max: 99.1 (03-26-21 @ 08:00)  HR: 48 (03-27-21 @ 01:00) (48 - 154)  BP: 105/56 (03-27-21 @ 01:00) (69/42 - 165/104)  ABP: 109/42 (03-27-21 @ 01:00) (72/57 - 152/90)  ABP(mean): 64 (03-27-21 @ 01:00) (63 - 117)  RR: 18 (03-27-21 @ 01:00) (18 - 29)  SpO2: 100% (03-27-21 @ 01:00) (92% - 100%)    DIET/FLUIDS: dextrose 5%. 1000 milliLiter(s) IV Continuous <Continuous>  dextrose 5%. 1000 milliLiter(s) IV Continuous <Continuous>  ferrous    sulfate 325 milliGRAM(s) Oral daily  lactated ringers Bolus 1000 milliLiter(s) IV Bolus once  multivitamin 1 Tablet(s) Oral daily  sodium chloride 0.9%. 1000 milliLiter(s) IV Continuous <Continuous>                                                                                 DRAINS: right sided CT x 3 to suction with +air leaks      URINE:   03-25-21 @ 07:01  -  03-26-21 @ 07:00  --------------------------------------------------------  OUT: 1665 mL     Indwelling Urethral Catheter:     Connect To:  Straight Drainage/Gravity    Indication:  Urine Output Monitoring in Critically Ill    Additional Instructions:  For 48 hours, To gravity bag urimeter OR digital urimeter (03-26-21 @ 09:44)    GI proph:  famotidine Injectable 20 milliGRAM(s) IV Push every 12 hours    AC/ proph: enoxaparin Injectable 40 milliGRAM(s) SubCutaneous daily    ABx: cefepime   IVPB 2000 milliGRAM(s) IV Intermittent every 12 hours  metroNIDAZOLE  IVPB 500 milliGRAM(s) IV Intermittent every 8 hours      PHYSICAL EXAM:  GENERAL: NAD, sedated, mechanically ventilated, on pressors   CHEST/LUNG: symmetric chest rise, 3 CTs in place on right chest all to suction (#1- 350 cc, #2- 650cc, #3- 200cc serosanguinous)  HEART: Regular rate and rhythm  ABDOMEN: Soft, Nontender, Nondistended;   EXTREMITIES:  No clubbing, cyanosis, or edema      LABS  Labs:  CAPILLARY BLOOD GLUCOSE      POCT Blood Glucose.: 160 mg/dL (26 Mar 2021 23:35)  POCT Blood Glucose.: 171 mg/dL (26 Mar 2021 16:51)  POCT Blood Glucose.: 190 mg/dL (26 Mar 2021 13:18)  POCT Blood Glucose.: 190 mg/dL (26 Mar 2021 06:38)                          8.1    19.88 )-----------( 377      ( 26 Mar 2021 04:10 )             25.4       Auto Neutrophil %: 79.2 % (03-26-21 @ 04:10)  Auto Immature Granulocyte %: 1.4 % (03-26-21 @ 04:10)    03-26    139  |  106  |  12  ----------------------------<  159<H>  4.0   |  26  |  0.5<L>      Calcium, Total Serum: 8.2 mg/dL (03-26-21 @ 04:10)      LFTs:     Blood Gas Arterial, Lactate: 1.3 mmoL/L (03-26-21 @ 03:30)  Blood Gas Arterial, Lactate: 1.4 mmoL/L (03-25-21 @ 03:56)  Blood Gas Arterial, Lactate: 1.5 mmoL/L (03-24-21 @ 04:33)    ABG - ( 26 Mar 2021 03:30 )  pH: 7.37  /  pCO2: 48    /  pO2: 78    / HCO3: 28    / Base Excess: 2.4   /  SaO2: 96          ABG - ( 25 Mar 2021 03:56 )  pH: 7.45  /  pCO2: 36    /  pO2: 86    / HCO3: 25    / Base Excess: 1.1   /  SaO2: 98          ABG - ( 24 Mar 2021 04:33 )  pH: 7.48  /  pCO2: 33    /  pO2: 71    / HCO3: 25    / Base Excess: 1.2   /  SaO2: 96            RADIOLOGY & ADDITIONAL TESTS:  < from: Xray Chest 1 View- PORTABLE-Routine (Xray Chest 1 View- PORTABLE-Routine in AM.) (03.26.21 @ 06:01) >  Stable to decreased small right basilar pneumothorax.  Unchanged bilateral parenchymal airspace opacities.  Stable support devices.    < end of copied text >

## 2021-03-28 NOTE — PROGRESS NOTE ADULT - ASSESSMENT
Assessment:  85y Female patient admitted POD 6 s/p R VATS for empyema, x3 chest tubes left in place, remains intubated post op, vent settings stable, cxr worsening , with the above physical exam, labs, and imaging findings.    Plan:  - f/u am repeat cxr  -may need repeat bronch  -monitor chest tube outputs  -Pain control as needed  -Hemodynamic monitoring as per routine  -Encourage ambulation and incentive spirometer use (10x/hr when awake)  -GI and DVT prophylaxis  -Check and replete CBC and BMP q daily  -Strict input and output monitoring  -Continue current management    Date/Time: 03-28-21 @ 03:09

## 2021-03-28 NOTE — PROGRESS NOTE ADULT - SUBJECTIVE AND OBJECTIVE BOX
Progress Note: Surgery  Patient: DESTIN TERRY , 85y (1935)Female   MRN: [id number]  Location: [address]  Visit: 03-11-21 Inpatient  Date: 03-28-21 @ 03:09    Events over 24h: No acute event overnight. No new complaint. Pt is hemodynamically stable.     Vitals: T(F): 98.4 (03-28-21 @ 01:00), Max: 98.5 (03-27-21 @ 21:00)  HR: 82 (03-28-21 @ 02:30)  BP: 152/77 (03-28-21 @ 02:30) (80/41 - 152/77)  RR: 26 (03-28-21 @ 02:30)  SpO2: 99% (03-28-21 @ 02:30)  RR (machine): 18, TV (machine): 500, FiO2: 60, PEEP: 5, PIP: 49    Diet: Diet, NPO:   Tube Feeding Modality: Orogastric  Vital High Protein  Total Volume for 24 Hours (mL): 960  Continuous  Starting Tube Feed Rate mL per Hour: 10  Increase Tube Feed Rate by (mL): 10     Every 2 hours  Until Goal Tube Feed Rate (mL per Hour): 40  Tube Feed Duration (in Hours): 24  Tube Feed Start Time: 08:45 (03-24-21 @ 08:35)    IV Fluid: dextrose 5%. 1000 milliLiter(s) (50 mL/Hr) IV Continuous <Continuous>  dextrose 5%. 1000 milliLiter(s) (100 mL/Hr) IV Continuous <Continuous>  ferrous    sulfate 325 milliGRAM(s) Oral daily  lactated ringers Bolus 1000 milliLiter(s) IV Bolus once  multivitamin 1 Tablet(s) Oral daily  sodium chloride 0.9%. 1000 milliLiter(s) (10 mL/Hr) IV Continuous <Continuous>      In:   03-26-21 @ 07:01  -  03-27-21 @ 07:00  --------------------------------------------------------  IN: 1635.5 mL    03-27-21 @ 07:01 - 03-28-21 @ 03:09  --------------------------------------------------------  IN: 2423.5 mL      Out:   03-26-21 @ 07:01  -  03-27-21 @ 07:00  --------------------------------------------------------  OUT:    Chest Tube (mL): 20 mL    Chest Tube (mL): 100 mL    Chest Tube (mL): 70 mL    Enteral Tube Flush: 0 mL    Indwelling Catheter - Urethral (mL): 1185 mL  Total OUT: 1375 mL      03-27-21 @ 07:01  -  03-28-21 @ 03:09  --------------------------------------------------------  OUT:    Chest Tube (mL): 20 mL    Chest Tube (mL): 60 mL    Chest Tube (mL): 50 mL    Indwelling Catheter - Urethral (mL): 915 mL  Total OUT: 1045 mL        Net:   03-26-21 @ 07:01  -  03-27-21 @ 07:00  --------------------------------------------------------  NET: 260.5 mL    03-27-21 @ 07:01  -  03-28-21 @ 03:09  --------------------------------------------------------  NET: 1378.5 mL        Physical Examination:  General Appearance: NAD, alert and cooperative  Heart: S1 and S2. No murmurs. Rhythm is regular.  Lungs: Clear to auscultation BL without rales, rhonchi, wheezing, crackles or diminished breath sounds.  Abdomen: Soft, nondistended, nontender.No rigidity, guarding, or rebound tenderness.      Medications: [Standing]  ALBUTerol    90 MICROgram(s) HFA Inhaler 2 Puff(s) Inhalation every 6 hours  aMIOdarone Infusion 1 mG/Min (33.3 mL/Hr) IV Continuous <Continuous>  cefepime   IVPB 2000 milliGRAM(s) IV Intermittent every 12 hours  chlorhexidine 0.12% Liquid 5 milliLiter(s) Oral Mucosa two times a day  chlorhexidine 4% Liquid 1 Application(s) Topical <User Schedule>  dexMEDEtomidine Infusion 0.2 MICROgram(s)/kG/Hr (3 mL/Hr) IV Continuous <Continuous>  dextrose 40% Gel 15 Gram(s) Oral once  dextrose 5%. 1000 milliLiter(s) (50 mL/Hr) IV Continuous <Continuous>  dextrose 5%. 1000 milliLiter(s) (100 mL/Hr) IV Continuous <Continuous>  dextrose 50% Injectable 25 Gram(s) IV Push once  dextrose 50% Injectable 12.5 Gram(s) IV Push once  dextrose 50% Injectable 25 Gram(s) IV Push once  donepezil 5 milliGRAM(s) Oral at bedtime  enoxaparin Injectable 40 milliGRAM(s) SubCutaneous daily  famotidine Injectable 20 milliGRAM(s) IV Push every 12 hours  fentaNYL   Infusion. 0.5 MICROgram(s)/kG/Hr (3 mL/Hr) IV Continuous <Continuous>  ferrous    sulfate 325 milliGRAM(s) Oral daily  gabapentin   Solution 100 milliGRAM(s) Oral three times a day  glucagon  Injectable 1 milliGRAM(s) IntraMuscular once  insulin lispro (ADMELOG) corrective regimen sliding scale   SubCutaneous three times a day before meals  ipratropium 17 MICROgram(s) HFA Inhaler 2 Puff(s) Inhalation every 6 hours  lactated ringers Bolus 1000 milliLiter(s) IV Bolus once  meperidine     Injectable 25 milliGRAM(s) IV Push once  metroNIDAZOLE  IVPB 500 milliGRAM(s) IV Intermittent every 8 hours  multivitamin 1 Tablet(s) Oral daily  norepinephrine Infusion 0.05 MICROgram(s)/kG/Min (5.63 mL/Hr) IV Continuous <Continuous>  phenylephrine    Infusion 0.1 MICROgram(s)/kG/Min (2.25 mL/Hr) IV Continuous <Continuous>  polyethylene glycol 3350 17 Gram(s) Oral daily  propofol Infusion 10 MICROgram(s)/kG/Min (3.6 mL/Hr) IV Continuous <Continuous>  senna 2 Tablet(s) Oral at bedtime  sodium chloride 0.9%. 1000 milliLiter(s) (10 mL/Hr) IV Continuous <Continuous>  vasopressin Infusion 0.04 Unit(s)/Min (2.4 mL/Hr) IV Continuous <Continuous>    Medications:[PRN]  guaifenesin/dextromethorphan  Syrup 10 milliLiter(s) Oral every 4 hours PRN  ondansetron Injectable 4 milliGRAM(s) IV Push every 4 hours PRN    Labs:                        10.8   18.08 )-----------( 323      ( 28 Mar 2021 01:40 )             33.8   03-28    139  |  104  |  19  ----------------------------<  176<H>  3.8   |  28  |  <0.5<L>    Ca    8.2<L>      28 Mar 2021 01:40  Mg     1.8     03-28    ABG - ( 27 Mar 2021 04:19 )  pH: 7.40  /  pCO2: 49    /  pO2: 59    / HCO3: 30    / Base Excess: 4.8   /  SaO2: 92        Micro/Urine:    Imaging:  None/24h

## 2021-03-29 NOTE — PROGRESS NOTE ADULT - ASSESSMENT
ASSESSMENT  85 year old lady known to have dementia, osteoarithtis, Irish-speaking presenting with cough and fever.      IMPRESSION  #Sepsis present on admission - secondary to CAP  -  CT Chest No Cont (03.11.21 @ 00:54): Areas of right lung consolidation which can be seen in pneumonia. Numerous air-fluid levels throughout the right lung compatible with an infectious process. Suggestion of split pleura at the lung base for which empyema is favored although inherently limited on this noncontrast exam. Consideration can be given to contrast administration if feasiblefor better delineation. Areas of bilateral interlobular septal thickening and mild layering groundglass attenuation may reflect a component of edema.  - Urine Legionella negative  - Urine Strep negative  - BLood Cx 3/10 and 3/13 negative  - Procalcitonin 1.15   - CT Chest No Cont (03.17.21 @ 16:19): Since 3/11/2021. Enlarging loculated right pleural fluid collection with multiple air bubbles consistent with empyema.   Associated compressive atelectasis right lung is seen. Scattered groundglass opacities involving multiple lumbar segments.  - s/p VATS 3/22 -- right empyema with 800 cc purulent fluid in pleural space, multiple pockers with dense adhesions -- necrotizing pneumonia of the middle lobe    #Dementia  #Osteoarthritis  #Obesity BMI (kg/m2): 23.4  #Abx allergy: clindamycin (Hives)    Creatinine, Serum: 0.8 mg/dL (03.15.21 @ 05:56)  Weight (kg): 60 (11 Mar 2021 01:17)  CrCl 50      RECOMMENDATIONS  - OR cultures remain no growth -- can narrow cefepime to ceftriaxone 2g daily   - continue flagyl 500 mg TID  - will follow CT Chest    Please call or message on [employer] Teams if with any questions.  Spectra [phone]

## 2021-03-29 NOTE — PROGRESS NOTE ADULT - ASSESSMENT
Assessment:  85y Female patient admitted POD 7 s/p R VATS evacuation of empyema, x3 chest tubes in place post operatively remains intubated due to tachypnea and desaturation while SBT , with the above physical exam, labs, and imaging findings.    Plan:  - reattempt SBT today  - Trach today if fails SBT  - c/w IV Abx  - monitor chest tube output and drainage  -Pain control as needed  -Hemodynamic monitoring as per routine  -Encourage ambulation and incentive spirometer use (10x/hr when awake)  -GI and DVT prophylaxis  -Check and replete CBC and BMP q daily  -Strict input and output monitoring  -Continue current management    Date/Time: 03-29-21 @ 09:04

## 2021-03-29 NOTE — CONSULT NOTE ADULT - ASSESSMENT
ASSESSMENT  85y Female patient s/p R VATS evacuation of empyema, x3 chest tubes in place post operatively remains intubated due to tachypnea and desaturation while SBT ,   hypokalemia  hypomagnesemia  elevated WBC    PLAN  change tube feed to vital hp at 50ml/hr  check bmp/phos/mg  and correct lytes  will f/u   ASSESSMENT  85y Female patient s/p R VATS evacuation of empyema, x3 chest tubes in place post operatively remains intubated due to tachypnea and desaturation while SBT ,   hypokalemia  hypomagnesemia  elevated WBC    PLAN  change tube feed to vital hp at 55ml/hr  on propofol dosage~122 kcal  check bmp/phos/mg  and correct lytes  will f/u

## 2021-03-29 NOTE — CONSULT NOTE ADULT - SUBJECTIVE AND OBJECTIVE BOX
85 year old lady known to have dementia, osteoarithtis, Peruvian-speaking presenting with cough and fever.  As per daughter, patient has been having dry consistent cough since 2 months. Today, she was being evaluated for knee injections when she was found to be febrile. She also reports progressing generalized weakness with decreased appetite and PO intake.  No URT symptoms, no chest pain, no leg pain, no diarrhea, no urinary symptoms no sick contacts, no recent travel. In ED was hypotensive, was given IVF, started on levophed 0.04 called to evaluate (11 Mar 2021 03:21)  PAST MEDICAL & SURGICAL HISTORY:  Osteoarthritis  Dementia    ICU Vital Signs Last 24 Hrs  T(C): 37.9 (29 Mar 2021 11:00), Max: 37.9 (29 Mar 2021 11:00)  T(F): 100.2 (29 Mar 2021 11:00), Max: 100.2 (29 Mar 2021 11:00)  HR: 48 (29 Mar 2021 14:30) (48 - 92)  BP: 140/64 (29 Mar 2021 14:30) (80/46 - 157/81)  BP(mean): 92 (29 Mar 2021 14:30) (59 - 111)  RR: 22 (29 Mar 2021 14:30) (18 - 30)  SpO2: 100% (29 Mar 2021 14:30) (93% - 100%)  Height (cm): 160 (03-22-21 @ 07:23)  Weight (kg): 63.2 (03-29-21 @ 05:00)  BMI (kg/m2): 24.7 (03-29-21 @ 05:00)  BSA (m2): 1.66 (03-29-21 @ 05:00)    28 Mar 2021 07:01  -  29 Mar 2021 07:00  --------------------------------------------------------  IN:    Dexmedetomidine: 108 mL    Enteral Tube Flush: 230 mL    IV PiggyBack: 400 mL    Phenylephrine: 45 mL    Propofol: 111 mL    sodium chloride 0.9%: 230 mL    Vasopressin: 57.6 mL    Vital High Protein: 640 mL  Total IN: 1821.6 mL    OUT:    Chest Tube (mL): 80 mL    Chest Tube (mL): 30 mL    Chest Tube (mL): 30 mL    Indwelling Catheter - Urethral (mL): 3055 mL  Total OUT: 3195 mL    Total NET: -1373.4 mL      29 Mar 2021 07:01  -  29 Mar 2021 15:03  --------------------------------------------------------  IN:    Dexmedetomidine: 36.5 mL    Enteral Tube Flush: 250 mL    IV PiggyBack: 150 mL    Phenylephrine: 20 mL    Propofol: 40.5 mL    sodium chloride 0.9%: 80 mL    Vasopressin: 16.8 mL    Vital High Protein: 40 mL  Total IN: 633.8 mL    OUT:    Chest Tube (mL): 50 mL    Chest Tube (mL): 30 mL    Chest Tube (mL): 50 mL    Indwelling Catheter - Urethral (mL): 1765 mL    Rectal Tube (mL): 40 mL  Total OUT: 1935 mL    Total NET: -1301.2 mL        Mode: AC/ CMV (Assist Control/ Continuous Mandatory Ventilation)  RR (machine): 18  TV (machine): 500  FiO2: 60  PEEP: 5  ITime: 1  MAP: 20  PIP: 51      PHYSICAL EXAM:  GENERAL:REMAIN VENTED /SEDATED  HEENT:  Moist mucous membranes,  +chest tubes in place  ABDOMEN: Soft, n/t n/d  EXTREMITIES:  no edema  SKIN: No rashes or lesions  IV ACCESS:  FEEDING ACCESS:ogt feed    MEDICATIONS  (STANDING):  ALBUTerol    90 MICROgram(s) HFA Inhaler 2 Puff(s) Inhalation every 6 hours  aMIOdarone Infusion 1 mG/Min (33.3 mL/Hr) IV Continuous <Continuous>  cefepime   IVPB 2000 milliGRAM(s) IV Intermittent every 12 hours  chlorhexidine 0.12% Liquid 5 milliLiter(s) Oral Mucosa two times a day  chlorhexidine 4% Liquid 1 Application(s) Topical <User Schedule>  dexMEDEtomidine Infusion 0.2 MICROgram(s)/kG/Hr (3 mL/Hr) IV Continuous <Continuous>  dextrose 40% Gel 15 Gram(s) Oral once  dextrose 5%. 1000 milliLiter(s) (50 mL/Hr) IV Continuous <Continuous>  dextrose 5%. 1000 milliLiter(s) (100 mL/Hr) IV Continuous <Continuous>  dextrose 50% Injectable 25 Gram(s) IV Push once  dextrose 50% Injectable 12.5 Gram(s) IV Push once  dextrose 50% Injectable 25 Gram(s) IV Push once  donepezil 5 milliGRAM(s) Oral at bedtime  enoxaparin Injectable 40 milliGRAM(s) SubCutaneous daily  famotidine Injectable 20 milliGRAM(s) IV Push every 12 hours  fentaNYL   Infusion. 0.5 MICROgram(s)/kG/Hr (3 mL/Hr) IV Continuous <Continuous>  ferrous    sulfate 325 milliGRAM(s) Oral daily  furosemide   Injectable 40 milliGRAM(s) IV Push daily  gabapentin   Solution 100 milliGRAM(s) Oral three times a day  glucagon  Injectable 1 milliGRAM(s) IntraMuscular once  insulin lispro (ADMELOG) corrective regimen sliding scale   SubCutaneous three times a day before meals  ipratropium 17 MICROgram(s) HFA Inhaler 2 Puff(s) Inhalation every 6 hours  lactated ringers Bolus 1000 milliLiter(s) IV Bolus once  meperidine     Injectable 25 milliGRAM(s) IV Push once  metroNIDAZOLE  IVPB 500 milliGRAM(s) IV Intermittent every 8 hours  multivitamin 1 Tablet(s) Oral daily  norepinephrine Infusion 0.05 MICROgram(s)/kG/Min (5.63 mL/Hr) IV Continuous <Continuous>  nystatin Powder 1 Application(s) Topical two times a day  phenylephrine    Infusion 0.1 MICROgram(s)/kG/Min (2.25 mL/Hr) IV Continuous <Continuous>  polyethylene glycol 3350 17 Gram(s) Oral daily  potassium chloride   Powder 20 milliEquivalent(s) Oral daily  propofol Infusion 10 MICROgram(s)/kG/Min (3.6 mL/Hr) IV Continuous <Continuous>  senna 2 Tablet(s) Oral at bedtime  sodium chloride 0.9%. 1000 milliLiter(s) (10 mL/Hr) IV Continuous <Continuous>  vasopressin Infusion 0.04 Unit(s)/Min (2.4 mL/Hr) IV Continuous <Continuous>    MEDICATIONS  (PRN):  guaifenesin/dextromethorphan  Syrup 10 milliLiter(s) Oral every 4 hours PRN Cough/throat discomfort  ondansetron Injectable 4 milliGRAM(s) IV Push every 4 hours PRN Nausea and/or Vomiting    Allergies  clindamycin (Hives)      LABS:    03-29    137  |  99  |  22<H>  ----------------------------<  152<H>  3.6   |  31  |  0.5<L>    Ca    8.1<L>      29 Mar 2021 04:30  Mg     1.8     03-29                          10.3   17.35 )-----------( 280      ( 29 Mar 2021 04:30 )             32.0   CAPILLARY BLOOD GLUCOSE  POCT Blood Glucose.: 170 mg/dL (29 Mar 2021 13:19)  ABG - ( 29 Mar 2021 03:42 )  pH, Arterial: 7.56  pH, Blood: x     /  pCO2: 40    /  pO2: 94    / HCO3: 35    / Base Excess: 12.0  /  SaO2: 98        RADIOLOGY:  < from: Xray Chest 1 View- PORTABLE-Routine (Xray Chest 1 View- PORTABLE-Routine in AM.) (03.29.21 @ 06:53) >  Impression:  Unchanged bilateral airspace opacities. No radiographic evidence of pneumothorax.  Stable support devices.  Slight increased right chest wall  subcutaneous emphysema, which may be positional.

## 2021-03-29 NOTE — PROGRESS NOTE ADULT - SUBJECTIVE AND OBJECTIVE BOX
Progress Note: Surgery  Patient: DESTIN TERRY , 85y (1935)Female   MRN: [id number]  Location: [place] [address] [address]  Visit: 03-11-21 Inpatient  Date: 03-29-21 @ 09:04    Events over 24h: No acute event overnight. No new complaint. CXR from this morning showing slight improvement in pulmonary edema and pleural effusion. Patient is on low dose vaso 0.04. Vent settings stable 60/5, failed SBT. Chest tubes to suction, #1/#3 with airleak    Vitals: T(F): 99.4 (03-29-21 @ 04:00), Max: 99.6 (03-28-21 @ 14:00)  HR: 55 (03-29-21 @ 07:00)  BP: 96/53 (03-29-21 @ 07:00) (91/50 - 157/81)  RR: 18 (03-29-21 @ 07:00)  SpO2: 95% (03-29-21 @ 07:00)  RR (machine): 18, TV (machine): 500, FiO2: 60, PEEP: 5, PIP: 45    Diet: Diet, NPO after Midnight:      NPO Start Date: 28-Mar-2021,   NPO Start Time: 23:59 (03-28-21 @ 10:17)  Diet, NPO:   Tube Feeding Modality: Orogastric  Vital High Protein  Total Volume for 24 Hours (mL): 960  Continuous  Starting Tube Feed Rate mL per Hour: 10  Increase Tube Feed Rate by (mL): 10     Every 2 hours  Until Goal Tube Feed Rate (mL per Hour): 40  Tube Feed Duration (in Hours): 24  Tube Feed Start Time: 08:45 (03-24-21 @ 08:35)    IV Fluid: dextrose 5%. 1000 milliLiter(s) (50 mL/Hr) IV Continuous <Continuous>  dextrose 5%. 1000 milliLiter(s) (100 mL/Hr) IV Continuous <Continuous>  ferrous    sulfate 325 milliGRAM(s) Oral daily  lactated ringers Bolus 1000 milliLiter(s) IV Bolus once  multivitamin 1 Tablet(s) Oral daily  potassium chloride    Tablet ER 20 milliEquivalent(s) Oral every 2 hours  potassium chloride    Tablet ER 20 milliEquivalent(s) Oral once  sodium chloride 0.9%. 1000 milliLiter(s) (10 mL/Hr) IV Continuous <Continuous>      In:   03-28-21 @ 07:01  -  03-29-21 @ 07:00  --------------------------------------------------------  IN: 1811.6 mL      Out:   03-28-21 @ 07:01  -  03-29-21 @ 07:00  --------------------------------------------------------  OUT:    Chest Tube (mL): 80 mL    Chest Tube (mL): 30 mL    Chest Tube (mL): 30 mL    Indwelling Catheter - Urethral (mL): 3055 mL  Total OUT: 3195 mL        Net:   03-28-21 @ 07:01  -  03-29-21 @ 07:00  --------------------------------------------------------  NET: -1383.4 mL        Physical Examination:  General Appearance: intubated and sedated  Heart: S1 and S2. No murmurs. Rhythm is regular.  Lungs: Clear to auscultation BL without rales, rhonchi, wheezing, crackles or diminished breath sounds.  Abdomen:  Soft, nondistended, nontender.     Medications: [Standing]  ALBUTerol    90 MICROgram(s) HFA Inhaler 2 Puff(s) Inhalation every 6 hours  aMIOdarone Infusion 1 mG/Min (33.3 mL/Hr) IV Continuous <Continuous>  cefepime   IVPB 2000 milliGRAM(s) IV Intermittent every 12 hours  chlorhexidine 0.12% Liquid 5 milliLiter(s) Oral Mucosa two times a day  chlorhexidine 4% Liquid 1 Application(s) Topical <User Schedule>  dexMEDEtomidine Infusion 0.2 MICROgram(s)/kG/Hr (3 mL/Hr) IV Continuous <Continuous>  dextrose 40% Gel 15 Gram(s) Oral once  dextrose 5%. 1000 milliLiter(s) (50 mL/Hr) IV Continuous <Continuous>  dextrose 5%. 1000 milliLiter(s) (100 mL/Hr) IV Continuous <Continuous>  dextrose 50% Injectable 25 Gram(s) IV Push once  dextrose 50% Injectable 12.5 Gram(s) IV Push once  dextrose 50% Injectable 25 Gram(s) IV Push once  donepezil 5 milliGRAM(s) Oral at bedtime  enoxaparin Injectable 40 milliGRAM(s) SubCutaneous daily  famotidine Injectable 20 milliGRAM(s) IV Push every 12 hours  fentaNYL   Infusion. 0.5 MICROgram(s)/kG/Hr (3 mL/Hr) IV Continuous <Continuous>  ferrous    sulfate 325 milliGRAM(s) Oral daily  furosemide   Injectable 40 milliGRAM(s) IV Push once  gabapentin   Solution 100 milliGRAM(s) Oral three times a day  glucagon  Injectable 1 milliGRAM(s) IntraMuscular once  insulin lispro (ADMELOG) corrective regimen sliding scale   SubCutaneous three times a day before meals  ipratropium 17 MICROgram(s) HFA Inhaler 2 Puff(s) Inhalation every 6 hours  lactated ringers Bolus 1000 milliLiter(s) IV Bolus once  meperidine     Injectable 25 milliGRAM(s) IV Push once  metroNIDAZOLE  IVPB 500 milliGRAM(s) IV Intermittent every 8 hours  multivitamin 1 Tablet(s) Oral daily  norepinephrine Infusion 0.05 MICROgram(s)/kG/Min (5.63 mL/Hr) IV Continuous <Continuous>  nystatin Powder 1 Application(s) Topical two times a day  phenylephrine    Infusion 0.1 MICROgram(s)/kG/Min (2.25 mL/Hr) IV Continuous <Continuous>  polyethylene glycol 3350 17 Gram(s) Oral daily  potassium chloride    Tablet ER 20 milliEquivalent(s) Oral every 2 hours  potassium chloride    Tablet ER 20 milliEquivalent(s) Oral once  propofol Infusion 10 MICROgram(s)/kG/Min (3.6 mL/Hr) IV Continuous <Continuous>  senna 2 Tablet(s) Oral at bedtime  sodium chloride 0.9%. 1000 milliLiter(s) (10 mL/Hr) IV Continuous <Continuous>  vasopressin Infusion 0.04 Unit(s)/Min (2.4 mL/Hr) IV Continuous <Continuous>    Medications:[PRN]  guaifenesin/dextromethorphan  Syrup 10 milliLiter(s) Oral every 4 hours PRN  ondansetron Injectable 4 milliGRAM(s) IV Push every 4 hours PRN    Labs:                        10.3   17.35 )-----------( 280      ( 29 Mar 2021 04:30 )             32.0   03-29    137  |  99  |  22<H>  ----------------------------<  152<H>  3.6   |  31  |  0.5<L>    Ca    8.1<L>      29 Mar 2021 04:30  Mg     1.8     03-29    ABG - ( 29 Mar 2021 03:42 )  pH: 7.56  /  pCO2: 40    /  pO2: 94    / HCO3: 35    / Base Excess: 12.0  /  SaO2: 98        Micro/Urine:    Imaging:  None/24h

## 2021-03-29 NOTE — PROGRESS NOTE ADULT - SUBJECTIVE AND OBJECTIVE BOX
DESTIN TERRY  85y, Female  Allergy: clindamycin (Hives)      LOS  18d    CHIEF COMPLAINT: Cough, fever (20 Mar 2021 01:27)      INTERVAL EVENTS/HPI  - No acute events overnight  - planned for SBT today   - T(F): , Max: 99.6 (03-28-21 @ 14:00)  - WBC Count: 17.35 (03-29-21 @ 04:30)  WBC Count: 18.08 (03-28-21 @ 01:40)     - Creatinine, Serum: 0.5 (03-29-21 @ 04:30)  Creatinine, Serum: <0.5 (03-28-21 @ 01:40)       ROS  unable to obtain history secondary to patient's mental status     VITALS:  T(F): 98.8, Max: 99.6 (03-28-21 @ 14:00)  HR: 59  BP: 80/46  RR: 22Vital Signs Last 24 Hrs  T(C): 37.1 (29 Mar 2021 08:00), Max: 37.6 (28 Mar 2021 14:00)  T(F): 98.8 (29 Mar 2021 08:00), Max: 99.6 (28 Mar 2021 14:00)  HR: 59 (29 Mar 2021 10:15) (53 - 103)  BP: 80/46 (29 Mar 2021 10:15) (80/46 - 157/81)  BP(mean): 59 (29 Mar 2021 10:15) (59 - 111)  RR: 22 (29 Mar 2021 10:15) (18 - 30)  SpO2: 95% (29 Mar 2021 10:15) (93% - 100%)    PHYSICAL EXAM:  Gen: intubated  HEENT: Normocephalic, atraumatic  Neck: supple, no lymphadenopathy  CV: Regular rate & regular rhythm  Lungs: decreased BS at bases, no fremitus; chest tubes in place  Abdomen: Soft, BS present  Ext: Warm, well perfused  Neuro: non focal, awake  Skin: no rash, no erythema  Lines: no phlebitis    FH: Non-contributory  Social Hx: Non-contributory    TESTS & MEASUREMENTS:                        10.3   17.35 )-----------( 280      ( 29 Mar 2021 04:30 )             32.0     03-29    137  |  99  |  22<H>  ----------------------------<  152<H>  3.6   |  31  |  0.5<L>    Ca    8.1<L>      29 Mar 2021 04:30  Mg     1.8     03-29      eGFR if Non African American: 90 mL/min/1.73M2 (03-29-21 @ 04:30)  eGFR if African American: 104 mL/min/1.73M2 (03-29-21 @ 04:30)          Culture - Acid Fast - Tissue w/Smear (collected 03-22-21 @ 13:33)  Source: .Tissue PLEURAL PEEL  Preliminary Report (03-24-21 @ 15:04):    Culture is being performed.    Culture - Fungal, Tissue (collected 03-22-21 @ 13:33)  Source: .Tissue None  Preliminary Report (03-23-21 @ 08:01):    Testing in progress    Culture - Tissue with Gram Stain (collected 03-22-21 @ 13:33)  Source: .Tissue None  Gram Stain (03-23-21 @ 04:38):    Rare polymorphonuclear leukocytes seen per low power field    Few White blood cells seen per low power field    Few Gram positive cocci in pairs per oil power field  Preliminary Report (03-23-21 @ 18:45):    No growth    Culture - Fungal, Bronchial (collected 03-22-21 @ 12:58)  Source: .Bronchial None  Preliminary Report (03-25-21 @ 11:20):    Rare Yeast    Culture - Acid Fast - Bronchial w/Smear (collected 03-22-21 @ 12:58)  Source: Bronch Wash None  Preliminary Report (03-24-21 @ 15:03):    Culture is being performed.    Culture - Bronchial (collected 03-22-21 @ 12:58)  Source: .Bronchial None  Gram Stain (03-23-21 @ 07:58):    Numerous polymorphonuclear leukocytes per low power field    No Squamous epithelial cells per low power field    No organisms seen per oil power field  Final Report (03-24-21 @ 22:11):    Normal Respiratory Narda present    Culture - Fungal, Body Fluid (collected 03-22-21 @ 09:26)  Source: .Body Fluid None  Preliminary Report (03-23-21 @ 08:01):    Testing in progress    Culture - Acid Fast - Body Fluid w/Smear (collected 03-22-21 @ 09:26)  Source: .Body Fluid None  Preliminary Report (03-24-21 @ 15:03):    Culture is being performed.    Culture - Body Fluid with Gram Stain (collected 03-22-21 @ 09:26)  Source: .Body Fluid None  Gram Stain (03-23-21 @ 04:38):    polymorphonuclear leukocytes seen    No organisms seen    by cytocentrifuge  Final Report (03-27-21 @ 17:22):    No growth at 5 days    Culture - Blood (collected 03-17-21 @ 16:00)  Source: .Blood Blood-Peripheral  Final Report (03-23-21 @ 02:39):    No Growth Final    Culture - Blood (collected 03-13-21 @ 07:33)  Source: .Blood None  Final Report (03-18-21 @ 18:01):    No Growth Final    Culture - Blood (collected 03-10-21 @ 21:00)  Source: .Blood Blood-Peripheral  Final Report (03-16-21 @ 04:01):    No Growth Final    Culture - Blood (collected 03-10-21 @ 21:00)  Source: .Blood Blood-Peripheral  Final Report (03-16-21 @ 04:01):    No Growth Final            INFECTIOUS DISEASES TESTING  Procalcitonin, Serum: 2.29 (03-27-21 @ 10:50)  MRSA PCR Result.: Negative (03-20-21 @ 21:38)  COVID-19 PCR: NotDetec (03-20-21 @ 12:30)  Fungitell: 46 (03-17-21 @ 16:00)  Procalcitonin, Serum: 0.29 (03-17-21 @ 11:30)  Procalcitonin, Serum: 0.31 (03-16-21 @ 10:15)  Legionella Antigen, Urine: Negative (03-12-21 @ 10:30)  Streptococcus Pneumoniae Ag Urine: Negative (03-12-21 @ 10:30)  MRSA PCR Result.: Negative (03-12-21 @ 10:30)  Legionella Antigen, Urine: Negative (03-11-21 @ 08:10)  Streptococcus Pneumoniae Ag Urine: Negative (03-11-21 @ 08:10)  Procalcitonin, Serum: 1.75 (03-11-21 @ 06:31)  Rapid RVP Result: NotDetec (03-10-21 @ 22:10)      INFLAMMATORY MARKERS      RADIOLOGY & ADDITIONAL TESTS:  I have personally reviewed the last available Chest xray  CXR  Xray Chest 1 View- PORTABLE-Urgent:   EXAM:  XR CHEST PORTABLE URGENT 1V            PROCEDURE DATE:  03/27/2021            INTERPRETATION:  Clinical History / Reason for exam: ETT    Comparison : Chest radiograph 3/27/2021 at 3:13 AM.    Technique/Positioning: Frontal radiograph the chest. Rotated exam.    Findings:    Support devices: Advanced ET tube terminates above the dk. Stable enteric tube and right IJ central venous catheter. Stable 2 right-sided chest tubes    Cardiac/mediastinum/hilum: Stable.    Lung parenchyma/Pleura: Unchanged left greater right bilateral opacities. No visualized pneumothorax.    Skeleton/soft tissues: Stable.    Impression:    Support devices in proper position.    Unchanged left greater than right bilateral opacities.                  BUTCH RAWLS MD; Attending Radiologist  This document has been electronically signed. Mar 28 2021 10:19AM (03-27-21 @ 20:43)      CT      CARDIOLOGY TESTING  12 Lead ECG:   Ventricular Rate 157 BPM    Atrial Rate 326 BPM    QRS Duration 88 ms    Q-T Interval 314 ms    QTC Calculation(Bazett) 507 ms    R Axis 41 degrees    T Axis -87 degrees    Diagnosis Line Atrial fibrillation with premature ventricular or aberrantlyconducted  complexes  Nonspecific ST and T wave abnormality  Abnormal ECG    Confirmed by SHANNAN MORALES MD ([id number]) on 3/26/2021 1:51:15 PM (03-26-21 @ 12:19)  12 Lead ECG:   Ventricular Rate 82 BPM    Atrial Rate 82 BPM    P-R Interval 152 ms    QRS Duration 92 ms    Q-T Interval 408 ms    QTC Calculation(Bazett) 476 ms    P Axis 44 degrees    R Axis 22 degrees    T Axis 26 degrees    Diagnosis Line Normal sinus rhythm  Normal ECG    Confirmed by JUANI DODSON MD ([id number]) on 3/24/2021 8:30:17 PM (03-24-21 @ 13:34)      MEDICATIONS  ALBUTerol    90 MICROgram(s) HFA Inhaler 2 Inhalation every 6 hours  aMIOdarone Infusion 1 IV Continuous <Continuous>  cefepime   IVPB 2000 IV Intermittent every 12 hours  chlorhexidine 0.12% Liquid 5 Oral Mucosa two times a day  chlorhexidine 4% Liquid 1 Topical <User Schedule>  dexMEDEtomidine Infusion 0.2 IV Continuous <Continuous>  dextrose 40% Gel 15 Oral once  dextrose 5%. 1000 IV Continuous <Continuous>  dextrose 5%. 1000 IV Continuous <Continuous>  dextrose 50% Injectable 25 IV Push once  dextrose 50% Injectable 12.5 IV Push once  dextrose 50% Injectable 25 IV Push once  donepezil 5 Oral at bedtime  enoxaparin Injectable 40 SubCutaneous daily  famotidine Injectable 20 IV Push every 12 hours  fentaNYL   Infusion. 0.5 IV Continuous <Continuous>  ferrous    sulfate 325 Oral daily  gabapentin   Solution 100 Oral three times a day  glucagon  Injectable 1 IntraMuscular once  insulin lispro (ADMELOG) corrective regimen sliding scale  SubCutaneous three times a day before meals  ipratropium 17 MICROgram(s) HFA Inhaler 2 Inhalation every 6 hours  lactated ringers Bolus 1000 IV Bolus once  meperidine     Injectable 25 IV Push once  metroNIDAZOLE  IVPB 500 IV Intermittent every 8 hours  multivitamin 1 Oral daily  norepinephrine Infusion 0.05 IV Continuous <Continuous>  nystatin Powder 1 Topical two times a day  phenylephrine    Infusion 0.1 IV Continuous <Continuous>  polyethylene glycol 3350 17 Oral daily  potassium chloride    Tablet ER 20 Oral every 2 hours  potassium chloride    Tablet ER 20 Oral once  propofol Infusion 10 IV Continuous <Continuous>  senna 2 Oral at bedtime  sodium chloride 0.9%. 1000 IV Continuous <Continuous>  vasopressin Infusion 0.04 IV Continuous <Continuous>      WEIGHT  Weight (kg): 63.2 (03-29-21 @ 05:00)  Creatinine, Serum: 0.5 mg/dL (03-29-21 @ 04:30)      ANTIBIOTICS:  cefepime   IVPB 2000 milliGRAM(s) IV Intermittent every 12 hours  metroNIDAZOLE  IVPB 500 milliGRAM(s) IV Intermittent every 8 hours      All available historical records have been reviewed

## 2021-03-30 NOTE — BRIEF OPERATIVE NOTE - NSICDXBRIEFPROCEDURE_GEN_ALL_CORE_FT
PROCEDURES:  Open tracheostomy 30-Mar-2021 10:26:59  Marco Woody  Insertion, PEG tube 30-Mar-2021 10:27:13  Marco Woody  
PROCEDURES:  Bronchoscopy, at bedside 24-Mar-2021 17:56:39  Piyush Bowers  Open tracheostomy 30-Mar-2021 10:26:59  Marco Woody  Insertion, PEG tube 30-Mar-2021 10:27:13  Marco Woody

## 2021-03-30 NOTE — PRE-ANESTHESIA EVALUATION ADULT - NSANTHPMHFT_GEN_ALL_CORE
84 y/o F with PMHx of dementia, OA, presenting with PNA, sepsis and s/p septic shock
86 y/o F with PMHx of dementia, OA, presenting with PNA, sepsis and s/p septic shock s/p Right empyema drainage decortication with  necrotizing pneumonia middle lobe with one prbc transfused on 3/25 going for Tracheostomy and PEG placement.    COVID negative 3/29  BMP: 135/3.6 93/34 8/0.5 <176  CBC: 19.99>10.8/34<322  7.54; 47; 66; 40; +15.9/95%  PT: 18.3/1.59/35.44    IMPRESSION:  1.  Since March 29, 2021, resolved right pleural effusion; persistent moderate right pneumothorax with 2 chest tubes in place.  2.  Increased left greater than right diffuse bilateral groundglass opacities and interlobular septal thickening. Findings are suspicious for a multifocal infection. Superimposed edema is also a possibility.  3.  Enlarging mediastinal lymph nodes, likely reactive.    Summary:   1. Left ventricular ejection fraction, by visual estimation, is 50 to 55%.   2. Mildly increased LV wall thickness.   3. Spectral Doppler shows impaired relaxation pattern of left ventricular myocardial filling (Grade I diastolic dysfunction).   4. Normal left atrial size.   5. Normal right atrial size.   6. Trace mitral valve regurgitation.   7. Mild tricuspid regurgitation.   8. Mild pulmonic valve regurgitation.   9. Estimated pulmonary artery systolic pressure is 39.3 mmHg assuming a right atrial pressure of 3 mmHg, which is consistent with borderline pulmonary hypertension.
84 y/o F presenting with empyema, s/p VATS 7 days ago. Scheduled for a tracheostomy and PEG.

## 2021-03-30 NOTE — PROGRESS NOTE ADULT - ASSESSMENT
ASSESSMENT  85 year old lady known to have dementia, osteoarithtis, [place]-speaking presenting with cough and fever.      IMPRESSION  #Sepsis present on admission - secondary to CAP  -  CT Chest No Cont (03.11.21 @ 00:54): Areas of right lung consolidation which can be seen in pneumonia. Numerous air-fluid levels throughout the right lung compatible with an infectious process. Suggestion of split pleura at the lung base for which empyema is favored although inherently limited on this noncontrast exam. Consideration can be given to contrast administration if feasiblefor better delineation. Areas of bilateral interlobular septal thickening and mild layering groundglass attenuation may reflect a component of edema.  - Urine Legionella negative  - Urine Strep negative  - BLood Cx 3/10 and 3/13 negative  - Procalcitonin 1.15   - CT Chest No Cont (03.17.21 @ 16:19): Since 3/11/2021. Enlarging loculated right pleural fluid collection with multiple air bubbles consistent with empyema.   Associated compressive atelectasis right lung is seen. Scattered groundglass opacities involving multiple lumbar segments.  - s/p VATS 3/22 -- right empyema with 800 cc purulent fluid in pleural space, multiple pockers with dense adhesions -- necrotizing pneumonia of the middle lobe  - VATS Cx 3/22 with rare yeast, otherwise no growth  -  CT Chest No Cont (03.29.21 @ 12:37): Since March 29, 2021, resolved right pleural effusion; persistent moderate right pneumothorax with 2 chest tubes in place. Increased left greater than right diffuse bilateral groundglass opacities and interlobular septal thickening. Findings are suspicious for a multifocal infection. Superimposed edema is also a possibility.  Enlarging mediastinal lymph nodes, likely reactive.    #Dementia  #Osteoarthritis  #Obesity BMI (kg/m2): 23.4  #Abx allergy: clindamycin (Hives)    Creatinine, Serum: 0.8 mg/dL (03.15.21 @ 05:56)  Weight (kg): 60 (11 Mar 2021 01:17)  CrCl 50      RECOMMENDATIONS    This is a preliminary incomplete pended note, all final recommendations to follow after interview and examination of the patient.    Please call or message on [employer] Teams if with any questions.  Spectra [phone]     ASSESSMENT  85 year old lady known to have dementia, osteoarithtis, [place]ian-speaking presenting with cough and fever.      IMPRESSION  #Sepsis present on admission - secondary to CAP  -  CT Chest No Cont (03.11.21 @ 00:54): Areas of right lung consolidation which can be seen in pneumonia. Numerous air-fluid levels throughout the right lung compatible with an infectious process. Suggestion of split pleura at the lung base for which empyema is favored although inherently limited on this noncontrast exam. Consideration can be given to contrast administration if feasiblefor better delineation. Areas of bilateral interlobular septal thickening and mild layering groundglass attenuation may reflect a component of edema.  - Urine Legionella negative  - Urine Strep negative  - BLood Cx 3/10 and 3/13 negative  - Procalcitonin 1.15   - CT Chest No Cont (03.17.21 @ 16:19): Since 3/11/2021. Enlarging loculated right pleural fluid collection with multiple air bubbles consistent with empyema.   Associated compressive atelectasis right lung is seen. Scattered groundglass opacities involving multiple lumbar segments.  - s/p VATS 3/22 -- right empyema with 800 cc purulent fluid in pleural space, multiple pockers with dense adhesions -- necrotizing pneumonia of the middle lobe  - VATS Cx 3/22 with rare yeast, otherwise no growth  -  CT Chest No Cont (03.29.21 @ 12:37): Since March 29, 2021, resolved right pleural effusion; persistent moderate right pneumothorax with 2 chest tubes in place. Increased left greater than right diffuse bilateral groundglass opacities and interlobular septal thickening. Findings are suspicious for a multifocal infection. Superimposed edema is also a possibility.  Enlarging mediastinal lymph nodes, likely reactive.    #Dementia  #Osteoarthritis  #Obesity BMI (kg/m2): 23.4  #Abx allergy: clindamycin (Hives)    Creatinine, Serum: 0.8 mg/dL (03.15.21 @ 05:56)  Weight (kg): 60 (11 Mar 2021 01:17)  CrCl 50      RECOMMENDATIONS  - noted CT imaging -- left lung appears more consolidated  - when able, please send for tracheal cultures  - repeat MRSA nares  - continue cefepime/flagyl for now    Please call or message on [employer] Teams if with any questions.  Spectra [phone]

## 2021-03-30 NOTE — BRIEF OPERATIVE NOTE - NSICDXBRIEFPOSTOP_GEN_ALL_CORE_FT
POST-OP DIAGNOSIS:  Acute respiratory failure with hypoxia 30-Mar-2021 10:27:52  Marco Woody  
POST-OP DIAGNOSIS:  Acute respiratory failure with hypoxia 30-Mar-2021 10:27:52  Marco Woody

## 2021-03-30 NOTE — PROGRESS NOTE ADULT - ASSESSMENT
Assessment:  85y Female patient admitted POD 7 s/p R VATS evacuation of empyema, x3 chest tubes in place post operatively remains intubated due to tachypnea and desaturation while SBT , with the above physical exam, labs, and imaging findings.    Plan:  - reattempt SBT today  - Trach and PEG today if fails SBT  - c/w IV Abx  - monitor chest tube output and drainage  -Pain control as needed  -Hemodynamic monitoring as per routine  -Encourage ambulation and incentive spirometer use (10x/hr when awake)  -GI and DVT prophylaxis  -Check and replete CBC and BMP q daily  -Strict input and output monitoring  -Continue current management            Date/Time: 03-30-21 @ 09:43

## 2021-03-30 NOTE — PROGRESS NOTE ADULT - SUBJECTIVE AND OBJECTIVE BOX
MARA, MIGNONDULCE  85y, Female  Allergy: clindamycin (Hives)      LOS  19d    CHIEF COMPLAINT: Cough, fever (20 Mar 2021 01:27)      INTERVAL EVENTS/HPI  - No acute events overnight  - T(F): , Max: 98.1 (03-29-21 @ 20:00)  - Denies any worsening symptoms  - Tolerating medication  - WBC Count: 19.99 (03-30-21 @ 02:50)  WBC Count: 17.35 (03-29-21 @ 04:30)     - Creatinine, Serum: 0.5 (03-30-21 @ 02:50)  Creatinine, Serum: 0.5 (03-29-21 @ 04:30)       ROS  General: Denies rigors, nightsweats  HEENT: Denies headache, rhinorrhea, sore throat, eye pain  CV: Denies CP, palpitations  PULM: Denies wheezing, hemoptysis  GI: Denies hematemesis, hematochezia, melena  : Denies discharge, hematuria  MSK: Denies arthralgias, myalgias  SKIN: Denies rash, lesions  NEURO: Denies paresthesias, weakness  PSYCH: Denies depression, anxiety    VITALS:  T(F): 97.9, Max: 98.1 (03-29-21 @ 20:00)  HR: 76  BP: 128/60  RR: 33Vital Signs Last 24 Hrs  T(C): 36.6 (30 Mar 2021 16:00), Max: 37.3 (29 Mar 2021 17:19)  T(F): 97.9 (30 Mar 2021 16:00), Max: 98.1 (29 Mar 2021 20:00)  HR: 76 (30 Mar 2021 16:30) (43 - 106)  BP: 128/60 (30 Mar 2021 16:30) (89/50 - 162/71)  BP(mean): 87 (30 Mar 2021 16:30) (64 - 109)  RR: 33 (30 Mar 2021 16:30) (18 - 53)  SpO2: 90% (30 Mar 2021 16:30) (90% - 100%)    PHYSICAL EXAM:  Gen: NAD, resting in bed  HEENT: Normocephalic, atraumatic  Neck: supple, no lymphadenopathy  CV: Regular rate & regular rhythm  Lungs: decreased BS at bases, no fremitus  Abdomen: Soft, BS present  Ext: Warm, well perfused  Neuro: non focal, awake  Skin: no rash, no erythema  Lines: no phlebitis    FH: Non-contributory  Social Hx: Non-contributory    TESTS & MEASUREMENTS:                        10.8   19.99 )-----------( 322      ( 30 Mar 2021 02:50 )             34.0     03-30    135  |  93<L>  |  27<H>  ----------------------------<  176<H>  3.6   |  34<H>  |  0.5<L>    Ca    8.4<L>      30 Mar 2021 02:50  Mg     1.9     03-30      eGFR if Non African American: 90 mL/min/1.73M2 (03-30-21 @ 02:50)  eGFR if African American: 104 mL/min/1.73M2 (03-30-21 @ 02:50)          Culture - Acid Fast - Tissue w/Smear (collected 03-22-21 @ 13:33)  Source: .Tissue PLEURAL PEEL  Preliminary Report (03-24-21 @ 15:04):    Culture is being performed.    Culture - Fungal, Tissue (collected 03-22-21 @ 13:33)  Source: .Tissue None  Preliminary Report (03-23-21 @ 08:01):    Testing in progress    Culture - Tissue with Gram Stain (collected 03-22-21 @ 13:33)  Source: .Tissue None  Gram Stain (03-23-21 @ 04:38):    Rare polymorphonuclear leukocytes seen per low power field    Few White blood cells seen per low power field    Few Gram positive cocci in pairs per oil power field  Preliminary Report (03-23-21 @ 18:45):    No growth    Culture - Fungal, Bronchial (collected 03-22-21 @ 12:58)  Source: .Bronchial None  Preliminary Report (03-25-21 @ 11:20):    Rare Yeast    Culture - Acid Fast - Bronchial w/Smear (collected 03-22-21 @ 12:58)  Source: Bronch Wash None  Preliminary Report (03-24-21 @ 15:03):    Culture is being performed.    Culture - Bronchial (collected 03-22-21 @ 12:58)  Source: .Bronchial None  Gram Stain (03-23-21 @ 07:58):    Numerous polymorphonuclear leukocytes per low power field    No Squamous epithelial cells per low power field    No organisms seen per oil power field  Final Report (03-24-21 @ 22:11):    Normal Respiratory Narda present    Culture - Fungal, Body Fluid (collected 03-22-21 @ 09:26)  Source: .Body Fluid None  Preliminary Report (03-23-21 @ 08:01):    Testing in progress    Culture - Acid Fast - Body Fluid w/Smear (collected 03-22-21 @ 09:26)  Source: .Body Fluid None  Preliminary Report (03-24-21 @ 15:03):    Culture is being performed.    Culture - Body Fluid with Gram Stain (collected 03-22-21 @ 09:26)  Source: .Body Fluid None  Gram Stain (03-23-21 @ 04:38):    polymorphonuclear leukocytes seen    No organisms seen    by cytocentrifuge  Final Report (03-27-21 @ 17:22):    No growth at 5 days    Culture - Blood (collected 03-17-21 @ 16:00)  Source: .Blood Blood-Peripheral  Final Report (03-23-21 @ 02:39):    No Growth Final    Culture - Blood (collected 03-13-21 @ 07:33)  Source: .Blood None  Final Report (03-18-21 @ 18:01):    No Growth Final    Culture - Blood (collected 03-10-21 @ 21:00)  Source: .Blood Blood-Peripheral  Final Report (03-16-21 @ 04:01):    No Growth Final    Culture - Blood (collected 03-10-21 @ 21:00)  Source: .Blood Blood-Peripheral  Final Report (03-16-21 @ 04:01):    No Growth Final            INFECTIOUS DISEASES TESTING  COVID-19 PCR: NotDetec (03-29-21 @ 14:17)  Procalcitonin, Serum: 2.29 (03-27-21 @ 10:50)  MRSA PCR Result.: Negative (03-20-21 @ 21:38)  COVID-19 PCR: NotDetec (03-20-21 @ 12:30)  Fungitell: 46 (03-17-21 @ 16:00)  Procalcitonin, Serum: 0.29 (03-17-21 @ 11:30)  Procalcitonin, Serum: 0.31 (03-16-21 @ 10:15)  Legionella Antigen, Urine: Negative (03-12-21 @ 10:30)  Streptococcus Pneumoniae Ag Urine: Negative (03-12-21 @ 10:30)  MRSA PCR Result.: Negative (03-12-21 @ 10:30)  Legionella Antigen, Urine: Negative (03-11-21 @ 08:10)  Streptococcus Pneumoniae Ag Urine: Negative (03-11-21 @ 08:10)  Procalcitonin, Serum: 1.75 (03-11-21 @ 06:31)  Rapid RVP Result: NotDetec (03-10-21 @ 22:10)      INFLAMMATORY MARKERS      RADIOLOGY & ADDITIONAL TESTS:  I have personally reviewed the last available Chest xray  CXR  Xray Chest 1 View- PORTABLE-Urgent:   EXAM:  XR CHEST PORTABLE URGENT 1V            PROCEDURE DATE:  03/27/2021            INTERPRETATION:  Clinical History / Reason for exam: ETT    Comparison : Chest radiograph 3/27/2021 at 3:13 AM.    Technique/Positioning: Frontal radiograph the chest. Rotated exam.    Findings:    Support devices: Advanced ET tube terminates above the dk. Stable enteric tube and right IJ central venous catheter. Stable 2 right-sided chest tubes    Cardiac/mediastinum/hilum: Stable.    Lung parenchyma/Pleura: Unchanged left greater right bilateral opacities. No visualized pneumothorax.    Skeleton/soft tissues: Stable.    Impression:    Support devices in proper position.    Unchanged left greater than right bilateral opacities.                  BUTCH RAWLS MD; Attending Radiologist  This document has been electronically signed. Mar 28 2021 10:19AM (03-27-21 @ 20:43)      CT  CT Chest No Cont:   EXAM:  CT CHEST            PROCEDURE DATE:  03/29/2021            INTERPRETATION:  CLINICAL STATEMENT: Right-sided VATS.    TECHNIQUE: Multislice helical sections were obtained from the thoracic inlet to the lung bases without contrast administration. Coronal and sagittal reformatted images and axial MIPs are also submitted.    COMPARISON: March 17, 2021 CT chest. Correlation also made with most recent chest radiograph dated March 29, 2021.    FINDINGS:    TUBES/LINES: 2 right-sided chest tubesone terminating at the apex and one terminating at the base. ET tube terminates above the dk. Right IJ central venous catheter with tip in the SVC. Enteric tube courses below the diaphragm into the stomach.    LUNGS, PLEURA, AND AIRWAYS: Moderateright pneumothorax. Increased extensive bilateral diffuse ground glass opacities and interlobular septal thickening, left greater than right. Patent central airways. Resolved right pleural effusion. Trace left pleural effusion present.    MEDIASTINUM/LYMPH NODES: Increased enlarged mediastinal lymph nodes. For example, a right lower paratracheal lymph node measures 1.8 x 1.4 cm.    HEART/GREAT VESSELS: Mild cardiomegaly. No pericardial effusion. Atherosclerotic vascular calcifications. Ectatic aorta.    BONES/SOFT TISSUES: Subcutaneous emphysema in the right chest wall. No acute osseous abnormality.    VISUALIZED UPPER ABDOMEN: Punctate hepatic calcifications.    IMPRESSION:  1.  Since March 29, 2021, resolved right pleural effusion; persistent moderate right pneumothorax with 2 chest tubes in place.  2.  Increased left greater than right diffuse bilateral groundglass opacities and interlobular septal thickening. Findings are suspicious for a multifocal infection. Superimposed edema is also a possibility.  3.  Enlarging mediastinal lymph nodes, likely reactive.                AMANDA HAMMONDS MD; Attending Radiologist  This document has been electronically signed. Mar 29 2021  3:10PM (03-29-21 @ 12:37)      CARDIOLOGY TESTING  12 Lead ECG:   Ventricular Rate 157 BPM    Atrial Rate 326 BPM    QRS Duration 88 ms    Q-T Interval 314 ms    QTC Calculation(Bazett) 507 ms    R Axis 41 degrees    T Axis -87 degrees    Diagnosis Line Atrial fibrillation with premature ventricular or aberrantlyconducted  complexes  Nonspecific ST and T wave abnormality  Abnormal ECG    Confirmed by SHANNAN MORALES MD ([id number]) on 3/26/2021 1:51:15 PM (03-26-21 @ 12:19)  12 Lead ECG:   Ventricular Rate 82 BPM    Atrial Rate 82 BPM    P-R Interval 152 ms    QRS Duration 92 ms    Q-T Interval 408 ms    QTC Calculation(Bazett) 476 ms    P Axis 44 degrees    R Axis 22 degrees    T Axis 26 degrees    Diagnosis Line Normal sinus rhythm  Normal ECG    Confirmed by JUANI DODSON MD ([id number]) on 3/24/2021 8:30:17 PM (03-24-21 @ 13:34)      MEDICATIONS  ALBUTerol    90 MICROgram(s) HFA Inhaler 2 Inhalation every 6 hours  aMIOdarone Infusion 1 IV Continuous <Continuous>  cefepime   IVPB 2000 IV Intermittent every 12 hours  chlorhexidine 0.12% Liquid 5 Oral Mucosa two times a day  chlorhexidine 4% Liquid 1 Topical <User Schedule>  dexMEDEtomidine Infusion 0.202 IV Continuous <Continuous>  dextrose 40% Gel 15 Oral once  dextrose 5%. 1000 IV Continuous <Continuous>  dextrose 5%. 1000 IV Continuous <Continuous>  dextrose 50% Injectable 25 IV Push once  dextrose 50% Injectable 12.5 IV Push once  dextrose 50% Injectable 25 IV Push once  donepezil 5 Oral at bedtime  enoxaparin Injectable 40 SubCutaneous daily  famotidine Injectable 20 IV Push every 12 hours  fentaNYL   Infusion. 0.505 IV Continuous <Continuous>  ferrous    sulfate 325 Oral daily  furosemide   Injectable 40 IV Push daily  gabapentin   Solution 100 Oral three times a day  glucagon  Injectable 1 IntraMuscular once  insulin lispro (ADMELOG) corrective regimen sliding scale  SubCutaneous three times a day before meals  ipratropium 17 MICROgram(s) HFA Inhaler 2 Inhalation every 6 hours  meperidine     Injectable 25 IV Push once  metroNIDAZOLE  IVPB 500 IV Intermittent every 8 hours  multivitamin 1 Oral daily  phenylephrine    Infusion 0.101 IV Continuous <Continuous>  polyethylene glycol 3350 17 Oral daily  potassium chloride   Powder 20 Oral daily  propofol Infusion 10.101 IV Continuous <Continuous>  senna 2 Oral at bedtime  sodium chloride 0.9%. 1000 IV Continuous <Continuous>  vasopressin Infusion 0.04 IV Continuous <Continuous>      WEIGHT  Weight (kg): 59.4 (03-30-21 @ 07:39)  Creatinine, Serum: 0.5 mg/dL (03-30-21 @ 02:50)      ANTIBIOTICS:  cefepime   IVPB 2000 milliGRAM(s) IV Intermittent every 12 hours  metroNIDAZOLE  IVPB 500 milliGRAM(s) IV Intermittent every 8 hours      All available historical records have been reviewed       MARA, MIGNONDULCE  85y, Female  Allergy: clindamycin (Hives)      LOS  19d    CHIEF COMPLAINT: Cough, fever (20 Mar 2021 01:27)      INTERVAL EVENTS/HPI  - No acute events overnight  - T(F): , Max: 98.1 (03-29-21 @ 20:00)  - s/p trach/peg  - WBC Count: 19.99 (03-30-21 @ 02:50)  WBC Count: 17.35 (03-29-21 @ 04:30)     - Creatinine, Serum: 0.5 (03-30-21 @ 02:50)  Creatinine, Serum: 0.5 (03-29-21 @ 04:30)       ROS  General: Denies rigors, nightsweats  HEENT: Denies headache, rhinorrhea, sore throat, eye pain  CV: Denies CP, palpitations  PULM: Denies wheezing, hemoptysis  GI: Denies hematemesis, hematochezia, melena  : Denies discharge, hematuria  MSK: Denies arthralgias, myalgias  SKIN: Denies rash, lesions  NEURO: Denies paresthesias, weakness  PSYCH: Denies depression, anxiety    VITALS:  T(F): 97.9, Max: 98.1 (03-29-21 @ 20:00)  HR: 76  BP: 128/60  RR: 33Vital Signs Last 24 Hrs  T(C): 36.6 (30 Mar 2021 16:00), Max: 37.3 (29 Mar 2021 17:19)  T(F): 97.9 (30 Mar 2021 16:00), Max: 98.1 (29 Mar 2021 20:00)  HR: 76 (30 Mar 2021 16:30) (43 - 106)  BP: 128/60 (30 Mar 2021 16:30) (89/50 - 162/71)  BP(mean): 87 (30 Mar 2021 16:30) (64 - 109)  RR: 33 (30 Mar 2021 16:30) (18 - 53)  SpO2: 90% (30 Mar 2021 16:30) (90% - 100%)    PHYSICAL EXAM:  Gen: NAD, resting in bed  HEENT: Normocephalic, atraumatic  Neck: supple, no lymphadenopathy  CV: Regular rate & regular rhythm  Lungs: decreased BS at bases, no fremitus  Abdomen: Soft, BS present  Ext: Warm, well perfused  Neuro: non focal, awake  Skin: no rash, no erythema  Lines: no phlebitis    FH: Non-contributory  Social Hx: Non-contributory    TESTS & MEASUREMENTS:                        10.8   19.99 )-----------( 322      ( 30 Mar 2021 02:50 )             34.0     03-30    135  |  93<L>  |  27<H>  ----------------------------<  176<H>  3.6   |  34<H>  |  0.5<L>    Ca    8.4<L>      30 Mar 2021 02:50  Mg     1.9     03-30      eGFR if Non African American: 90 mL/min/1.73M2 (03-30-21 @ 02:50)  eGFR if African American: 104 mL/min/1.73M2 (03-30-21 @ 02:50)          Culture - Acid Fast - Tissue w/Smear (collected 03-22-21 @ 13:33)  Source: .Tissue PLEURAL PEEL  Preliminary Report (03-24-21 @ 15:04):    Culture is being performed.    Culture - Fungal, Tissue (collected 03-22-21 @ 13:33)  Source: .Tissue None  Preliminary Report (03-23-21 @ 08:01):    Testing in progress    Culture - Tissue with Gram Stain (collected 03-22-21 @ 13:33)  Source: .Tissue None  Gram Stain (03-23-21 @ 04:38):    Rare polymorphonuclear leukocytes seen per low power field    Few White blood cells seen per low power field    Few Gram positive cocci in pairs per oil power field  Preliminary Report (03-23-21 @ 18:45):    No growth    Culture - Fungal, Bronchial (collected 03-22-21 @ 12:58)  Source: .Bronchial None  Preliminary Report (03-25-21 @ 11:20):    Rare Yeast    Culture - Acid Fast - Bronchial w/Smear (collected 03-22-21 @ 12:58)  Source: Bronch Wash None  Preliminary Report (03-24-21 @ 15:03):    Culture is being performed.    Culture - Bronchial (collected 03-22-21 @ 12:58)  Source: .Bronchial None  Gram Stain (03-23-21 @ 07:58):    Numerous polymorphonuclear leukocytes per low power field    No Squamous epithelial cells per low power field    No organisms seen per oil power field  Final Report (03-24-21 @ 22:11):    Normal Respiratory Narda present    Culture - Fungal, Body Fluid (collected 03-22-21 @ 09:26)  Source: .Body Fluid None  Preliminary Report (03-23-21 @ 08:01):    Testing in progress    Culture - Acid Fast - Body Fluid w/Smear (collected 03-22-21 @ 09:26)  Source: .Body Fluid None  Preliminary Report (03-24-21 @ 15:03):    Culture is being performed.    Culture - Body Fluid with Gram Stain (collected 03-22-21 @ 09:26)  Source: .Body Fluid None  Gram Stain (03-23-21 @ 04:38):    polymorphonuclear leukocytes seen    No organisms seen    by cytocentrifuge  Final Report (03-27-21 @ 17:22):    No growth at 5 days    Culture - Blood (collected 03-17-21 @ 16:00)  Source: .Blood Blood-Peripheral  Final Report (03-23-21 @ 02:39):    No Growth Final    Culture - Blood (collected 03-13-21 @ 07:33)  Source: .Blood None  Final Report (03-18-21 @ 18:01):    No Growth Final    Culture - Blood (collected 03-10-21 @ 21:00)  Source: .Blood Blood-Peripheral  Final Report (03-16-21 @ 04:01):    No Growth Final    Culture - Blood (collected 03-10-21 @ 21:00)  Source: .Blood Blood-Peripheral  Final Report (03-16-21 @ 04:01):    No Growth Final            INFECTIOUS DISEASES TESTING  COVID-19 PCR: NotDetec (03-29-21 @ 14:17)  Procalcitonin, Serum: 2.29 (03-27-21 @ 10:50)  MRSA PCR Result.: Negative (03-20-21 @ 21:38)  COVID-19 PCR: NotDetec (03-20-21 @ 12:30)  Fungitell: 46 (03-17-21 @ 16:00)  Procalcitonin, Serum: 0.29 (03-17-21 @ 11:30)  Procalcitonin, Serum: 0.31 (03-16-21 @ 10:15)  Legionella Antigen, Urine: Negative (03-12-21 @ 10:30)  Streptococcus Pneumoniae Ag Urine: Negative (03-12-21 @ 10:30)  MRSA PCR Result.: Negative (03-12-21 @ 10:30)  Legionella Antigen, Urine: Negative (03-11-21 @ 08:10)  Streptococcus Pneumoniae Ag Urine: Negative (03-11-21 @ 08:10)  Procalcitonin, Serum: 1.75 (03-11-21 @ 06:31)  Rapid RVP Result: NotDetec (03-10-21 @ 22:10)      INFLAMMATORY MARKERS      RADIOLOGY & ADDITIONAL TESTS:  I have personally reviewed the last available Chest xray  CXR  Xray Chest 1 View- PORTABLE-Urgent:   EXAM:  XR CHEST PORTABLE URGENT 1V            PROCEDURE DATE:  03/27/2021            INTERPRETATION:  Clinical History / Reason for exam: ETT    Comparison : Chest radiograph 3/27/2021 at 3:13 AM.    Technique/Positioning: Frontal radiograph the chest. Rotated exam.    Findings:    Support devices: Advanced ET tube terminates above the dk. Stable enteric tube and right IJ central venous catheter. Stable 2 right-sided chest tubes    Cardiac/mediastinum/hilum: Stable.    Lung parenchyma/Pleura: Unchanged left greater right bilateral opacities. No visualized pneumothorax.    Skeleton/soft tissues: Stable.    Impression:    Support devices in proper position.    Unchanged left greater than right bilateral opacities.                  BUTCH RAWLS MD; Attending Radiologist  This document has been electronically signed. Mar 28 2021 10:19AM (03-27-21 @ 20:43)      CT  CT Chest No Cont:   EXAM:  CT CHEST            PROCEDURE DATE:  03/29/2021            INTERPRETATION:  CLINICAL STATEMENT: Right-sided VATS.    TECHNIQUE: Multislice helical sections were obtained from the thoracic inlet to the lung bases without contrast administration. Coronal and sagittal reformatted images and axial MIPs are also submitted.    COMPARISON: March 17, 2021 CT chest. Correlation also made with most recent chest radiograph dated March 29, 2021.    FINDINGS:    TUBES/LINES: 2 right-sided chest tubesone terminating at the apex and one terminating at the base. ET tube terminates above the dk. Right IJ central venous catheter with tip in the SVC. Enteric tube courses below the diaphragm into the stomach.    LUNGS, PLEURA, AND AIRWAYS: Moderateright pneumothorax. Increased extensive bilateral diffuse ground glass opacities and interlobular septal thickening, left greater than right. Patent central airways. Resolved right pleural effusion. Trace left pleural effusion present.    MEDIASTINUM/LYMPH NODES: Increased enlarged mediastinal lymph nodes. For example, a right lower paratracheal lymph node measures 1.8 x 1.4 cm.    HEART/GREAT VESSELS: Mild cardiomegaly. No pericardial effusion. Atherosclerotic vascular calcifications. Ectatic aorta.    BONES/SOFT TISSUES: Subcutaneous emphysema in the right chest wall. No acute osseous abnormality.    VISUALIZED UPPER ABDOMEN: Punctate hepatic calcifications.    IMPRESSION:  1.  Since March 29, 2021, resolved right pleural effusion; persistent moderate right pneumothorax with 2 chest tubes in place.  2.  Increased left greater than right diffuse bilateral groundglass opacities and interlobular septal thickening. Findings are suspicious for a multifocal infection. Superimposed edema is also a possibility.  3.  Enlarging mediastinal lymph nodes, likely reactive.                AMANDA HAMMONDS MD; Attending Radiologist  This document has been electronically signed. Mar 29 2021  3:10PM (03-29-21 @ 12:37)      CARDIOLOGY TESTING  12 Lead ECG:   Ventricular Rate 157 BPM    Atrial Rate 326 BPM    QRS Duration 88 ms    Q-T Interval 314 ms    QTC Calculation(Bazett) 507 ms    R Axis 41 degrees    T Axis -87 degrees    Diagnosis Line Atrial fibrillation with premature ventricular or aberrantlyconducted  complexes  Nonspecific ST and T wave abnormality  Abnormal ECG    Confirmed by SHANNAN MORALES MD ([id number]) on 3/26/2021 1:51:15 PM (03-26-21 @ 12:19)  12 Lead ECG:   Ventricular Rate 82 BPM    Atrial Rate 82 BPM    P-R Interval 152 ms    QRS Duration 92 ms    Q-T Interval 408 ms    QTC Calculation(Bazett) 476 ms    P Axis 44 degrees    R Axis 22 degrees    T Axis 26 degrees    Diagnosis Line Normal sinus rhythm  Normal ECG    Confirmed by JUANI DODSON MD ([id number]) on 3/24/2021 8:30:17 PM (03-24-21 @ 13:34)      MEDICATIONS  ALBUTerol    90 MICROgram(s) HFA Inhaler 2 Inhalation every 6 hours  aMIOdarone Infusion 1 IV Continuous <Continuous>  cefepime   IVPB 2000 IV Intermittent every 12 hours  chlorhexidine 0.12% Liquid 5 Oral Mucosa two times a day  chlorhexidine 4% Liquid 1 Topical <User Schedule>  dexMEDEtomidine Infusion 0.202 IV Continuous <Continuous>  dextrose 40% Gel 15 Oral once  dextrose 5%. 1000 IV Continuous <Continuous>  dextrose 5%. 1000 IV Continuous <Continuous>  dextrose 50% Injectable 25 IV Push once  dextrose 50% Injectable 12.5 IV Push once  dextrose 50% Injectable 25 IV Push once  donepezil 5 Oral at bedtime  enoxaparin Injectable 40 SubCutaneous daily  famotidine Injectable 20 IV Push every 12 hours  fentaNYL   Infusion. 0.505 IV Continuous <Continuous>  ferrous    sulfate 325 Oral daily  furosemide   Injectable 40 IV Push daily  gabapentin   Solution 100 Oral three times a day  glucagon  Injectable 1 IntraMuscular once  insulin lispro (ADMELOG) corrective regimen sliding scale  SubCutaneous three times a day before meals  ipratropium 17 MICROgram(s) HFA Inhaler 2 Inhalation every 6 hours  meperidine     Injectable 25 IV Push once  metroNIDAZOLE  IVPB 500 IV Intermittent every 8 hours  multivitamin 1 Oral daily  phenylephrine    Infusion 0.101 IV Continuous <Continuous>  polyethylene glycol 3350 17 Oral daily  potassium chloride   Powder 20 Oral daily  propofol Infusion 10.101 IV Continuous <Continuous>  senna 2 Oral at bedtime  sodium chloride 0.9%. 1000 IV Continuous <Continuous>  vasopressin Infusion 0.04 IV Continuous <Continuous>      WEIGHT  Weight (kg): 59.4 (03-30-21 @ 07:39)  Creatinine, Serum: 0.5 mg/dL (03-30-21 @ 02:50)      ANTIBIOTICS:  cefepime   IVPB 2000 milliGRAM(s) IV Intermittent every 12 hours  metroNIDAZOLE  IVPB 500 milliGRAM(s) IV Intermittent every 8 hours      All available historical records have been reviewed

## 2021-03-30 NOTE — PROGRESS NOTE ADULT - ATTENDING COMMENTS
Impression  Severe necrotizing CAP   Right sided parapneumonic effusion s/p VATS and chest tube  Sepsis present on admission   Septic shock, improving     Recommend  Continue present management  ARDS network MV setting

## 2021-03-30 NOTE — PROGRESS NOTE ADULT - ASSESSMENT
Impression  Severe necrotizing CAP   Right sided parapneumonic effusion s/p VATS and chest tube  Sepsis present on admission   Septic shock, improving     Recommendations  Goal directed fluid resuscitation, CHEETAH  Target MAP>60, wean pressors as tolerated  Dec TV to 450  Avoid volume overload  DC propofol, increase precedex if needed  DC miah, add levo if needed  DC cefepime, start merrem  Repeat nasal MRSA swab  ID FU  Repeat pancx  HOB at 45  Aspiration precautions  DVT ppx  Decrease .    SBT  Impression  Severe necrotizing CAP   Right sided parapneumonic effusion s/p VATS and chest tube  Sepsis present on admission   Septic shock, improving     Recommendations  Goal directed fluid resuscitation, CHEETAH  Target MAP>60, wean pressors as tolerated  ARDS network vent settings   Avoid volume overload  DC propofol, increase precedex if needed  DC miah, add levo if needed  Repeat nasal MRSA swab. DTA   ID FU  Repeat pancx.  Consider broadening ABX coverage    HOB at 45  Aspiration precautions  DVT ppx

## 2021-03-30 NOTE — PRE-ANESTHESIA EVALUATION ADULT - NSANTHOSAYNRD_GEN_A_CORE
No. TANIYA screening performed.  STOP BANG Legend: 0-2 = LOW Risk; 3-4 = INTERMEDIATE Risk; 5-8 = HIGH Risk

## 2021-03-30 NOTE — PROGRESS NOTE ADULT - SUBJECTIVE AND OBJECTIVE BOX
Progress Note: General Surgery  Patient: DESTIN TERRY , 85y (1935)Female   MRN: [id number]  Location: [address]  Visit: 03-11-21 Inpatient  Date: 03-30-21 @ 09:43    Admit Diagnosis/Chief Complaint:     Procedure/Diagnosis:  S/P Bronchoscopy, at bedside        Events/ 24h: No acute events overnight still intubated. Pain controlled.    Vitals: T(F): 98.1 (03-30-21 @ 08:00), Max: 100.2 (03-29-21 @ 11:00)  HR: 73 (03-30-21 @ 08:15)  BP: 139/63 (03-30-21 @ 08:15) (80/46 - 162/71)  RR: 26 (03-30-21 @ 08:15)  SpO2: 97% (03-30-21 @ 08:15)  RR (machine): 18, TV (machine): 500, FiO2: 60, PEEP: 5, PIP: 52  In:   03-29-21 @ 07:01  -  03-30-21 @ 07:00  --------------------------------------------------------  IN: 1965.6 mL    03-30-21 @ 07:01  -  03-30-21 @ 09:43  --------------------------------------------------------  IN: 22.4 mL      Out:   03-29-21 @ 07:01  -  03-30-21 @ 07:00  --------------------------------------------------------  OUT:    Chest Tube (mL): 70 mL    Chest Tube (mL): 100 mL    Chest Tube (mL): 30 mL    Indwelling Catheter - Urethral (mL): 2965 mL    Rectal Tube (mL): 190 mL  Total OUT: 3355 mL      03-30-21 @ 07:01  -  03-30-21 @ 09:43  --------------------------------------------------------  OUT:    Chest Tube (mL): 0 mL    Chest Tube (mL): 0 mL    Chest Tube (mL): 0 mL    Indwelling Catheter - Urethral (mL): 40 mL    Rectal Tube (mL): 100 mL  Total OUT: 140 mL        Net:   03-29-21 @ 07:01  -  03-30-21 @ 07:00  --------------------------------------------------------  NET: -1389.4 mL    03-30-21 @ 07:01  -  03-30-21 @ 09:43  --------------------------------------------------------  NET: -117.6 mL        Diet: Diet, NPO after Midnight:      NPO Start Date: 29-Mar-2021,   NPO Start Time: 23:59 (03-29-21 @ 17:14)  Diet, NPO:   Tube Feeding Modality: Orogastric  Vital High Protein  Total Volume for 24 Hours (mL): 1440  Continuous  Starting Tube Feed Rate mL per Hour: 20  Increase Tube Feed Rate by (mL): 10     Every 2 hours  Until Goal Tube Feed Rate (mL per Hour): 60  Tube Feed Duration (in Hours): 24  Tube Feed Start Time: 13:00 (03-29-21 @ 13:00)  Diet, NPO after Midnight:      NPO Start Date: 28-Mar-2021,   NPO Start Time: 23:59 (03-28-21 @ 10:17)    IV Fluids: dextrose 5%. 1000 milliLiter(s) (50 mL/Hr) IV Continuous <Continuous>  dextrose 5%. 1000 milliLiter(s) (100 mL/Hr) IV Continuous <Continuous>  ferrous    sulfate 325 milliGRAM(s) Oral daily  lactated ringers Bolus 1000 milliLiter(s) IV Bolus once  multivitamin 1 Tablet(s) Oral daily  potassium chloride   Powder 20 milliEquivalent(s) Oral daily  sodium chloride 0.9%. 1000 milliLiter(s) (10 mL/Hr) IV Continuous <Continuous>      Physical Examination:  General Appearance: Intubated on vent   HEENT: EOMI, sclera non-icteric.  Heart: RRR   Lungs: CTABL.   Abdomen:  Soft, ***nontender, nondistended.   MSK/Extremities: Warm & well-perfused.   Skin: Warm, dry. No jaundice.       Medications: [Standing]  ALBUTerol    90 MICROgram(s) HFA Inhaler 2 Puff(s) Inhalation every 6 hours  aMIOdarone Infusion 1 mG/Min (33.3 mL/Hr) IV Continuous <Continuous>  cefepime   IVPB 2000 milliGRAM(s) IV Intermittent every 12 hours  chlorhexidine 0.12% Liquid 5 milliLiter(s) Oral Mucosa two times a day  chlorhexidine 4% Liquid 1 Application(s) Topical <User Schedule>  dexMEDEtomidine Infusion 0.2 MICROgram(s)/kG/Hr (3 mL/Hr) IV Continuous <Continuous>  dextrose 40% Gel 15 Gram(s) Oral once  dextrose 5%. 1000 milliLiter(s) (50 mL/Hr) IV Continuous <Continuous>  dextrose 5%. 1000 milliLiter(s) (100 mL/Hr) IV Continuous <Continuous>  dextrose 50% Injectable 25 Gram(s) IV Push once  dextrose 50% Injectable 12.5 Gram(s) IV Push once  dextrose 50% Injectable 25 Gram(s) IV Push once  donepezil 5 milliGRAM(s) Oral at bedtime  enoxaparin Injectable 40 milliGRAM(s) SubCutaneous daily  famotidine Injectable 20 milliGRAM(s) IV Push every 12 hours  fentaNYL   Infusion. 0.5 MICROgram(s)/kG/Hr (3 mL/Hr) IV Continuous <Continuous>  ferrous    sulfate 325 milliGRAM(s) Oral daily  furosemide   Injectable 40 milliGRAM(s) IV Push daily  gabapentin   Solution 100 milliGRAM(s) Oral three times a day  glucagon  Injectable 1 milliGRAM(s) IntraMuscular once  insulin lispro (ADMELOG) corrective regimen sliding scale   SubCutaneous three times a day before meals  ipratropium 17 MICROgram(s) HFA Inhaler 2 Puff(s) Inhalation every 6 hours  lactated ringers Bolus 1000 milliLiter(s) IV Bolus once  meperidine     Injectable 25 milliGRAM(s) IV Push once  metroNIDAZOLE  IVPB 500 milliGRAM(s) IV Intermittent every 8 hours  multivitamin 1 Tablet(s) Oral daily  norepinephrine Infusion 0.05 MICROgram(s)/kG/Min (5.63 mL/Hr) IV Continuous <Continuous>  nystatin Powder 1 Application(s) Topical two times a day  phenylephrine    Infusion 0.1 MICROgram(s)/kG/Min (2.25 mL/Hr) IV Continuous <Continuous>  polyethylene glycol 3350 17 Gram(s) Oral daily  potassium chloride   Powder 20 milliEquivalent(s) Oral daily  propofol Infusion 10 MICROgram(s)/kG/Min (3.6 mL/Hr) IV Continuous <Continuous>  senna 2 Tablet(s) Oral at bedtime  sodium chloride 0.9%. 1000 milliLiter(s) (10 mL/Hr) IV Continuous <Continuous>  vasopressin Infusion 0.04 Unit(s)/Min (2.4 mL/Hr) IV Continuous <Continuous>    DVT Prophylaxis: enoxaparin Injectable 40 milliGRAM(s) SubCutaneous daily    GI Prophylaxis: famotidine Injectable 20 milliGRAM(s) IV Push every 12 hours    Antibiotics: cefepime   IVPB 2000 milliGRAM(s) IV Intermittent every 12 hours  metroNIDAZOLE  IVPB 500 milliGRAM(s) IV Intermittent every 8 hours    Anticoagulation:   Medications:[PRN]  guaifenesin/dextromethorphan  Syrup 10 milliLiter(s) Oral every 4 hours PRN  ondansetron Injectable 4 milliGRAM(s) IV Push every 4 hours PRN      Labs:                        10.8   19.99 )-----------( 322      ( 30 Mar 2021 02:50 )             34.0     03-30    135  |  93<L>  |  27<H>  ----------------------------<  176<H>  3.6   |  34<H>  |  0.5<L>    Ca    8.4<L>      30 Mar 2021 02:50  Mg     1.9     03-30        PT/INR - ( 30 Mar 2021 02:50 )   PT: 18.30 sec;   INR: 1.59 ratio         PTT - ( 30 Mar 2021 02:50 )  PTT:35.4 sec  ABG - ( 30 Mar 2021 03:47 )  pH: 7.54  /  pCO2: 47    /  pO2: 66    / HCO3: 40    / Base Excess: 15.9  /  SaO2: 95                      Urine/Micro:        Imaging:   ***  CT Chest No Cont:   EXAM:  CT CHEST            PROCEDURE DATE:  03/29/2021            INTERPRETATION:  CLINICAL STATEMENT: Right-sided VATS.    TECHNIQUE: Multislice helical sections were obtained from the thoracic inlet to the lung bases without contrast administration. Coronal and sagittal reformatted images and axial MIPs are also submitted.    COMPARISON: March 17, 2021 CT chest. Correlation also made with most recent chest radiograph dated March 29, 2021.    FINDINGS:    TUBES/LINES: 2 right-sided chest tubesone terminating at the apex and one terminating at the base. ET tube terminates above the dk. Right IJ central venous catheter with tip in the SVC. Enteric tube courses below the diaphragm into the stomach.    LUNGS, PLEURA, AND AIRWAYS: Moderateright pneumothorax. Increased extensive bilateral diffuse ground glass opacities and interlobular septal thickening, left greater than right. Patent central airways. Resolved right pleural effusion. Trace left pleural effusion present.    MEDIASTINUM/LYMPH NODES: Increased enlarged mediastinal lymph nodes. For example, a right lower paratracheal lymph node measures 1.8 x 1.4 cm.    HEART/GREAT VESSELS: Mild cardiomegaly. No pericardial effusion. Atherosclerotic vascular calcifications. Ectatic aorta.    BONES/SOFT TISSUES: Subcutaneous emphysema in the right chest wall. No acute osseous abnormality.    VISUALIZED UPPER ABDOMEN: Punctate hepatic calcifications.    IMPRESSION:  1.  Since March 29, 2021, resolved right pleural effusion; persistent moderate right pneumothorax with 2 chest tubes in place.  2.  Increased left greater than right diffuse bilateral groundglass opacities and interlobular septal thickening. Findings are suspicious for a multifocal infection. Superimposed edema is also a possibility.  3.  Enlarging mediastinal lymph nodes, likely reactive.                AMANDA HAMMONDS MD; Attending Radiologist  This document has been electronically signed. Mar 29 2021  3:10PM (03-29-21 @ 12:37)

## 2021-03-30 NOTE — PROGRESS NOTE ADULT - SUBJECTIVE AND OBJECTIVE BOX
Patient is a 85y old  Female who presents with a chief complaint of Cough, fever (20 Mar 2021 01:27)        Over Night Events:  Failing SBT. Remains on mechanical ventilation. Precedex 0.3, vaso-0.04, propofol-10, miah-0.06      ROS:     All pertinent ROS are negative except HPI         PHYSICAL EXAM    ICU Vital Signs Last 24 Hrs  T(C): 36.7 (30 Mar 2021 08:00), Max: 37.9 (29 Mar 2021 11:00)  T(F): 98.1 (30 Mar 2021 08:00), Max: 100.2 (29 Mar 2021 11:00)  HR: 67 (30 Mar 2021 08:00) (43 - 92)  BP: 114/60 (30 Mar 2021 08:00) (80/46 - 162/71)  BP(mean): 82 (30 Mar 2021 08:00) (59 - 109)  RR: 30 (30 Mar 2021 08:00) (19 - 53)  SpO2: 92% (30 Mar 2021 08:00) (91% - 100%)      CONSTITUTIONAL:   Ill appearing.  Well nourished.  NAD    ENT:   Airway patent,   Mouth with normal mucosa.   No thrush    EYES:   Pupils equal,   Round and reactive to light.    CARDIAC:   Normal rate,   Regular rhythm.    No edema    Vascular:  Normal systolic impulse  No Carotid bruits    RESPIRATORY:   ET+  Inspection- normal chest wall symmetry  Palpation- normal chest wall expansion  Auscultation- clear to auscultation bilaterally   No wheezing  Bilateral BS  Normal chest expansion  Not tachypneic,  No use of accessory muscles    GASTROINTESTINAL:  Abdomen soft,   Non-tender,   No guarding,   + BS    MUSCULOSKELETAL:   Range of motion is not limited,  No clubbing, cyanosis    NEUROLOGICAL:   Sedated  No motor  deficits.  pertinent DTRs normal    SKIN:   Skin normal color for race,   Warm and dry  No evidence of rash.    PSYCHIATRIC:   No apparent risk to self or others.      03-29-21 @ 07:01  -  03-30-21 @ 07:00  --------------------------------------------------------  IN:    Dexmedetomidine: 128.5 mL    Enteral Tube Flush: 375 mL    IV PiggyBack: 600 mL    Phenylephrine: 47.5 mL    Propofol: 119.4 mL    sodium chloride 0.9%: 240 mL    Vasopressin: 55.2 mL    Vital High Protein: 400 mL  Total IN: 1965.6 mL    OUT:    Chest Tube (mL): 70 mL    Chest Tube (mL): 100 mL    Chest Tube (mL): 30 mL    Indwelling Catheter - Urethral (mL): 2965 mL    Rectal Tube (mL): 190 mL  Total OUT: 3355 mL    Total NET: -1389.4 mL      03-30-21 @ 07:01  -  03-30-21 @ 08:34  --------------------------------------------------------  IN:    Dexmedetomidine: 4.7 mL    Phenylephrine: 1.5 mL    Propofol: 3.8 mL    sodium chloride 0.9%: 10 mL    Vasopressin: 2.4 mL  Total IN: 22.4 mL    OUT:    Chest Tube (mL): 0 mL    Chest Tube (mL): 0 mL    Chest Tube (mL): 0 mL    Indwelling Catheter - Urethral (mL): 40 mL    Rectal Tube (mL): 100 mL  Total OUT: 140 mL    Total NET: -117.6 mL          LABS:                            10.8   19.99 )-----------( 322      ( 30 Mar 2021 02:50 )             34.0                                               03-30    135  |  93<L>  |  27<H>  ----------------------------<  176<H>  3.6   |  34<H>  |  0.5<L>    Ca    8.4<L>      30 Mar 2021 02:50  Mg     1.9     03-30        PT/INR - ( 30 Mar 2021 02:50 )   PT: 18.30 sec;   INR: 1.59 ratio         PTT - ( 30 Mar 2021 02:50 )  PTT:35.4 sec                                                                      Mode: Nasal SIMV/ IMV ([facility])  RR (machine): 18  TV (machine): 500  FiO2: 60  PEEP: 5  MAP: 22  PIP: 52                                      ABG - ( 30 Mar 2021 03:47 )  pH, Arterial: 7.54  pH, Blood: x     /  pCO2: 47    /  pO2: 66    / HCO3: 40    / Base Excess: 15.9  /  SaO2: 95                MEDICATIONS  (STANDING):  ALBUTerol    90 MICROgram(s) HFA Inhaler 2 Puff(s) Inhalation every 6 hours  aMIOdarone Infusion 1 mG/Min (33.3 mL/Hr) IV Continuous <Continuous>  cefepime   IVPB 2000 milliGRAM(s) IV Intermittent every 12 hours  chlorhexidine 0.12% Liquid 5 milliLiter(s) Oral Mucosa two times a day  chlorhexidine 4% Liquid 1 Application(s) Topical <User Schedule>  dexMEDEtomidine Infusion 0.2 MICROgram(s)/kG/Hr (3 mL/Hr) IV Continuous <Continuous>  dextrose 40% Gel 15 Gram(s) Oral once  dextrose 5%. 1000 milliLiter(s) (50 mL/Hr) IV Continuous <Continuous>  dextrose 5%. 1000 milliLiter(s) (100 mL/Hr) IV Continuous <Continuous>  dextrose 50% Injectable 25 Gram(s) IV Push once  dextrose 50% Injectable 12.5 Gram(s) IV Push once  dextrose 50% Injectable 25 Gram(s) IV Push once  donepezil 5 milliGRAM(s) Oral at bedtime  enoxaparin Injectable 40 milliGRAM(s) SubCutaneous daily  famotidine Injectable 20 milliGRAM(s) IV Push every 12 hours  fentaNYL   Infusion. 0.5 MICROgram(s)/kG/Hr (3 mL/Hr) IV Continuous <Continuous>  ferrous    sulfate 325 milliGRAM(s) Oral daily  furosemide   Injectable 40 milliGRAM(s) IV Push daily  gabapentin   Solution 100 milliGRAM(s) Oral three times a day  glucagon  Injectable 1 milliGRAM(s) IntraMuscular once  insulin lispro (ADMELOG) corrective regimen sliding scale   SubCutaneous three times a day before meals  ipratropium 17 MICROgram(s) HFA Inhaler 2 Puff(s) Inhalation every 6 hours  lactated ringers Bolus 1000 milliLiter(s) IV Bolus once  meperidine     Injectable 25 milliGRAM(s) IV Push once  metroNIDAZOLE  IVPB 500 milliGRAM(s) IV Intermittent every 8 hours  multivitamin 1 Tablet(s) Oral daily  norepinephrine Infusion 0.05 MICROgram(s)/kG/Min (5.63 mL/Hr) IV Continuous <Continuous>  nystatin Powder 1 Application(s) Topical two times a day  phenylephrine    Infusion 0.1 MICROgram(s)/kG/Min (2.25 mL/Hr) IV Continuous <Continuous>  polyethylene glycol 3350 17 Gram(s) Oral daily  potassium chloride   Powder 20 milliEquivalent(s) Oral daily  propofol Infusion 10 MICROgram(s)/kG/Min (3.6 mL/Hr) IV Continuous <Continuous>  senna 2 Tablet(s) Oral at bedtime  sodium chloride 0.9%. 1000 milliLiter(s) (10 mL/Hr) IV Continuous <Continuous>  vasopressin Infusion 0.04 Unit(s)/Min (2.4 mL/Hr) IV Continuous <Continuous>    MEDICATIONS  (PRN):  guaifenesin/dextromethorphan  Syrup 10 milliLiter(s) Oral every 4 hours PRN Cough/throat discomfort  ondansetron Injectable 4 milliGRAM(s) IV Push every 4 hours PRN Nausea and/or Vomiting      New X-rays reviewed:                                                                                  ECHO    CXR interpreted by me:  bilateral interstitial opacities

## 2021-03-30 NOTE — BRIEF OPERATIVE NOTE - OPERATION/FINDINGS
Percutaneous endoscopic gastrostomy tube placement. An EGD was performed, the stomach was transilluminated and an optimal position was identified. PEG tube was placed over guidewire and secured in position at 2.5cm frome the hub. Sutured in place.     Percutaneous tracheostomy was attempted, however due to inadequate visualization during bronchoscopy decision was made to convert to open tracheostomy. 8Fr Shiley tracheostomy placed and sutured in place.
Tracheostomy placed without complications  PEG placed in antrum, site verified by palpation and transillumination
Right empyema, 800 cc of purulent fluid in pleural space, multiple pockets of purulent fluids, severe dense adhesions from lung to chest wall, diaphragm and mediastinum, moderately thick pleural rind with trapped lung, necrotizing pneumonia middle lobe  Total lung decortication accomplished, adequate lung re-expansion, small air leak

## 2021-03-31 NOTE — PROGRESS NOTE ADULT - SUBJECTIVE AND OBJECTIVE BOX
pt remains intubated, but remains on propofol and fentanyl  now on 2 pressors and paralytic today.  Events noted & d/w CTU team.  abd soft, new GT in place but clamped  + right IJ TLC in place, dressing intact  marked facial & infraclavicular wasting > expected for age  Vital Signs Last 24 Hrs  T(C): 36.7 (31 Mar 2021 16:00), Max: 37 (31 Mar 2021 12:00)  T(F): 98 (31 Mar 2021 16:00), Max: 98.6 (31 Mar 2021 12:00)  HR: 74 (31 Mar 2021 16:00) (51 - 111)  BP: 93/52 (31 Mar 2021 16:00) (73/46 - 165/83)  BP(mean): 68 (31 Mar 2021 16:00) (54 - 114)  RR: 21 (31 Mar 2021 16:00) (20 - 54)  SpO2: 98% (31 Mar 2021 16:00) (90% - 100%)    MEDICATIONS  (STANDING):  ALBUTerol    90 MICROgram(s) HFA Inhaler 2 Puff(s) Inhalation every 6 hours  cefepime   IVPB 2000 milliGRAM(s) IV Intermittent every 12 hours  chlorhexidine 0.12% Liquid 5 milliLiter(s) Oral Mucosa two times a day  chlorhexidine 4% Liquid 1 Application(s) Topical <User Schedule>  cisatracurium Infusion 3 MICROgram(s)/kG/Min (10.7 mL/Hr) IV Continuous <Continuous>  dexMEDEtomidine Infusion 0.202 MICROgram(s)/kG/Hr (3 mL/Hr) IV Continuous <Continuous>  donepezil 5 milliGRAM(s) Oral at bedtime  enoxaparin Injectable 40 milliGRAM(s) SubCutaneous daily  famotidine Injectable 20 milliGRAM(s) IV Push every 12 hours  fentaNYL   Infusion. 0.505 MICROgram(s)/kG/Hr (3 mL/Hr) IV Continuous <Continuous>  ferrous    sulfate 325 milliGRAM(s) Oral daily  fluconAZOLE IVPB      gabapentin   Solution 100 milliGRAM(s) Oral three times a day  insulin lispro (ADMELOG) corrective regimen sliding scale   SubCutaneous three times a day before meals  ipratropium 17 MICROgram(s) HFA Inhaler 2 Puff(s) Inhalation every 6 hours  metroNIDAZOLE  IVPB 500 milliGRAM(s) IV Intermittent every 8 hours  multivitamin 1 Tablet(s) Oral daily  petrolatum Ophthalmic Ointment 1 Application(s) Both EYES three times a day  phenylephrine    Infusion 0.1 MICROgram(s)/kG/Min (1.11 mL/Hr) IV Continuous <Continuous>  potassium chloride   Powder 20 milliEquivalent(s) Oral daily  propofol Infusion 10.101 MICROgram(s)/kG/Min (3.6 mL/Hr) IV Continuous <Continuous>  sodium chloride 0.9%. 1000 milliLiter(s) (10 mL/Hr) IV Continuous <Continuous>  vancomycin  IVPB 1500 milliGRAM(s) IV Intermittent once  vasopressin Infusion 0.04 Unit(s)/Min (2.4 mL/Hr) IV Continuous <Continuous>                        10.2   23.02 )-----------( 362      ( 31 Mar 2021 01:30 )             33.0   03-31    140  |  96<L>  |  39<H>  ----------------------------<  153<H>  3.8   |  36<H>  |  0.5<L>    Ca    8.5      31 Mar 2021 01:30  Phos  3.1     03-31  Mg     2.0     03-31  ABG - ( 31 Mar 2021 13:01 )  pH, Arterial: 7.41  pH, Blood: x     /  pCO2: 61    /  pO2: 120   / HCO3: 39    / Base Excess: 12.3  /  SaO2: 99        CAPILLARY BLOOD GLUCOSE  186 (31 Mar 2021 06:00)  138 (30 Mar 2021 21:00)  POCT Blood Glucose.: 182 mg/dL (31 Mar 2021 11:46)  POCT Blood Glucose.: 186 mg/dL (31 Mar 2021 06:49)  POCT Blood Glucose.: 138 mg/dL (30 Mar 2021 21:50)  < from: Xray Chest 1 View- PORTABLE-Routine (Xray Chest 1 View- PORTABLE-Routine in AM.) (03.31.21 @ 06:08) >  Support devices: Stable tracheostomy, multiple right-sided chest tubes.    Cardiac/mediastinum/hilum: Unchanged.    Lung parenchyma/Pleura: Stable right basilar pneumothorax. Unchanged bilateral parenchymal opacities.    < end of copied text >      Vitamin B12, Serum: >2000 pg/mL (03.27.21

## 2021-03-31 NOTE — PROGRESS NOTE ADULT - ASSESSMENT
Chest tubes to suction  Daily chest xray  Monitor chest tube out put  Monitor for airleaks  Monitor 02 saturation  DVT prophylaxis   Pain management

## 2021-03-31 NOTE — PROGRESS NOTE ADULT - ASSESSMENT
ASSESSMENT  85 year old lady known to have dementia, osteoarithtis, Tajik-speaking presenting with cough and fever.      IMPRESSION  #Sepsis present on admission - secondary to CAP  -  CT Chest No Cont (03.11.21 @ 00:54): Areas of right lung consolidation which can be seen in pneumonia. Numerous air-fluid levels throughout the right lung compatible with an infectious process. Suggestion of split pleura at the lung base for which empyema is favored although inherently limited on this noncontrast exam. Consideration can be given to contrast administration if feasiblefor better delineation. Areas of bilateral interlobular septal thickening and mild layering groundglass attenuation may reflect a component of edema.  - Urine Legionella negative  - Urine Strep negative  - BLood Cx 3/10 and 3/13 negative  - Procalcitonin 1.15   - CT Chest No Cont (03.17.21 @ 16:19): Since 3/11/2021. Enlarging loculated right pleural fluid collection with multiple air bubbles consistent with empyema.   Associated compressive atelectasis right lung is seen. Scattered groundglass opacities involving multiple lumbar segments.  - s/p VATS 3/22 -- right empyema with 800 cc purulent fluid in pleural space, multiple pockers with dense adhesions -- necrotizing pneumonia of the middle lobe  - VATS Cx 3/22 with rare yeast, otherwise no growth  -  CT Chest No Cont (03.29.21 @ 12:37): Since March 29, 2021, resolved right pleural effusion; persistent moderate right pneumothorax with 2 chest tubes in place. Increased left greater than right diffuse bilateral groundglass opacities and interlobular septal thickening. Findings are suspicious for a multifocal infection. Superimposed edema is also a possibility.  Enlarging mediastinal lymph nodes, likely reactive.    #Dementia  #Osteoarthritis  #Obesity BMI (kg/m2): 23.4  #Abx allergy: clindamycin (Hives)    Creatinine, Serum: 0.5 mg/dL (03.31.21 @ 01:30)  Weight (kg): 60 (11 Mar 2021 01:17)  CrCl 50      RECOMMENDATIONS  - noted CT imaging -- left lung appears more consolidated -- while community acquired candida pneumonia is rare, ok with adding fluconazole 400 mg daily for now   - can start Vancomycin 1.5g x 1, followed by 750 mg daily -- please obtain trough prior to 4th dose (goal 15-20)  - when able, please send for tracheal cultures  - repeat MRSA nares  - check fungitell and galactomannan   - continue cefepime/flagyl for now    Please call or message on [employer] Teams if with any questions.  Spectra [phone]

## 2021-03-31 NOTE — PROGRESS NOTE ADULT - ASSESSMENT
ASSESSMENT  85y Female patient admitted 3/11  s/p R VATS evacuation of empyema, x3 chest tubes in place post operatively remains intubated   ARDS  shock/ pressors  elevated WBC    PLAN  consider trophic enteral input via GT - e.g. 20 ml/h of Vital HI Pro ( chosen due to provision of propofol)  will re-evaluate choice of enteral formula for goal feeding, depending on propofol dosing tomorrow  if unable to feed to goal enterally tomorrow, will need to start TPN

## 2021-03-31 NOTE — PROGRESS NOTE ADULT - SUBJECTIVE AND OBJECTIVE BOX
DESTIN TERRY  85y Female   [id number]    Hospital Day: 21  Post Operative Day:  Procedure:s/p right VTAS , 3 CT placed, trach and peg   Patient is a 85y old  Female who presents with a chief complaint of Cough, fever (20 Mar 2021 01:27)    PAST MEDICAL & SURGICAL HISTORY:  Dementia    Osteoarthritis    No significant past surgical history        Events of the Last 24h:trach and peg   Vital Signs Last 24 Hrs  T(C): 36.6 (30 Mar 2021 20:00), Max: 36.7 (30 Mar 2021 08:00)  T(F): 97.9 (30 Mar 2021 20:00), Max: 98.1 (30 Mar 2021 08:00)  HR: 62 (30 Mar 2021 23:) (53 - 106)  BP: 111/54 (30 Mar 2021 23:) (92/51 - 155/71)  BP(mean): 78 (30 Mar 2021 23:) (67 - 109)  RR: 35 (30 Mar 2021 23:) (18 - 37)  SpO2: 95% (30 Mar 2021 23:) (90% - 100%)    Mode: AC/ CMV (Assist Control/ Continuous Mandatory Ventilation), RR (machine): 18, TV (machine): 500, FiO2: 100, PEEP: 8, ITime: 1, MAP: 26, PIP: 38        I&O's Summary    29 Mar 2021 07:  -  30 Mar 2021 07:00  --------------------------------------------------------  IN: 1965.6 mL / OUT: 3355 mL / NET: -1389.4 mL    30 Mar 2021 07:  -  31 Mar 2021 00:48  --------------------------------------------------------  IN: 574.3 mL / OUT: 960 mL / NET: -385.7 mL     I&O's Detail    29 Mar 2021 07:01  -  30 Mar 2021 07:00  --------------------------------------------------------  IN:    Dexmedetomidine: 128.5 mL    Enteral Tube Flush: 375 mL    IV PiggyBack: 600 mL    Phenylephrine: 47.5 mL    Propofol: 119.4 mL    sodium chloride 0.9%: 240 mL    Vasopressin: 55.2 mL    Vital High Protein: 400 mL  Total IN: 1965.6 mL    OUT:    Chest Tube (mL): 70 mL    Chest Tube (mL): 100 mL    Chest Tube (mL): 30 mL    Indwelling Catheter - Urethral (mL): 2965 mL    Rectal Tube (mL): 190 mL  Total OUT: 3355 mL    Total NET: -1389.4 mL      30 Mar 2021 07:01  -  31 Mar 2021 00:48  --------------------------------------------------------  IN:    Dexmedetomidine: 4.7 mL    Dexmedetomidine: 85.1 mL    IV PiggyBack: 200 mL    Phenylephrine: 28.7 mL    Phenylephrine: 1.5 mL    Propofol: 3.8 mL    Propofol: 66.9 mL    sodium chloride 0.9%: 10 mL    sodium chloride 0.9%: 140 mL    Vasopressin: 2.4 mL    Vasopressin: 31.2 mL  Total IN: 574.3 mL    OUT:    Chest Tube (mL): 30 mL    Chest Tube (mL): 10 mL    Chest Tube (mL): 60 mL    Indwelling Catheter - Urethral (mL): 835 mL    PEG (Percutaneous Endoscopic Gastrostomy) Tube (mL): 15 mL    Rectal Tube (mL): 10 mL  Total OUT: 960 mL    Total NET: -385.7 mL          MEDICATIONS  (STANDING):  ALBUTerol    90 MICROgram(s) HFA Inhaler 2 Puff(s) Inhalation every 6 hours  aMIOdarone Infusion 1 mG/Min (33.3 mL/Hr) IV Continuous <Continuous>  cefepime   IVPB 2000 milliGRAM(s) IV Intermittent every 12 hours  chlorhexidine 0.12% Liquid 5 milliLiter(s) Oral Mucosa two times a day  chlorhexidine 4% Liquid 1 Application(s) Topical <User Schedule>  dexMEDEtomidine Infusion 0.202 MICROgram(s)/kG/Hr (3 mL/Hr) IV Continuous <Continuous>  dextrose 40% Gel 15 Gram(s) Oral once  dextrose 5%. 1000 milliLiter(s) (50 mL/Hr) IV Continuous <Continuous>  dextrose 5%. 1000 milliLiter(s) (100 mL/Hr) IV Continuous <Continuous>  dextrose 50% Injectable 12.5 Gram(s) IV Push once  dextrose 50% Injectable 25 Gram(s) IV Push once  dextrose 50% Injectable 25 Gram(s) IV Push once  donepezil 5 milliGRAM(s) Oral at bedtime  enoxaparin Injectable 40 milliGRAM(s) SubCutaneous daily  famotidine Injectable 20 milliGRAM(s) IV Push every 12 hours  fentaNYL   Infusion. 0.505 MICROgram(s)/kG/Hr (3 mL/Hr) IV Continuous <Continuous>  ferrous    sulfate 325 milliGRAM(s) Oral daily  furosemide   Injectable 40 milliGRAM(s) IV Push daily  gabapentin   Solution 100 milliGRAM(s) Oral three times a day  glucagon  Injectable 1 milliGRAM(s) IntraMuscular once  insulin lispro (ADMELOG) corrective regimen sliding scale   SubCutaneous three times a day before meals  ipratropium 17 MICROgram(s) HFA Inhaler 2 Puff(s) Inhalation every 6 hours  meperidine     Injectable 25 milliGRAM(s) IV Push once  metroNIDAZOLE  IVPB 500 milliGRAM(s) IV Intermittent every 8 hours  multivitamin 1 Tablet(s) Oral daily  phenylephrine    Infusion 0.101 MICROgram(s)/kG/Min (2.25 mL/Hr) IV Continuous <Continuous>  polyethylene glycol 3350 17 Gram(s) Oral daily  potassium chloride   Powder 20 milliEquivalent(s) Oral daily  propofol Infusion 10.101 MICROgram(s)/kG/Min (3.6 mL/Hr) IV Continuous <Continuous>  senna 2 Tablet(s) Oral at bedtime  sodium chloride 0.9%. 1000 milliLiter(s) (10 mL/Hr) IV Continuous <Continuous>  vasopressin Infusion 0.04 Unit(s)/Min (2.4 mL/Hr) IV Continuous <Continuous>    MEDICATIONS  (PRN):  guaifenesin/dextromethorphan  Syrup 10 milliLiter(s) Oral every 4 hours PRN Cough/throat discomfort  ondansetron Injectable 4 milliGRAM(s) IV Push every 4 hours PRN Nausea and/or Vomiting      PHYSICAL EXAM:    GENERAL: NAD    HEENT: NCAT trach no bleeding     CHEST/LUNGS: CTAB, 3 chest tubes to suction +airleak     HEART: RRR,  No murmurs, rubs, or gallops    ABDOMEN: SNTND +BS, peg no bleeding no hematoma     EXTREMITIES:  FROM, No clubbing, cyanosis, or edema, palpable pulse    NEURO: No focal neurological deficits    SKIN: No rashes or lesions    INCISION/WOUNDS:                          10.8   19.99 )-----------( 322      ( 30 Mar 2021 02:50 )             34.0        CBC Full  -  ( 30 Mar 2021 02:50 )  WBC Count : 19.99 K/uL  RBC Count : 4.34 M/uL  Hemoglobin : 10.8 g/dL  Hematocrit : 34.0 %  Platelet Count - Automated : 322 K/uL  Mean Cell Volume : 78.3 fL  Mean Cell Hemoglobin : 24.9 pg  Mean Cell Hemoglobin Concentration : 31.8 g/dL  Auto Neutrophil # : 16.55 K/uL  Auto Lymphocyte # : 1.87 K/uL  Auto Monocyte # : 0.83 K/uL  Auto Eosinophil # : 0.42 K/uL  Auto Basophil # : 0.06 K/uL  Auto Neutrophil % : 82.7 %  Auto Lymphocyte % : 9.4 %  Auto Monocyte % : 4.2 %  Auto Eosinophil % : 2.1 %  Auto Basophil % : 0.3 %               135   |  93    |  27                 Ca: 8.4    BMP:   ----------------------------< 176    M.9   (21 @ 02:50)             3.6    |  34    | 0.5                Ph: x        LFT:     TPro: 5.8 / Alb: 3.0 / TBili: 0.6 / DBili: x / AST: 30 / ALT: 13 / AlkPhos: 46   (21 @ 03:00)      PT/INR - ( 30 Mar 2021 02:50 )   PT: 18.30 sec;   INR: 1.59 ratio         PTT - ( 30 Mar 2021 02:50 )  PTT:35.4 sec          < from: Xray Chest 1 View-PORTABLE IMMEDIATE (Xray Chest 1 View-PORTABLE IMMEDIATE .) (21 @ 11:55) >    Impression:    In comparison with the prior chest x-ray dated 2021 time 4:30 AM:    Interval removal of the endotracheal tube and nasogastric tube and interval placement of a tracheostomy tube.    Bilateral lung opacities and right basilar pneumothorax, unchanged.    CT scan of the chest was performed on 2021, please see that report for further information.          < end of copied text >

## 2021-03-31 NOTE — PROGRESS NOTE ADULT - SUBJECTIVE AND OBJECTIVE BOX
MIGNON TERRYDULCE  85y, Female  Allergy: clindamycin (Hives)      LOS  20d    CHIEF COMPLAINT: Cough, fever (20 Mar 2021 01:27)      INTERVAL EVENTS/HPI  - No acute events overnight  - T(F): , Max: 97.9 (03-30-21 @ 16:00)  - WBC Count: 23.02 (03-31-21 @ 01:30)  WBC Count: 19.99 (03-30-21 @ 02:50)     - Creatinine, Serum: 0.5 (03-31-21 @ 01:30)  Creatinine, Serum: 0.5 (03-30-21 @ 02:50)       ROS  unable to obtain history secondary to patient's mental status     VITALS:  T(F): 96.8, Max: 97.9 (03-30-21 @ 16:00)  HR: 97  BP: 129/72  RR: 28Vital Signs Last 24 Hrs  T(C): 36 (31 Mar 2021 08:00), Max: 36.6 (30 Mar 2021 16:00)  T(F): 96.8 (31 Mar 2021 08:00), Max: 97.9 (30 Mar 2021 16:00)  HR: 97 (31 Mar 2021 10:00) (51 - 111)  BP: 129/72 (31 Mar 2021 10:00) (74/44 - 147/68)  BP(mean): 94 (31 Mar 2021 10:00) (54 - 98)  RR: 28 (31 Mar 2021 10:00) (22 - 54)  SpO2: 100% (31 Mar 2021 10:00) (90% - 100%)    PHYSICAL EXAM:  Gen: trach/vent  HEENT: Normocephalic, atraumatic  Neck: supple, no lymphadenopathy  CV: Regular rate & regular rhythm  Lungs: decreased BS at bases, no fremitus; chest tubes ni place  Abdomen: Soft, BS present  Ext: Warm, well perfused  Neuro: non focal, awake  Skin: no rash, no erythema  Lines: no phlebitis    FH: Non-contributory  Social Hx: Non-contributory    TESTS & MEASUREMENTS:                        10.2   23.02 )-----------( 362      ( 31 Mar 2021 01:30 )             33.0     03-31    140  |  96<L>  |  39<H>  ----------------------------<  153<H>  3.8   |  36<H>  |  0.5<L>    Ca    8.5      31 Mar 2021 01:30  Phos  3.1     03-31  Mg     2.0     03-31      eGFR if Non African American: 90 mL/min/1.73M2 (03-31-21 @ 01:30)  eGFR if African American: 104 mL/min/1.73M2 (03-31-21 @ 01:30)          Culture - Acid Fast - Tissue w/Smear (collected 03-22-21 @ 13:33)  Source: .Tissue PLEURAL PEEL  Preliminary Report (03-24-21 @ 15:04):    Culture is being performed.    Culture - Fungal, Tissue (collected 03-22-21 @ 13:33)  Source: .Tissue None  Preliminary Report (03-23-21 @ 08:01):    Testing in progress    Culture - Tissue with Gram Stain (collected 03-22-21 @ 13:33)  Source: .Tissue None  Gram Stain (03-23-21 @ 04:38):    Rare polymorphonuclear leukocytes seen per low power field    Few White blood cells seen per low power field    Few Gram positive cocci in pairs per oil power field  Preliminary Report (03-23-21 @ 18:45):    No growth    Culture - Fungal, Bronchial (collected 03-22-21 @ 12:58)  Source: .Bronchial None  Preliminary Report (03-25-21 @ 11:20):    Rare Yeast    Culture - Acid Fast - Bronchial w/Smear (collected 03-22-21 @ 12:58)  Source: Bronch Wash None  Preliminary Report (03-24-21 @ 15:03):    Culture is being performed.    Culture - Bronchial (collected 03-22-21 @ 12:58)  Source: .Bronchial None  Gram Stain (03-23-21 @ 07:58):    Numerous polymorphonuclear leukocytes per low power field    No Squamous epithelial cells per low power field    No organisms seen per oil power field  Final Report (03-24-21 @ 22:11):    Normal Respiratory Nadra present    Culture - Fungal, Body Fluid (collected 03-22-21 @ 09:26)  Source: .Body Fluid None  Preliminary Report (03-23-21 @ 08:01):    Testing in progress    Culture - Acid Fast - Body Fluid w/Smear (collected 03-22-21 @ 09:26)  Source: .Body Fluid None  Preliminary Report (03-24-21 @ 15:03):    Culture is being performed.    Culture - Body Fluid with Gram Stain (collected 03-22-21 @ 09:26)  Source: .Body Fluid None  Gram Stain (03-23-21 @ 04:38):    polymorphonuclear leukocytes seen    No organisms seen    by cytocentrifuge  Final Report (03-27-21 @ 17:22):    No growth at 5 days    Culture - Blood (collected 03-17-21 @ 16:00)  Source: .Blood Blood-Peripheral  Final Report (03-23-21 @ 02:39):    No Growth Final    Culture - Blood (collected 03-13-21 @ 07:33)  Source: .Blood None  Final Report (03-18-21 @ 18:01):    No Growth Final    Culture - Blood (collected 03-10-21 @ 21:00)  Source: .Blood Blood-Peripheral  Final Report (03-16-21 @ 04:01):    No Growth Final    Culture - Blood (collected 03-10-21 @ 21:00)  Source: .Blood Blood-Peripheral  Final Report (03-16-21 @ 04:01):    No Growth Final            INFECTIOUS DISEASES TESTING  COVID-19 PCR: NotDetec (03-29-21 @ 14:17)  Procalcitonin, Serum: 2.29 (03-27-21 @ 10:50)  MRSA PCR Result.: Negative (03-20-21 @ 21:38)  COVID-19 PCR: NotDetec (03-20-21 @ 12:30)  Fungitell: 46 (03-17-21 @ 16:00)  Procalcitonin, Serum: 0.29 (03-17-21 @ 11:30)  Procalcitonin, Serum: 0.31 (03-16-21 @ 10:15)  Legionella Antigen, Urine: Negative (03-12-21 @ 10:30)  Streptococcus Pneumoniae Ag Urine: Negative (03-12-21 @ 10:30)  MRSA PCR Result.: Negative (03-12-21 @ 10:30)  Legionella Antigen, Urine: Negative (03-11-21 @ 08:10)  Streptococcus Pneumoniae Ag Urine: Negative (03-11-21 @ 08:10)  Procalcitonin, Serum: 1.75 (03-11-21 @ 06:31)  Rapid RVP Result: NotDetec (03-10-21 @ 22:10)      INFLAMMATORY MARKERS      RADIOLOGY & ADDITIONAL TESTS:  I have personally reviewed the last available Chest xray  CXR      CT  CT Chest No Cont:   EXAM:  CT CHEST            PROCEDURE DATE:  03/29/2021            INTERPRETATION:  CLINICAL STATEMENT: Right-sided VATS.    TECHNIQUE: Multislice helical sections were obtained from the thoracic inlet to the lung bases without contrast administration. Coronal and sagittal reformatted images and axial MIPs are also submitted.    COMPARISON: March 17, 2021 CT chest. Correlation also made with most recent chest radiograph dated March 29, 2021.    FINDINGS:    TUBES/LINES: 2 right-sided chest tubesone terminating at the apex and one terminating at the base. ET tube terminates above the dk. Right IJ central venous catheter with tip in the SVC. Enteric tube courses below the diaphragm into the stomach.    LUNGS, PLEURA, AND AIRWAYS: Moderateright pneumothorax. Increased extensive bilateral diffuse ground glass opacities and interlobular septal thickening, left greater than right. Patent central airways. Resolved right pleural effusion. Trace left pleural effusion present.    MEDIASTINUM/LYMPH NODES: Increased enlarged mediastinal lymph nodes. For example, a right lower paratracheal lymph node measures 1.8 x 1.4 cm.    HEART/GREAT VESSELS: Mild cardiomegaly. No pericardial effusion. Atherosclerotic vascular calcifications. Ectatic aorta.    BONES/SOFT TISSUES: Subcutaneous emphysema in the right chest wall. No acute osseous abnormality.    VISUALIZED UPPER ABDOMEN: Punctate hepatic calcifications.    IMPRESSION:  1.  Since March 29, 2021, resolved right pleural effusion; persistent moderate right pneumothorax with 2 chest tubes in place.  2.  Increased left greater than right diffuse bilateral groundglass opacities and interlobular septal thickening. Findings are suspicious for a multifocal infection. Superimposed edema is also a possibility.  3.  Enlarging mediastinal lymph nodes, likely reactive.                AMANDA HAMMONDS MD; Attending Radiologist  This document has been electronically signed. Mar 29 2021  3:10PM (03-29-21 @ 12:37)      CARDIOLOGY TESTING  12 Lead ECG:   Ventricular Rate 157 BPM    Atrial Rate 326 BPM    QRS Duration 88 ms    Q-T Interval 314 ms    QTC Calculation(Bazett) 507 ms    R Axis 41 degrees    T Axis -87 degrees    Diagnosis Line Atrial fibrillation with premature ventricular or aberrantlyconducted  complexes  Nonspecific ST and T wave abnormality  Abnormal ECG    Confirmed by SHANNAN MORALES MD ([id number]) on 3/26/2021 1:51:15 PM (03-26-21 @ 12:19)  12 Lead ECG:   Ventricular Rate 82 BPM    Atrial Rate 82 BPM    P-R Interval 152 ms    QRS Duration 92 ms    Q-T Interval 408 ms    QTC Calculation(Bazett) 476 ms    P Axis 44 degrees    R Axis 22 degrees    T Axis 26 degrees    Diagnosis Line Normal sinus rhythm  Normal ECG    Confirmed by JUANI DODSON MD ([id number]) on 3/24/2021 8:30:17 PM (03-24-21 @ 13:34)      MEDICATIONS  ALBUTerol    90 MICROgram(s) HFA Inhaler 2 Inhalation every 6 hours  cefepime   IVPB 2000 IV Intermittent every 12 hours  chlorhexidine 0.12% Liquid 5 Oral Mucosa two times a day  chlorhexidine 4% Liquid 1 Topical <User Schedule>  cisatracurium Infusion 3 IV Continuous <Continuous>  dexMEDEtomidine Infusion 0.202 IV Continuous <Continuous>  dextrose 40% Gel 15 Oral once  dextrose 5%. 1000 IV Continuous <Continuous>  dextrose 5%. 1000 IV Continuous <Continuous>  dextrose 50% Injectable 25 IV Push once  dextrose 50% Injectable 12.5 IV Push once  dextrose 50% Injectable 25 IV Push once  donepezil 5 Oral at bedtime  enoxaparin Injectable 40 SubCutaneous daily  famotidine Injectable 20 IV Push every 12 hours  fentaNYL   Infusion. 0.505 IV Continuous <Continuous>  ferrous    sulfate 325 Oral daily  fluconAZOLE IVPB     gabapentin   Solution 100 Oral three times a day  glucagon  Injectable 1 IntraMuscular once  insulin lispro (ADMELOG) corrective regimen sliding scale  SubCutaneous three times a day before meals  ipratropium 17 MICROgram(s) HFA Inhaler 2 Inhalation every 6 hours  metroNIDAZOLE  IVPB 500 IV Intermittent every 8 hours  multivitamin 1 Oral daily  phenylephrine    Infusion 0.101 IV Continuous <Continuous>  polyethylene glycol 3350 17 Oral daily  potassium chloride   Powder 20 Oral daily  propofol Infusion 10.101 IV Continuous <Continuous>  senna 2 Oral at bedtime  sodium chloride 0.9%. 1000 IV Continuous <Continuous>  vasopressin Infusion 0.04 IV Continuous <Continuous>      WEIGHT  Weight (kg): 59.4 (03-30-21 @ 07:39)  Creatinine, Serum: 0.5 mg/dL (03-31-21 @ 01:30)      ANTIBIOTICS:  cefepime   IVPB 2000 milliGRAM(s) IV Intermittent every 12 hours  fluconAZOLE IVPB      metroNIDAZOLE  IVPB 500 milliGRAM(s) IV Intermittent every 8 hours      All available historical records have been reviewed

## 2021-04-01 NOTE — PROGRESS NOTE ADULT - SUBJECTIVE AND OBJECTIVE BOX
DESTIN TERRY  85y Female   [id number]    Hospital Day: 22  Post Operative Day:2  Procedure:s/p right VATS 3 CT , S/P TRACH AND PEG   Patient is a 85y old  Female who presents with a chief complaint of Cough, fever (20 Mar 2021 01:27)    PAST MEDICAL & SURGICAL HISTORY:  Dementia    Osteoarthritis    No significant past surgical history        Events of the Last 24h:  Vital Signs Last 24 Hrs  T(C): 36.7 (31 Mar 2021 16:00), Max: 37 (31 Mar 2021 12:00)  T(F): 98 (31 Mar 2021 16:00), Max: 98.6 (31 Mar 2021 12:00)  HR: 72 (2021 00:) (52 - 111)  BP: 97/56 (:) (73/46 - 165/83)  BP(mean): 73 (2021 00:00) (54 - 114)  RR: 24 (:) (20 - 54)  SpO2: 99% (:) (92% - 100%)    Mode: AC/ CMV (Assist Control/ Continuous Mandatory Ventilation), RR (machine): 20, TV (machine): 400, FiO2: 60, PEEP: 10, ITime: 1, MAP: 19, PIP: 40        I&O's Summary    30 Mar 2021 07:  -  31 Mar 2021 07:00  --------------------------------------------------------  IN: 959.5 mL / OUT: 1231 mL / NET: -271.5 mL    31 Mar 2021 07:  -  2021 00:18  --------------------------------------------------------  IN: 1640.2 mL / OUT: 950 mL / NET: 690.2 mL     I&O's Detail    30 Mar 2021 07:  -  31 Mar 2021 07:00  --------------------------------------------------------  IN:    Dexmedetomidine: 133.9 mL    Dexmedetomidine: 4.7 mL    IV PiggyBack: 350 mL    Phenylephrine: 1.5 mL    Phenylephrine: 67.2 mL    Propofol: 125.6 mL    Propofol: 3.8 mL    sodium chloride 0.9%: 210 mL    sodium chloride 0.9%: 10 mL    Vasopressin: 2.4 mL    Vasopressin: 50.4 mL  Total IN: 959.5 mL    OUT:    Chest Tube (mL): 10 mL    Chest Tube (mL): 50 mL    Chest Tube (mL): 120 mL    Indwelling Catheter - Urethral (mL): 1020 mL    PEG (Percutaneous Endoscopic Gastrostomy) Tube (mL): 21 mL    Rectal Tube (mL): 10 mL  Total OUT: 1231 mL    Total NET: -271.5 mL      31 Mar 2021 07:01  -  2021 00:18  --------------------------------------------------------  IN:    Cisatracurium: 124.2 mL    Dexmedetomidine: 9 mL    FentaNYL: 143.8 mL    IV PiggyBack: 850 mL    Phenylephrine: 140 mL    Phenylephrine: 138 mL    Propofol: 86.4 mL    sodium chloride 0.9%: 120 mL    Vasopressin: 28.8 mL  Total IN: 1640.2 mL    OUT:    Chest Tube (mL): 40 mL    Chest Tube (mL): 30 mL    Chest Tube (mL): 0 mL    Indwelling Catheter - Urethral (mL): 880 mL    PEG (Percutaneous Endoscopic Gastrostomy) Tube (mL): 0 mL    Rectal Tube (mL): 0 mL  Total OUT: 950 mL    Total NET: 690.2 mL          MEDICATIONS  (STANDING):  ALBUTerol    90 MICROgram(s) HFA Inhaler 2 Puff(s) Inhalation every 6 hours  cefepime   IVPB 2000 milliGRAM(s) IV Intermittent every 12 hours  chlorhexidine 0.12% Liquid 5 milliLiter(s) Oral Mucosa two times a day  chlorhexidine 4% Liquid 1 Application(s) Topical <User Schedule>  cisatracurium Infusion 3 MICROgram(s)/kG/Min (10.7 mL/Hr) IV Continuous <Continuous>  dexMEDEtomidine Infusion 0.202 MICROgram(s)/kG/Hr (3 mL/Hr) IV Continuous <Continuous>  dextrose 5%. 1000 milliLiter(s) (100 mL/Hr) IV Continuous <Continuous>  dextrose 5%. 1000 milliLiter(s) (50 mL/Hr) IV Continuous <Continuous>  donepezil 5 milliGRAM(s) Oral at bedtime  enoxaparin Injectable 40 milliGRAM(s) SubCutaneous daily  famotidine Injectable 20 milliGRAM(s) IV Push every 12 hours  fentaNYL   Infusion. 0.505 MICROgram(s)/kG/Hr (3 mL/Hr) IV Continuous <Continuous>  ferrous    sulfate 325 milliGRAM(s) Oral daily  fluconAZOLE IVPB      fluconAZOLE IVPB 400 milliGRAM(s) IV Intermittent every 24 hours  gabapentin   Solution 100 milliGRAM(s) Oral three times a day  glucagon  Injectable 1 milliGRAM(s) IntraMuscular once  ipratropium 17 MICROgram(s) HFA Inhaler 2 Puff(s) Inhalation every 6 hours  metroNIDAZOLE  IVPB 500 milliGRAM(s) IV Intermittent every 8 hours  multivitamin 1 Tablet(s) Oral daily  petrolatum Ophthalmic Ointment 1 Application(s) Both EYES three times a day  phenylephrine    Infusion 0.1 MICROgram(s)/kG/Min (1.11 mL/Hr) IV Continuous <Continuous>  potassium chloride   Powder 20 milliEquivalent(s) Oral daily  propofol Infusion 10.101 MICROgram(s)/kG/Min (3.6 mL/Hr) IV Continuous <Continuous>  sodium chloride 0.9%. 1000 milliLiter(s) (10 mL/Hr) IV Continuous <Continuous>  vancomycin  IVPB 750 milliGRAM(s) IV Intermittent every 12 hours  vasopressin Infusion 0.04 Unit(s)/Min (2.4 mL/Hr) IV Continuous <Continuous>    MEDICATIONS  (PRN):  ondansetron Injectable 4 milliGRAM(s) IV Push every 4 hours PRN Nausea and/or Vomiting      PHYSICAL EXAM:    GENERAL: NAD    HEENT: NCAT    CHEST/LUNGS: CTAB3 chest tubes to suction , 1 and 3 airleak , 2 no airleak     HEART: RRR,  No murmurs, rubs, or gallops    ABDOMEN: SNTND +BS    EXTREMITIES:  FROM, No clubbing, cyanosis, or edema, palpable pulse    NEURO: No focal neurological deficits    SKIN: No rashes or lesions    INCISION/WOUNDS:                          10.2   23.02 )-----------( 362      ( 31 Mar 2021 01:30 )             33.0        CBC Full  -  ( 31 Mar 2021 01:30 )  WBC Count : 23.02 K/uL  RBC Count : 4.13 M/uL  Hemoglobin : 10.2 g/dL  Hematocrit : 33.0 %  Platelet Count - Automated : 362 K/uL  Mean Cell Volume : 79.9 fL  Mean Cell Hemoglobin : 24.7 pg  Mean Cell Hemoglobin Concentration : 30.9 g/dL  Auto Neutrophil # : 18.53 K/uL  Auto Lymphocyte # : 2.39 K/uL  Auto Monocyte # : 1.23 K/uL  Auto Eosinophil # : 0.54 K/uL  Auto Basophil # : 0.07 K/uL  Auto Neutrophil % : 80.6 %  Auto Lymphocyte % : 10.4 %  Auto Monocyte % : 5.3 %  Auto Eosinophil % : 2.3 %  Auto Basophil % : 0.3 %               140   |  96    |  39                 Ca: 8.5    BMP:   ----------------------------< 153    M.0   (21 @ 01:30)             3.8    |  36    | 0.5                Ph: 3.1      LFT:     TPro: 5.8 / Alb: 3.0 / TBili: 0.6 / DBili: x / AST: 30 / ALT: 13 / AlkPhos: 46   (21 @ 03:00)      PT/INR - ( 30 Mar 2021 02:50 )   PT: 18.30 sec;   INR: 1.59 ratio         PTT - ( 30 Mar 2021 02:50 )  PTT:35.4 sec          < from: Xray Chest 1 View- PORTABLE-Routine (Xray Chest 1 View- PORTABLE-Routine in AM.) (21 @ 06:08) >  Impression:    Stable right basilar pneumothorax. Unchanged bilateral parenchymal opacities.    Stable support devices.      < end of copied text >

## 2021-04-01 NOTE — PROGRESS NOTE ADULT - ASSESSMENT
ASSESSMENT  85 year old lady known to have dementia, osteoarithtis, [place]divian-speaking presenting with cough and fever.      IMPRESSION  #Sepsis present on admission - secondary to CAP  -  CT Chest No Cont (03.11.21 @ 00:54): Areas of right lung consolidation which can be seen in pneumonia. Numerous air-fluid levels throughout the right lung compatible with an infectious process. Suggestion of split pleura at the lung base for which empyema is favored although inherently limited on this noncontrast exam. Consideration can be given to contrast administration if feasiblefor better delineation. Areas of bilateral interlobular septal thickening and mild layering groundglass attenuation may reflect a component of edema.  - Urine Legionella negative  - Urine Strep negative  - BLood Cx 3/10 and 3/13 negative  - Procalcitonin 1.15   - CT Chest No Cont (03.17.21 @ 16:19): Since 3/11/2021. Enlarging loculated right pleural fluid collection with multiple air bubbles consistent with empyema.   Associated compressive atelectasis right lung is seen. Scattered groundglass opacities involving multiple lumbar segments.  - s/p VATS 3/22 -- right empyema with 800 cc purulent fluid in pleural space, multiple pockers with dense adhesions -- necrotizing pneumonia of the middle lobe  - VATS Cx 3/22 with rare yeast, otherwise no growth  -  CT Chest No Cont (03.29.21 @ 12:37): Since March 29, 2021, resolved right pleural effusion; persistent moderate right pneumothorax with 2 chest tubes in place. Increased left greater than right diffuse bilateral groundglass opacities and interlobular septal thickening. Findings are suspicious for a multifocal infection. Superimposed edema is also a possibility.  Enlarging mediastinal lymph nodes, likely reactive.    #Dementia  #Osteoarthritis  #Obesity BMI (kg/m2): 23.4  #Abx allergy: clindamycin (Hives)    Creatinine, Serum: 1.6 mg/dL (04.01.21 @ 16:35)  Weight (kg): 60 (11 Mar 2021 01:17)  CrCl 30     RECOMMENDATIONS  - continue caspofungin 50 mg daily  - can change vancomycin to Linezolid 600 mg q 12 hours  - can broaden cefepime/flagyl to meropenem 1g q 12 hours  - repeat MRSA nares  - please obtain tracheal cultures  - please obtain KUB  - check C diff - can start PO vancomycin 125 mg QID while awaiting results  - follw-up fungitell and galactomannan     Please call or message on [employer] Teams if with any questions.  Spectra [phone]

## 2021-04-01 NOTE — PROGRESS NOTE ADULT - SUBJECTIVE AND OBJECTIVE BOX
DESTIN TERRY  85y, Female  Allergy: clindamycin (Hives)      LOS  21d    CHIEF COMPLAINT: Cough, fever (20 Mar 2021 01:27)      INTERVAL EVENTS/HPI  - on pressors -- worsening WBC  - T(F): , Max: 98.8 (03-31-21 @ 20:00)  - WBC Count: 45.51 (04-01-21 @ 16:35)  WBC Count: 35.37 (04-01-21 @ 01:03)     - Creatinine, Serum: 1.6 (04-01-21 @ 16:35)  Creatinine, Serum: 0.8 (04-01-21 @ 01:03)       ROS  unable to obtain history secondary to patient's mental status and/or sedation      VITALS:  T(F): 97.9, Max: 98.8 (03-31-21 @ 20:00)  HR: 98  BP: 95/44  RR: 27Vital Signs Last 24 Hrs  T(C): 36.6 (01 Apr 2021 12:00), Max: 37.1 (31 Mar 2021 20:00)  T(F): 97.9 (01 Apr 2021 12:00), Max: 98.8 (31 Mar 2021 20:00)  HR: 98 (01 Apr 2021 17:00) (67 - 122)  BP: 95/44 (01 Apr 2021 17:00) (75/43 - 117/56)  BP(mean): 63 (01 Apr 2021 17:00) (55 - 81)  RR: 27 (01 Apr 2021 17:00) (9 - 33)  SpO2: 98% (01 Apr 2021 17:00) (91% - 100%)    PHYSICAL EXAM:  Gen: NAD, resting in bed  HEENT: Normocephalic, atraumatic  Neck: supple, no lymphadenopathy  CV: Regular rate & regular rhythm  Lungs: decreased BS at bases, no fremitus  Abdomen: Soft, BS present  Ext: Warm, well perfused  Neuro: non focal, awake  Skin: no rash, no erythema  Lines: no phlebitis    FH: Non-contributory  Social Hx: Non-contributory    TESTS & MEASUREMENTS:                        7.2    45.51 )-----------( 523      ( 01 Apr 2021 16:35 )             24.4     04-01    140  |  107  |  65<HH>  ----------------------------<  205<H>  5.0   |  23  |  1.6<H>    Ca    7.6<L>      01 Apr 2021 16:35  Phos  3.1     03-31  Mg     2.1     04-01      eGFR if Non African American: 29 mL/min/1.73M2 (04-01-21 @ 16:35)  eGFR if : 34 mL/min/1.73M2 (04-01-21 @ 16:35)  eGFR if Non African American: 68 mL/min/1.73M2 (04-01-21 @ 01:03)  eGFR if African American: 79 mL/min/1.73M2 (04-01-21 @ 01:03)          Culture - Acid Fast - Tissue w/Smear (collected 03-22-21 @ 13:33)  Source: .Tissue PLEURAL PEEL  Preliminary Report (03-31-21 @ 15:03):    No growth at 1 week.    Culture - Fungal, Tissue (collected 03-22-21 @ 13:33)  Source: .Tissue None  Preliminary Report (03-31-21 @ 15:02):    No growth    Culture - Tissue with Gram Stain (collected 03-22-21 @ 13:33)  Source: .Tissue None  Gram Stain (03-23-21 @ 04:38):    Rare polymorphonuclear leukocytes seen per low power field    Few White blood cells seen per low power field    Few Gram positive cocci in pairs per oil power field  Final Report (04-01-21 @ 16:29):    No growth    Organism seen in Gram stain is non-viable after prolonged    incubation and repeated subculture.    Culture - Fungal, Bronchial (collected 03-22-21 @ 12:58)  Source: .Bronchial None  Preliminary Report (03-25-21 @ 11:20):    Rare Yeast    Culture - Acid Fast - Bronchial w/Smear (collected 03-22-21 @ 12:58)  Source: Bronch Wash None  Preliminary Report (03-31-21 @ 15:03):    No growth at 1 week.    Culture - Bronchial (collected 03-22-21 @ 12:58)  Source: .Bronchial None  Gram Stain (03-23-21 @ 07:58):    Numerous polymorphonuclear leukocytes per low power field    No Squamous epithelial cells per low power field    No organisms seen per oil power field  Final Report (03-24-21 @ 22:11):    Normal Respiratory Narda present    Culture - Fungal, Body Fluid (collected 03-22-21 @ 09:26)  Source: .Body Fluid None  Preliminary Report (03-31-21 @ 15:02):    No growth    Culture - Acid Fast - Body Fluid w/Smear (collected 03-22-21 @ 09:26)  Source: .Body Fluid None  Preliminary Report (03-31-21 @ 15:03):    No growth at 1 week.    Culture - Body Fluid with Gram Stain (collected 03-22-21 @ 09:26)  Source: .Body Fluid None  Gram Stain (03-23-21 @ 04:38):    polymorphonuclear leukocytes seen    No organisms seen    by cytocentrifuge  Final Report (03-27-21 @ 17:22):    No growth at 5 days    Culture - Blood (collected 03-17-21 @ 16:00)  Source: .Blood Blood-Peripheral  Final Report (03-23-21 @ 02:39):    No Growth Final    Culture - Blood (collected 03-13-21 @ 07:33)  Source: .Blood None  Final Report (03-18-21 @ 18:01):    No Growth Final    Culture - Blood (collected 03-10-21 @ 21:00)  Source: .Blood Blood-Peripheral  Final Report (03-16-21 @ 04:01):    No Growth Final    Culture - Blood (collected 03-10-21 @ 21:00)  Source: .Blood Blood-Peripheral  Final Report (03-16-21 @ 04:01):    No Growth Final            INFECTIOUS DISEASES TESTING  COVID-19 PCR: NotDetec (03-29-21 @ 14:17)  Procalcitonin, Serum: 2.29 (03-27-21 @ 10:50)  MRSA PCR Result.: Negative (03-20-21 @ 21:38)  COVID-19 PCR: NotDetec (03-20-21 @ 12:30)  Fungitell: 46 (03-17-21 @ 16:00)  Procalcitonin, Serum: 0.29 (03-17-21 @ 11:30)  Procalcitonin, Serum: 0.31 (03-16-21 @ 10:15)  Legionella Antigen, Urine: Negative (03-12-21 @ 10:30)  Streptococcus Pneumoniae Ag Urine: Negative (03-12-21 @ 10:30)  MRSA PCR Result.: Negative (03-12-21 @ 10:30)  Legionella Antigen, Urine: Negative (03-11-21 @ 08:10)  Streptococcus Pneumoniae Ag Urine: Negative (03-11-21 @ 08:10)  Procalcitonin, Serum: 1.75 (03-11-21 @ 06:31)  Rapid RVP Result: NotDetec (03-10-21 @ 22:10)      INFLAMMATORY MARKERS      RADIOLOGY & ADDITIONAL TESTS:  I have personally reviewed the last available Chest xray  CXR      CT      CARDIOLOGY TESTING  12 Lead ECG:   Ventricular Rate 157 BPM    Atrial Rate 326 BPM    QRS Duration 88 ms    Q-T Interval 314 ms    QTC Calculation(Bazett) 507 ms    R Axis 41 degrees    T Axis -87 degrees    Diagnosis Line Atrial fibrillation with premature ventricular or aberrantlyconducted  complexes  Nonspecific ST and T wave abnormality  Abnormal ECG    Confirmed by SHANNAN MORALES MD ([id number]) on 3/26/2021 1:51:15 PM (03-26-21 @ 12:19)  12 Lead ECG:   Ventricular Rate 82 BPM    Atrial Rate 82 BPM    P-R Interval 152 ms    QRS Duration 92 ms    Q-T Interval 408 ms    QTC Calculation(Bazett) 476 ms    P Axis 44 degrees    R Axis 22 degrees    T Axis 26 degrees    Diagnosis Line Normal sinus rhythm  Normal ECG    Confirmed by JUANI DODSON MD ([id number]) on 3/24/2021 8:30:17 PM (03-24-21 @ 13:34)      MEDICATIONS  ALBUTerol    90 MICROgram(s) HFA Inhaler 2 Inhalation every 6 hours  caspofungin IVPB     chlorhexidine 0.12% Liquid 5 Oral Mucosa two times a day  chlorhexidine 4% Liquid 1 Topical <User Schedule>  cisatracurium Infusion 3 IV Continuous <Continuous>  dexMEDEtomidine Infusion 0.202 IV Continuous <Continuous>  dextrose 5%. 1000 IV Continuous <Continuous>  dextrose 5%. 1000 IV Continuous <Continuous>  donepezil 5 Oral at bedtime  enoxaparin Injectable 40 SubCutaneous daily  famotidine Injectable 20 IV Push every 12 hours  fentaNYL   Infusion. 0.505 IV Continuous <Continuous>  ferrous    sulfate 325 Oral daily  gabapentin   Solution 100 Oral three times a day  glucagon  Injectable 1 IntraMuscular once  insulin lispro (ADMELOG) corrective regimen sliding scale  SubCutaneous every 4 hours  ipratropium 17 MICROgram(s) HFA Inhaler 2 Inhalation every 6 hours  meropenem  IVPB     meropenem  IVPB 1000 IV Intermittent once  metroNIDAZOLE  IVPB 500 IV Intermittent every 8 hours  multivitamin 1 Oral daily  norepinephrine Infusion 0.1 IV Continuous <Continuous>  petrolatum Ophthalmic Ointment 1 Both EYES three times a day  phenylephrine    Infusion 1 IV Continuous <Continuous>  potassium chloride   Powder 20 Oral daily  propofol Infusion 10.101 IV Continuous <Continuous>  sodium chloride 0.9%. 1000 IV Continuous <Continuous>  vancomycin    Solution 125 Oral every 6 hours  vasopressin Infusion 0.04 IV Continuous <Continuous>      WEIGHT  Weight (kg): 59.4 (03-30-21 @ 07:39)  Creatinine, Serum: 1.6 mg/dL (04-01-21 @ 16:35)  Creatinine, Serum: 0.8 mg/dL (04-01-21 @ 01:03)      ANTIBIOTICS:  caspofungin IVPB      meropenem  IVPB      meropenem  IVPB 1000 milliGRAM(s) IV Intermittent once  metroNIDAZOLE  IVPB 500 milliGRAM(s) IV Intermittent every 8 hours  vancomycin    Solution 125 milliGRAM(s) Oral every 6 hours      All available historical records have been reviewed

## 2021-04-01 NOTE — PROGRESS NOTE ADULT - ASSESSMENT
Chest tubes to suction  Daily chest xray  Monitor chest tube out put  Monitor for airleaks  Monitor 02 saturation  Monitor vent settings   DVT/GI prophylaxis  Pain management

## 2021-04-02 NOTE — PROGRESS NOTE ADULT - SUBJECTIVE AND OBJECTIVE BOX
Progress Note: General Surgery  Patient: DESTIN TERRY , 85y (1935)Female   MRN: [id number]  Location: [address]  Visit: 03-11-21 Inpatient  Date: 04-02-21 @ 09:14    Admit Diagnosis/Chief Complaint: Acute respiratory failure with hypoxia        Procedure/Diagnosis: Acute respiratory failure with hypoxia     S/P Bronchoscopy, at bedside    Open tracheostomy    Insertion, PEG tube        Events/ 24h: CT scan over night, urine output slightly improving, C-diff sent, s/p 3 units PRBC's   Pain controlled.    Vitals: T(F): 97 (04-02-21 @ 06:00), Max: 98.1 (04-02-21 @ 04:00)  HR: 101 (04-02-21 @ 07:30)  BP: 106/57 (04-02-21 @ 07:30) (75/43 - 119/59)  RR: 30 (04-02-21 @ 07:30)  SpO2: 98% (04-02-21 @ 07:30)  RR (machine): 26, TV (machine): 450, FiO2: 50, PEEP: 10, PIP: 49  In:   04-01-21 @ 07:01  -  04-02-21 @ 07:00  --------------------------------------------------------  IN: 5855.6 mL      Out:   04-01-21 @ 07:01  -  04-02-21 @ 07:00  --------------------------------------------------------  OUT:    Chest Tube (mL): 85 mL    Chest Tube (mL): 80 mL    Chest Tube (mL): 0 mL    Indwelling Catheter - Urethral (mL): 215 mL    Rectal Tube (mL): 650 mL  Total OUT: 1030 mL        Net:   04-01-21 @ 07:01  -  04-02-21 @ 07:00  --------------------------------------------------------  NET: 4825.6 mL        Diet: Diet, NPO:   Tube Feeding Modality: Gastrostomy  Vital High Protein  Total Volume for 24 Hours (mL): 480  Continuous  Starting Tube Feed Rate mL per Hour: 20  Until Goal Tube Feed Rate (mL per Hour): 20  Tube Feed Duration (in Hours): 24  Tube Feed Start Time: 13:00 (04-02-21 @ 08:51)    IV Fluids: dextrose 5%. 1000 milliLiter(s) (100 mL/Hr) IV Continuous <Continuous>  dextrose 5%. 1000 milliLiter(s) (50 mL/Hr) IV Continuous <Continuous>  ferrous    sulfate 325 milliGRAM(s) Oral daily  multivitamin 1 Tablet(s) Oral daily  potassium chloride   Powder 20 milliEquivalent(s) Oral daily  sodium chloride 0.9%. 1000 milliLiter(s) (40 mL/Hr) IV Continuous <Continuous>      Physical Examination:  General Appearance: Trach in place on vent  HEENT: EOMI, sclera non-icteric.  Heart: RRR   Lungs: Right chest tubes x3 to suction, CT 1 + leak, CT 2 - leak, CT 3 + leak  Abdomen:  Soft, PEG in place  MSK/Extremities: Warm & well-perfused.   Skin: Warm, dry. No jaundice.       Medications: [Standing]  ALBUTerol    90 MICROgram(s) HFA Inhaler 2 Puff(s) Inhalation every 6 hours  caspofungin IVPB      caspofungin IVPB 50 milliGRAM(s) IV Intermittent every 24 hours  chlorhexidine 0.12% Liquid 5 milliLiter(s) Oral Mucosa two times a day  chlorhexidine 4% Liquid 1 Application(s) Topical <User Schedule>  cisatracurium Infusion 3 MICROgram(s)/kG/Min (10.7 mL/Hr) IV Continuous <Continuous>  clotrimazole 1% Cream 1 Application(s) Topical two times a day  dexMEDEtomidine Infusion 0.202 MICROgram(s)/kG/Hr (3 mL/Hr) IV Continuous <Continuous>  dextrose 5%. 1000 milliLiter(s) (50 mL/Hr) IV Continuous <Continuous>  dextrose 5%. 1000 milliLiter(s) (100 mL/Hr) IV Continuous <Continuous>  donepezil 5 milliGRAM(s) Oral at bedtime  enoxaparin Injectable 40 milliGRAM(s) SubCutaneous daily  famotidine Injectable 20 milliGRAM(s) IV Push every 12 hours  fentaNYL   Infusion. 0.505 MICROgram(s)/kG/Hr (3 mL/Hr) IV Continuous <Continuous>  ferrous    sulfate 325 milliGRAM(s) Oral daily  gabapentin   Solution 100 milliGRAM(s) Oral three times a day  glucagon  Injectable 1 milliGRAM(s) IntraMuscular once  insulin lispro (ADMELOG) corrective regimen sliding scale   SubCutaneous every 4 hours  ipratropium 17 MICROgram(s) HFA Inhaler 2 Puff(s) Inhalation every 6 hours  linezolid  IVPB      linezolid  IVPB 600 milliGRAM(s) IV Intermittent once  linezolid  IVPB 600 milliGRAM(s) IV Intermittent every 12 hours  meropenem  IVPB 1000 milliGRAM(s) IV Intermittent every 12 hours  metroNIDAZOLE  IVPB 500 milliGRAM(s) IV Intermittent every 8 hours  multivitamin 1 Tablet(s) Oral daily  norepinephrine Infusion 0.1 MICROgram(s)/kG/Min (5.57 mL/Hr) IV Continuous <Continuous>  petrolatum Ophthalmic Ointment 1 Application(s) Both EYES three times a day  phenylephrine    Infusion 1 MICROgram(s)/kG/Min (11.1 mL/Hr) IV Continuous <Continuous>  potassium chloride   Powder 20 milliEquivalent(s) Oral daily  propofol Infusion 10.101 MICROgram(s)/kG/Min (3.6 mL/Hr) IV Continuous <Continuous>  sodium chloride 0.9%. 1000 milliLiter(s) (40 mL/Hr) IV Continuous <Continuous>  vancomycin    Solution 125 milliGRAM(s) Oral every 6 hours  vasopressin Infusion 0.04 Unit(s)/Min (2.4 mL/Hr) IV Continuous <Continuous>    DVT Prophylaxis: enoxaparin Injectable 40 milliGRAM(s) SubCutaneous daily    GI Prophylaxis: famotidine Injectable 20 milliGRAM(s) IV Push every 12 hours    Antibiotics: caspofungin IVPB      caspofungin IVPB 50 milliGRAM(s) IV Intermittent every 24 hours  linezolid  IVPB      linezolid  IVPB 600 milliGRAM(s) IV Intermittent once  linezolid  IVPB 600 milliGRAM(s) IV Intermittent every 12 hours  meropenem  IVPB 1000 milliGRAM(s) IV Intermittent every 12 hours  metroNIDAZOLE  IVPB 500 milliGRAM(s) IV Intermittent every 8 hours  vancomycin    Solution 125 milliGRAM(s) Oral every 6 hours    Anticoagulation:   Medications:[PRN]  ondansetron Injectable 4 milliGRAM(s) IV Push every 4 hours PRN      Labs:                        10.3   49.96 )-----------( 456      ( 02 Apr 2021 01:30 )             33.4     04-02    142  |  108  |  66<HH>  ----------------------------<  207<H>  4.6   |  24  |  1.7<H>    Ca    7.8<L>      02 Apr 2021 01:30  Mg     1.9     04-02          ABG - ( 02 Apr 2021 03:52 )  pH: 7.26  /  pCO2: 54    /  pO2: 86    / HCO3: 25    / Base Excess: -2.7  /  SaO2: 97                      Urine/Micro:    Culture - Blood (collected 31 Mar 2021 10:52)  Source: .Blood Blood  Preliminary Report (01 Apr 2021 23:01):    No growth to date.          Imaging:   ***  CT Abdomen and Pelvis No Cont:   EXAM:  CT ABDOMEN AND PELVIS        EXAM:  CT CHEST            PROCEDURE DATE:  04/01/2021            INTERPRETATION:  REASON FOR EXAM / CLINICAL STATEMENT: Trauma;    TECHNIQUE: Contiguous axial CT images were obtained from the thoracic inlet to the pubic symphysis without intravenous contrast. Reformatted images in the coronal and sagittal planes were acquired.    COMPARISON CT: CT scan of the chest dated 3/29/2021    OTHER STUDIES USED FOR CORRELATION: None.      FINDINGS CHEST:    TUBES/LINES: Tracheostomy is in place. Three left-sided chest tubes are in place.    HEART AND VESSELS: The heart is enlarged. There is no pericardial effusion.    Aortic calcifications and ectasia of the aorta are noted.    MEDIASTINUM: New pneumomediastinum is noted, especially in the anterior mediastinum.    AIRWAYS, LUNGS AND PLEURA: The central tracheobronchial tree is patent. Stable residual right-sided pneumothorax. Stable bilateral diffuse areas of groundglass opacity. Areas of traction bronchiectasis noted throughout the left lung field and at the right lung base. There is no pleural effusion.        FINDINGS ABDOMEN AND PELVIS:        IMPRESSION:    1. Stable residual right-sided pneumothorax. Three left-sided chest tubes are in place. New pneumomediastinum is noted, especially in the anterior mediastinum.    2. Stable bilateral diffuse areas of groundglass opacity. Areas of traction bronchiectasis noted throughout the left lung field and at the right lung base.    3. No evidence of bowel obstruction or intra-abdominal or pelvic inflammatory process              LIBAN SALAZAR MD; Attending Interventional Radiologist  This document has been electronically signed. Apr 1 2021  8:55PM (04-01-21 @ 20:19)  CT Chest No Cont:   EXAM:  CT ABDOMEN AND PELVIS        EXAM:  CT CHEST            PROCEDURE DATE:  04/01/2021            INTERPRETATION:  REASON FOR EXAM / CLINICAL STATEMENT: Trauma;    TECHNIQUE: Contiguous axial CT images were obtained from the thoracic inlet to the pubic symphysis without intravenous contrast. Reformatted images in the coronal and sagittal planes were acquired.    COMPARISON CT: CT scan of the chest dated 3/29/2021    OTHER STUDIES USED FOR CORRELATION: None.      FINDINGS CHEST:          IMPRESSION:    1. Stable residual right-sided pneumothorax. Three left-sided chest tubes are in place. New pneumomediastinum is noted, especially in the anterior mediastinum.    2. Stable bilateral diffuse areas of groundglass opacity. Areas of traction bronchiectasis noted throughout the left lung field and at the right lung base.    3. No evidence of bowel obstruction or intra-abdominal or pelvic inflammatory process              LIBAN SALAZAR MD; Attending Interventional Radiologist  This document has been electronically signed. Apr 1 2021  8:55PM (04-01-21 @ 20:18)

## 2021-04-02 NOTE — PROGRESS NOTE ADULT - SUBJECTIVE AND OBJECTIVE BOX
OPERATIVE PROCEDURE(s):    right VTAS , 3 CT placed, trach and peg        85yFemale  SURGEON(s): PRO Oshea  SUBJECTIVE ASSESSMENT:  Patient has no complaints at this time.    Vital Signs Last 24 Hrs  T(F): 97 (02 Apr 2021 06:00), Max: 98.1 (02 Apr 2021 04:00)  HR: 101 (02 Apr 2021 07:30) (78 - 122)  BP: 106/57 (02 Apr 2021 07:30) (75/43 - 119/59)  BP(mean): 78 (02 Apr 2021 07:30) (55 - 85)  RR: 30 (02 Apr 2021 07:30) (24 - 30)  SpO2: 98% (02 Apr 2021 07:30) (92% - 100%)  Mode: AC/ CMV (Assist Control/ Continuous Mandatory Ventilation)  RR (machine): 26  TV (machine): 450  FiO2: 50  PEEP: 10  MAP: 22    I&O's Detail    01 Apr 2021 07:01  -  02 Apr 2021 07:00  --------------------------------------------------------  IN:    Cisatracurium: 190.6 mL    Enteral Tube Flush: 240 mL    FentaNYL: 234 mL    IV PiggyBack: 750 mL    Norepinephrine: 5.6 mL    Phenylephrine: 445 mL    Phenylephrine: 410 mL    PRBCs (Packed Red Blood Cells): 500 mL    Propofol: 72.8 mL    sodium chloride 0.9%: 840 mL    sodium chloride 0.9%: 30 mL    Sodium Chloride 0.9% Bolus: 2000 mL    Vasopressin: 57.6 mL    Vital High Protein: 80 mL  Total IN: 5855.6 mL    OUT:    Chest Tube (mL): 85 mL    Chest Tube (mL): 80 mL    Chest Tube (mL): 0 mL    Indwelling Catheter - Urethral (mL): 215 mL    Rectal Tube (mL): 650 mL  Total OUT: 1030 mL        Net:   I&O's Detail    31 Mar 2021 07:01  -  01 Apr 2021 07:00  --------------------------------------------------------  Total NET: 1692 mL      01 Apr 2021 07:01  -  02 Apr 2021 07:00  --------------------------------------------------------  Total NET: 4825.6 mL        CAPILLARY BLOOD GLUCOSE  225 (01 Apr 2021 12:00)      POCT Blood Glucose.: 175 mg/dL (02 Apr 2021 06:24)  POCT Blood Glucose.: 176 mg/dL (02 Apr 2021 03:15)  POCT Blood Glucose.: 184 mg/dL (01 Apr 2021 22:14)  POCT Blood Glucose.: 196 mg/dL (01 Apr 2021 17:26)  POCT Blood Glucose.: 191 mg/dL (01 Apr 2021 16:28)  POCT Blood Glucose.: 215 mg/dL (01 Apr 2021 13:28)  POCT Blood Glucose.: 225 mg/dL (01 Apr 2021 12:00)    Physical Exam:  General: Patient is intubated and sedated  Cardiac: S1/S2, RRR, no murmur, no rubs  Abdomen: Soft/NT/ND; positive bowel sounds x 4  Sternum: Intact, no click, incision healing well with no drainage  Incisions: Incisions clean/dry/intact          LABS:                        10.3<L>  49.96<HH> )-----------( 456<H>    ( 02 Apr 2021 01:30 )             33.4<L>                        7.2<L>  45.51<HH> )-----------( 523<H>    ( 01 Apr 2021 16:35 )             24.4<L>    04-02    142  |  108  |  66<HH>  ----------------------------<  207<H>  4.6   |  24  |  1.7<H>  04-01    140  |  107  |  65<HH>  ----------------------------<  205<H>  5.0   |  23  |  1.6<H>    Ca    7.8<L>      02 Apr 2021 01:30  Phos  3.1     03-31  Mg     1.9     04-02          ABG - ( 02 Apr 2021 03:52 )  pH: 7.26  /  pCO2: 54    /  pO2: 86    / HCO3: 25    / Base Excess: -2.7  /  SaO2: 97    /  LA: 1.4          MEDICATIONS  (STANDING):  ALBUTerol    90 MICROgram(s) HFA Inhaler 2 Puff(s) Inhalation every 6 hours  caspofungin IVPB      caspofungin IVPB 50 milliGRAM(s) IV Intermittent every 24 hours  chlorhexidine 0.12% Liquid 5 milliLiter(s) Oral Mucosa two times a day  chlorhexidine 4% Liquid 1 Application(s) Topical <User Schedule>  cisatracurium Infusion 3 MICROgram(s)/kG/Min (10.7 mL/Hr) IV Continuous <Continuous>  dexMEDEtomidine Infusion 0.202 MICROgram(s)/kG/Hr (3 mL/Hr) IV Continuous <Continuous>  dextrose 5%. 1000 milliLiter(s) (50 mL/Hr) IV Continuous <Continuous>  dextrose 5%. 1000 milliLiter(s) (100 mL/Hr) IV Continuous <Continuous>  donepezil 5 milliGRAM(s) Oral at bedtime  famotidine Injectable 20 milliGRAM(s) IV Push every 12 hours  fentaNYL   Infusion. 0.505 MICROgram(s)/kG/Hr (3 mL/Hr) IV Continuous <Continuous>  ferrous    sulfate 325 milliGRAM(s) Oral daily  gabapentin   Solution 100 milliGRAM(s) Oral three times a day  glucagon  Injectable 1 milliGRAM(s) IntraMuscular once  insulin lispro (ADMELOG) corrective regimen sliding scale   SubCutaneous every 4 hours  ipratropium 17 MICROgram(s) HFA Inhaler 2 Puff(s) Inhalation every 6 hours  metroNIDAZOLE  IVPB 500 milliGRAM(s) IV Intermittent every 8 hours  multivitamin 1 Tablet(s) Oral daily  norepinephrine Infusion 0.1 MICROgram(s)/kG/Min (5.57 mL/Hr) IV Continuous <Continuous>  petrolatum Ophthalmic Ointment 1 Application(s) Both EYES three times a day  phenylephrine    Infusion 1 MICROgram(s)/kG/Min (11.1 mL/Hr) IV Continuous <Continuous>  potassium chloride   Powder 20 milliEquivalent(s) Oral daily  propofol Infusion 10.101 MICROgram(s)/kG/Min (3.6 mL/Hr) IV Continuous <Continuous>  sodium chloride 0.9%. 1000 milliLiter(s) (40 mL/Hr) IV Continuous <Continuous>  vancomycin    Solution 125 milliGRAM(s) Oral every 6 hours  vasopressin Infusion 0.04 Unit(s)/Min (2.4 mL/Hr) IV Continuous <Continuous>    MEDICATIONS  (PRN):  ondansetron Injectable 4 milliGRAM(s) IV Push every 4 hours PRN Nausea and/or Vomiting      Allergies:  clindamycin (Hives)      Assessment/Plan:  85y Female   - Case and plan discussed with CTU Intensivist and CT Surgeon - Dr. Oneal/Ron/Abiel  - Continue CTU supportive care    - Continue DVT/GI prophylaxis  - Incentive Spirometry 10 times an hour  - Continue to advance physical activity as tolerated and continue PT/OT as directed  1. cont supportive care

## 2021-04-02 NOTE — PROGRESS NOTE ADULT - SUBJECTIVE AND OBJECTIVE BOX
DESTIN TERRY  85y, Female  Allergy: clindamycin (Hives)      LOS  22d    CHIEF COMPLAINT: Cough, fever (20 Mar 2021 01:27)      INTERVAL EVENTS/HPI  - No acute events overnight  - T(F): , Max: 98.7 (04-02-21 @ 08:00)  - remains on pressors; CT imaging reviewed  - WBC Count: 49.96 (04-02-21 @ 01:30)  WBC Count: 45.51 (04-01-21 @ 16:35)     - Creatinine, Serum: 1.7 (04-02-21 @ 01:30)  Creatinine, Serum: 1.6 (04-01-21 @ 16:35)       ROS  unable to obtain history secondary to patient's mental status    VITALS:  T(F): 98, Max: 98.7 (04-02-21 @ 08:00)  HR: 98  BP: 109/53  RR: 28Vital Signs Last 24 Hrs  T(C): 36.7 (02 Apr 2021 16:00), Max: 37.1 (02 Apr 2021 08:00)  T(F): 98 (02 Apr 2021 16:00), Max: 98.7 (02 Apr 2021 08:00)  HR: 98 (02 Apr 2021 16:30) (78 - 104)  BP: 109/53 (02 Apr 2021 16:30) (85/47 - 124/57)  BP(mean): 76 (02 Apr 2021 16:30) (62 - 93)  RR: 28 (02 Apr 2021 16:30) (25 - 34)  SpO2: 99% (02 Apr 2021 16:30) (95% - 100%)    PHYSICAL EXAM:  Gen:intubated  HEENT: Normocephalic, atraumatic  Neck: supple, no lymphadenopathy  CV: Regular rate & regular rhythm  Lungs: decreased BS at bases, no fremitus  Abdomen: Soft, BS present  Ext: Warm, well perfused  Neuro: non focal, awake  Skin: no rash, no erythema  Lines: no phlebitis    FH: Non-contributory  Social Hx: Non-contributory    TESTS & MEASUREMENTS:                        10.3   49.96 )-----------( 456      ( 02 Apr 2021 01:30 )             33.4     04-02    142  |  108  |  66<HH>  ----------------------------<  207<H>  4.6   |  24  |  1.7<H>    Ca    7.8<L>      02 Apr 2021 01:30  Mg     1.9     04-02      eGFR if Non African American: 27 mL/min/1.73M2 (04-02-21 @ 01:30)  eGFR if African American: 32 mL/min/1.73M2 (04-02-21 @ 01:30)          Culture - Blood (collected 03-31-21 @ 10:52)  Source: .Blood Blood  Preliminary Report (04-01-21 @ 23:01):    No growth to date.    Culture - Acid Fast - Tissue w/Smear (collected 03-22-21 @ 13:33)  Source: .Tissue PLEURAL PEEL  Preliminary Report (03-31-21 @ 15:03):    No growth at 1 week.    Culture - Fungal, Tissue (collected 03-22-21 @ 13:33)  Source: .Tissue None  Preliminary Report (03-31-21 @ 15:02):    No growth    Culture - Tissue with Gram Stain (collected 03-22-21 @ 13:33)  Source: .Tissue None  Gram Stain (03-23-21 @ 04:38):    Rare polymorphonuclear leukocytes seen per low power field    Few White blood cells seen per low power field    Few Gram positive cocci in pairs per oil power field  Final Report (04-01-21 @ 16:29):    No growth    Organism seen in Gram stain is non-viable after prolonged    incubation and repeated subculture.    Culture - Fungal, Bronchial (collected 03-22-21 @ 12:58)  Source: .Bronchial None  Preliminary Report (03-25-21 @ 11:20):    Rare Yeast    Culture - Acid Fast - Bronchial w/Smear (collected 03-22-21 @ 12:58)  Source: Bronch Wash None  Preliminary Report (03-31-21 @ 15:03):    No growth at 1 week.    Culture - Bronchial (collected 03-22-21 @ 12:58)  Source: .Bronchial None  Gram Stain (03-23-21 @ 07:58):    Numerous polymorphonuclear leukocytes per low power field    No Squamous epithelial cells per low power field    No organisms seen per oil power field  Final Report (03-24-21 @ 22:11):    Normal Respiratory Narda present    Culture - Fungal, Body Fluid (collected 03-22-21 @ 09:26)  Source: .Body Fluid None  Preliminary Report (03-31-21 @ 15:02):    No growth    Culture - Acid Fast - Body Fluid w/Smear (collected 03-22-21 @ 09:26)  Source: .Body Fluid None  Preliminary Report (03-31-21 @ 15:03):    No growth at 1 week.    Culture - Body Fluid with Gram Stain (collected 03-22-21 @ 09:26)  Source: .Body Fluid None  Gram Stain (03-23-21 @ 04:38):    polymorphonuclear leukocytes seen    No organisms seen    by cytocentrifuge  Final Report (03-27-21 @ 17:22):    No growth at 5 days    Culture - Blood (collected 03-17-21 @ 16:00)  Source: .Blood Blood-Peripheral  Final Report (03-23-21 @ 02:39):    No Growth Final    Culture - Blood (collected 03-13-21 @ 07:33)  Source: .Blood None  Final Report (03-18-21 @ 18:01):    No Growth Final    Culture - Blood (collected 03-10-21 @ 21:00)  Source: .Blood Blood-Peripheral  Final Report (03-16-21 @ 04:01):    No Growth Final    Culture - Blood (collected 03-10-21 @ 21:00)  Source: .Blood Blood-Peripheral  Final Report (03-16-21 @ 04:01):    No Growth Final            INFECTIOUS DISEASES TESTING  COVID-19 PCR: NotDetec (03-29-21 @ 14:17)  Procalcitonin, Serum: 2.29 (03-27-21 @ 10:50)  MRSA PCR Result.: Negative (03-20-21 @ 21:38)  COVID-19 PCR: NotDetec (03-20-21 @ 12:30)  Fungitell: 46 (03-17-21 @ 16:00)  Procalcitonin, Serum: 0.29 (03-17-21 @ 11:30)  Procalcitonin, Serum: 0.31 (03-16-21 @ 10:15)  Legionella Antigen, Urine: Negative (03-12-21 @ 10:30)  Streptococcus Pneumoniae Ag Urine: Negative (03-12-21 @ 10:30)  MRSA PCR Result.: Negative (03-12-21 @ 10:30)  Legionella Antigen, Urine: Negative (03-11-21 @ 08:10)  Streptococcus Pneumoniae Ag Urine: Negative (03-11-21 @ 08:10)  Procalcitonin, Serum: 1.75 (03-11-21 @ 06:31)  Rapid RVP Result: NotDetec (03-10-21 @ 22:10)      INFLAMMATORY MARKERS      RADIOLOGY & ADDITIONAL TESTS:  I have personally reviewed the last available Chest xray  CXR      CT  CT Abdomen and Pelvis No Cont:   EXAM:  CT ABDOMEN AND PELVIS        EXAM:  CT CHEST            PROCEDURE DATE:  04/01/2021            INTERPRETATION:  REASON FOR EXAM / CLINICAL STATEMENT: Trauma;    TECHNIQUE: Contiguous axial CT images were obtained from the thoracic inlet to the pubic symphysis without intravenous contrast. Reformatted images in the coronal and sagittal planes were acquired.    COMPARISON CT: CT scan of the chest dated 3/29/2021    OTHER STUDIES USED FOR CORRELATION: None.      FINDINGS CHEST:    TUBES/LINES: Tracheostomy is in place. Three left-sided chest tubes are in place.    HEART AND VESSELS: The heart is enlarged. There is no pericardial effusion.    Aortic calcifications and ectasia of the aorta are noted.    MEDIASTINUM: New pneumomediastinum is noted, especially in the anterior mediastinum.    AIRWAYS, LUNGS AND PLEURA: The central tracheobronchial tree is patent. Stable residual right-sided pneumothorax. Stable bilateral diffuse areas of groundglass opacity. Areas of traction bronchiectasis noted throughout the left lung field and at the right lung base. There is no pleural effusion.        FINDINGS ABDOMEN AND PELVIS:    HEPATIC: The liver is normal in appearance with no evidence of mass or bile duct dilatation. Small hepatic calcifications are again noted.    BILIARY: No calcified gallstones are noted.    SPLEEN: Unremarkable.    PANCREAS: The pancreas is normal in size and configuration. No evidence of mass or pancreatitis.    ADRENAL GLANDS: Unremarkable.    KIDNEYS: No evidence of hydronephrosis or calcified stones.    ABDOMINOPELVIC NODES: Unremarkable.    PELVIC ORGANS: A Adams catheter is present in the bladder. No evidence of pelvic mass, lymphadenopathy, or fluid collection.    PERITONEUM/MESENTERY/BOWEL: Gastrostomy tube is noted in the midline of the upper abdomen. The retention device is within the gastric lumen. A rectal tube is noted. No evidence of bowel obstruction, colitis, inflammatory process, or ascites within the abdomen or pelvis. No pneumoperitoneum.    BONES/SOFT TISSUES: Degenerative changes of the spine are noted. Subcutaneous emphysema is noted in the neck.    OTHER: Aortoiliac calcifications are noted with no evidence of abdominal aortic aneurysm.      IMPRESSION:    1. Stable residual right-sided pneumothorax. Three left-sided chest tubes are in place. New pneumomediastinum is noted, especially in the anterior mediastinum.    2. Stable bilateral diffuse areas of groundglass opacity. Areas of traction bronchiectasis noted throughout the left lung field and at the right lung base.    3. No evidence of bowel obstruction or intra-abdominal or pelvic inflammatory process              LIBAN SALAZAR MD; Attending Interventional Radiologist  This document has been electronically signed. Apr 1 2021  8:55PM (04-01-21 @ 20:19)      CARDIOLOGY TESTING  12 Lead ECG:   Ventricular Rate 157 BPM    Atrial Rate 326 BPM    QRS Duration 88 ms    Q-T Interval 314 ms    QTC Calculation(Bazett) 507 ms    R Axis 41 degrees    T Axis -87 degrees    Diagnosis Line Atrial fibrillation with premature ventricular or aberrantlyconducted  complexes  Nonspecific ST and T wave abnormality  Abnormal ECG    Confirmed by SHANNAN MORALES MD ([id number]) on 3/26/2021 1:51:15 PM (03-26-21 @ 12:19)  12 Lead ECG:   Ventricular Rate 82 BPM    Atrial Rate 82 BPM    P-R Interval 152 ms    QRS Duration 92 ms    Q-T Interval 408 ms    QTC Calculation(Bazett) 476 ms    P Axis 44 degrees    R Axis 22 degrees    T Axis 26 degrees    Diagnosis Line Normal sinus rhythm  Normal ECG    Confirmed by JUANI DODSON MD ([id number]) on 3/24/2021 8:30:17 PM (03-24-21 @ 13:34)      MEDICATIONS  ALBUTerol    90 MICROgram(s) HFA Inhaler 2 Inhalation every 6 hours  caspofungin IVPB     caspofungin IVPB 50 IV Intermittent every 24 hours  chlorhexidine 0.12% Liquid 5 Oral Mucosa two times a day  chlorhexidine 4% Liquid 1 Topical <User Schedule>  cisatracurium Infusion 3 IV Continuous <Continuous>  clotrimazole 1% Cream 1 Topical two times a day  dexMEDEtomidine Infusion 0.202 IV Continuous <Continuous>  dextrose 5%. 1000 IV Continuous <Continuous>  dextrose 5%. 1000 IV Continuous <Continuous>  donepezil 5 Oral at bedtime  famotidine Injectable 20 IV Push every 12 hours  fentaNYL   Infusion. 0.505 IV Continuous <Continuous>  ferrous    sulfate 325 Oral daily  gabapentin   Solution 100 Oral three times a day  glucagon  Injectable 1 IntraMuscular once  heparin   Injectable 5000 SubCutaneous every 8 hours  insulin lispro (ADMELOG) corrective regimen sliding scale  SubCutaneous every 4 hours  ipratropium 17 MICROgram(s) HFA Inhaler 2 Inhalation every 6 hours  linezolid  IVPB     linezolid  IVPB 600 IV Intermittent every 12 hours  meropenem  IVPB 1000 IV Intermittent every 12 hours  metroNIDAZOLE  IVPB 500 IV Intermittent every 8 hours  multivitamin 1 Oral daily  norepinephrine Infusion 0.1 IV Continuous <Continuous>  petrolatum Ophthalmic Ointment 1 Both EYES three times a day  phenylephrine    Infusion 1 IV Continuous <Continuous>  potassium chloride   Powder 20 Oral daily  propofol Infusion 10.101 IV Continuous <Continuous>  sodium chloride 0.9%. 1000 IV Continuous <Continuous>  vancomycin    Solution 125 Oral every 6 hours  vasopressin Infusion 0.04 IV Continuous <Continuous>      WEIGHT  Weight (kg): 59.4 (03-30-21 @ 07:39)  Creatinine, Serum: 1.7 mg/dL (04-02-21 @ 01:30)      ANTIBIOTICS:  caspofungin IVPB      caspofungin IVPB 50 milliGRAM(s) IV Intermittent every 24 hours  linezolid  IVPB      linezolid  IVPB 600 milliGRAM(s) IV Intermittent every 12 hours  meropenem  IVPB 1000 milliGRAM(s) IV Intermittent every 12 hours  metroNIDAZOLE  IVPB 500 milliGRAM(s) IV Intermittent every 8 hours  vancomycin    Solution 125 milliGRAM(s) Oral every 6 hours      All available historical records have been reviewed

## 2021-04-02 NOTE — PROGRESS NOTE ADULT - SUBJECTIVE AND OBJECTIVE BOX
CTU Attending Progress Daily Note     02 Apr 2021 14:11   remain intubated sedated on pressors     He has history of Dementia    Osteoarthritis      Interval event for past 24 hr:  DESTIN TERRY  85y had no event.   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    As per daughter, patient has been having dry consistent cough since 2 months. Today, she was being evaluated for knee injections when she was found to be febrile. She also reports progressing generalized weakness with decreased appetite and PO intake.  No URT symptoms, no chest pain, no leg pain, no diarrhea, no urinary symptoms no sick contacts, no recent travel. In ED was hypotensive, was given IVF, started on levophed 0.04 called to evaluate (11 Mar 2021 03:21)    OBJECTIVE:  ICU Vital Signs Last 24 Hrs  T(C): 36.8 (02 Apr 2021 12:00), Max: 37.1 (02 Apr 2021 08:00)  T(F): 98.2 (02 Apr 2021 12:00), Max: 98.7 (02 Apr 2021 08:00)  HR: 99 (02 Apr 2021 13:00) (78 - 122)  BP: 124/57 (02 Apr 2021 13:00) (75/43 - 124/57)  BP(mean): 82 (02 Apr 2021 13:00) (55 - 85)  ABP: --  ABP(mean): --  RR: 31 (02 Apr 2021 13:00) (25 - 34)  SpO2: 99% (02 Apr 2021 13:00) (92% - 100%)    I&O's Summary    01 Apr 2021 07:01  -  02 Apr 2021 07:00  --------------------------------------------------------  IN: 5855.6 mL / OUT: 1030 mL / NET: 4825.6 mL    02 Apr 2021 07:01  -  02 Apr 2021 14:11  --------------------------------------------------------  IN: 1154.7 mL / OUT: 170 mL / NET: 984.7 mL      I&O's Detail    01 Apr 2021 07:01  -  02 Apr 2021 07:00  --------------------------------------------------------  IN:    Cisatracurium: 190.6 mL    Enteral Tube Flush: 240 mL    FentaNYL: 234 mL    IV PiggyBack: 750 mL    Norepinephrine: 5.6 mL    Phenylephrine: 445 mL    Phenylephrine: 410 mL    PRBCs (Packed Red Blood Cells): 500 mL    Propofol: 72.8 mL    sodium chloride 0.9%: 840 mL    sodium chloride 0.9%: 30 mL    Sodium Chloride 0.9% Bolus: 2000 mL    Vasopressin: 57.6 mL    Vital High Protein: 80 mL  Total IN: 5855.6 mL    OUT:    Chest Tube (mL): 0 mL    Chest Tube (mL): 85 mL    Chest Tube (mL): 80 mL    Indwelling Catheter - Urethral (mL): 215 mL    Rectal Tube (mL): 650 mL  Total OUT: 1030 mL    Total NET: 4825.6 mL      02 Apr 2021 07:01  -  02 Apr 2021 14:11  --------------------------------------------------------  IN:    Cisatracurium: 30.5 mL    FentaNYL: 63 mL    IV PiggyBack: 600 mL    Phenylephrine: 156 mL    Propofol: 10.8 mL    sodium chloride 0.9%: 280 mL    Vasopressin: 14.4 mL  Total IN: 1154.7 mL    OUT:    Chest Tube (mL): 40 mL    Chest Tube (mL): 20 mL    Chest Tube (mL): 0 mL    Indwelling Catheter - Urethral (mL): 80 mL    Rectal Tube (mL): 30 mL  Total OUT: 170 mL    Total NET: 984.7 mL              CAPILLARY BLOOD GLUCOSE      POCT Blood Glucose.: 154 mg/dL (02 Apr 2021 11:34)  POCT Blood Glucose.: 175 mg/dL (02 Apr 2021 06:24)  POCT Blood Glucose.: 176 mg/dL (02 Apr 2021 03:15)  POCT Blood Glucose.: 184 mg/dL (01 Apr 2021 22:14)  POCT Blood Glucose.: 196 mg/dL (01 Apr 2021 17:26)  POCT Blood Glucose.: 191 mg/dL (01 Apr 2021 16:28)    LABS:  ABG - ( 02 Apr 2021 03:52 )  pH, Arterial: 7.26  pH, Blood: x     /  pCO2: 54    /  pO2: 86    / HCO3: 25    / Base Excess: -2.7  /  SaO2: 97                                      10.3   49.96 )-----------( 456      ( 02 Apr 2021 01:30 )             33.4     04-02    142  |  108  |  66<HH>  ----------------------------<  207<H>  4.6   |  24  |  1.7<H>    Ca    7.8<L>      02 Apr 2021 01:30  Mg     1.9     04-02            Culture - Blood (collected 31 Mar 2021 10:52)  Source: .Blood Blood  Preliminary Report (01 Apr 2021 23:01):    No growth to date.      Home Medications:  donepezil 5 mg oral tablet: 1 tab(s) orally once a day (at bedtime) (11 Mar 2021 03:24)  gabapentin 300 mg oral capsule: 1 cap(s) orally 3 times a day, As Needed (11 Mar 2021 03:24)  meloxicam 15 mg oral tablet: 1 tab(s) orally once a day, As Needed (11 Mar 2021 03:24)  zolpidem 10 mg oral tablet: 1 tab(s) orally once a day (at bedtime), As Needed (11 Mar 2021 03:24)    HOSPITAL MEDICATIONS:  MEDICATIONS  (STANDING):  ALBUTerol    90 MICROgram(s) HFA Inhaler 2 Puff(s) Inhalation every 6 hours  caspofungin IVPB      caspofungin IVPB 50 milliGRAM(s) IV Intermittent every 24 hours  chlorhexidine 0.12% Liquid 5 milliLiter(s) Oral Mucosa two times a day  chlorhexidine 4% Liquid 1 Application(s) Topical <User Schedule>  cisatracurium Infusion 3 MICROgram(s)/kG/Min (10.7 mL/Hr) IV Continuous <Continuous>  clotrimazole 1% Cream 1 Application(s) Topical two times a day  dexMEDEtomidine Infusion 0.202 MICROgram(s)/kG/Hr (3 mL/Hr) IV Continuous <Continuous>  dextrose 5%. 1000 milliLiter(s) (50 mL/Hr) IV Continuous <Continuous>  dextrose 5%. 1000 milliLiter(s) (100 mL/Hr) IV Continuous <Continuous>  donepezil 5 milliGRAM(s) Oral at bedtime  famotidine Injectable 20 milliGRAM(s) IV Push every 12 hours  fentaNYL   Infusion. 0.505 MICROgram(s)/kG/Hr (3 mL/Hr) IV Continuous <Continuous>  ferrous    sulfate 325 milliGRAM(s) Oral daily  gabapentin   Solution 100 milliGRAM(s) Oral three times a day  glucagon  Injectable 1 milliGRAM(s) IntraMuscular once  heparin   Injectable 5000 Unit(s) SubCutaneous every 8 hours  insulin lispro (ADMELOG) corrective regimen sliding scale   SubCutaneous every 4 hours  ipratropium 17 MICROgram(s) HFA Inhaler 2 Puff(s) Inhalation every 6 hours  linezolid  IVPB      linezolid  IVPB 600 milliGRAM(s) IV Intermittent every 12 hours  meropenem  IVPB 1000 milliGRAM(s) IV Intermittent every 12 hours  metroNIDAZOLE  IVPB 500 milliGRAM(s) IV Intermittent every 8 hours  multivitamin 1 Tablet(s) Oral daily  norepinephrine Infusion 0.1 MICROgram(s)/kG/Min (5.57 mL/Hr) IV Continuous <Continuous>  petrolatum Ophthalmic Ointment 1 Application(s) Both EYES three times a day  phenylephrine    Infusion 1 MICROgram(s)/kG/Min (11.1 mL/Hr) IV Continuous <Continuous>  potassium chloride   Powder 20 milliEquivalent(s) Oral daily  propofol Infusion 10.101 MICROgram(s)/kG/Min (3.6 mL/Hr) IV Continuous <Continuous>  sodium chloride 0.9%. 1000 milliLiter(s) (40 mL/Hr) IV Continuous <Continuous>  vancomycin    Solution 125 milliGRAM(s) Oral every 6 hours  vasopressin Infusion 0.04 Unit(s)/Min (2.4 mL/Hr) IV Continuous <Continuous>    MEDICATIONS  (PRN):  ondansetron Injectable 4 milliGRAM(s) IV Push every 4 hours PRN Nausea and/or Vomiting          [ x ] Unable to assess ROS because intubated sedated    PHYSICAL EXAM:          CONSTITUTIONAL: tarched sedated   	SKIN: warm, dry  	HEAD: Normocephalic; atraumatic.  	EYES: PERRL, EOM, no conj injection, sclera clear  	ENT: No nasal discharge; airway clear.  	NECK: Supple; non tender.  No midline ttp ctls  	CARD: S1, S2 normal; no murmurs, gallops, or rubs. Regular rate and rhythm. 2+ RPs and DPs bilat, no carotid bruits, no pedal   edema, no calf pain b/l  	RESP: CTA  bilat good air movement No wheezes, rales or rhonchi.  	ABD: Soft, not tender, not distended, no CVA ttp no rebound or guarding, bowel sounds present  	EXT: on medical paralysis for vent synchrony contol   	  	NEURO: Alert, awake, motor 5/5 R, 5/5 L        RADIOLOGY:  xray  < from: Xray Chest 1 View- PORTABLE-Routine (Xray Chest 1 View- PORTABLE-Routine in AM.) (04.02.21 @ 07:06) >  Impression:    Right basilar pneumothorax not well delineated on current x-ray. Unchanged bilateral airspace opacities  Pneumomediastinum better appreciated on chest CT.  Stable support devices.      < end of copied text >  < from: CT Abdomen and Pelvis No Cont (04.01.21 @ 20:19) >  IMPRESSION:    1. Stable residual right-sided pneumothorax. Three left-sided chest tubes are in place. New pneumomediastinum is noted, especially in the anterior mediastinum.    2. Stable bilateral diffuse areas of groundglass opacity. Areas of traction bronchiectasis noted throughout the left lung field and at the right lung base.    3. No evidence of bowel obstruction or intra-abdominal or pelvic inflammatory process    < end of copied text >    I spent 45 minutes of critical care time examining patient, reviewing vitals, labs, medications, imaging and discussing with the team goals of care to prevent life-threatening in this patient who is at high risk for deterioration or death due to:

## 2021-04-02 NOTE — PROGRESS NOTE ADULT - ASSESSMENT
Assessment:  85y Female patient admitted s/p R VATS evacuation of empyema, x3 chest tubes in place, s/p Trach and PEG, with the above physical exam, labs, and imaging findings.      Plan:    Follow up C-diff  start trickle feeds @20  3 Chest tubes to suction on right side  Daily chest xray  Monitor chest tube out put  Monitor for airleaks  Monitor 02 saturation  Monitor vent settings   DVT/GI prophylaxis  Pain management             Date/Time: 04-02-21 @ 09:14

## 2021-04-02 NOTE — PROGRESS NOTE ADULT - ASSESSMENT
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, osteoarithtis, Faroese-speaking presenting with R empyma had VAT on R and developed ARDS sever sepsis     1- R vat for empyma  pnomonia  continue ATB as per ID karina zyvox capsofungin falgyl  2- ARDS continu venty support   3- MENDOZA monitor bun /cr daily  4- septic shock due to pnumonia ?empyma  continue vent support and presors vaso and miah   5- Acute hypoxic hypercapnic resp failure dur to ards/pnumonia   vent anagment 10 peep and sedation wth fentaly and propofol low dose and Nimbex   6 diareah   sc diff sent on po avnco  7 R ptx with air leak daily chest xray       ID f/u Ct surgery f/u    poor prognosis

## 2021-04-02 NOTE — PROGRESS NOTE ADULT - ASSESSMENT
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      IMPRESSION  #Sepsis present on admission - secondary to CAP  -  CT Chest No Cont (03.11.21 @ 00:54): Areas of right lung consolidation which can be seen in pneumonia. Numerous air-fluid levels throughout the right lung compatible with an infectious process. Suggestion of split pleura at the lung base for which empyema is favored although inherently limited on this noncontrast exam. Consideration can be given to contrast administration if feasiblefor better delineation. Areas of bilateral interlobular septal thickening and mild layering groundglass attenuation may reflect a component of edema.  - Urine Legionella negative  - Urine Strep negative  - BLood Cx 3/10 and 3/13 negative  - Procalcitonin 1.15   - CT Chest No Cont (03.17.21 @ 16:19): Since 3/11/2021. Enlarging loculated right pleural fluid collection with multiple air bubbles consistent with empyema.   Associated compressive atelectasis right lung is seen. Scattered groundglass opacities involving multiple lumbar segments.  - s/p VATS 3/22 -- right empyema with 800 cc purulent fluid in pleural space, multiple pockers with dense adhesions -- necrotizing pneumonia of the middle lobe  - VATS Cx 3/22 with rare yeast, otherwise no growth  -  CT Chest No Cont (03.29.21 @ 12:37): Since March 29, 2021, resolved right pleural effusion; persistent moderate right pneumothorax with 2 chest tubes in place. Increased left greater than right diffuse bilateral groundglass opacities and interlobular septal thickening. Findings are suspicious for a multifocal infection. Superimposed edema is also a possibility.  Enlarging mediastinal lymph nodes, likely reactive.  - CT Chest/Abdomen and Pelvis No Cont (04.01.21 @ 20:19): Stable residual right-sided pneumothorax. Three left-sided chest tubes are in place. New pneumomediastinum is noted, especially in the anterior mediastinum.   Stable bilateral diffuse areas of groundglass opacity. Areas of traction bronchiectasis noted throughout the left lung field and at the right lung base. No evidence of bowel obstruction or intra-abdominal or pelvic inflammatory process      #Dementia  #Osteoarthritis  #Obesity BMI (kg/m2): 23.4  #Abx allergy: clindamycin (Hives)    Creatinine, Serum: 0.5 mg/dL (03.31.21 @ 01:30)  Weight (kg): 60 (11 Mar 2021 01:17)  CrCl 50      RECOMMENDATIONS  - follow-up fungitell and galactomannan  - can stop PO vancomycin as C diff negative  - continue meropenem, linzeolid, and caspofungin  - management of chest tubes/pneumomediastinum per CT surgery    Please call or message on [employer] Teams if with any questions.  Spectra [phone]

## 2021-04-03 NOTE — PROGRESS NOTE ADULT - SUBJECTIVE AND OBJECTIVE BOX
DESTIN TERRY  85y, Female  Allergy: clindamycin (Hives)      LOS  23d    CHIEF COMPLAINT: Cough, fever (20 Mar 2021 01:27)      INTERVAL EVENTS/HPI  - T(F): , Max: 99.3 (21 @ 08:00)  - WBC continues to rise;on pressors  - WBC Count: 62.73 (21 @ 01:57)  WBC Count: 49.96 (21 @ 01:30)     - Creatinine, Serum: 2.3 (21 @ 01:57)  Creatinine, Serum: 1.7 (21 @ 01:30)       ROS  unable to obtain history secondary to patient's mental status      VITALS:  T(F): 99.1, Max: 99.3 (21 @ 08:00)  HR: 80  BP: 117/55  RR: 29Vital Signs Last 24 Hrs  T(C): 37.3 (2021 12:00), Max: 37.4 (2021 08:00)  T(F): 99.1 (2021 12:00), Max: 99.3 (2021 08:00)  HR: 80 (2021 15:30) (80 - 114)  BP: 117/55 (2021 11:00) (92/46 - 132/59)  BP(mean): 79 (2021 11:00) (66 - 86)  RR: 29 (2021 15:30) (18 - 38)  SpO2: 99% (2021 15:30) (97% - 100%)    PHYSICAL EXAM:  Gen: NAD, resting in bed  HEENT: Normocephalic, atraumatic  Neck: supple, no lymphadenopathy  CV: Regular rate & regular rhythm  Lungs: decreased BS at bases, no fremitus  Abdomen: Soft, BS present  Ext: Warm, well perfused  Neuro: non focal, awake  Skin: no rash, no erythema  Lines: no phlebitis    FH: Non-contributory  Social Hx: Non-contributory    TESTS & MEASUREMENTS:                        8.7    62.73 )-----------( 499      ( 2021 01:57 )             29.5     04-03    146  |  112<H>  |  77<HH>  ----------------------------<  170<H>  4.8   |  22  |  2.3<H>    Ca    7.9<L>      2021 01:57  Mg     2.1           eGFR if Non African American: 19 mL/min/1.73M2 (21 @ 01:57)  eGFR if African American: 22 mL/min/1.73M2 (21 @ 01:57)      Urinalysis Basic - ( 2021 10:40 )    Color: Yellow / Appearance: Clear / S.017 / pH: x  Gluc: x / Ketone: Negative  / Bili: Negative / Urobili: <2 mg/dL   Blood: x / Protein: 100 mg/dL / Nitrite: Negative   Leuk Esterase: Negative / RBC: 1 /HPF / WBC 6 /HPF   Sq Epi: x / Non Sq Epi: 1 /HPF / Bacteria: Negative        Culture - Blood (collected 21 @ 10:52)  Source: .Blood Blood  Preliminary Report (21 @ 23:01):    No growth to date.    Culture - Acid Fast - Tissue w/Smear (collected 21 @ 13:33)  Source: .Tissue PLEURAL PEEL  Preliminary Report (21 @ 15:03):    No growth at 1 week.    Culture - Fungal, Tissue (collected 21 @ 13:33)  Source: .Tissue None  Preliminary Report (21 @ 15:02):    No growth    Culture - Tissue with Gram Stain (collected 21 @ 13:33)  Source: .Tissue None  Gram Stain (21 @ 04:38):    Rare polymorphonuclear leukocytes seen per low power field    Few White blood cells seen per low power field    Few Gram positive cocci in pairs per oil power field  Final Report (21 @ 16:29):    No growth    Organism seen in Gram stain is non-viable after prolonged    incubation and repeated subculture.    Culture - Fungal, Bronchial (collected 21 @ 12:58)  Source: .Bronchial None  Preliminary Report (21 @ 11:20):    Rare Yeast    Culture - Acid Fast - Bronchial w/Smear (collected 21 @ 12:58)  Source: Bronch Wash None  Preliminary Report (21 @ 15:03):    No growth at 1 week.    Culture - Bronchial (collected 21 @ 12:58)  Source: .Bronchial None  Gram Stain (21 @ 07:58):    Numerous polymorphonuclear leukocytes per low power field    No Squamous epithelial cells per low power field    No organisms seen per oil power field  Final Report (21 @ 22:11):    Normal Respiratory Anrda present    Culture - Fungal, Body Fluid (collected 21 @ 09:26)  Source: .Body Fluid None  Preliminary Report (21 @ 15:02):    No growth    Culture - Acid Fast - Body Fluid w/Smear (collected 21 @ 09:26)  Source: .Body Fluid None  Preliminary Report (21 @ 15:03):    No growth at 1 week.    Culture - Body Fluid with Gram Stain (collected 21 @ 09:26)  Source: .Body Fluid None  Gram Stain (21 @ 04:38):    polymorphonuclear leukocytes seen    No organisms seen    by cytocentrifuge  Final Report (21 @ 17:22):    No growth at 5 days    Culture - Blood (collected 21 @ 16:00)  Source: .Blood Blood-Peripheral  Final Report (21 @ 02:39):    No Growth Final    Culture - Blood (collected 21 @ 07:33)  Source: .Blood None  Final Report (21 @ 18:01):    No Growth Final    Culture - Blood (collected 03-10-21 @ 21:00)  Source: .Blood Blood-Peripheral  Final Report (21 @ 04:01):    No Growth Final    Culture - Blood (collected 03-10-21 @ 21:00)  Source: .Blood Blood-Peripheral  Final Report (21 @ 04:01):    No Growth Final            INFECTIOUS DISEASES TESTING  Fungitell: <31 (21 @ 01:03)  Aspergillus Galactomannan Antigen: <0.500 (21 @ 01:03)  COVID-19 PCR: NotDetec (21 @ 14:17)  Procalcitonin, Serum: 2.29 (21 @ 10:50)  MRSA PCR Result.: Negative (21 @ 21:38)  COVID-19 PCR: NotDetec (21 @ 12:30)  Fungitell: 46 (21 @ 16:00)  Procalcitonin, Serum: 0.29 (21 @ 11:30)  Procalcitonin, Serum: 0.31 (21 @ 10:15)  Legionella Antigen, Urine: Negative (21 @ 10:30)  Streptococcus Pneumoniae Ag Urine: Negative (21 @ 10:30)  MRSA PCR Result.: Negative (21 @ 10:30)  Legionella Antigen, Urine: Negative (21 @ 08:10)  Streptococcus Pneumoniae Ag Urine: Negative (21 @ 08:10)  Procalcitonin, Serum: 1.75 (21 @ 06:31)  Rapid RVP Result: NotDetec (03-10-21 @ 22:10)      INFLAMMATORY MARKERS      RADIOLOGY & ADDITIONAL TESTS:  I have personally reviewed the last available Chest xray  CXR  Xray Chest 1 View AP/PA:   EXAM:  XR CHEST 1 VIEW            PROCEDURE DATE:  2021            INTERPRETATION:  Clinical History / Reason for exam: Respiratory distress    Comparison : Chest radiograph 4/3/2021.    Technique/Positioning: Frontal.    Findings:    Supportdevices: Tracheostomy tube in place chest tube in place central venous line superior vena cava    Cardiac/mediastinum/hilum: Unremarkable.    Lung parenchyma/Pleura: Bilateral opacities unchanged. Right basilar pneumothorax unchanged. No pleural effusion.    Skeleton/soft tissues: Scoliosis    Impression:    Bilateral opacities unchanged. Right basilar pneumothorax unchanged. No pleural effusion.                  ARCHIE HYDE MD; Attending Radiologist  This document has been electronicallysigned. Apr  3 2021  2:11PM (21 @ 13:15)      CT  CT Abdomen and Pelvis No Cont:   EXAM:  CT ABDOMEN AND PELVIS        EXAM:  CT CHEST            PROCEDURE DATE:  2021            INTERPRETATION:  REASON FOR EXAM / CLINICAL STATEMENT: Trauma;    TECHNIQUE: Contiguous axial CT images were obtained from the thoracic inlet to the pubic symphysis without intravenous contrast. Reformatted images in the coronal and sagittal planes were acquired.    COMPARISON CT: CT scan of the chest dated 3/29/2021    OTHER STUDIES USED FOR CORRELATION: None.      FINDINGS CHEST:    TUBES/LINES: Tracheostomy is in place. Three left-sided chest tubes are in place.    HEART AND VESSELS: The heart is enlarged. There is no pericardial effusion.    Aortic calcifications and ectasia of the aorta are noted.    MEDIASTINUM: New pneumomediastinum is noted, especially in the anterior mediastinum.    AIRWAYS, LUNGS AND PLEURA: The central tracheobronchial tree is patent. Stable residual right-sided pneumothorax. Stable bilateral diffuse areas of groundglass opacity. Areas of traction bronchiectasis noted throughout the left lung field and at the right lung base. There is no pleural effusion.        FINDINGS ABDOMEN AND PELVIS:    HEPATIC: The liver is normal in appearance with no evidence of mass or bile duct dilatation. Small hepatic calcifications are again noted.    BILIARY: No calcified gallstones are noted.    SPLEEN: Unremarkable.    PANCREAS: The pancreas is normal in size and configuration. No evidence of mass or pancreatitis.    ADRENAL GLANDS: Unremarkable.    KIDNEYS: No evidence of hydronephrosis or calcified stones.    ABDOMINOPELVIC NODES: Unremarkable.    PELVIC ORGANS: A Adams catheter is present in the bladder. No evidence of pelvic mass, lymphadenopathy, or fluid collection.    PERITONEUM/MESENTERY/BOWEL: Gastrostomy tube is noted in the midline of the upper abdomen. The retention device is within the gastric lumen. A rectal tube is noted. No evidence of bowel obstruction, colitis, inflammatory process, or ascites within the abdomen or pelvis. No pneumoperitoneum.    BONES/SOFT TISSUES: Degenerative changes of the spine are noted. Subcutaneous emphysema is noted in the neck.    OTHER: Aortoiliac calcifications are noted with no evidence of abdominal aortic aneurysm.      IMPRESSION:    1. Stable residual right-sided pneumothorax. Three left-sided chest tubes are in place. New pneumomediastinum is noted, especially in the anterior mediastinum.    2. Stable bilateral diffuse areas of groundglass opacity. Areas of traction bronchiectasis noted throughout the left lung field and at the right lung base.    3. No evidence of bowel obstruction or intra-abdominal or pelvic inflammatory process              LIBAN SALAZAR MD; Attending Interventional Radiologist  This document has been electronically signed. 2021  8:55PM (21 @ 20:19)      CARDIOLOGY TESTING  12 Lead ECG:   Ventricular Rate 157 BPM    Atrial Rate 326 BPM    QRS Duration 88 ms    Q-T Interval 314 ms    QTC Calculation(Bazett) 507 ms    R Axis 41 degrees    T Axis -87 degrees    Diagnosis Line Atrial fibrillation with premature ventricular or aberrantlyconducted  complexes  Nonspecific ST and T wave abnormality  Abnormal ECG    Confirmed by SHANNAN MORALES MD ([id number]) on 3/26/2021 1:51:15 PM (21 @ 12:19)  12 Lead ECG:   Ventricular Rate 82 BPM    Atrial Rate 82 BPM    P-R Interval 152 ms    QRS Duration 92 ms    Q-T Interval 408 ms    QTC Calculation(Bazett) 476 ms    P Axis 44 degrees    R Axis 22 degrees    T Axis 26 degrees    Diagnosis Line Normal sinus rhythm  Normal ECG    Confirmed by JUANI DODSON MD ([id number]) on 3/24/2021 8:30:17 PM (21 @ 13:34)      MEDICATIONS  ALBUTerol    90 MICROgram(s) HFA Inhaler 2 Inhalation every 6 hours  caspofungin IVPB     caspofungin IVPB 50 IV Intermittent every 24 hours  chlorhexidine 0.12% Liquid 5 Oral Mucosa two times a day  chlorhexidine 4% Liquid 1 Topical <User Schedule>  cisatracurium Infusion 3 IV Continuous <Continuous>  clotrimazole 1% Cream 1 Topical two times a day  dexMEDEtomidine Infusion 0.202 IV Continuous <Continuous>  dextrose 5%. 1000 IV Continuous <Continuous>  dextrose 5%. 1000 IV Continuous <Continuous>  donepezil 5 Oral at bedtime  famotidine Injectable 20 IV Push every 12 hours  fentaNYL   Infusion. 0.505 IV Continuous <Continuous>  ferrous    sulfate 325 Oral daily  gabapentin   Solution 100 Oral three times a day  glucagon  Injectable 1 IntraMuscular once  heparin   Injectable 5000 SubCutaneous every 8 hours  insulin lispro (ADMELOG) corrective regimen sliding scale  SubCutaneous every 4 hours  ipratropium 17 MICROgram(s) HFA Inhaler 2 Inhalation every 6 hours  linezolid  IVPB     linezolid  IVPB 600 IV Intermittent every 12 hours  meropenem  IVPB 1000 IV Intermittent every 12 hours  metroNIDAZOLE  IVPB 500 IV Intermittent every 8 hours  multivitamin 1 Oral daily  norepinephrine Infusion 0.1 IV Continuous <Continuous>  petrolatum Ophthalmic Ointment 1 Both EYES three times a day  phenylephrine    Infusion 1 IV Continuous <Continuous>  propofol Infusion 10.101 IV Continuous <Continuous>  sodium chloride 0.9%. 1000 IV Continuous <Continuous>  vancomycin    Solution 125 Oral every 6 hours  vasopressin Infusion 0.04 IV Continuous <Continuous>      WEIGHT  Weight (kg): 59.4 (21 @ 07:39)  Creatinine, Serum: 2.3 mg/dL (21 @ 01:57)      ANTIBIOTICS:  caspofungin IVPB      caspofungin IVPB 50 milliGRAM(s) IV Intermittent every 24 hours  linezolid  IVPB      linezolid  IVPB 600 milliGRAM(s) IV Intermittent every 12 hours  meropenem  IVPB 1000 milliGRAM(s) IV Intermittent every 12 hours  metroNIDAZOLE  IVPB 500 milliGRAM(s) IV Intermittent every 8 hours  vancomycin    Solution 125 milliGRAM(s) Oral every 6 hours      All available historical records have been reviewed

## 2021-04-03 NOTE — PROCEDURAL SAFETY CHECKLIST WITH OR WITHOUT SEDATION - NSPREPROCEDSEDAT_GEN_ALL_CORE
with sedation
pt. sedated, intubated/with sedation
without sedation
on sedation/with sedation
without sedation

## 2021-04-03 NOTE — PROGRESS NOTE ADULT - ATTENDING COMMENTS
Post op Right VATS, decortication  Multiple bronchoscopies    Continues to be septic  Extremely elevated WBC counts  Renal failure  Respiratory failure    Suggest  New central line  ID follow up  antibiotics  Supportive care.

## 2021-04-03 NOTE — PROGRESS NOTE ADULT - SUBJECTIVE AND OBJECTIVE BOX
OPERATIVE PROCEDURE(s):      Right VTAS , 3 CT placed, trach and peg                 85yFemale  SURGEON(s): PRO Oshea  SUBJECTIVE ASSESSMENT:  Patient has no complaints at this time.    Vital Signs Last 24 Hrs  T(F): 99.3 (03 Apr 2021 08:00), Max: 99.3 (03 Apr 2021 08:00)  HR: 83 (03 Apr 2021 09:00) (81 - 114)  BP: 116/58 (03 Apr 2021 09:00) (92/46 - 132/59)  BP(mean): 84 (03 Apr 2021 09:00) (66 - 93)  RR: 31 (03 Apr 2021 09:00) (18 - 38)  SpO2: 100% (03 Apr 2021 09:00) (97% - 100%)  Mode: AC/ CMV (Assist Control/ Continuous Mandatory Ventilation)  RR (machine): 26  TV (machine): 450  FiO2: 50  PEEP: 10  MAP: 19    I&O's Detail    02 Apr 2021 07:01  -  03 Apr 2021 07:00  --------------------------------------------------------  IN:    Cisatracurium: 64.7 mL    Enteral Tube Flush: 120 mL    FentaNYL: 216 mL    IV PiggyBack: 1600 mL    Phenylephrine: 624 mL    Propofol: 72 mL    sodium chloride 0.9%: 960 mL    Vasopressin: 57.6 mL    Vital High Protein: 440 mL  Total IN: 4154.3 mL    OUT:    Chest Tube (mL): 150 mL    Chest Tube (mL): 100 mL    Chest Tube (mL): 20 mL    Indwelling Catheter - Urethral (mL): 466 mL    Rectal Tube (mL): 850 mL  Total OUT: 1586 mL        Net:   I&O's Detail    01 Apr 2021 07:01  -  02 Apr 2021 07:00  --------------------------------------------------------  Total NET: 4825.6 mL      02 Apr 2021 07:01  -  03 Apr 2021 07:00  --------------------------------------------------------  Total NET: 2568.3 mL        CAPILLARY BLOOD GLUCOSE  POCT Blood Glucose.: 154 mg/dL (03 Apr 2021 05:21)  POCT Blood Glucose.: 199 mg/dL (03 Apr 2021 03:01)  POCT Blood Glucose.: 209 mg/dL (02 Apr 2021 21:37)  POCT Blood Glucose.: 196 mg/dL (02 Apr 2021 18:14)  POCT Blood Glucose.: 196 mg/dL (02 Apr 2021 14:26)  POCT Blood Glucose.: 154 mg/dL (02 Apr 2021 11:34)    Physical Exam:  General: Patient is intubated and sedated  Cardiac: S1/S2, RRR, no murmur, no rubs  Abdomen: Soft/NT/ND; positive bowel sounds x 4  Sternum: Intact, no click, incision healing well with no drainage  Incisions: Incisions clean/dry/intact      LABS:                        8.7<L>  62.73<HH> )-----------( 499<H>    ( 03 Apr 2021 01:57 )             29.5<L>                        10.3<L>  49.96<HH> )-----------( 456<H>    ( 02 Apr 2021 01:30 )             33.4<L>    04-03    146  |  112<H>  |  77<HH>  ----------------------------<  170<H>  4.8   |  22  |  2.3<H>  04-02    142  |  108  |  66<HH>  ----------------------------<  207<H>  4.6   |  24  |  1.7<H>    Ca    7.9<L>      03 Apr 2021 01:57  Mg     2.1     04-03          ABG - ( 03 Apr 2021 04:50 )  pH: 7.25  /  pCO2: 55    /  pO2: 90    / HCO3: 24    / Base Excess: -3.2  /  SaO2: 96    /  LA: 1.3        MEDICATIONS  (STANDING):  ALBUTerol    90 MICROgram(s) HFA Inhaler 2 Puff(s) Inhalation every 6 hours  caspofungin IVPB      caspofungin IVPB 50 milliGRAM(s) IV Intermittent every 24 hours  chlorhexidine 0.12% Liquid 5 milliLiter(s) Oral Mucosa two times a day  chlorhexidine 4% Liquid 1 Application(s) Topical <User Schedule>  cisatracurium Infusion 3 MICROgram(s)/kG/Min (10.7 mL/Hr) IV Continuous <Continuous>  clotrimazole 1% Cream 1 Application(s) Topical two times a day  dexMEDEtomidine Infusion 0.202 MICROgram(s)/kG/Hr (3 mL/Hr) IV Continuous <Continuous>  dextrose 5%. 1000 milliLiter(s) (50 mL/Hr) IV Continuous <Continuous>  dextrose 5%. 1000 milliLiter(s) (100 mL/Hr) IV Continuous <Continuous>  donepezil 5 milliGRAM(s) Oral at bedtime  famotidine Injectable 20 milliGRAM(s) IV Push every 12 hours  fentaNYL   Infusion. 0.505 MICROgram(s)/kG/Hr (3 mL/Hr) IV Continuous <Continuous>  ferrous    sulfate 325 milliGRAM(s) Oral daily  gabapentin   Solution 100 milliGRAM(s) Oral three times a day  glucagon  Injectable 1 milliGRAM(s) IntraMuscular once  heparin   Injectable 5000 Unit(s) SubCutaneous every 8 hours  insulin lispro (ADMELOG) corrective regimen sliding scale   SubCutaneous every 4 hours  ipratropium 17 MICROgram(s) HFA Inhaler 2 Puff(s) Inhalation every 6 hours  linezolid  IVPB      linezolid  IVPB 600 milliGRAM(s) IV Intermittent every 12 hours  meropenem  IVPB 1000 milliGRAM(s) IV Intermittent every 12 hours  metroNIDAZOLE  IVPB 500 milliGRAM(s) IV Intermittent every 8 hours  multivitamin 1 Tablet(s) Oral daily  norepinephrine Infusion 0.1 MICROgram(s)/kG/Min (5.57 mL/Hr) IV Continuous <Continuous>  petrolatum Ophthalmic Ointment 1 Application(s) Both EYES three times a day  phenylephrine    Infusion 1 MICROgram(s)/kG/Min (11.1 mL/Hr) IV Continuous <Continuous>  propofol Infusion 10.101 MICROgram(s)/kG/Min (3.6 mL/Hr) IV Continuous <Continuous>  sodium chloride 0.9%. 1000 milliLiter(s) (40 mL/Hr) IV Continuous <Continuous>  vancomycin    Solution 125 milliGRAM(s) Oral every 6 hours  vasopressin Infusion 0.04 Unit(s)/Min (2.4 mL/Hr) IV Continuous <Continuous>    MEDICATIONS  (PRN):  ondansetron Injectable 4 milliGRAM(s) IV Push every 4 hours PRN Nausea and/or Vomiting      Allergies:  clindamycin (Hives)      Assessment/Plan:  85y Female Right VTAS , 3 CT placed, trach and peg    - Case and plan discussed with CTU Intensivist and CT Surgeon - Dr. Oneal/Ron/Abiel  - Continue CTU supportive care    - Continue DVT/GI prophylaxis  - Incentive Spirometry 10 times an hour  - Continue to advance physical activity as tolerated and continue PT/OT as directed  1. Cont supportive care  2. Place A-line today; New TLC today and removal old  3. Repeat Blood cultures

## 2021-04-03 NOTE — PROCEDURAL SAFETY CHECKLIST WITH OR WITHOUT SEDATION - NSPOSTCOMMENTFT_GEN_ALL_CORE
Pt had bedside Bronchoscopy. Pt intubated prior to procedure, precedex gtt for sedation. No additional medications needed for procedure. Pt tolerated well.
procedure tolerated

## 2021-04-03 NOTE — PROGRESS NOTE ADULT - ASSESSMENT
ASSESSMENT  85 year old lady known to have dementia, osteoarithtis, [place]-speaking presenting with cough and fever.      IMPRESSION  #Sepsis present on admission - secondary to CAP  -  CT Chest No Cont (03.11.21 @ 00:54): Areas of right lung consolidation which can be seen in pneumonia. Numerous air-fluid levels throughout the right lung compatible with an infectious process. Suggestion of split pleura at the lung base for which empyema is favored although inherently limited on this noncontrast exam. Consideration can be given to contrast administration if feasiblefor better delineation. Areas of bilateral interlobular septal thickening and mild layering groundglass attenuation may reflect a component of edema.  - Urine Legionella negative  - Urine Strep negative  - BLood Cx 3/10 and 3/13 negative  - Procalcitonin 1.15   - CT Chest No Cont (03.17.21 @ 16:19): Since 3/11/2021. Enlarging loculated right pleural fluid collection with multiple air bubbles consistent with empyema.   Associated compressive atelectasis right lung is seen. Scattered groundglass opacities involving multiple lumbar segments.  - s/p VATS 3/22 -- right empyema with 800 cc purulent fluid in pleural space, multiple pockers with dense adhesions -- necrotizing pneumonia of the middle lobe  - VATS Cx 3/22 with rare yeast, otherwise no growth  -  CT Chest No Cont (03.29.21 @ 12:37): Since March 29, 2021, resolved right pleural effusion; persistent moderate right pneumothorax with 2 chest tubes in place. Increased left greater than right diffuse bilateral groundglass opacities and interlobular septal thickening. Findings are suspicious for a multifocal infection. Superimposed edema is also a possibility.  Enlarging mediastinal lymph nodes, likely reactive.  - CT Chest/Abdomen and Pelvis No Cont (04.01.21 @ 20:19): Stable residual right-sided pneumothorax. Three left-sided chest tubes are in place. New pneumomediastinum is noted, especially in the anterior mediastinum.  Stable bilateral diffuse areas of groundglass opacity. Areas of traction bronchiectasis noted throughout the left lung field and at the right lung base. No evidence of bowel obstruction or intra-abdominal or pelvic inflammatory process  - Fungitell: <31 4/1  - Aspergillus Galactomannan Antigen: <0.500 (04.01.21 @ 01:03)    #Dementia  #Osteoarthritis  #Obesity BMI (kg/m2): 23.4  #Abx allergy: clindamycin (Hives)    Creatinine, Serum: 2.3 mg/dL (04.03.21 @ 01:57)  Weight (kg): 60 (11 Mar 2021 01:17)  CrCl 21     RECOMMENDATIONS  - consider stopping caspofungin as fungal markers are negative   - can stop PO vancomycin as C diff negative  - continue meropenem, linzeolid  - lines have been exchanged, will follow repeat blood cultures  - management of chest tubes/pneumomediastinum per CT surgery    Please call or message on [employer] Teams if with any questions.  Spectra [phone]

## 2021-04-03 NOTE — PROGRESS NOTE ADULT - ASSESSMENT
85y Female patient admitted s/p R VATS evacuation of empyema, x3 chest tubes in place, s/p Trach and PEG, with the above physical exam, labs, and imaging findings.      Plan:    Follow up C-diff  start trickle feeds @20  3 Chest tubes to suction on right side  Daily chest xray  Monitor chest tube out put  Monitor for airleaks  Monitor 02 saturation  Monitor vent settings   DVT/GI prophylaxis  Pain management

## 2021-04-03 NOTE — PROGRESS NOTE ADULT - SUBJECTIVE AND OBJECTIVE BOX
GENERAL SURGERY PROGRESS NOTE     DESTIN TERRY  85y  Female  Hospital day :23d  POD:  Procedure: Bronchoscopy, at bedside    Open tracheostomy    Insertion, PEG tube      OVERNIGHT EVENTS: no acute overnight events    T(F): 99 (04-02-21 @ 20:00), Max: 99 (04-02-21 @ 20:00)  HR: 82 (04-03-21 @ 03:45) (81 - 104)  BP: 110/55 (04-03-21 @ 03:45) (93/54 - 132/59)  ABP: --  ABP(mean): --  RR: 32 (04-03-21 @ 03:45) (25 - 38)  SpO2: 99% (04-03-21 @ 03:45) (97% - 100%)    DIET/FLUIDS: dextrose 5%. 1000 milliLiter(s) IV Continuous <Continuous>  dextrose 5%. 1000 milliLiter(s) IV Continuous <Continuous>  ferrous    sulfate 325 milliGRAM(s) Oral daily  multivitamin 1 Tablet(s) Oral daily  potassium chloride   Powder 20 milliEquivalent(s) Oral daily  sodium chloride 0.9%. 1000 milliLiter(s) IV Continuous <Continuous>    NG:                                                                                DRAINS:     BM:   04-01-21 @ 07:01  -  04-02-21 @ 07:00  --------------------------------------------------------  OUT: 650 mL      EMESIS:     URINE:   04-01-21 @ 07:01  -  04-02-21 @ 07:00  --------------------------------------------------------  OUT: 215 mL     Indwelling Urethral Catheter:     Connect To:  Straight Drainage/Gravity    Indication:  Urine Output Monitoring in Critically Ill    Additional Instructions:  For 48 hours, To gravity bag urimeter OR digital urimeter (04-02-21 @ 08:48)    GI proph:  famotidine Injectable 20 milliGRAM(s) IV Push every 12 hours    AC/ proph: heparin   Injectable 5000 Unit(s) SubCutaneous every 8 hours    ABx: caspofungin IVPB      caspofungin IVPB 50 milliGRAM(s) IV Intermittent every 24 hours  linezolid  IVPB      linezolid  IVPB 600 milliGRAM(s) IV Intermittent every 12 hours  meropenem  IVPB 1000 milliGRAM(s) IV Intermittent every 12 hours  metroNIDAZOLE  IVPB 500 milliGRAM(s) IV Intermittent every 8 hours  vancomycin    Solution 125 milliGRAM(s) Oral every 6 hours      Physical Examination:  General Appearance: Trach in place on vent  HEENT: EOMI, sclera non-icteric.  Heart: RRR   Lungs: Right chest tubes x3 to suction, CT 1 + leak, CT 2 - leak, CT 3 + leak  Abdomen:  Soft, PEG in place  MSK/Extremities: Warm & well-perfused.   Skin: Warm, dry. No jaundice.       LABS  Labs:  CAPILLARY BLOOD GLUCOSE      POCT Blood Glucose.: 199 mg/dL (03 Apr 2021 03:01)  POCT Blood Glucose.: 209 mg/dL (02 Apr 2021 21:37)  POCT Blood Glucose.: 196 mg/dL (02 Apr 2021 18:14)  POCT Blood Glucose.: 196 mg/dL (02 Apr 2021 14:26)  POCT Blood Glucose.: 154 mg/dL (02 Apr 2021 11:34)  POCT Blood Glucose.: 175 mg/dL (02 Apr 2021 06:24)                          8.7    62.73 )-----------( 499      ( 03 Apr 2021 01:57 )             29.5       Auto Neutrophil %: 83.3 % (04-03-21 @ 01:57)  Auto Immature Granulocyte %: 2.6 % (04-03-21 @ 01:57)    04-03    146  |  112<H>  |  77<HH>  ----------------------------<  170<H>  4.8   |  22  |  2.3<H>      Calcium, Total Serum: 7.9 mg/dL (04-03-21 @ 01:57)      LFTs:     Blood Gas Arterial, Lactate: 1.4 mmoL/L (04-02-21 @ 03:52)  Blood Gas Arterial, Lactate: 3.0 mmoL/L (04-01-21 @ 18:23)  Blood Gas Arterial, Lactate: 3.8 mmoL/L (04-01-21 @ 16:28)  Blood Gas Arterial, Lactate: 2.3 mmoL/L (04-01-21 @ 03:26)  Blood Gas Arterial, Lactate: 1.9 mmoL/L (03-31-21 @ 13:01)    ABG - ( 02 Apr 2021 03:52 )  pH: 7.26  /  pCO2: 54    /  pO2: 86    / HCO3: 25    / Base Excess: -2.7  /  SaO2: 97              ABG - ( 01 Apr 2021 18:23 )  pH: 7.25  /  pCO2: 58    /  pO2: 116   / HCO3: 25    / Base Excess: -2.0  /  SaO2: 99              ABG - ( 01 Apr 2021 16:28 )  pH: 7.21  /  pCO2: 62    /  pO2: 82    / HCO3: 25    / Base Excess: -2.7  /  SaO2: 95                Coags:                Culture - Blood (collected 31 Mar 2021 10:52)  Source: .Blood Blood  Preliminary Report (01 Apr 2021 23:01):    No growth to date.          RADIOLOGY & ADDITIONAL TESTS:      A/P

## 2021-04-04 NOTE — PROGRESS NOTE ADULT - ASSESSMENT
Assessment:  85y Female patient admitted s/p R VATS evacuation of empyema, x3 chest tubes in place, s/p Trach and PEG, with the above physical exam, labs, and imaging findings.      Plan:    C-diff negative  ID recs- continue meropenem and Linezolid d/c caspofungin and oral vanco  trickle feeds @20  Trend WBC  3 Chest tubes to suction on right side  Daily chest xray  Monitor chest tube out put  Monitor for airleaks  Monitor 02 saturation  Monitor vent settings   DVT/GI prophylaxis  Pain management             Date/Time: 04-04-21 @ 02:52

## 2021-04-04 NOTE — PROGRESS NOTE ADULT - SUBJECTIVE AND OBJECTIVE BOX
CTU Attending Progress Daily Note     2021 12:16    remain intuabted sedated on nembex and fentanl /propofol  on pressor     new gI bleed from Peg stop feeed     He has history of Dementia    Osteoarthritis      Interval event for past 24 hr:  DESTIN TERRY  85y had no event.   Current Complains:  DESTIN TERRY has no new complains  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, osteoarithtis, Italian-speaking presenting with cough and fever.    As per daughter, patient has been having dry consistent cough since 2 months. Today, she was being evaluated for knee injections when she was found to be febrile. She also reports progressing generalized weakness with decreased appetite and PO intake.  No URT symptoms, no chest pain, no leg pain, no diarrhea, no urinary symptoms no sick contacts, no recent travel. In ED was hypotensive, was given IVF, started on levophed 0.04 called to evaluate (11 Mar 2021 03:21)    OBJECTIVE:  ICU Vital Signs Last 24 Hrs  T(C): 36.6 (2021 08:00), Max: 38.2 (2021 18:00)  T(F): 97.9 (2021 08:00), Max: 100.8 (2021 18:00)  HR: 75 (2021 10:00) (73 - 100)  BP: 122/60 (2021 19:30) (122/60 - 122/60)  BP(mean): 87 (2021 19:30) (87 - 87)  ABP: 126/48 (2021 10:00) (90/45 - 146/61)  ABP(mean): 70 (2021 10:00) (56 - 89)  RR: 28 (2021 10:00) (10 - 39)  SpO2: 98% (2021 10:00) (96% - 100%)    I&O's Summary    2021 07:01  -  2021 07:00  --------------------------------------------------------  IN: 3912.4 mL / OUT: 1752 mL / NET: 2160.4 mL    2021 07:01  -  2021 12:16  --------------------------------------------------------  IN: 234.6 mL / OUT: 345 mL / NET: -110.4 mL      I&O's Detail    2021 07:  -  2021 07:00  --------------------------------------------------------  IN:    Cisatracurium: 54 mL    Enteral Tube Flush: 180 mL    FentaNYL: 216 mL    IV PiggyBack: 1250 mL    Pantoprazole: 70 mL    Phenylephrine: 528 mL    Propofol: 116.8 mL    sodium chloride 0.9%: 960 mL    Vasopressin: 57.6 mL    Vital High Protein: 480 mL  Total IN: 3912.4 mL    OUT:    Chest Tube (mL): 20 mL    Chest Tube (mL): 100 mL    Chest Tube (mL): 100 mL    Indwelling Catheter - Urethral (mL): 812 mL    PEG (Percutaneous Endoscopic Gastrostomy) Tube (mL): 20 mL    Rectal Tube (mL): 700 mL  Total OUT: 1752 mL    Total NET: 2160.4 mL      2021 07:  -  2021 12:16  --------------------------------------------------------  IN:    Cisatracurium: 10.8 mL    FentaNYL: 27 mL    Pantoprazole: 30 mL    Phenylephrine: 48 mL    Propofol: 21.6 mL    sodium chloride 0.9%: 90 mL    Vasopressin: 7.2 mL  Total IN: 234.6 mL    OUT:    Chest Tube (mL): 0 mL    Chest Tube (mL): 10 mL    Chest Tube (mL): 20 mL    Drain (mL): 250 mL    Indwelling Catheter - Urethral (mL): 65 mL    Rectal Tube (mL): 0 mL    Vital High Protein: 0 mL  Total OUT: 345 mL    Total NET: -110.4 mL            CAPILLARY BLOOD GLUCOSE      POCT Blood Glucose.: 136 mg/dL (2021 10:44)  POCT Blood Glucose.: 231 mg/dL (2021 06:18)  POCT Blood Glucose.: 219 mg/dL (2021 01:52)  POCT Blood Glucose.: 196 mg/dL (2021 22:00)  POCT Blood Glucose.: 186 mg/dL (2021 18:01)  POCT Blood Glucose.: 221 mg/dL (2021 14:43)    LABS:  ABG - ( 2021 03:15 )  pH, Arterial: 7.31  pH, Blood: x     /  pCO2: 43    /  pO2: 88    / HCO3: 22    / Base Excess: -4.3  /  SaO2: 98                                      8.4    40.32 )-----------( 482      ( 2021 02:00 )             28.0     04-    146  |  116<H>  |  82<HH>  ----------------------------<  225<H>  4.7   |  20  |  2.6<H>    Ca    7.7<L>      2021 02:00  Mg     2.1     -04        Urinalysis Basic - ( 2021 10:40 )    Color: Yellow / Appearance: Clear / S.017 / pH: x  Gluc: x / Ketone: Negative  / Bili: Negative / Urobili: <2 mg/dL   Blood: x / Protein: 100 mg/dL / Nitrite: Negative   Leuk Esterase: Negative / RBC: 1 /HPF / WBC 6 /HPF   Sq Epi: x / Non Sq Epi: 1 /HPF / Bacteria: Negative        Home Medications:  donepezil 5 mg oral tablet: 1 tab(s) orally once a day (at bedtime) (11 Mar 2021 03:24)  gabapentin 300 mg oral capsule: 1 cap(s) orally 3 times a day, As Needed (11 Mar 2021 03:24)  meloxicam 15 mg oral tablet: 1 tab(s) orally once a day, As Needed (11 Mar 2021 03:24)  zolpidem 10 mg oral tablet: 1 tab(s) orally once a day (at bedtime), As Needed (11 Mar 2021 03:24)    HOSPITAL MEDICATIONS:  MEDICATIONS  (STANDING):  ALBUTerol    90 MICROgram(s) HFA Inhaler 2 Puff(s) Inhalation every 6 hours  caspofungin IVPB      caspofungin IVPB 50 milliGRAM(s) IV Intermittent every 24 hours  chlorhexidine 0.12% Liquid 5 milliLiter(s) Oral Mucosa two times a day  chlorhexidine 4% Liquid 1 Application(s) Topical <User Schedule>  cisatracurium Infusion 3 MICROgram(s)/kG/Min (10.7 mL/Hr) IV Continuous <Continuous>  clotrimazole 1% Cream 1 Application(s) Topical two times a day  dexMEDEtomidine Infusion 0.202 MICROgram(s)/kG/Hr (3 mL/Hr) IV Continuous <Continuous>  dextrose 5%. 1000 milliLiter(s) (50 mL/Hr) IV Continuous <Continuous>  dextrose 5%. 1000 milliLiter(s) (100 mL/Hr) IV Continuous <Continuous>  donepezil 5 milliGRAM(s) Oral at bedtime  fentaNYL   Infusion. 0.505 MICROgram(s)/kG/Hr (3 mL/Hr) IV Continuous <Continuous>  ferrous    sulfate 325 milliGRAM(s) Oral daily  gabapentin   Solution 100 milliGRAM(s) Oral three times a day  glucagon  Injectable 1 milliGRAM(s) IntraMuscular once  insulin lispro (ADMELOG) corrective regimen sliding scale   SubCutaneous every 4 hours  ipratropium 17 MICROgram(s) HFA Inhaler 2 Puff(s) Inhalation every 6 hours  linezolid  IVPB      linezolid  IVPB 600 milliGRAM(s) IV Intermittent every 12 hours  meropenem  IVPB 1000 milliGRAM(s) IV Intermittent every 12 hours  metroNIDAZOLE  IVPB 500 milliGRAM(s) IV Intermittent every 8 hours  multivitamin 1 Tablet(s) Oral daily  norepinephrine Infusion 0.1 MICROgram(s)/kG/Min (5.57 mL/Hr) IV Continuous <Continuous>  pantoprazole Infusion 8 mG/Hr (10 mL/Hr) IV Continuous <Continuous>  petrolatum Ophthalmic Ointment 1 Application(s) Both EYES three times a day  phenylephrine    Infusion 1 MICROgram(s)/kG/Min (11.1 mL/Hr) IV Continuous <Continuous>  propofol Infusion 10.101 MICROgram(s)/kG/Min (3.6 mL/Hr) IV Continuous <Continuous>  sodium chloride 0.9%. 1000 milliLiter(s) (10 mL/Hr) IV Continuous <Continuous>  vancomycin    Solution 125 milliGRAM(s) Oral every 6 hours  vasopressin Infusion 0.04 Unit(s)/Min (2.4 mL/Hr) IV Continuous <Continuous>    MEDICATIONS  (PRN):  ondansetron Injectable 4 milliGRAM(s) IV Push every 4 hours PRN Nausea and/or Vomiting      REVIEW OF SYSTEMS:  CONSTITUTIONAL: [X] all negative; [ ] weakness, [ ] fevers, [ ] chills  EYES/ENT: [X] all negative; [ ] visual changes, [ ] vertigo, [ ] throat pain   NECK: [X] all negative; [ ] pain, [ ] stiffness  RESPIRATORY: [] all negative, [ ] cough, [ ] wheezing, [ ] hemoptysis, [ ] shortness of breath  CARDIOVASCULAR: [] all negative; [ ] chest pain, [ ] palpitations, [ ] orthopnea  GASTROINTESTINAL: [X] all negative; [ ]abdominal pain, [ ] nausea, [ ] vomiting, [ ] hematemesis, [ ] diarrhea, [ ] constipation, [ ] melena, [ ] hematochezia.  GENITOURINARY: [X] all negative; [ ] dysuria, [ ] frequency, [ ] hematuria  NEUROLOGICAL: [X] all negative; [ ] numbness, [ ] weakness  SKIN: [X] all negative; [ ] itching, [ ] burning, [ ] rashes, [ ] lesions   All other review of systems is negative unless indicated above.    [ x] Unable to assess ROS because intubated sedated    PHYSICAL EXAM:          CONSTITUTIONAL: on vent breathing 30 whil sedated   	SKIN: warm, dry  	HEAD: Normocephalic; atraumatic.  	EYES: PERRL, EOM, no conj injection, sclera clear  	ENT: No nasal discharge; airway clear.  	NECK: Supple; non tender.  No midline ttp ctls  	CARD: S1, S2 normal; no murmurs, gallops, or rubs. Regular rate and rhythm. 2+ RPs and DPs bilat, no carotid bruits, no pedal   edema, no calf pain b/l  	RESP: CTA  bilat good air movement No wheezes, rales or rhonchi.  	ABD: Soft, not tender, not distended, no CVA ttp no rebound or guarding, bowel sounds present  	EXT: not moving sedated  	  	NEURO: Alert, awake, motor 5/5 R, 5/5 L        RADIOLOGY:  xray  < from: Xray Chest 1 View AP/PA (21 @ 13:15) >  Impression:    Bilateral opacities unchanged. Right basilar pneumothorax unchanged. No pleural effusion.          < end of copied text >    I spent 45 minutes of critical care time examining patient, reviewing vitals, labs, medications, imaging and discussing with the team goals of care to prevent life-threatening in this patient who is at high risk for deterioration or death due to:

## 2021-04-04 NOTE — CONSULT NOTE ADULT - ASSESSMENT
85 year old lady known to have dementia, osteoarthritis  presenting with cough and fever. As per daughter, patient has been having dry consistent cough since 2 months. Patient was found to have pneumonia and empyema s/p R VATS evacuation of empyema, x3 chest tubes in place, s/p Trach and PEG on 3/30 by surgery . Patient dropped HGB from 10 to 7 after Trach and PEG placement . Was transfused 3 units of PRBC on the first , then dropped HGB slowly to stabilize around 8 in the last 24 hrs .GI called for concerns of  GI bleed . Around 200 ml of dark red blood is obtained from the stomach by PEG connected to suction. HGB stable since yesterday . Patient is on two pressors for septic shock before GI Bleed , today they are going down on pressor requirements      #upper GI bleed post PEG placement :  r/o bleeding related to PEG placement   HGB stable overnight   #septic shock with improvement in pressor requirements    REC:   Keep NPO  trend CBC and keep HGB above 8  active type and screen   2 large 18 gauge IV   EGD in am or earlier on urgent bases if worsened GI bleed causing HD insatiability  Protonix drip

## 2021-04-04 NOTE — PROGRESS NOTE ADULT - SUBJECTIVE AND OBJECTIVE BOX
Progress Note: General Surgery  Patient: DESTIN TERRY , 85y (1935)Female   MRN: [id number]  Location: [address]  Visit: 21 Inpatient  Date: 21 @ 02:52    Admit Diagnosis/Chief Complaint: Acute respiratory failure with hypoxia        Procedure/Diagnosis: Acute respiratory failure with hypoxia     S/P Bronchoscopy, at bedside    Open tracheostomy    Insertion, PEG tube        Events/ 24h: No acute events overnight. Pain controlled.    Vitals: T(F): 99.5 (21 @ 00:00), Max: 100.8 (21 @ 18:00)  HR: 93 (21 @ 02:45)  BP: 122/60 (21 @ 19:30) (92/46 - 122/60)  RR: 20 (21 @ 02:45)  SpO2: 99% (21 @ 02:45)  RR (machine): 26, TV (machine): 450, FiO2: 40, PEEP: 10, PIP: 39  In:   21 @ 07:01  -  21 @ 07:00  --------------------------------------------------------  IN: 4154.3 mL    21 @ 07:  -  21 @ 02:52  --------------------------------------------------------  IN: 2964.3 mL      Out:   21 @ 07:01  -  21 @ 07:00  --------------------------------------------------------  OUT:    Chest Tube (mL): 100 mL    Chest Tube (mL): 20 mL    Chest Tube (mL): 150 mL    Indwelling Catheter - Urethral (mL): 466 mL    Rectal Tube (mL): 850 mL  Total OUT: 1586 mL      21 @ 07:01  -  21 @ 02:52  --------------------------------------------------------  OUT:    Chest Tube (mL): 90 mL    Chest Tube (mL): 70 mL    Chest Tube (mL): 10 mL    Indwelling Catheter - Urethral (mL): 663 mL    PEG (Percutaneous Endoscopic Gastrostomy) Tube (mL): 20 mL    Rectal Tube (mL): 500 mL  Total OUT: 1353 mL        Net:   21 @ 07:01  -  21 @ 07:00  --------------------------------------------------------  NET: 2568.3 mL    21 @ 07:01  -  21 @ 02:52  --------------------------------------------------------  NET: 1611.3 mL        Diet: Diet, NPO:   Tube Feeding Modality: Gastrostomy  Vital High Protein  Total Volume for 24 Hours (mL): 480  Continuous  Starting Tube Feed Rate mL per Hour: 20  Until Goal Tube Feed Rate (mL per Hour): 20  Tube Feed Duration (in Hours): 24  Tube Feed Start Time: 13:00 (21 @ 08:51)    IV Fluids: dextrose 5%. 1000 milliLiter(s) (50 mL/Hr) IV Continuous <Continuous>  dextrose 5%. 1000 milliLiter(s) (100 mL/Hr) IV Continuous <Continuous>  ferrous    sulfate 325 milliGRAM(s) Oral daily  multivitamin 1 Tablet(s) Oral daily  sodium chloride 0.9%. 1000 milliLiter(s) (40 mL/Hr) IV Continuous <Continuous>    Physical Examination:  General Appearance: Trach in place on vent  HEENT: EOMI, sclera non-icteric.  Heart: RRR   Lungs: Right chest tubes x3 to suction, CT 1 + leak, CT 2 - leak, CT 3 + leak  Abdomen:  Soft, PEG in place  MSK/Extremities: Warm & well-perfused.   Skin: Warm, dry. No jaundice.         Medications: [Standing]  ALBUTerol    90 MICROgram(s) HFA Inhaler 2 Puff(s) Inhalation every 6 hours  caspofungin IVPB      caspofungin IVPB 50 milliGRAM(s) IV Intermittent every 24 hours  chlorhexidine 0.12% Liquid 5 milliLiter(s) Oral Mucosa two times a day  chlorhexidine 4% Liquid 1 Application(s) Topical <User Schedule>  cisatracurium Infusion 3 MICROgram(s)/kG/Min (10.7 mL/Hr) IV Continuous <Continuous>  clotrimazole 1% Cream 1 Application(s) Topical two times a day  dexMEDEtomidine Infusion 0.202 MICROgram(s)/kG/Hr (3 mL/Hr) IV Continuous <Continuous>  dextrose 5%. 1000 milliLiter(s) (100 mL/Hr) IV Continuous <Continuous>  dextrose 5%. 1000 milliLiter(s) (50 mL/Hr) IV Continuous <Continuous>  donepezil 5 milliGRAM(s) Oral at bedtime  fentaNYL   Infusion. 0.505 MICROgram(s)/kG/Hr (3 mL/Hr) IV Continuous <Continuous>  ferrous    sulfate 325 milliGRAM(s) Oral daily  gabapentin   Solution 100 milliGRAM(s) Oral three times a day  glucagon  Injectable 1 milliGRAM(s) IntraMuscular once  heparin   Injectable 5000 Unit(s) SubCutaneous every 8 hours  insulin lispro (ADMELOG) corrective regimen sliding scale   SubCutaneous every 4 hours  ipratropium 17 MICROgram(s) HFA Inhaler 2 Puff(s) Inhalation every 6 hours  linezolid  IVPB      linezolid  IVPB 600 milliGRAM(s) IV Intermittent every 12 hours  meropenem  IVPB 1000 milliGRAM(s) IV Intermittent every 12 hours  metroNIDAZOLE  IVPB 500 milliGRAM(s) IV Intermittent every 8 hours  multivitamin 1 Tablet(s) Oral daily  norepinephrine Infusion 0.1 MICROgram(s)/kG/Min (5.57 mL/Hr) IV Continuous <Continuous>  pantoprazole Infusion 8 mG/Hr (10 mL/Hr) IV Continuous <Continuous>  petrolatum Ophthalmic Ointment 1 Application(s) Both EYES three times a day  phenylephrine    Infusion 1 MICROgram(s)/kG/Min (11.1 mL/Hr) IV Continuous <Continuous>  propofol Infusion 10.101 MICROgram(s)/kG/Min (3.6 mL/Hr) IV Continuous <Continuous>  sodium chloride 0.9%. 1000 milliLiter(s) (40 mL/Hr) IV Continuous <Continuous>  vancomycin    Solution 125 milliGRAM(s) Oral every 6 hours  vasopressin Infusion 0.04 Unit(s)/Min (2.4 mL/Hr) IV Continuous <Continuous>    DVT Prophylaxis: heparin   Injectable 5000 Unit(s) SubCutaneous every 8 hours    GI Prophylaxis: pantoprazole Infusion 8 mG/Hr IV Continuous <Continuous>    Antibiotics: caspofungin IVPB      caspofungin IVPB 50 milliGRAM(s) IV Intermittent every 24 hours  linezolid  IVPB      linezolid  IVPB 600 milliGRAM(s) IV Intermittent every 12 hours  meropenem  IVPB 1000 milliGRAM(s) IV Intermittent every 12 hours  metroNIDAZOLE  IVPB 500 milliGRAM(s) IV Intermittent every 8 hours  vancomycin    Solution 125 milliGRAM(s) Oral every 6 hours    Anticoagulation:   Medications:[PRN]  ondansetron Injectable 4 milliGRAM(s) IV Push every 4 hours PRN      Labs:                        8.4    40.32 )-----------( 482      ( 2021 02:00 )             28.0     04-03    146  |  112<H>  |  77<HH>  ----------------------------<  170<H>  4.8   |  22  |  2.3<H>    Ca    7.9<L>      2021 01:57  Mg     2.1     04-03          ABG - ( 2021 13:17 )  pH: 7.30  /  pCO2: 47    /  pO2: 113   / HCO3: 23    / Base Excess: -3.5  /  SaO2: 99                      Urine/Micro:    Urinalysis Basic - ( 2021 10:40 )    Color: Yellow / Appearance: Clear / S.017 / pH: x  Gluc: x / Ketone: Negative  / Bili: Negative / Urobili: <2 mg/dL   Blood: x / Protein: 100 mg/dL / Nitrite: Negative   Leuk Esterase: Negative / RBC: 1 /HPF / WBC 6 /HPF   Sq Epi: x / Non Sq Epi: 1 /HPF / Bacteria: Negative        Imaging:   ***  Xray Chest 1 View- PORTABLE-Urgent:   EXAM:  XR CHEST PORTABLE URGENT 1V            PROCEDURE DATE:  2021            INTERPRETATION:  Clinical History / Reason for exam: Vented patient    Comparison : Chest radiograph 2021.    Technique/Positioning: Frontal.    Findings:    Support devices: Stable positioning. Monitoring device..    Cardiac/mediastinum/hilum: Heart size within normal limits, thoracic aortic calcification.    Lung parenchyma/Pleura: Right pneumothorax. Bilateral opacities.    Skeleton/soft tissues: Stable. Dextro scoliosis.    Impression:    Right pneumothorax, bilateral opacities, stable.                  ALEJA SIDHU MD; Attending Radiologist  This document has been electronically signed. Apr  3 2021  9:57AM (21 @ 04:38)

## 2021-04-04 NOTE — PROGRESS NOTE ADULT - ASSESSMENT
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, osteoarithtis, Haitian-speaking presenting with R empyma had VAT on R and developed ARDS sever sepsis     1- R vat for empyma  pnomonia  continue ATB as per ID karina zyvox capsofungin falgyl  2- ARDS continu venty support   3- MENDOZA monitor bun /cr daily  4- septic shock due to pnumonia ?empyma  continue vent support and presors vaso and miah   5- Acute hypoxic hypercapnic resp failure dur to ards/pnumonia   vent anagment 10 peep and sedation wth fentaly and propofol low dose and Nimbex   6 diareah   sc diff sent on po avnco  7 R ptx with air leak daily chest xray  8- new Gi bleed from peg d/c feeding f/u GI conult protonix drip for now   ID f/u Ct surgery f/u    very poor prognosis

## 2021-04-04 NOTE — CONSULT NOTE ADULT - SUBJECTIVE AND OBJECTIVE BOX
Gastroenterology Consultation:    Patient is a 85y old  Female who presents with a chief complaint of Cough, fever (20 Mar 2021 01:27)      Admitted on: 03-11-21  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, osteoarithtis, [place]-speaking presenting with cough and fever. As per daughter, patient has been having dry consistent cough since 2 months. Patient was found to have pneumonia and empyema s/p R VATS evacuation of empyema, x3 chest tubes in place, s/p Trach and PEG on 3/30 by surgery . Patient dropped HGB from 10 to 7 after Trach and PEG placement . Was transfused 3 units of PRBC on the first , then dropped HGB slowly to stabilize around 8 in the last 24 hrs .GI called for concerns of  GI bleed          Prior records Reviewed (Y/N): Y  History obtained from person other than patient (Y/N): Y    Prior EGD:  none in chart   Prior Colonoscopy: none in chart       PAST MEDICAL & SURGICAL HISTORY:  Dementia    Osteoarthritis    No significant past surgical history        FAMILY HISTORY: No pertinent family history         Home Medications:  donepezil 5 mg oral tablet: 1 tab(s) orally once a day (at bedtime) (11 Mar 2021 03:24)  gabapentin 300 mg oral capsule: 1 cap(s) orally 3 times a day, As Needed (11 Mar 2021 03:24)  meloxicam 15 mg oral tablet: 1 tab(s) orally once a day, As Needed (11 Mar 2021 03:24)  zolpidem 10 mg oral tablet: 1 tab(s) orally once a day (at bedtime), As Needed (11 Mar 2021 03:24)    MEDICATIONS  (STANDING):  ALBUTerol    90 MICROgram(s) HFA Inhaler 2 Puff(s) Inhalation every 6 hours  caspofungin IVPB      caspofungin IVPB 50 milliGRAM(s) IV Intermittent every 24 hours  chlorhexidine 0.12% Liquid 5 milliLiter(s) Oral Mucosa two times a day  chlorhexidine 4% Liquid 1 Application(s) Topical <User Schedule>  cisatracurium Infusion 3 MICROgram(s)/kG/Min (10.7 mL/Hr) IV Continuous <Continuous>  clotrimazole 1% Cream 1 Application(s) Topical two times a day  dexMEDEtomidine Infusion 0.202 MICROgram(s)/kG/Hr (3 mL/Hr) IV Continuous <Continuous>  dextrose 5%. 1000 milliLiter(s) (50 mL/Hr) IV Continuous <Continuous>  dextrose 5%. 1000 milliLiter(s) (100 mL/Hr) IV Continuous <Continuous>  donepezil 5 milliGRAM(s) Oral at bedtime  fentaNYL   Infusion. 0.505 MICROgram(s)/kG/Hr (3 mL/Hr) IV Continuous <Continuous>  ferrous    sulfate 325 milliGRAM(s) Oral daily  gabapentin   Solution 100 milliGRAM(s) Oral three times a day  glucagon  Injectable 1 milliGRAM(s) IntraMuscular once  insulin lispro (ADMELOG) corrective regimen sliding scale   SubCutaneous every 4 hours  ipratropium 17 MICROgram(s) HFA Inhaler 2 Puff(s) Inhalation every 6 hours  linezolid  IVPB      linezolid  IVPB 600 milliGRAM(s) IV Intermittent every 12 hours  meropenem  IVPB 1000 milliGRAM(s) IV Intermittent every 12 hours  metroNIDAZOLE  IVPB 500 milliGRAM(s) IV Intermittent every 8 hours  multivitamin 1 Tablet(s) Oral daily  norepinephrine Infusion 0.1 MICROgram(s)/kG/Min (5.57 mL/Hr) IV Continuous <Continuous>  pantoprazole Infusion 8 mG/Hr (10 mL/Hr) IV Continuous <Continuous>  petrolatum Ophthalmic Ointment 1 Application(s) Both EYES three times a day  phenylephrine    Infusion 1 MICROgram(s)/kG/Min (11.1 mL/Hr) IV Continuous <Continuous>  propofol Infusion 10.101 MICROgram(s)/kG/Min (3.6 mL/Hr) IV Continuous <Continuous>  sodium chloride 0.9%. 1000 milliLiter(s) (10 mL/Hr) IV Continuous <Continuous>  vancomycin    Solution 125 milliGRAM(s) Oral every 6 hours  vasopressin Infusion 0.04 Unit(s)/Min (2.4 mL/Hr) IV Continuous <Continuous>    MEDICATIONS  (PRN):  ondansetron Injectable 4 milliGRAM(s) IV Push every 4 hours PRN Nausea and/or Vomiting      Allergies  clindamycin (Hives)      Review of Systems:   Constitutional:  No Fever, No Chills  ENT/Mouth:  No Hearing Changes,  No Difficulty Swallowing  Eyes:  No Eye Pain, No Vision Changes  Cardiovascular:  No Chest Pain, No Palpitations  Respiratory:  No Cough, + Dyspnea  Gastrointestinal:  As described in HPI  Musculoskeletal:  No Joint Swelling, No Back Pain  Skin:  No Skin Lesions, No Jaundice  Neuro:  No Syncope, No Dizziness  Heme/Lymph:  No Bruising, No Bleeding.          Physical Examination:  T(C): 36.6 (04-04-21 @ 08:00), Max: 38.2 (04-03-21 @ 18:00)  HR: 75 (04-04-21 @ 10:00) (73 - 100)  BP: 122/60 (04-03-21 @ 19:30) (117/55 - 122/60)  RR: 28 (04-04-21 @ 10:00) (10 - 39)  SpO2: 98% (04-04-21 @ 10:00) (96% - 100%)      04-02-21 @ 07:01  -  04-03-21 @ 07:00  --------------------------------------------------------  IN: 4154.3 mL / OUT: 1586 mL / NET: 2568.3 mL    04-03-21 @ 07:01  -  04-04-21 @ 07:00  --------------------------------------------------------  IN: 3912.4 mL / OUT: 1752 mL / NET: 2160.4 mL    04-04-21 @ 07:01  -  04-04-21 @ 10:45  --------------------------------------------------------  IN: 234.6 mL / OUT: 345 mL / NET: -110.4 mL        Constitutional: No acute distress.  Eyes:. Conjunctivae are clear, Sclera is non-icteric.  Ears Nose and Throat: The external ears are normal appearing,  Oral mucosa is pink and moist.  Respiratory:  No signs of respiratory distress. Lung sounds are clear bilaterally.  Cardiovascular:  S1 S2, Regular rate and rhythm.  GI: Abdomen is soft, symmetric, and non-tender without distention.  Bowel sounds are present and normoactive in all four quadrants. No masses, hepatomegaly, or splenomegaly are noted.   Neuro: No Tremor, No involuntary movements  Skin: No rashes, No Jaundice.          Data: (reviewed by attending)                        8.4    40.32 )-----------( 482      ( 04 Apr 2021 02:00 )             28.0     Hgb Trend:  8.4  04-04-21 @ 02:00  8.7  04-03-21 @ 01:57  10.3  04-02-21 @ 01:30  7.2  04-01-21 @ 16:35      04-01-21 @ 07:01  -  04-02-21 @ 07:00  --------------------------------------------------------  IN: 500 mL      04-04    146  |  116<H>  |  82<HH>  ----------------------------<  225<H>  4.7   |  20  |  2.6<H>    Ca    7.7<L>      04 Apr 2021 02:00  Mg     2.1     04-04      Liver panel trend:              Radiology:(reviewed by attending)       Gastroenterology Consultation:    Patient is a 85y old  Female who presents with a chief complaint of Cough, fever (20 Mar 2021 01:27)      Admitted on: 03-11-21  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, osteoarithtis, [place]-speaking presenting with cough and fever. As per daughter, patient has been having dry consistent cough since 2 months. Patient was found to have pneumonia and empyema s/p R VATS evacuation of empyema, x3 chest tubes in place, s/p Trach and PEG on 3/30 by surgery . Patient dropped HGB from 10 to 7 after Trach and PEG placement . Was transfused 3 units of PRBC on the first , then dropped HGB slowly to stabilize around 8 in the last 24 hrs .GI called for concerns of  GI bleed . Around 200 ml of dark red blood is obtained from the stomach by PEG connected to suction. HGB stable since yesterday . Patient is on two pressors for septic shock before GI Bleed , today they are going down on pressor requirements           Prior records Reviewed (Y/N): Y  History obtained from person other than patient (Y/N): Y    Prior EGD:  none in chart   Prior Colonoscopy: none in chart       PAST MEDICAL & SURGICAL HISTORY:  Dementia    Osteoarthritis    No significant past surgical history        FAMILY HISTORY: No pertinent family history         Home Medications:  donepezil 5 mg oral tablet: 1 tab(s) orally once a day (at bedtime) (11 Mar 2021 03:24)  gabapentin 300 mg oral capsule: 1 cap(s) orally 3 times a day, As Needed (11 Mar 2021 03:24)  meloxicam 15 mg oral tablet: 1 tab(s) orally once a day, As Needed (11 Mar 2021 03:24)  zolpidem 10 mg oral tablet: 1 tab(s) orally once a day (at bedtime), As Needed (11 Mar 2021 03:24)    MEDICATIONS  (STANDING):  ALBUTerol    90 MICROgram(s) HFA Inhaler 2 Puff(s) Inhalation every 6 hours  caspofungin IVPB      caspofungin IVPB 50 milliGRAM(s) IV Intermittent every 24 hours  chlorhexidine 0.12% Liquid 5 milliLiter(s) Oral Mucosa two times a day  chlorhexidine 4% Liquid 1 Application(s) Topical <User Schedule>  cisatracurium Infusion 3 MICROgram(s)/kG/Min (10.7 mL/Hr) IV Continuous <Continuous>  clotrimazole 1% Cream 1 Application(s) Topical two times a day  dexMEDEtomidine Infusion 0.202 MICROgram(s)/kG/Hr (3 mL/Hr) IV Continuous <Continuous>  dextrose 5%. 1000 milliLiter(s) (50 mL/Hr) IV Continuous <Continuous>  dextrose 5%. 1000 milliLiter(s) (100 mL/Hr) IV Continuous <Continuous>  donepezil 5 milliGRAM(s) Oral at bedtime  fentaNYL   Infusion. 0.505 MICROgram(s)/kG/Hr (3 mL/Hr) IV Continuous <Continuous>  ferrous    sulfate 325 milliGRAM(s) Oral daily  gabapentin   Solution 100 milliGRAM(s) Oral three times a day  glucagon  Injectable 1 milliGRAM(s) IntraMuscular once  insulin lispro (ADMELOG) corrective regimen sliding scale   SubCutaneous every 4 hours  ipratropium 17 MICROgram(s) HFA Inhaler 2 Puff(s) Inhalation every 6 hours  linezolid  IVPB      linezolid  IVPB 600 milliGRAM(s) IV Intermittent every 12 hours  meropenem  IVPB 1000 milliGRAM(s) IV Intermittent every 12 hours  metroNIDAZOLE  IVPB 500 milliGRAM(s) IV Intermittent every 8 hours  multivitamin 1 Tablet(s) Oral daily  norepinephrine Infusion 0.1 MICROgram(s)/kG/Min (5.57 mL/Hr) IV Continuous <Continuous>  pantoprazole Infusion 8 mG/Hr (10 mL/Hr) IV Continuous <Continuous>  petrolatum Ophthalmic Ointment 1 Application(s) Both EYES three times a day  phenylephrine    Infusion 1 MICROgram(s)/kG/Min (11.1 mL/Hr) IV Continuous <Continuous>  propofol Infusion 10.101 MICROgram(s)/kG/Min (3.6 mL/Hr) IV Continuous <Continuous>  sodium chloride 0.9%. 1000 milliLiter(s) (10 mL/Hr) IV Continuous <Continuous>  vancomycin    Solution 125 milliGRAM(s) Oral every 6 hours  vasopressin Infusion 0.04 Unit(s)/Min (2.4 mL/Hr) IV Continuous <Continuous>    MEDICATIONS  (PRN):  ondansetron Injectable 4 milliGRAM(s) IV Push every 4 hours PRN Nausea and/or Vomiting      Allergies  clindamycin (Hives)      Review of Systems:   unable to obtain secondary to patient condition           Physical Examination:  T(C): 36.6 (04-04-21 @ 08:00), Max: 38.2 (04-03-21 @ 18:00)  HR: 75 (04-04-21 @ 10:00) (73 - 100)  BP: 122/60 (04-03-21 @ 19:30) (117/55 - 122/60)  RR: 28 (04-04-21 @ 10:00) (10 - 39)  SpO2: 98% (04-04-21 @ 10:00) (96% - 100%)      04-02-21 @ 07:01  -  04-03-21 @ 07:00  --------------------------------------------------------  IN: 4154.3 mL / OUT: 1586 mL / NET: 2568.3 mL    04-03-21 @ 07:01  -  04-04-21 @ 07:00  --------------------------------------------------------  IN: 3912.4 mL / OUT: 1752 mL / NET: 2160.4 mL    04-04-21 @ 07:01  -  04-04-21 @ 10:45  --------------------------------------------------------  IN: 234.6 mL / OUT: 345 mL / NET: -110.4 mL        Constitutional: No acute distress.  Eyes:. Conjunctivae are clear, Sclera is non-icteric.  Ears Nose and Throat: The external ears are normal appearing,  Oral mucosa is pink and moist.  Respiratory:  No signs of respiratory distress. Lung sounds are clear bilaterally.  Cardiovascular:  S1 S2, Regular rate and rhythm.  GI: Abdomen is soft, symmetric, and non-tender without distention.  Bowel sounds are present and normoactive in all four quadrants. No masses, hepatomegaly, or splenomegaly are noted. PEG sutured in place . Dark red blood in the suction tube   Neuro: No Tremor, No involuntary movements  Skin: No rashes, No Jaundice.          Data: (reviewed by attending)                        8.4    40.32 )-----------( 482      ( 04 Apr 2021 02:00 )             28.0     Hgb Trend:  8.4  04-04-21 @ 02:00  8.7  04-03-21 @ 01:57  10.3  04-02-21 @ 01:30  7.2  04-01-21 @ 16:35      04-01-21 @ 07:01  -  04-02-21 @ 07:00  --------------------------------------------------------  IN: 500 mL      04-04    146  |  116<H>  |  82<HH>  ----------------------------<  225<H>  4.7   |  20  |  2.6<H>    Ca    7.7<L>      04 Apr 2021 02:00  Mg     2.1     04-04      Liver panel trend:              Radiology:(reviewed by attending)

## 2021-04-05 NOTE — CONSULT NOTE ADULT - SUBJECTIVE AND OBJECTIVE BOX
NEPHROLOGY CONSULTATION NOTE    THIS CONSULT IS INCOMPLETE / FULL CONSULT TO FOLLOW    Patient is a 85y Female who presented to the ED with cough and fever. PMHX of dementia and OA. As per daughter, patient has been having dry consistent cough since 2 months. Today, she was being evaluated for knee injections when she was found to be febrile. She also reports progressing generalized weakness with decreased appetite and PO intake.  No URT symptoms, no chest pain, no leg pain, no diarrhea, no urinary symptoms no sick contacts, no recent travel. In ED was hypotensive, was given IVF, started on levophed 0.04 called to evaluate (11 Mar 2021 03:21)    PAST MEDICAL & SURGICAL HISTORY:  Dementia    Osteoarthritis    No significant past surgical history      Allergies:  clindamycin (Hives)    Home Medications Reviewed  Hospital Medications:   MEDICATIONS  (STANDING):  ALBUTerol    90 MICROgram(s) HFA Inhaler 2 Puff(s) Inhalation every 6 hours  caspofungin IVPB      caspofungin IVPB 50 milliGRAM(s) IV Intermittent every 24 hours  chlorhexidine 0.12% Liquid 5 milliLiter(s) Oral Mucosa two times a day  chlorhexidine 4% Liquid 1 Application(s) Topical <User Schedule>  cisatracurium Infusion 3 MICROgram(s)/kG/Min (10.7 mL/Hr) IV Continuous <Continuous>  clotrimazole 1% Cream 1 Application(s) Topical two times a day  dexMEDEtomidine Infusion 0.202 MICROgram(s)/kG/Hr (3 mL/Hr) IV Continuous <Continuous>  dextrose 5%. 1000 milliLiter(s) (50 mL/Hr) IV Continuous <Continuous>  dextrose 5%. 1000 milliLiter(s) (100 mL/Hr) IV Continuous <Continuous>  donepezil 5 milliGRAM(s) Oral at bedtime  fentaNYL   Infusion. 0.505 MICROgram(s)/kG/Hr (3 mL/Hr) IV Continuous <Continuous>  ferrous    sulfate 325 milliGRAM(s) Oral daily  gabapentin   Solution 100 milliGRAM(s) Oral three times a day  glucagon  Injectable 1 milliGRAM(s) IntraMuscular once  insulin lispro (ADMELOG) corrective regimen sliding scale   SubCutaneous every 4 hours  ipratropium 17 MICROgram(s) HFA Inhaler 2 Puff(s) Inhalation every 6 hours  linezolid  IVPB      linezolid  IVPB 600 milliGRAM(s) IV Intermittent every 12 hours  meropenem  IVPB 1000 milliGRAM(s) IV Intermittent every 12 hours  metroNIDAZOLE  IVPB 500 milliGRAM(s) IV Intermittent every 8 hours  multivitamin 1 Tablet(s) Oral daily  norepinephrine Infusion 0.1 MICROgram(s)/kG/Min (5.57 mL/Hr) IV Continuous <Continuous>  pantoprazole Infusion 8 mG/Hr (10 mL/Hr) IV Continuous <Continuous>  petrolatum Ophthalmic Ointment 1 Application(s) Both EYES three times a day  phenylephrine    Infusion 1 MICROgram(s)/kG/Min (11.1 mL/Hr) IV Continuous <Continuous>  propofol Infusion 10.101 MICROgram(s)/kG/Min (3.6 mL/Hr) IV Continuous <Continuous>  sodium chloride 0.9%. 1000 milliLiter(s) (10 mL/Hr) IV Continuous <Continuous>  vancomycin    Solution 125 milliGRAM(s) Oral every 6 hours  vasopressin Infusion 0.04 Unit(s)/Min (2.4 mL/Hr) IV Continuous <Continuous>      SOCIAL HISTORY:  Denies ETOH,Smoking,   FAMILY HISTORY:        REVIEW OF SYSTEMS:  Unable to obtain as patient is intubated    VITALS:  T(F): 97.4 (21 @ 12:00), Max: 99.6 (21 @ 18:00)  HR: 73 (21 @ 12:45)  BP: 105/51 (21 @ 08:30)  RR: 26 (21 @ 12:45)  SpO2: 97% (21 @ 12:45)     @ 07:  -   @ 07:00  --------------------------------------------------------  IN: 2704.8 mL / OUT: 1890 mL / NET: 814.8 mL     @ 07:01  -   @ 13:59  --------------------------------------------------------  IN: 563.7 mL / OUT: 370 mL / NET: 193.7 mL          21 @ 07:01  -  21 @ 07:00  --------------------------------------------------------  IN: 0 mL / OUT: 995 mL / NET: -995 mL    21 @ 07:  -  21 @ 13:59  --------------------------------------------------------  IN: 0 mL / OUT: 190 mL / NET: -190 mL      I&O's Detail    2021 07: 07:00  --------------------------------------------------------  IN:    Cisatracurium: 86.4 mL    FentaNYL: 216 mL    IV PiggyBack: 1250 mL    Pantoprazole: 240 mL    Phenylephrine: 382 mL    Propofol: 172.8 mL    sodium chloride 0.9%: 300 mL    Vasopressin: 57.6 mL  Total IN: 2704.8 mL    OUT:    Chest Tube (mL): 100 mL    Chest Tube (mL): 150 mL    Chest Tube (mL): 10 mL    Drain (mL): 535 mL    Indwelling Catheter - Urethral (mL): 995 mL    Rectal Tube (mL): 100 mL    Vital High Protein: 0 mL  Total OUT: 1890 mL    Total NET: 814.8 mL      2021 07: 13:59  --------------------------------------------------------  IN:    Cisatracurium: 20.7 mL    FentaNYL: 45 mL    IV PiggyBack: 250 mL    Pantoprazole: 50 mL    Phenylephrine: 100 mL    Propofol: 36 mL    sodium chloride 0.9%: 50 mL    Vasopressin: 12 mL  Total IN: 563.7 mL    OUT:    Chest Tube (mL): 20 mL    Chest Tube (mL): 0 mL    Chest Tube (mL): 10 mL    Drain (mL): 50 mL    Indwelling Catheter - Urethral (mL): 190 mL    Rectal Tube (mL): 100 mL  Total OUT: 370 mL    Total NET: 193.7 mL            PHYSICAL EXAM:  Constitutional: intubated, sedated  HEENT: head atraumatic, normocephalic  Respiratory: mildly diminished right-sided breath sounds, chest tubes in place  Cardiovascular: S1, S2, RRR  Gastrointestinal: BS+, soft, non-distended  Neurological: pt sedated  : Adams in place  Skin: No rashes    LABS:      145  |  113<H>  |  82<HH>  ----------------------------<  146<H>  4.6   |  19  |  2.7<H>    Ca    7.9<L>      2021 01:34  Mg     2.1           Creatinine Trend: 2.7 <--, 2.6 <--, 2.3 <--, 1.7 <--, 1.6 <--, 0.8 <--, 0.5 <--, 0.5 <--, 0.5 <--, <0.5 <--, 0.5 <--, 0.5 <--, 0.5 <--, 0.5 <--, 0.5 <--, 0.7 <--, 0.8 <--, 0.7 <--, 0.7 <--, 0.7 <--, 0.7 <--, 0.7 <--, 0.8 <--, 0.8 <--, 0.8 <--, 0.8 <--, 0.8 <--, 0.8 <--, 0.8 <--, 1.2 <--, 1.7 <--                        8.7    31.76 )-----------( 462      ( 2021 01:34 )             28.9     Urine Studies:  Urinalysis Basic - ( 2021 10:40 )    Color: Yellow / Appearance: Clear / S.017 / pH:   Gluc:  / Ketone: Negative  / Bili: Negative / Urobili: <2 mg/dL   Blood:  / Protein: 100 mg/dL / Nitrite: Negative   Leuk Esterase: Negative / RBC: 1 /HPF / WBC 6 /HPF   Sq Epi:  / Non Sq Epi: 1 /HPF / Bacteria: Negative                RADIOLOGY & ADDITIONAL STUDIES:  EXAM:  XR CHEST PORTABLE ROUTINE 1V            PROCEDURE DATE:  2021            INTERPRETATION:  Clinical History / Reason for exam: Vented patient    Comparison : Chest radiograph 2021.    Technique/Positionin frontal.    Findings:    Support devices: Stable positioning. Monitoring device.    Cardiac/mediastinum/hilum: Heart size within normal limits, thoracic aortic calcification..    Lung parenchyma/Pleura: Right basilar pneumothorax. Bilateral opacities.    Skeleton/soft tissues: Stable. Scoliosis.    Impression:    Right basilar pneumothorax, decreased. Bilateral opacities, stable.            ALEJA SIDHU MD; Attending Radiologist  This document has been electronically signed. 2021 10:54AM    EXAM:  CT ABDOMEN AND PELVIS        EXAM:  CT CHEST            PROCEDURE DATE:  2021            INTERPRETATION:  REASON FOR EXAM / CLINICAL STATEMENT: Trauma;    TECHNIQUE: Contiguous axial CT images were obtained from the thoracic inlet to the pubic symphysis without intravenous contrast. Reformatted images in the coronal and sagittal planes were acquired.    COMPARISON CT: CT scan of the chest dated 3/29/2021    OTHER STUDIES USED FOR CORRELATION: None.      FINDINGS CHEST:    TUBES/LINES: Tracheostomy is in place. Three left-sided chest tubes are in place.    HEART AND VESSELS: The heart is enlarged. There is no pericardial effusion.    Aortic calcifications and ectasia of the aorta are noted.    MEDIASTINUM: New pneumomediastinum is noted, especially in the anterior mediastinum.    AIRWAYS, LUNGS AND PLEURA: The central tracheobronchial tree is patent. Stable residual right-sided pneumothorax. Stable bilateral diffuse areas of groundglass opacity. Areas of traction bronchiectasis noted throughout the left lung field and at the right lung base. There is no pleural effusion.        FINDINGS ABDOMEN AND PELVIS:    HEPATIC: The liver is normal in appearance with no evidence of mass or bile duct dilatation. Small hepatic calcifications are again noted.    BILIARY: No calcified gallstones are noted.    SPLEEN: Unremarkable.    PANCREAS: The pancreas is normal in size and configuration. No evidence of mass or pancreatitis.    ADRENAL GLANDS: Unremarkable.    KIDNEYS: No evidence of hydronephrosis or calcified stones.    ABDOMINOPELVIC NODES: Unremarkable.    PELVIC ORGANS: A Adams catheter is present in the bladder. No evidence of pelvic mass, lymphadenopathy, or fluid collection.    PERITONEUM/MESENTERY/BOWEL: Gastrostomy tube is noted in the midline of the upper abdomen. The retention device is within the gastric lumen. A rectal tube is noted. No evidence of bowel obstruction, colitis, inflammatory process, or ascites within the abdomen or pelvis. No pneumoperitoneum.    BONES/SOFT TISSUES: Degenerative changes of the spine are noted. Subcutaneous emphysema is noted in the neck.    OTHER: Aortoiliac calcifications are noted with no evidence of abdominal aortic aneurysm.      IMPRESSION:    1. Stable residual right-sided pneumothorax. Three left-sided chest tubes are in place. New pneumomediastinum is noted, especially in the anterior mediastinum.    2. Stable bilateral diffuse areas of groundglass opacity. Areas of traction bronchiectasis noted throughout the left lung field and at the right lung base.    3. No evidence of bowel obstruction or intra-abdominal or pelvic inflammatory process              LIBAN SALAZAR MD; Attending Interventional Radiologist  This document has been electronically signed. 2021  8:55PM                   NEPHROLOGY CONSULTATION NOTE    THIS CONSULT IS INCOMPLETE / FULL CONSULT TO FOLLOW    Patient is a 85y Female who presented to the ED with cough and fever. PMHX of dementia and OA. As per daughter, patient has been having dry consistent cough since 2 months. Today, she was being evaluated for knee injections when she was found to be febrile. She also reports progressing generalized weakness with decreased appetite and PO intake.  No URT symptoms, no chest pain, no leg pain, no diarrhea, no urinary symptoms no sick contacts, no recent travel. In ED was hypotensive, was given IVF, started on levophed 0.04 called to evaluate (11 Mar 2021 03:21)    PAST MEDICAL & SURGICAL HISTORY:  Dementia    Osteoarthritis    No significant past surgical history      Allergies:  clindamycin (Hives)    Home Medications Reviewed  Hospital Medications:   MEDICATIONS  (STANDING):  ALBUTerol    90 MICROgram(s) HFA Inhaler 2 Puff(s) Inhalation every 6 hours  caspofungin IVPB 50 milliGRAM(s) IV Intermittent every 24 hours  chlorhexidine 0.12% Liquid 5 milliLiter(s) Oral Mucosa two times a day  chlorhexidine 4% Liquid 1 Application(s) Topical <User Schedule>  cisatracurium Infusion 3 MICROgram(s)/kG/Min (10.7 mL/Hr) IV Continuous <Continuous>  clotrimazole 1% Cream 1 Application(s) Topical two times a day  dexMEDEtomidine Infusion 0.202 MICROgram(s)/kG/Hr (3 mL/Hr) IV Continuous <Continuous>  dextrose 5%. 1000 milliLiter(s) (100 mL/Hr) IV Continuous <Continuous>  donepezil 5 milliGRAM(s) Oral at bedtime  fentaNYL   Infusion. 0.505 MICROgram(s)/kG/Hr (3 mL/Hr) IV Continuous <Continuous>  ferrous    sulfate 325 milliGRAM(s) Oral daily  gabapentin   Solution 100 milliGRAM(s) Oral three times a day  glucagon  Injectable 1 milliGRAM(s) IntraMuscular once  insulin lispro (ADMELOG) corrective regimen sliding scale   SubCutaneous every 4 hours  ipratropium 17 MICROgram(s) HFA Inhaler 2 Puff(s) Inhalation every 6 hours  linezolid  IVPB 600 milliGRAM(s) IV Intermittent every 12 hours  meropenem  IVPB 1000 milliGRAM(s) IV Intermittent every 12 hours  metroNIDAZOLE  IVPB 500 milliGRAM(s) IV Intermittent every 8 hours  multivitamin 1 Tablet(s) Oral daily  norepinephrine Infusion 0.1 MICROgram(s)/kG/Min (5.57 mL/Hr) IV Continuous <Continuous>  pantoprazole Infusion 8 mG/Hr (10 mL/Hr) IV Continuous <Continuous>  petrolatum Ophthalmic Ointment 1 Application(s) Both EYES three times a day  phenylephrine    Infusion 1 MICROgram(s)/kG/Min (11.1 mL/Hr) IV Continuous <Continuous>  propofol Infusion 10.101 MICROgram(s)/kG/Min (3.6 mL/Hr) IV Continuous <Continuous>  sodium chloride 0.9%. 1000 milliLiter(s) (10 mL/Hr) IV Continuous <Continuous>  vasopressin Infusion 0.04 Unit(s)/Min (2.4 mL/Hr) IV Continuous <Continuous>      SOCIAL HISTORY:  Denies ETOH,Smoking,   FAMILY HISTORY:        REVIEW OF SYSTEMS:  Unable to obtain as patient is intubated    VITALS:  T(F): 97.4 (21 @ 12:00), Max: 99.6 (21 @ 18:00)  HR: 73 (21 @ 12:45)  BP: 105/51 (21 @ 08:30)  RR: 26 (21 @ 12:45)  SpO2: 97% (21 @ 12:45)     07:  -   @ 07:00  --------------------------------------------------------  IN: 2704.8 mL / OUT: 1890 mL / NET: 814.8 mL     @ 07:  -   @ 13:59  --------------------------------------------------------  IN: 563.7 mL / OUT: 370 mL / NET: 193.7 mL          21 @ 07:01  -  21 @ 07:00  --------------------------------------------------------  IN: 0 mL / OUT: 995 mL / NET: -995 mL    21 @ 07:01  -  21 @ 13:59  --------------------------------------------------------  IN: 0 mL / OUT: 190 mL / NET: -190 mL      I&O's Detail    2021 07:  -  2021 07:00  --------------------------------------------------------  IN:    Cisatracurium: 86.4 mL    FentaNYL: 216 mL    IV PiggyBack: 1250 mL    Pantoprazole: 240 mL    Phenylephrine: 382 mL    Propofol: 172.8 mL    sodium chloride 0.9%: 300 mL    Vasopressin: 57.6 mL  Total IN: 2704.8 mL    OUT:    Chest Tube (mL): 100 mL    Chest Tube (mL): 150 mL    Chest Tube (mL): 10 mL    Drain (mL): 535 mL    Indwelling Catheter - Urethral (mL): 995 mL    Rectal Tube (mL): 100 mL    Vital High Protein: 0 mL  Total OUT: 1890 mL    Total NET: 814.8 mL      2021 07:  -  2021 13:59  --------------------------------------------------------  IN:    Cisatracurium: 20.7 mL    FentaNYL: 45 mL    IV PiggyBack: 250 mL    Pantoprazole: 50 mL    Phenylephrine: 100 mL    Propofol: 36 mL    sodium chloride 0.9%: 50 mL    Vasopressin: 12 mL  Total IN: 563.7 mL    OUT:    Chest Tube (mL): 20 mL    Chest Tube (mL): 0 mL    Chest Tube (mL): 10 mL    Drain (mL): 50 mL    Indwelling Catheter - Urethral (mL): 190 mL    Rectal Tube (mL): 100 mL  Total OUT: 370 mL    Total NET: 193.7 mL            PHYSICAL EXAM:  Constitutional: trach/sedated  HEENT: head atraumatic, normocephalic  Respiratory: mildly diminished right-sided breath sounds, chest tubes in place  Cardiovascular: S1, S2, RRR  Gastrointestinal: BS+, soft, non-distended, PEG  Neurological: pt sedated  : Adams in place  Skin: No rashes    LABS:      145  |  113<H>  |  82<HH>  ----------------------------<  146<H>  4.6   |  19  |  2.7<H>    Ca    7.9<L>      2021 01:34  Mg     2.1           Creatinine Trend: 2.7 <--, 2.6 <--, 2.3 <--, 1.7 <--, 1.6 <--, 0.8 <--, 0.5 <--, 0.5 <--, 0.5 <--, <0.5 <--, 0.5 <--, 0.5 <--, 0.5 <--, 0.5 <--, 0.5 <--, 0.7 <--, 0.8 <--, 0.7 <--, 0.7 <--, 0.7 <--, 0.7 <--, 0.7 <--, 0.8 <--, 0.8 <--, 0.8 <--, 0.8 <--, 0.8 <--, 0.8 <--, 0.8 <--, 1.2 <--, 1.7 <--                        8.7    31.76 )-----------( 462      ( 2021 01:34 )             28.9     Urine Studies:  Urinalysis Basic - ( 2021 10:40 )    Color: Yellow / Appearance: Clear / S.017 / pH:   Gluc:  / Ketone: Negative  / Bili: Negative / Urobili: <2 mg/dL   Blood:  / Protein: 100 mg/dL / Nitrite: Negative   Leuk Esterase: Negative / RBC: 1 /HPF / WBC 6 /HPF   Sq Epi:  / Non Sq Epi: 1 /HPF / Bacteria: Negative                RADIOLOGY & ADDITIONAL STUDIES:  EXAM:  XR CHEST PORTABLE ROUTINE 1V            PROCEDURE DATE:  2021            INTERPRETATION:  Clinical History / Reason for exam: Vented patient    Comparison : Chest radiograph 2021.    Technique/Positionin frontal.    Findings:    Support devices: Stable positioning. Monitoring device.    Cardiac/mediastinum/hilum: Heart size within normal limits, thoracic aortic calcification..    Lung parenchyma/Pleura: Right basilar pneumothorax. Bilateral opacities.    Skeleton/soft tissues: Stable. Scoliosis.    Impression:    Right basilar pneumothorax, decreased. Bilateral opacities, stable.            ALEJA SIDHU MD; Attending Radiologist  This document has been electronically signed. 2021 10:54AM    EXAM:  CT ABDOMEN AND PELVIS        EXAM:  CT CHEST            PROCEDURE DATE:  2021            INTERPRETATION:  REASON FOR EXAM / CLINICAL STATEMENT: Trauma;    TECHNIQUE: Contiguous axial CT images were obtained from the thoracic inlet to the pubic symphysis without intravenous contrast. Reformatted images in the coronal and sagittal planes were acquired.    COMPARISON CT: CT scan of the chest dated 3/29/2021    OTHER STUDIES USED FOR CORRELATION: None.      FINDINGS CHEST:    TUBES/LINES: Tracheostomy is in place. Three left-sided chest tubes are in place.    HEART AND VESSELS: The heart is enlarged. There is no pericardial effusion.    Aortic calcifications and ectasia of the aorta are noted.    MEDIASTINUM: New pneumomediastinum is noted, especially in the anterior mediastinum.    AIRWAYS, LUNGS AND PLEURA: The central tracheobronchial tree is patent. Stable residual right-sided pneumothorax. Stable bilateral diffuse areas of groundglass opacity. Areas of traction bronchiectasis noted throughout the left lung field and at the right lung base. There is no pleural effusion.        FINDINGS ABDOMEN AND PELVIS:    HEPATIC: The liver is normal in appearance with no evidence of mass or bile duct dilatation. Small hepatic calcifications are again noted.    BILIARY: No calcified gallstones are noted.    SPLEEN: Unremarkable.    PANCREAS: The pancreas is normal in size and configuration. No evidence of mass or pancreatitis.    ADRENAL GLANDS: Unremarkable.    KIDNEYS: No evidence of hydronephrosis or calcified stones.    ABDOMINOPELVIC NODES: Unremarkable.    PELVIC ORGANS: A Adams catheter is present in the bladder. No evidence of pelvic mass, lymphadenopathy, or fluid collection.    PERITONEUM/MESENTERY/BOWEL: Gastrostomy tube is noted in the midline of the upper abdomen. The retention device is within the gastric lumen. A rectal tube is noted. No evidence of bowel obstruction, colitis, inflammatory process, or ascites within the abdomen or pelvis. No pneumoperitoneum.    BONES/SOFT TISSUES: Degenerative changes of the spine are noted. Subcutaneous emphysema is noted in the neck.    OTHER: Aortoiliac calcifications are noted with no evidence of abdominal aortic aneurysm.      IMPRESSION:    1. Stable residual right-sided pneumothorax. Three left-sided chest tubes are in place. New pneumomediastinum is noted, especially in the anterior mediastinum.    2. Stable bilateral diffuse areas of groundglass opacity. Areas of traction bronchiectasis noted throughout the left lung field and at the right lung base.    3. No evidence of bowel obstruction or intra-abdominal or pelvic inflammatory process              LIBAN SALAZAR MD; Attending Interventional Radiologist  This document has been electronically signed. 2021  8:55PM

## 2021-04-05 NOTE — PROGRESS NOTE ADULT - SUBJECTIVE AND OBJECTIVE BOX
Progress Note: General Surgery  Patient: DESTIN TERRY , 85y (1935)Female   MRN: [id number]  Location: [address]  Visit: 21 Inpatient  Date: 21 @ 10:06    Admit Diagnosis/Chief Complaint: Acute respiratory failure with hypoxia        Procedure/Diagnosis: Acute respiratory failure with hypoxia     S/P Bronchoscopy, at bedside    Open tracheostomy    Insertion, PEG tube        Events/ 24h: No acute events overnight. Pain controlled, sedated on vent.    Vitals: T(F): 96.5 (21 @ 08:30), Max: 99.6 (21 @ 18:00)  HR: 83 (21 @ 09:00)  BP: 105/51 (21 @ 08:30) (104/51 - 105/51)  RR: 26 (21 @ 09:00)  SpO2: 98% (21 @ 09:00)  RR (machine): 26, TV (machine): 450, FiO2: 40, PEEP: 8, PIP: 40  In:   21 @ 07:01  -  21 @ 07:00  --------------------------------------------------------  IN: 2704.8 mL    21 @ 07:01  -  21 @ 10:06  --------------------------------------------------------  IN: 370.1 mL      Out:   21 @ 07:01  -  21 @ 07:00  --------------------------------------------------------  OUT:    Chest Tube (mL): 10 mL    Chest Tube (mL): 100 mL    Chest Tube (mL): 150 mL    Drain (mL): 535 mL    Indwelling Catheter - Urethral (mL): 995 mL    Rectal Tube (mL): 100 mL    Vital High Protein: 0 mL  Total OUT: 1890 mL      21 @ 07:01  -  21 @ 10:06  --------------------------------------------------------  OUT:    Chest Tube (mL): 0 mL    Chest Tube (mL): 0 mL    Chest Tube (mL): 0 mL    Drain (mL): 40 mL    Indwelling Catheter - Urethral (mL): 75 mL    Rectal Tube (mL): 70 mL  Total OUT: 185 mL        Net:   21 @ 07:01  -  21 @ 07:00  --------------------------------------------------------  NET: 814.8 mL    21 @ 07:01  -  21 @ 10:06  --------------------------------------------------------  NET: 185.1 mL        Diet: Diet, NPO after Midnight:      NPO Start Date: 2021,   NPO Start Time: 23:59 (21 @ 14:07)  Diet, NPO:   Tube Feeding Modality: Gastrostomy  Vital High Protein  Total Volume for 24 Hours (mL): 480  Continuous  Starting Tube Feed Rate mL per Hour: 20  Until Goal Tube Feed Rate (mL per Hour): 20  Tube Feed Duration (in Hours): 24  Tube Feed Start Time: 13:00 (21 @ 08:51)    IV Fluids: dextrose 5%. 1000 milliLiter(s) (50 mL/Hr) IV Continuous <Continuous>  dextrose 5%. 1000 milliLiter(s) (100 mL/Hr) IV Continuous <Continuous>  ferrous    sulfate 325 milliGRAM(s) Oral daily  multivitamin 1 Tablet(s) Oral daily  sodium chloride 0.9%. 1000 milliLiter(s) (10 mL/Hr) IV Continuous <Continuous>      Physical Examination:  General Appearance: Trach in place on vent  HEENT: EOMI, sclera non-icteric.  Heart: RRR   Lungs: Right chest tubes x3 to suction, CT 1 + leak, CT 2 - leak, CT 3 + leak  Abdomen:  Soft, PEG in place  MSK/Extremities: Warm & well-perfused.   Skin: Warm, dry. No jaundice.         Medications: [Standing]  ALBUTerol    90 MICROgram(s) HFA Inhaler 2 Puff(s) Inhalation every 6 hours  caspofungin IVPB      caspofungin IVPB 50 milliGRAM(s) IV Intermittent every 24 hours  chlorhexidine 0.12% Liquid 5 milliLiter(s) Oral Mucosa two times a day  chlorhexidine 4% Liquid 1 Application(s) Topical <User Schedule>  cisatracurium Infusion 3 MICROgram(s)/kG/Min (10.7 mL/Hr) IV Continuous <Continuous>  clotrimazole 1% Cream 1 Application(s) Topical two times a day  dexMEDEtomidine Infusion 0.202 MICROgram(s)/kG/Hr (3 mL/Hr) IV Continuous <Continuous>  dextrose 5%. 1000 milliLiter(s) (50 mL/Hr) IV Continuous <Continuous>  dextrose 5%. 1000 milliLiter(s) (100 mL/Hr) IV Continuous <Continuous>  donepezil 5 milliGRAM(s) Oral at bedtime  fentaNYL   Infusion. 0.505 MICROgram(s)/kG/Hr (3 mL/Hr) IV Continuous <Continuous>  ferrous    sulfate 325 milliGRAM(s) Oral daily  gabapentin   Solution 100 milliGRAM(s) Oral three times a day  glucagon  Injectable 1 milliGRAM(s) IntraMuscular once  insulin lispro (ADMELOG) corrective regimen sliding scale   SubCutaneous every 4 hours  ipratropium 17 MICROgram(s) HFA Inhaler 2 Puff(s) Inhalation every 6 hours  linezolid  IVPB      linezolid  IVPB 600 milliGRAM(s) IV Intermittent every 12 hours  meropenem  IVPB 1000 milliGRAM(s) IV Intermittent every 12 hours  metroNIDAZOLE  IVPB 500 milliGRAM(s) IV Intermittent every 8 hours  multivitamin 1 Tablet(s) Oral daily  norepinephrine Infusion 0.1 MICROgram(s)/kG/Min (5.57 mL/Hr) IV Continuous <Continuous>  pantoprazole Infusion 8 mG/Hr (10 mL/Hr) IV Continuous <Continuous>  petrolatum Ophthalmic Ointment 1 Application(s) Both EYES three times a day  phenylephrine    Infusion 1 MICROgram(s)/kG/Min (11.1 mL/Hr) IV Continuous <Continuous>  propofol Infusion 10.101 MICROgram(s)/kG/Min (3.6 mL/Hr) IV Continuous <Continuous>  sodium chloride 0.9%. 1000 milliLiter(s) (10 mL/Hr) IV Continuous <Continuous>  vancomycin    Solution 125 milliGRAM(s) Oral every 6 hours  vasopressin Infusion 0.04 Unit(s)/Min (2.4 mL/Hr) IV Continuous <Continuous>    DVT Prophylaxis:   GI Prophylaxis: pantoprazole Infusion 8 mG/Hr IV Continuous <Continuous>    Antibiotics: caspofungin IVPB      caspofungin IVPB 50 milliGRAM(s) IV Intermittent every 24 hours  linezolid  IVPB      linezolid  IVPB 600 milliGRAM(s) IV Intermittent every 12 hours  meropenem  IVPB 1000 milliGRAM(s) IV Intermittent every 12 hours  metroNIDAZOLE  IVPB 500 milliGRAM(s) IV Intermittent every 8 hours  vancomycin    Solution 125 milliGRAM(s) Oral every 6 hours    Anticoagulation:   Medications:[PRN]  ondansetron Injectable 4 milliGRAM(s) IV Push every 4 hours PRN      Labs:                        8.7    31.76 )-----------( 462      ( 2021 01:34 )             28.9     04-    145  |  113<H>  |  82<HH>  ----------------------------<  146<H>  4.6   |  19  |  2.7<H>    Ca    7.9<L>      2021 01:34  Mg     2.1     04-05        PT/INR - ( 2021 14:07 )   PT: 18.80 sec;   INR: 1.63 ratio         PTT - ( 2021 14:07 )  PTT:36.8 sec  ABG - ( 2021 06:11 )  pH: 7.28  /  pCO2: 44    /  pO2: 88    / HCO3: 20    / Base Excess: -5.9  /  SaO2: 97                      Urine/Micro:    Culture - Blood (collected 2021 13:21)  Source: .Blood Blood  Preliminary Report (2021 20:01):    No growth to date.      Urinalysis Basic - ( 2021 10:40 )    Color: Yellow / Appearance: Clear / S.017 / pH: x  Gluc: x / Ketone: Negative  / Bili: Negative / Urobili: <2 mg/dL   Blood: x / Protein: 100 mg/dL / Nitrite: Negative   Leuk Esterase: Negative / RBC: 1 /HPF / WBC 6 /HPF   Sq Epi: x / Non Sq Epi: 1 /HPF / Bacteria: Negative        Imaging:   ***  < from: Xray Chest 1 View- PORTABLE-Routine (Xray Chest 1 View- PORTABLE-Routine in AM.) (21 @ 06:07) >  Impression:    Bilateral opacities and right basilar pneumothorax unchanged.    < end of copied text >

## 2021-04-05 NOTE — PROGRESS NOTE ADULT - ASSESSMENT
ASSESSMENT/PLAN  85y Female patient admitted 3/11  s/p R VATS evacuation of empyema, x3 chest tubes in place post operatively remains intubated   ARDS  shock/ pressors  elevated WBC    PLAN  tube feed on hold/NPO now  if pt to remain npo for longer will start TPN   check bmp/phos/mg and correct lytes  ASSESSMENT/PLAN  85y Female patient admitted 3/11  s/p R VATS evacuation of empyema, x3 chest tubes in place post operatively remains intubated   ARDS  shock/ pressors  elevated WBC  bleeding from GT    PLAN  tube feed on hold - pt received no more than trophic input since GT placed  if pt to remain npo by tomorrow, need to start TPN

## 2021-04-05 NOTE — PROGRESS NOTE ADULT - SUBJECTIVE AND OBJECTIVE BOX
MIGNON TERRYDULCE  85y, Female  Allergy: clindamycin (Hives)      LOS  25d    CHIEF COMPLAINT: cough and fever (05 Apr 2021 13:59)      INTERVAL EVENTS/HPI  - noted events over weekend   - T(F): , Max: 99.6 (04-04-21 @ 18:00)  - WBC Count: 31.76 (04-05-21 @ 01:34)  WBC Count: 35.31 (04-04-21 @ 14:07)     - Creatinine, Serum: 2.7 (04-05-21 @ 01:34)  Creatinine, Serum: 2.6 (04-04-21 @ 02:00)       ROS  unable to obtain history secondary to patient's mental status     VITALS:  T(F): 97.4, Max: 99.6 (04-04-21 @ 18:00)  HR: 74  BP: 105/51  RR: 14Vital Signs Last 24 Hrs  T(C): 36.3 (05 Apr 2021 12:00), Max: 37.6 (04 Apr 2021 18:00)  T(F): 97.4 (05 Apr 2021 12:00), Max: 99.6 (04 Apr 2021 18:00)  HR: 74 (05 Apr 2021 15:15) (69 - 92)  BP: 105/51 (05 Apr 2021 08:30) (104/51 - 105/51)  BP(mean): 73 (05 Apr 2021 08:30) (73 - 74)  RR: 14 (05 Apr 2021 15:15) (7 - 37)  SpO2: 99% (05 Apr 2021 15:15) (96% - 100%)    PHYSICAL EXAM:  Gen: vent/trach   HEENT: Normocephalic, atraumatic  Neck: supple, no lymphadenopathy  CV: Regular rate & regular rhythm  Lungs: decreased BS at bases, no fremitus  Abdomen: Soft, BS present, PEG in palce  Ext: Warm, well perfused  Neuro: non focal, awake  Skin: no rash, no erythema  Lines: no phlebitis    FH: Non-contributory  Social Hx: Non-contributory    TESTS & MEASUREMENTS:                        8.7    31.76 )-----------( 462      ( 05 Apr 2021 01:34 )             28.9     04-05    145  |  113<H>  |  82<HH>  ----------------------------<  146<H>  4.6   |  19  |  2.7<H>    Ca    7.9<L>      05 Apr 2021 01:34  Mg     2.1     04-05      eGFR if Non African American: 16 mL/min/1.73M2 (04-05-21 @ 01:34)  eGFR if African American: 18 mL/min/1.73M2 (04-05-21 @ 01:34)          Culture - Blood (collected 04-03-21 @ 13:21)  Source: .Blood Blood  Preliminary Report (04-04-21 @ 20:01):    No growth to date.    Culture - Blood (collected 03-31-21 @ 10:52)  Source: .Blood Blood  Preliminary Report (04-01-21 @ 23:01):    No growth to date.    Culture - Acid Fast - Tissue w/Smear (collected 03-22-21 @ 13:33)  Source: .Tissue PLEURAL PEEL  Preliminary Report (03-31-21 @ 15:03):    No growth at 1 week.    Culture - Fungal, Tissue (collected 03-22-21 @ 13:33)  Source: .Tissue None  Preliminary Report (03-31-21 @ 15:02):    No growth    Culture - Tissue with Gram Stain (collected 03-22-21 @ 13:33)  Source: .Tissue None  Gram Stain (03-23-21 @ 04:38):    Rare polymorphonuclear leukocytes seen per low power field    Few White blood cells seen per low power field    Few Gram positive cocci in pairs per oil power field  Final Report (04-01-21 @ 16:29):    No growth    Organism seen in Gram stain is non-viable after prolonged    incubation and repeated subculture.    Culture - Fungal, Bronchial (collected 03-22-21 @ 12:58)  Source: .Bronchial None  Preliminary Report (03-25-21 @ 11:20):    Rare Yeast    Culture - Acid Fast - Bronchial w/Smear (collected 03-22-21 @ 12:58)  Source: Bronch Wash None  Preliminary Report (03-31-21 @ 15:03):    No growth at 1 week.    Culture - Bronchial (collected 03-22-21 @ 12:58)  Source: .Bronchial None  Gram Stain (03-23-21 @ 07:58):    Numerous polymorphonuclear leukocytes per low power field    No Squamous epithelial cells per low power field    No organisms seen per oil power field  Final Report (03-24-21 @ 22:11):    Normal Respiratory Narda present    Culture - Fungal, Body Fluid (collected 03-22-21 @ 09:26)  Source: .Body Fluid None  Preliminary Report (03-31-21 @ 15:02):    No growth    Culture - Acid Fast - Body Fluid w/Smear (collected 03-22-21 @ 09:26)  Source: .Body Fluid None  Preliminary Report (03-31-21 @ 15:03):    No growth at 1 week.    Culture - Body Fluid with Gram Stain (collected 03-22-21 @ 09:26)  Source: .Body Fluid None  Gram Stain (03-23-21 @ 04:38):    polymorphonuclear leukocytes seen    No organisms seen    by cytocentrifuge  Final Report (03-27-21 @ 17:22):    No growth at 5 days    Culture - Blood (collected 03-17-21 @ 16:00)  Source: .Blood Blood-Peripheral  Final Report (03-23-21 @ 02:39):    No Growth Final    Culture - Blood (collected 03-13-21 @ 07:33)  Source: .Blood None  Final Report (03-18-21 @ 18:01):    No Growth Final    Culture - Blood (collected 03-10-21 @ 21:00)  Source: .Blood Blood-Peripheral  Final Report (03-16-21 @ 04:01):    No Growth Final    Culture - Blood (collected 03-10-21 @ 21:00)  Source: .Blood Blood-Peripheral  Final Report (03-16-21 @ 04:01):    No Growth Final            INFECTIOUS DISEASES TESTING  Fungitell: <31 (04-01-21 @ 01:03)  Aspergillus Galactomannan Antigen: <0.500 (04-01-21 @ 01:03)  COVID-19 PCR: NotDetec (03-29-21 @ 14:17)  Procalcitonin, Serum: 2.29 (03-27-21 @ 10:50)  MRSA PCR Result.: Negative (03-20-21 @ 21:38)  COVID-19 PCR: NotDetec (03-20-21 @ 12:30)  Fungitell: 46 (03-17-21 @ 16:00)  Procalcitonin, Serum: 0.29 (03-17-21 @ 11:30)  Procalcitonin, Serum: 0.31 (03-16-21 @ 10:15)  Legionella Antigen, Urine: Negative (03-12-21 @ 10:30)  Streptococcus Pneumoniae Ag Urine: Negative (03-12-21 @ 10:30)  MRSA PCR Result.: Negative (03-12-21 @ 10:30)  Legionella Antigen, Urine: Negative (03-11-21 @ 08:10)  Streptococcus Pneumoniae Ag Urine: Negative (03-11-21 @ 08:10)  Procalcitonin, Serum: 1.75 (03-11-21 @ 06:31)  Rapid RVP Result: NotDetec (03-10-21 @ 22:10)      INFLAMMATORY MARKERS      RADIOLOGY & ADDITIONAL TESTS:  I have personally reviewed the last available Chest xray  CXR  Xray Chest 1 View AP/PA:   EXAM:  XR CHEST 1 VIEW            PROCEDURE DATE:  04/03/2021            INTERPRETATION:  Clinical History / Reason for exam: Respiratory distress    Comparison : Chest radiograph 4/3/2021.    Technique/Positioning: Frontal.    Findings:    Supportdevices: Tracheostomy tube in place chest tube in place central venous line superior vena cava    Cardiac/mediastinum/hilum: Unremarkable.    Lung parenchyma/Pleura: Bilateral opacities unchanged. Right basilar pneumothorax unchanged. No pleural effusion.    Skeleton/soft tissues: Scoliosis    Impression:    Bilateral opacities unchanged. Right basilar pneumothorax unchanged. No pleural effusion.                  ARCHIE HYDE MD; Attending Radiologist  This document has been electronicallysigned. Apr  3 2021  2:11PM (04-03-21 @ 13:15)      CT      CARDIOLOGY TESTING  12 Lead ECG:   Ventricular Rate 157 BPM    Atrial Rate 326 BPM    QRS Duration 88 ms    Q-T Interval 314 ms    QTC Calculation(Bazett) 507 ms    R Axis 41 degrees    T Axis -87 degrees    Diagnosis Line Atrial fibrillation with premature ventricular or aberrantlyconducted  complexes  Nonspecific ST and T wave abnormality  Abnormal ECG    Confirmed by SHANNAN MORALES MD ([id number]) on 3/26/2021 1:51:15 PM (03-26-21 @ 12:19)  12 Lead ECG:   Ventricular Rate 82 BPM    Atrial Rate 82 BPM    P-R Interval 152 ms    QRS Duration 92 ms    Q-T Interval 408 ms    QTC Calculation(Bazett) 476 ms    P Axis 44 degrees    R Axis 22 degrees    T Axis 26 degrees    Diagnosis Line Normal sinus rhythm  Normal ECG    Confirmed by JUANI DODSON MD ([id number]) on 3/24/2021 8:30:17 PM (03-24-21 @ 13:34)      MEDICATIONS  ALBUTerol    90 MICROgram(s) HFA Inhaler 2 Inhalation every 6 hours  caspofungin IVPB     caspofungin IVPB 50 IV Intermittent every 24 hours  chlorhexidine 0.12% Liquid 5 Oral Mucosa two times a day  chlorhexidine 4% Liquid 1 Topical <User Schedule>  cisatracurium Infusion 3 IV Continuous <Continuous>  clotrimazole 1% Cream 1 Topical two times a day  dexMEDEtomidine Infusion 0.202 IV Continuous <Continuous>  dextrose 5%. 1000 IV Continuous <Continuous>  dextrose 5%. 1000 IV Continuous <Continuous>  donepezil 5 Oral at bedtime  fentaNYL   Infusion. 0.505 IV Continuous <Continuous>  ferrous    sulfate 325 Oral daily  gabapentin   Solution 100 Oral three times a day  glucagon  Injectable 1 IntraMuscular once  insulin lispro (ADMELOG) corrective regimen sliding scale  SubCutaneous every 4 hours  ipratropium 17 MICROgram(s) HFA Inhaler 2 Inhalation every 6 hours  linezolid  IVPB     linezolid  IVPB 600 IV Intermittent every 12 hours  meropenem  IVPB 1000 IV Intermittent every 24 hours  metroNIDAZOLE  IVPB 500 IV Intermittent every 8 hours  multivitamin 1 Oral daily  norepinephrine Infusion 0.1 IV Continuous <Continuous>  pantoprazole Infusion 8 IV Continuous <Continuous>  petrolatum Ophthalmic Ointment 1 Both EYES three times a day  phenylephrine    Infusion 1 IV Continuous <Continuous>  propofol Infusion 10.101 IV Continuous <Continuous>  sodium bicarbonate  Injectable 50 IV Push <User Schedule>  sodium chloride 0.9%. 1000 IV Continuous <Continuous>  vancomycin    Solution 125 Oral every 6 hours  vasopressin Infusion 0.04 IV Continuous <Continuous>      WEIGHT  Weight (kg): 59.4 (03-30-21 @ 07:39)  Creatinine, Serum: 2.7 mg/dL (04-05-21 @ 01:34)      ANTIBIOTICS:  caspofungin IVPB      caspofungin IVPB 50 milliGRAM(s) IV Intermittent every 24 hours  linezolid  IVPB      linezolid  IVPB 600 milliGRAM(s) IV Intermittent every 12 hours  meropenem  IVPB 1000 milliGRAM(s) IV Intermittent every 24 hours  metroNIDAZOLE  IVPB 500 milliGRAM(s) IV Intermittent every 8 hours  vancomycin    Solution 125 milliGRAM(s) Oral every 6 hours      All available historical records have been reviewed

## 2021-04-05 NOTE — CONSULT NOTE ADULT - ASSESSMENT
86 yo F presented for cough and fever, admitted for necrotizing community-acquired pneumonia and MENDOZA.    #ATN exacerbated by GIB  - cr 1.7 on admission-> decreased to 0.8 with IVF  - creatinine remained stable ~0.8 until 4/1 when it shweta to 1.6 in the setting of a drop in Hgb from 8.7 to 7.2  - pt s/p 3 U RBCs on 4/1/21  - today (4/5/21) BUN 82, Cr 2.7, eGFR 16, Cl- 113, Na+ 145  - episodes of hypotension during admission and on norepinephrine drip  - output over past 24 hours 1890cc, net +817cc  - currently on NS @ 10cc/hr    RECS:  - renally dose meropenem to QD  - PO bicarb  - obtain phosphorus level  - Na+ currently ~145-146, start D5W peripherally if Na+ >146 or free water by PEG if tolerated  - transfuse as needed Attending attestation: (unable to open separate section)  Saw and examined patient.  Agree with MS4's A&P below.    85F a/w necrotizing PNA / empyema s/p VATS s/p open trach/PEG placement c/b shock/sepsis/GIB and non-oliguric MENDOZA 2/2 ATN, CTAP w/o hydronephrosis  - dose meds for eGFR < 15 while creatinine up-trending  - check phos level, po sodium bicarb   - document strict i/o and daily weights  - hypervolemic, avoid iv hydration  - appreciate GI f/u for EGD  - transfuse RBC to maintain hgb > 7  - does not need RRT  - will follow     ______________________________    #ATN d/t shock, exacerbated by GIB  - cr 1.7 on admission-> decreased to 0.8 with IVF  - creatinine remained stable ~0.8 until 4/1 when it shweta to 1.6 in the setting of a drop in Hgb from 8.7 to 7.2  - pt s/p 3 U RBCs on 4/1/21  - today (4/5/21) BUN 82, Cr 2.7, eGFR 16, Cl- 113, Na+ 145  - episodes of hypotension during admission and on norepinephrine drip  - output over past 24 hours 1890cc, net +817cc  - currently on NS @ 10cc/hr    RECS:  - renally dose meropenem to QD  - PO bicarb  - obtain phosphorus level  - Na+ currently ~145-146, start D5W peripherally if Na+ >146 or free water by PEG if tolerated  - transfuse as needed

## 2021-04-05 NOTE — PROGRESS NOTE ADULT - ASSESSMENT
ASSESSMENT  85 year old lady known to have dementia, osteoarithtis, Danish-speaking presenting with cough and fever.      IMPRESSION  #Sepsis present on admission - secondary to CAP  -  CT Chest No Cont (03.11.21 @ 00:54): Areas of right lung consolidation which can be seen in pneumonia. Numerous air-fluid levels throughout the right lung compatible with an infectious process. Suggestion of split pleura at the lung base for which empyema is favored although inherently limited on this noncontrast exam. Consideration can be given to contrast administration if feasiblefor better delineation. Areas of bilateral interlobular septal thickening and mild layering groundglass attenuation may reflect a component of edema.  - Urine Legionella negative  - Urine Strep negative  - BLood Cx 3/10 and 3/13 negative  - Procalcitonin 1.15   - CT Chest No Cont (03.17.21 @ 16:19): Since 3/11/2021. Enlarging loculated right pleural fluid collection with multiple air bubbles consistent with empyema.   Associated compressive atelectasis right lung is seen. Scattered groundglass opacities involving multiple lumbar segments.  - s/p VATS 3/22 -- right empyema with 800 cc purulent fluid in pleural space, multiple pockers with dense adhesions -- necrotizing pneumonia of the middle lobe  - VATS Cx 3/22 with rare yeast, otherwise no growth  -  CT Chest No Cont (03.29.21 @ 12:37): Since March 29, 2021, resolved right pleural effusion; persistent moderate right pneumothorax with 2 chest tubes in place. Increased left greater than right diffuse bilateral groundglass opacities and interlobular septal thickening. Findings are suspicious for a multifocal infection. Superimposed edema is also a possibility.  Enlarging mediastinal lymph nodes, likely reactive.  - CT Chest/Abdomen and Pelvis No Cont (04.01.21 @ 20:19): Stable residual right-sided pneumothorax. Three left-sided chest tubes are in place. New pneumomediastinum is noted, especially in the anterior mediastinum.  Stable bilateral diffuse areas of groundglass opacity. Areas of traction bronchiectasis noted throughout the left lung field and at the right lung base. No evidence of bowel obstruction or intra-abdominal or pelvic inflammatory process  - Fungitell: <31 4/1  - Aspergillus Galactomannan Antigen: <0.500 (04.01.21 @ 01:03)  - Blood Cx 3/31 and 4/3 NG    #Dementia  #Osteoarthritis  #Obesity BMI (kg/m2): 23.4  #Abx allergy: clindamycin (Hives)    Creatinine, Serum: 2.3 mg/dL (04.03.21 @ 01:57)  Weight (kg): 60 (11 Mar 2021 01:17)  CrCl 21     RECOMMENDATIONS  - consider stopping caspofungin as fungal markers are negative   - can stop PO vancomycin as C diff negative  - continue meropenem, linzeolid  - trend WBC -- worsening WBC over weekend may be reactive from GI bleed    Please call or message on [employer] Teams if with any questions.  Spectra [phone]

## 2021-04-05 NOTE — PROGRESS NOTE ADULT - ATTENDING COMMENTS
bleeding from PEG has slowed down, Hgb stable. still on two pressors. will plan for EGD tomorrow, or more urgently if she shows s/sxs hemodynamic instability.

## 2021-04-05 NOTE — PROGRESS NOTE ADULT - SUBJECTIVE AND OBJECTIVE BOX
Patient is a 85y old  Female who presents with a chief complaint of cough and fever (05 Apr 2021 13:59)  pt seen and evaluated   remain vented /sedated on propofol   on pressors  peg tube feed on hold  + dark red blood from PEG tube   GI cf/u noted   ICU Vital Signs Last 24 Hrs  T(C): 36.3 (05 Apr 2021 12:00), Max: 37.6 (04 Apr 2021 18:00)  T(F): 97.4 (05 Apr 2021 12:00), Max: 99.6 (04 Apr 2021 18:00)  HR: 74 (05 Apr 2021 15:15) (69 - 92)  BP: 105/51 (05 Apr 2021 08:30) (104/51 - 105/51)  BP(mean): 73 (05 Apr 2021 08:30) (73 - 74)  ABP: 104/45 (05 Apr 2021 15:15) (70/56 - 134/59)  ABP(mean): 66 (05 Apr 2021 15:15) (59 - 90)  RR: 14 (05 Apr 2021 15:15) (7 - 37)  SpO2: 99% (05 Apr 2021 15:15) (96% - 100%)      Drug Dosing Weight  Height (cm): 160 (30 Mar 2021 07:39)  Weight (kg): 59.4 (30 Mar 2021 07:39)  BMI (kg/m2): 23.2 (30 Mar 2021 07:39)  BSA (m2): 1.62 (30 Mar 2021 07:39)    I&O's Detail    04 Apr 2021 07:01  -  05 Apr 2021 07:00  --------------------------------------------------------  IN:    Cisatracurium: 86.4 mL    FentaNYL: 216 mL    IV PiggyBack: 1250 mL    Pantoprazole: 240 mL    Phenylephrine: 382 mL    Propofol: 172.8 mL    sodium chloride 0.9%: 300 mL    Vasopressin: 57.6 mL  Total IN: 2704.8 mL    OUT:    Chest Tube (mL): 100 mL    Chest Tube (mL): 150 mL    Chest Tube (mL): 10 mL    Drain (mL): 535 mL    Indwelling Catheter - Urethral (mL): 995 mL    Rectal Tube (mL): 100 mL    Vital High Protein: 0 mL  Total OUT: 1890 mL    Total NET: 814.8 mL      05 Apr 2021 07:01  -  05 Apr 2021 16:07  --------------------------------------------------------  IN:    Cisatracurium: 37.8 mL    FentaNYL: 81 mL    IV PiggyBack: 350 mL    Pantoprazole: 90 mL    Phenylephrine: 200 mL    Propofol: 64.8 mL    sodium chloride 0.9%: 90 mL    Vasopressin: 21.6 mL  Total IN: 935.2 mL    OUT:    Chest Tube (mL): 0 mL    Chest Tube (mL): 10 mL    Chest Tube (mL): 20 mL    Drain (mL): 50 mL    Indwelling Catheter - Urethral (mL): 230 mL    Rectal Tube (mL): 100 mL  Total OUT: 410 mL    Total NET: 525.2 mL     PHYSICAL EXAM:  Constitutional:  remain vented via trach  chest + 3 chest tubes in place  Gastrointestinal:  = PEG tube in place , soft    MEDICATIONS  (STANDING):  ALBUTerol    90 MICROgram(s) HFA Inhaler 2 Puff(s) Inhalation every 6 hours  caspofungin IVPB      caspofungin IVPB 50 milliGRAM(s) IV Intermittent every 24 hours  chlorhexidine 0.12% Liquid 5 milliLiter(s) Oral Mucosa two times a day  chlorhexidine 4% Liquid 1 Application(s) Topical <User Schedule>  cisatracurium Infusion 3 MICROgram(s)/kG/Min (10.7 mL/Hr) IV Continuous <Continuous>  clotrimazole 1% Cream 1 Application(s) Topical two times a day  dexMEDEtomidine Infusion 0.202 MICROgram(s)/kG/Hr (3 mL/Hr) IV Continuous <Continuous>  dextrose 5%. 1000 milliLiter(s) (50 mL/Hr) IV Continuous <Continuous>  dextrose 5%. 1000 milliLiter(s) (100 mL/Hr) IV Continuous <Continuous>  donepezil 5 milliGRAM(s) Oral at bedtime  fentaNYL   Infusion. 0.505 MICROgram(s)/kG/Hr (3 mL/Hr) IV Continuous <Continuous>  ferrous    sulfate 325 milliGRAM(s) Oral daily  gabapentin   Solution 100 milliGRAM(s) Oral three times a day  glucagon  Injectable 1 milliGRAM(s) IntraMuscular once  insulin lispro (ADMELOG) corrective regimen sliding scale   SubCutaneous every 4 hours  ipratropium 17 MICROgram(s) HFA Inhaler 2 Puff(s) Inhalation every 6 hours  linezolid  IVPB      linezolid  IVPB 600 milliGRAM(s) IV Intermittent every 12 hours  meropenem  IVPB 1000 milliGRAM(s) IV Intermittent every 24 hours  metroNIDAZOLE  IVPB 500 milliGRAM(s) IV Intermittent every 8 hours  multivitamin 1 Tablet(s) Oral daily  norepinephrine Infusion 0.1 MICROgram(s)/kG/Min (5.57 mL/Hr) IV Continuous <Continuous>  pantoprazole Infusion 8 mG/Hr (10 mL/Hr) IV Continuous <Continuous>  petrolatum Ophthalmic Ointment 1 Application(s) Both EYES three times a day  phenylephrine    Infusion 1 MICROgram(s)/kG/Min (11.1 mL/Hr) IV Continuous <Continuous>  propofol Infusion 10.101 MICROgram(s)/kG/Min (3.6 mL/Hr) IV Continuous <Continuous>  sodium bicarbonate  Injectable 50 milliEquivalent(s) IV Push <User Schedule>  sodium chloride 0.9%. 1000 milliLiter(s) (10 mL/Hr) IV Continuous <Continuous>  vancomycin    Solution 125 milliGRAM(s) Oral every 6 hours  vasopressin Infusion 0.04 Unit(s)/Min (2.4 mL/Hr) IV Continuous <Continuous>      Diet, NPO after Midnight:      NPO Start Date: 04-Apr-2021,   NPO Start Time: 23:59 (04-04-21 @ 14:07)  Diet, NPO:   Tube Feeding Modality: Gastrostomy  Vital High Protein  Total Volume for 24 Hours (mL): 480  Continuous  Starting Tube Feed Rate mL per Hour: 20  Until Goal Tube Feed Rate (mL per Hour): 20  Tube Feed Duration (in Hours): 24  Tube Feed Start Time: 13:00 (04-02-21 @ 08:51)      LABS  04-05    145  |  113<H>  |  82<HH>  ----------------------------<  146<H>  4.6   |  19  |  2.7<H>    Ca    7.9<L>      05 Apr 2021 01:34  Mg     2.1     04-05                        8.7    31.76 )-----------( 462      ( 05 Apr 2021 01:34 )             28.9     CAPILLARY BLOOD GLUCOSE  POCT Blood Glucose.: 164 mg/dL (05 Apr 2021 15:14)  POCT Blood Glucose.: 183 mg/dL (05 Apr 2021 10:39)  POCT Blood Glucose.: 156 mg/dL (05 Apr 2021 06:07)  POCT Blood Glucose.: 138 mg/dL (05 Apr 2021 01:25)  POCT Blood Glucose.: 178 mg/dL (04 Apr 2021 22:34)  POCT Blood Glucose.: 136 mg/dL (04 Apr 2021 18:26)   RADIOLOGY STUDIES  r< from: Xray Chest 1 View- PORTABLE-Routine (Xray Chest 1 View- PORTABLE-Routine in AM.) (04.05.21 @ 05:52) >  Impression:  Right basilar pneumothorax, decreased. Bilateral opacities, stable.           85 year old lady known to have dementia, osteoarthritis  presenting with cough and fever. Patient was found to have pneumonia and empyema s/p R VATS evacuation of empyema, x3 chest tubes in place, s/p Trach and PEG on 3/30 by surgery . Patient dropped HGB from 10 to 7 after Trach and PEG placement . pt required several transfusions. pt remains intubated, on 2 pressors, on paralytic, sedated with propofol.  GT feeds held since early 4/4, and pt fed minimally for several days before that - 20 ml/h at most.  + dark red blood from PEG tube   GI f/u noted   ICU Vital Signs Last 24 Hrs  T(C): 36.3 (05 Apr 2021 12:00), Max: 37.6 (04 Apr 2021 18:00)  T(F): 97.4 (05 Apr 2021 12:00), Max: 99.6 (04 Apr 2021 18:00)  HR: 74 (05 Apr 2021 15:15) (69 - 92)  BP: 105/51 (05 Apr 2021 08:30) (104/51 - 105/51)  BP(mean): 73 (05 Apr 2021 08:30) (73 - 74)  ABP: 104/45 (05 Apr 2021 15:15) (70/56 - 134/59)  ABP(mean): 66 (05 Apr 2021 15:15) (59 - 90)  RR: 14 (05 Apr 2021 15:15) (7 - 37)  SpO2: 99% (05 Apr 2021 15:15) (96% - 100%)  Drug Dosing Weight  Height (cm): 160 (30 Mar 2021 07:39)  Weight (kg): 59.4 (30 Mar 2021 07:39)  BMI (kg/m2): 23.2 (30 Mar 2021 07:39)  BSA (m2): 1.62 (30 Mar 2021 07:39)    I&O's Detail    04 Apr 2021 07:01  -  05 Apr 2021 07:00  --------------------------------------------------------  IN:    Cisatracurium: 86.4 mL    FentaNYL: 216 mL    IV PiggyBack: 1250 mL    Pantoprazole: 240 mL    Phenylephrine: 382 mL    Propofol: 172.8 mL    sodium chloride 0.9%: 300 mL    Vasopressin: 57.6 mL  Total IN: 2704.8 mL    OUT:    Chest Tube (mL): 100 mL    Chest Tube (mL): 150 mL    Chest Tube (mL): 10 mL    Drain (mL): 535 mL    Indwelling Catheter - Urethral (mL): 995 mL    Rectal Tube (mL): 100 mL    Vital High Protein: 0 mL  Total OUT: 1890 mL    Total NET: 814.8 mL     PHYSICAL EXAM:  Constitutional: remain vented via trach  chest + 3 chest tubes in place  Gastrointestinal: PEG tube in place , abd soft    MEDICATIONS  (STANDING):  ALBUTerol    90 MICROgram(s) HFA Inhaler 2 Puff(s) Inhalation every 6 hours  caspofungin IVPB      caspofungin IVPB 50 milliGRAM(s) IV Intermittent every 24 hours  chlorhexidine 0.12% Liquid 5 milliLiter(s) Oral Mucosa two times a day  chlorhexidine 4% Liquid 1 Application(s) Topical <User Schedule>  cisatracurium Infusion 3 MICROgram(s)/kG/Min (10.7 mL/Hr) IV Continuous <Continuous>  clotrimazole 1% Cream 1 Application(s) Topical two times a day  dexMEDEtomidine Infusion 0.202 MICROgram(s)/kG/Hr (3 mL/Hr) IV Continuous <Continuous>  dextrose 5%. 1000 milliLiter(s) (50 mL/Hr) IV Continuous <Continuous>  dextrose 5%. 1000 milliLiter(s) (100 mL/Hr) IV Continuous <Continuous>  donepezil 5 milliGRAM(s) Oral at bedtime  fentaNYL   Infusion. 0.505 MICROgram(s)/kG/Hr (3 mL/Hr) IV Continuous <Continuous>  ferrous    sulfate 325 milliGRAM(s) Oral daily  gabapentin   Solution 100 milliGRAM(s) Oral three times a day  glucagon  Injectable 1 milliGRAM(s) IntraMuscular once  insulin lispro (ADMELOG) corrective regimen sliding scale   SubCutaneous every 4 hours  ipratropium 17 MICROgram(s) HFA Inhaler 2 Puff(s) Inhalation every 6 hours  linezolid  IVPB      linezolid  IVPB 600 milliGRAM(s) IV Intermittent every 12 hours  meropenem  IVPB 1000 milliGRAM(s) IV Intermittent every 24 hours  metroNIDAZOLE  IVPB 500 milliGRAM(s) IV Intermittent every 8 hours  multivitamin 1 Tablet(s) Oral daily  norepinephrine Infusion 0.1 MICROgram(s)/kG/Min (5.57 mL/Hr) IV Continuous <Continuous>  pantoprazole Infusion 8 mG/Hr (10 mL/Hr) IV Continuous <Continuous>  petrolatum Ophthalmic Ointment 1 Application(s) Both EYES three times a day  phenylephrine    Infusion 1 MICROgram(s)/kG/Min (11.1 mL/Hr) IV Continuous <Continuous>  propofol Infusion 10.101 MICROgram(s)/kG/Min (3.6 mL/Hr) IV Continuous <Continuous>  sodium bicarbonate  Injectable 50 milliEquivalent(s) IV Push <User Schedule>  sodium chloride 0.9%. 1000 milliLiter(s) (10 mL/Hr) IV Continuous <Continuous>  vancomycin    Solution 125 milliGRAM(s) Oral every 6 hours  vasopressin Infusion 0.04 Unit(s)/Min (2.4 mL/Hr) IV Continuous <Continuous>    Diet, NPO after Midnight:      NPO Start Date: 04-Apr-2021,   NPO Start Time: 23:59 (04-04-21 @ 14:07)  Diet, NPO:   Tube Feeding Modality: Gastrostomy  Vital High Protein  Total Volume for 24 Hours (mL): 480  Continuous  Starting Tube Feed Rate mL per Hour: 20  Until Goal Tube Feed Rate (mL per Hour): 20  Tube Feed Duration (in Hours): 24  Tube Feed Start Time: 13:00 (04-02-21 @ 08:51)      LABS  04-05    145  |  113<H>  |  82<HH>  ----------------------------<  146<H>  4.6   |  19  |  2.7<H>    Ca    7.9<L>      05 Apr 2021 01:34  Mg     2.1     04-05                        8.7    31.76 )-----------( 462      ( 05 Apr 2021 01:34 )             28.9     CAPILLARY BLOOD GLUCOSE  POCT Blood Glucose.: 164 mg/dL (05 Apr 2021 15:14)  POCT Blood Glucose.: 183 mg/dL (05 Apr 2021 10:39)  POCT Blood Glucose.: 156 mg/dL (05 Apr 2021 06:07)  POCT Blood Glucose.: 138 mg/dL (05 Apr 2021 01:25)  POCT Blood Glucose.: 178 mg/dL (04 Apr 2021 22:34)  POCT Blood Glucose.: 136 mg/dL (04 Apr 2021 18:26)   RADIOLOGY STUDIES  r< from: Xray Chest 1 View- PORTABLE-Routine (Xray Chest 1 View- PORTABLE-Routine in AM.) (04.05.21 @ 05:52) >  Impression:  Right basilar pneumothorax, decreased. Bilateral opacities, stable.

## 2021-04-05 NOTE — PROGRESS NOTE ADULT - SUBJECTIVE AND OBJECTIVE BOX
Gastroenterology progress note:     Patient is a 85y old  Female who presents with a chief complaint of cough and fever (05 Apr 2021 13:59)       Admitted on: 03-11-21    We are following the patient for: dark red blood from PEG tube      Interval History: dark blood through PEG tube slowing down , less than 100 cc today noted in the suction bag. Patient is still on two pressors secondary to septic shock. HGB is stable     Patient's medical problems are critical    Prior records reviewed (Y/N): Y  History obtained from someone other than patient (Y/N): Y       PAST MEDICAL & SURGICAL HISTORY:  Dementia    Osteoarthritis    No significant past surgical history        MEDICATIONS  (STANDING):  ALBUTerol    90 MICROgram(s) HFA Inhaler 2 Puff(s) Inhalation every 6 hours  caspofungin IVPB      caspofungin IVPB 50 milliGRAM(s) IV Intermittent every 24 hours  chlorhexidine 0.12% Liquid 5 milliLiter(s) Oral Mucosa two times a day  chlorhexidine 4% Liquid 1 Application(s) Topical <User Schedule>  cisatracurium Infusion 3 MICROgram(s)/kG/Min (10.7 mL/Hr) IV Continuous <Continuous>  clotrimazole 1% Cream 1 Application(s) Topical two times a day  dexMEDEtomidine Infusion 0.202 MICROgram(s)/kG/Hr (3 mL/Hr) IV Continuous <Continuous>  dextrose 5%. 1000 milliLiter(s) (50 mL/Hr) IV Continuous <Continuous>  dextrose 5%. 1000 milliLiter(s) (100 mL/Hr) IV Continuous <Continuous>  donepezil 5 milliGRAM(s) Oral at bedtime  fentaNYL   Infusion. 0.505 MICROgram(s)/kG/Hr (3 mL/Hr) IV Continuous <Continuous>  ferrous    sulfate 325 milliGRAM(s) Oral daily  gabapentin   Solution 100 milliGRAM(s) Oral three times a day  glucagon  Injectable 1 milliGRAM(s) IntraMuscular once  insulin lispro (ADMELOG) corrective regimen sliding scale   SubCutaneous every 4 hours  ipratropium 17 MICROgram(s) HFA Inhaler 2 Puff(s) Inhalation every 6 hours  linezolid  IVPB      linezolid  IVPB 600 milliGRAM(s) IV Intermittent every 12 hours  meropenem  IVPB 1000 milliGRAM(s) IV Intermittent every 12 hours  metroNIDAZOLE  IVPB 500 milliGRAM(s) IV Intermittent every 8 hours  multivitamin 1 Tablet(s) Oral daily  norepinephrine Infusion 0.1 MICROgram(s)/kG/Min (5.57 mL/Hr) IV Continuous <Continuous>  pantoprazole Infusion 8 mG/Hr (10 mL/Hr) IV Continuous <Continuous>  petrolatum Ophthalmic Ointment 1 Application(s) Both EYES three times a day  phenylephrine    Infusion 1 MICROgram(s)/kG/Min (11.1 mL/Hr) IV Continuous <Continuous>  propofol Infusion 10.101 MICROgram(s)/kG/Min (3.6 mL/Hr) IV Continuous <Continuous>  sodium chloride 0.9%. 1000 milliLiter(s) (10 mL/Hr) IV Continuous <Continuous>  vancomycin    Solution 125 milliGRAM(s) Oral every 6 hours  vasopressin Infusion 0.04 Unit(s)/Min (2.4 mL/Hr) IV Continuous <Continuous>    MEDICATIONS  (PRN):  ondansetron Injectable 4 milliGRAM(s) IV Push every 4 hours PRN Nausea and/or Vomiting      Allergies  clindamycin (Hives)      Review of Systems:   unable to obtain secondary to patient condition     Physical Examination:  T(C): 36.3 (04-05-21 @ 12:00), Max: 37.6 (04-04-21 @ 18:00)  HR: 75 (04-05-21 @ 14:30) (69 - 92)  BP: 105/51 (04-05-21 @ 08:30) (104/51 - 105/51)  RR: 26 (04-05-21 @ 14:30) (10 - 37)  SpO2: 100% (04-05-21 @ 14:30) (96% - 100%)      04-04-21 @ 07:01  -  04-05-21 @ 07:00  --------------------------------------------------------  IN: 2704.8 mL / OUT: 1890 mL / NET: 814.8 mL    04-05-21 @ 07:01  -  04-05-21 @ 14:53  --------------------------------------------------------  IN: 563.7 mL / OUT: 370 mL / NET: 193.7 mL      Constitutional: No acute distress.  Respiratory:  No signs of respiratory distress. Lung sounds are clear bilaterally.  Cardiovascular:  S1 S2, Regular rate and rhythm.  Abdominal: Abdomen is soft, symmetric, and non-tender without distention.  Bowel sounds are present and normoactive in all four quadrants. PEG in place   Skin: No rashes, No Jaundice.        Data: (reviewed by attending)                        8.7    31.76 )-----------( 462      ( 05 Apr 2021 01:34 )             28.9     Hgb trend:  8.7  04-05-21 @ 01:34  8.7  04-04-21 @ 14:07  8.4  04-04-21 @ 02:00  8.7  04-03-21 @ 01:57        04-05    145  |  113<H>  |  82<HH>  ----------------------------<  146<H>  4.6   |  19  |  2.7<H>    Ca    7.9<L>      05 Apr 2021 01:34  Mg     2.1     04-05      Liver panel trend:      PT/INR - ( 04 Apr 2021 14:07 )   PT: 18.80 sec;   INR: 1.63 ratio         PTT - ( 04 Apr 2021 14:07 )  PTT:36.8 sec    Culture - Blood (collected 03 Apr 2021 13:21)  Source: .Blood Blood  Preliminary Report (04 Apr 2021 20:01):    No growth to date.         Radiology: (reviewed by attending)

## 2021-04-05 NOTE — PROGRESS NOTE ADULT - ASSESSMENT
Assessment:  85y Female patient admitted s/p R VATS evacuation of empyema, x3 chest tubes in place, s/p Trach and PEG, with the above physical exam, labs, and imaging findings.      Plan:    F/U GI for EGD today  C-diff negative  ID recs- continue meropenem and Linezolid d/c caspofungin and oral vanco  trickle feeds @20 on hold  Trend WBC  3 Chest tubes to suction on right side  Daily chest xray  Monitor chest tube out put  Monitor for airleaks  Monitor 02 saturation  Monitor vent settings   DVT/GI prophylaxis  Pain management            Date/Time: 04-05-21 @ 10:06

## 2021-04-05 NOTE — PROGRESS NOTE ADULT - ASSESSMENT
85 year old lady known to have dementia, osteoarthritis  presenting with cough and fever. As per daughter, patient has been having dry consistent cough since 2 months. Patient was found to have pneumonia and empyema s/p R VATS evacuation of empyema, x3 chest tubes in place, s/p Trach and PEG on 3/30 by surgery . Patient dropped HGB from 10 to 7 after Trach and PEG placement . Was transfused 3 units of PRBC on the first , then dropped HGB slowly to stabilize around 8 in the last 24 hrs .GI called for concerns of  GI bleed . Around 200 ml of dark red blood is obtained from the stomach by PEG connected to suction. HGB stable since yesterday . Patient is on two pressors for septic shock before GI Bleed , today they are going down on pressor requirements      #upper GI bleed post PEG placement :  r/o bleeding related to PEG placement   HGB stable overnight   #septic shock with improvement in pressor requirements    REC:   Keep NPO  trend CBC and keep HGB above 8  active type and screen   2 large 18 gauge IV   COVID sent from yesterday is still pending   EGD in am or earlier on urgent bases if worsened GI bleed causing HD insatiability  Protonix drip 85 year old lady known to have dementia, osteoarthritis  presenting with cough and fever. As per daughter, patient has been having dry consistent cough since 2 months. Patient was found to have pneumonia and empyema s/p R VATS evacuation of empyema, x3 chest tubes in place, s/p Trach and PEG on 3/30 by surgery . Patient dropped HGB from 10 to 7 after Trach and PEG placement . Was transfused 3 units of PRBC on the first , then dropped HGB slowly to stabilize around 8 in the last 24 hrs .GI called for concerns of  GI bleed . Around 200 ml of dark red blood is obtained from the stomach by PEG connected to suction. HGB stable since yesterday . Patient is on two pressors for septic shock before GI Bleed , today they are going down on pressor requirements      #upper GI bleed post PEG placement :  r/o bleeding related to PEG placement   HGB stable overnight   #septic shock with improvement in pressor requirements    REC:   Keep NPO  trend CBC and keep HGB above 8  active type and screen   2 large 18 gauge IV   COVID sent from yesterday is still pending   EGD in am or earlier on urgent basis if worsened GI bleed causing HD instability  Protonix drip

## 2021-04-06 NOTE — PROGRESS NOTE ADULT - SUBJECTIVE AND OBJECTIVE BOX
Nephrology progress note    Patient was seen and examined this AM, events over the last 24 h noted .  Cr stable  non-oliguric    Allergies:  clindamycin (Hives)    Hospital Medications:   MEDICATIONS  (STANDING):  ALBUTerol    90 MICROgram(s) HFA Inhaler 2 Puff(s) Inhalation every 6 hours  caspofungin IVPB      caspofungin IVPB 50 milliGRAM(s) IV Intermittent every 24 hours  chlorhexidine 0.12% Liquid 5 milliLiter(s) Oral Mucosa two times a day  chlorhexidine 4% Liquid 1 Application(s) Topical <User Schedule>  cisatracurium Infusion 3 MICROgram(s)/kG/Min (10.7 mL/Hr) IV Continuous <Continuous>  clotrimazole 1% Cream 1 Application(s) Topical two times a day  dexMEDEtomidine Infusion 0.202 MICROgram(s)/kG/Hr (3 mL/Hr) IV Continuous <Continuous>  dextrose 5%. 1000 milliLiter(s) (50 mL/Hr) IV Continuous <Continuous>  dextrose 5%. 1000 milliLiter(s) (100 mL/Hr) IV Continuous <Continuous>  dextrose 5%. 1000 milliLiter(s) (50 mL/Hr) IV Continuous <Continuous>  donepezil 5 milliGRAM(s) Oral at bedtime  fentaNYL   Infusion. 0.505 MICROgram(s)/kG/Hr (3 mL/Hr) IV Continuous <Continuous>  ferrous    sulfate Liquid 300 milliGRAM(s) Enteral Tube daily  gabapentin   Solution 100 milliGRAM(s) Oral three times a day  glucagon  Injectable 1 milliGRAM(s) IntraMuscular once  insulin lispro (ADMELOG) corrective regimen sliding scale   SubCutaneous every 4 hours  ipratropium 17 MICROgram(s) HFA Inhaler 2 Puff(s) Inhalation every 6 hours  linezolid  IVPB      linezolid  IVPB 600 milliGRAM(s) IV Intermittent every 12 hours  meropenem  IVPB 1000 milliGRAM(s) IV Intermittent every 24 hours  metroNIDAZOLE  IVPB 500 milliGRAM(s) IV Intermittent every 8 hours  multivitamin/minerals/iron Oral Solution (CENTRUM) 15 milliLiter(s) Enteral Tube daily  norepinephrine Infusion 0.1 MICROgram(s)/kG/Min (5.57 mL/Hr) IV Continuous <Continuous>  pantoprazole Infusion 8 mG/Hr (10 mL/Hr) IV Continuous <Continuous>  petrolatum Ophthalmic Ointment 1 Application(s) Both EYES three times a day  phenylephrine    Infusion 1 MICROgram(s)/kG/Min (11.1 mL/Hr) IV Continuous <Continuous>  propofol Infusion 10.101 MICROgram(s)/kG/Min (3.6 mL/Hr) IV Continuous <Continuous>  sodium bicarbonate  Injectable 50 milliEquivalent(s) IV Push <User Schedule>  sodium chloride 0.9%. 1000 milliLiter(s) (10 mL/Hr) IV Continuous <Continuous>  vasopressin Infusion 0.04 Unit(s)/Min (2.4 mL/Hr) IV Continuous <Continuous>        VITALS:  T(F): 98.3 (21 @ 18:00), Max: 98.9 (21 @ 16:30)  HR: 85 (21 @ 18:00)  BP: 130/61 (21 @ 18:00)  RR: 29 (21 @ 18:00)  SpO2: 98% (21 @ 18:00)  Wt(kg): --     @ 07:  -   @ 07:00  --------------------------------------------------------  IN: 2704.8 mL / OUT: 1890 mL / NET: 814.8 mL     @ 07:  -   @ 07:00  --------------------------------------------------------  IN: 3268 mL / OUT: 960 mL / NET: 2308 mL     @ 07:  -   @ 19:27  --------------------------------------------------------  IN: 1837.8 mL / OUT: 730 mL / NET: 1107.8 mL          PHYSICAL EXAM:  Constitutional: trach/sedated  HEENT: head atraumatic, normocephalic  Respiratory: mildly diminished right-sided breath sounds, chest tubes in place  Cardiovascular: S1, S2, RRR  Gastrointestinal: BS+, soft, non-distended, PEG  Neurological: pt sedated  : Adams in place  Skin: No rashes    LABS:      145  |  114<H>  |  82<HH>  ----------------------------<  183<H>  3.8   |  20  |  2.7<H>    Ca    7.7<L>      2021 02:00  Phos  5.3     04-06  Mg     2.0     04-06    TPro  5.3<L>  /  Alb  1.8<L>  /  TBili  0.5  /  DBili  0.4<H>  /  AST  42<H>  /  ALT  11  /  AlkPhos  81  -06                          8.9    28.40 )-----------( 452      ( 2021 02:00 )             29.2       Urine Studies:  Urinalysis Basic - ( 2021 10:40 )    Color: Yellow / Appearance: Clear / S.017 / pH:   Gluc:  / Ketone: Negative  / Bili: Negative / Urobili: <2 mg/dL   Blood:  / Protein: 100 mg/dL / Nitrite: Negative   Leuk Esterase: Negative / RBC: 1 /HPF / WBC 6 /HPF   Sq Epi:  / Non Sq Epi: 1 /HPF / Bacteria: Negative        RADIOLOGY & ADDITIONAL STUDIES:

## 2021-04-06 NOTE — PROGRESS NOTE ADULT - SUBJECTIVE AND OBJECTIVE BOX
Gastroenterology progress note:     Patient is a 85y old  Female who presents with a chief complaint of empyema (06 Apr 2021 09:24)       Admitted on: 03-11-21    We are following the patient for: GI bleed      Interval History: GI bleed resolved , no further red blood from PEG tube connected to suction . No fresh blood per rectum. Hgb stable for more than 48 hrs     Patient's medical problems are critical   Prior records reviewed (Y/N): Y  History obtained from someone other than patient (Y/N): Y      PAST MEDICAL & SURGICAL HISTORY:  Dementia    Osteoarthritis    No significant past surgical history        MEDICATIONS  (STANDING):  ALBUTerol    90 MICROgram(s) HFA Inhaler 2 Puff(s) Inhalation every 6 hours  caspofungin IVPB      caspofungin IVPB 50 milliGRAM(s) IV Intermittent every 24 hours  chlorhexidine 0.12% Liquid 5 milliLiter(s) Oral Mucosa two times a day  chlorhexidine 4% Liquid 1 Application(s) Topical <User Schedule>  cisatracurium Infusion 3 MICROgram(s)/kG/Min (10.7 mL/Hr) IV Continuous <Continuous>  clotrimazole 1% Cream 1 Application(s) Topical two times a day  dexMEDEtomidine Infusion 0.202 MICROgram(s)/kG/Hr (3 mL/Hr) IV Continuous <Continuous>  dextrose 5%. 1000 milliLiter(s) (50 mL/Hr) IV Continuous <Continuous>  dextrose 5%. 1000 milliLiter(s) (100 mL/Hr) IV Continuous <Continuous>  dextrose 5%. 1000 milliLiter(s) (50 mL/Hr) IV Continuous <Continuous>  donepezil 5 milliGRAM(s) Oral at bedtime  fentaNYL   Infusion. 0.505 MICROgram(s)/kG/Hr (3 mL/Hr) IV Continuous <Continuous>  ferrous    sulfate 325 milliGRAM(s) Oral daily  gabapentin   Solution 100 milliGRAM(s) Oral three times a day  glucagon  Injectable 1 milliGRAM(s) IntraMuscular once  insulin lispro (ADMELOG) corrective regimen sliding scale   SubCutaneous every 4 hours  ipratropium 17 MICROgram(s) HFA Inhaler 2 Puff(s) Inhalation every 6 hours  linezolid  IVPB      linezolid  IVPB 600 milliGRAM(s) IV Intermittent every 12 hours  meropenem  IVPB 1000 milliGRAM(s) IV Intermittent every 24 hours  metroNIDAZOLE  IVPB 500 milliGRAM(s) IV Intermittent every 8 hours  multivitamin 1 Tablet(s) Oral daily  norepinephrine Infusion 0.1 MICROgram(s)/kG/Min (5.57 mL/Hr) IV Continuous <Continuous>  pantoprazole Infusion 8 mG/Hr (10 mL/Hr) IV Continuous <Continuous>  petrolatum Ophthalmic Ointment 1 Application(s) Both EYES three times a day  phenylephrine    Infusion 1 MICROgram(s)/kG/Min (11.1 mL/Hr) IV Continuous <Continuous>  propofol Infusion 10.101 MICROgram(s)/kG/Min (3.6 mL/Hr) IV Continuous <Continuous>  sodium bicarbonate  Injectable 50 milliEquivalent(s) IV Push <User Schedule>  sodium chloride 0.9%. 1000 milliLiter(s) (10 mL/Hr) IV Continuous <Continuous>  vasopressin Infusion 0.04 Unit(s)/Min (2.4 mL/Hr) IV Continuous <Continuous>    MEDICATIONS  (PRN):  ondansetron Injectable 4 milliGRAM(s) IV Push every 4 hours PRN Nausea and/or Vomiting      Allergies  clindamycin (Hives)      Review of Systems:   unable to obtain secondary to patient condition     Physical Examination:  T(C): 36.9 (04-06-21 @ 08:00), Max: 36.9 (04-05-21 @ 22:00)  HR: 81 (04-06-21 @ 08:47) (69 - 90)  BP: 127/58 (04-06-21 @ 08:00) (109/55 - 135/61)  RR: 29 (04-06-21 @ 08:00) (0 - 39)  SpO2: 98% (04-06-21 @ 08:47) (96% - 100%)      04-05-21 @ 07:01  -  04-06-21 @ 07:00  --------------------------------------------------------  IN: 3268 mL / OUT: 960 mL / NET: 2308 mL    04-06-21 @ 07:01  -  04-06-21 @ 12:05  --------------------------------------------------------  IN: 347.6 mL / OUT: 20 mL / NET: 327.6 mL      Constitutional: No acute distress.  Respiratory:  No signs of respiratory distress. Lung sounds are clear bilaterally.  Cardiovascular:  S1 S2, Regular rate and rhythm.  Abdominal: Abdomen is soft, symmetric, and non-tender without distention. PEG tube in place .  Bowel sounds are present and normoactive in all four quadrants.   Skin: No rashes, No Jaundice.        Data: (reviewed by attending)                        8.9    28.40 )-----------( 452      ( 06 Apr 2021 02:00 )             29.2     Hgb trend:  8.9  04-06-21 @ 02:00  8.7  04-05-21 @ 01:34  8.7  04-04-21 @ 14:07  8.4  04-04-21 @ 02:00        04-06    145  |  114<H>  |  82<HH>  ----------------------------<  183<H>  3.8   |  20  |  2.7<H>    Ca    7.7<L>      06 Apr 2021 02:00  Phos  5.3     04-06  Mg     2.0     04-06    TPro  5.3<L>  /  Alb  1.8<L>  /  TBili  0.5  /  DBili  0.4<H>  /  AST  42<H>  /  ALT  11  /  AlkPhos  81  04-06    Liver panel trend:  TBili 0.5   /   AST 42   /   ALT 11   /   AlkP 81   /   Tptn 5.3   /   Alb 1.8    /   DBili 0.4      04-06      PT/INR - ( 04 Apr 2021 14:07 )   PT: 18.80 sec;   INR: 1.63 ratio         PTT - ( 04 Apr 2021 14:07 )  PTT:36.8 sec    Culture - Blood (collected 03 Apr 2021 13:21)  Source: .Blood Blood  Preliminary Report (04 Apr 2021 20:01):    No growth to date.         Radiology: (reviewed by attending)

## 2021-04-06 NOTE — PROGRESS NOTE ADULT - ASSESSMENT
85F a/w necrotizing PNA / empyema s/p VATS s/p open trach/PEG placement c/b shock/sepsis/GIB and non-oliguric MENDOZA 2/2 ATN, CTAP w/o hydronephrosis  - cr stable today  non-oliguric  - phos 5.3   bicarb 20  -- appreciate GI f/u for EGD  - transfuse RBC to maintain hgb > 7  - does not need RRT  - will follow

## 2021-04-06 NOTE — PROGRESS NOTE ADULT - ASSESSMENT
85 year old lady known to have dementia, osteoarthritis  presenting with cough and fever. As per daughter, patient has been having dry consistent cough since 2 months. Patient was found to have pneumonia and empyema s/p R VATS evacuation of empyema, x3 chest tubes in place, s/p Trach and PEG on 3/30 by surgery . Patient dropped HGB from 10 to 7 after Trach and PEG placement . Was transfused 3 units of PRBC on the first , then dropped HGB slowly to stabilize around 8 in the last 24 hrs .GI called for concerns of  GI bleed . Around 200 ml of dark red blood is obtained from the stomach by PEG connected to suction. HGB stable since yesterday . Patient is on two pressors for septic shock before GI Bleed , today they are going down on pressor requirements      #upper GI bleed post PEG placement : resolved   HGB stable more than 48 hrs   #septic shock with improvement in pressor requirements    REC:   resume diet per PEG tube   trend CBC and keep HGB above 8  active type and screen   2 large 18 gauge IV   Protonix 40 mg IV BID   will hod on EGD for now , as bleeding is resolved and HGB is stable in a critically ill patient . Will reconsider EGD is recurrent GI bleed  Plan of care discussed with primary team

## 2021-04-06 NOTE — PROGRESS NOTE ADULT - ATTENDING COMMENTS
no longer having any blood come from the PEG tube, Hgb stable. given pt is critically ill on two pressors, will defer endoscopy for now.

## 2021-04-06 NOTE — PROGRESS NOTE ADULT - SUBJECTIVE AND OBJECTIVE BOX
Progress Note: General Surgery  Patient: DESTIN TERRY , 85y (1935)Female   MRN: [id number]  Location: [address]  Visit: 03-11-21 Inpatient  Date: 04-06-21 @ 09:25    Admit Diagnosis/Chief Complaint: Acute respiratory failure with hypoxia        Procedure/Diagnosis: Acute respiratory failure with hypoxia     S/P Bronchoscopy, at bedside    Open tracheostomy    Insertion, PEG tube        Events/ 24h: No acute events overnight. Pain controlled.    Vitals: T(F): 98.4 (04-06-21 @ 08:00), Max: 98.4 (04-05-21 @ 22:00)  HR: 81 (04-06-21 @ 08:47)  BP: 127/58 (04-06-21 @ 08:00) (109/55 - 135/61)  RR: 29 (04-06-21 @ 08:00)  SpO2: 98% (04-06-21 @ 08:47)  RR (machine): 26, TV (machine): 450, FiO2: 40, PEEP: 5, PIP: 41  In:   04-05-21 @ 07:01  -  04-06-21 @ 07:00  --------------------------------------------------------  IN: 3268 mL    04-06-21 @ 07:01  -  04-06-21 @ 09:25  --------------------------------------------------------  IN: 347.6 mL      Out:   04-05-21 @ 07:01  -  04-06-21 @ 07:00  --------------------------------------------------------  OUT:    Chest Tube (mL): 50 mL    Chest Tube (mL): 40 mL    Chest Tube (mL): 10 mL    Drain (mL): 75 mL    Indwelling Catheter - Urethral (mL): 685 mL    Rectal Tube (mL): 100 mL  Total OUT: 960 mL      04-06-21 @ 07:01  -  04-06-21 @ 09:25  --------------------------------------------------------  OUT:    Chest Tube (mL): 0 mL    Chest Tube (mL): 0 mL    Chest Tube (mL): 0 mL    Drain (mL): 0 mL    Indwelling Catheter - Urethral (mL): 20 mL    Rectal Tube (mL): 0 mL  Total OUT: 20 mL        Net:   04-05-21 @ 07:01  -  04-06-21 @ 07:00  --------------------------------------------------------  NET: 2308 mL    04-06-21 @ 07:01  -  04-06-21 @ 09:25  --------------------------------------------------------  NET: 327.6 mL        Diet: Diet, NPO with Tube Feed:   Tube Feeding Modality: Gastrostomy  Vital High Protein  Total Volume for 24 Hours (mL): 1440  Continuous  Starting Tube Feed Rate mL per Hour: 10  Increase Tube Feed Rate by (mL): 10     Every 2 hours  Until Goal Tube Feed Rate (mL per Hour): 60  Tube Feed Duration (in Hours): 24  Tube Feed Start Time: 09:02 (04-06-21 @ 09:02)  Diet, NPO after Midnight:      NPO Start Date: 05-Apr-2021,   NPO Start Time: 23:59 (04-05-21 @ 19:18)  Diet, NPO after Midnight:      NPO Start Date: 04-Apr-2021,   NPO Start Time: 23:59 (04-04-21 @ 14:07)    IV Fluids: dextrose 5%. 1000 milliLiter(s) (50 mL/Hr) IV Continuous <Continuous>  dextrose 5%. 1000 milliLiter(s) (100 mL/Hr) IV Continuous <Continuous>  dextrose 5%. 1000 milliLiter(s) (50 mL/Hr) IV Continuous <Continuous>  ferrous    sulfate 325 milliGRAM(s) Oral daily  multivitamin 1 Tablet(s) Oral daily  sodium bicarbonate  Injectable 50 milliEquivalent(s) IV Push <User Schedule>  sodium chloride 0.9%. 1000 milliLiter(s) (10 mL/Hr) IV Continuous <Continuous>      Physical Examination:  General Appearance: Trach in place on vent  HEENT: EOMI, sclera non-icteric.  Heart: RRR   Lungs: Right chest tubes x3 to suction, CT 1 + leak, CT 2 - leak, CT 3 + leak  Abdomen:  Soft, PEG in place  MSK/Extremities: Warm & well-perfused.   Skin: Warm, dry. No jaundice.         Medications: [Standing]  ALBUTerol    90 MICROgram(s) HFA Inhaler 2 Puff(s) Inhalation every 6 hours  caspofungin IVPB      caspofungin IVPB 50 milliGRAM(s) IV Intermittent every 24 hours  chlorhexidine 0.12% Liquid 5 milliLiter(s) Oral Mucosa two times a day  chlorhexidine 4% Liquid 1 Application(s) Topical <User Schedule>  cisatracurium Infusion 3 MICROgram(s)/kG/Min (10.7 mL/Hr) IV Continuous <Continuous>  clotrimazole 1% Cream 1 Application(s) Topical two times a day  dexMEDEtomidine Infusion 0.202 MICROgram(s)/kG/Hr (3 mL/Hr) IV Continuous <Continuous>  dextrose 5%. 1000 milliLiter(s) (50 mL/Hr) IV Continuous <Continuous>  dextrose 5%. 1000 milliLiter(s) (100 mL/Hr) IV Continuous <Continuous>  dextrose 5%. 1000 milliLiter(s) (50 mL/Hr) IV Continuous <Continuous>  donepezil 5 milliGRAM(s) Oral at bedtime  fentaNYL   Infusion. 0.505 MICROgram(s)/kG/Hr (3 mL/Hr) IV Continuous <Continuous>  ferrous    sulfate 325 milliGRAM(s) Oral daily  gabapentin   Solution 100 milliGRAM(s) Oral three times a day  glucagon  Injectable 1 milliGRAM(s) IntraMuscular once  insulin lispro (ADMELOG) corrective regimen sliding scale   SubCutaneous every 4 hours  ipratropium 17 MICROgram(s) HFA Inhaler 2 Puff(s) Inhalation every 6 hours  linezolid  IVPB      linezolid  IVPB 600 milliGRAM(s) IV Intermittent every 12 hours  meropenem  IVPB 1000 milliGRAM(s) IV Intermittent every 24 hours  metroNIDAZOLE  IVPB 500 milliGRAM(s) IV Intermittent every 8 hours  multivitamin 1 Tablet(s) Oral daily  norepinephrine Infusion 0.1 MICROgram(s)/kG/Min (5.57 mL/Hr) IV Continuous <Continuous>  pantoprazole Infusion 8 mG/Hr (10 mL/Hr) IV Continuous <Continuous>  petrolatum Ophthalmic Ointment 1 Application(s) Both EYES three times a day  phenylephrine    Infusion 1 MICROgram(s)/kG/Min (11.1 mL/Hr) IV Continuous <Continuous>  propofol Infusion 10.101 MICROgram(s)/kG/Min (3.6 mL/Hr) IV Continuous <Continuous>  sodium bicarbonate  Injectable 50 milliEquivalent(s) IV Push <User Schedule>  sodium chloride 0.9%. 1000 milliLiter(s) (10 mL/Hr) IV Continuous <Continuous>  vasopressin Infusion 0.04 Unit(s)/Min (2.4 mL/Hr) IV Continuous <Continuous>    DVT Prophylaxis:   GI Prophylaxis: pantoprazole Infusion 8 mG/Hr IV Continuous <Continuous>    Antibiotics: caspofungin IVPB      caspofungin IVPB 50 milliGRAM(s) IV Intermittent every 24 hours  linezolid  IVPB      linezolid  IVPB 600 milliGRAM(s) IV Intermittent every 12 hours  meropenem  IVPB 1000 milliGRAM(s) IV Intermittent every 24 hours  metroNIDAZOLE  IVPB 500 milliGRAM(s) IV Intermittent every 8 hours    Anticoagulation:   Medications:[PRN]  ondansetron Injectable 4 milliGRAM(s) IV Push every 4 hours PRN      Labs:                        8.9    28.40 )-----------( 452      ( 06 Apr 2021 02:00 )             29.2     04-06    145  |  114<H>  |  82<HH>  ----------------------------<  183<H>  3.8   |  20  |  2.7<H>    Ca    7.7<L>      06 Apr 2021 02:00  Phos  5.3     04-06  Mg     2.0     04-06    TPro  5.3<L>  /  Alb  1.8<L>  /  TBili  0.5  /  DBili  0.4<H>  /  AST  42<H>  /  ALT  11  /  AlkPhos  81  04-06    LIVER FUNCTIONS - ( 06 Apr 2021 02:00 )  Alb: 1.8 g/dL / Pro: 5.3 g/dL / ALK PHOS: 81 U/L / ALT: 11 U/L / AST: 42 U/L / GGT: x           PT/INR - ( 04 Apr 2021 14:07 )   PT: 18.80 sec;   INR: 1.63 ratio         PTT - ( 04 Apr 2021 14:07 )  PTT:36.8 sec  ABG - ( 06 Apr 2021 06:05 )  pH: 7.41  /  pCO2: 33    /  pO2: 141   / HCO3: 21    / Base Excess: -3.5  /  SaO2: 99                      Urine/Micro:    Culture - Blood (collected 03 Apr 2021 13:21)  Source: .Blood Blood  Preliminary Report (04 Apr 2021 20:01):    No growth to date.          Imaging:   ***    < from: Xray Chest 1 View- PORTABLE-Routine (Xray Chest 1 View- PORTABLE-Routine in AM.) (04.06.21 @ 06:33) >  Impression:    Unchanged patchy bilateral parenchymal opacities and right pleural effusion.  Stable support devices.    < end of copied text >

## 2021-04-06 NOTE — PROGRESS NOTE ADULT - ASSESSMENT
Assessment:  85y Female patient admitted s/p R VATS evacuation of empyema, x3 chest tubes in place, s/p Trach and PEG, with the above physical exam, labs, and imaging findings.      Plan:    EGD cancelled  Restart feeds  C-diff negative  ID recs- continue meropenem and Linezolid d/c caspofungin and oral vanco  Trend WBC  3 Chest tubes to suction on right side  Chest #2 clamped f/u CXR and remove today  Daily chest xray  Monitor chest tube out put  Monitor for airleaks  Monitor 02 saturation  Monitor vent settings   DVT/GI prophylaxis  Pain management    Date/Time: 04-06-21 @ 09:25

## 2021-04-07 NOTE — PROGRESS NOTE ADULT - SUBJECTIVE AND OBJECTIVE BOX
DESTIN TERRY  85y Female   [id number]    Hospital Day: 28  Post Operative Day:8  Procedure:s/p right vats chest tubes placement s/p trach and peg  Patient is a 85y old  Female who presents with a chief complaint of empyema (2021 09:24)    PAST MEDICAL & SURGICAL HISTORY:  Dementia    Osteoarthritis    No significant past surgical history        Events of the Last 24h:  Vital Signs Last 24 Hrs  T(C): 36.6 (2021 23:30), Max: 37.2 (2021 16:30)  T(F): 97.8 (2021 23:30), Max: 98.9 (2021 16:30)  HR: 78 (2021 01:00) (65 - 98)  BP: 100/55 (2021 01:00) (97/57 - 133/62)  BP(mean): 74 (2021 01:00) (71 - 89)  RR: 26 (2021 01:00) (0 - 34)  SpO2: 94% (2021 01:00) (92% - 100%)    Mode: AC/ CMV (Assist Control/ Continuous Mandatory Ventilation), RR (machine): 26, TV (machine): 450, FiO2: 40, PEEP: 5, ITime: 1, MAP: 16, PIP: 39    Diet, NPO with Tube Feed:   Tube Feeding Modality: Gastrostomy  Vital High Protein  Total Volume for 24 Hours (mL): 1440  Continuous  Starting Tube Feed Rate mL per Hour: 10  Increase Tube Feed Rate by (mL): 10     Every 2 hours  Until Goal Tube Feed Rate (mL per Hour): 60  Tube Feed Duration (in Hours): 24  Tube Feed Start Time: 09:02 (21 @ 09:02)  Diet, NPO after Midnight:      NPO Start Date: 2021,   NPO Start Time: 23:59 (21 @ 19:18)  Diet, NPO after Midnight:      NPO Start Date: 2021,   NPO Start Time: 23:59 (21 @ 14:07)      I&O's Summary    2021 07:01  -  2021 07:00  --------------------------------------------------------  IN: 3268 mL / OUT: 960 mL / NET: 2308 mL    2021 07:  -  2021 01:27  --------------------------------------------------------  IN: 2645.2 mL / OUT: 995 mL / NET: 1650.2 mL     I&O's Detail    2021 07:  -  2021 07:00  --------------------------------------------------------  IN:    Cisatracurium: 114.5 mL    dextrose 5%: 650 mL    Enteral Tube Flush: 20 mL    FentaNYL: 217 mL    IV PiggyBack: 1250 mL    Pantoprazole: 230 mL    Phenylephrine: 475 mL    Propofol: 158.7 mL    sodium chloride 0.9%: 100 mL    Vasopressin: 52.8 mL  Total IN: 3268 mL    OUT:    Chest Tube (mL): 50 mL    Chest Tube (mL): 40 mL    Chest Tube (mL): 10 mL    Drain (mL): 75 mL    Indwelling Catheter - Urethral (mL): 685 mL    Rectal Tube (mL): 100 mL  Total OUT: 960 mL    Total NET: 2308 mL      2021 07:  -  2021 01:27  --------------------------------------------------------  IN:    Cisatracurium: 5.4 mL    dextrose 5%: 850 mL    Enteral Tube Flush: 50 mL    FentaNYL: 77 mL    IV PiggyBack: 750 mL    Pantoprazole: 170 mL    Phenylephrine: 173 mL    Propofol: 29 mL    Vasopressin: 40.8 mL    Vital High Protein: 500 mL  Total IN: 2645.2 mL    OUT:    Chest Tube (mL): 0 mL    Chest Tube (mL): 30 mL    Chest Tube (mL): 10 mL    Drain (mL): 180 mL    Indwelling Catheter - Urethral (mL): 600 mL    Rectal Tube (mL): 175 mL  Total OUT: 995 mL    Total NET: 1650.2 mL          MEDICATIONS  (STANDING):  ALBUTerol    90 MICROgram(s) HFA Inhaler 2 Puff(s) Inhalation every 6 hours  caspofungin IVPB      caspofungin IVPB 50 milliGRAM(s) IV Intermittent every 24 hours  chlorhexidine 0.12% Liquid 5 milliLiter(s) Oral Mucosa two times a day  chlorhexidine 4% Liquid 1 Application(s) Topical <User Schedule>  cisatracurium Infusion 3 MICROgram(s)/kG/Min (10.7 mL/Hr) IV Continuous <Continuous>  clotrimazole 1% Cream 1 Application(s) Topical two times a day  dexMEDEtomidine Infusion 0.202 MICROgram(s)/kG/Hr (3 mL/Hr) IV Continuous <Continuous>  dextrose 5%. 1000 milliLiter(s) (50 mL/Hr) IV Continuous <Continuous>  dextrose 5%. 1000 milliLiter(s) (100 mL/Hr) IV Continuous <Continuous>  dextrose 5%. 1000 milliLiter(s) (50 mL/Hr) IV Continuous <Continuous>  donepezil 5 milliGRAM(s) Oral at bedtime  fentaNYL   Infusion. 0.505 MICROgram(s)/kG/Hr (3 mL/Hr) IV Continuous <Continuous>  ferrous    sulfate Liquid 300 milliGRAM(s) Enteral Tube daily  gabapentin   Solution 100 milliGRAM(s) Oral three times a day  glucagon  Injectable 1 milliGRAM(s) IntraMuscular once  insulin lispro (ADMELOG) corrective regimen sliding scale   SubCutaneous every 4 hours  ipratropium 17 MICROgram(s) HFA Inhaler 2 Puff(s) Inhalation every 6 hours  linezolid  IVPB      linezolid  IVPB 600 milliGRAM(s) IV Intermittent every 12 hours  meropenem  IVPB 1000 milliGRAM(s) IV Intermittent every 24 hours  metroNIDAZOLE  IVPB 500 milliGRAM(s) IV Intermittent every 8 hours  multivitamin/minerals/iron Oral Solution (CENTRUM) 15 milliLiter(s) Enteral Tube daily  norepinephrine Infusion 0.1 MICROgram(s)/kG/Min (5.57 mL/Hr) IV Continuous <Continuous>  pantoprazole Infusion 8 mG/Hr (10 mL/Hr) IV Continuous <Continuous>  petrolatum Ophthalmic Ointment 1 Application(s) Both EYES three times a day  phenylephrine    Infusion 1 MICROgram(s)/kG/Min (11.1 mL/Hr) IV Continuous <Continuous>  propofol Infusion 10.101 MICROgram(s)/kG/Min (3.6 mL/Hr) IV Continuous <Continuous>  sodium bicarbonate  Injectable 50 milliEquivalent(s) IV Push <User Schedule>  sodium chloride 0.9%. 1000 milliLiter(s) (10 mL/Hr) IV Continuous <Continuous>  vasopressin Infusion 0.04 Unit(s)/Min (2.4 mL/Hr) IV Continuous <Continuous>    MEDICATIONS  (PRN):  ondansetron Injectable 4 milliGRAM(s) IV Push every 4 hours PRN Nausea and/or Vomiting      PHYSICAL EXAM:    GENERAL: NAD    HEENT: NCAT    CHEST/LUNGS: CTAB 2 chest tubes to suction     HEART: RRR,  No murmurs, rubs, or gallops    ABDOMEN: SNTND +BS    EXTREMITIES:  FROM, No clubbing, cyanosis, or edema, palpable pulse    NEURO: No focal neurological deficits    SKIN: No rashes or lesions    INCISION/WOUNDS:                          8.9    28.40 )-----------( 452      ( 2021 02:00 )             29.2        CBC Full  -  ( 2021 02:00 )  WBC Count : 28.40 K/uL  RBC Count : 3.27 M/uL  Hemoglobin : 8.9 g/dL  Hematocrit : 29.2 %  Platelet Count - Automated : 452 K/uL  Mean Cell Volume : 89.3 fL  Mean Cell Hemoglobin : 27.2 pg  Mean Cell Hemoglobin Concentration : 30.5 g/dL  Auto Neutrophil # : x  Auto Lymphocyte # : x  Auto Monocyte # : x  Auto Eosinophil # : x  Auto Basophil # : x  Auto Neutrophil % : x  Auto Lymphocyte % : x  Auto Monocyte % : x  Auto Eosinophil % : x  Auto Basophil % : x               145   |  114   |  82                 Ca: 7.7    BMP:   ----------------------------< 183    M.0   (21 @ 02:00)             3.8    |  20    | 2.7                Ph: 5.3      LFT:     TPro: 5.3 / Alb: 1.8 / TBili: 0.5 / DBili: 0.4 / AST: 42 / ALT: 11 / AlkPhos: 81   (21 @ 02:00)    LIVER FUNCTIONS - ( 2021 02:00 )  Alb: 1.8 g/dL / Pro: 5.3 g/dL / ALK PHOS: 81 U/L / ALT: 11 U/L / AST: 42 U/L / GGT: x               < from: Xray Chest 1 View- PORTABLE-Routine (Xray Chest 1 View- PORTABLE-Routine .) (21 @ 12:07) >  Findings:    Support devices: Stable positioning.    Cardiac/mediastinum/hilum: Heart size within normal limits, thoracic aortic calcification..    Lung parenchyma/Pleura: Right basilar pneumothorax. Bilateral opacities..    Skeleton/soft tissues: Stable. Scoliosis.    Impression:    Bilateral opacities, stable. Right basilar pneumothorax        < end of copied text >

## 2021-04-07 NOTE — PROGRESS NOTE ADULT - SUBJECTIVE AND OBJECTIVE BOX
CTU Attending Progress Daily Note     07 Apr 2021 06:40    Procedure:                                                  POD#                   Patient seen as post-op critical care follow-up    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, osteoarithtis, [place]-speaking presenting with cough and fever.    As per daughter, patient has been having dry consistent cough since 2 months. Today, she was being evaluated for knee injections when she was found to be febrile. She also reports progressing generalized weakness with decreased appetite and PO intake.  No URT symptoms, no chest pain, no leg pain, no diarrhea, no urinary symptoms no sick contacts, no recent travel. In ED was hypotensive, was given IVF, started on levophed 0.04 called to evaluate (11 Mar 2021 03:21)    See preop testing chart H&P    Interval event for past 24 hr:  DESTIN TERRY  85y had no event.     Current Complains:  DESTIN TERRY has no new complaints    REVIEW OF SYSTEMS:  CONSTITUTIONAL:  [-] weakness, [-] fevers, [-] chills  EYES/ENT: [-] visual changes, [-] vertigo, [-] throat pain   NECK: [-] pain, [-] stiffness  RESPIRATORY: [-] cough, [-] wheezing, [-] hemoptysis, [-] shortness of breath  CARDIOVASCULAR: [-] chest pain, [-] palpitations, [-] orthopnea  GASTROINTESTINAL:    [-]abdominal pain, [-] nausea, [-] vomiting, [-] hematemesis, [-] diarrhea, [-] constipation, [-] melena, [-] hematochezia.  GENITOURINARY: [-] dysuria, [-] frequency, [-] hematuria  NEUROLOGICAL: [-] numbness, [-] weakness  SKIN: [-] itching, [-] burning, [-] rashes, [-] lesions   All other review of systems is negative unless indicated above.    [  ] Unable to assess ROS because :    OBJECTIVE:  ICU Vital Signs Last 24 Hrs  T(C): 36.3 (07 Apr 2021 06:00), Max: 37.2 (06 Apr 2021 16:30)  T(F): 97.4 (07 Apr 2021 06:00), Max: 98.9 (06 Apr 2021 16:30)  HR: 92 (07 Apr 2021 06:00) (65 - 98)  BP: 122/58 (07 Apr 2021 06:00) (97/57 - 133/62)  BP(mean): 84 (07 Apr 2021 06:00) (71 - 89)  ABP: 126/51 (07 Apr 2021 06:00) (91/45 - 140/54)  ABP(mean): 77 (07 Apr 2021 06:00) (62 - 87)  RR: 28 (07 Apr 2021 06:00) (0 - 32)  SpO2: 97% (07 Apr 2021 06:00) (92% - 100%)      I&O's Summary    05 Apr 2021 07:01  -  06 Apr 2021 07:00  --------------------------------------------------------  IN: 3268 mL / OUT: 960 mL / NET: 2308 mL    06 Apr 2021 07:01  -  07 Apr 2021 06:40  --------------------------------------------------------  IN: 3374.6 mL / OUT: 1255 mL / NET: 2119.6 mL      Adult Advanced Hemodynamics Last 24 Hrs  CVP(mm Hg): --  CVP(cm H2O): --  CO: --  CI: --  PA: --  PA(mean): --  PCWP: --  SVR: --  SVRI: --  PVR: --  PVRI: --  Mode: AC/ CMV (Assist Control/ Continuous Mandatory Ventilation)  RR (machine): 26  TV (machine): 450  FiO2: 40  PEEP: 5  ITime: 1  MAP: 16  PIP: 39      PHYSICAL EXAM:  General: WN/WD NAD    HEENT:     [+] NCAT  [+] EOMI  [-] Conjuctival edema   [-] Icterus   [-] Thrush   [-] ETT  [-] NGT/OGT    Neck:         [+] FROM   [-] JVD     [-] Nodes     [-] Masses    [+] Mid-line trachea    [-] Tracheostomy    Chest:         [-] Sternal click   [-] Sternal drainage   [+] Pacing wires   [+] Chest tubes   [-] SubQ emphysema    Lungs:          [+] CTA   [-] Rhonchi   [-] Rales    [-] Wheezing    [-] Decreased BS    [-] Dullness R L    Cardiac:       [+] S1 [+] S2    [+] RRR   [-] Irregular   [-] S3   [-] S4    [-] Murmurs    [-] Rub    Abdomen:    [+] BS    [+] Soft    [+] Non-tender     [-] Distended    [-] Organomegaly  [-] PEG    Extremities:   [-] Cyanosis U/L   [-] Clubbing  U/L  [-] LE/UE Edema   [+] Capillary refill    [+] Pulses     Neuro:        [+] Awake   [+]  Alert   [-] Confused   [-] Lethargic   [-] Sedated   [-] Generalized Weakness    Skin:        [-] Rashes    [-] Erythema   [+] Normal incisions   [+] IV sites intact   [-] Sacral decubitus    Tubes:  LINES:    CAPILLARY BLOOD GLUCOSE      POCT Blood Glucose.: 213 mg/dL (07 Apr 2021 06:19)    CAPILLARY BLOOD GLUCOSE      POCT Blood Glucose.: 213 mg/dL (07 Apr 2021 06:19)  POCT Blood Glucose.: 253 mg/dL (07 Apr 2021 01:44)  POCT Blood Glucose.: 584 mg/dL (07 Apr 2021 01:42)  POCT Blood Glucose.: 234 mg/dL (06 Apr 2021 22:39)  POCT Blood Glucose.: 168 mg/dL (06 Apr 2021 17:58)  POCT Blood Glucose.: 126 mg/dL (06 Apr 2021 13:40)  POCT Blood Glucose.: 197 mg/dL (06 Apr 2021 10:02)  POCT Blood Glucose.: 171 mg/dL (06 Apr 2021 06:50)      HOSPITAL MEDICATIONS:  MEDICATIONS  (STANDING):  ALBUTerol    90 MICROgram(s) HFA Inhaler 2 Puff(s) Inhalation every 6 hours  caspofungin IVPB 50 milliGRAM(s) IV Intermittent every 24 hours  caspofungin IVPB      chlorhexidine 0.12% Liquid 5 milliLiter(s) Oral Mucosa two times a day  chlorhexidine 4% Liquid 1 Application(s) Topical <User Schedule>  cisatracurium Infusion 3 MICROgram(s)/kG/Min (10.7 mL/Hr) IV Continuous <Continuous>  clotrimazole 1% Cream 1 Application(s) Topical two times a day  dexMEDEtomidine Infusion 0.202 MICROgram(s)/kG/Hr (3 mL/Hr) IV Continuous <Continuous>  dextrose 5%. 1000 milliLiter(s) (50 mL/Hr) IV Continuous <Continuous>  dextrose 5%. 1000 milliLiter(s) (100 mL/Hr) IV Continuous <Continuous>  dextrose 5%. 1000 milliLiter(s) (50 mL/Hr) IV Continuous <Continuous>  donepezil 5 milliGRAM(s) Oral at bedtime  ferrous    sulfate Liquid 300 milliGRAM(s) Enteral Tube daily  gabapentin   Solution 100 milliGRAM(s) Oral three times a day  glucagon  Injectable 1 milliGRAM(s) IntraMuscular once  insulin lispro (ADMELOG) corrective regimen sliding scale   SubCutaneous every 4 hours  ipratropium 17 MICROgram(s) HFA Inhaler 2 Puff(s) Inhalation every 6 hours  linezolid  IVPB      linezolid  IVPB 600 milliGRAM(s) IV Intermittent every 12 hours  meropenem  IVPB 1000 milliGRAM(s) IV Intermittent every 24 hours  metroNIDAZOLE  IVPB 500 milliGRAM(s) IV Intermittent every 8 hours  multivitamin/minerals/iron Oral Solution (CENTRUM) 15 milliLiter(s) Enteral Tube daily  norepinephrine Infusion 0.1 MICROgram(s)/kG/Min (5.57 mL/Hr) IV Continuous <Continuous>  pantoprazole Infusion 8 mG/Hr (10 mL/Hr) IV Continuous <Continuous>  petrolatum Ophthalmic Ointment 1 Application(s) Both EYES three times a day  phenylephrine    Infusion 1 MICROgram(s)/kG/Min (11.1 mL/Hr) IV Continuous <Continuous>  propofol Infusion 10.101 MICROgram(s)/kG/Min (3.6 mL/Hr) IV Continuous <Continuous>  sodium bicarbonate  Injectable 50 milliEquivalent(s) IV Push <User Schedule>  sodium chloride 0.9%. 1000 milliLiter(s) (10 mL/Hr) IV Continuous <Continuous>  vasopressin Infusion 0.04 Unit(s)/Min (2.4 mL/Hr) IV Continuous <Continuous>    MEDICATIONS  (PRN):  ondansetron Injectable 4 milliGRAM(s) IV Push every 4 hours PRN Nausea and/or Vomiting      LABS:  ABG - ( 07 Apr 2021 03:30 )  pH, Arterial: 7.30  pH, Blood: x     /  pCO2: 46    /  pO2: 87    / HCO3: 22    / Base Excess: -4.0  /  SaO2: 97                                      8.4    23.58 )-----------( 380      ( 07 Apr 2021 01:45 )             27.5     04-07    143  |  111<H>  |  76<HH>  ----------------------------<  260<H>  3.4<L>   |  21  |  2.5<H>    Ca    7.2<L>      07 Apr 2021 01:45  Phos  5.3     04-06  Mg     1.9     04-07    TPro  5.0<L>  /  Alb  1.7<L>  /  TBili  0.7  /  DBili  0.5<H>  /  AST  38  /  ALT  10  /  AlkPhos  75  04-07            RADIOLOGY:  Reviewed and interpreted by me  CXR from 04-07-21 shows [+] mild congestion, [-] pneumothorax, [-] R/L effusion, [-] cardiomegaly,   NGT in place, S-G Catheter in place, R/L TLC in place, R/L Chest Tubes in place    ECG:  Reviewed and interpreted by me:   QTC:    Assessment:      PAST MEDICAL & SURGICAL HISTORY:  Dementia    Osteoarthritis    No significant past surgical history        PLAN:  Neuro: Pain control  Pulm: Encourage coughing, deep breathing and use of incentive spirometry. Wean off supplemental oxygen as able. Daily CXR.   Cardio: Monitor telemetry/alarms. Continue cardiac meds  GI: Tolerating diet. Continue stool softeners. Continue GI prophylaxis  Renal: monitor urine output, supplement electrolytes as needed  Vasc: Heparin SC/SCDs for DVT prophylaxis  Heme: Monitor H/H.   ID: Off antibiotics. Stable.  Endocrine: Monitor finger stick blood sugar and control hyperglycemia with insulin  Physical Therapy: OOB/ambulate  Tubes: Monitor Chest tube output      Discussed with Cardiothoracic Team at AM rounds.    45 minutes of critical care time spent providing medical care for patient's acute illness/conditions that impairs at least one vital organ system and/or poses a high risk of imminent or life threatening deterioration in the patient's condition. It includes time spent evaluating and treating the patient's acute illness as well as time spent reviewing labs, radiology, discussing goals of care with patient and/or patient's family, and discussing the case with a multidisciplinary team in an effort to prevent further life threatening deterioration or end organ damage. This time is independent of any procedures performed. CTU Attending Progress Daily Note     07 Apr 2021 06:40    Procedure:            VATS decortication                                      POD#         16          Patient seen as post-op critical care follow-up    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, osteoarithtis, Syrian-speaking presenting with cough and fever.    As per daughter, patient has been having dry consistent cough since 2 months. Today, she was being evaluated for knee injections when she was found to be febrile. She also reports progressing generalized weakness with decreased appetite and PO intake.  No URT symptoms, no chest pain, no leg pain, no diarrhea, no urinary symptoms no sick contacts, no recent travel. In ED was hypotensive, was given IVF, started on levophed 0.04 called to evaluate (11 Mar 2021 03:21)    See preop testing chart H&P    Interval event for past 24 hr:  SHAYLAALOKMIGNON SHELLEYDULCE  85y remains on vent, pressors    REVIEW OF SYSTEMS:      [ x ] Unable to assess ROS because : vent, sedated    OBJECTIVE:  ICU Vital Signs Last 24 Hrs  T(C): 36.3 (07 Apr 2021 06:00), Max: 37.2 (06 Apr 2021 16:30)  T(F): 97.4 (07 Apr 2021 06:00), Max: 98.9 (06 Apr 2021 16:30)  HR: 92 (07 Apr 2021 06:00) (65 - 98)  BP: 122/58 (07 Apr 2021 06:00) (97/57 - 133/62)  BP(mean): 84 (07 Apr 2021 06:00) (71 - 89)  ABP: 126/51 (07 Apr 2021 06:00) (91/45 - 140/54)  ABP(mean): 77 (07 Apr 2021 06:00) (62 - 87)  RR: 28 (07 Apr 2021 06:00) (0 - 32)  SpO2: 97% (07 Apr 2021 06:00) (92% - 100%)      I&O's Summary    05 Apr 2021 07:01  -  06 Apr 2021 07:00  --------------------------------------------------------  IN: 3268 mL / OUT: 960 mL / NET: 2308 mL    06 Apr 2021 07:01  -  07 Apr 2021 06:40  --------------------------------------------------------  IN: 3374.6 mL / OUT: 1255 mL / NET: 2119.6 mL      Adult Advanced Hemodynamics Last 24 Hrs  CVP(mm Hg): --  CVP(cm H2O): --  CO: --  CI: --  PA: --  PA(mean): --  PCWP: --  SVR: --  SVRI: --  PVR: --  PVRI: --  Mode: AC/ CMV (Assist Control/ Continuous Mandatory Ventilation)  RR (machine): 26  TV (machine): 450  FiO2: 40  PEEP: 5  ITime: 1  MAP: 16  PIP: 39      PHYSICAL EXAM:  General: WN/WD NAD    HEENT:     [+] NCAT  [+] EOMI  [-] Conjuctival edema   [-] Icterus   [-] Thrush   [-] ETT  [-] NGT/OGT    Neck:         [+] FROM   [-] JVD     [-] Nodes     [-] Masses    [+] Mid-line trachea    [+] Tracheostomy    Chest:          [+] Chest tubes   [-] SubQ emphysema    Lungs:          [+] CTA   [-] Rhonchi   [-] Rales    [-] Wheezing    [-] Decreased BS    [-] Dullness R L    Cardiac:       [+] S1 [+] S2    [+] RRR   [-] Irregular   [-] S3   [-] S4    [-] Murmurs    [-] Rub    Abdomen:    [+] BS    [+] Soft    [+] Non-tender     [-] Distended    [-] Organomegaly  [+] PEG    Extremities:   [-] Cyanosis U/L   [-] Clubbing  U/L  [+] LE/UE Edema   [+] Capillary refill    [+] Pulses     Neuro:           [+] Sedated   [+] Generalized Weakness    Skin:        [-] Rashes    [-] Erythema   [+] Normal incisions   [+] IV sites intact   [+] Sacral decubitus    Tubes:  LINES:    CAPILLARY BLOOD GLUCOSE      POCT Blood Glucose.: 213 mg/dL (07 Apr 2021 06:19)    CAPILLARY BLOOD GLUCOSE      POCT Blood Glucose.: 213 mg/dL (07 Apr 2021 06:19)  POCT Blood Glucose.: 253 mg/dL (07 Apr 2021 01:44)  POCT Blood Glucose.: 584 mg/dL (07 Apr 2021 01:42)  POCT Blood Glucose.: 234 mg/dL (06 Apr 2021 22:39)  POCT Blood Glucose.: 168 mg/dL (06 Apr 2021 17:58)  POCT Blood Glucose.: 126 mg/dL (06 Apr 2021 13:40)  POCT Blood Glucose.: 197 mg/dL (06 Apr 2021 10:02)  POCT Blood Glucose.: 171 mg/dL (06 Apr 2021 06:50)      HOSPITAL MEDICATIONS:  MEDICATIONS  (STANDING):  ALBUTerol    90 MICROgram(s) HFA Inhaler 2 Puff(s) Inhalation every 6 hours  caspofungin IVPB 50 milliGRAM(s) IV Intermittent every 24 hours  caspofungin IVPB      chlorhexidine 0.12% Liquid 5 milliLiter(s) Oral Mucosa two times a day  chlorhexidine 4% Liquid 1 Application(s) Topical <User Schedule>  cisatracurium Infusion 3 MICROgram(s)/kG/Min (10.7 mL/Hr) IV Continuous <Continuous>  clotrimazole 1% Cream 1 Application(s) Topical two times a day  dexMEDEtomidine Infusion 0.202 MICROgram(s)/kG/Hr (3 mL/Hr) IV Continuous <Continuous>  dextrose 5%. 1000 milliLiter(s) (50 mL/Hr) IV Continuous <Continuous>  dextrose 5%. 1000 milliLiter(s) (100 mL/Hr) IV Continuous <Continuous>  dextrose 5%. 1000 milliLiter(s) (50 mL/Hr) IV Continuous <Continuous>  donepezil 5 milliGRAM(s) Oral at bedtime  ferrous    sulfate Liquid 300 milliGRAM(s) Enteral Tube daily  gabapentin   Solution 100 milliGRAM(s) Oral three times a day  glucagon  Injectable 1 milliGRAM(s) IntraMuscular once  insulin lispro (ADMELOG) corrective regimen sliding scale   SubCutaneous every 4 hours  ipratropium 17 MICROgram(s) HFA Inhaler 2 Puff(s) Inhalation every 6 hours  linezolid  IVPB      linezolid  IVPB 600 milliGRAM(s) IV Intermittent every 12 hours  meropenem  IVPB 1000 milliGRAM(s) IV Intermittent every 24 hours  metroNIDAZOLE  IVPB 500 milliGRAM(s) IV Intermittent every 8 hours  multivitamin/minerals/iron Oral Solution (CENTRUM) 15 milliLiter(s) Enteral Tube daily  norepinephrine Infusion 0.1 MICROgram(s)/kG/Min (5.57 mL/Hr) IV Continuous <Continuous>  pantoprazole Infusion 8 mG/Hr (10 mL/Hr) IV Continuous <Continuous>  petrolatum Ophthalmic Ointment 1 Application(s) Both EYES three times a day  phenylephrine    Infusion 1 MICROgram(s)/kG/Min (11.1 mL/Hr) IV Continuous <Continuous>  propofol Infusion 10.101 MICROgram(s)/kG/Min (3.6 mL/Hr) IV Continuous <Continuous>  sodium bicarbonate  Injectable 50 milliEquivalent(s) IV Push <User Schedule>  sodium chloride 0.9%. 1000 milliLiter(s) (10 mL/Hr) IV Continuous <Continuous>  vasopressin Infusion 0.04 Unit(s)/Min (2.4 mL/Hr) IV Continuous <Continuous>    MEDICATIONS  (PRN):  ondansetron Injectable 4 milliGRAM(s) IV Push every 4 hours PRN Nausea and/or Vomiting      LABS:  ABG - ( 07 Apr 2021 03:30 )  pH, Arterial: 7.30  pH, Blood: x     /  pCO2: 46    /  pO2: 87    / HCO3: 22    / Base Excess: -4.0  /  SaO2: 97                                      8.4    23.58 )-----------( 380      ( 07 Apr 2021 01:45 )             27.5     04-07    143  |  111<H>  |  76<HH>  ----------------------------<  260<H>  3.4<L>   |  21  |  2.5<H>    Ca    7.2<L>      07 Apr 2021 01:45  Phos  5.3     04-06  Mg     1.9     04-07    TPro  5.0<L>  /  Alb  1.7<L>  /  TBili  0.7  /  DBili  0.5<H>  /  AST  38  /  ALT  10  /  AlkPhos  75  04-07            RADIOLOGY:  Reviewed and interpreted by me  CXR from 04-07-21 shows [+] congestion, [-] pneumothorax, Chest Tubes in place    ECG:  Reviewed and interpreted by me:   QTC:    Assessment:  empyema SP VATS decortication  acute hypoxemic respiratory failure secondary to multilobar pneumonia sp trach  acute blood loss anemia  septic shock on pressors  MENDOZA  hypokalemia    PAST MEDICAL & SURGICAL HISTORY:  Dementia    Osteoarthritis    No significant past surgical history        PLAN:  Neuro: SAT  Pulm: SBT. Daily CXR.   Cardio: Monitor telemetry/alarms. Continue pressors  GI: Tolerating peg diet. Continue stool softeners. Continue GI prophylaxis  Renal: monitor urine output, supplement electrolytes as needed  Vasc: Heparin SC/SCDs for DVT prophylaxis  Heme: Monitor H/H.   ID: IV antibiotics. monitor WBC.  Endocrine: Monitor finger stick blood sugar and control hyperglycemia with insulin  Physical Therapy: bed rest  Tubes: Monitor Chest tube output      Discussed with Cardiothoracic Team at AM rounds.    45 minutes of critical care time spent providing medical care for patient's acute illness/conditions that impairs at least one vital organ system and/or poses a high risk of imminent or life threatening deterioration in the patient's condition. It includes time spent evaluating and treating the patient's acute illness as well as time spent reviewing labs, radiology, discussing goals of care with patient and/or patient's family, and discussing the case with a multidisciplinary team in an effort to prevent further life threatening deterioration or end organ damage. This time is independent of any procedures performed.

## 2021-04-07 NOTE — PROGRESS NOTE ADULT - SUBJECTIVE AND OBJECTIVE BOX
Patient is a 85y old  Female who presents with a chief complaint of empyema (06 Apr 2021 09:24)  pt seen and evaluated   remain vented via trach/sedated on propofol  ICU Vital Signs Last 24 Hrs  T(C): 36.6 (07 Apr 2021 12:00), Max: 37.2 (06 Apr 2021 16:30)  T(F): 97.8 (07 Apr 2021 12:00), Max: 98.9 (06 Apr 2021 16:30)  HR: 84 (07 Apr 2021 14:00) (67 - 99)  BP: 107/58 (07 Apr 2021 14:00) (100/55 - 133/61)  BP(mean): 79 (07 Apr 2021 14:00) (74 - 89)  ABP: 119/49 (07 Apr 2021 14:00) (91/45 - 138/57)  ABP(mean): 74 (07 Apr 2021 14:00) (62 - 87)  RR: 32 (07 Apr 2021 14:00) (24 - 33)  SpO2: 96% (07 Apr 2021 14:00) (92% - 100%)  Drug Dosing Weight  Height (cm): 160 (30 Mar 2021 07:39)  Weight (kg): 67.5 (07 Apr 2021 06:00)  BMI (kg/m2): 26.4 (07 Apr 2021 06:00)  BSA (m2): 1.71 (07 Apr 2021 06:00)  06 Apr 2021 07:01  -  07 Apr 2021 07:00  --------------------------------------------------------  IN:    Cisatracurium: 5.4 mL    dextrose 5%: 1200 mL    Enteral Tube Flush: 50 mL    FentaNYL: 77 mL    IV PiggyBack: 1150 mL    Pantoprazole: 240 mL    Phenylephrine: 233 mL    Propofol: 36 mL    Vasopressin: 57.6 mL    Vital High Protein: 850 mL  Total IN: 3899 mL    OUT:    Chest Tube (mL): 0 mL    Chest Tube (mL): 50 mL    Chest Tube (mL): 20 mL    Drain (mL): 180 mL    Indwelling Catheter - Urethral (mL): 860 mL    Rectal Tube (mL): 175 mL  Total OUT: 1285 mL    Total NET: 2614 mL      07 Apr 2021 07:01  -  07 Apr 2021 15:48  --------------------------------------------------------  IN:    dextrose 5%: 150 mL    Enteral Tube Flush: 160 mL    IV PiggyBack: 650 mL    Pantoprazole: 30 mL    Phenylephrine: 37 mL    Propofol: 51 mL    Vasopressin: 21.6 mL    Vital High Protein: 520 mL  Total IN: 1619.6 mL    OUT:    Indwelling Catheter - Urethral (mL): 235 mL  Total OUT: 235 mL    Total NET: 1384.6 mL     PHYSICAL EXAM:  Constitutional:  remain venetd via trach  + chest tube in place  Gastrointestinal:  +peg tube in place soft n/d  + rectal tube in place  MEDICATIONS  (STANDING):  ALBUTerol    90 MICROgram(s) HFA Inhaler 2 Puff(s) Inhalation every 6 hours  caspofungin IVPB      caspofungin IVPB 50 milliGRAM(s) IV Intermittent every 24 hours  chlorhexidine 0.12% Liquid 5 milliLiter(s) Oral Mucosa two times a day  chlorhexidine 4% Liquid 1 Application(s) Topical <User Schedule>  cisatracurium Infusion 3 MICROgram(s)/kG/Min (10.7 mL/Hr) IV Continuous <Continuous>  clotrimazole 1% Cream 1 Application(s) Topical two times a day  dexMEDEtomidine Infusion 0.202 MICROgram(s)/kG/Hr (3 mL/Hr) IV Continuous <Continuous>  dextrose 5%. 1000 milliLiter(s) (50 mL/Hr) IV Continuous <Continuous>  dextrose 5%. 1000 milliLiter(s) (100 mL/Hr) IV Continuous <Continuous>  donepezil 5 milliGRAM(s) Oral at bedtime  ferrous    sulfate Liquid 300 milliGRAM(s) Enteral Tube daily  gabapentin   Solution 100 milliGRAM(s) Oral three times a day  glucagon  Injectable 1 milliGRAM(s) IntraMuscular once  heparin   Injectable 5000 Unit(s) SubCutaneous every 12 hours  insulin lispro (ADMELOG) corrective regimen sliding scale   SubCutaneous every 4 hours  ipratropium 17 MICROgram(s) HFA Inhaler 2 Puff(s) Inhalation every 6 hours  linezolid  IVPB      linezolid  IVPB 600 milliGRAM(s) IV Intermittent every 12 hours  meropenem  IVPB 1000 milliGRAM(s) IV Intermittent every 24 hours  metroNIDAZOLE  IVPB 500 milliGRAM(s) IV Intermittent every 8 hours  multivitamin/minerals/iron Oral Solution (CENTRUM) 15 milliLiter(s) Enteral Tube daily  norepinephrine Infusion 0.1 MICROgram(s)/kG/Min (5.57 mL/Hr) IV Continuous <Continuous>  pantoprazole  Injectable 40 milliGRAM(s) IV Push two times a day  petrolatum Ophthalmic Ointment 1 Application(s) Both EYES three times a day  phenylephrine    Infusion 1 MICROgram(s)/kG/Min (11.1 mL/Hr) IV Continuous <Continuous>  propofol Infusion 10.101 MICROgram(s)/kG/Min (3.6 mL/Hr) IV Continuous <Continuous>  sodium bicarbonate  Injectable 50 milliEquivalent(s) IV Push <User Schedule>  sodium chloride 0.9%. 1000 milliLiter(s) (10 mL/Hr) IV Continuous <Continuous>  vasopressin Infusion 0.04 Unit(s)/Min (2.4 mL/Hr) IV Continuous <Continuous>      Diet, NPO with Tube Feed:   Tube Feeding Modality: Gastrostomy  Vital High Protein  Total Volume for 24 Hours (mL): 1440  Continuous  Starting Tube Feed Rate mL per Hour: 10  Increase Tube Feed Rate by (mL): 10     Every 2 hours  Until Goal Tube Feed Rate (mL per Hour): 60  Tube Feed Duration (in Hours): 24  Tube Feed Start Time: 09:02 (04-06-21 @ 09:02)  Diet, NPO after Midnight:      NPO Start Date: 05-Apr-2021,   NPO Start Time: 23:59 (04-05-21 @ 19:18)  Diet, NPO after Midnight:      NPO Start Date: 04-Apr-2021,   NPO Start Time: 23:59 (04-04-21 @ 14:07)      LABS  04-07    143  |  111<H>  |  76<HH>  ----------------------------<  260<H>  3.4<L>   |  21  |  2.5<H>    Ca    7.2<L>      07 Apr 2021 01:45  Phos  5.3     04-06  Mg     1.9     04-07    TPro  5.0<L>  /  Alb  1.7<L>  /  TBili  0.7  /  DBili  0.5<H>  /  AST  38  /  ALT  10  /  AlkPhos  75  04-07                          8.4    23.58 )-----------( 380      ( 07 Apr 2021 01:45 )             27.5     CAPILLARY BLOOD GLUCOSE  299 (07 Apr 2021 10:00)  POCT Blood Glucose.: 260 mg/dL (07 Apr 2021 14:29)  POCT Blood Glucose.: 299 mg/dL (07 Apr 2021 10:00)  POCT Blood Glucose.: 213 mg/dL (07 Apr 2021 06:19)  POCT Blood Glucose.: 253 mg/dL (07 Apr 2021 01:44)   RADIOLOGY STUDIES  < from: Xray Chest 1 View- PORTABLE-Routine (Xray Chest 1 View- PORTABLE-Routine in AM.) (04.07.21 @ 06:04) >  Impression:  Unchanged patchy bilateral parenchymal opacities.   Patient is a 85y old  Female who presents with a chief complaint of empyema (06 Apr 2021 09:24)  pt seen and evaluated   remains vented via trach/sedated on propofol - currently at 6 ml/h, off NImbex  requiring pressors - levo and vaso today  ICU Vital Signs Last 24 Hrs  T(C): 36.6 (07 Apr 2021 12:00), Max: 37.2 (06 Apr 2021 16:30)  T(F): 97.8 (07 Apr 2021 12:00), Max: 98.9 (06 Apr 2021 16:30)  HR: 84 (07 Apr 2021 14:00) (67 - 99)  BP: 107/58 (07 Apr 2021 14:00) (100/55 - 133/61)  BP(mean): 79 (07 Apr 2021 14:00) (74 - 89)  ABP: 119/49 (07 Apr 2021 14:00) (91/45 - 138/57)  ABP(mean): 74 (07 Apr 2021 14:00) (62 - 87)  RR: 32 (07 Apr 2021 14:00) (24 - 33)  SpO2: 96% (07 Apr 2021 14:00) (92% - 100%)  Drug Dosing Weight  Height (cm): 160 (30 Mar 2021 07:39)  Weight (kg): 67.5 (07 Apr 2021 06:00)  BMI (kg/m2): 26.4 (07 Apr 2021 06:00)  BSA (m2): 1.71 (07 Apr 2021 06:00)  06 Apr 2021 07:01  -  07 Apr 2021 07:00  --------------------------------------------------------  IN:    Cisatracurium: 5.4 mL    dextrose 5%: 1200 mL    Enteral Tube Flush: 50 mL    FentaNYL: 77 mL    IV PiggyBack: 1150 mL    Pantoprazole: 240 mL    Phenylephrine: 233 mL    Propofol: 36 mL    Vasopressin: 57.6 mL    Vital High Protein: 850 mL  Total IN: 3899 mL    OUT:    Chest Tube (mL): 0 mL    Chest Tube (mL): 50 mL    Chest Tube (mL): 20 mL    Drain (mL): 180 mL    Indwelling Catheter - Urethral (mL): 860 mL    Rectal Tube (mL): 175 mL  Total OUT: 1285 mL    Total NET: 2614 mL      07 Apr 2021 07:01  -  07 Apr 2021 15:48  --------------------------------------------------------  IN:    dextrose 5%: 150 mL    Enteral Tube Flush: 160 mL    IV PiggyBack: 650 mL    Pantoprazole: 30 mL    Phenylephrine: 37 mL    Propofol: 51 mL    Vasopressin: 21.6 mL    Vital High Protein: 520 mL  Total IN: 1619.6 mL    OUT:    Indwelling Catheter - Urethral (mL): 235 mL  Total OUT: 235 mL    Total NET: 1384.6 mL     PHYSICAL EXAM:  Constitutional: remains vented via trach  + chest tubes in place  Gastrointestinal: +peg tube in place soft n/d  + rectal tube in place    MEDICATIONS  (STANDING):  ALBUTerol    90 MICROgram(s) HFA Inhaler 2 Puff(s) Inhalation every 6 hours     caspofungin IVPB 50 milliGRAM(s) IV Intermittent every 24 hours  chlorhexidine 0.12% Liquid 5 milliLiter(s) Oral Mucosa two times a day  chlorhexidine 4% Liquid 1 Application(s) Topical <User Schedule>  cisatracurium Infusion 3 MICROgram(s)/kG/Min (10.7 mL/Hr) IV Continuous <Continuous>  clotrimazole 1% Cream 1 Application(s) Topical two times a day  dexMEDEtomidine Infusion 0.202 MICROgram(s)/kG/Hr (3 mL/Hr) IV Continuous <Continuous>  dextrose 5%. 1000 milliLiter(s) (50 mL/Hr) IV Continuous <Continuous>  dextrose 5%. 1000 milliLiter(s) (100 mL/Hr) IV Continuous <Continuous>  donepezil 5 milliGRAM(s) Oral at bedtime  ferrous    sulfate Liquid 300 milliGRAM(s) Enteral Tube daily  gabapentin   Solution 100 milliGRAM(s) Oral three times a day  glucagon  Injectable 1 milliGRAM(s) IntraMuscular once  heparin   Injectable 5000 Unit(s) SubCutaneous every 12 hours  insulin lispro (ADMELOG) corrective regimen sliding scale   SubCutaneous every 4 hours  ipratropium 17 MICROgram(s) HFA Inhaler 2 Puff(s) Inhalation every 6 hours  linezolid  IVPB 600 milliGRAM(s) IV Intermittent every 12 hours  meropenem  IVPB 1000 milliGRAM(s) IV Intermittent every 24 hours  metroNIDAZOLE  IVPB 500 milliGRAM(s) IV Intermittent every 8 hours  multivitamin/minerals/iron Oral Solution (CENTRUM) 15 milliLiter(s) Enteral Tube daily  norepinephrine Infusion 0.1 MICROgram(s)/kG/Min (5.57 mL/Hr) IV Continuous <Continuous>  pantoprazole  Injectable 40 milliGRAM(s) IV Push two times a day  petrolatum Ophthalmic Ointment 1 Application(s) Both EYES three times a day  phenylephrine    Infusion 1 MICROgram(s)/kG/Min (11.1 mL/Hr) IV Continuous <Continuous>  propofol Infusion 10.101 MICROgram(s)/kG/Min (3.6 mL/Hr) IV Continuous <Continuous>  sodium bicarbonate  Injectable 50 milliEquivalent(s) IV Push <User Schedule>  sodium chloride 0.9%. 1000 milliLiter(s) (10 mL/Hr) IV Continuous <Continuous>  vasopressin Infusion 0.04 Unit(s)/Min (2.4 mL/Hr) IV Continuous <Continuous>    Diet, NPO with Tube Feed:   Tube Feeding Modality: Gastrostomy  Vital High Protein  Total Volume for 24 Hours (mL): 1440  Until Goal Tube Feed Rate (mL per Hour): 60  Tube Feed Duration (in Hours): 24    LABS  04-07    143  |  111<H>  |  76<HH>  ----------------------------<  260<H>  3.4<L>   |  21  |  2.5<H>    Ca    7.2<L>      07 Apr 2021 01:45  Phos  5.3     04-06  Mg     1.9     04-07    TPro  5.0<L>  /  Alb  1.7<L>  /  TBili  0.7  /  DBili  0.5<H>  /  AST  38  /  ALT  10  /  AlkPhos  75  04-07                          8.4    23.58 )-----------( 380      ( 07 Apr 2021 01:45 )             27.5     CAPILLARY BLOOD GLUCOSE  299 (07 Apr 2021 10:00)  POCT Blood Glucose.: 260 mg/dL (07 Apr 2021 14:29)  POCT Blood Glucose.: 299 mg/dL (07 Apr 2021 10:00)  POCT Blood Glucose.: 213 mg/dL (07 Apr 2021 06:19)  POCT Blood Glucose.: 253 mg/dL (07 Apr 2021 01:44)   RADIOLOGY STUDIES  < from: Xray Chest 1 View- PORTABLE-Routine (Xray Chest 1 View- PORTABLE-Routine in AM.) (04.07.21 @ 06:04) >  Impression:  Unchanged patchy bilateral parenchymal opacities.

## 2021-04-07 NOTE — PROGRESS NOTE ADULT - SUBJECTIVE AND OBJECTIVE BOX
SHAYLAALOKIS, ANGELIKI  85y, Female  Allergy: clindamycin (Hives)      LOS  27d    CHIEF COMPLAINT: empyema (06 Apr 2021 09:24)      INTERVAL EVENTS/HPI  - No acute events overnight  - T(F): , Max: 98.9 (04-06-21 @ 16:30)  - Denies any worsening symptoms  - Tolerating medication  - WBC Count: 23.58 (04-07-21 @ 01:45)  WBC Count: 28.40 (04-06-21 @ 02:00)     - Creatinine, Serum: 2.5 (04-07-21 @ 01:45)  Creatinine, Serum: 2.7 (04-06-21 @ 02:00)       ROS  General: Denies rigors, nightsweats  HEENT: Denies headache, rhinorrhea, sore throat, eye pain  CV: Denies CP, palpitations  PULM: Denies wheezing, hemoptysis  GI: Denies hematemesis, hematochezia, melena  : Denies discharge, hematuria  MSK: Denies arthralgias, myalgias  SKIN: Denies rash, lesions  NEURO: Denies paresthesias, weakness  PSYCH: Denies depression, anxiety    VITALS:  T(F): 97.4, Max: 98.9 (04-06-21 @ 16:30)  HR: 89  BP: 122/58  RR: 29Vital Signs Last 24 Hrs  T(C): 36.3 (07 Apr 2021 06:00), Max: 37.2 (06 Apr 2021 16:30)  T(F): 97.4 (07 Apr 2021 06:00), Max: 98.9 (06 Apr 2021 16:30)  HR: 89 (07 Apr 2021 06:30) (65 - 98)  BP: 122/58 (07 Apr 2021 06:00) (97/57 - 133/62)  BP(mean): 84 (07 Apr 2021 06:00) (71 - 89)  RR: 29 (07 Apr 2021 06:30) (0 - 32)  SpO2: 96% (07 Apr 2021 06:30) (92% - 100%)    PHYSICAL EXAM:  Gen: NAD, resting in bed  HEENT: Normocephalic, atraumatic  Neck: supple, no lymphadenopathy  CV: Regular rate & regular rhythm  Lungs: decreased BS at bases, no fremitus  Abdomen: Soft, BS present  Ext: Warm, well perfused  Neuro: non focal, awake  Skin: no rash, no erythema  Lines: no phlebitis    FH: Non-contributory  Social Hx: Non-contributory    TESTS & MEASUREMENTS:                        8.4    23.58 )-----------( 380      ( 07 Apr 2021 01:45 )             27.5     04-07    143  |  111<H>  |  76<HH>  ----------------------------<  260<H>  3.4<L>   |  21  |  2.5<H>    Ca    7.2<L>      07 Apr 2021 01:45  Phos  5.3     04-06  Mg     1.9     04-07    TPro  5.0<L>  /  Alb  1.7<L>  /  TBili  0.7  /  DBili  0.5<H>  /  AST  38  /  ALT  10  /  AlkPhos  75  04-07    eGFR if Non African American: 17 mL/min/1.73M2 (04-07-21 @ 01:45)  eGFR if African American: 20 mL/min/1.73M2 (04-07-21 @ 01:45)    LIVER FUNCTIONS - ( 07 Apr 2021 01:45 )  Alb: 1.7 g/dL / Pro: 5.0 g/dL / ALK PHOS: 75 U/L / ALT: 10 U/L / AST: 38 U/L / GGT: x               Culture - Blood (collected 04-03-21 @ 13:21)  Source: .Blood Blood  Preliminary Report (04-04-21 @ 20:01):    No growth to date.    Culture - Blood (collected 03-31-21 @ 10:52)  Source: .Blood Blood  Final Report (04-05-21 @ 23:01):    No Growth Final    Culture - Acid Fast - Tissue w/Smear (collected 03-22-21 @ 13:33)  Source: .Tissue PLEURAL PEEL  Preliminary Report (03-31-21 @ 15:03):    No growth at 1 week.    Culture - Fungal, Tissue (collected 03-22-21 @ 13:33)  Source: .Tissue None  Preliminary Report (03-31-21 @ 15:02):    No growth    Culture - Tissue with Gram Stain (collected 03-22-21 @ 13:33)  Source: .Tissue None  Gram Stain (03-23-21 @ 04:38):    Rare polymorphonuclear leukocytes seen per low power field    Few White blood cells seen per low power field    Few Gram positive cocci in pairs per oil power field  Final Report (04-01-21 @ 16:29):    No growth    Organism seen in Gram stain is non-viable after prolonged    incubation and repeated subculture.    Culture - Fungal, Bronchial (collected 03-22-21 @ 12:58)  Source: .Bronchial None  Preliminary Report (03-25-21 @ 11:20):    Rare Yeast    Culture - Acid Fast - Bronchial w/Smear (collected 03-22-21 @ 12:58)  Source: Bronch Wash None  Preliminary Report (03-31-21 @ 15:03):    No growth at 1 week.    Culture - Bronchial (collected 03-22-21 @ 12:58)  Source: .Bronchial None  Gram Stain (03-23-21 @ 07:58):    Numerous polymorphonuclear leukocytes per low power field    No Squamous epithelial cells per low power field    No organisms seen per oil power field  Final Report (03-24-21 @ 22:11):    Normal Respiratory Narda present    Culture - Fungal, Body Fluid (collected 03-22-21 @ 09:26)  Source: .Body Fluid None  Preliminary Report (03-31-21 @ 15:02):    No growth    Culture - Acid Fast - Body Fluid w/Smear (collected 03-22-21 @ 09:26)  Source: .Body Fluid None  Preliminary Report (03-31-21 @ 15:03):    No growth at 1 week.    Culture - Body Fluid with Gram Stain (collected 03-22-21 @ 09:26)  Source: .Body Fluid None  Gram Stain (03-23-21 @ 04:38):    polymorphonuclear leukocytes seen    No organisms seen    by cytocentrifuge  Final Report (03-27-21 @ 17:22):    No growth at 5 days    Culture - Blood (collected 03-17-21 @ 16:00)  Source: .Blood Blood-Peripheral  Final Report (03-23-21 @ 02:39):    No Growth Final    Culture - Blood (collected 03-13-21 @ 07:33)  Source: .Blood None  Final Report (03-18-21 @ 18:01):    No Growth Final    Culture - Blood (collected 03-10-21 @ 21:00)  Source: .Blood Blood-Peripheral  Final Report (03-16-21 @ 04:01):    No Growth Final    Culture - Blood (collected 03-10-21 @ 21:00)  Source: .Blood Blood-Peripheral  Final Report (03-16-21 @ 04:01):    No Growth Final            INFECTIOUS DISEASES TESTING  COVID-19 PCR: NotDetec (04-05-21 @ 10:12)  COVID-19 PCR: NotDetec (04-04-21 @ 14:03)  Fungitell: <31 (04-01-21 @ 01:03)  Aspergillus Galactomannan Antigen: <0.500 (04-01-21 @ 01:03)  COVID-19 PCR: NotDetec (03-29-21 @ 14:17)  Procalcitonin, Serum: 2.29 (03-27-21 @ 10:50)  MRSA PCR Result.: Negative (03-20-21 @ 21:38)  COVID-19 PCR: NotDetec (03-20-21 @ 12:30)  Fungitell: 46 (03-17-21 @ 16:00)  Procalcitonin, Serum: 0.29 (03-17-21 @ 11:30)  Procalcitonin, Serum: 0.31 (03-16-21 @ 10:15)  Legionella Antigen, Urine: Negative (03-12-21 @ 10:30)  Streptococcus Pneumoniae Ag Urine: Negative (03-12-21 @ 10:30)  MRSA PCR Result.: Negative (03-12-21 @ 10:30)  Legionella Antigen, Urine: Negative (03-11-21 @ 08:10)  Streptococcus Pneumoniae Ag Urine: Negative (03-11-21 @ 08:10)  Procalcitonin, Serum: 1.75 (03-11-21 @ 06:31)  Rapid RVP Result: NotDetec (03-10-21 @ 22:10)      INFLAMMATORY MARKERS      RADIOLOGY & ADDITIONAL TESTS:  I have personally reviewed the last available Chest xray  CXR      CT      CARDIOLOGY TESTING  12 Lead ECG:   Ventricular Rate 157 BPM    Atrial Rate 326 BPM    QRS Duration 88 ms    Q-T Interval 314 ms    QTC Calculation(Bazett) 507 ms    R Axis 41 degrees    T Axis -87 degrees    Diagnosis Line Atrial fibrillation with premature ventricular or aberrantlyconducted  complexes  Nonspecific ST and T wave abnormality  Abnormal ECG    Confirmed by SHANNAN MORALES MD ([id number]) on 3/26/2021 1:51:15 PM (03-26-21 @ 12:19)  12 Lead ECG:   Ventricular Rate 82 BPM    Atrial Rate 82 BPM    P-R Interval 152 ms    QRS Duration 92 ms    Q-T Interval 408 ms    QTC Calculation(Bazett) 476 ms    P Axis 44 degrees    R Axis 22 degrees    T Axis 26 degrees    Diagnosis Line Normal sinus rhythm  Normal ECG    Confirmed by JUANI DODSON MD ([id number]) on 3/24/2021 8:30:17 PM (03-24-21 @ 13:34)      MEDICATIONS  ALBUTerol    90 MICROgram(s) HFA Inhaler 2 Inhalation every 6 hours  caspofungin IVPB 50 IV Intermittent every 24 hours  caspofungin IVPB     chlorhexidine 0.12% Liquid 5 Oral Mucosa two times a day  chlorhexidine 4% Liquid 1 Topical <User Schedule>  cisatracurium Infusion 3 IV Continuous <Continuous>  clotrimazole 1% Cream 1 Topical two times a day  dexMEDEtomidine Infusion 0.202 IV Continuous <Continuous>  dextrose 5%. 1000 IV Continuous <Continuous>  dextrose 5%. 1000 IV Continuous <Continuous>  dextrose 5%. 1000 IV Continuous <Continuous>  donepezil 5 Oral at bedtime  ferrous    sulfate Liquid 300 Enteral Tube daily  gabapentin   Solution 100 Oral three times a day  glucagon  Injectable 1 IntraMuscular once  insulin lispro (ADMELOG) corrective regimen sliding scale  SubCutaneous every 4 hours  ipratropium 17 MICROgram(s) HFA Inhaler 2 Inhalation every 6 hours  linezolid  IVPB     linezolid  IVPB 600 IV Intermittent every 12 hours  meropenem  IVPB 1000 IV Intermittent every 24 hours  metroNIDAZOLE  IVPB 500 IV Intermittent every 8 hours  multivitamin/minerals/iron Oral Solution (CENTRUM) 15 Enteral Tube daily  norepinephrine Infusion 0.1 IV Continuous <Continuous>  pantoprazole Infusion 8 IV Continuous <Continuous>  petrolatum Ophthalmic Ointment 1 Both EYES three times a day  phenylephrine    Infusion 1 IV Continuous <Continuous>  propofol Infusion 10.101 IV Continuous <Continuous>  sodium bicarbonate  Injectable 50 IV Push <User Schedule>  sodium chloride 0.9%. 1000 IV Continuous <Continuous>  vasopressin Infusion 0.04 IV Continuous <Continuous>      WEIGHT  Weight (kg): 67.5 (04-07-21 @ 06:00)  Creatinine, Serum: 2.5 mg/dL (04-07-21 @ 01:45)      ANTIBIOTICS:  caspofungin IVPB 50 milliGRAM(s) IV Intermittent every 24 hours  caspofungin IVPB      linezolid  IVPB      linezolid  IVPB 600 milliGRAM(s) IV Intermittent every 12 hours  meropenem  IVPB 1000 milliGRAM(s) IV Intermittent every 24 hours  metroNIDAZOLE  IVPB 500 milliGRAM(s) IV Intermittent every 8 hours      All available historical records have been reviewed       DESTIN TERRY  85y, Female  Allergy: clindamycin (Hives)      LOS  27d    CHIEF COMPLAINT: empyema (06 Apr 2021 09:24)      INTERVAL EVENTS/HPI  - No acute events overnight  - T(F): , Max: 98.9 (04-06-21 @ 16:30)  - remains on vaso and phenylephrine   - WBC Count: 23.58 (04-07-21 @ 01:45)  WBC Count: 28.40 (04-06-21 @ 02:00)     - Creatinine, Serum: 2.5 (04-07-21 @ 01:45)  Creatinine, Serum: 2.7 (04-06-21 @ 02:00)       ROS  unable to obtain history secondary to patient's mental status and/or sedation    VITALS:  T(F): 97.4, Max: 98.9 (04-06-21 @ 16:30)  HR: 89  BP: 122/58  RR: 29Vital Signs Last 24 Hrs  T(C): 36.3 (07 Apr 2021 06:00), Max: 37.2 (06 Apr 2021 16:30)  T(F): 97.4 (07 Apr 2021 06:00), Max: 98.9 (06 Apr 2021 16:30)  HR: 89 (07 Apr 2021 06:30) (65 - 98)  BP: 122/58 (07 Apr 2021 06:00) (97/57 - 133/62)  BP(mean): 84 (07 Apr 2021 06:00) (71 - 89)  RR: 29 (07 Apr 2021 06:30) (0 - 32)  SpO2: 96% (07 Apr 2021 06:30) (92% - 100%)    PHYSICAL EXAM:  Gen: vent/trach  HEENT: Normocephalic, atraumatic  Neck: supple, no lymphadenopathy  CV: Regular rate & regular rhythm  Lungs: chest tube x 2   Abdomen: Soft, BS present  Ext: Warm, well perfused  Neuro: non focal  Skin: no rash, no erythema  Lines: no phlebitis    FH: Non-contributory  Social Hx: Non-contributory    TESTS & MEASUREMENTS:                        8.4    23.58 )-----------( 380      ( 07 Apr 2021 01:45 )             27.5     04-07    143  |  111<H>  |  76<HH>  ----------------------------<  260<H>  3.4<L>   |  21  |  2.5<H>    Ca    7.2<L>      07 Apr 2021 01:45  Phos  5.3     04-06  Mg     1.9     04-07    TPro  5.0<L>  /  Alb  1.7<L>  /  TBili  0.7  /  DBili  0.5<H>  /  AST  38  /  ALT  10  /  AlkPhos  75  04-07    eGFR if Non African American: 17 mL/min/1.73M2 (04-07-21 @ 01:45)  eGFR if African American: 20 mL/min/1.73M2 (04-07-21 @ 01:45)    LIVER FUNCTIONS - ( 07 Apr 2021 01:45 )  Alb: 1.7 g/dL / Pro: 5.0 g/dL / ALK PHOS: 75 U/L / ALT: 10 U/L / AST: 38 U/L / GGT: x               Culture - Blood (collected 04-03-21 @ 13:21)  Source: .Blood Blood  Preliminary Report (04-04-21 @ 20:01):    No growth to date.    Culture - Blood (collected 03-31-21 @ 10:52)  Source: .Blood Blood  Final Report (04-05-21 @ 23:01):    No Growth Final    Culture - Acid Fast - Tissue w/Smear (collected 03-22-21 @ 13:33)  Source: .Tissue PLEURAL PEEL  Preliminary Report (03-31-21 @ 15:03):    No growth at 1 week.    Culture - Fungal, Tissue (collected 03-22-21 @ 13:33)  Source: .Tissue None  Preliminary Report (03-31-21 @ 15:02):    No growth    Culture - Tissue with Gram Stain (collected 03-22-21 @ 13:33)  Source: .Tissue None  Gram Stain (03-23-21 @ 04:38):    Rare polymorphonuclear leukocytes seen per low power field    Few White blood cells seen per low power field    Few Gram positive cocci in pairs per oil power field  Final Report (04-01-21 @ 16:29):    No growth    Organism seen in Gram stain is non-viable after prolonged    incubation and repeated subculture.    Culture - Fungal, Bronchial (collected 03-22-21 @ 12:58)  Source: .Bronchial None  Preliminary Report (03-25-21 @ 11:20):    Rare Yeast    Culture - Acid Fast - Bronchial w/Smear (collected 03-22-21 @ 12:58)  Source: Bronch Wash None  Preliminary Report (03-31-21 @ 15:03):    No growth at 1 week.    Culture - Bronchial (collected 03-22-21 @ 12:58)  Source: .Bronchial None  Gram Stain (03-23-21 @ 07:58):    Numerous polymorphonuclear leukocytes per low power field    No Squamous epithelial cells per low power field    No organisms seen per oil power field  Final Report (03-24-21 @ 22:11):    Normal Respiratory Narda present    Culture - Fungal, Body Fluid (collected 03-22-21 @ 09:26)  Source: .Body Fluid None  Preliminary Report (03-31-21 @ 15:02):    No growth    Culture - Acid Fast - Body Fluid w/Smear (collected 03-22-21 @ 09:26)  Source: .Body Fluid None  Preliminary Report (03-31-21 @ 15:03):    No growth at 1 week.    Culture - Body Fluid with Gram Stain (collected 03-22-21 @ 09:26)  Source: .Body Fluid None  Gram Stain (03-23-21 @ 04:38):    polymorphonuclear leukocytes seen    No organisms seen    by cytocentrifuge  Final Report (03-27-21 @ 17:22):    No growth at 5 days    Culture - Blood (collected 03-17-21 @ 16:00)  Source: .Blood Blood-Peripheral  Final Report (03-23-21 @ 02:39):    No Growth Final    Culture - Blood (collected 03-13-21 @ 07:33)  Source: .Blood None  Final Report (03-18-21 @ 18:01):    No Growth Final    Culture - Blood (collected 03-10-21 @ 21:00)  Source: .Blood Blood-Peripheral  Final Report (03-16-21 @ 04:01):    No Growth Final    Culture - Blood (collected 03-10-21 @ 21:00)  Source: .Blood Blood-Peripheral  Final Report (03-16-21 @ 04:01):    No Growth Final            INFECTIOUS DISEASES TESTING  COVID-19 PCR: NotDetec (04-05-21 @ 10:12)  COVID-19 PCR: NotDetec (04-04-21 @ 14:03)  Fungitell: <31 (04-01-21 @ 01:03)  Aspergillus Galactomannan Antigen: <0.500 (04-01-21 @ 01:03)  COVID-19 PCR: NotDetec (03-29-21 @ 14:17)  Procalcitonin, Serum: 2.29 (03-27-21 @ 10:50)  MRSA PCR Result.: Negative (03-20-21 @ 21:38)  COVID-19 PCR: NotDetec (03-20-21 @ 12:30)  Fungitell: 46 (03-17-21 @ 16:00)  Procalcitonin, Serum: 0.29 (03-17-21 @ 11:30)  Procalcitonin, Serum: 0.31 (03-16-21 @ 10:15)  Legionella Antigen, Urine: Negative (03-12-21 @ 10:30)  Streptococcus Pneumoniae Ag Urine: Negative (03-12-21 @ 10:30)  MRSA PCR Result.: Negative (03-12-21 @ 10:30)  Legionella Antigen, Urine: Negative (03-11-21 @ 08:10)  Streptococcus Pneumoniae Ag Urine: Negative (03-11-21 @ 08:10)  Procalcitonin, Serum: 1.75 (03-11-21 @ 06:31)  Rapid RVP Result: NotDetec (03-10-21 @ 22:10)      INFLAMMATORY MARKERS      RADIOLOGY & ADDITIONAL TESTS:  I have personally reviewed the last available Chest xray  CXR      CT      CARDIOLOGY TESTING  12 Lead ECG:   Ventricular Rate 157 BPM    Atrial Rate 326 BPM    QRS Duration 88 ms    Q-T Interval 314 ms    QTC Calculation(Bazett) 507 ms    R Axis 41 degrees    T Axis -87 degrees    Diagnosis Line Atrial fibrillation with premature ventricular or aberrantlyconducted  complexes  Nonspecific ST and T wave abnormality  Abnormal ECG    Confirmed by SHANNAN MORALES MD ([id number]) on 3/26/2021 1:51:15 PM (03-26-21 @ 12:19)  12 Lead ECG:   Ventricular Rate 82 BPM    Atrial Rate 82 BPM    P-R Interval 152 ms    QRS Duration 92 ms    Q-T Interval 408 ms    QTC Calculation(Bazett) 476 ms    P Axis 44 degrees    R Axis 22 degrees    T Axis 26 degrees    Diagnosis Line Normal sinus rhythm  Normal ECG    Confirmed by JUANI DODSON MD ([id number]) on 3/24/2021 8:30:17 PM (03-24-21 @ 13:34)      MEDICATIONS  ALBUTerol    90 MICROgram(s) HFA Inhaler 2 Inhalation every 6 hours  caspofungin IVPB 50 IV Intermittent every 24 hours  caspofungin IVPB     chlorhexidine 0.12% Liquid 5 Oral Mucosa two times a day  chlorhexidine 4% Liquid 1 Topical <User Schedule>  cisatracurium Infusion 3 IV Continuous <Continuous>  clotrimazole 1% Cream 1 Topical two times a day  dexMEDEtomidine Infusion 0.202 IV Continuous <Continuous>  dextrose 5%. 1000 IV Continuous <Continuous>  dextrose 5%. 1000 IV Continuous <Continuous>  dextrose 5%. 1000 IV Continuous <Continuous>  donepezil 5 Oral at bedtime  ferrous    sulfate Liquid 300 Enteral Tube daily  gabapentin   Solution 100 Oral three times a day  glucagon  Injectable 1 IntraMuscular once  insulin lispro (ADMELOG) corrective regimen sliding scale  SubCutaneous every 4 hours  ipratropium 17 MICROgram(s) HFA Inhaler 2 Inhalation every 6 hours  linezolid  IVPB     linezolid  IVPB 600 IV Intermittent every 12 hours  meropenem  IVPB 1000 IV Intermittent every 24 hours  metroNIDAZOLE  IVPB 500 IV Intermittent every 8 hours  multivitamin/minerals/iron Oral Solution (CENTRUM) 15 Enteral Tube daily  norepinephrine Infusion 0.1 IV Continuous <Continuous>  pantoprazole Infusion 8 IV Continuous <Continuous>  petrolatum Ophthalmic Ointment 1 Both EYES three times a day  phenylephrine    Infusion 1 IV Continuous <Continuous>  propofol Infusion 10.101 IV Continuous <Continuous>  sodium bicarbonate  Injectable 50 IV Push <User Schedule>  sodium chloride 0.9%. 1000 IV Continuous <Continuous>  vasopressin Infusion 0.04 IV Continuous <Continuous>      WEIGHT  Weight (kg): 67.5 (04-07-21 @ 06:00)  Creatinine, Serum: 2.5 mg/dL (04-07-21 @ 01:45)      ANTIBIOTICS:  caspofungin IVPB 50 milliGRAM(s) IV Intermittent every 24 hours  caspofungin IVPB      linezolid  IVPB      linezolid  IVPB 600 milliGRAM(s) IV Intermittent every 12 hours  meropenem  IVPB 1000 milliGRAM(s) IV Intermittent every 24 hours  metroNIDAZOLE  IVPB 500 milliGRAM(s) IV Intermittent every 8 hours      All available historical records have been reviewed

## 2021-04-07 NOTE — PROGRESS NOTE ADULT - ASSESSMENT
Chest tubes to suction  Daily chest xray  Monitor chest tube out put  Monitor for airleaks  Monitor 02 saturation  DVT/GI prophylaxis  Pain management

## 2021-04-07 NOTE — PROGRESS NOTE ADULT - ASSESSMENT
ASSESSMENT  85 year old lady known to have dementia, osteoarithtis, Swiss-speaking presenting with cough and fever.      IMPRESSION  #Sepsis present on admission - secondary to CAP  -  CT Chest No Cont (03.11.21 @ 00:54): Areas of right lung consolidation which can be seen in pneumonia. Numerous air-fluid levels throughout the right lung compatible with an infectious process. Suggestion of split pleura at the lung base for which empyema is favored although inherently limited on this noncontrast exam. Consideration can be given to contrast administration if feasiblefor better delineation. Areas of bilateral interlobular septal thickening and mild layering groundglass attenuation may reflect a component of edema.  - Urine Legionella negative  - Urine Strep negative  - BLood Cx 3/10 and 3/13 negative  - Procalcitonin 1.15   - CT Chest No Cont (03.17.21 @ 16:19): Since 3/11/2021. Enlarging loculated right pleural fluid collection with multiple air bubbles consistent with empyema.   Associated compressive atelectasis right lung is seen. Scattered groundglass opacities involving multiple lumbar segments.  - s/p VATS 3/22 -- right empyema with 800 cc purulent fluid in pleural space, multiple pockers with dense adhesions -- necrotizing pneumonia of the middle lobe  - VATS Cx 3/22 with rare yeast, otherwise no growth  -  CT Chest No Cont (03.29.21 @ 12:37): Since March 29, 2021, resolved right pleural effusion; persistent moderate right pneumothorax with 2 chest tubes in place. Increased left greater than right diffuse bilateral groundglass opacities and interlobular septal thickening. Findings are suspicious for a multifocal infection. Superimposed edema is also a possibility.  Enlarging mediastinal lymph nodes, likely reactive.  - CT Chest/Abdomen and Pelvis No Cont (04.01.21 @ 20:19): Stable residual right-sided pneumothorax. Three left-sided chest tubes are in place. New pneumomediastinum is noted, especially in the anterior mediastinum.  Stable bilateral diffuse areas of groundglass opacity. Areas of traction bronchiectasis noted throughout the left lung field and at the right lung base. No evidence of bowel obstruction or intra-abdominal or pelvic inflammatory process  - Fungitell: <31 4/1  - Aspergillus Galactomannan Antigen: <0.500 (04.01.21 @ 01:03)  - Blood Cx 3/31 and 4/3 NG    #GI Bleed from PEG 4/3 -- EGD held as improved/stable    #Dementia  #Osteoarthritis  #Obesity BMI (kg/m2): 23.4  #Abx allergy: clindamycin (Hives)    Creatinine, Serum: 2.5 mg/dL (04.07.21 @ 01:45)  Weight (kg): 60 (11 Mar 2021 01:17)  CrCl 21     RECOMMENDATIONS  - consider stopping caspofungin as fungal markers are negative   - can stop flagyl as meropenem has anerobic coverage  - in favor of stopping linezolid as MRSA not isolated in any cultures  - continue meropenem  - trend WBC -- improving     This is a preliminary incomplete pended note, all final recommendations to follow after interview and examination of the patient.    Please call or message on [employer] Teams if with any questions.  Spectra [phone]     ASSESSMENT  85 year old lady known to have dementia, osteoarithtis, [place]anian-speaking presenting with cough and fever.      IMPRESSION  #Sepsis present on admission - secondary to CAP  -  CT Chest No Cont (03.11.21 @ 00:54): Areas of right lung consolidation which can be seen in pneumonia. Numerous air-fluid levels throughout the right lung compatible with an infectious process. Suggestion of split pleura at the lung base for which empyema is favored although inherently limited on this noncontrast exam. Consideration can be given to contrast administration if feasiblefor better delineation. Areas of bilateral interlobular septal thickening and mild layering groundglass attenuation may reflect a component of edema.  - Urine Legionella negative  - Urine Strep negative  - BLood Cx 3/10 and 3/13 negative  - Procalcitonin 1.15   - CT Chest No Cont (03.17.21 @ 16:19): Since 3/11/2021. Enlarging loculated right pleural fluid collection with multiple air bubbles consistent with empyema.   Associated compressive atelectasis right lung is seen. Scattered groundglass opacities involving multiple lumbar segments.  - s/p VATS 3/22 -- right empyema with 800 cc purulent fluid in pleural space, multiple pockers with dense adhesions -- necrotizing pneumonia of the middle lobe  - VATS Cx 3/22 with rare yeast, otherwise no growth  -  CT Chest No Cont (03.29.21 @ 12:37): Since March 29, 2021, resolved right pleural effusion; persistent moderate right pneumothorax with 2 chest tubes in place. Increased left greater than right diffuse bilateral groundglass opacities and interlobular septal thickening. Findings are suspicious for a multifocal infection. Superimposed edema is also a possibility.  Enlarging mediastinal lymph nodes, likely reactive.  - CT Chest/Abdomen and Pelvis No Cont (04.01.21 @ 20:19): Stable residual right-sided pneumothorax. Three left-sided chest tubes are in place. New pneumomediastinum is noted, especially in the anterior mediastinum.  Stable bilateral diffuse areas of groundglass opacity. Areas of traction bronchiectasis noted throughout the left lung field and at the right lung base. No evidence of bowel obstruction or intra-abdominal or pelvic inflammatory process  - Fungitell: <31 4/1  - Aspergillus Galactomannan Antigen: <0.500 (04.01.21 @ 01:03)  - Blood Cx 3/31 and 4/3 NG    #GI Bleed from PEG 4/3 -- EGD held as improved/stable    #Dementia  #Osteoarthritis  #Obesity BMI (kg/m2): 23.4  #Abx allergy: clindamycin (Hives)    Creatinine, Serum: 2.5 mg/dL (04.07.21 @ 01:45)  Weight (kg): 60 (11 Mar 2021 01:17)  CrCl 21     RECOMMENDATIONS  - consider stopping caspofungin as fungal markers are negative   - can stop flagyl as meropenem has anerobic coverage  - in favor of stopping linezolid as MRSA not isolated in any cultures  - continue meropenem  - trend WBC -- improving     Please call or message on [employer] Teams if with any questions.  Spectra [phone]

## 2021-04-07 NOTE — PROGRESS NOTE ADULT - ASSESSMENT
ASSESSMENT/PLAN  85y Female patient admitted 3/11  s/p R VATS evacuation of empyema, x3 chest tubes in place post operatively remains intubated   ARDS  shock/ pressors  elevated WBC  bleeding from GT  hypokalemia/hypomagnesemia  hyperphosphatemia   PLAN   continue with current tube feed   check bmp/phos/mg and correct lytes   ASSESSMENT/PLAN  85y Female patient admitted 3/11  s/p R VATS evacuation of empyema, x3 chest tubes in place post operatively remains intubated   ARDS  shock/ pressors  elevated WBC  bleeding from GT --> anemia  MENDOZA with hyperphosphatemia - 5.3 yesterday, improved  hypokalemia/hypomagnesemia  hyperglycemia      PLAN  - current propofol dose provides ~ 160 kcal of IVFE/d  - as long as propofol remains at current or lower rate of infusion, will change feeds to Peptamen AF at 55 ml/h     --> 100 gm protein, 1584 kcal (+ current prop = 1742k/d) with lower carb content.  - hopefully can d/c propofol soon, then give the AF at goal of 65 ml/h  - glycemic control - concern is that currently feeds are continuous but insulin is given q4h. If glycemic control too variable, consider either insulin drip with continuous feeds, of intermittent insulin with intermittent gravity feeds.

## 2021-04-08 NOTE — PROGRESS NOTE ADULT - SUBJECTIVE AND OBJECTIVE BOX
MIGNON TERRYDULCE  85y, Female  Allergy: clindamycin (Hives)      LOS  28d    CHIEF COMPLAINT: empyema (06 Apr 2021 09:24)      INTERVAL EVENTS/HPI  - No acute events overnight  - T(F): , Max: 98.3 (04-07-21 @ 20:00)  - Denies any worsening symptoms  - Tolerating medication  - WBC Count: 23.98 (04-08-21 @ 04:00)  WBC Count: 23.58 (04-07-21 @ 01:45)     - Creatinine, Serum: 2.2 (04-08-21 @ 04:00)  Creatinine, Serum: 2.5 (04-07-21 @ 01:45)       ROS  unable to obtain history secondary to patient's mental status     VITALS:  T(F): 98.3, Max: 98.3 (04-07-21 @ 20:00)  HR: 102  BP: 85/53  RR: 26Vital Signs Last 24 Hrs  T(C): 36.8 (08 Apr 2021 04:00), Max: 36.8 (07 Apr 2021 20:00)  T(F): 98.3 (08 Apr 2021 04:00), Max: 98.3 (07 Apr 2021 20:00)  HR: 102 (08 Apr 2021 07:00) (72 - 102)  BP: 85/53 (08 Apr 2021 06:00) (85/53 - 135/62)  BP(mean): 65 (08 Apr 2021 06:00) (65 - 89)  RR: 26 (08 Apr 2021 07:00) (23 - 33)  SpO2: 97% (08 Apr 2021 07:00) (95% - 100%)    PHYSICAL EXAM:  Gen: trach/vent   HEENT: Normocephalic, atraumatic  Neck: supple, no lymphadenopathy  CV: Regular rate & regular rhythm  Lungs: decreased BS at bases, no fremitus; chjest tube in place   Abdomen: Soft, BS present  Ext: Warm, well perfused  Neuro: non focal, awake  Skin: no rash, no erythema  Lines: no phlebitis    FH: Non-contributory  Social Hx: Non-contributory    TESTS & MEASUREMENTS:                        8.6    23.98 )-----------( 295      ( 08 Apr 2021 04:00 )             28.2     04-08    145  |  113<H>  |  76<HH>  ----------------------------<  194<H>  3.5   |  22  |  2.2<H>    Ca    7.4<L>      08 Apr 2021 04:00  Mg     2.0     04-08    TPro  4.9<L>  /  Alb  1.6<L>  /  TBili  0.3  /  DBili  0.3<H>  /  AST  35  /  ALT  10  /  AlkPhos  94  04-08    eGFR if Non African American: 20 mL/min/1.73M2 (04-08-21 @ 04:00)  eGFR if African American: 23 mL/min/1.73M2 (04-08-21 @ 04:00)    LIVER FUNCTIONS - ( 08 Apr 2021 04:00 )  Alb: 1.6 g/dL / Pro: 4.9 g/dL / ALK PHOS: 94 U/L / ALT: 10 U/L / AST: 35 U/L / GGT: x               Culture - Blood (collected 04-03-21 @ 13:21)  Source: .Blood Blood  Preliminary Report (04-04-21 @ 20:01):    No growth to date.    Culture - Blood (collected 03-31-21 @ 10:52)  Source: .Blood Blood  Final Report (04-05-21 @ 23:01):    No Growth Final    Culture - Acid Fast - Tissue w/Smear (collected 03-22-21 @ 13:33)  Source: .Tissue PLEURAL PEEL  Preliminary Report (03-31-21 @ 15:03):    No growth at 1 week.    Culture - Fungal, Tissue (collected 03-22-21 @ 13:33)  Source: .Tissue None  Preliminary Report (03-31-21 @ 15:02):    No growth    Culture - Tissue with Gram Stain (collected 03-22-21 @ 13:33)  Source: .Tissue None  Gram Stain (03-23-21 @ 04:38):    Rare polymorphonuclear leukocytes seen per low power field    Few White blood cells seen per low power field    Few Gram positive cocci in pairs per oil power field  Final Report (04-01-21 @ 16:29):    No growth    Organism seen in Gram stain is non-viable after prolonged    incubation and repeated subculture.    Culture - Fungal, Bronchial (collected 03-22-21 @ 12:58)  Source: .Bronchial None  Preliminary Report (03-25-21 @ 11:20):    Rare Yeast    Culture - Acid Fast - Bronchial w/Smear (collected 03-22-21 @ 12:58)  Source: Bronch Wash None  Preliminary Report (03-31-21 @ 15:03):    No growth at 1 week.    Culture - Bronchial (collected 03-22-21 @ 12:58)  Source: .Bronchial None  Gram Stain (03-23-21 @ 07:58):    Numerous polymorphonuclear leukocytes per low power field    No Squamous epithelial cells per low power field    No organisms seen per oil power field  Final Report (03-24-21 @ 22:11):    Normal Respiratory Narda present    Culture - Fungal, Body Fluid (collected 03-22-21 @ 09:26)  Source: .Body Fluid None  Preliminary Report (03-31-21 @ 15:02):    No growth    Culture - Acid Fast - Body Fluid w/Smear (collected 03-22-21 @ 09:26)  Source: .Body Fluid None  Preliminary Report (03-31-21 @ 15:03):    No growth at 1 week.    Culture - Body Fluid with Gram Stain (collected 03-22-21 @ 09:26)  Source: .Body Fluid None  Gram Stain (03-23-21 @ 04:38):    polymorphonuclear leukocytes seen    No organisms seen    by cytocentrifuge  Final Report (03-27-21 @ 17:22):    No growth at 5 days    Culture - Blood (collected 03-17-21 @ 16:00)  Source: .Blood Blood-Peripheral  Final Report (03-23-21 @ 02:39):    No Growth Final    Culture - Blood (collected 03-13-21 @ 07:33)  Source: .Blood None  Final Report (03-18-21 @ 18:01):    No Growth Final    Culture - Blood (collected 03-10-21 @ 21:00)  Source: .Blood Blood-Peripheral  Final Report (03-16-21 @ 04:01):    No Growth Final    Culture - Blood (collected 03-10-21 @ 21:00)  Source: .Blood Blood-Peripheral  Final Report (03-16-21 @ 04:01):    No Growth Final            INFECTIOUS DISEASES TESTING  COVID-19 PCR: NotDetec (04-05-21 @ 10:12)  COVID-19 PCR: NotDetec (04-04-21 @ 14:03)  Fungitell: <31 (04-01-21 @ 01:03)  Aspergillus Galactomannan Antigen: <0.500 (04-01-21 @ 01:03)  COVID-19 PCR: NotDetec (03-29-21 @ 14:17)  Procalcitonin, Serum: 2.29 (03-27-21 @ 10:50)  MRSA PCR Result.: Negative (03-20-21 @ 21:38)  COVID-19 PCR: NotDetec (03-20-21 @ 12:30)  Fungitell: 46 (03-17-21 @ 16:00)  Procalcitonin, Serum: 0.29 (03-17-21 @ 11:30)  Procalcitonin, Serum: 0.31 (03-16-21 @ 10:15)  Legionella Antigen, Urine: Negative (03-12-21 @ 10:30)  Streptococcus Pneumoniae Ag Urine: Negative (03-12-21 @ 10:30)  MRSA PCR Result.: Negative (03-12-21 @ 10:30)  Legionella Antigen, Urine: Negative (03-11-21 @ 08:10)  Streptococcus Pneumoniae Ag Urine: Negative (03-11-21 @ 08:10)  Procalcitonin, Serum: 1.75 (03-11-21 @ 06:31)  Rapid RVP Result: NotDetec (03-10-21 @ 22:10)      INFLAMMATORY MARKERS      RADIOLOGY & ADDITIONAL TESTS:  I have personally reviewed the last available Chest xray  CXR      CT      CARDIOLOGY TESTING  12 Lead ECG:   Ventricular Rate 89 BPM    Atrial Rate 89 BPM    P-R Interval 132 ms    QRS Duration 84 ms    Q-T Interval 364 ms    QTC Calculation(Bazett) 442 ms    P Axis 58 degrees    R Axis 23 degrees    T Axis -48 degrees    Diagnosis Line Sinus rhythm with marked sinus arrhythmia with Premature atrial complexes  Nonspecific ST and T wave abnormality  Abnormal ECG    Confirmed by Job Bains ([id number]) on 4/7/2021 11:26:01 AM (04-07-21 @ 09:41)  12 Lead ECG:   Ventricular Rate 157 BPM    Atrial Rate 326 BPM    QRS Duration 88 ms    Q-T Interval 314 ms    QTC Calculation(Bazett) 507 ms    R Axis 41 degrees    T Axis -87 degrees    Diagnosis Line Atrial fibrillation with premature ventricular or aberrantlyconducted  complexes  Nonspecific ST and T wave abnormality  Abnormal ECG    Confirmed by SHANNAN MORALES MD ([id number]) on 3/26/2021 1:51:15 PM (03-26-21 @ 12:19)      MEDICATIONS  ALBUTerol    90 MICROgram(s) HFA Inhaler 2 Inhalation every 6 hours  caspofungin IVPB     caspofungin IVPB 50 IV Intermittent every 24 hours  chlorhexidine 0.12% Liquid 5 Oral Mucosa two times a day  chlorhexidine 4% Liquid 1 Topical <User Schedule>  cisatracurium Infusion 3 IV Continuous <Continuous>  clotrimazole 1% Cream 1 Topical two times a day  dexMEDEtomidine Infusion 0.202 IV Continuous <Continuous>  dextrose 5%. 1000 IV Continuous <Continuous>  dextrose 5%. 1000 IV Continuous <Continuous>  donepezil 5 Oral at bedtime  ferrous    sulfate Liquid 300 Enteral Tube daily  gabapentin   Solution 100 Oral three times a day  glucagon  Injectable 1 IntraMuscular once  heparin   Injectable 5000 SubCutaneous every 12 hours  insulin lispro (ADMELOG) corrective regimen sliding scale  SubCutaneous every 4 hours  ipratropium 17 MICROgram(s) HFA Inhaler 2 Inhalation every 6 hours  linezolid  IVPB     linezolid  IVPB 600 IV Intermittent every 12 hours  meropenem  IVPB 1000 IV Intermittent every 24 hours  metroNIDAZOLE  IVPB 500 IV Intermittent every 8 hours  multivitamin/minerals/iron Oral Solution (CENTRUM) 15 Enteral Tube daily  norepinephrine Infusion 0.1 IV Continuous <Continuous>  pantoprazole  Injectable 40 IV Push two times a day  petrolatum Ophthalmic Ointment 1 Both EYES three times a day  phenylephrine    Infusion 1 IV Continuous <Continuous>  propofol Infusion 10.101 IV Continuous <Continuous>  sodium bicarbonate  Injectable 50 IV Push <User Schedule>  sodium chloride 0.9%. 1000 IV Continuous <Continuous>  vasopressin Infusion 0.04 IV Continuous <Continuous>      WEIGHT  Weight (kg): 67.5 (04-07-21 @ 06:00)  Creatinine, Serum: 2.2 mg/dL (04-08-21 @ 04:00)      ANTIBIOTICS:  caspofungin IVPB      caspofungin IVPB 50 milliGRAM(s) IV Intermittent every 24 hours  linezolid  IVPB      linezolid  IVPB 600 milliGRAM(s) IV Intermittent every 12 hours  meropenem  IVPB 1000 milliGRAM(s) IV Intermittent every 24 hours  metroNIDAZOLE  IVPB 500 milliGRAM(s) IV Intermittent every 8 hours      All available historical records have been reviewed

## 2021-04-08 NOTE — PROGRESS NOTE ADULT - SUBJECTIVE AND OBJECTIVE BOX
OPERATIVE PROCEDURE(s):                POD #                       85yFemale  SURGEON(s): PRO Oshea  SUBJECTIVE ASSESSMENT:   Vital Signs Last 24 Hrs  T(F): 98.3 (08 Apr 2021 04:00), Max: 98.3 (07 Apr 2021 20:00)  HR: 102 (08 Apr 2021 07:00) (72 - 102)  BP: 85/53 (08 Apr 2021 06:00) (85/53 - 135/62)  BP(mean): 65 (08 Apr 2021 06:00) (65 - 89)  ABP: 104/49 (08 Apr 2021 07:00) (64/56 - 142/55)  ABP(mean): 65 (08 Apr 2021 07:00)  RR: 26 (08 Apr 2021 07:00) (23 - 33)  SpO2: 97% (08 Apr 2021 07:00) (95% - 99%)  CVP(mm Hg): --  CVP(cm H2O): --  CO: --  CI: --  PA: --  SVR: --  Mode: AC/ CMV (Assist Control/ Continuous Mandatory Ventilation)  RR (machine): 26  TV (machine): 450  FiO2: 40  PEEP: 5  MAP: 17    I&O's Detail    07 Apr 2021 07:01  -  08 Apr 2021 07:00  --------------------------------------------------------  IN:    dextrose 5%: 150 mL    Enteral Tube Flush: 220 mL    IV PiggyBack: 1450 mL    Pantoprazole: 30 mL    Peptamen A.F.: 165 mL    Phenylephrine: 97 mL    Propofol: 234.5 mL    Vasopressin: 57.6 mL    Vital High Protein: 1270 mL  Total IN: 3674.1 mL    OUT:    Chest Tube (mL): 5 mL    Chest Tube (mL): 60 mL    Indwelling Catheter - Urethral (mL): 885 mL    PEG (Percutaneous Endoscopic Gastrostomy) Tube (mL): 75 mL    Rectal Tube (mL): 490 mL  Total OUT: 1515 mL        Net:   I&O's Detail    06 Apr 2021 07:01  -  07 Apr 2021 07:00  --------------------------------------------------------  Total NET: 2614 mL      07 Apr 2021 07:01  -  08 Apr 2021 07:00  --------------------------------------------------------  Total NET: 2159.1 mL        CAPILLARY BLOOD GLUCOSE  299 (07 Apr 2021 10:00)      POCT Blood Glucose.: 192 mg/dL (08 Apr 2021 05:38)  POCT Blood Glucose.: 188 mg/dL (08 Apr 2021 03:30)  POCT Blood Glucose.: 162 mg/dL (08 Apr 2021 00:53)  POCT Blood Glucose.: 252 mg/dL (07 Apr 2021 21:17)  POCT Blood Glucose.: 280 mg/dL (07 Apr 2021 18:43)  POCT Blood Glucose.: 260 mg/dL (07 Apr 2021 14:29)  POCT Blood Glucose.: 299 mg/dL (07 Apr 2021 10:00)    Physical Exam:  General: NAD; A&Ox3/Patient is intubated and sedated  Cardiac: S1/S2, RRR, no murmur, no rubs  Lungs: unlabored respirations, CTA b/l, no wheeze, no rales, no crackles  Abdomen: Soft/NT/ND; positive bowel sounds x 4  Sternum: Intact, no click, incision healing well with no drainage  Incisions: Incisions clean/dry/intact  Extremities: No edema b/l lower extremities; good capillary refill; no cyanosis; palpable 1+ pedal pulses b/l    Central Venous Catheter: Yes[]  No[] , If Yes indication:           Day #  Adams Catheter: Yes  [] , No  [] , If yes indication:                      Day #  NGT: Yes [] No [] ,    If Yes Placement:                                     Day #  EPICARDIAL WIRES:  [] YES [] NO                                              Day #  BOWEL MOVEMENT:  [] YES [] NO, If No, Timing since last BM:      Day #  CHEST TUBE(Left/Right):  [] YES [] NO, If yes -  AIR LEAKS:  [] YES [] NO        LABS:                        8.6<L>  23.98<H> )-----------( 295      ( 08 Apr 2021 04:00 )             28.2<L>                        8.4<L>  23.58<H> )-----------( 380      ( 07 Apr 2021 01:45 )             27.5<L>    04-08    145  |  113<H>  |  76<HH>  ----------------------------<  194<H>  3.5   |  22  |  2.2<H>  04-07    143  |  111<H>  |  76<HH>  ----------------------------<  260<H>  3.4<L>   |  21  |  2.5<H>    Ca    7.4<L>      08 Apr 2021 04:00  Phos  5.3     04-06  Mg     2.0     04-08    TPro  4.9<L> [6.0 - 8.0]  /  Alb  1.6<L> [3.5 - 5.2]  /  TBili  0.3 [0.2 - 1.2]  /  DBili  0.3<H> [0.0 - 0.2]  /  AST  35 [0 - 41]  /  ALT  10 [0 - 41]  /  AlkPhos  94 [30 - 115]  04-08        ABG - ( 08 Apr 2021 03:29 )  pH: 7.31  /  pCO2: 47    /  pO2: 81    / HCO3: 24    / Base Excess: -2.5  /  SaO2: 96    /  LA: 1.7              RADIOLOGY & ADDITIONAL TESTS:  CXR:  EKG:  MEDICATIONS  (STANDING):  ALBUTerol    90 MICROgram(s) HFA Inhaler 2 Puff(s) Inhalation every 6 hours  caspofungin IVPB      caspofungin IVPB 50 milliGRAM(s) IV Intermittent every 24 hours  chlorhexidine 0.12% Liquid 5 milliLiter(s) Oral Mucosa two times a day  chlorhexidine 4% Liquid 1 Application(s) Topical <User Schedule>  clotrimazole 1% Cream 1 Application(s) Topical two times a day  dexMEDEtomidine Infusion 0.202 MICROgram(s)/kG/Hr (3 mL/Hr) IV Continuous <Continuous>  dextrose 5%. 1000 milliLiter(s) (50 mL/Hr) IV Continuous <Continuous>  dextrose 5%. 1000 milliLiter(s) (100 mL/Hr) IV Continuous <Continuous>  donepezil 5 milliGRAM(s) Oral at bedtime  ferrous    sulfate Liquid 300 milliGRAM(s) Enteral Tube daily  gabapentin   Solution 100 milliGRAM(s) Oral three times a day  glucagon  Injectable 1 milliGRAM(s) IntraMuscular once  heparin   Injectable 5000 Unit(s) SubCutaneous every 12 hours  insulin lispro (ADMELOG) corrective regimen sliding scale   SubCutaneous every 4 hours  ipratropium 17 MICROgram(s) HFA Inhaler 2 Puff(s) Inhalation every 6 hours  linezolid  IVPB      linezolid  IVPB 600 milliGRAM(s) IV Intermittent every 12 hours  meropenem  IVPB 1000 milliGRAM(s) IV Intermittent every 24 hours  metroNIDAZOLE  IVPB 500 milliGRAM(s) IV Intermittent every 8 hours  multivitamin/minerals/iron Oral Solution (CENTRUM) 15 milliLiter(s) Enteral Tube daily  pantoprazole  Injectable 40 milliGRAM(s) IV Push two times a day  petrolatum Ophthalmic Ointment 1 Application(s) Both EYES three times a day  phenylephrine    Infusion 1 MICROgram(s)/kG/Min (11.1 mL/Hr) IV Continuous <Continuous>  propofol Infusion 10.101 MICROgram(s)/kG/Min (3.6 mL/Hr) IV Continuous <Continuous>  sodium bicarbonate  Injectable 50 milliEquivalent(s) IV Push <User Schedule>  sodium chloride 0.9%. 1000 milliLiter(s) (10 mL/Hr) IV Continuous <Continuous>  vasopressin Infusion 0.04 Unit(s)/Min (2.4 mL/Hr) IV Continuous <Continuous>    MEDICATIONS  (PRN):  ondansetron Injectable 4 milliGRAM(s) IV Push every 4 hours PRN Nausea and/or Vomiting    HEPARIN:  [] YES [] NO  Dose: XX UNITS/HR UNITS Q8H  LOVENOX:[] YES [] NO  Dose: XX mg Q24H  COUMADIN: []  YES [] NO  Dose: XX mg  Q24H  SCD's: YES b/l  GI Prophylaxis: Protonix [], Pepcid [], None [], (Contra-indication:.....)    Post-Op Beta-Blockers: Yes [], No[], If No, then contraindication:  Post-Op Aspirin: Yes [],  No [], If No, then contraindication:  Post-Op Statin: Yes [], No[], If No, then contraindication:  Allergies    clindamycin (Hives)    Intolerances      Ambulation/Activity Status:    Assessment/Plan:  85y Female status-post .....  - Case and plan discussed with CTU Intensivist and CT Surgeon - Dr. Oneal/Ron/Abiel  - Continue CTU supportive care    - Continue DVT/GI prophylaxis  - Incentive Spirometry 10 times an hour  - Continue to advance physical activity as tolerated and continue PT/OT as directed  1. CAD: Continue ASA, statin, BB  2. HTN:   3. A. Fib:   4. COPD/Hypoxia:   5. DM/Glucose Control:     Social Service Disposition:

## 2021-04-08 NOTE — PROGRESS NOTE ADULT - ASSESSMENT
ASSESSMENT  85 year old lady known to have dementia, osteoarithtis, Kazakh-speaking presenting with cough and fever.      IMPRESSION  #Sepsis present on admission - secondary to CAP  -  CT Chest No Cont (03.11.21 @ 00:54): Areas of right lung consolidation which can be seen in pneumonia. Numerous air-fluid levels throughout the right lung compatible with an infectious process. Suggestion of split pleura at the lung base for which empyema is favored although inherently limited on this noncontrast exam. Consideration can be given to contrast administration if feasiblefor better delineation. Areas of bilateral interlobular septal thickening and mild layering groundglass attenuation may reflect a component of edema.  - Urine Legionella negative  - Urine Strep negative  - BLood Cx 3/10 and 3/13 negative  - Procalcitonin 1.15   - CT Chest No Cont (03.17.21 @ 16:19): Since 3/11/2021. Enlarging loculated right pleural fluid collection with multiple air bubbles consistent with empyema.   Associated compressive atelectasis right lung is seen. Scattered groundglass opacities involving multiple lumbar segments.  - s/p VATS 3/22 -- right empyema with 800 cc purulent fluid in pleural space, multiple pockers with dense adhesions -- necrotizing pneumonia of the middle lobe  - VATS Cx 3/22 with rare yeast, otherwise no growth  -  CT Chest No Cont (03.29.21 @ 12:37): Since March 29, 2021, resolved right pleural effusion; persistent moderate right pneumothorax with 2 chest tubes in place. Increased left greater than right diffuse bilateral groundglass opacities and interlobular septal thickening. Findings are suspicious for a multifocal infection. Superimposed edema is also a possibility.  Enlarging mediastinal lymph nodes, likely reactive.  - CT Chest/Abdomen and Pelvis No Cont (04.01.21 @ 20:19): Stable residual right-sided pneumothorax. Three left-sided chest tubes are in place. New pneumomediastinum is noted, especially in the anterior mediastinum.  Stable bilateral diffuse areas of groundglass opacity. Areas of traction bronchiectasis noted throughout the left lung field and at the right lung base. No evidence of bowel obstruction or intra-abdominal or pelvic inflammatory process  - Fungitell: <31 4/1  - Aspergillus Galactomannan Antigen: <0.500 (04.01.21 @ 01:03)  - Blood Cx 3/31 and 4/3 NG    #GI Bleed from PEG 4/3 -- EGD held as improved/stable    #Dementia  #Osteoarthritis  #Obesity BMI (kg/m2): 23.4  #Abx allergy: clindamycin (Hives)    Creatinine, Serum: 2.5 mg/dL (04.07.21 @ 01:45)  Weight (kg): 60 (11 Mar 2021 01:17)  CrCl 21     RECOMMENDATIONS  - can stop flagyl as meropenem has anerobic coverage  - continue meropenem + linezolid   - trend WBC     This is a preliminary incomplete pended note, all final recommendations to follow after interview and examination of the patient.      Please call or message on [employer] Teams if with any questions.  Spectra [phone]     ASSESSMENT  85 year old lady known to have dementia, osteoarithtis, Maldivian-speaking presenting with cough and fever.      IMPRESSION  #Sepsis present on admission - secondary to CAP  -  CT Chest No Cont (03.11.21 @ 00:54): Areas of right lung consolidation which can be seen in pneumonia. Numerous air-fluid levels throughout the right lung compatible with an infectious process. Suggestion of split pleura at the lung base for which empyema is favored although inherently limited on this noncontrast exam. Consideration can be given to contrast administration if feasiblefor better delineation. Areas of bilateral interlobular septal thickening and mild layering groundglass attenuation may reflect a component of edema.  - Urine Legionella negative  - Urine Strep negative  - BLood Cx 3/10 and 3/13 negative  - Procalcitonin 1.15   - CT Chest No Cont (03.17.21 @ 16:19): Since 3/11/2021. Enlarging loculated right pleural fluid collection with multiple air bubbles consistent with empyema.   Associated compressive atelectasis right lung is seen. Scattered groundglass opacities involving multiple lumbar segments.  - s/p VATS 3/22 -- right empyema with 800 cc purulent fluid in pleural space, multiple pockers with dense adhesions -- necrotizing pneumonia of the middle lobe  - VATS Cx 3/22 with rare yeast, otherwise no growth  -  CT Chest No Cont (03.29.21 @ 12:37): Since March 29, 2021, resolved right pleural effusion; persistent moderate right pneumothorax with 2 chest tubes in place. Increased left greater than right diffuse bilateral groundglass opacities and interlobular septal thickening. Findings are suspicious for a multifocal infection. Superimposed edema is also a possibility.  Enlarging mediastinal lymph nodes, likely reactive.  - CT Chest/Abdomen and Pelvis No Cont (04.01.21 @ 20:19): Stable residual right-sided pneumothorax. Three left-sided chest tubes are in place. New pneumomediastinum is noted, especially in the anterior mediastinum.  Stable bilateral diffuse areas of groundglass opacity. Areas of traction bronchiectasis noted throughout the left lung field and at the right lung base. No evidence of bowel obstruction or intra-abdominal or pelvic inflammatory process  - Fungitell: <31 4/1  - Aspergillus Galactomannan Antigen: <0.500 (04.01.21 @ 01:03)  - Blood Cx 3/31 and 4/3 NG    #GI Bleed from PEG 4/3 -- EGD held as improved/stable    #Dementia  #Osteoarthritis  #Obesity BMI (kg/m2): 23.4  #Abx allergy: clindamycin (Hives)    Creatinine, Serum: 2.5 mg/dL (04.07.21 @ 01:45)  Weight (kg): 60 (11 Mar 2021 01:17)  CrCl 21     RECOMMENDATIONS  - can stop flagyl as meropenem has anerobic coverage  - continue meropenem + linezolid   - trend WBC     Please call or message on [employer] Teams if with any questions.  Spectra [phone]

## 2021-04-08 NOTE — PROGRESS NOTE ADULT - ASSESSMENT
Chest tubes to suction  Daily chest xray  Monitor chest tube out put  Monitor for airleaks  Monitor 02 saturation  DVT/GI prophylaxis  Pain management  Follow up breathing trials

## 2021-04-08 NOTE — PROGRESS NOTE ADULT - SUBJECTIVE AND OBJECTIVE BOX
Nephrology progress note    THIS IS AN INCOMPLETE NOTE . FULL NOTE TO FOLLOW SHORTLY    Patient is seen and examined, events over the last 24 h noted .    Allergies:  clindamycin (Hives)    Hospital Medications:   MEDICATIONS  (STANDING):  ALBUTerol    90 MICROgram(s) HFA Inhaler 2 Puff(s) Inhalation every 6 hours  caspofungin IVPB      caspofungin IVPB 50 milliGRAM(s) IV Intermittent every 24 hours  chlorhexidine 0.12% Liquid 5 milliLiter(s) Oral Mucosa two times a day  chlorhexidine 4% Liquid 1 Application(s) Topical <User Schedule>  cisatracurium Infusion 3 MICROgram(s)/kG/Min (10.7 mL/Hr) IV Continuous <Continuous>  clotrimazole 1% Cream 1 Application(s) Topical two times a day  dexMEDEtomidine Infusion 0.202 MICROgram(s)/kG/Hr (3 mL/Hr) IV Continuous <Continuous>  dextrose 40% Gel 15 Gram(s) Oral once  dextrose 5%. 1000 milliLiter(s) (50 mL/Hr) IV Continuous <Continuous>  dextrose 5%. 1000 milliLiter(s) (100 mL/Hr) IV Continuous <Continuous>  dextrose 50% Injectable 25 Gram(s) IV Push once  dextrose 50% Injectable 12.5 Gram(s) IV Push once  dextrose 50% Injectable 25 Gram(s) IV Push once  donepezil 5 milliGRAM(s) Oral at bedtime  ferrous    sulfate Liquid 300 milliGRAM(s) Enteral Tube daily  furosemide   Injectable 20 milliGRAM(s) IV Push once  gabapentin   Solution 100 milliGRAM(s) Oral three times a day  glucagon  Injectable 1 milliGRAM(s) IntraMuscular once  heparin   Injectable 5000 Unit(s) SubCutaneous every 12 hours  insulin lispro (ADMELOG) corrective regimen sliding scale   SubCutaneous every 4 hours  insulin regular Infusion 1 Unit(s)/Hr (1 mL/Hr) IV Continuous <Continuous>  ipratropium 17 MICROgram(s) HFA Inhaler 2 Puff(s) Inhalation every 6 hours  linezolid  IVPB      linezolid  IVPB 600 milliGRAM(s) IV Intermittent every 12 hours  meropenem  IVPB 1000 milliGRAM(s) IV Intermittent every 24 hours  metroNIDAZOLE  IVPB 500 milliGRAM(s) IV Intermittent every 8 hours  multivitamin/minerals/iron Oral Solution (CENTRUM) 15 milliLiter(s) Enteral Tube daily  norepinephrine Infusion 0.1 MICROgram(s)/kG/Min (5.57 mL/Hr) IV Continuous <Continuous>  pantoprazole  Injectable 40 milliGRAM(s) IV Push two times a day  petrolatum Ophthalmic Ointment 1 Application(s) Both EYES three times a day  phenylephrine    Infusion 1 MICROgram(s)/kG/Min (11.1 mL/Hr) IV Continuous <Continuous>  potassium chloride  20 mEq/100 mL IVPB 20 milliEquivalent(s) IV Intermittent once  propofol Infusion 10.101 MICROgram(s)/kG/Min (3.6 mL/Hr) IV Continuous <Continuous>  sodium bicarbonate  Injectable 50 milliEquivalent(s) IV Push <User Schedule>  sodium chloride 0.9%. 1000 milliLiter(s) (10 mL/Hr) IV Continuous <Continuous>  vasopressin Infusion 0.04 Unit(s)/Min (2.4 mL/Hr) IV Continuous <Continuous>        VITALS:  T(F): 98.3 (21 @ 04:00), Max: 98.3 (21 @ 20:00)  HR: 79 (21 @ 08:40)  BP: 85/53 (21 @ 06:00)  RR: 26 (21 @ 07:00)  SpO2: 96% (21 @ 08:40)  Wt(kg): --     @ 07:  -   @ 07:00  --------------------------------------------------------  IN: 3899 mL / OUT: 1285 mL / NET: 2614 mL     @ 07:01  -   @ 07:00  --------------------------------------------------------  IN: 3674.1 mL / OUT: 1515 mL / NET: 2159.1 mL          PHYSICAL EXAM:  Constitutional: NAD  HEENT: anicteric sclera, oropharynx clear, MMM  Neck: No JVD  Respiratory: CTAB, no wheezes, rales or rhonchi  Cardiovascular: S1, S2, RRR  Gastrointestinal: BS+, soft, NT/ND  Extremities: No cyanosis or clubbing. No peripheral edema  :  No montaño.   Skin: No rashes    LABS:      145  |  113<H>  |  76<HH>  ----------------------------<  194<H>  3.5   |  22  |  2.2<H>    Ca    7.4<L>      2021 04:00  Mg     2.0         TPro  4.9<L>  /  Alb  1.6<L>  /  TBili  0.3  /  DBili  0.3<H>  /  AST  35  /  ALT  10  /  AlkPhos  94                            8.6    23.98 )-----------( 295      ( 2021 04:00 )             28.2       Urine Studies:  Urinalysis Basic - ( 2021 10:40 )    Color: Yellow / Appearance: Clear / S.017 / pH:   Gluc:  / Ketone: Negative  / Bili: Negative / Urobili: <2 mg/dL   Blood:  / Protein: 100 mg/dL / Nitrite: Negative   Leuk Esterase: Negative / RBC: 1 /HPF / WBC 6 /HPF   Sq Epi:  / Non Sq Epi: 1 /HPF / Bacteria: Negative        RADIOLOGY & ADDITIONAL STUDIES:   Nephrology progress note  Patient is seen and examined, events over the last 24 h noted .  Trached on MV     Allergies:  clindamycin (Hives)    Hospital Medications:   MEDICATIONS  (STANDING):    ALBUTerol    90 MICROgram(s) HFA Inhaler 2 Puff(s) Inhalation every 6 hours    caspofungin IVPB 50 milliGRAM(s) IV Intermittent every 24 hours  cisatracurium Infusion 3 MICROgram(s)/kG/Min (10.7 mL/Hr) IV Continuous <Continuous>  clotrimazole 1% Cream 1 Application(s) Topical two times a day  dexMEDEtomidine Infusion 0.202 MICROgram(s)/kG/Hr (3 mL/Hr) IV Continuous <Continuous>  dextrose 40% Gel 15 Gram(s) Oral once  donepezil 5 milliGRAM(s) Oral at bedtime  ferrous    sulfate Liquid 300 milliGRAM(s) Enteral Tube daily  furosemide   Injectable 20 milliGRAM(s) IV Push once  gabapentin   Solution 100 milliGRAM(s) Oral three times a day  glucagon  Injectable 1 milliGRAM(s) IntraMuscular once  heparin   Injectable 5000 Unit(s) SubCutaneous every 12 hours  insulin lispro (ADMELOG) corrective regimen sliding scale   SubCutaneous every 4 hours  insulin regular Infusion 1 Unit(s)/Hr (1 mL/Hr) IV Continuous <Continuous>  ipratropium 17 MICROgram(s) HFA Inhaler 2 Puff(s) Inhalation every 6 hours     linezolid  IVPB 600 milliGRAM(s) IV Intermittent every 12 hours  meropenem  IVPB 1000 milliGRAM(s) IV Intermittent every 24 hours  metroNIDAZOLE  IVPB 500 milliGRAM(s) IV Intermittent every 8 hours  multivitamin/minerals/iron Oral Solution (CENTRUM) 15 milliLiter(s) Enteral Tube daily  norepinephrine Infusion 0.1 MICROgram(s)/kG/Min (5.57 mL/Hr) IV Continuous <Continuous>  pantoprazole  Injectable 40 milliGRAM(s) IV Push two times a day  petrolatum Ophthalmic Ointment 1 Application(s) Both EYES three times a day  phenylephrine    Infusion 1 MICROgram(s)/kG/Min (11.1 mL/Hr) IV Continuous <Continuous>  potassium chloride  20 mEq/100 mL IVPB 20 milliEquivalent(s) IV Intermittent once  propofol Infusion 10.101 MICROgram(s)/kG/Min (3.6 mL/Hr) IV Continuous <Continuous>  sodium bicarbonate  Injectable 50 milliEquivalent(s) IV Push <User Schedule>  sodium chloride 0.9%. 1000 milliLiter(s) (10 mL/Hr) IV Continuous <Continuous>  vasopressin Infusion 0.04 Unit(s)/Min (2.4 mL/Hr) IV Continuous <Continuous>        VITALS:  T(F): 98.3 (21 @ 04:00), Max: 98.3 (21 @ 20:00)  HR: 79 (21 @ 08:40)  BP: 85/53 (21 @ 06:00)  RR: 26 (21 @ 07:00)  SpO2: 96% (21 @ 08:40)       @ :  -   @ 07:00  --------------------------------------------------------  IN: 3899 mL / OUT: 1285 mL / NET: 2614 mL     @ :  -   @ 07:00  --------------------------------------------------------  IN: 3674.1 mL / OUT: 1515 mL / NET: 2159.1 mL          PHYSICAL EXAM:  Constitutional: trached on MV  Respiratory: Crackles at base   Cardiovascular: S1, S2, RRR  Gastrointestinal: BS+, soft, NT/ND  Extremities: No cyanosis or clubbing. No peripheral edema  :  positive montaño   Skin: No rashes    LABS:      145  |  113<H>  |  76<HH>  ----------------------------<  194<H>  3.5   |  22  |  2.2<H>    Creatinine Trend: 2.2<--, 2.5<--, 2.7<--, 2.8<--, 2.7<--, 2.6<--    Ca    7.4<L>      2021 04:00  Mg     2.0     08    TPro  4.9<L>  /  Alb  1.6<L>  /  TBili  0.3  /  DBili  0.3<H>  /  AST  35  /  ALT  10  /  AlkPhos  94  -                          8.6    23.98 )-----------( 295      ( 2021 04:00 )             28.2       Urine Studies:  Urinalysis Basic - ( 2021 10:40 )    Color: Yellow / Appearance: Clear / S.017 / pH:   Gluc:  / Ketone: Negative  / Bili: Negative / Urobili: <2 mg/dL   Blood:  / Protein: 100 mg/dL / Nitrite: Negative   Leuk Esterase: Negative / RBC: 1 /HPF / WBC 6 /HPF   Sq Epi:  / Non Sq Epi: 1 /HPF / Bacteria: Negative        RADIOLOGY & ADDITIONAL STUDIES:

## 2021-04-09 NOTE — PROGRESS NOTE ADULT - SUBJECTIVE AND OBJECTIVE BOX
Progress Note: General Surgery  Patient: DESTIN TERRY , 85y (1935)Female   MRN: [id number]  Location: [address]  Visit: 03-11-21 Inpatient  Date: 04-09-21 @ 10:55    Admit Diagnosis/Chief Complaint: Acute respiratory failure with hypoxia        Procedure/Diagnosis: Acute respiratory failure with hypoxia     S/P Bronchoscopy, at bedside    Open tracheostomy    Insertion, PEG tube      Events/ 24h: No acute events overnight. Pain controlled.    Vitals: T(F): 97 (04-09-21 @ 08:00), Max: 98.2 (04-09-21 @ 00:00)  HR: 96 (04-09-21 @ 10:00)  BP: 102/52 (04-09-21 @ 09:00) (92/57 - 155/65)  RR: 36 (04-09-21 @ 10:00)  SpO2: 99% (04-09-21 @ 10:00)  RR (machine): 36, TV (machine): 400, FiO2: 40, PEEP: 5, PIP: 40  In:   04-08-21 @ 07:01  -  04-09-21 @ 07:00  --------------------------------------------------------  IN: 3222.1 mL    04-09-21 @ 07:01  -  04-09-21 @ 10:55  --------------------------------------------------------  IN: 655.6 mL      Out:   04-08-21 @ 07:01  -  04-09-21 @ 07:00  --------------------------------------------------------  OUT:    Chest Tube (mL): 90 mL    Chest Tube (mL): 25 mL    Indwelling Catheter - Urethral (mL): 1355 mL    Rectal Tube (mL): 500 mL  Total OUT: 1970 mL      04-09-21 @ 07:01  -  04-09-21 @ 10:55  --------------------------------------------------------  OUT:    Chest Tube (mL): 0 mL    Chest Tube (mL): 0 mL    Indwelling Catheter - Urethral (mL): 255 mL  Total OUT: 255 mL        Net:   04-08-21 @ 07:01  -  04-09-21 @ 07:00  --------------------------------------------------------  NET: 1252.1 mL    04-09-21 @ 07:01  -  04-09-21 @ 10:55  --------------------------------------------------------  NET: 400.6 mL        Diet: Diet, NPO with Tube Feed:   Tube Feeding Modality: Gastrostomy  Peptamen A.F. Formula  Total Volume for 24 Hours (mL): 1320  Continuous  Until Goal Tube Feed Rate (mL per Hour): 55  Tube Feed Duration (in Hours): 24  Tube Feed Start Time: 17:45 (04-07-21 @ 17:37)    IV Fluids: dextrose 5%. 1000 milliLiter(s) (50 mL/Hr) IV Continuous <Continuous>  dextrose 5%. 1000 milliLiter(s) (100 mL/Hr) IV Continuous <Continuous>  ferrous    sulfate Liquid 300 milliGRAM(s) Enteral Tube daily  multivitamin/minerals/iron Oral Solution (CENTRUM) 15 milliLiter(s) Enteral Tube daily  sodium bicarbonate  Injectable 50 milliEquivalent(s) IV Push <User Schedule>  sodium chloride 0.9%. 1000 milliLiter(s) (10 mL/Hr) IV Continuous <Continuous>    PHYSICAL EXAM:  GENERAL: Trach in place on vent  HEENT: NCAT  CHEST/LUNGS: CTAB 2 chest tubes to suction  HEART: RRR,  No murmurs, rubs, or gallops  ABDOMEN: SNTND +BS  EXTREMITIES:  FROM, No clubbing, cyanosis, or edema, palpable pulse  NEURO: No focal neurological deficits  SKIN: No rashes or lesions      Medications: [Standing]  ALBUTerol    90 MICROgram(s) HFA Inhaler 2 Puff(s) Inhalation every 6 hours  caspofungin IVPB      caspofungin IVPB 50 milliGRAM(s) IV Intermittent every 24 hours  chlorhexidine 4% Liquid 1 Application(s) Topical <User Schedule>  cisatracurium Infusion 3 MICROgram(s)/kG/Min (10.7 mL/Hr) IV Continuous <Continuous>  clotrimazole 1% Cream 1 Application(s) Topical two times a day  collagenase Ointment 1 Application(s) Topical daily  dexMEDEtomidine Infusion 0.202 MICROgram(s)/kG/Hr (3 mL/Hr) IV Continuous <Continuous>  dextrose 40% Gel 15 Gram(s) Oral once  dextrose 5%. 1000 milliLiter(s) (50 mL/Hr) IV Continuous <Continuous>  dextrose 5%. 1000 milliLiter(s) (100 mL/Hr) IV Continuous <Continuous>  dextrose 50% Injectable 25 Gram(s) IV Push once  dextrose 50% Injectable 12.5 Gram(s) IV Push once  dextrose 50% Injectable 25 Gram(s) IV Push once  donepezil 5 milliGRAM(s) Oral at bedtime  ferrous    sulfate Liquid 300 milliGRAM(s) Enteral Tube daily  gabapentin   Solution 100 milliGRAM(s) Oral three times a day  glucagon  Injectable 1 milliGRAM(s) IntraMuscular once  heparin   Injectable 5000 Unit(s) SubCutaneous every 12 hours  insulin regular Infusion 1 Unit(s)/Hr (1 mL/Hr) IV Continuous <Continuous>  ipratropium 17 MICROgram(s) HFA Inhaler 2 Puff(s) Inhalation every 6 hours  linezolid  IVPB      linezolid  IVPB 600 milliGRAM(s) IV Intermittent every 12 hours  meropenem  IVPB 1000 milliGRAM(s) IV Intermittent every 24 hours  multivitamin/minerals/iron Oral Solution (CENTRUM) 15 milliLiter(s) Enteral Tube daily  norepinephrine Infusion 0.1 MICROgram(s)/kG/Min (5.57 mL/Hr) IV Continuous <Continuous>  nystatin Cream 1 Application(s) Topical two times a day  pantoprazole  Injectable 40 milliGRAM(s) IV Push two times a day  petrolatum Ophthalmic Ointment 1 Application(s) Both EYES three times a day  phenylephrine    Infusion 1 MICROgram(s)/kG/Min (11.1 mL/Hr) IV Continuous <Continuous>  propofol Infusion 10.101 MICROgram(s)/kG/Min (3.6 mL/Hr) IV Continuous <Continuous>  sodium bicarbonate  Injectable 50 milliEquivalent(s) IV Push <User Schedule>  sodium chloride 0.9%. 1000 milliLiter(s) (10 mL/Hr) IV Continuous <Continuous>  vasopressin Infusion 0.04 Unit(s)/Min (2.4 mL/Hr) IV Continuous <Continuous>    DVT Prophylaxis: heparin   Injectable 5000 Unit(s) SubCutaneous every 12 hours    GI Prophylaxis: pantoprazole  Injectable 40 milliGRAM(s) IV Push two times a day    Antibiotics: caspofungin IVPB      caspofungin IVPB 50 milliGRAM(s) IV Intermittent every 24 hours  linezolid  IVPB      linezolid  IVPB 600 milliGRAM(s) IV Intermittent every 12 hours  meropenem  IVPB 1000 milliGRAM(s) IV Intermittent every 24 hours    Anticoagulation:   Medications:[PRN]  ondansetron Injectable 4 milliGRAM(s) IV Push every 4 hours PRN      Labs:                        9.8    35.35 )-----------( 274      ( 09 Apr 2021 01:40 )             32.8     04-09    148<H>  |  114<H>  |  80<HH>  ----------------------------<  86  3.6   |  21  |  2.3<H>    Ca    7.9<L>      09 Apr 2021 01:40  Mg     2.1     04-09    TPro  5.6<L>  /  Alb  1.9<L>  /  TBili  0.4  /  DBili  0.3<H>  /  AST  54<H>  /  ALT  17  /  AlkPhos  151<H>  04-09    LIVER FUNCTIONS - ( 09 Apr 2021 01:40 )  Alb: 1.9 g/dL / Pro: 5.6 g/dL / ALK PHOS: 151 U/L / ALT: 17 U/L / AST: 54 U/L / GGT: x             ABG - ( 09 Apr 2021 08:14 )  pH: 7.29  /  pCO2: 45    /  pO2: 109   / HCO3: 22    / Base Excess: -4.6  /  SaO2: 99                      Urine/Micro:        Imaging:   ***    < from: Xray Chest 1 View- PORTABLE-Routine (Xray Chest 1 View- PORTABLE-Routine in AM.) (04.09.21 @ 07:05) >    IMPRESSION:    Grossly stable exam since one day earlier.    < end of copied text >

## 2021-04-09 NOTE — PROGRESS NOTE ADULT - ASSESSMENT
85F a/w necrotizing PNA / empyema s/p VATS s/p open trach/PEG placement c/b shock/sepsis/GIB and non-oliguric MENDOZA 2/2 ATN, CTAP w/o hydronephrosis  - cr noted improving   -non-oliguric  -can use lasix to improve UO but need to follow on BMP and sodiuma s sodium is rising up   - transfuse RBC to maintain hgb > 7  - no need for RRT   - will sign off recall if any question arises

## 2021-04-09 NOTE — CONSULT NOTE ADULT - ASSESSMENT
85yFemale being evaluated for      MEDD (morphine equivalent daily dose):      See Recs below.    Please call [phone] with questions or concerns 24/7.   We will continue to follow.     INCOMPLETE note  85yFemale being evaluated for goals of care. Patient has PMH of dementia, osteoarithtis, Danish-speaking presenting with R kemiyma had VAT on R and developed ARDS sever sepsis. Patient's hosptial course complicated by ARDS, patient now has tracheostomy, MENDOZA, septic shock on pressors, acute hypoxic hypercapnic resp failure dur to ards/pnumonia.    CTU spoke to daughter this evening. Called daughter and left  asking to call back palliative medicine.  If daughter calls we will discuss CMO with her.     MEDD (morphine equivalent daily dose):0      See Recs below.    Please call [phone] with questions or concerns 24/7.   We will continue to follow.     Discussed with Dr Stout, bedside RN

## 2021-04-09 NOTE — PROGRESS NOTE ADULT - SUBJECTIVE AND OBJECTIVE BOX
Patient given Rx for glasses. CTU Attending Progress Daily Note     09 Apr 2021 11:08  POD# -   He has history of Dementia    Osteoarthritis      Interval event for past 24 hr:  DESTIN TERRY  85y had no event.   Current Complains:  DESTIN TERRY has no new complains  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, osteoarithtis, Bhutanese-speaking presenting with cough and fever.    As per daughter, patient has been having dry consistent cough since 2 months. Today, she was being evaluated for knee injections when she was found to be febrile. She also reports progressing generalized weakness with decreased appetite and PO intake.  No URT symptoms, no chest pain, no leg pain, no diarrhea, no urinary symptoms no sick contacts, no recent travel. In ED was hypotensive, was given IVF, started on levophed 0.04 called to evaluate (11 Mar 2021 03:21)    OBJECTIVE:  ICU Vital Signs Last 24 Hrs  T(C): 36.1 (09 Apr 2021 08:00), Max: 36.8 (09 Apr 2021 00:00)  T(F): 97 (09 Apr 2021 08:00), Max: 98.2 (09 Apr 2021 00:00)  HR: 96 (09 Apr 2021 10:00) (84 - 184)  BP: 102/52 (09 Apr 2021 09:00) (92/57 - 155/65)  BP(mean): 74 (09 Apr 2021 09:00) (68 - 93)  ABP: 112/49 (09 Apr 2021 10:00) (82/39 - 136/55)  ABP(mean): 67 (09 Apr 2021 10:00) (55 - 83)  RR: 36 (09 Apr 2021 10:00) (27 - 39)  SpO2: 99% (09 Apr 2021 10:00) (94% - 100%)    I&O's Summary    08 Apr 2021 07:01  -  09 Apr 2021 07:00  --------------------------------------------------------  IN: 3222.1 mL / OUT: 1970 mL / NET: 1252.1 mL    09 Apr 2021 07:01  -  09 Apr 2021 11:08  --------------------------------------------------------  IN: 655.6 mL / OUT: 255 mL / NET: 400.6 mL      I&O's Detail    08 Apr 2021 07:01  -  09 Apr 2021 07:00  --------------------------------------------------------  IN:    Enteral Tube Flush: 250 mL    Insulin: 93 mL    IV PiggyBack: 1010 mL    Peptamen A.F.: 1320 mL    Phenylephrine: 177.5 mL    Propofol: 214 mL    Vasopressin: 57.6 mL    Vital High Protein: 100 mL  Total IN: 3222.1 mL    OUT:    Chest Tube (mL): 90 mL    Chest Tube (mL): 25 mL    Indwelling Catheter - Urethral (mL): 1355 mL    Rectal Tube (mL): 500 mL  Total OUT: 1970 mL    Total NET: 1252.1 mL      09 Apr 2021 07:01  -  09 Apr 2021 11:08  --------------------------------------------------------  IN:    Insulin: 12 mL    IV PiggyBack: 500 mL    Peptamen A.F.: 55 mL    Propofol: 39 mL    sodium chloride 0.9%: 40 mL    Vasopressin: 9.6 mL  Total IN: 655.6 mL    OUT:    Chest Tube (mL): 0 mL    Chest Tube (mL): 0 mL    Indwelling Catheter - Urethral (mL): 255 mL  Total OUT: 255 mL    Total NET: 400.6 mL        Mode: AC/ CMV (Assist Control/ Continuous Mandatory Ventilation)  RR (machine): 36  TV (machine): 400  FiO2: 40  PEEP: 5  ITime: 1  MAP: 12  PIP: 40    CAPILLARY BLOOD GLUCOSE      POCT Blood Glucose.: 141 mg/dL (09 Apr 2021 11:00)  POCT Blood Glucose.: 132 mg/dL (09 Apr 2021 08:19)  POCT Blood Glucose.: 142 mg/dL (09 Apr 2021 06:14)  POCT Blood Glucose.: 95 mg/dL (09 Apr 2021 03:04)  POCT Blood Glucose.: 91 mg/dL (09 Apr 2021 01:34)  POCT Blood Glucose.: 86 mg/dL (09 Apr 2021 01:07)  POCT Blood Glucose.: 126 mg/dL (08 Apr 2021 22:54)  POCT Blood Glucose.: 193 mg/dL (08 Apr 2021 21:40)  POCT Blood Glucose.: 278 mg/dL (08 Apr 2021 18:29)  POCT Blood Glucose.: 240 mg/dL (08 Apr 2021 17:17)  POCT Blood Glucose.: 222 mg/dL (08 Apr 2021 15:17)  POCT Blood Glucose.: 196 mg/dL (08 Apr 2021 13:23)    LABS:  ABG - ( 09 Apr 2021 08:14 )  pH, Arterial: 7.29  pH, Blood: x     /  pCO2: 45    /  pO2: 109   / HCO3: 22    / Base Excess: -4.6  /  SaO2: 99                                      9.8    35.35 )-----------( 274      ( 09 Apr 2021 01:40 )             32.8     04-09    148<H>  |  114<H>  |  80<HH>  ----------------------------<  86  3.6   |  21  |  2.3<H>    Ca    7.9<L>      09 Apr 2021 01:40  Mg     2.1     04-09    TPro  5.6<L>  /  Alb  1.9<L>  /  TBili  0.4  /  DBili  0.3<H>  /  AST  54<H>  /  ALT  17  /  AlkPhos  151<H>  04-09          Home Medications:  donepezil 5 mg oral tablet: 1 tab(s) orally once a day (at bedtime) (11 Mar 2021 03:24)  gabapentin 300 mg oral capsule: 1 cap(s) orally 3 times a day, As Needed (11 Mar 2021 03:24)  meloxicam 15 mg oral tablet: 1 tab(s) orally once a day, As Needed (11 Mar 2021 03:24)  zolpidem 10 mg oral tablet: 1 tab(s) orally once a day (at bedtime), As Needed (11 Mar 2021 03:24)    HOSPITAL MEDICATIONS:  MEDICATIONS  (STANDING):  ALBUTerol    90 MICROgram(s) HFA Inhaler 2 Puff(s) Inhalation every 6 hours  caspofungin IVPB      caspofungin IVPB 50 milliGRAM(s) IV Intermittent every 24 hours  chlorhexidine 4% Liquid 1 Application(s) Topical <User Schedule>  cisatracurium Infusion 3 MICROgram(s)/kG/Min (10.7 mL/Hr) IV Continuous <Continuous>  clotrimazole 1% Cream 1 Application(s) Topical two times a day  collagenase Ointment 1 Application(s) Topical daily  dexMEDEtomidine Infusion 0.202 MICROgram(s)/kG/Hr (3 mL/Hr) IV Continuous <Continuous>  dextrose 40% Gel 15 Gram(s) Oral once  dextrose 5%. 1000 milliLiter(s) (50 mL/Hr) IV Continuous <Continuous>  dextrose 5%. 1000 milliLiter(s) (100 mL/Hr) IV Continuous <Continuous>  dextrose 50% Injectable 25 Gram(s) IV Push once  dextrose 50% Injectable 12.5 Gram(s) IV Push once  dextrose 50% Injectable 25 Gram(s) IV Push once  donepezil 5 milliGRAM(s) Oral at bedtime  ferrous    sulfate Liquid 300 milliGRAM(s) Enteral Tube daily  gabapentin   Solution 100 milliGRAM(s) Oral three times a day  glucagon  Injectable 1 milliGRAM(s) IntraMuscular once  heparin   Injectable 5000 Unit(s) SubCutaneous every 12 hours  insulin regular Infusion 1 Unit(s)/Hr (1 mL/Hr) IV Continuous <Continuous>  ipratropium 17 MICROgram(s) HFA Inhaler 2 Puff(s) Inhalation every 6 hours  linezolid  IVPB      linezolid  IVPB 600 milliGRAM(s) IV Intermittent every 12 hours  meropenem  IVPB 1000 milliGRAM(s) IV Intermittent every 24 hours  multivitamin/minerals/iron Oral Solution (CENTRUM) 15 milliLiter(s) Enteral Tube daily  norepinephrine Infusion 0.1 MICROgram(s)/kG/Min (5.57 mL/Hr) IV Continuous <Continuous>  nystatin Cream 1 Application(s) Topical two times a day  pantoprazole  Injectable 40 milliGRAM(s) IV Push two times a day  petrolatum Ophthalmic Ointment 1 Application(s) Both EYES three times a day  phenylephrine    Infusion 1 MICROgram(s)/kG/Min (11.1 mL/Hr) IV Continuous <Continuous>  propofol Infusion 10.101 MICROgram(s)/kG/Min (3.6 mL/Hr) IV Continuous <Continuous>  sodium bicarbonate  Injectable 50 milliEquivalent(s) IV Push <User Schedule>  sodium chloride 0.9%. 1000 milliLiter(s) (10 mL/Hr) IV Continuous <Continuous>  vasopressin Infusion 0.04 Unit(s)/Min (2.4 mL/Hr) IV Continuous <Continuous>    MEDICATIONS  (PRN):  ondansetron Injectable 4 milliGRAM(s) IV Push every 4 hours PRN Nausea and/or Vomiting      REVIEW OF SYSTEMS:  CONSTITUTIONAL: [X] all negative; [ ] weakness, [ ] fevers, [ ] chills  EYES/ENT: [X] all negative; [ ] visual changes, [ ] vertigo, [ ] throat pain   NECK: [X] all negative; [ ] pain, [ ] stiffness  RESPIRATORY: [] all negative, [ ] cough, [ ] wheezing, [ ] hemoptysis, [ ] shortness of breath  CARDIOVASCULAR: [] all negative; [ ] chest pain, [ ] palpitations, [ ] orthopnea  GASTROINTESTINAL: [X] all negative; [ ]abdominal pain, [ ] nausea, [ ] vomiting, [ ] hematemesis, [ ] diarrhea, [ ] constipation, [ ] melena, [ ] hematochezia.  GENITOURINARY: [X] all negative; [ ] dysuria, [ ] frequency, [ ] hematuria  NEUROLOGICAL: [X] all negative; [ ] numbness, [ ] weakness  SKIN: [X] all negative; [ ] itching, [ ] burning, [ ] rashes, [ ] lesions   All other review of systems is negative unless indicated above.    [  ] Unable to assess ROS because     PHYSICAL EXAM:          CONSTITUTIONAL: Well-developed; well-nourished; in no acute distress.   	SKIN: warm, dry  	HEAD: Normocephalic; atraumatic.  	EYES: PERRL, EOM, no conj injection, sclera clear  	ENT: No nasal discharge; airway clear.  	NECK: Supple; non tender.  No midline ttp ctls  	CARD: S1, S2 normal; no murmurs, gallops, or rubs. Regular rate and rhythm. 2+ RPs and DPs bilat, no carotid bruits, no pedal   edema, no calf pain b/l  	RESP: CTA  bilat good air movement No wheezes, rales or rhonchi.  	ABD: Soft, not tender, not distended, no CVA ttp no rebound or guarding, bowel sounds present  	EXT: Normal ROM.  No clubbing, cyanosis or edema.   	  	NEURO: Alert, awake, motor 5/5 R, 5/5 L        RADIOLOGY:  xray  < from: Xray Chest 1 View- PORTABLE-Routine (Xray Chest 1 View- PORTABLE-Routine in AM.) (04.09.21 @ 07:05) >      IMPRESSION:    Grossly stable exam since one day earlier.        < end of copied text >  `< from: Xray Chest 1 View- PORTABLE-Routine (Xray Chest 1 View- PORTABLE-Routine in AM.) (04.09.21 @ 07:05) >    IMPRESSION:    Grossly stable exam since one day earlier.        < end of copied text >    I spent 45 minutes of critical care time examining patient, reviewing vitals, labs, medications, imaging and discussing with the team goals of care to prevent life-threatening in this patient who is at high risk for deterioration or death due to:

## 2021-04-09 NOTE — CONSULT NOTE ADULT - PROBLEM SELECTOR RECOMMENDATION 9
Patient has trach and is vented.  COntinue management as per primary team.  If needed can use fentanyl for dyspnea.

## 2021-04-09 NOTE — CONSULT NOTE ADULT - SUBJECTIVE AND OBJECTIVE BOX
DESTIN TERRY          MRN-[id number]              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, osteoarithtis, Latvian-speaking presenting with cough and fever.    As per daughter, patient has been having dry consistent cough since 2 months. Today, she was being evaluated for knee injections when she was found to be febrile. She also reports progressing generalized weakness with decreased appetite and PO intake.  No URT symptoms, no chest pain, no leg pain, no diarrhea, no urinary symptoms no sick contacts, no recent travel. In ED was hypotensive, was given IVF, started on levophed 0.04 called to evaluate (11 Mar 2021 03:21)      PAST MEDICAL & SURGICAL HISTORY:  Dementia    Osteoarthritis    No significant past surgical history        FAMILY HISTORY:   Reviewed and found non contributory in mother or father    SOCIAL HISTORY:     ROS:    Unable to attain due to:                      Dyspnea (Luis Enrique 0-10): 0                       N/V (Y/N): No                             Secretions (Y/N) : No                                          Agitation(Y/N): No                              Pain (Y/N): No                                 -Provocation/Palliation: N/A  -Quality/Quantity: N/A  -Radiating: N/A  -Severity: No pain  -Timing/Frequency: N/A  -Impact on ADLs: N/A    General:  Denied  HEENT:    Denied  Neck:  Denied  CVS:  Denied  Resp:  Denied  GI:  Denied    :  Denied  Musc:  Denied  Neuro:  Denied  Psych:  Denied  Skin:  Denied  Lymph:  Denied    Allergies    clindamycin (Hives)    Intolerances      Opiate Naive (Y/N):   -iStop reviewed (Y/N):   Ref#:              Medications:      MEDICATIONS  (STANDING):  ALBUTerol    90 MICROgram(s) HFA Inhaler 2 Puff(s) Inhalation every 6 hours  caspofungin IVPB      caspofungin IVPB 50 milliGRAM(s) IV Intermittent every 24 hours  chlorhexidine 4% Liquid 1 Application(s) Topical <User Schedule>  cisatracurium Infusion 3 MICROgram(s)/kG/Min (10.7 mL/Hr) IV Continuous <Continuous>  clotrimazole 1% Cream 1 Application(s) Topical two times a day  collagenase Ointment 1 Application(s) Topical daily  dexMEDEtomidine Infusion 0.202 MICROgram(s)/kG/Hr (3 mL/Hr) IV Continuous <Continuous>  dextrose 40% Gel 15 Gram(s) Oral once  dextrose 5%. 1000 milliLiter(s) (100 mL/Hr) IV Continuous <Continuous>  dextrose 5%. 1000 milliLiter(s) (50 mL/Hr) IV Continuous <Continuous>  dextrose 50% Injectable 25 Gram(s) IV Push once  dextrose 50% Injectable 12.5 Gram(s) IV Push once  dextrose 50% Injectable 25 Gram(s) IV Push once  donepezil 5 milliGRAM(s) Oral at bedtime  ferrous    sulfate Liquid 300 milliGRAM(s) Enteral Tube daily  gabapentin   Solution 100 milliGRAM(s) Oral three times a day  glucagon  Injectable 1 milliGRAM(s) IntraMuscular once  heparin   Injectable 5000 Unit(s) SubCutaneous every 12 hours  insulin regular Infusion 1 Unit(s)/Hr (1 mL/Hr) IV Continuous <Continuous>  ipratropium 17 MICROgram(s) HFA Inhaler 2 Puff(s) Inhalation every 6 hours  linezolid  IVPB      linezolid  IVPB 600 milliGRAM(s) IV Intermittent every 12 hours  meropenem  IVPB 1000 milliGRAM(s) IV Intermittent every 24 hours  multivitamin/minerals/iron Oral Solution (CENTRUM) 15 milliLiter(s) Enteral Tube daily  norepinephrine Infusion 0.1 MICROgram(s)/kG/Min (5.57 mL/Hr) IV Continuous <Continuous>  nystatin Cream 1 Application(s) Topical two times a day  pantoprazole  Injectable 40 milliGRAM(s) IV Push two times a day  petrolatum Ophthalmic Ointment 1 Application(s) Both EYES three times a day  phenylephrine    Infusion 1 MICROgram(s)/kG/Min (11.1 mL/Hr) IV Continuous <Continuous>  propofol Infusion 10.101 MICROgram(s)/kG/Min (3.6 mL/Hr) IV Continuous <Continuous>  sodium bicarbonate  Injectable 50 milliEquivalent(s) IV Push <User Schedule>  sodium chloride 0.9%. 1000 milliLiter(s) (10 mL/Hr) IV Continuous <Continuous>  vasopressin Infusion 0.04 Unit(s)/Min (2.4 mL/Hr) IV Continuous <Continuous>    MEDICATIONS  (PRN):  ondansetron Injectable 4 milliGRAM(s) IV Push every 4 hours PRN Nausea and/or Vomiting      Labs:    CBC:                        9.8    35.35 )-----------( 274      ( 2021 01:40 )             32.8     CMP:    04-09    148<H>  |  114<H>  |  80<HH>  ----------------------------<  86  3.6   |  21  |  2.3<H>    Ca    7.9<L>      2021 01:40  Mg     2.1         TPro  5.6<L>  /  Alb  1.9<L>  /  TBili  0.4  /  DBili  0.3<H>  /  AST  54<H>  /  ALT  17  /  AlkPhos  151<H>       Albumin, Serum: 1.9 g/dL (21 @ 01:40)             Imaging:  Reviewed    PEx:  T(C): 36.1 (21 @ 16:00), Max: 36.8 (21 @ 00:00)  HR: 90 (21 @ 16:00) (84 - 184)  BP: 85/48 (21 @ 16:00) (85/48 - 130/61)  RR: 36 (21 @ 16:00) (28 - 39)  SpO2: 97% (21 @ 16:00) (95% - 100%)  Wt(kg): --  Daily     Daily Weight in k.2 (2021 06:00)    General: AAOx3    found in bed in NAD  HEENT:  NCAT PERRL EOMI Non icteric MOM  Neck: Supple no masses  CVS: RR S1S2 No M/G/R  Resp: Unlabored Non tachypneic No increased WOB  GI:  Soft NT ND BS+  :  Voiding / Adams / PrimaFit  Musc: No C/C/E    Neuro: Follows commands No focal deficits  Psych: Calm Pleasant  Skin: Non jaundiced   Lymph: Normal    Preadmit Karnofsky:  %           Current Karnofsky:     %  http://www.npcrc.org/files/news/karnofsky_performance_scale.pdf   http://www.npcrc.org/files/news/palliative_performance_scale_PPSv2.pdf  Cachexia (Y/N):   BMI:    Advanced Directives:     Full Code     DNR/DNI     MOLST     HCP     DPOA     Living Will     Decision maker: The patient is able to participate in complex medical decision making conversations.   Legal surrogate:    GOALS OF CARE DISCUSSION       Palliative care info/counseling provided	           Family meeting       Advanced Directives addressed please see Advance Care Planning Note	           See previous Palliative Medicine Note       Documentation of GOC: 	    REFERRALS	        Palliative Med        Unit SW/Case Mgmt              Speech/Swallow       Patient/Family Support       Massage Therapy       Music Therapy       Pet Therapy       Hospice       Nutrition       Dietician       PT/OT DESTIN TERRY          MRN-[id number]              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, osteoarithtis, Romansh-speaking presenting with cough and fever.    As per daughter, patient has been having dry consistent cough since 2 months. Today, she was being evaluated for knee injections when she was found to be febrile. She also reports progressing generalized weakness with decreased appetite and PO intake.  No URT symptoms, no chest pain, no leg pain, no diarrhea, no urinary symptoms no sick contacts, no recent travel. In ED was hypotensive, was given IVF, started on levophed 0.04 called to evaluate (11 Mar 2021 03:21)      PAST MEDICAL & SURGICAL HISTORY:  Dementia    Osteoarthritis    No significant past surgical history      FAMILY HISTORY:   Reviewed and found non contributory in mother or father    SOCIAL HISTORY:     ROS:    Unable to attain due to:   patient on trach and vented, lethargic and not able to participate.                     Dyspnea (Luis Enrique 0-10):                        N/V (Y/N): No                             Secretions (Y/N) : No                                          Agitation(Y/N): No                              Pain (Y/N): No                                 -Provocation/Palliation: N/A  -Quality/Quantity: N/A  -Radiating: N/A  -Severity: No pain  -Timing/Frequency: N/A  -Impact on ADLs: N/A    General:  Denied  HEENT:    Denied  Neck:  Denied  CVS:  Denied  Resp:  Denied  GI:  Denied    :  Denied  Musc:  Denied  Neuro:  Denied  Psych:  Denied  Skin:  Denied  Lymph:  Denied    Allergies    clindamycin (Hives)    Intolerances      Opiate Naive (Y/N): y  -iStop reviewed (Y/N): y              Medications:      MEDICATIONS  (STANDING):  ALBUTerol    90 MICROgram(s) HFA Inhaler 2 Puff(s) Inhalation every 6 hours  caspofungin IVPB      caspofungin IVPB 50 milliGRAM(s) IV Intermittent every 24 hours  chlorhexidine 4% Liquid 1 Application(s) Topical <User Schedule>  cisatracurium Infusion 3 MICROgram(s)/kG/Min (10.7 mL/Hr) IV Continuous <Continuous>  clotrimazole 1% Cream 1 Application(s) Topical two times a day  collagenase Ointment 1 Application(s) Topical daily  dexMEDEtomidine Infusion 0.202 MICROgram(s)/kG/Hr (3 mL/Hr) IV Continuous <Continuous>  dextrose 40% Gel 15 Gram(s) Oral once  dextrose 5%. 1000 milliLiter(s) (100 mL/Hr) IV Continuous <Continuous>  dextrose 5%. 1000 milliLiter(s) (50 mL/Hr) IV Continuous <Continuous>  dextrose 50% Injectable 25 Gram(s) IV Push once  dextrose 50% Injectable 12.5 Gram(s) IV Push once  dextrose 50% Injectable 25 Gram(s) IV Push once  donepezil 5 milliGRAM(s) Oral at bedtime  ferrous    sulfate Liquid 300 milliGRAM(s) Enteral Tube daily  gabapentin   Solution 100 milliGRAM(s) Oral three times a day  glucagon  Injectable 1 milliGRAM(s) IntraMuscular once  heparin   Injectable 5000 Unit(s) SubCutaneous every 12 hours  insulin regular Infusion 1 Unit(s)/Hr (1 mL/Hr) IV Continuous <Continuous>  ipratropium 17 MICROgram(s) HFA Inhaler 2 Puff(s) Inhalation every 6 hours  linezolid  IVPB      linezolid  IVPB 600 milliGRAM(s) IV Intermittent every 12 hours  meropenem  IVPB 1000 milliGRAM(s) IV Intermittent every 24 hours  multivitamin/minerals/iron Oral Solution (CENTRUM) 15 milliLiter(s) Enteral Tube daily  norepinephrine Infusion 0.1 MICROgram(s)/kG/Min (5.57 mL/Hr) IV Continuous <Continuous>  nystatin Cream 1 Application(s) Topical two times a day  pantoprazole  Injectable 40 milliGRAM(s) IV Push two times a day  petrolatum Ophthalmic Ointment 1 Application(s) Both EYES three times a day  phenylephrine    Infusion 1 MICROgram(s)/kG/Min (11.1 mL/Hr) IV Continuous <Continuous>  propofol Infusion 10.101 MICROgram(s)/kG/Min (3.6 mL/Hr) IV Continuous <Continuous>  sodium bicarbonate  Injectable 50 milliEquivalent(s) IV Push <User Schedule>  sodium chloride 0.9%. 1000 milliLiter(s) (10 mL/Hr) IV Continuous <Continuous>  vasopressin Infusion 0.04 Unit(s)/Min (2.4 mL/Hr) IV Continuous <Continuous>    MEDICATIONS  (PRN):  ondansetron Injectable 4 milliGRAM(s) IV Push every 4 hours PRN Nausea and/or Vomiting      Labs:    CBC:                        9.8    35.35 )-----------( 274      ( 2021 01:40 )             32.8     CMP:        148<H>  |  114<H>  |  80<HH>  ----------------------------<  86  3.6   |  21  |  2.3<H>    Ca    7.9<L>      2021 01:40  Mg     2.1         TPro  5.6<L>  /  Alb  1.9<L>  /  TBili  0.4  /  DBili  0.3<H>  /  AST  54<H>  /  ALT  17  /  AlkPhos  151<H>       Albumin, Serum: 1.9 g/dL (21 @ 01:40)             Imaging:  Reviewed    EKG reveiwed     PEx:  T(C): 36.1 (21 @ 16:00), Max: 36.8 (21 @ 00:00)  HR: 90 (21 @ 16:00) (84 - 184)  BP: 85/48 (21 @ 16:00) (85/48 - 130/61)  RR: 36 (21 @ 16:00) (28 - 39)  SpO2: 97% (21 @ 16:00) (95% - 100%)  Wt(kg): --  Daily     Daily Weight in k.2 (2021 06:00)    General: patient has trach, on vent, lethargic.   HEENT:  NCAT PERRL EOMI Non icteric MOM  Neck: Supple no masses  CVS: RR S1S2 No M/G/R  Resp: vented BS  GI:  Soft NT ND BS+  :  montaño   Musc: + edema   Neuro: lethargic   Psych: not able to assess   Skin: Non jaundiced   Lymph: Normal    Preadmit Karnofsky:  %    50       Current Karnofsky:     %10  http://www.npcrc.org/files/news/karnofsky_performance_scale.pdf   http://www.npcrc.org/files/news/palliative_performance_scale_PPSv2.pdf  Cachexia (Y/N):   BMI:    Advanced Directives:     Full Code     MOLST     HCP     DPOA     Living Will     Decision maker: The patient isnot able to participate in complex medical decision making conversations.   Legal surrogate: daughter     GOALS OF CARE DISCUSSION       Palliative care info/counseling provided	           Family meeting       Advanced Directives addressed please see Advance Care Planning Note	           See previous Palliative Medicine Note       Documentation of GOC: msg left for daughter, awaiting call back 	    REFERRALS	        Palliative Med        Unit SW/Case Mgmt               used

## 2021-04-09 NOTE — PROGRESS NOTE ADULT - ASSESSMENT
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, osteoarithtis, Ecuadorean-speaking presenting with R empyma had VAT on R and developed ARDS sever sepsis     1- R vat for empyma  pnomonia  continue ATB as per ID karina zyvox capsofungin   2- ARDS continu venty support air leak on R side keep chest tubes as per CT surgery  3- MENDOZA monitor bun /cr daily  4- septic shock due to pnumonia /empyma  continue vent support and presors vaso and miah as needed now on vaso  5- Acute hypoxic hypercapnic resp failure dur to ards/pnumonia   vent anagment 10 peep and sedation wth fentaly and propofol low dose and Nimbex   6 R ptx with air leak daily chest xray       ID f/u Ct surgery f/u      poor prognosis   recomend palliative team will talk to daughter

## 2021-04-09 NOTE — PROGRESS NOTE ADULT - ASSESSMENT
Assessment:  85y Female patient admitted s/p R VATS evacuation of empyema, x3 chest tubes in place, s/p Trach and PEG, with the above physical exam, labs, and imaging findings.      Plan:    Palliative consult   Restarted feeds  ID recs- continue meropenem, Fluconazole and Linezolid  Trend WBC  2 Chest tubes to suction on right side  Daily chest xray  Monitor chest tube out put  Monitor for airleaks  Monitor 02 saturation  Monitor vent settings   DVT/GI prophylaxis  Pain management            Date/Time: 04-09-21 @ 10:55

## 2021-04-09 NOTE — PROGRESS NOTE ADULT - SUBJECTIVE AND OBJECTIVE BOX
Nephrology progress note    THIS IS AN INCOMPLETE NOTE . FULL NOTE TO FOLLOW SHORTLY    Patient is seen and examined, events over the last 24 h noted .    Allergies:  clindamycin (Hives)    Hospital Medications:   MEDICATIONS  (STANDING):  ALBUTerol    90 MICROgram(s) HFA Inhaler 2 Puff(s) Inhalation every 6 hours  caspofungin IVPB      caspofungin IVPB 50 milliGRAM(s) IV Intermittent every 24 hours  chlorhexidine 4% Liquid 1 Application(s) Topical <User Schedule>  cisatracurium Infusion 3 MICROgram(s)/kG/Min (10.7 mL/Hr) IV Continuous <Continuous>  clotrimazole 1% Cream 1 Application(s) Topical two times a day  collagenase Ointment 1 Application(s) Topical daily  dexMEDEtomidine Infusion 0.202 MICROgram(s)/kG/Hr (3 mL/Hr) IV Continuous <Continuous>  dextrose 40% Gel 15 Gram(s) Oral once  dextrose 5%. 1000 milliLiter(s) (50 mL/Hr) IV Continuous <Continuous>  dextrose 5%. 1000 milliLiter(s) (100 mL/Hr) IV Continuous <Continuous>  dextrose 50% Injectable 25 Gram(s) IV Push once  dextrose 50% Injectable 12.5 Gram(s) IV Push once  dextrose 50% Injectable 25 Gram(s) IV Push once  donepezil 5 milliGRAM(s) Oral at bedtime  ferrous    sulfate Liquid 300 milliGRAM(s) Enteral Tube daily  gabapentin   Solution 100 milliGRAM(s) Oral three times a day  glucagon  Injectable 1 milliGRAM(s) IntraMuscular once  heparin   Injectable 5000 Unit(s) SubCutaneous every 12 hours  insulin regular Infusion 1 Unit(s)/Hr (1 mL/Hr) IV Continuous <Continuous>  ipratropium 17 MICROgram(s) HFA Inhaler 2 Puff(s) Inhalation every 6 hours  linezolid  IVPB      linezolid  IVPB 600 milliGRAM(s) IV Intermittent every 12 hours  meropenem  IVPB 1000 milliGRAM(s) IV Intermittent every 24 hours  multivitamin/minerals/iron Oral Solution (CENTRUM) 15 milliLiter(s) Enteral Tube daily  norepinephrine Infusion 0.1 MICROgram(s)/kG/Min (5.57 mL/Hr) IV Continuous <Continuous>  nystatin Cream 1 Application(s) Topical two times a day  pantoprazole  Injectable 40 milliGRAM(s) IV Push two times a day  petrolatum Ophthalmic Ointment 1 Application(s) Both EYES three times a day  phenylephrine    Infusion 1 MICROgram(s)/kG/Min (11.1 mL/Hr) IV Continuous <Continuous>  propofol Infusion 10.101 MICROgram(s)/kG/Min (3.6 mL/Hr) IV Continuous <Continuous>  sodium bicarbonate  Injectable 50 milliEquivalent(s) IV Push <User Schedule>  sodium chloride 0.9%. 1000 milliLiter(s) (10 mL/Hr) IV Continuous <Continuous>  vasopressin Infusion 0.04 Unit(s)/Min (2.4 mL/Hr) IV Continuous <Continuous>        VITALS:  T(F): 98 (21 @ 04:00), Max: 98.2 (21 @ 00:00)  HR: 98 (21 @ 08:31)  BP: 105/56 (21 @ 08:00)  RR: 37 (21 @ 08:31)  SpO2: 99% (21 @ 08:31)  Wt(kg): --     @ 07:  -   @ 07:00  --------------------------------------------------------  IN: 3674.1 mL / OUT: 1525 mL / NET: 2149.1 mL     07:  -   @ 07:00  --------------------------------------------------------  IN: 3222.1 mL / OUT: 1970 mL / NET: 1252.1 mL     07:  -   @ 08:41  --------------------------------------------------------  IN: 523.4 mL / OUT: 180 mL / NET: 343.4 mL          PHYSICAL EXAM:  Constitutional: NAD  HEENT: anicteric sclera, oropharynx clear, MMM  Neck: No JVD  Respiratory: CTAB, no wheezes, rales or rhonchi  Cardiovascular: S1, S2, RRR  Gastrointestinal: BS+, soft, NT/ND  Extremities: No cyanosis or clubbing. No peripheral edema  :  No montaño.   Skin: No rashes    LABS:      148<H>  |  114<H>  |  80<HH>  ----------------------------<  86  3.6   |  21  |  2.3<H>    Ca    7.9<L>      2021 01:40  Mg     2.1         TPro  5.6<L>  /  Alb  1.9<L>  /  TBili  0.4  /  DBili  0.3<H>  /  AST  54<H>  /  ALT  17  /  AlkPhos  151<H>                            9.8    35.35 )-----------( 274      ( 2021 01:40 )             32.8       Urine Studies:  Urinalysis Basic - ( 2021 10:40 )    Color: Yellow / Appearance: Clear / S.017 / pH:   Gluc:  / Ketone: Negative  / Bili: Negative / Urobili: <2 mg/dL   Blood:  / Protein: 100 mg/dL / Nitrite: Negative   Leuk Esterase: Negative / RBC: 1 /HPF / WBC 6 /HPF   Sq Epi:  / Non Sq Epi: 1 /HPF / Bacteria: Negative        RADIOLOGY & ADDITIONAL STUDIES:   Nephrology progress note  Patient is seen and examined, events over the last 24 h noted .  intubated on MV     Allergies:  clindamycin (Hives)    Hospital Medications:   MEDICATIONS  (STANDING):  ALBUTerol    90 MICROgram(s) HFA Inhaler 2 Puff(s) Inhalation every 6 hours  caspofungin IVPB 50 milliGRAM(s) IV Intermittent every 24 hours  cisatracurium Infusion 3 MICROgram(s)/kG/Min (10.7 mL/Hr) IV Continuous <Continuous>  clotrimazole 1% Cream 1 Application(s) Topical two times a day  collagenase Ointment 1 Application(s) Topical daily  dexMEDEtomidine Infusion 0.202 MICROgram(s)/kG/Hr (3 mL/Hr) IV Continuous <Continuous>  dextrose 40% Gel 15 Gram(s) Oral once  donepezil 5 milliGRAM(s) Oral at bedtime  ferrous    sulfate Liquid 300 milliGRAM(s) Enteral Tube daily  gabapentin   Solution 100 milliGRAM(s) Oral three times a day  glucagon  Injectable 1 milliGRAM(s) IntraMuscular once  heparin   Injectable 5000 Unit(s) SubCutaneous every 12 hours  insulin regular Infusion 1 Unit(s)/Hr (1 mL/Hr) IV Continuous <Continuous>  ipratropium 17 MICROgram(s) HFA Inhaler 2 Puff(s) Inhalation every 6 hours  linezolid  IVPB 600 milliGRAM(s) IV Intermittent every 12 hours  meropenem  IVPB 1000 milliGRAM(s) IV Intermittent every 24 hours  multivitamin/minerals/iron Oral Solution (CENTRUM) 15 milliLiter(s) Enteral Tube daily  norepinephrine Infusion 0.1 MICROgram(s)/kG/Min (5.57 mL/Hr) IV Continuous <Continuous>  nystatin Cream 1 Application(s) Topical two times a day  pantoprazole  Injectable 40 milliGRAM(s) IV Push two times a day  petrolatum Ophthalmic Ointment 1 Application(s) Both EYES three times a day  phenylephrine    Infusion 1 MICROgram(s)/kG/Min (11.1 mL/Hr) IV Continuous <Continuous>  propofol Infusion 10.101 MICROgram(s)/kG/Min (3.6 mL/Hr) IV Continuous <Continuous>  sodium bicarbonate  Injectable 50 milliEquivalent(s) IV Push <User Schedule>  sodium chloride 0.9%. 1000 milliLiter(s) (10 mL/Hr) IV Continuous <Continuous>  vasopressin Infusion 0.04 Unit(s)/Min (2.4 mL/Hr) IV Continuous <Continuous>        VITALS:  T(F): 98 (21 @ 04:00), Max: 98.2 (21 @ 00:00)  HR: 98 (21 @ 08:31)  BP: 105/56 (21 @ 08:00)  RR: 37 (21 @ 08:31)  SpO2: 99% (21 @ 08:31)       @ :  -   07:00  --------------------------------------------------------  IN: 3674.1 mL / OUT: 1525 mL / NET: 2149.1 mL     07:  -   @ 07:00  --------------------------------------------------------  IN: 3222.1 mL / OUT: 1970 mL / NET: 1252.1 mL     @ 07:01  -   @ 08:41  --------------------------------------------------------  IN: 523.4 mL / OUT: 180 mL / NET: 343.4 mL          PHYSICAL EXAM:  Constitutional: trached on MV   Neck: No JVD  Respiratory: Crackles at base   Cardiovascular: S1, S2, RRR  Gastrointestinal: BS+, soft, NT/ND  Extremities: No cyanosis or clubbing. No peripheral edema  :  No montaño.   Skin: No rashes    LABS:      148<H>  |  114<H>  |  80<HH>  ----------------------------<  86  3.6   |  21  |  2.3<H>    Creatinine Trend: 2.3<--, 2.2<--, 2.5<--, 2.7<--, 2.8<--, 2.7<--    Ca    7.9<L>      2021 01:40  Mg     2.1         TPro  5.6<L>  /  Alb  1.9<L>  /  TBili  0.4  /  DBili  0.3<H>  /  AST  54<H>  /  ALT  17  /  AlkPhos  151<H>                            9.8    35.35 )-----------( 274      ( 2021 01:40 )             32.8       Urine Studies:  Urinalysis Basic - ( 2021 10:40 )    Color: Yellow / Appearance: Clear / S.017 / pH:   Gluc:  / Ketone: Negative  / Bili: Negative / Urobili: <2 mg/dL   Blood:  / Protein: 100 mg/dL / Nitrite: Negative   Leuk Esterase: Negative / RBC: 1 /HPF / WBC 6 /HPF   Sq Epi:  / Non Sq Epi: 1 /HPF / Bacteria: Negative        RADIOLOGY & ADDITIONAL STUDIES:

## 2021-04-10 NOTE — PROGRESS NOTE ADULT - ASSESSMENT
85y Female patient admitted s/p R VATS evacuation of empyema, x3 chest tubes in place, s/p Trach and PEG, with the above physical exam, labs, and imaging findings.    Plan:  - Continue chest tubes to suction  - Daily chest xray  - Monitor chest tube out put  - Monitor for airleaks  - Monitor O2 saturation  - DVT/GI prophylaxis  - Pain management  - palliative care recs appreciated  - nephro recs appreciated- no HD at this time

## 2021-04-10 NOTE — PROGRESS NOTE ADULT - SUBJECTIVE AND OBJECTIVE BOX
CTU Attending Progress Daily Note     10 Apr 2021 11:10  Admited 03-11-21, Hospital Day 30d  POD# - 19    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, osteoarithtis, Citizen of the [place]-speaking presenting with cough and fever.    As per daughter, patient has been having dry consistent cough since 2 months. Today, she was being evaluated for knee injections when she was found to be febrile. She also reports progressing generalized weakness with decreased appetite and PO intake.  No URT symptoms, no chest pain, no leg pain, no diarrhea, no urinary symptoms no sick contacts, no recent travel. In ED was hypotensive, was given IVF, started on levophed 0.04 called to evaluate (11 Mar 2021 03:21)    Home Medications:  donepezil 5 mg oral tablet: 1 tab(s) orally once a day (at bedtime) (11 Mar 2021 03:24)  gabapentin 300 mg oral capsule: 1 cap(s) orally 3 times a day, As Needed (11 Mar 2021 03:24)  meloxicam 15 mg oral tablet: 1 tab(s) orally once a day, As Needed (11 Mar 2021 03:24)  zolpidem 10 mg oral tablet: 1 tab(s) orally once a day (at bedtime), As Needed (11 Mar 2021 03:24)    FAMILY HISTORY:    PAST MEDICAL & SURGICAL HISTORY:  Dementia    Osteoarthritis    No significant past surgical history      Interval event for past 24 hr:  DESTIN TERRY  85y had no event.   Current Complains:  DESTIN TERRY has no new complains  Allergies    clindamycin (Hives)    Intolerances      OBJECTIVE:  Vitals last 24 hrs  ICU Vital Signs Last 24 Hrs  T(C): 37.4 (10 Apr 2021 08:00), Max: 37.4 (10 Apr 2021 08:00)  T(F): 99.4 (10 Apr 2021 08:00), Max: 99.4 (10 Apr 2021 08:00)  HR: 92 (10 Apr 2021 10:30) (77 - 111)  BP: 92/50 (10 Apr 2021 10:00) (85/48 - 126/58)  BP(mean): 65 (10 Apr 2021 10:00) (63 - 83)  ABP: 114/45 (10 Apr 2021 10:30) (88/43 - 140/45)  ABP(mean): 64 (10 Apr 2021 10:30) (53 - 75)  RR: 33 (10 Apr 2021 10:30) (11 - 38)  SpO2: 99% (10 Apr 2021 10:30) (97% - 100%)    04-09-21 @ 07:01  -  04-10-21 @ 07:00  --------------------------------------------------------  IN:    Enteral Tube Flush: 210 mL    Insulin: 63 mL    IV PiggyBack: 850 mL    Peptamen A.F.: 1265 mL    Phenylephrine: 130 mL    Propofol: 193.2 mL    sodium chloride 0.9%: 240 mL    Vasopressin: 57.6 mL    Vital High Protein: 150 mL  Total IN: 3158.8 mL    OUT:    Chest Tube (mL): 70 mL    Chest Tube (mL): 10 mL    Indwelling Catheter - Urethral (mL): 865 mL  Total OUT: 945 mL    Total NET: 2213.8 mL        Mode: AC/ CMV (Assist Control/ Continuous Mandatory Ventilation)  RR (machine): 36  TV (machine): 400  FiO2: 40  PEEP: 5  ITime: 1  MAP: 16  PIP: 26     Mode: AC/ CMV (Assist Control/ Continuous Mandatory Ventilation), RR (machine): 36, TV (machine): 400, FiO2: 40, PEEP: 5, ITime: 1, MAP: 16, PIP: 26  CAPILLARY BLOOD GLUCOSE      POCT Blood Glucose.: 137 mg/dL (10 Apr 2021 08:52)  POCT Blood Glucose.: 199 mg/dL (10 Apr 2021 07:00)  POCT Blood Glucose.: 170 mg/dL (10 Apr 2021 05:15)  POCT Blood Glucose.: 109 mg/dL (10 Apr 2021 03:14)  POCT Blood Glucose.: 136 mg/dL (10 Apr 2021 02:19)  POCT Blood Glucose.: 80 mg/dL (10 Apr 2021 00:18)  POCT Blood Glucose.: 89 mg/dL (09 Apr 2021 22:43)  POCT Blood Glucose.: 88 mg/dL (09 Apr 2021 22:42)  POCT Blood Glucose.: 128 mg/dL (09 Apr 2021 20:07)  POCT Blood Glucose.: 157 mg/dL (09 Apr 2021 18:11)  POCT Blood Glucose.: 176 mg/dL (09 Apr 2021 16:32)  POCT Blood Glucose.: 235 mg/dL (09 Apr 2021 15:27)    LABS:  ABG - ( 10 Apr 2021 03:19 )  pH, Arterial: 7.23  pH, Blood: x     /  pCO2: 53    /  pO2: 85    / HCO3: 22    / Base Excess: -5.5  /  SaO2: 96              Blood Gas Arterial, Lactate: 1.8 mmoL/L *H* (04-10-21 @ 03:19)                          8.8    31.29 )-----------( 201      ( 10 Apr 2021 02:00 )             29.7     Hemoglobin: 8.8 g/dL (04-10 @ 02:00)  Hemoglobin: 9.8 g/dL (04-09 @ 01:40)  Hemoglobin: 8.6 g/dL (04-08 @ 04:00)    04-10    148<H>  |  114<H>  |  88<HH>  ----------------------------<  118<H>  4.4   |  21  |  2.4<H>    Ca    7.8<L>      10 Apr 2021 02:00  Mg     2.2     04-10    TPro  5.3<L>  /  Alb  1.9<L>  /  TBili  0.6  /  DBili  0.5<H>  /  AST  49<H>  /  ALT  18  /  AlkPhos  171<H>  04-10    Creatinine, Serum: 2.4 mg/dL (04-10 @ 02:00)  Creatinine, Serum: 2.3 mg/dL (04-09 @ 01:40)  Creatinine, Serum: 2.2 mg/dL (04-08 @ 04:00)  Creatinine, Serum: 2.5 mg/dL (04-07 @ 01:45)          HOSPITAL MEDICATIONS:  MEDICATIONS  (STANDING):  albumin human  5% IVPB 500 milliLiter(s) IV Intermittent once  ALBUTerol    90 MICROgram(s) HFA Inhaler 2 Puff(s) Inhalation every 6 hours  caspofungin IVPB      caspofungin IVPB 50 milliGRAM(s) IV Intermittent every 24 hours  chlorhexidine 4% Liquid 1 Application(s) Topical <User Schedule>  cisatracurium Infusion 3 MICROgram(s)/kG/Min (10.7 mL/Hr) IV Continuous <Continuous>  clotrimazole 1% Cream 1 Application(s) Topical two times a day  collagenase Ointment 1 Application(s) Topical daily  dexMEDEtomidine Infusion 0.202 MICROgram(s)/kG/Hr (3 mL/Hr) IV Continuous <Continuous>  dextrose 40% Gel 15 Gram(s) Oral once  dextrose 5%. 1000 milliLiter(s) (50 mL/Hr) IV Continuous <Continuous>  dextrose 5%. 1000 milliLiter(s) (100 mL/Hr) IV Continuous <Continuous>  dextrose 50% Injectable 25 Gram(s) IV Push once  dextrose 50% Injectable 12.5 Gram(s) IV Push once  dextrose 50% Injectable 25 Gram(s) IV Push once  ferrous    sulfate Liquid 300 milliGRAM(s) Enteral Tube daily  gabapentin   Solution 100 milliGRAM(s) Oral three times a day  glucagon  Injectable 1 milliGRAM(s) IntraMuscular once  heparin   Injectable 5000 Unit(s) SubCutaneous every 12 hours  insulin regular Infusion 1 Unit(s)/Hr (1 mL/Hr) IV Continuous <Continuous>  ipratropium 17 MICROgram(s) HFA Inhaler 2 Puff(s) Inhalation every 6 hours  linezolid  IVPB      linezolid  IVPB 600 milliGRAM(s) IV Intermittent every 12 hours  meropenem  IVPB 1000 milliGRAM(s) IV Intermittent every 24 hours  multivitamin/minerals/iron Oral Solution (CENTRUM) 15 milliLiter(s) Enteral Tube daily  norepinephrine Infusion 0.1 MICROgram(s)/kG/Min (5.57 mL/Hr) IV Continuous <Continuous>  nystatin Cream 1 Application(s) Topical two times a day  pantoprazole  Injectable 40 milliGRAM(s) IV Push two times a day  petrolatum Ophthalmic Ointment 1 Application(s) Both EYES three times a day  phenylephrine    Infusion 1 MICROgram(s)/kG/Min (11.1 mL/Hr) IV Continuous <Continuous>  propofol Infusion 10.101 MICROgram(s)/kG/Min (3.6 mL/Hr) IV Continuous <Continuous>  sodium bicarbonate  Injectable 50 milliEquivalent(s) IV Push <User Schedule>  sodium chloride 0.9%. 1000 milliLiter(s) (10 mL/Hr) IV Continuous <Continuous>  vasopressin Infusion 0.04 Unit(s)/Min (2.4 mL/Hr) IV Continuous <Continuous>    MEDICATIONS  (PRN):  ondansetron Injectable 4 milliGRAM(s) IV Push every 4 hours PRN Nausea and/or Vomiting      REVIEW OF SYSTEMS:    [ x ] Unable to assess ROS because of sedation    PHYSICAL EXAM:          CONSTITUTIONAL: Well-developed; well-nourished; in no acute distress.   	SKIN: warm, dry, no rashes , sacral DTI   	HEENT: Atraumatic. Normocephalic. PERRL. Moist membranes, no conjunctival injection, sclera clear  	NECK: Supple; non tender.  No JVD. No lymphadenopathy.  	CARD: Normal S1, S2. Rate and Rhythm are regular. No murmurs.  	RESP: Good air entry bilaterally, no wheezes, + rales no rhonchi.  	ABD: Soft, not tender, not distended, no CVA tenderness, no rebound no guarding, bowel sounds present  	EXT: Normal ROM.  No clubbing, no cyanosis, +++ pedal edema, no calf pain b/l, Peripheral pulses intact.  	LYMPH: No acute cervical adenopathy.  	NEURO: sedated    RADIOLOGY:  X Reviewed and interpreted by me  CxR from 04-10-21 shows moderate congestion, bilateral infiltrates - L>R, no pneumothorax, no effusion, no cardiomegaly,   ETT above dk in good position, NGT in place, TLC in place, Chest Tubes in place,     ECG:

## 2021-04-10 NOTE — PROGRESS NOTE ADULT - SUBJECTIVE AND OBJECTIVE BOX
GENERAL SURGERY PROGRESS NOTE     DESTIN TERRY  85y  Female  Hospital day :30d  POD:  Procedure: Bronchoscopy, at bedside  Open tracheostomy  Insertion, PEG tube      OVERNIGHT EVENTS: No acute overnight events. On pressors. Right chest tubes x 2 with air leaks.     T(F): 98.7 (04-09-21 @ 20:00), Max: 98.7 (04-09-21 @ 20:00)  HR: 86 (04-09-21 @ 23:30) (77 - 184)  BP: 113/56 (04-09-21 @ 23:00) (85/48 - 130/61)  ABP: 124/45 (04-09-21 @ 23:30) (88/43 - 136/55)  ABP(mean): 66 (04-09-21 @ 23:30) (53 - 83)  RR: 35 (04-09-21 @ 23:30) (11 - 39)  SpO2: 99% (04-09-21 @ 23:30) (95% - 100%)    DIET/FLUIDS: dextrose 5%. 1000 milliLiter(s) IV Continuous <Continuous>  dextrose 5%. 1000 milliLiter(s) IV Continuous <Continuous>  ferrous    sulfate Liquid 300 milliGRAM(s) Enteral Tube daily  multivitamin/minerals/iron Oral Solution (CENTRUM) 15 milliLiter(s) Enteral Tube daily  sodium bicarbonate  Injectable 50 milliEquivalent(s) IV Push <User Schedule>  sodium chloride 0.9%. 1000 milliLiter(s) IV Continuous <Continuous>    NG:                                                                                DRAINS:     BM:   04-08-21 @ 07:01  -  04-09-21 @ 07:00  --------------------------------------------------------  OUT: 500 mL      EMESIS:     URINE:   04-08-21 @ 07:01  -  04-09-21 @ 07:00  --------------------------------------------------------  OUT: 1355 mL       GI proph:  pantoprazole  Injectable 40 milliGRAM(s) IV Push two times a day    AC/ proph: heparin   Injectable 5000 Unit(s) SubCutaneous every 12 hours    ABx: caspofungin IVPB      caspofungin IVPB 50 milliGRAM(s) IV Intermittent every 24 hours  linezolid  IVPB      linezolid  IVPB 600 milliGRAM(s) IV Intermittent every 12 hours  meropenem  IVPB 1000 milliGRAM(s) IV Intermittent every 24 hours      PHYSICAL EXAM:  GENERAL: NAD, tracheostomy in place, sedated  CHEST/LUNG: right sided chest tubes x 2 in place  HEART: Regular rate and rhythm  ABDOMEN: Soft, Nontender, Nondistended;   EXTREMITIES:  No clubbing, cyanosis, or edema      LABS  POCT Blood Glucose.: 80 mg/dL (10 Apr 2021 00:18)  POCT Blood Glucose.: 89 mg/dL (09 Apr 2021 22:43)  POCT Blood Glucose.: 88 mg/dL (09 Apr 2021 22:42)  POCT Blood Glucose.: 128 mg/dL (09 Apr 2021 20:07)  POCT Blood Glucose.: 157 mg/dL (09 Apr 2021 18:11)  POCT Blood Glucose.: 176 mg/dL (09 Apr 2021 16:32)  POCT Blood Glucose.: 235 mg/dL (09 Apr 2021 15:27)  POCT Blood Glucose.: 141 mg/dL (09 Apr 2021 11:00)  POCT Blood Glucose.: 132 mg/dL (09 Apr 2021 08:19)  POCT Blood Glucose.: 142 mg/dL (09 Apr 2021 06:14)  POCT Blood Glucose.: 95 mg/dL (09 Apr 2021 03:04)  POCT Blood Glucose.: 91 mg/dL (09 Apr 2021 01:34)  POCT Blood Glucose.: 86 mg/dL (09 Apr 2021 01:07)                          9.8    35.35 )-----------( 274      ( 09 Apr 2021 01:40 )             32.8         04-09    148<H>  |  114<H>  |  80<HH>  ----------------------------<  86  3.6   |  21  |  2.3<H>      Calcium, Total Serum: 7.9 mg/dL (04-09-21 @ 01:40)      LFTs:             5.6  | 0.4  | 54       ------------------[151     ( 09 Apr 2021 01:40 )  1.9  | 0.3  | 17          Lipase:x      Amylase:x         Blood Gas Arterial, Lactate: 2.3 mmoL/L (04-09-21 @ 08:14)  Blood Gas Arterial, Lactate: 2.2 mmoL/L (04-09-21 @ 06:19)  Blood Gas Arterial, Lactate: 2.3 mmoL/L (04-09-21 @ 03:52)  Blood Gas Arterial, Lactate: 1.7 mmoL/L (04-08-21 @ 03:29)  Blood Gas Arterial, Lactate: 2.0 mmoL/L (04-07-21 @ 03:30)    ABG - ( 09 Apr 2021 08:14 )  pH: 7.29  /  pCO2: 45    /  pO2: 109   / HCO3: 22    / Base Excess: -4.6  /  SaO2: 99              ABG - ( 09 Apr 2021 06:19 )  pH: 7.16  /  pCO2: 68    /  pO2: 80    / HCO3: 24    / Base Excess: -4.5  /  SaO2: 94              ABG - ( 09 Apr 2021 03:52 )  pH: 7.21  /  pCO2: 57    /  pO2: 100   / HCO3: 23    / Base Excess: -5.2  /  SaO2: 97            RADIOLOGY & ADDITIONAL TESTS:  < from: US Abdomen Limited (04.09.21 @ 10:40) >  IMPRESSION:  Unremarkable right upper quadrant abdominal ultrasound.  < end of copied text >    < from: Xray Chest 1 View- PORTABLE-Routine (Xray Chest 1 View- PORTABLE-Routine in AM.) (04.09.21 @ 07:05) >  IMPRESSION:  Grossly stable exam since one day earlier.  < end of copied text >

## 2021-04-10 NOTE — PROGRESS NOTE ADULT - ASSESSMENT
PROBLEMS:  I spent 45 minutes of critical care time examining patient, reviewing vitals, labs, medications, imaging and discussing with the team goals of care to prevent life-threatening in this patient who is at high risk for deterioration or death due to:      Septic Shock - continue IV antibiotics, Continue pressors  Pleural leak - keep CT, decreased TV, increased RR  MENDOZA  - now Stable - monitor  FLuid overload - Mild diuresis  Protein malnutrition - continue Peptamen Feeding - add Albumins 25% 100 cc q 6   stop Aricept due to possible side effects ( Nausea, diarrhea, infection, confusion )      Case and plan discussed with CT Surgeons and CTU PAs.  Continue CTU supportive care and ongoing plan of care as per continuing CTU rounds.   Continue DVT/GI prophylaxis.  Incentive Spirometry 10 times an hour.  Continue to advance physical activity as tolerated and continue PT/OT as directed.    PLAN  Neuro: move all 4 extremities. no sensory or motor deficits  Pain management.   cisatracurium Infusion 3 MICROgram(s)/kG/Min IV Continuous <Continuous>  dexMEDEtomidine Infusion 0.202 MICROgram(s)/kG/Hr IV Continuous <Continuous>  gabapentin   Solution 100 milliGRAM(s) Oral three times a day  ondansetron Injectable 4 milliGRAM(s) IV Push every 4 hours PRN  propofol Infusion 10.101 MICROgram(s)/kG/Min IV Continuous <Continuous>    Pulm: Wean off supplemental oxygen as able. Daily CXR. Encourage coughing, deep breathing and use of incentive spirometry.   ALBUTerol    90 MICROgram(s) HFA Inhaler 2 Puff(s) Inhalation every 6 hours  ipratropium 17 MICROgram(s) HFA Inhaler 2 Puff(s) Inhalation every 6 hours    Cardio: Monitor telemetry/alarms. Continue supportive care   norepinephrine Infusion 0.1 MICROgram(s)/kG/Min IV Continuous <Continuous>  phenylephrine    Infusion 1 MICROgram(s)/kG/Min IV Continuous <Continuous>    GI: Continue stool softeners.    pantoprazole  Injectable 40 milliGRAM(s) IV Push two times a day    Nutrition: Continue present diet  Endocrine and glucose control:   dextrose 40% Gel 15 Gram(s) Oral once  dextrose 50% Injectable 25 Gram(s) IV Push once  dextrose 50% Injectable 12.5 Gram(s) IV Push once  dextrose 50% Injectable 25 Gram(s) IV Push once  glucagon  Injectable 1 milliGRAM(s) IntraMuscular once  insulin regular Infusion 1 Unit(s)/Hr IV Continuous <Continuous>  vasopressin Infusion 0.04 Unit(s)/Min IV Continuous <Continuous>    Renal: monitor urine output, supplement electrolytes as needed,     Vasc: Heparin SC and/or SCDs for DVT prophylaxis  heparin   Injectable 5000 Unit(s) SubCutaneous every 12 hours    ID: Stable, no fever , no chills. Off antibiotics.  caspofungin IVPB      caspofungin IVPB 50 milliGRAM(s) IV Intermittent every 24 hours  linezolid  IVPB      linezolid  IVPB 600 milliGRAM(s) IV Intermittent every 12 hours  meropenem  IVPB 1000 milliGRAM(s) IV Intermittent every 24 hours    Tubes: Monitor Chest tube output  Therapy: OOB/ambulate  Disposition: start planing discharge home or placement    Pertinent clinical, laboratory, radiographic, hemodynamic, echocardiographic, respiratory data, microbiologic data and chart were reviewed and analyzed frequently throughout the course of the day and night. GI and DVT prophylaxis, glycemic control, head of bed elevation and skin care issues were addressed.  Patient seen, examined and plan discussed with CT Surgery / CTICU team during rounds.    [x ] The patient remains in critical and unstable condition, and requires ICU care and monitoring  [ ] The patient is improving but requires continued monitoring and adjustment of therapy

## 2021-04-11 NOTE — PROGRESS NOTE ADULT - ASSESSMENT
Assessment:  85y Female patient admitted s/p R VATS evacuation of empyema, 2 chest tubes in place, s/p Trach and PEG, with the above physical exam, labs, and imaging findings. CT#1 clamped, cxr stable    Plan:  - Continue chest tube #3 to suction  - likely dc #1 today  - Daily chest xray  - Monitor chest tube out put  - Monitor for airleaks  - Monitor O2 saturation  - DVT/GI prophylaxis  - Pain management  - palliative care to see pt monday and discuss GOC with family    Date/Time: 04-11-21 @ 01:25

## 2021-04-12 NOTE — PROGRESS NOTE ADULT - SUBJECTIVE AND OBJECTIVE BOX
Patient is a 85y old  Female who presents with a chief complaint of empyema (06 Apr 2021 09:24)  pt seen and evaluated   when pt seen earlier today feed were off - off since 1pm yesterday  albumin infusions noted  pt vent dependent, off sedation, unresponsive, eyes open  abd soft, ND  ++anasarca of sacrum, thighs, extremities  + chest tube  + sacral decubiti  dignishield - stool output black and watery  Vital Signs Last 24 Hrs  T(C): 35.7 (12 Apr 2021 12:00), Max: 36.2 (12 Apr 2021 00:00)  T(F): 96.3 (12 Apr 2021 12:00), Max: 97.1 (12 Apr 2021 00:00)  HR: 79 (12 Apr 2021 14:00) (73 - 95)  BP: 131/82 (12 Apr 2021 08:00) (93/50 - 149/76)  BP(mean): 102 (12 Apr 2021 08:00) (69 - 104)  RR: 36 (12 Apr 2021 14:00) (31 - 41)  SpO2: 100% (12 Apr 2021 14:00) (98% - 100%)    MEDICATIONS  (STANDING):  albumin human 25% IVPB 100 milliLiter(s) IV Intermittent every 6 hours  ALBUTerol    90 MICROgram(s) HFA Inhaler 2 Puff(s) Inhalation every 6 hours     caspofungin IVPB 50 milliGRAM(s) IV Intermittent every 24 hours  chlorhexidine 4% Liquid 1 Application(s) Topical <User Schedule>  clotrimazole 1% Cream 1 Application(s) Topical two times a day  collagenase Ointment 1 Application(s) Topical daily  dexMEDEtomidine Infusion 0.202 MICROgram(s)/kG/Hr (3 mL/Hr) IV Continuous <Continuous>  ferrous    sulfate Liquid 300 milliGRAM(s) Enteral Tube daily  gabapentin   Solution 100 milliGRAM(s) Oral three times a day  insulin glargine Injectable (LANTUS) 20 Unit(s) SubCutaneous every morning  insulin lispro (ADMELOG) corrective regimen sliding scale   SubCutaneous every 4 hours  ipratropium 17 MICROgram(s) HFA Inhaler 2 Puff(s) Inhalation every 6 hours  linezolid  IVPB 600 milliGRAM(s) IV Intermittent every 12 hours  meropenem  IVPB 1000 milliGRAM(s) IV Intermittent every 24 hours  multivitamin/minerals/iron Oral Solution (CENTRUM) 15 milliLiter(s) Enteral Tube daily  nystatin Cream 1 Application(s) Topical two times a day  pantoprazole  Injectable 40 milliGRAM(s) IV Push two times a day  petrolatum Ophthalmic Ointment 1 Application(s) Both EYES three times a day  phenylephrine    Infusion 1 MICROgram(s)/kG/Min (11.1 mL/Hr) IV Continuous <Continuous>  propofol Infusion 10.101 MICROgram(s)/kG/Min (3.6 mL/Hr) IV Continuous <Continuous>PROPOFOL OFF SINCE AFTERNOON OF 4/10  sodium bicarbonate  Injectable 50 milliEquivalent(s) IV Push <User Schedule>  sodium chloride 0.9%. 1000 milliLiter(s) (10 mL/Hr) IV Continuous <Continuous>  vasopressin Infusion 0.04 Unit(s)/Min (2.4 mL/Hr) IV Continuous <Continuous>                          7.6    17.32 )-----------( 76       ( 12 Apr 2021 01:30 )             24.8   04-12    152<H>  |  116<H>  |  105<HH>  ----------------------------<  108<H>  3.6   |  21  |  2.2<H>    Ca    8.7      12 Apr 2021 01:30  Mg     2.3     04-12    TPro  5.4<L>  /  Alb  3.5  /  TBili  2.6<H>  /  DBili  2.0<H>  /  AST  40  /  ALT  13  /  AlkPhos  125<H>  04-12

## 2021-04-12 NOTE — PROGRESS NOTE ADULT - ASSESSMENT
Assessment:  85y Female patient admitted s/p R VATS evacuation of empyema, 1 chest tube in place, s/p Trach and PEG, with the above physical exam, labs, and imaging findings.     Plan:  - Continue chest tube #3 to suction  - Daily chest xray  - Monitor chest tube out put  - Monitor for airleaks  - Monitor O2 saturation  - DVT/GI prophylaxis  - Pain management  - palliative care to see pt monday and discuss GOC with family

## 2021-04-12 NOTE — PROGRESS NOTE ADULT - SUBJECTIVE AND OBJECTIVE BOX
SHAYLAALOKKARSTEN, MIGNONIKI  85y, Female  Allergy: clindamycin (Hives)      LOS  32d    CHIEF COMPLAINT: empyema (06 Apr 2021 09:24)      INTERVAL EVENTS/HPI  - No acute events overnight  - T(F): , Max: 97.1 (04-12-21 @ 00:00)  - Denies any worsening symptoms  - Tolerating medication  - WBC Count: 17.32 (04-12-21 @ 01:30)  WBC Count: 22.28 (04-11-21 @ 07:00)     - Creatinine, Serum: 2.2 (04-12-21 @ 01:30)  Creatinine, Serum: 2.2 (04-11-21 @ 02:00)       ROS  General: Denies rigors, nightsweats  HEENT: Denies headache, rhinorrhea, sore throat, eye pain  CV: Denies CP, palpitations  PULM: Denies wheezing, hemoptysis  GI: Denies hematemesis, hematochezia, melena  : Denies discharge, hematuria  MSK: Denies arthralgias, myalgias  SKIN: Denies rash, lesions  NEURO: Denies paresthesias, weakness  PSYCH: Denies depression, anxiety    VITALS:  T(F): 96.2, Max: 97.1 (04-12-21 @ 00:00)  HR: 79  BP: 118/59  RR: 42Vital Signs Last 24 Hrs  T(C): 35.7 (12 Apr 2021 16:00), Max: 36.2 (12 Apr 2021 00:00)  T(F): 96.2 (12 Apr 2021 16:00), Max: 97.1 (12 Apr 2021 00:00)  HR: 79 (12 Apr 2021 16:00) (73 - 95)  BP: 118/59 (12 Apr 2021 14:30) (93/50 - 149/76)  BP(mean): 83 (12 Apr 2021 14:30) (69 - 104)  RR: 42 (12 Apr 2021 16:00) (34 - 43)  SpO2: 100% (12 Apr 2021 16:00) (98% - 100%)    PHYSICAL EXAM:  Gen: NAD, resting in bed  HEENT: Normocephalic, atraumatic  Neck: supple, no lymphadenopathy  CV: Regular rate & regular rhythm  Lungs: decreased BS at bases, no fremitus  Abdomen: Soft, BS present  Ext: Warm, well perfused  Neuro: non focal, awake  Skin: no rash, no erythema  Lines: no phlebitis    FH: Non-contributory  Social Hx: Non-contributory    TESTS & MEASUREMENTS:                        7.6    17.32 )-----------( 76       ( 12 Apr 2021 01:30 )             24.8     04-12    152<H>  |  116<H>  |  105<HH>  ----------------------------<  108<H>  3.6   |  21  |  2.2<H>    Ca    8.7      12 Apr 2021 01:30  Mg     2.3     04-12    TPro  5.4<L>  /  Alb  3.5  /  TBili  2.6<H>  /  DBili  2.0<H>  /  AST  40  /  ALT  13  /  AlkPhos  125<H>  04-12    eGFR if Non African American: 20 mL/min/1.73M2 (04-12-21 @ 01:30)  eGFR if African American: 23 mL/min/1.73M2 (04-12-21 @ 01:30)    LIVER FUNCTIONS - ( 12 Apr 2021 01:30 )  Alb: 3.5 g/dL / Pro: 5.4 g/dL / ALK PHOS: 125 U/L / ALT: 13 U/L / AST: 40 U/L / GGT: x               Culture - Blood (collected 04-03-21 @ 13:21)  Source: .Blood Blood  Final Report (04-08-21 @ 20:01):    No Growth Final    Culture - Blood (collected 03-31-21 @ 10:52)  Source: .Blood Blood  Final Report (04-05-21 @ 23:01):    No Growth Final    Culture - Acid Fast - Tissue w/Smear (collected 03-22-21 @ 13:33)  Source: .Tissue PLEURAL PEEL  Preliminary Report (03-31-21 @ 15:03):    No growth at 1 week.    Culture - Fungal, Tissue (collected 03-22-21 @ 13:33)  Source: .Tissue None  Preliminary Report (03-31-21 @ 15:02):    No growth    Culture - Tissue with Gram Stain (collected 03-22-21 @ 13:33)  Source: .Tissue None  Gram Stain (03-23-21 @ 04:38):    Rare polymorphonuclear leukocytes seen per low power field    Few White blood cells seen per low power field    Few Gram positive cocci in pairs per oil power field  Final Report (04-01-21 @ 16:29):    No growth    Organism seen in Gram stain is non-viable after prolonged    incubation and repeated subculture.    Culture - Fungal, Bronchial (collected 03-22-21 @ 12:58)  Source: .Bronchial None  Preliminary Report (03-25-21 @ 11:20):    Rare Yeast    Culture - Acid Fast - Bronchial w/Smear (collected 03-22-21 @ 12:58)  Source: Bronch Wash None  Preliminary Report (03-31-21 @ 15:03):    No growth at 1 week.    Culture - Bronchial (collected 03-22-21 @ 12:58)  Source: .Bronchial None  Gram Stain (03-23-21 @ 07:58):    Numerous polymorphonuclear leukocytes per low power field    No Squamous epithelial cells per low power field    No organisms seen per oil power field  Final Report (03-24-21 @ 22:11):    Normal Respiratory Narda present    Culture - Fungal, Body Fluid (collected 03-22-21 @ 09:26)  Source: .Body Fluid None  Preliminary Report (03-31-21 @ 15:02):    No growth    Culture - Acid Fast - Body Fluid w/Smear (collected 03-22-21 @ 09:26)  Source: .Body Fluid None  Preliminary Report (03-31-21 @ 15:03):    No growth at 1 week.    Culture - Body Fluid with Gram Stain (collected 03-22-21 @ 09:26)  Source: .Body Fluid None  Gram Stain (03-23-21 @ 04:38):    polymorphonuclear leukocytes seen    No organisms seen    by cytocentrifuge  Final Report (03-27-21 @ 17:22):    No growth at 5 days    Culture - Blood (collected 03-17-21 @ 16:00)  Source: .Blood Blood-Peripheral  Final Report (03-23-21 @ 02:39):    No Growth Final            INFECTIOUS DISEASES TESTING  COVID-19 PCR: NotDetec (04-05-21 @ 10:12)  COVID-19 PCR: NotDetec (04-04-21 @ 14:03)  Fungitell: <31 (04-01-21 @ 01:03)  Aspergillus Galactomannan Antigen: <0.500 (04-01-21 @ 01:03)  COVID-19 PCR: NotDetec (03-29-21 @ 14:17)  Procalcitonin, Serum: 2.29 (03-27-21 @ 10:50)  MRSA PCR Result.: Negative (03-20-21 @ 21:38)  COVID-19 PCR: NotDetec (03-20-21 @ 12:30)  Fungitell: 46 (03-17-21 @ 16:00)  Procalcitonin, Serum: 0.29 (03-17-21 @ 11:30)  Procalcitonin, Serum: 0.31 (03-16-21 @ 10:15)  Legionella Antigen, Urine: Negative (03-12-21 @ 10:30)  Streptococcus Pneumoniae Ag Urine: Negative (03-12-21 @ 10:30)  MRSA PCR Result.: Negative (03-12-21 @ 10:30)  Legionella Antigen, Urine: Negative (03-11-21 @ 08:10)  Streptococcus Pneumoniae Ag Urine: Negative (03-11-21 @ 08:10)  Procalcitonin, Serum: 1.75 (03-11-21 @ 06:31)  Rapid RVP Result: NotDetec (03-10-21 @ 22:10)      INFLAMMATORY MARKERS      RADIOLOGY & ADDITIONAL TESTS:  I have personally reviewed the last available Chest xray  CXR      CT      CARDIOLOGY TESTING  12 Lead ECG:   Ventricular Rate 89 BPM    Atrial Rate 89 BPM    P-R Interval 132 ms    QRS Duration 84 ms    Q-T Interval 364 ms    QTC Calculation(Bazett) 442 ms    P Axis 58 degrees    R Axis 23 degrees    T Axis -48 degrees    Diagnosis Line Sinus rhythm with marked sinus arrhythmia with Premature atrial complexes  Nonspecific ST and T wave abnormality  Abnormal ECG    Confirmed by Job Bains ([id number]) on 4/7/2021 11:26:01 AM (04-07-21 @ 09:41)      MEDICATIONS  albumin human 25% IVPB 100 IV Intermittent every 6 hours  ALBUTerol    90 MICROgram(s) HFA Inhaler 2 Inhalation every 6 hours  caspofungin IVPB     caspofungin IVPB 50 IV Intermittent every 24 hours  chlorhexidine 4% Liquid 1 Topical <User Schedule>  clotrimazole 1% Cream 1 Topical two times a day  collagenase Ointment 1 Topical daily  dexMEDEtomidine Infusion 0.202 IV Continuous <Continuous>  dextrose 40% Gel 15 Oral once  dextrose 5%. 1000 IV Continuous <Continuous>  dextrose 5%. 1000 IV Continuous <Continuous>  dextrose 5%. 1000 IV Continuous <Continuous>  dextrose 5%. 1000 IV Continuous <Continuous>  dextrose 5%. 1000 IV Continuous <Continuous>  dextrose 50% Injectable 25 IV Push once  dextrose 50% Injectable 12.5 IV Push once  dextrose 50% Injectable 25 IV Push once  ferrous    sulfate Liquid 300 Enteral Tube daily  gabapentin   Solution 100 Oral three times a day  glucagon  Injectable 1 IntraMuscular once  glucagon  Injectable 1 IntraMuscular once  insulin glargine Injectable (LANTUS) 20 SubCutaneous every morning  insulin lispro (ADMELOG) corrective regimen sliding scale  SubCutaneous every 4 hours  ipratropium 17 MICROgram(s) HFA Inhaler 2 Inhalation every 6 hours  linezolid  IVPB 600 IV Intermittent every 12 hours  linezolid  IVPB     meropenem  IVPB 1000 IV Intermittent every 24 hours  multivitamin/minerals/iron Oral Solution (CENTRUM) 15 Enteral Tube daily  nystatin Cream 1 Topical two times a day  pantoprazole  Injectable 40 IV Push two times a day  petrolatum Ophthalmic Ointment 1 Both EYES three times a day  phenylephrine    Infusion 1 IV Continuous <Continuous>  propofol Infusion 10.101 IV Continuous <Continuous>  sodium bicarbonate  Injectable 50 IV Push <User Schedule>  sodium chloride 0.9%. 1000 IV Continuous <Continuous>  vasopressin Infusion 0.04 IV Continuous <Continuous>      WEIGHT  Weight (kg): 67.5 (04-07-21 @ 06:00)  Creatinine, Serum: 2.2 mg/dL (04-12-21 @ 01:30)      ANTIBIOTICS:  caspofungin IVPB      caspofungin IVPB 50 milliGRAM(s) IV Intermittent every 24 hours  linezolid  IVPB 600 milliGRAM(s) IV Intermittent every 12 hours  linezolid  IVPB      meropenem  IVPB 1000 milliGRAM(s) IV Intermittent every 24 hours      All available historical records have been reviewed

## 2021-04-12 NOTE — PROGRESS NOTE ADULT - ASSESSMENT
PROBLEMS:  I spent 45 minutes of critical care time examining patient, reviewing vitals, labs, medications, imaging and discussing with the team goals of care to prevent life-threatening in this patient who is at high risk for deterioration or death due to:      Septic Shock - continue IV antibiotics, now off pressors - CT of Chest, recall ID  worsening anemia with guiac positive stool - recall GI  absent bowel sounds - CT of abdomen - oral contrast  Thrombocytopenia - possibly due to linezolid   Pleural air leak - keep CT,   unresponsiveness - CT of Head and Neurology consult  MENDOZA  - now Stable - monitor  rising BUN - probably due to albumin IV  FLuid overload - Mild diuresis  Protein malnutrition - continue Peptamen Feeding - Albumins 25% 100 cc q 6   Sacral decubiti - local care         Case and plan discussed with CT Surgeons and CTU PAs.  Continue CTU supportive care and ongoing plan of care as per continuing CTU rounds.   Continue DVT/GI prophylaxis.  Incentive Spirometry 10 times an hour.  Continue to advance physical activity as tolerated and continue PT/OT as directed.    PLAN  Neuro: move all 4 extremities. no sensory or motor deficits  Pain management.   dexMEDEtomidine Infusion 0.202 MICROgram(s)/kG/Hr IV Continuous <Continuous>  gabapentin   Solution 100 milliGRAM(s) Oral three times a day  ondansetron Injectable 4 milliGRAM(s) IV Push every 4 hours PRN  propofol Infusion 10.101 MICROgram(s)/kG/Min IV Continuous <Continuous>    Pulm: Wean off supplemental oxygen as able. Daily CXR. Encourage coughing, deep breathing and use of incentive spirometry.   ALBUTerol    90 MICROgram(s) HFA Inhaler 2 Puff(s) Inhalation every 6 hours  ipratropium 17 MICROgram(s) HFA Inhaler 2 Puff(s) Inhalation every 6 hours    Cardio: Monitor telemetry/alarms. Continue supportive care   phenylephrine    Infusion 1 MICROgram(s)/kG/Min IV Continuous <Continuous>    GI: Continue stool softeners.    pantoprazole  Injectable 40 milliGRAM(s) IV Push two times a day    Nutrition: Continue present diet  Endocrine and glucose control:   dextrose 40% Gel 15 Gram(s) Oral once  dextrose 50% Injectable 25 Gram(s) IV Push once  dextrose 50% Injectable 12.5 Gram(s) IV Push once  dextrose 50% Injectable 25 Gram(s) IV Push once  glucagon  Injectable 1 milliGRAM(s) IntraMuscular once  glucagon  Injectable 1 milliGRAM(s) IntraMuscular once  insulin glargine Injectable (LANTUS) 20 Unit(s) SubCutaneous every morning  insulin lispro (ADMELOG) corrective regimen sliding scale   SubCutaneous every 4 hours  vasopressin Infusion 0.04 Unit(s)/Min IV Continuous <Continuous>    Renal: monitor urine output, supplement electrolytes as needed,     Vasc: Heparin SC and/or SCDs for DVT prophylaxis    ID: Stable, no fever , no chills. Off antibiotics.  caspofungin IVPB      caspofungin IVPB 50 milliGRAM(s) IV Intermittent every 24 hours  linezolid  IVPB      linezolid  IVPB 600 milliGRAM(s) IV Intermittent every 12 hours  meropenem  IVPB 1000 milliGRAM(s) IV Intermittent every 24 hours    Tubes: Monitor Chest tube output  Therapy: OOB/ambulate  Disposition: start planing discharge home or placement    Pertinent clinical, laboratory, radiographic, hemodynamic, echocardiographic, respiratory data, microbiologic data and chart were reviewed and analyzed frequently throughout the course of the day and night. GI and DVT prophylaxis, glycemic control, head of bed elevation and skin care issues were addressed.  Patient seen, examined and plan discussed with CT Surgery / CTICU team during rounds.    [x ] The patient remains in critical and unstable condition, and requires ICU care and monitoring  [ ] The patient is improving but requires continued monitoring and adjustment of therapy

## 2021-04-12 NOTE — PROCEDURE NOTE - NSPROCDETAILS_GEN_ALL_CORE
location identified, draped/prepped, sterile technique used, needle inserted/introduced/positive blood return obtained via catheter/connected to a pressurized flush line/sutured in place/Seldinger technique/all materials/supplies accounted for at end of procedure
location identified, draped/prepped, sterile technique used/sterile dressing applied/sterile technique, catheter placed/supine position/ultrasound guidance
guidewire recovered/lumen(s) aspirated and flushed/sterile dressing applied/sterile technique, catheter placed

## 2021-04-12 NOTE — PROGRESS NOTE ADULT - SUBJECTIVE AND OBJECTIVE BOX
CTU Attending Progress Daily Note     12 Apr 2021 09:35  Admited 03-11-21, Hospital Day 32d  POD# - 21    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, osteoarithtis, Bolivian-speaking presenting with cough and fever.    As per daughter, patient has been having dry consistent cough since 2 months. Today, she was being evaluated for knee injections when she was found to be febrile. She also reports progressing generalized weakness with decreased appetite and PO intake.  No URT symptoms, no chest pain, no leg pain, no diarrhea, no urinary symptoms no sick contacts, no recent travel. In ED was hypotensive, was given IVF, started on levophed 0.04 called to evaluate (11 Mar 2021 03:21)    Home Medications:  donepezil 5 mg oral tablet: 1 tab(s) orally once a day (at bedtime) (11 Mar 2021 03:24)  gabapentin 300 mg oral capsule: 1 cap(s) orally 3 times a day, As Needed (11 Mar 2021 03:24)  meloxicam 15 mg oral tablet: 1 tab(s) orally once a day, As Needed (11 Mar 2021 03:24)  zolpidem 10 mg oral tablet: 1 tab(s) orally once a day (at bedtime), As Needed (11 Mar 2021 03:24)    FAMILY HISTORY:    PAST MEDICAL & SURGICAL HISTORY:  Dementia    Osteoarthritis    No significant past surgical history      Interval event for past 24 hr:  DESTIN TERRY  85y had no event.   Current Complains:  DESTIN TERRY has no new complains  Allergies    clindamycin (Hives)    Intolerances      OBJECTIVE:  Vitals last 24 hrs  ICU Vital Signs Last 24 Hrs  T(C): 35.8 (12 Apr 2021 08:00), Max: 36.2 (12 Apr 2021 00:00)  T(F): 96.4 (12 Apr 2021 08:00), Max: 97.1 (12 Apr 2021 00:00)  HR: 89 (12 Apr 2021 09:00) (73 - 95)  BP: 131/82 (12 Apr 2021 08:00) (93/50 - 149/76)  BP(mean): 102 (12 Apr 2021 08:00) (69 - 104)  ABP: 135/64 (12 Apr 2021 09:00) (101/44 - 157/72)  ABP(mean): 91 (12 Apr 2021 09:00) (62 - 107)  RR: 36 (12 Apr 2021 09:00) (31 - 41)  SpO2: 100% (12 Apr 2021 09:00) (99% - 100%)    04-11-21 @ 07:01  -  04-12-21 @ 07:00  --------------------------------------------------------  IN:    Albumin 25%  -  50 mL: 300 mL    dextrose 5%: 1200 mL    Enteral Tube Flush: 170 mL    Insulin: 4 mL    IV PiggyBack: 1000 mL    Peptamen A.F.: 330 mL    Phenylephrine: 15 mL    PRBCs (Packed Red Blood Cells): 250 mL    Vasopressin: 16.8 mL  Total IN: 3285.8 mL    OUT:    Chest Tube (mL): 0 mL    Chest Tube (mL): 90 mL    Gastric Aspirate - Discarded (mL): 160 mL    Indwelling Catheter - Urethral (mL): 940 mL    Rectal Tube (mL): 300 mL  Total OUT: 1490 mL    Total NET: 1795.8 mL        Mode: AC/ CMV (Assist Control/ Continuous Mandatory Ventilation)  RR (machine): 36  TV (machine): 400  FiO2: 40  PEEP: 5  ITime: 1  MAP: 18  PIP: 29     Mode: AC/ CMV (Assist Control/ Continuous Mandatory Ventilation), RR (machine): 36, TV (machine): 400, FiO2: 40, PEEP: 5, ITime: 1, MAP: 18, PIP: 29  CAPILLARY BLOOD GLUCOSE      POCT Blood Glucose.: 131 mg/dL (12 Apr 2021 07:03)  POCT Blood Glucose.: 111 mg/dL (12 Apr 2021 01:28)  POCT Blood Glucose.: 125 mg/dL (11 Apr 2021 22:12)  POCT Blood Glucose.: 217 mg/dL (11 Apr 2021 16:45)  POCT Blood Glucose.: 169 mg/dL (11 Apr 2021 11:48)    LABS:  ABG - ( 12 Apr 2021 03:07 )  pH, Arterial: 7.34  pH, Blood: x     /  pCO2: 42    /  pO2: 118   / HCO3: 23    / Base Excess: -2.6  /  SaO2: 99              Blood Gas Arterial, Lactate: 1.8 mmoL/L *H* (04-12-21 @ 03:07)                          7.6    17.32 )-----------( 76       ( 12 Apr 2021 01:30 )             24.8     Hemoglobin: 7.6 g/dL (04-12 @ 01:30)  Hemoglobin: 7.0 g/dL (04-11 @ 07:00)  Hemoglobin: 7.3 g/dL (04-11 @ 02:00)  Hemoglobin: 8.8 g/dL (04-10 @ 02:00)    04-12    152<H>  |  116<H>  |  105<HH>  ----------------------------<  108<H>  3.6   |  21  |  2.2<H>    Ca    8.7      12 Apr 2021 01:30  Mg     2.3     04-12    TPro  5.4<L>  /  Alb  3.5  /  TBili  2.6<H>  /  DBili  2.0<H>  /  AST  40  /  ALT  13  /  AlkPhos  125<H>  04-12    Creatinine, Serum: 2.2 mg/dL (04-12 @ 01:30)  Creatinine, Serum: 2.2 mg/dL (04-11 @ 02:00)  Creatinine, Serum: 2.4 mg/dL (04-10 @ 02:00)  Creatinine, Serum: 2.3 mg/dL (04-09 @ 01:40)    PT/INR - ( 11 Apr 2021 07:00 )   PT: 14.40 sec;   INR: 1.25 ratio     PTT - ( 11 Apr 2021 07:00 )  PTT:36.4 sec      HOSPITAL MEDICATIONS:  MEDICATIONS  (STANDING):  albumin human 25% IVPB 100 milliLiter(s) IV Intermittent every 6 hours  ALBUTerol    90 MICROgram(s) HFA Inhaler 2 Puff(s) Inhalation every 6 hours  caspofungin IVPB      caspofungin IVPB 50 milliGRAM(s) IV Intermittent every 24 hours  chlorhexidine 4% Liquid 1 Application(s) Topical <User Schedule>  clotrimazole 1% Cream 1 Application(s) Topical two times a day  collagenase Ointment 1 Application(s) Topical daily  dexMEDEtomidine Infusion 0.202 MICROgram(s)/kG/Hr (3 mL/Hr) IV Continuous <Continuous>  dextrose 40% Gel 15 Gram(s) Oral once  dextrose 5%. 1000 milliLiter(s) (50 mL/Hr) IV Continuous <Continuous>  dextrose 5%. 1000 milliLiter(s) (50 mL/Hr) IV Continuous <Continuous>  dextrose 5%. 1000 milliLiter(s) (100 mL/Hr) IV Continuous <Continuous>  dextrose 5%. 1000 milliLiter(s) (50 mL/Hr) IV Continuous <Continuous>  dextrose 5%. 1000 milliLiter(s) (100 mL/Hr) IV Continuous <Continuous>  dextrose 50% Injectable 25 Gram(s) IV Push once  dextrose 50% Injectable 12.5 Gram(s) IV Push once  dextrose 50% Injectable 25 Gram(s) IV Push once  diatrizoate meglumine/diatrizoate sodium. 900 milliLiter(s) Oral once  ferrous    sulfate Liquid 300 milliGRAM(s) Enteral Tube daily  gabapentin   Solution 100 milliGRAM(s) Oral three times a day  glucagon  Injectable 1 milliGRAM(s) IntraMuscular once  glucagon  Injectable 1 milliGRAM(s) IntraMuscular once  insulin glargine Injectable (LANTUS) 20 Unit(s) SubCutaneous every morning  insulin lispro (ADMELOG) corrective regimen sliding scale   SubCutaneous every 4 hours  ipratropium 17 MICROgram(s) HFA Inhaler 2 Puff(s) Inhalation every 6 hours  linezolid  IVPB      linezolid  IVPB 600 milliGRAM(s) IV Intermittent every 12 hours  meropenem  IVPB 1000 milliGRAM(s) IV Intermittent every 24 hours  multivitamin/minerals/iron Oral Solution (CENTRUM) 15 milliLiter(s) Enteral Tube daily  nystatin Cream 1 Application(s) Topical two times a day  pantoprazole  Injectable 40 milliGRAM(s) IV Push two times a day  petrolatum Ophthalmic Ointment 1 Application(s) Both EYES three times a day  phenylephrine    Infusion 1 MICROgram(s)/kG/Min (11.1 mL/Hr) IV Continuous <Continuous>  propofol Infusion 10.101 MICROgram(s)/kG/Min (3.6 mL/Hr) IV Continuous <Continuous>  sodium bicarbonate  Injectable 50 milliEquivalent(s) IV Push <User Schedule>  sodium chloride 0.9%. 1000 milliLiter(s) (10 mL/Hr) IV Continuous <Continuous>  vasopressin Infusion 0.04 Unit(s)/Min (2.4 mL/Hr) IV Continuous <Continuous>    MEDICATIONS  (PRN):  ondansetron Injectable 4 milliGRAM(s) IV Push every 4 hours PRN Nausea and/or Vomiting      REVIEW OF SYSTEMS:    [ x ] Unable to assess ROS because unresponsive on Vent    PHYSICAL EXAM:          CONSTITUTIONAL: Well-developed; well-nourished; in no acute distress.   	SKIN: warm, dry, no rashes or lesions - sacral DTI   	HEENT: Atraumatic. Normocephalic. PERRL. Moist membranes, no conjunctival injection, sclera clear  	NECK: Supple; non tender.  No JVD. No lymphadenopathy.  	CARD: Normal S1, S2. Rate and Rhythm are regular. No murmurs.  	RESP: Good air entry bilaterally, no wheezes, + rales no rhonchi.  	ABD: Soft, not tender, not distended, no CVA tenderness, no rebound no guarding, bowel sounds absent   	EXT: Normal ROM.  No clubbing, no cyanosis, +++ pedal edema, no calf pain b/l, Peripheral pulses intact.  	LYMPH: No acute cervical adenopathy.  	NEURO: minimal respond to tactile stimuli, Pupils ar reactive and equal, Has a gag reflex    RADIOLOGY:  X Reviewed and interpreted by me  CxR from 04-12-21 shows moderate congestion and bilateral infiltrates, no pneumothorax, no effusion, no cardiomegaly,   ETT above dk in good position, NGT in place, TLC in place, Chest Tubes in place,     ECG:

## 2021-04-12 NOTE — PROGRESS NOTE ADULT - ASSESSMENT
ASSESSMENT/PLAN  85y Female patient admitted 3/11  s/p R VATS evacuation of empyema, x3 chest tubes in place post operatively remains intubated   ARDS  shock/ pressors  anemia  bleeding from GT --> anemia  MENDOZA with hyperphosphatemia - 5.3 yesterday, improved  hypokalemia/hypomagnesemia  hyperglycemia      PLAN  - enteral feeds had been Peptamen AF at 55 ml/h   - without any propofol, should increase feeds to 65 ml/h  - glycemic control - concern is that currently feeds are continuous but insulin is given q4h. If glycemic control too variable, consider either insulin drip with continuous feeds, of intermittent insulin with intermittent gravity feeds.  - note addition of albumin infusions  - feeds and most drips off > 24 h - - will d/w CTU team regarding continued care

## 2021-04-12 NOTE — PROGRESS NOTE ADULT - ASSESSMENT
85yFemale being evaluated for goals of care. Patient has PMH of dementia, osteoarithtis, Bengali-speaking presenting with R empyma had VAT on R and developed ARDS sever sepsis. Patient's hosptial course complicated by ARDS, patient now has tracheostomy and PEG, MENDOZA, septic shock on pressors, acute hypoxic hypercapnic resp failure dur to ards/pnumonia. currently off pressors; having HCT done today; neuro c/s pending; GI c/s pending for guaiac     MEDD (morphine equivalent daily dose):0    See Recs below.    Please call [phone] with questions or concerns 24/7.   We will continue to follow.     Discussed with Angelica GRAVES and bedside RN

## 2021-04-12 NOTE — PROGRESS NOTE ADULT - SUBJECTIVE AND OBJECTIVE BOX
DESTIN TERRY             MRN-[id number]    CC: unable to participate in encounter    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, osteoarithtis, Tajik-speaking presenting with cough and fever.    As per daughter, patient has been having dry consistent cough since 2 months. Today, she was being evaluated for knee injections when she was found to be febrile. She also reports progressing generalized weakness with decreased appetite and PO intake.  No URT symptoms, no chest pain, no leg pain, no diarrhea, no urinary symptoms no sick contacts, no recent travel. In ED was hypotensive, was given IVF, started on levophed 0.04 called to evaluate (11 Mar 2021 03:21)      SUBJECTIVE:    ROS:  DYSPNEA: N	  NAUS/VOM: N	  SECRETIONS: minimal	  AGITATION: N  Pain (Y/N):     no evidence  -Provocation/Palliation:  -Quality/Quantity:  -Radiating:  -Severity:  -Timing/Frequency:  -Impact on ADLs:    OTHER REVIEW OF SYSTEMS:  UNABLE TO OBTAIN  due to:    PEx:  85y            85y  T(C): 35.7 (04-12-21 @ 16:00), Max: 36.2 (04-12-21 @ 00:00)  T(F): 96.2 (04-12-21 @ 16:00), Max: 97.1 (04-12-21 @ 00:00)  HR: 79 (04-12-21 @ 16:00) (73 - 95)  BP: 118/59 (04-12-21 @ 14:30) (93/50 - 149/76)  RR: 42 (04-12-21 @ 16:00) (34 - 43)  SpO2: 100% (04-12-21 @ 16:00) (98% - 100%)  Wt(kg): --    General: awake. obese, lying in bed, NAD, vented non-responsive  HEENT:  NCAT Pupils - not tracking  Non icteric   CVS: AF  Resp: Unlabored  vent dependent; +Chest tubes  GI:  obese rectal tube  :  Adams  Musc: anasarca  Neuro: non responsive  Skin: per nursing    Last BM: rectal tube  04-05-21 @ 07:01  -  04-06-21 @ 07:00  --------------------------------------------------------  OUT: 100 mL    04-06-21 @ 07:01  -  04-07-21 @ 07:00  --------------------------------------------------------  OUT: 175 mL    04-07-21 @ 07:01  -  04-08-21 @ 07:00  --------------------------------------------------------  OUT: 490 mL    04-08-21 @ 07:01  -  04-09-21 @ 07:00  --------------------------------------------------------  OUT: 500 mL    04-10-21 @ 07:01  -  04-11-21 @ 07:00  --------------------------------------------------------  OUT: 1450 mL    04-11-21 @ 07:01  -  04-12-21 @ 07:00  --------------------------------------------------------  OUT: 300 mL        ALLERGIES: Female    OPIATE NAÏVE (Y/N): y    MEDICATIONS: REVIEWED  MEDICATIONS  (STANDING):  albumin human 25% IVPB 100 milliLiter(s) IV Intermittent every 6 hours  ALBUTerol    90 MICROgram(s) HFA Inhaler 2 Puff(s) Inhalation every 6 hours  caspofungin IVPB      caspofungin IVPB 50 milliGRAM(s) IV Intermittent every 24 hours  chlorhexidine 4% Liquid 1 Application(s) Topical <User Schedule>  clotrimazole 1% Cream 1 Application(s) Topical two times a day  collagenase Ointment 1 Application(s) Topical daily  dexMEDEtomidine Infusion 0.202 MICROgram(s)/kG/Hr (3 mL/Hr) IV Continuous <Continuous>  dextrose 40% Gel 15 Gram(s) Oral once  dextrose 5%. 1000 milliLiter(s) (50 mL/Hr) IV Continuous <Continuous>  dextrose 5%. 1000 milliLiter(s) (100 mL/Hr) IV Continuous <Continuous>  dextrose 5%. 1000 milliLiter(s) (50 mL/Hr) IV Continuous <Continuous>  dextrose 5%. 1000 milliLiter(s) (50 mL/Hr) IV Continuous <Continuous>  dextrose 5%. 1000 milliLiter(s) (100 mL/Hr) IV Continuous <Continuous>  dextrose 50% Injectable 25 Gram(s) IV Push once  dextrose 50% Injectable 12.5 Gram(s) IV Push once  dextrose 50% Injectable 25 Gram(s) IV Push once  ferrous    sulfate Liquid 300 milliGRAM(s) Enteral Tube daily  gabapentin   Solution 100 milliGRAM(s) Oral three times a day  glucagon  Injectable 1 milliGRAM(s) IntraMuscular once  glucagon  Injectable 1 milliGRAM(s) IntraMuscular once  insulin glargine Injectable (LANTUS) 20 Unit(s) SubCutaneous every morning  insulin lispro (ADMELOG) corrective regimen sliding scale   SubCutaneous every 4 hours  ipratropium 17 MICROgram(s) HFA Inhaler 2 Puff(s) Inhalation every 6 hours  linezolid  IVPB      linezolid  IVPB 600 milliGRAM(s) IV Intermittent every 12 hours  meropenem  IVPB 1000 milliGRAM(s) IV Intermittent every 24 hours  multivitamin/minerals/iron Oral Solution (CENTRUM) 15 milliLiter(s) Enteral Tube daily  nystatin Cream 1 Application(s) Topical two times a day  pantoprazole  Injectable 40 milliGRAM(s) IV Push two times a day  petrolatum Ophthalmic Ointment 1 Application(s) Both EYES three times a day  phenylephrine    Infusion 1 MICROgram(s)/kG/Min (11.1 mL/Hr) IV Continuous <Continuous>  propofol Infusion 10.101 MICROgram(s)/kG/Min (3.6 mL/Hr) IV Continuous <Continuous>  sodium bicarbonate  Injectable 50 milliEquivalent(s) IV Push <User Schedule>  sodium chloride 0.9%. 1000 milliLiter(s) (10 mL/Hr) IV Continuous <Continuous>  vasopressin Infusion 0.04 Unit(s)/Min (2.4 mL/Hr) IV Continuous <Continuous>    MEDICATIONS  (PRN):  ondansetron Injectable 4 milliGRAM(s) IV Push every 4 hours PRN Nausea and/or Vomiting      LABS: REVIEWED  CBC:                        7.6    17.32 )-----------( 76       ( 12 Apr 2021 01:30 )             24.8     CMP:    04-12    152<H>  |  116<H>  |  105<HH>  ----------------------------<  108<H>  3.6   |  21  |  2.2<H>    Ca    8.7      12 Apr 2021 01:30  Mg     2.3     04-12    TPro  5.4<L>  /  Alb  3.5  /  TBili  2.6<H>  /  DBili  2.0<H>  /  AST  40  /  ALT  13  /  AlkPhos  125<H>  04-12  Albumin, Serum: 3.5 g/dL (04-12-21 @ 01:30)      IMAGING: REVIEWED    ADVANCED DIRECTIVES:            FULL CODE           DECISION MAKER: grandson and dgt - + other family members  LEGAL SURROGATE:    PSYCHOSOCIAL-SPIRITUAL ASSESSMENT:      per ROSAMARIA and     GOALS OF CARE DISCUSSION       Palliative care info/counseling provided	           Family meeting to be set up           CURRENT DISPO PLAN:     WILL REMAIN IN HOSPITAL      REFERRALS	        Palliative Med        Unit SW/Case Mgmt

## 2021-04-12 NOTE — PROGRESS NOTE ADULT - ASSESSMENT
ASSESSMENT  85 year old lady known to have dementia, osteoarithtis, Malagasy-speaking presenting with cough and fever.      IMPRESSION  #Sepsis present on admission - secondary to CAP  -  CT Chest No Cont (03.11.21 @ 00:54): Areas of right lung consolidation which can be seen in pneumonia. Numerous air-fluid levels throughout the right lung compatible with an infectious process. Suggestion of split pleura at the lung base for which empyema is favored although inherently limited on this noncontrast exam. Consideration can be given to contrast administration if feasiblefor better delineation. Areas of bilateral interlobular septal thickening and mild layering groundglass attenuation may reflect a component of edema.  - Urine Legionella negative  - Urine Strep negative  - BLood Cx 3/10 and 3/13 negative  - Procalcitonin 1.15   - CT Chest No Cont (03.17.21 @ 16:19): Since 3/11/2021. Enlarging loculated right pleural fluid collection with multiple air bubbles consistent with empyema.   Associated compressive atelectasis right lung is seen. Scattered groundglass opacities involving multiple lumbar segments.  - s/p VATS 3/22 -- right empyema with 800 cc purulent fluid in pleural space, multiple pockers with dense adhesions -- necrotizing pneumonia of the middle lobe  - VATS Cx 3/22 with rare yeast, otherwise no growth  -  CT Chest No Cont (03.29.21 @ 12:37): Since March 29, 2021, resolved right pleural effusion; persistent moderate right pneumothorax with 2 chest tubes in place. Increased left greater than right diffuse bilateral groundglass opacities and interlobular septal thickening. Findings are suspicious for a multifocal infection. Superimposed edema is also a possibility.  Enlarging mediastinal lymph nodes, likely reactive.  - CT Chest/Abdomen and Pelvis No Cont (04.01.21 @ 20:19): Stable residual right-sided pneumothorax. Three left-sided chest tubes are in place. New pneumomediastinum is noted, especially in the anterior mediastinum.  Stable bilateral diffuse areas of groundglass opacity. Areas of traction bronchiectasis noted throughout the left lung field and at the right lung base. No evidence of bowel obstruction or intra-abdominal or pelvic inflammatory process  - Fungitell: <31 4/1  - Aspergillus Galactomannan Antigen: <0.500 (04.01.21 @ 01:03)  - Blood Cx 3/31 and 4/3 NG    #GI Bleed from PEG 4/3 -- EGD held as improved/stable    #Dementia  #Osteoarthritis  #Obesity BMI (kg/m2): 23.4  #Abx allergy: clindamycin (Hives)    Creatinine, Serum: 2.5 mg/dL (04.07.21 @ 01:45)  Weight (kg): 60 (11 Mar 2021 01:17)  CrCl 21     RECOMMENDATIONS  - continue meropenem + linezolid   - on caspofungin   - will follow repeat CT imaging   - trend WBC     Please call or message on [employer] Teams if with any questions.  Spectra [phone]

## 2021-04-12 NOTE — PROGRESS NOTE ADULT - SUBJECTIVE AND OBJECTIVE BOX
Progress Note: General Surgery  Patient: DESTIN TERRY , 85y (1935)Female   MRN: [id number]  Location: [address]  Visit: 03-11-21 Inpatient  Date: 04-12-21 @ 01:01    Admit Diagnosis/Chief Complaint: Acute respiratory failure with hypoxia        Procedure/Diagnosis: Acute respiratory failure with hypoxia     S/P Bronchoscopy, at bedside    Open tracheostomy    Insertion, PEG tube        Events/ 24h: No acute events overnight. Pain controlled.    Vitals: T(F): 97 (04-11-21 @ 20:00), Max: 98 (04-11-21 @ 04:00)  HR: 87 (04-12-21 @ 00:00)  BP: 108/60 (04-12-21 @ 00:00) (93/50 - 128/59)  RR: 38 (04-12-21 @ 00:00)  SpO2: 100% (04-12-21 @ 00:00)  RR (machine): 36, TV (machine): 400, FiO2: 40, PEEP: 5, PIP: 25  In:   04-10-21 @ 07:01  -  04-11-21 @ 07:00  --------------------------------------------------------  IN: 3522.2 mL    04-11-21 @ 07:01  -  04-12-21 @ 01:01  --------------------------------------------------------  IN: 2325.8 mL      Out:   04-10-21 @ 07:01  -  04-11-21 @ 07:00  --------------------------------------------------------  OUT:    Chest Tube (mL): 20 mL    Chest Tube (mL): 100 mL    Gastric Aspirate - Discarded (mL): 180 mL    Indwelling Catheter - Urethral (mL): 844 mL    Rectal Tube (mL): 1450 mL  Total OUT: 2594 mL      04-11-21 @ 07:01  -  04-12-21 @ 01:01  --------------------------------------------------------  OUT:    Chest Tube (mL): 0 mL    Chest Tube (mL): 50 mL    Gastric Aspirate - Discarded (mL): 160 mL    Indwelling Catheter - Urethral (mL): 615 mL    Rectal Tube (mL): 100 mL  Total OUT: 925 mL        Net:   04-10-21 @ 07:01  -  04-11-21 @ 07:00  --------------------------------------------------------  NET: 928.2 mL    04-11-21 @ 07:01  -  04-12-21 @ 01:01  --------------------------------------------------------  NET: 1400.8 mL        Diet: Diet, NPO with Tube Feed:   Tube Feeding Modality: Gastrostomy  Peptamen A.F. Formula  Total Volume for 24 Hours (mL): 1320  Continuous  Until Goal Tube Feed Rate (mL per Hour): 55  Tube Feed Duration (in Hours): 24  Tube Feed Start Time: 17:45 (04-07-21 @ 17:37)    IV Fluids: albumin human 25% IVPB 100 milliLiter(s) IV Intermittent every 6 hours  dextrose 5%. 1000 milliLiter(s) (50 mL/Hr) IV Continuous <Continuous>  dextrose 5%. 1000 milliLiter(s) (100 mL/Hr) IV Continuous <Continuous>  dextrose 5%. 1000 milliLiter(s) (50 mL/Hr) IV Continuous <Continuous>  dextrose 5%. 1000 milliLiter(s) (50 mL/Hr) IV Continuous <Continuous>  dextrose 5%. 1000 milliLiter(s) (100 mL/Hr) IV Continuous <Continuous>  ferrous    sulfate Liquid 300 milliGRAM(s) Enteral Tube daily  multivitamin/minerals/iron Oral Solution (CENTRUM) 15 milliLiter(s) Enteral Tube daily  sodium bicarbonate  Injectable 50 milliEquivalent(s) IV Push <User Schedule>  sodium chloride 0.9%. 1000 milliLiter(s) (10 mL/Hr) IV Continuous <Continuous>      Physical Examination:  General Appearance:  ill appearing, sedated on pressors  HEENT: EOMI, sclera anicteric. trach  Heart: RRR   Lungs: Symmetric chest wall expansion, equal rise and fall. right sided chest tubes x2  Abdomen:  Soft, nontender, nondistended.   MSK/Extremities: Warm & well-perfused.   Skin: Warm, dry. No jaundice.         Medications: [Standing]  albumin human 25% IVPB 100 milliLiter(s) IV Intermittent every 6 hours  ALBUTerol    90 MICROgram(s) HFA Inhaler 2 Puff(s) Inhalation every 6 hours  caspofungin IVPB      caspofungin IVPB 50 milliGRAM(s) IV Intermittent every 24 hours  chlorhexidine 4% Liquid 1 Application(s) Topical <User Schedule>  clotrimazole 1% Cream 1 Application(s) Topical two times a day  collagenase Ointment 1 Application(s) Topical daily  dexMEDEtomidine Infusion 0.202 MICROgram(s)/kG/Hr (3 mL/Hr) IV Continuous <Continuous>  dextrose 40% Gel 15 Gram(s) Oral once  dextrose 5%. 1000 milliLiter(s) (50 mL/Hr) IV Continuous <Continuous>  dextrose 5%. 1000 milliLiter(s) (50 mL/Hr) IV Continuous <Continuous>  dextrose 5%. 1000 milliLiter(s) (100 mL/Hr) IV Continuous <Continuous>  dextrose 5%. 1000 milliLiter(s) (50 mL/Hr) IV Continuous <Continuous>  dextrose 5%. 1000 milliLiter(s) (100 mL/Hr) IV Continuous <Continuous>  dextrose 50% Injectable 25 Gram(s) IV Push once  dextrose 50% Injectable 12.5 Gram(s) IV Push once  dextrose 50% Injectable 25 Gram(s) IV Push once  ferrous    sulfate Liquid 300 milliGRAM(s) Enteral Tube daily  gabapentin   Solution 100 milliGRAM(s) Oral three times a day  glucagon  Injectable 1 milliGRAM(s) IntraMuscular once  glucagon  Injectable 1 milliGRAM(s) IntraMuscular once  insulin glargine Injectable (LANTUS) 20 Unit(s) SubCutaneous every morning  insulin lispro (ADMELOG) corrective regimen sliding scale   SubCutaneous every 4 hours  ipratropium 17 MICROgram(s) HFA Inhaler 2 Puff(s) Inhalation every 6 hours  linezolid  IVPB      linezolid  IVPB 600 milliGRAM(s) IV Intermittent every 12 hours  meropenem  IVPB 1000 milliGRAM(s) IV Intermittent every 24 hours  multivitamin/minerals/iron Oral Solution (CENTRUM) 15 milliLiter(s) Enteral Tube daily  nystatin Cream 1 Application(s) Topical two times a day  pantoprazole  Injectable 40 milliGRAM(s) IV Push two times a day  petrolatum Ophthalmic Ointment 1 Application(s) Both EYES three times a day  phenylephrine    Infusion 1 MICROgram(s)/kG/Min (11.1 mL/Hr) IV Continuous <Continuous>  propofol Infusion 10.101 MICROgram(s)/kG/Min (3.6 mL/Hr) IV Continuous <Continuous>  sodium bicarbonate  Injectable 50 milliEquivalent(s) IV Push <User Schedule>  sodium chloride 0.9%. 1000 milliLiter(s) (10 mL/Hr) IV Continuous <Continuous>  vasopressin Infusion 0.04 Unit(s)/Min (2.4 mL/Hr) IV Continuous <Continuous>    DVT Prophylaxis:   GI Prophylaxis: pantoprazole  Injectable 40 milliGRAM(s) IV Push two times a day    Antibiotics: caspofungin IVPB      caspofungin IVPB 50 milliGRAM(s) IV Intermittent every 24 hours  linezolid  IVPB      linezolid  IVPB 600 milliGRAM(s) IV Intermittent every 12 hours  meropenem  IVPB 1000 milliGRAM(s) IV Intermittent every 24 hours    Anticoagulation:   Medications:[PRN]  ondansetron Injectable 4 milliGRAM(s) IV Push every 4 hours PRN      Labs:                        7.0    22.28 )-----------( 96       ( 11 Apr 2021 07:00 )             23.7     04-11    154<H>  |  118<H>  |  99<HH>  ----------------------------<  120<H>  4.2   |  22  |  2.2<H>    Ca    8.2<L>      11 Apr 2021 02:00  Mg     2.3     04-11    TPro  5.7<L>  /  Alb  3.1<L>  /  TBili  1.6<H>  /  DBili  1.3<H>  /  AST  38  /  ALT  15  /  AlkPhos  135<H>  04-11    LIVER FUNCTIONS - ( 11 Apr 2021 02:00 )  Alb: 3.1 g/dL / Pro: 5.7 g/dL / ALK PHOS: 135 U/L / ALT: 15 U/L / AST: 38 U/L / GGT: x           PT/INR - ( 11 Apr 2021 07:00 )   PT: 14.40 sec;   INR: 1.25 ratio         PTT - ( 11 Apr 2021 07:00 )  PTT:36.4 sec  ABG - ( 11 Apr 2021 07:57 )  pH: 7.31  /  pCO2: 46    /  pO2: 111   / HCO3: 23    / Base Excess: -3.0  /  SaO2: 99                      Urine/Micro:        Imaging:   ***    < from: Xray Chest 1 View- PORTABLE-Routine (Xray Chest 1 View- PORTABLE-Routine in AM.) (04.11.21 @ 06:23) >    Impression:    Bilateral opacities and small loculated right pneumothorax, unchanged.    Support tubes and lines as above.    < end of copied text >

## 2021-04-13 NOTE — PROGRESS NOTE ADULT - ASSESSMENT
85 year old lady known to have dementia, osteoarthritis  presenting with cough and fever. As per daughter, patient has been having dry consistent cough since 2 months. Patient was found to have pneumonia and empyema s/p R VATS evacuation of empyema, x3 chest tubes in place, s/p Trach and PEG on 3/30 by surgery . Patient dropped HGB from 10 to 7 after Trach and PEG placement . Was transfused 3 units of PRBC on the first , then dropped HGB slowly to stabilize around 8.GI called for concerns of  GI bleed . Around 200 ml of dark red blood is obtained from the stomach by PEG connected to suction at that time, patient was treated medically at that time ( patient was unstable and in septic shock on 2 pressors). Patient is off pressors now. primary team called if patient needs EGD now.     #upper GI bleed post PEG placement : resolved   HGB stable more than 48 hrs   No signs of gross GI bleed  on tube feeding    REC:   resume diet per PEG tube   Monitor CBC  active type and screen   transfuse as needed  PPI qd  No need for any endoscopic procedure at this stage  Follow up as outpatient with GI

## 2021-04-13 NOTE — PROGRESS NOTE ADULT - SUBJECTIVE AND OBJECTIVE BOX
DESTIN TERRY  85y, Female  Allergy: clindamycin (Hives)      LOS  33d    CHIEF COMPLAINT: empyema (06 Apr 2021 09:24)      INTERVAL EVENTS/HPI  - No acute events overnight  - T(F): , Max: 96.9 (04-12-21 @ 20:00)  - off pressors and sedation; appears lethargic  - WBC Count: 13.80 (04-13-21 @ 02:00)  WBC Count: 17.32 (04-12-21 @ 01:30)     - Creatinine, Serum: 2.0 (04-13-21 @ 02:00)  Creatinine, Serum: 2.2 (04-12-21 @ 01:30)       ROS  unable to obtain history secondary to patient's mental status and/or sedation    VITALS:  T(F): 96, Max: 96.9 (04-12-21 @ 20:00)  HR: 86  BP: 118/59  RR: 36Vital Signs Last 24 Hrs  T(C): 35.6 (13 Apr 2021 08:00), Max: 36.1 (12 Apr 2021 20:00)  T(F): 96 (13 Apr 2021 08:00), Max: 96.9 (12 Apr 2021 20:00)  HR: 86 (13 Apr 2021 13:00) (70 - 97)  BP: 118/59 (12 Apr 2021 14:30) (118/59 - 118/59)  BP(mean): 83 (12 Apr 2021 14:30) (83 - 83)  RR: 36 (13 Apr 2021 13:00) (28 - 43)  SpO2: 97% (13 Apr 2021 13:00) (91% - 100%)    PHYSICAL EXAM:  Gen: vent  HEENT: Normocephalic, atraumatic  Neck: supple, no lymphadenopathy  CV: Regular rate & regular rhythm  Lungs: decreased BS at bases, no fremitus  Abdomen: Soft, BS present  Ext: Warm, well perfused  Neuro: non focal, awake  Skin: no rash, no erythema  Lines: no phlebitis    FH: Non-contributory  Social Hx: Non-contributory    TESTS & MEASUREMENTS:                        7.5    13.80 )-----------( 57       ( 13 Apr 2021 02:00 )             24.0     04-13    148<H>  |  111<H>  |  101<HH>  ----------------------------<  143<H>  3.4<L>   |  21  |  2.0<H>    Ca    9.0      13 Apr 2021 02:00  Mg     2.2     04-13    TPro  5.5<L>  /  Alb  4.1  /  TBili  2.3<H>  /  DBili  1.7<H>  /  AST  47<H>  /  ALT  13  /  AlkPhos  191<H>  04-13    eGFR if Non African American: 23 mL/min/1.73M2 (04-13-21 @ 02:00)  eGFR if : 26 mL/min/1.73M2 (04-13-21 @ 02:00)    LIVER FUNCTIONS - ( 13 Apr 2021 02:00 )  Alb: 4.1 g/dL / Pro: 5.5 g/dL / ALK PHOS: 191 U/L / ALT: 13 U/L / AST: 47 U/L / GGT: x               Culture - Blood (collected 04-03-21 @ 13:21)  Source: .Blood Blood  Final Report (04-08-21 @ 20:01):    No Growth Final    Culture - Blood (collected 03-31-21 @ 10:52)  Source: .Blood Blood  Final Report (04-05-21 @ 23:01):    No Growth Final    Culture - Acid Fast - Tissue w/Smear (collected 03-22-21 @ 13:33)  Source: .Tissue PLEURAL PEEL  Preliminary Report (03-31-21 @ 15:03):    No growth at 1 week.    Culture - Fungal, Tissue (collected 03-22-21 @ 13:33)  Source: .Tissue None  Preliminary Report (03-31-21 @ 15:02):    No growth    Culture - Tissue with Gram Stain (collected 03-22-21 @ 13:33)  Source: .Tissue None  Gram Stain (03-23-21 @ 04:38):    Rare polymorphonuclear leukocytes seen per low power field    Few White blood cells seen per low power field    Few Gram positive cocci in pairs per oil power field  Final Report (04-01-21 @ 16:29):    No growth    Organism seen in Gram stain is non-viable after prolonged    incubation and repeated subculture.    Culture - Fungal, Bronchial (collected 03-22-21 @ 12:58)  Source: .Bronchial None  Preliminary Report (03-25-21 @ 11:20):    Rare Yeast    Culture - Acid Fast - Bronchial w/Smear (collected 03-22-21 @ 12:58)  Source: Bronch Wash None  Preliminary Report (03-31-21 @ 15:03):    No growth at 1 week.    Culture - Bronchial (collected 03-22-21 @ 12:58)  Source: .Bronchial None  Gram Stain (03-23-21 @ 07:58):    Numerous polymorphonuclear leukocytes per low power field    No Squamous epithelial cells per low power field    No organisms seen per oil power field  Final Report (03-24-21 @ 22:11):    Normal Respiratory Narda present    Culture - Fungal, Body Fluid (collected 03-22-21 @ 09:26)  Source: .Body Fluid None  Preliminary Report (03-31-21 @ 15:02):    No growth    Culture - Acid Fast - Body Fluid w/Smear (collected 03-22-21 @ 09:26)  Source: .Body Fluid None  Preliminary Report (03-31-21 @ 15:03):    No growth at 1 week.    Culture - Body Fluid with Gram Stain (collected 03-22-21 @ 09:26)  Source: .Body Fluid None  Gram Stain (03-23-21 @ 04:38):    polymorphonuclear leukocytes seen    No organisms seen    by cytocentrifuge  Final Report (03-27-21 @ 17:22):    No growth at 5 days    Culture - Blood (collected 03-17-21 @ 16:00)  Source: .Blood Blood-Peripheral  Final Report (03-23-21 @ 02:39):    No Growth Final            INFECTIOUS DISEASES TESTING  COVID-19 PCR: NotDetec (04-05-21 @ 10:12)  COVID-19 PCR: NotDetec (04-04-21 @ 14:03)  Fungitell: <31 (04-01-21 @ 01:03)  Aspergillus Galactomannan Antigen: <0.500 (04-01-21 @ 01:03)  COVID-19 PCR: NotDetec (03-29-21 @ 14:17)  Procalcitonin, Serum: 2.29 (03-27-21 @ 10:50)  MRSA PCR Result.: Negative (03-20-21 @ 21:38)  COVID-19 PCR: NotDetec (03-20-21 @ 12:30)  Fungitell: 46 (03-17-21 @ 16:00)  Procalcitonin, Serum: 0.29 (03-17-21 @ 11:30)  Procalcitonin, Serum: 0.31 (03-16-21 @ 10:15)  Legionella Antigen, Urine: Negative (03-12-21 @ 10:30)  Streptococcus Pneumoniae Ag Urine: Negative (03-12-21 @ 10:30)  MRSA PCR Result.: Negative (03-12-21 @ 10:30)  Legionella Antigen, Urine: Negative (03-11-21 @ 08:10)  Streptococcus Pneumoniae Ag Urine: Negative (03-11-21 @ 08:10)  Procalcitonin, Serum: 1.75 (03-11-21 @ 06:31)  Rapid RVP Result: NotDetec (03-10-21 @ 22:10)      INFLAMMATORY MARKERS      RADIOLOGY & ADDITIONAL TESTS:  I have personally reviewed the last available Chest xray  CXR      CT  CT Chest No Cont:   EXAM:  CT ABDOMEN AND PELVIS OC        EXAM:  CT CHEST            PROCEDURE DATE:  04/12/2021            INTERPRETATION:  CLINICAL STATEMENT: Cough. Fever. Status post VATS.    TECHNIQUE: Contiguous axial CT images were obtained from the thoracic inlet to the pubic symphysis following administration of 100c Optiray 320 intravenous contrast.  Oral contrast was administered.  Reformatted images in the coronal and sagittal planes were acquired.    COMPARISON: CT CHEST ABDOMEN PELVIS DATED 4/1/2021.    FINDINGS:      CHEST:    TUBES/LINES: Tracheostomy in place. Interval removal of 2 chest tubes. Persistent right anterior chest tube terminating along the apex.    LUNGS/PLEURA/AIRWAYS: Essentially unchanged loculated right-sided pneumothorax. Increased left pleural effusion tracking into the fissure. Extensive groundglass and consolidative changes all 5 lobes, essentially unchanged on the right and slightly decreased in the left upper lobe with areas of interlobular septal thickening (crazy paving). There is a interspersed traction bronchiectasis best appreciated along the basal consolidative opacities. Interspersed mild subpleural reticular changes also present on the right with lung surface thickening.    MEDIASTINUM/THORACIC NODES: Nolymphadenopathy. Interval resolution of previously seen pneumomediastinum. Few subcentimeter prominent mediastinal lymph nodes, nonspecific and without significant change.    HEART/GREAT VESSELS: Unremarkable.      ABDOMEN/PELVIS:    Evaluation of intra-abdominal organs is limited due to lack of intravenous contrast.    TUBES/LINES: Gastrostomy tube in place.    HEPATOBILIARY: Punctate calcified hepatic granulomas. Mild pericholecystic edema/fluid is likely secondary.    SPLEEN: Unremarkable.    PANCREAS: Unremarkable.    ADRENAL GLANDS: Unremarkable.    KIDNEYS: No hydronephrosis.    ABDOMINOPELVIC NODES: No lymphadenopathy.    PELVIC ORGANS: Urinary bladder collapsed around a Adams catheter.    PERITONEUM/MESENTERY/BOWEL: Interval development of moderate abdominopelvic ascites. No evidence of bowel obstruction or intraperitoneal free air. Diffuse colonic wall thickening. Oral contrast seen reaching the rectum. Rectal tube in place. Appendix not well-visualized. PEG tube terminating in the stomach.    BONES/SOFT TISSUES: Interval development of diffuse anasarca. Scoliosis. Degenerative changes of the spine.    OTHER: Vascular calcifications.      IMPRESSION:    1. Redemonstrated loculated right pneumothorax without significant change. Interval removal of 2/3 right chest tubes.    2. Diffuse groundglass and consolidative opacities with interlobular septal thickening and traction bronchiectasis in the bases. Findings likely reflecting a combination of pulmonary edema and post inflammatory/infectious changes with some degree of organization.    3. Interval development abdominopelvic ascites and anasarca consistent with fluid overload.    4. Nonspecific diffuse colonic wall thickening which could reflect colitis versus edema. No bowel obstruction.    5. Interval resolution of previously seen pneumomediastinum.                  ERIC TATUM M.D., RESIDENT RADIOLOGIST  This document has been electronically signed.  OLIVE ANDRADE MD; Attending Radiologist  This document has been electronically signed. Apr 12 2021  9:28PM (04-12-21 @ 17:17)      CARDIOLOGY TESTING  12 Lead ECG:   Ventricular Rate 89 BPM    Atrial Rate 89 BPM    P-R Interval 132 ms    QRS Duration 84 ms    Q-T Interval 364 ms    QTC Calculation(Bazett) 442 ms    P Axis 58 degrees    R Axis 23 degrees    T Axis -48 degrees    Diagnosis Line Sinus rhythm with marked sinus arrhythmia with Premature atrial complexes  Nonspecific ST and T wave abnormality  Abnormal ECG    Confirmed by Job Bains ([id number]) on 4/7/2021 11:26:01 AM (04-07-21 @ 09:41)      MEDICATIONS  ALBUTerol    90 MICROgram(s) HFA Inhaler 2 Inhalation every 6 hours  chlorhexidine 4% Liquid 1 Topical <User Schedule>  clotrimazole 1% Cream 1 Topical two times a day  collagenase Ointment 1 Topical daily  dexMEDEtomidine Infusion 0.202 IV Continuous <Continuous>  dextrose 40% Gel 15 Oral once  dextrose 5%. 1000 IV Continuous <Continuous>  dextrose 5%. 1000 IV Continuous <Continuous>  dextrose 5%. 1000 IV Continuous <Continuous>  dextrose 5%. 1000 IV Continuous <Continuous>  dextrose 5%. 1000 IV Continuous <Continuous>  dextrose 50% Injectable 25 IV Push once  dextrose 50% Injectable 12.5 IV Push once  dextrose 50% Injectable 25 IV Push once  ferrous    sulfate Liquid 300 Enteral Tube daily  gabapentin   Solution 100 Oral three times a day  glucagon  Injectable 1 IntraMuscular once  glucagon  Injectable 1 IntraMuscular once  insulin glargine Injectable (LANTUS) 20 SubCutaneous every morning  insulin lispro (ADMELOG) corrective regimen sliding scale  SubCutaneous every 4 hours  ipratropium 17 MICROgram(s) HFA Inhaler 2 Inhalation every 6 hours  meropenem  IVPB 1000 IV Intermittent every 24 hours  multivitamin/minerals/iron Oral Solution (CENTRUM) 15 Enteral Tube daily  nystatin Cream 1 Topical two times a day  pantoprazole  Injectable 40 IV Push two times a day  petrolatum Ophthalmic Ointment 1 Both EYES three times a day  phenylephrine    Infusion 1 IV Continuous <Continuous>  propofol Infusion 10.101 IV Continuous <Continuous>  sodium bicarbonate  Injectable 50 IV Push <User Schedule>  sodium chloride 0.9%. 1000 IV Continuous <Continuous>  vasopressin Infusion 0.04 IV Continuous <Continuous>      WEIGHT  Weight (kg): 67.5 (04-07-21 @ 06:00)  Creatinine, Serum: 2.0 mg/dL (04-13-21 @ 02:00)      ANTIBIOTICS:  meropenem  IVPB 1000 milliGRAM(s) IV Intermittent every 24 hours      All available historical records have been reviewed

## 2021-04-13 NOTE — PROGRESS NOTE ADULT - SUBJECTIVE AND OBJECTIVE BOX
Gastroenterology progress note:     Patient is a 85y old  Female who presents with a chief complaint of empyema (06 Apr 2021 09:24)       Admitted on: 03-11-21    We are following the patient for: anemia     Interval History:  recalled by primary team if patient needs EGD in patient      PAST MEDICAL & SURGICAL HISTORY:  Dementia    Osteoarthritis    No significant past surgical history        MEDICATIONS  (STANDING):  ALBUTerol    90 MICROgram(s) HFA Inhaler 2 Puff(s) Inhalation every 6 hours  chlorhexidine 4% Liquid 1 Application(s) Topical <User Schedule>  clotrimazole 1% Cream 1 Application(s) Topical two times a day  collagenase Ointment 1 Application(s) Topical daily  dexMEDEtomidine Infusion 0.202 MICROgram(s)/kG/Hr (3 mL/Hr) IV Continuous <Continuous>  dextrose 40% Gel 15 Gram(s) Oral once  dextrose 5%. 1000 milliLiter(s) (100 mL/Hr) IV Continuous <Continuous>  dextrose 5%. 1000 milliLiter(s) (50 mL/Hr) IV Continuous <Continuous>  dextrose 5%. 1000 milliLiter(s) (50 mL/Hr) IV Continuous <Continuous>  dextrose 5%. 1000 milliLiter(s) (100 mL/Hr) IV Continuous <Continuous>  dextrose 5%. 1000 milliLiter(s) (50 mL/Hr) IV Continuous <Continuous>  dextrose 50% Injectable 25 Gram(s) IV Push once  dextrose 50% Injectable 12.5 Gram(s) IV Push once  dextrose 50% Injectable 25 Gram(s) IV Push once  ferrous    sulfate Liquid 300 milliGRAM(s) Enteral Tube daily  gabapentin   Solution 100 milliGRAM(s) Oral three times a day  glucagon  Injectable 1 milliGRAM(s) IntraMuscular once  glucagon  Injectable 1 milliGRAM(s) IntraMuscular once  insulin glargine Injectable (LANTUS) 20 Unit(s) SubCutaneous every morning  insulin lispro (ADMELOG) corrective regimen sliding scale   SubCutaneous every 4 hours  ipratropium 17 MICROgram(s) HFA Inhaler 2 Puff(s) Inhalation every 6 hours  meropenem  IVPB 1000 milliGRAM(s) IV Intermittent every 24 hours  metolazone 2.5 milliGRAM(s) Oral once  multivitamin/minerals/iron Oral Solution (CENTRUM) 15 milliLiter(s) Enteral Tube daily  nystatin Cream 1 Application(s) Topical two times a day  pantoprazole  Injectable 40 milliGRAM(s) IV Push two times a day  petrolatum Ophthalmic Ointment 1 Application(s) Both EYES three times a day  phenylephrine    Infusion 1 MICROgram(s)/kG/Min (11.1 mL/Hr) IV Continuous <Continuous>  propofol Infusion 10.101 MICROgram(s)/kG/Min (3.6 mL/Hr) IV Continuous <Continuous>  sodium bicarbonate  Injectable 50 milliEquivalent(s) IV Push <User Schedule>  sodium chloride 0.9%. 1000 milliLiter(s) (10 mL/Hr) IV Continuous <Continuous>  vasopressin Infusion 0.04 Unit(s)/Min (2.4 mL/Hr) IV Continuous <Continuous>    MEDICATIONS  (PRN):  ondansetron Injectable 4 milliGRAM(s) IV Push every 4 hours PRN Nausea and/or Vomiting      Allergies  clindamycin (Hives)      Review of Systems:   Unable to obtain    Physical Examination:  T(C): 35.6 (04-13-21 @ 08:00), Max: 36.1 (04-12-21 @ 20:00)  HR: 85 (04-13-21 @ 08:00) (70 - 97)  BP: 118/59 (04-12-21 @ 14:30) (118/59 - 118/59)  RR: 39 (04-13-21 @ 08:00) (35 - 43)  SpO2: 100% (04-13-21 @ 08:00) (91% - 100%)      04-12-21 @ 07:01  -  04-13-21 @ 07:00  --------------------------------------------------------  IN: 3520 mL / OUT: 1965 mL / NET: 1555 mL    04-13-21 @ 07:01  -  04-13-21 @ 09:46  --------------------------------------------------------  IN: 120 mL / OUT: 130 mL / NET: -10 mL      Respiratory:  Chest tube on Right side, trach and ventilated, b/l breathing sound  Cardiovascular:  S1 S2, Regular rate and rhythm.  Abdominal: Abdomen is soft, symmetric, and non-tender without distention. There are no visible lesions. PEG-tube noted, Bowel sounds are present and normoactive in all four quadrants. No masses, hepatomegaly, or splenomegaly are noted.   Neurology:  Non-focal  Vascular: No varicose vein, No cyanosis, No edema        Data: (reviewed by attending)                        7.5    13.80 )-----------( 57       ( 13 Apr 2021 02:00 )             24.0     Hgb trend:  7.5  04-13-21 @ 02:00  7.6  04-12-21 @ 01:30  7.0  04-11-21 @ 07:00  7.3  04-11-21 @ 02:00      04-10-21 @ 07:01  -  04-11-21 @ 07:00  --------------------------------------------------------  IN: 800 mL    04-11-21 @ 07:01  -  04-12-21 @ 07:00  --------------------------------------------------------  IN: 550 mL    04-12-21 @ 07:01  -  04-13-21 @ 07:00  --------------------------------------------------------  IN: 300 mL      04-13    148<H>  |  111<H>  |  101<HH>  ----------------------------<  143<H>  3.4<L>   |  21  |  2.0<H>    Ca    9.0      13 Apr 2021 02:00  Mg     2.2     04-13    TPro  5.5<L>  /  Alb  4.1  /  TBili  2.3<H>  /  DBili  1.7<H>  /  AST  47<H>  /  ALT  13  /  AlkPhos  191<H>  04-13    Liver panel trend:  TBili 2.3   /   AST 47   /   ALT 13   /   AlkP 191   /   Tptn 5.5   /   Alb 4.1    /   DBili 1.7      04-13  TBili 2.6   /   AST 40   /   ALT 13   /   AlkP 125   /   Tptn 5.4   /   Alb 3.5    /   DBili 2.0      04-12  TBili 1.6   /   AST 38   /   ALT 15   /   AlkP 135   /   Tptn 5.7   /   Alb 3.1    /   DBili 1.3      04-11  TBili 0.6   /   AST 49   /   ALT 18   /   AlkP 171   /   Tptn 5.3   /   Alb 1.9    /   DBili 0.5      04-10  TBili 0.4   /   AST 54   /   ALT 17   /   AlkP 151   /   Tptn 5.6   /   Alb 1.9    /   DBili 0.3      04-09  TBili 0.3   /   AST 35   /   ALT 10   /   AlkP 94   /   Tptn 4.9   /   Alb 1.6    /   DBili 0.3      04-08  TBili 0.7   /   AST 38   /   ALT 10   /   AlkP 75   /   Tptn 5.0   /   Alb 1.7    /   DBili 0.5      04-07  TBili 0.5   /   AST 42   /   ALT 11   /   AlkP 81   /   Tptn 5.3   /   Alb 1.8    /   DBili 0.4      04-06             Radiology: (reviewed by attending)  CT Abdomen and Pelvis w/ Oral Cont:   EXAM:  CT ABDOMEN AND PELVIS OC        EXAM:  CT CHEST            PROCEDURE DATE:  04/12/2021            INTERPRETATION:  CLINICAL STATEMENT: Cough. Fever. Status post VATS.    TECHNIQUE: Contiguous axial CT images were obtained from the thoracic inlet to the pubic symphysis following administration of 100c Optiray 320 intravenous contrast.  Oral contrast was administered.  Reformatted images in the coronal and sagittal planes were acquired.    COMPARISON: CT CHEST ABDOMEN PELVIS DATED 4/1/2021.    FINDINGS:      CHEST:    TUBES/LINES: Tracheostomy in place. Interval removal of 2 chest tubes. Persistent right anterior chest tube terminating along the apex.    LUNGS/PLEURA/AIRWAYS: Essentially unchanged loculated right-sided pneumothorax. Increased left pleural effusion tracking into the fissure. Extensive groundglass and consolidative changes all 5 lobes, essentially unchanged on the right and slightly decreased in the left upper lobe with areas of interlobular septal thickening (crazy paving). There is a interspersed traction bronchiectasis best appreciated along the basal consolidative opacities. Interspersed mild subpleural reticular changes also present on the right with lung surface thickening.    MEDIASTINUM/THORACIC NODES: Nolymphadenopathy. Interval resolution of previously seen pneumomediastinum. Few subcentimeter prominent mediastinal lymph nodes, nonspecific and without significant change.    HEART/GREAT VESSELS: Unremarkable.      ABDOMEN/PELVIS:    Evaluation of intra-abdominal organs is limited due to lack of intravenous contrast.    TUBES/LINES: Gastrostomy tube in place.    HEPATOBILIARY: Punctate calcified hepatic granulomas. Mild pericholecystic edema/fluid is likely secondary.    SPLEEN: Unremarkable.    PANCREAS: Unremarkable.    ADRENAL GLANDS: Unremarkable.    KIDNEYS: No hydronephrosis.    ABDOMINOPELVIC NODES: No lymphadenopathy.    PELVIC ORGANS: Urinary bladder collapsed around a Adams catheter.    PERITONEUM/MESENTERY/BOWEL: Interval development of moderate abdominopelvic ascites. No evidence of bowel obstruction or intraperitoneal free air. Diffuse colonic wall thickening. Oral contrast seen reaching the rectum. Rectal tube in place. Appendix not well-visualized. PEG tube terminating in the stomach.    BONES/SOFT TISSUES: Interval development of diffuse anasarca. Scoliosis. Degenerative changes of the spine.    OTHER: Vascular calcifications.      IMPRESSION:    1. Redemonstrated loculated right pneumothorax without significant change. Interval removal of 2/3 right chest tubes.    2. Diffuse ground glass and consolidative opacities with interlobular septal thickening and traction bronchiectasis in the bases. Findings likely reflecting a combination of pulmonary edema and post inflammatory/infectious changes with some degree of organization.    3. Interval development abdominopelvic ascites and anasarca consistent with fluid overload.    4. Nonspecific diffuse colonic wall thickening which could reflect colitis versus edema. No bowel obstruction.    5. Interval resolution of previously seen pneumomediastinum.                  ERIC TATUM M.D., RESIDENT RADIOLOGIST  This document has been electronically signed.  OLIVE ANDRADE MD; Attending Radiologist  This document has been electronically signed. Apr 12 2021  9:28PM (04-12-21 @ 17:17)

## 2021-04-13 NOTE — PROGRESS NOTE ADULT - SUBJECTIVE AND OBJECTIVE BOX
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    As per daughter, patient has been having dry consistent cough since 2 months. Today, she was being evaluated for knee injections when she was found to be febrile. She also reports progressing generalized weakness with decreased appetite and PO intake.  No URT symptoms, no chest pain, no leg pain, no diarrhea, no urinary symptoms no sick contacts, no recent travel. In ED was hypotensive, was given IVF, started on levophed 0.04 called to evaluate (11 Mar 2021 03:21)      SUBJECTIVE:    ROS:  DYSPNEA: Y / N	  NAUS/VOM: Y / N	  SECRETIONS: Y / N	  AGITATION: Y / N  Pain (Y/N):       -Provocation/Palliation:  -Quality/Quantity:  -Radiating:  -Severity:  -Timing/Frequency:  -Impact on ADLs:    OTHER REVIEW OF SYSTEMS:  UNABLE TO OBTAIN  due to:    PEx:  85y              General: awake. well nourished, lying in bed, NAD, conversant  HEENT:  NCAT PERRL EOMI Non icteric   Neck: Supple no masses  CVS: RR S1S2 No M/G/R  Resp: Unlabored Non tachypneic No increased WOB, clear to ascultation bilaterally  GI:  Soft NT ND BS+  :  Voiding / Adams / PrimaFit  Musc: No C/C/E    Neuro: Follows commands No focal deficits  Psych: Calm, a & o x 3  Skin: Non jaundiced, no rash   Lymph: Normal      Last BM:   04-06-21 @ 07:01  -  04-07-21 @ 07:00  --------------------------------------------------------  OUT: 175 mL    04-07-21 @ 07:01  -  04-08-21 @ 07:00  --------------------------------------------------------  OUT: 490 mL    04-08-21 @ 07:01  -  04-09-21 @ 07:00  --------------------------------------------------------  OUT: 500 mL    04-10-21 @ 07:01  -  04-11-21 @ 07:00  --------------------------------------------------------  OUT: 1450 mL    04-11-21 @ 07:01  -  04-12-21 @ 07:00  --------------------------------------------------------  OUT: 300 mL    04-12-21 @ 07:01  -  04-13-21 @ 07:00  --------------------------------------------------------  OUT: 700 mL        ALLERGIES:     OPIATE NAÏVE (Y/N):    MEDICATIONS: REVIEWED  MEDICATIONS  (STANDING):  ALBUTerol    90 MICROgram(s) HFA Inhaler 2 Puff(s) Inhalation every 6 hours  chlorhexidine 4% Liquid 1 Application(s) Topical <User Schedule>  clotrimazole 1% Cream 1 Application(s) Topical two times a day  collagenase Ointment 1 Application(s) Topical daily  dexMEDEtomidine Infusion 0.202 MICROgram(s)/kG/Hr (3 mL/Hr) IV Continuous <Continuous>  dextrose 40% Gel 15 Gram(s) Oral once  dextrose 5%. 1000 milliLiter(s) (100 mL/Hr) IV Continuous <Continuous>  dextrose 5%. 1000 milliLiter(s) (50 mL/Hr) IV Continuous <Continuous>  dextrose 5%. 1000 milliLiter(s) (50 mL/Hr) IV Continuous <Continuous>  dextrose 5%. 1000 milliLiter(s) (100 mL/Hr) IV Continuous <Continuous>  dextrose 5%. 1000 milliLiter(s) (50 mL/Hr) IV Continuous <Continuous>  dextrose 50% Injectable 25 Gram(s) IV Push once  dextrose 50% Injectable 12.5 Gram(s) IV Push once  dextrose 50% Injectable 25 Gram(s) IV Push once  ferrous    sulfate Liquid 300 milliGRAM(s) Enteral Tube daily  gabapentin   Solution 100 milliGRAM(s) Oral three times a day  glucagon  Injectable 1 milliGRAM(s) IntraMuscular once  glucagon  Injectable 1 milliGRAM(s) IntraMuscular once  insulin glargine Injectable (LANTUS) 20 Unit(s) SubCutaneous every morning  insulin lispro (ADMELOG) corrective regimen sliding scale   SubCutaneous every 4 hours  ipratropium 17 MICROgram(s) HFA Inhaler 2 Puff(s) Inhalation every 6 hours  meropenem  IVPB 1000 milliGRAM(s) IV Intermittent every 24 hours  multivitamin/minerals/iron Oral Solution (CENTRUM) 15 milliLiter(s) Enteral Tube daily  nystatin Cream 1 Application(s) Topical two times a day  pantoprazole  Injectable 40 milliGRAM(s) IV Push two times a day  petrolatum Ophthalmic Ointment 1 Application(s) Both EYES three times a day  phenylephrine    Infusion 1 MICROgram(s)/kG/Min (11.1 mL/Hr) IV Continuous <Continuous>  propofol Infusion 10.101 MICROgram(s)/kG/Min (3.6 mL/Hr) IV Continuous <Continuous>  sodium bicarbonate  Injectable 50 milliEquivalent(s) IV Push <User Schedule>  sodium chloride 0.9%. 1000 milliLiter(s) (10 mL/Hr) IV Continuous <Continuous>  vasopressin Infusion 0.04 Unit(s)/Min (2.4 mL/Hr) IV Continuous <Continuous>    MEDICATIONS  (PRN):  ondansetron Injectable 4 milliGRAM(s) IV Push every 4 hours PRN Nausea and/or Vomiting      LABS: REVIEWED  CBC:                        7.5    13.80 )-----------( 57       ( 13 Apr 2021 02:00 )             24.0     CMP:    04-13    148<H>  |  111<H>  |  101<HH>  ----------------------------<  143<H>  3.4<L>   |  21  |  2.0<H>    Ca    9.0      13 Apr 2021 02:00  Mg     2.2     04-13    TPro  5.5<L>  /  Alb  4.1  /  TBili  2.3<H>  /  DBili  1.7<H>  /  AST  47<H>  /  ALT  13  /  AlkPhos  191<H>  04-13  Albumin, Serum: 4.1 g/dL (04-13-21 @ 02:00)      IMAGING: REVIEWED    ADVANCED DIRECTIVES:            FULL CODE            DNR DNI            LIVING WILL            DPOA       HCP       MOLST    DECISION MAKER:   LEGAL SURROGATE:    PSYCHOSOCIAL-SPIRITUAL ASSESSMENT:       Reviewed       Care plan unchanged       Care plan adjusted as above	    GOALS OF CARE DISCUSSION       Palliative care info/counseling provided	           Family meeting       Advanced Directives addressed please see Advance Care Planning Note	           See previous Palliative Medicine Note       Documentation of GOC:    CURRENT DISPO PLAN:     WILL REMAIN IN HOSPITAL  [facility]  Hospice  Home      REFERRALS	        Palliative Med        Unit SW/Case Mgmt              Speech/Swallow       Patient/Family Support      Hospice       Nutrition       Dietician       PT/OT DESTIN TERRY             MRN-[id number]    CC: fever     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    As per daughter, patient has been having dry consistent cough since 2 months. Today, she was being evaluated for knee injections when she was found to be febrile. She also reports progressing generalized weakness with decreased appetite and PO intake.  No URT symptoms, no chest pain, no leg pain, no diarrhea, no urinary symptoms no sick contacts, no recent travel. In ED was hypotensive, was given IVF, started on levophed 0.04 called to evaluate (11 Mar 2021 03:21)      SUBJECTIVE:  PAtient seen and examined at bedside. She has trach and peg. not able to participate.  Received a call from the daughter, possible family meeting thursday or friday. She will call us with a time.     ROS: unable to assess as patient is lethargic and on vent   DYSPNEA: Y / N	  NAUS/VOM: Y / N	  SECRETIONS: Y / N	  AGITATION: Y / N  Pain (Y/N):       -Provocation/Palliation:  -Quality/Quantity:  -Radiating:  -Severity:  -Timing/Frequency:  -Impact on ADLs:    OTHER REVIEW OF SYSTEMS:  UNABLE TO OBTAIN  due to: lethargic and vented     PEx:  85y              General: trach and vent in place.   HEENT:  NCAT PERRL EOMI Non icteric   Neck: Supple no masses  CVS: RR S1S2 No M/G/R  Resp: vented bs   GI:  Soft NT ND BS+, peg in place   : Angie  Musc: No C/C/E    Neuro: lethargic   Psych: not able to assess   Skin: Non jaundiced, no rash   Lymph: Normal      Last BM:   04-06-21 @ 07:01  -  04-07-21 @ 07:00  --------------------------------------------------------  OUT: 175 mL    04-07-21 @ 07:01  -  04-08-21 @ 07:00  --------------------------------------------------------  OUT: 490 mL    04-08-21 @ 07:01  -  04-09-21 @ 07:00  --------------------------------------------------------  OUT: 500 mL    04-10-21 @ 07:01  -  04-11-21 @ 07:00  --------------------------------------------------------  OUT: 1450 mL    04-11-21 @ 07:01  -  04-12-21 @ 07:00  --------------------------------------------------------  OUT: 300 mL    04-12-21 @ 07:01  -  04-13-21 @ 07:00  --------------------------------------------------------  OUT: 700 mL        ALLERGIES:     OPIATE NAÏVE (Y/N): y    MEDICATIONS: REVIEWED  MEDICATIONS  (STANDING):  ALBUTerol    90 MICROgram(s) HFA Inhaler 2 Puff(s) Inhalation every 6 hours  chlorhexidine 4% Liquid 1 Application(s) Topical <User Schedule>  clotrimazole 1% Cream 1 Application(s) Topical two times a day  collagenase Ointment 1 Application(s) Topical daily  dexMEDEtomidine Infusion 0.202 MICROgram(s)/kG/Hr (3 mL/Hr) IV Continuous <Continuous>  dextrose 40% Gel 15 Gram(s) Oral once  dextrose 5%. 1000 milliLiter(s) (100 mL/Hr) IV Continuous <Continuous>  dextrose 5%. 1000 milliLiter(s) (50 mL/Hr) IV Continuous <Continuous>  dextrose 5%. 1000 milliLiter(s) (50 mL/Hr) IV Continuous <Continuous>  dextrose 5%. 1000 milliLiter(s) (100 mL/Hr) IV Continuous <Continuous>  dextrose 5%. 1000 milliLiter(s) (50 mL/Hr) IV Continuous <Continuous>  dextrose 50% Injectable 25 Gram(s) IV Push once  dextrose 50% Injectable 12.5 Gram(s) IV Push once  dextrose 50% Injectable 25 Gram(s) IV Push once  ferrous    sulfate Liquid 300 milliGRAM(s) Enteral Tube daily  gabapentin   Solution 100 milliGRAM(s) Oral three times a day  glucagon  Injectable 1 milliGRAM(s) IntraMuscular once  glucagon  Injectable 1 milliGRAM(s) IntraMuscular once  insulin glargine Injectable (LANTUS) 20 Unit(s) SubCutaneous every morning  insulin lispro (ADMELOG) corrective regimen sliding scale   SubCutaneous every 4 hours  ipratropium 17 MICROgram(s) HFA Inhaler 2 Puff(s) Inhalation every 6 hours  meropenem  IVPB 1000 milliGRAM(s) IV Intermittent every 24 hours  multivitamin/minerals/iron Oral Solution (CENTRUM) 15 milliLiter(s) Enteral Tube daily  nystatin Cream 1 Application(s) Topical two times a day  pantoprazole  Injectable 40 milliGRAM(s) IV Push two times a day  petrolatum Ophthalmic Ointment 1 Application(s) Both EYES three times a day  phenylephrine    Infusion 1 MICROgram(s)/kG/Min (11.1 mL/Hr) IV Continuous <Continuous>  propofol Infusion 10.101 MICROgram(s)/kG/Min (3.6 mL/Hr) IV Continuous <Continuous>  sodium bicarbonate  Injectable 50 milliEquivalent(s) IV Push <User Schedule>  sodium chloride 0.9%. 1000 milliLiter(s) (10 mL/Hr) IV Continuous <Continuous>  vasopressin Infusion 0.04 Unit(s)/Min (2.4 mL/Hr) IV Continuous <Continuous>    MEDICATIONS  (PRN):  ondansetron Injectable 4 milliGRAM(s) IV Push every 4 hours PRN Nausea and/or Vomiting      LABS: REVIEWED  CBC:                        7.5    13.80 )-----------( 57       ( 13 Apr 2021 02:00 )             24.0     CMP:    04-13    148<H>  |  111<H>  |  101<HH>  ----------------------------<  143<H>  3.4<L>   |  21  |  2.0<H>    Ca    9.0      13 Apr 2021 02:00  Mg     2.2     04-13    TPro  5.5<L>  /  Alb  4.1  /  TBili  2.3<H>  /  DBili  1.7<H>  /  AST  47<H>  /  ALT  13  /  AlkPhos  191<H>  04-13  Albumin, Serum: 4.1 g/dL (04-13-21 @ 02:00)      IMAGING: REVIEWED    EKG reviewed     ADVANCED DIRECTIVES:          FULL CODE                  LIVING WILL            DPOA       HCP       MOLST    DECISION MAKER:  daughter   LEGAL SURROGATE: daughter     PSYCHOSOCIAL-SPIRITUAL ASSESSMENT:       Reviewed       Care plan unchanged       GOALS OF CARE DISCUSSION       Palliative care info/counseling provided	           Family meeting       Advanced Directives addressed please see Advance Care Planning Note	           See previous Palliative Medicine Note       Documentation of GOC: awaiting family meeting     CURRENT DISPO PLAN:     WILL REMAIN IN HOSPITAL        REFERRALS	        Palliative Med        Unit SW/Case Mgmt

## 2021-04-13 NOTE — PROGRESS NOTE ADULT - ASSESSMENT
85y Female s/p R VATS evacuation of empyema, 1 chest tube in place, s/p Trach and PEG    Plan:  neurology note appreciated, recommending MRI of the brain  Chest tube to suction  Daily AM cxr  follow up Chest tube output  monitor for airleak  monitor O2 sat

## 2021-04-13 NOTE — PROGRESS NOTE ADULT - ASSESSMENT
85yFemale being evaluated for goals of care. Patient has PMH of dementia, osteoarithtis, Turkmen-speaking presenting with R kemiyma had VAT on R and developed ARDS sever sepsis. Patient's hosptial course complicated by ARDS, patient now has tracheostomy, MENDOZA, septic shock on pressors, acute hypoxic hypercapnic resp failure dur to ards/pnumonia.    CTU spoke to daughter this evening. Called daughter and left  asking to call back palliative medicine.  If daughter calls we will discuss CMO with her.     MEDD (morphine equivalent daily dose):0      See Recs below.    Please call [phone] with questions or concerns 24/7.   We will continue to follow.     Discussed with Dr Stout, bedside RN 85yFemale being evaluated for goals of care. Patient has PMH of dementia, osteoarithtis, Ecuadorean-speaking presenting with R empyma had VAT on R and developed ARDS sever sepsis. Patient's hosptial course complicated by ARDS, patient now has tracheostomy, MENDOZA, septic shock on pressors, acute hypoxic hypercapnic resp failure dur to ards/pnumonia.    Daughter would like family meeting, either Thursday or Friday she will call us back with a time.     MEDD (morphine equivalent daily dose):0      See Recs below.    Please call [phone] with questions or concerns 24/7.   We will continue to follow.     Discussed with primary team and  bedside RN

## 2021-04-13 NOTE — PROGRESS NOTE ADULT - SUBJECTIVE AND OBJECTIVE BOX
DESTIN TERRY  85y Female   [id number]    Procedure/dx: R VATS evacuation of empyema, 1 chest tube in place, s/p Trach and PEG,      Patient is a 85y old  Female who presents with a chief complaint of empyema (06 Apr 2021 09:24)    PAST MEDICAL & SURGICAL HISTORY:  Dementia    Osteoarthritis    No significant past surgical history        Vital Signs Last 24 Hrs  T(C): 35.9 (13 Apr 2021 00:00), Max: 36.1 (12 Apr 2021 20:00)  T(F): 96.7 (13 Apr 2021 00:00), Max: 96.9 (12 Apr 2021 20:00)  HR: 80 (13 Apr 2021 04:00) (70 - 97)  BP: 118/59 (12 Apr 2021 14:30) (118/59 - 149/76)  BP(mean): 83 (12 Apr 2021 14:30) (83 - 104)  RR: 36 (13 Apr 2021 04:00) (35 - 43)  SpO2: 100% (13 Apr 2021 04:00) (91% - 100%)    Mode: AC/ CMV (Assist Control/ Continuous Mandatory Ventilation), RR (machine): 36, TV (machine): 400, FiO2: 40, PEEP: 5, ITime: 1, MAP: 12, PIP: 36    Diet, NPO with Tube Feed:   Tube Feeding Modality: Gastrostomy  Peptamen A.F. Formula  Total Volume for 24 Hours (mL): 720  Continuous  Starting Tube Feed Rate mL per Hour: 10  Increase Tube Feed Rate by (mL): 10     Every 3 hours  Until Goal Tube Feed Rate (mL per Hour): 30  Tube Feed Duration (in Hours): 24  Tube Feed Start Time: 17:45 (04-12-21 @ 18:11)      I&O's Detail    11 Apr 2021 07:01  -  12 Apr 2021 07:00  --------------------------------------------------------  IN:    Albumin 25%  -  50 mL: 300 mL    dextrose 5%: 1200 mL    Enteral Tube Flush: 170 mL    Insulin: 4 mL    IV PiggyBack: 1000 mL    Peptamen A.F.: 330 mL    Phenylephrine: 15 mL    PRBCs (Packed Red Blood Cells): 250 mL    Vasopressin: 16.8 mL  Total IN: 3285.8 mL    OUT:    Chest Tube (mL): 0 mL    Chest Tube (mL): 90 mL    Gastric Aspirate - Discarded (mL): 160 mL    Indwelling Catheter - Urethral (mL): 940 mL    Rectal Tube (mL): 300 mL  Total OUT: 1490 mL    Total NET: 1795.8 mL      12 Apr 2021 07:01  -  13 Apr 2021 04:52  --------------------------------------------------------  IN:    Albumin 25%  -  50 mL: 200 mL    dextrose 5%: 1050 mL    Enteral Tube Flush: 60 mL    IV PiggyBack: 550 mL    Oral Fluid: 700 mL    Peptamen A.F.: 210 mL  Total IN: 2770 mL    OUT:    Chest Tube (mL): 70 mL    Indwelling Catheter - Urethral (mL): 960 mL    Rectal Tube (mL): 500 mL  Total OUT: 1530 mL    Total NET: 1240 mL          MEDICATIONS  (STANDING):  albumin human 25% IVPB 100 milliLiter(s) IV Intermittent every 6 hours  ALBUTerol    90 MICROgram(s) HFA Inhaler 2 Puff(s) Inhalation every 6 hours  caspofungin IVPB      caspofungin IVPB 50 milliGRAM(s) IV Intermittent every 24 hours  chlorhexidine 4% Liquid 1 Application(s) Topical <User Schedule>  clotrimazole 1% Cream 1 Application(s) Topical two times a day  collagenase Ointment 1 Application(s) Topical daily  dexMEDEtomidine Infusion 0.202 MICROgram(s)/kG/Hr (3 mL/Hr) IV Continuous <Continuous>  dextrose 40% Gel 15 Gram(s) Oral once  dextrose 5%. 1000 milliLiter(s) (50 mL/Hr) IV Continuous <Continuous>  dextrose 5%. 1000 milliLiter(s) (50 mL/Hr) IV Continuous <Continuous>  dextrose 5%. 1000 milliLiter(s) (100 mL/Hr) IV Continuous <Continuous>  dextrose 5%. 1000 milliLiter(s) (50 mL/Hr) IV Continuous <Continuous>  dextrose 5%. 1000 milliLiter(s) (100 mL/Hr) IV Continuous <Continuous>  dextrose 50% Injectable 25 Gram(s) IV Push once  dextrose 50% Injectable 12.5 Gram(s) IV Push once  dextrose 50% Injectable 25 Gram(s) IV Push once  ferrous    sulfate Liquid 300 milliGRAM(s) Enteral Tube daily  gabapentin   Solution 100 milliGRAM(s) Oral three times a day  glucagon  Injectable 1 milliGRAM(s) IntraMuscular once  glucagon  Injectable 1 milliGRAM(s) IntraMuscular once  insulin glargine Injectable (LANTUS) 20 Unit(s) SubCutaneous every morning  insulin lispro (ADMELOG) corrective regimen sliding scale   SubCutaneous every 4 hours  ipratropium 17 MICROgram(s) HFA Inhaler 2 Puff(s) Inhalation every 6 hours  linezolid  IVPB      linezolid  IVPB 600 milliGRAM(s) IV Intermittent every 12 hours  meropenem  IVPB 1000 milliGRAM(s) IV Intermittent every 24 hours  multivitamin/minerals/iron Oral Solution (CENTRUM) 15 milliLiter(s) Enteral Tube daily  nystatin Cream 1 Application(s) Topical two times a day  pantoprazole  Injectable 40 milliGRAM(s) IV Push two times a day  petrolatum Ophthalmic Ointment 1 Application(s) Both EYES three times a day  phenylephrine    Infusion 1 MICROgram(s)/kG/Min (11.1 mL/Hr) IV Continuous <Continuous>  propofol Infusion 10.101 MICROgram(s)/kG/Min (3.6 mL/Hr) IV Continuous <Continuous>  sodium bicarbonate  Injectable 50 milliEquivalent(s) IV Push <User Schedule>  sodium chloride 0.9%. 1000 milliLiter(s) (10 mL/Hr) IV Continuous <Continuous>  vasopressin Infusion 0.04 Unit(s)/Min (2.4 mL/Hr) IV Continuous <Continuous>    MEDICATIONS  (PRN):  ondansetron Injectable 4 milliGRAM(s) IV Push every 4 hours PRN Nausea and/or Vomiting    PHYSICAL EXAM:  GENERAL: NAD, on vent  CHEST/LUNG: Clear to auscultation bilaterally; chest tube on suction, +airleak  HEART: S1 and S2 noted  ABDOMEN: Soft, Nontender, Nondistended;   PERIPHERAL VASCULAR: Extremities are normal in color, size and temperature.   PSYCH: AAOx3  NEUROLOGY: non-focal deficits    LABS:                         7.5    13.80 )-----------( 57       ( 13 Apr 2021 02:00 )             24.0        04-13    148<H>  |  111<H>  |  101<HH>  ----------------------------<  143<H>  3.4<L>   |  21  |  2.0<H>    Ca    9.0      13 Apr 2021 02:00  Mg     2.2     04-13    TPro  5.5<L>  /  Alb  4.1  /  TBili  2.3<H>  /  DBili  1.7<H>  /  AST  47<H>  /  ALT  13  /  AlkPhos  191<H>  04-13    LIVER FUNCTIONS - ( 13 Apr 2021 02:00 )  Alb: 4.1 g/dL / Pro: 5.5 g/dL / ALK PHOS: 191 U/L / ALT: 13 U/L / AST: 47 U/L / GGT: x           PT/INR - ( 11 Apr 2021 07:00 )   PT: 14.40 sec;   INR: 1.25 ratio         PTT - ( 11 Apr 2021 07:00 )  PTT:36.4 sec    IMAGING:  < from: CT Abdomen and Pelvis w/ Oral Cont (04.12.21 @ 17:17) >    IMPRESSION:    1. Redemonstrated loculated right pneumothorax without significant change. Interval removal of 2/3 right chest tubes.    2. Diffuse groundglass and consolidative opacities with interlobular septal thickening and traction bronchiectasis in the bases. Findings likely reflecting a combination of pulmonary edema and post inflammatory/infectious changes with some degree of organization.    3. Interval development abdominopelvic ascites and anasarca consistent with fluid overload.    4. Nonspecific diffuse colonic wall thickening which could reflect colitis versus edema. No bowel obstruction.    5. Interval resolution of previously seen pneumomediastinum.    < end of copied text >  < from: CT Head No Cont (04.12.21 @ 17:17) >    IMPRESSION:    1.  No evidence of acute intracranial pathology. No evidence of acute territorialinfarct, intracranial hemorrhage, mass effect or midline shift.  2.  Mild parenchymal volume loss and chronic microvascular ischemic changes.    < end of copied text >

## 2021-04-13 NOTE — PROGRESS NOTE ADULT - ASSESSMENT
ASSESSMENT  85 year old lady known to have dementia, osteoarithtis, Indonesian-speaking presenting with cough and fever.      IMPRESSION  #Sepsis present on admission - secondary to CAP  -  CT Chest No Cont (03.11.21 @ 00:54): Areas of right lung consolidation which can be seen in pneumonia. Numerous air-fluid levels throughout the right lung compatible with an infectious process. Suggestion of split pleura at the lung base for which empyema is favored although inherently limited on this noncontrast exam. Consideration can be given to contrast administration if feasiblefor better delineation. Areas of bilateral interlobular septal thickening and mild layering groundglass attenuation may reflect a component of edema.  - Urine Legionella negative  - Urine Strep negative  - BLood Cx 3/10 and 3/13 negative  - Procalcitonin 1.15   - CT Chest No Cont (03.17.21 @ 16:19): Since 3/11/2021. Enlarging loculated right pleural fluid collection with multiple air bubbles consistent with empyema.   Associated compressive atelectasis right lung is seen. Scattered groundglass opacities involving multiple lumbar segments.  - s/p VATS 3/22 -- right empyema with 800 cc purulent fluid in pleural space, multiple pockers with dense adhesions -- necrotizing pneumonia of the middle lobe  - VATS Cx 3/22 with rare yeast, otherwise no growth  -  CT Chest No Cont (03.29.21 @ 12:37): Since March 29, 2021, resolved right pleural effusion; persistent moderate right pneumothorax with 2 chest tubes in place. Increased left greater than right diffuse bilateral groundglass opacities and interlobular septal thickening. Findings are suspicious for a multifocal infection. Superimposed edema is also a possibility.  Enlarging mediastinal lymph nodes, likely reactive.  - CT Chest/Abdomen and Pelvis No Cont (04.01.21 @ 20:19): Stable residual right-sided pneumothorax. Three left-sided chest tubes are in place. New pneumomediastinum is noted, especially in the anterior mediastinum.  Stable bilateral diffuse areas of groundglass opacity. Areas of traction bronchiectasis noted throughout the left lung field and at the right lung base. No evidence of bowel obstruction or intra-abdominal or pelvic inflammatory process  - Fungitell: <31 4/1  - Aspergillus Galactomannan Antigen: <0.500 (04.01.21 @ 01:03)  - Blood Cx 3/31 and 4/3 NG    #GI Bleed from PEG 4/3 -- EGD held as improved/stable    #Dementia  #Osteoarthritis  #Obesity BMI (kg/m2): 23.4  #Abx allergy: clindamycin (Hives)    Creatinine, Serum: 2.5 mg/dL (04.07.21 @ 01:45)  Weight (kg): 60 (11 Mar 2021 01:17)  CrCl 21     RECOMMENDATIONS  - linezolid stopped due to thrombocytopenia likely antibiotic induced  - continue meropenem  - trend WBC     Please call or message on [employer] Teams if with any questions.  Spectra [phone]

## 2021-04-13 NOTE — CONSULT NOTE ADULT - SUBJECTIVE AND OBJECTIVE BOX
Specialty: Neuro Critical Care     Interval Hx: Neurocritical care consulted for "not following commands when off sedation"    HPI: 85 year old lady known to have dementia, osteoarithtis, Macedonian-speaking presenting with cough and fever. As per daughter, patient has been having dry consistent cough since 2 months. Today, she was being evaluated for knee injections when she was found to be febrile. She also reports progressing generalized weakness with decreased appetite and PO intake. No URT symptoms, no chest pain, no leg pain, no diarrhea, no urinary symptoms no sick contacts, no recent travel. In ED was hypotensive, was given IVF, started on levophed 0.04 called to evaluate (11 Mar 2021 03:21)      Past Medical and Surgical Hx:  PAST MEDICAL & SURGICAL HISTORY:  Dementia  Osteoarthritis    No significant past surgical history    Allergies: clindamycin (Hives)      ROS: unable to be obtained due to neurologic status    PE:  General: ill-appearing, female, with tracheostomy in place, edema of the UE and LE b/l.  Neuro:   MSE: Does not follow commands even with Mexican  (#[id number]), opens eyes spontaneously, does not track with eyes   CN: pupils are 3 - 4 mm in size, round and react briskly to light bilaterally. corneal reflex intact b/l. (+) cough, (+) gag  Motor: 0/5 strength in the UE b/l. Will move toes when noxious stimuli is applied to inner thighs b/l LE.   Sensory: grimace to nail bed pressure on the index finger of both hands, grimace to cough and suction    Vital Signs:  ICU Vital Signs Last 24 Hrs  T(C): 35.7 (12 Apr 2021 16:00), Max: 36.2 (12 Apr 2021 04:00)  T(F): 96.2 (12 Apr 2021 16:00), Max: 97.1 (12 Apr 2021 04:00)  HR: 91 (12 Apr 2021 23:00) (70 - 95)  BP: 118/59 (12 Apr 2021 14:30) (117/61 - 149/76)  BP(mean): 83 (12 Apr 2021 14:30) (83 - 104)  ABP: 135/66 (12 Apr 2021 23:00) (104/49 - 150/71)  ABP(mean): 94 (12 Apr 2021 23:00) (68 - 104)  RR: 39 (12 Apr 2021 23:00) (34 - 43)  SpO2: 98% (12 Apr 2021 23:00) (91% - 100%)      Ventilator:  Mode: AC/ CMV (Assist Control/ Continuous Mandatory Ventilation)  RR (machine): 36  TV (machine): 400  FiO2: 40  PEEP: 5  ITime: 1  MAP: 12  PIP: 36      I/Os:  I&O's Detail    11 Apr 2021 07:01  -  12 Apr 2021 07:00  --------------------------------------------------------  IN:    Albumin 25%  -  50 mL: 300 mL    dextrose 5%: 1200 mL    Enteral Tube Flush: 170 mL    Insulin: 4 mL    IV PiggyBack: 1000 mL    Peptamen A.F.: 330 mL    Phenylephrine: 15 mL    PRBCs (Packed Red Blood Cells): 250 mL    Vasopressin: 16.8 mL  Total IN: 3285.8 mL    OUT:    Chest Tube (mL): 0 mL    Chest Tube (mL): 90 mL    Gastric Aspirate - Discarded (mL): 160 mL    Indwelling Catheter - Urethral (mL): 940 mL    Rectal Tube (mL): 300 mL  Total OUT: 1490 mL    Total NET: 1795.8 mL      12 Apr 2021 07:01  -  13 Apr 2021 00:02  --------------------------------------------------------  IN:    Albumin 25%  -  50 mL: 200 mL    dextrose 5%: 800 mL    IV PiggyBack: 550 mL    Oral Fluid: 700 mL    Peptamen A.F.: 70 mL  Total IN: 2320 mL    OUT:    Chest Tube (mL): 60 mL    Indwelling Catheter - Urethral (mL): 850 mL    Rectal Tube (mL): 400 mL  Total OUT: 1310 mL    Total NET: 1010 mL          Labs:  04-12    152<H>  |  116<H>  |  105<HH>  ----------------------------<  108<H>  3.6   |  21  |  2.2<H>    Ca    8.7      12 Apr 2021 01:30  Mg     2.3     04-12    TPro  5.4<L>  /  Alb  3.5  /  TBili  2.6<H>  /  DBili  2.0<H>  /  AST  40  /  ALT  13  /  AlkPhos  125<H>  04-12    CBC Full  -  ( 12 Apr 2021 01:30 )  WBC Count : 17.32 K/uL  RBC Count : 2.67 M/uL  Hemoglobin : 7.6 g/dL  Hematocrit : 24.8 %  Platelet Count - Automated : 76 K/uL  Mean Cell Volume : 92.9 fL  Mean Cell Hemoglobin : 28.5 pg  Mean Cell Hemoglobin Concentration : 30.6 g/dL  Auto Neutrophil # : x  Auto Lymphocyte # : x  Auto Monocyte # : x  Auto Eosinophil # : x  Auto Basophil # : x  Auto Neutrophil % : x  Auto Lymphocyte % : x  Auto Monocyte % : x  Auto Eosinophil % : x  Auto Basophil % : x    PT/INR - ( 11 Apr 2021 07:00 )   PT: 14.40 sec;   INR: 1.25 ratio         PTT - ( 11 Apr 2021 07:00 )  PTT:36.4 sec  LIVER FUNCTIONS - ( 12 Apr 2021 01:30 )  Alb: 3.5 g/dL / Pro: 5.4 g/dL / ALK PHOS: 125 U/L / ALT: 13 U/L / AST: 40 U/L / GGT: x           ABG - ( 12 Apr 2021 03:07 )  pH, Arterial: 7.34  pH, Blood: x     /  pCO2: 42    /  pO2: 118   / HCO3: 23    / Base Excess: -2.6  /  SaO2: 99        Microbiology  Culture - Blood (collected 04-03-21 @ 13:21)  Source: .Blood Blood  Final Report (04-08-21 @ 20:01):    No Growth Final    Culture - Blood (collected 03-31-21 @ 10:52)  Source: .Blood Blood  Final Report (04-05-21 @ 23:01):    No Growth Final    Culture - Acid Fast - Tissue w/Smear (collected 03-22-21 @ 13:33)  Source: .Tissue PLEURAL PEEL  Preliminary Report (03-31-21 @ 15:03):    No growth at 1 week.    Culture - Fungal, Tissue (collected 03-22-21 @ 13:33)  Source: .Tissue None  Preliminary Report (03-31-21 @ 15:02):    No growth    Culture - Tissue with Gram Stain (collected 03-22-21 @ 13:33)  Source: .Tissue None  Gram Stain (03-23-21 @ 04:38):    Rare polymorphonuclear leukocytes seen per low power field    Few White blood cells seen per low power field    Few Gram positive cocci in pairs per oil power field  Final Report (04-01-21 @ 16:29):    No growth    Organism seen in Gram stain is non-viable after prolonged    incubation and repeated subculture.    Culture - Fungal, Bronchial (collected 03-22-21 @ 12:58)  Source: .Bronchial None  Preliminary Report (03-25-21 @ 11:20):    Rare Yeast    Culture - Acid Fast - Bronchial w/Smear (collected 03-22-21 @ 12:58)  Source: Bronch Wash None  Preliminary Report (03-31-21 @ 15:03):    No growth at 1 week.    Culture - Bronchial (collected 03-22-21 @ 12:58)  Source: .Bronchial None  Gram Stain (03-23-21 @ 07:58):    Numerous polymorphonuclear leukocytes per low power field    No Squamous epithelial cells per low power field    No organisms seen per oil power field  Final Report (03-24-21 @ 22:11):    Normal Respiratory Narda present    Culture - Fungal, Body Fluid (collected 03-22-21 @ 09:26)  Source: .Body Fluid None  Preliminary Report (03-31-21 @ 15:02):    No growth    Culture - Acid Fast - Body Fluid w/Smear (collected 03-22-21 @ 09:26)  Source: .Body Fluid None  Preliminary Report (03-31-21 @ 15:03):    No growth at 1 week.    Culture - Body Fluid with Gram Stain (collected 03-22-21 @ 09:26)  Source: .Body Fluid None  Gram Stain (03-23-21 @ 04:38):    polymorphonuclear leukocytes seen    No organisms seen    by cytocentrifuge  Final Report (03-27-21 @ 17:22):    No growth at 5 days    Culture - Blood (collected 03-17-21 @ 16:00)  Source: .Blood Blood-Peripheral  Final Report (03-23-21 @ 02:39):    No Growth Final      Medications Current and PRN:  MEDICATIONS  (STANDING):  albumin human 25% IVPB 100 milliLiter(s) IV Intermittent every 6 hours  ALBUTerol    90 MICROgram(s) HFA Inhaler 2 Puff(s) Inhalation every 6 hours  caspofungin IVPB      caspofungin IVPB 50 milliGRAM(s) IV Intermittent every 24 hours  chlorhexidine 4% Liquid 1 Application(s) Topical <User Schedule>  clotrimazole 1% Cream 1 Application(s) Topical two times a day  collagenase Ointment 1 Application(s) Topical daily  dexMEDEtomidine Infusion 0.202 MICROgram(s)/kG/Hr (3 mL/Hr) IV Continuous <Continuous>  dextrose 40% Gel 15 Gram(s) Oral once  dextrose 5%. 1000 milliLiter(s) (50 mL/Hr) IV Continuous <Continuous>  dextrose 5%. 1000 milliLiter(s) (100 mL/Hr) IV Continuous <Continuous>  dextrose 5%. 1000 milliLiter(s) (50 mL/Hr) IV Continuous <Continuous>  dextrose 5%. 1000 milliLiter(s) (50 mL/Hr) IV Continuous <Continuous>  dextrose 5%. 1000 milliLiter(s) (100 mL/Hr) IV Continuous <Continuous>  dextrose 50% Injectable 25 Gram(s) IV Push once  dextrose 50% Injectable 12.5 Gram(s) IV Push once  dextrose 50% Injectable 25 Gram(s) IV Push once  ferrous    sulfate Liquid 300 milliGRAM(s) Enteral Tube daily  gabapentin   Solution 100 milliGRAM(s) Oral three times a day  glucagon  Injectable 1 milliGRAM(s) IntraMuscular once  glucagon  Injectable 1 milliGRAM(s) IntraMuscular once  insulin glargine Injectable (LANTUS) 20 Unit(s) SubCutaneous every morning  insulin lispro (ADMELOG) corrective regimen sliding scale   SubCutaneous every 4 hours  ipratropium 17 MICROgram(s) HFA Inhaler 2 Puff(s) Inhalation every 6 hours  linezolid  IVPB      linezolid  IVPB 600 milliGRAM(s) IV Intermittent every 12 hours  meropenem  IVPB 1000 milliGRAM(s) IV Intermittent every 24 hours  multivitamin/minerals/iron Oral Solution (CENTRUM) 15 milliLiter(s) Enteral Tube daily  nystatin Cream 1 Application(s) Topical two times a day  pantoprazole  Injectable 40 milliGRAM(s) IV Push two times a day  petrolatum Ophthalmic Ointment 1 Application(s) Both EYES three times a day  phenylephrine    Infusion 1 MICROgram(s)/kG/Min (11.1 mL/Hr) IV Continuous <Continuous>  propofol Infusion 10.101 MICROgram(s)/kG/Min (3.6 mL/Hr) IV Continuous <Continuous>  sodium bicarbonate  Injectable 50 milliEquivalent(s) IV Push <User Schedule>  sodium chloride 0.9%. 1000 milliLiter(s) (10 mL/Hr) IV Continuous <Continuous>  vasopressin Infusion 0.04 Unit(s)/Min (2.4 mL/Hr) IV Continuous <Continuous>    MEDICATIONS  (PRN):  ondansetron Injectable 4 milliGRAM(s) IV Push every 4 hours PRN Nausea and/or Vomiting      Radiology:    CTH non contrast 4/12/2021  IMPRESSION:    1.  No evidence of acute intracranial pathology. No evidence of acute territorial infarct, intracranial hemorrhage, mass effect or midline shift.  2.  Mild parenchymal volume loss and chronic microvascular ischemic changes.    CT Chest non-contrast 4/12/2021    IMPRESSION:    1. Redemonstrated loculated right pneumothorax without significant change. Interval removal of 2/3 right chest tubes.    2. Diffuse groundglass and consolidative opacities with interlobular septal thickening and traction bronchiectasis in the bases. Findings likely reflecting a combination of pulmonary edema and post inflammatory/infectious changes with some degree of organization.    3. Interval development abdominopelvic ascites and anasarca consistent with fluid overload.    4. Nonspecific diffuse colonic wall thickening which could reflect colitis versus edema. No bowel obstruction.    5. Interval resolution of previously seen pneumomediastinum.    CT Abdomen and Pelvis with Oral contrast 4/12/2021    IMPRESSION:    1. Redemonstrated loculated right pneumothorax without significant change. Interval removal of 2/3 right chest tubes.    2. Diffuse groundglass and consolidative opacities with interlobular septal thickening and traction bronchiectasis in the bases. Findings likely reflecting a combination of pulmonary edema and post inflammatory/infectious changes with some degree of organization.    3. Interval development abdominopelvic ascites and anasarca consistent with fluid overload.    4. Nonspecific diffuse colonic wall thickening which could reflect colitis versus edema. No bowel obstruction.    5. Interval resolution of previously seen pneumomediastinum.      Assessment: 85 year old (Mexican speaking) Female with PMHx of dementia, osteoarthritis admitted for acute hypoxemic respiratory failure. Hospital course complicated by septic shock, GI bleed, thrombocytopenia, and MENDOZA, S/p R VATS evacuation of empyema, s/p Trach and PEG. Upon neurologic exam this evening, patient was able to breathe above the recommended vent settings, eyes were open spontaneously, corneal reflexes were intact b/l, but UE 0/5 strength and LE notable for toe movement to noxious stimuli when applied to thighs b/l. 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 In addition, non-contrast CTH 4/12 did not reveal intracranial pathology, but labs are significant for increase BUN of 105 and Creatinine of 2.2, with elevated white blood cell count of 17. Patient may benefit from correction of metabolic derangements before further neurologic assessment. Once the metabolic abnormalities have been corrected, may consider MRI of the brain w/o contrast however, can perform sooner if stable. NCC team to follow up with the patient during morning rounds.     Plan:  #Neuro:  - CTH non-contrast (revealed no acute intracranial pathology)  - MRI of the brain w/o contrast         STAR Damon  Please feel free to contact for any questions or concerns. Thank you. Attending attestation to follow.   Extension: #[id number]   Specialty: Neuro Critical Care     Interval Hx: Neurocritical care consulted for "not following commands when off sedation"    HPI: 85 year old lady known to have dementia, osteoarithtis, German-speaking presenting with cough and fever. As per daughter, patient has been having dry consistent cough since 2 months. Today, she was being evaluated for knee injections when she was found to be febrile. She also reports progressing generalized weakness with decreased appetite and PO intake. No URT symptoms, no chest pain, no leg pain, no diarrhea, no urinary symptoms no sick contacts, no recent travel. In ED was hypotensive, was given IVF, started on levophed 0.04 called to evaluate (11 Mar 2021 03:21)      Past Medical and Surgical Hx:  PAST MEDICAL & SURGICAL HISTORY:  Dementia  Osteoarthritis    No significant past surgical history    Allergies: clindamycin (Hives)      ROS: unable to be obtained due to neurologic status    PE:  General: ill-appearing, female, with tracheostomy in place, edema of the UE and LE b/l.  Neuro:   MSE: Does not follow commands even with [place]err[place]  (#[id number]), opens eyes spontaneously, does not track with eyes   CN: pupils are 3 - 4 mm in size, round and react briskly to light bilaterally. corneal reflex intact b/l. (+) cough, (+) gag  Motor: 0/5 strength in the UE b/l. Will move toes when noxious stimuli is applied to inner thighs b/l LE.   Sensory: grimace to nail bed pressure on the index finger of both hands, grimace to cough and suction    Vital Signs:  ICU Vital Signs Last 24 Hrs  T(C): 35.7 (12 Apr 2021 16:00), Max: 36.2 (12 Apr 2021 04:00)  T(F): 96.2 (12 Apr 2021 16:00), Max: 97.1 (12 Apr 2021 04:00)  HR: 91 (12 Apr 2021 23:00) (70 - 95)  BP: 118/59 (12 Apr 2021 14:30) (117/61 - 149/76)  BP(mean): 83 (12 Apr 2021 14:30) (83 - 104)  ABP: 135/66 (12 Apr 2021 23:00) (104/49 - 150/71)  ABP(mean): 94 (12 Apr 2021 23:00) (68 - 104)  RR: 39 (12 Apr 2021 23:00) (34 - 43)  SpO2: 98% (12 Apr 2021 23:00) (91% - 100%)      Ventilator:  Mode: AC/ CMV (Assist Control/ Continuous Mandatory Ventilation)  RR (machine): 36  TV (machine): 400  FiO2: 40  PEEP: 5  ITime: 1  MAP: 12  PIP: 36      I/Os:  I&O's Detail    11 Apr 2021 07:01  -  12 Apr 2021 07:00  --------------------------------------------------------  IN:    Albumin 25%  -  50 mL: 300 mL    dextrose 5%: 1200 mL    Enteral Tube Flush: 170 mL    Insulin: 4 mL    IV PiggyBack: 1000 mL    Peptamen A.F.: 330 mL    Phenylephrine: 15 mL    PRBCs (Packed Red Blood Cells): 250 mL    Vasopressin: 16.8 mL  Total IN: 3285.8 mL    OUT:    Chest Tube (mL): 0 mL    Chest Tube (mL): 90 mL    Gastric Aspirate - Discarded (mL): 160 mL    Indwelling Catheter - Urethral (mL): 940 mL    Rectal Tube (mL): 300 mL  Total OUT: 1490 mL    Total NET: 1795.8 mL      12 Apr 2021 07:01  -  13 Apr 2021 00:02  --------------------------------------------------------  IN:    Albumin 25%  -  50 mL: 200 mL    dextrose 5%: 800 mL    IV PiggyBack: 550 mL    Oral Fluid: 700 mL    Peptamen A.F.: 70 mL  Total IN: 2320 mL    OUT:    Chest Tube (mL): 60 mL    Indwelling Catheter - Urethral (mL): 850 mL    Rectal Tube (mL): 400 mL  Total OUT: 1310 mL    Total NET: 1010 mL          Labs:  04-12    152<H>  |  116<H>  |  105<HH>  ----------------------------<  108<H>  3.6   |  21  |  2.2<H>    Ca    8.7      12 Apr 2021 01:30  Mg     2.3     04-12    TPro  5.4<L>  /  Alb  3.5  /  TBili  2.6<H>  /  DBili  2.0<H>  /  AST  40  /  ALT  13  /  AlkPhos  125<H>  04-12    CBC Full  -  ( 12 Apr 2021 01:30 )  WBC Count : 17.32 K/uL  RBC Count : 2.67 M/uL  Hemoglobin : 7.6 g/dL  Hematocrit : 24.8 %  Platelet Count - Automated : 76 K/uL  Mean Cell Volume : 92.9 fL  Mean Cell Hemoglobin : 28.5 pg  Mean Cell Hemoglobin Concentration : 30.6 g/dL  Auto Neutrophil # : x  Auto Lymphocyte # : x  Auto Monocyte # : x  Auto Eosinophil # : x  Auto Basophil # : x  Auto Neutrophil % : x  Auto Lymphocyte % : x  Auto Monocyte % : x  Auto Eosinophil % : x  Auto Basophil % : x    PT/INR - ( 11 Apr 2021 07:00 )   PT: 14.40 sec;   INR: 1.25 ratio         PTT - ( 11 Apr 2021 07:00 )  PTT:36.4 sec  LIVER FUNCTIONS - ( 12 Apr 2021 01:30 )  Alb: 3.5 g/dL / Pro: 5.4 g/dL / ALK PHOS: 125 U/L / ALT: 13 U/L / AST: 40 U/L / GGT: x           ABG - ( 12 Apr 2021 03:07 )  pH, Arterial: 7.34  pH, Blood: x     /  pCO2: 42    /  pO2: 118   / HCO3: 23    / Base Excess: -2.6  /  SaO2: 99        Microbiology  Culture - Blood (collected 04-03-21 @ 13:21)  Source: .Blood Blood  Final Report (04-08-21 @ 20:01):    No Growth Final    Culture - Blood (collected 03-31-21 @ 10:52)  Source: .Blood Blood  Final Report (04-05-21 @ 23:01):    No Growth Final    Culture - Acid Fast - Tissue w/Smear (collected 03-22-21 @ 13:33)  Source: .Tissue PLEURAL PEEL  Preliminary Report (03-31-21 @ 15:03):    No growth at 1 week.    Culture - Fungal, Tissue (collected 03-22-21 @ 13:33)  Source: .Tissue None  Preliminary Report (03-31-21 @ 15:02):    No growth    Culture - Tissue with Gram Stain (collected 03-22-21 @ 13:33)  Source: .Tissue None  Gram Stain (03-23-21 @ 04:38):    Rare polymorphonuclear leukocytes seen per low power field    Few White blood cells seen per low power field    Few Gram positive cocci in pairs per oil power field  Final Report (04-01-21 @ 16:29):    No growth    Organism seen in Gram stain is non-viable after prolonged    incubation and repeated subculture.    Culture - Fungal, Bronchial (collected 03-22-21 @ 12:58)  Source: .Bronchial None  Preliminary Report (03-25-21 @ 11:20):    Rare Yeast    Culture - Acid Fast - Bronchial w/Smear (collected 03-22-21 @ 12:58)  Source: Bronch Wash None  Preliminary Report (03-31-21 @ 15:03):    No growth at 1 week.    Culture - Bronchial (collected 03-22-21 @ 12:58)  Source: .Bronchial None  Gram Stain (03-23-21 @ 07:58):    Numerous polymorphonuclear leukocytes per low power field    No Squamous epithelial cells per low power field    No organisms seen per oil power field  Final Report (03-24-21 @ 22:11):    Normal Respiratory Narda present    Culture - Fungal, Body Fluid (collected 03-22-21 @ 09:26)  Source: .Body Fluid None  Preliminary Report (03-31-21 @ 15:02):    No growth    Culture - Acid Fast - Body Fluid w/Smear (collected 03-22-21 @ 09:26)  Source: .Body Fluid None  Preliminary Report (03-31-21 @ 15:03):    No growth at 1 week.    Culture - Body Fluid with Gram Stain (collected 03-22-21 @ 09:26)  Source: .Body Fluid None  Gram Stain (03-23-21 @ 04:38):    polymorphonuclear leukocytes seen    No organisms seen    by cytocentrifuge  Final Report (03-27-21 @ 17:22):    No growth at 5 days    Culture - Blood (collected 03-17-21 @ 16:00)  Source: .Blood Blood-Peripheral  Final Report (03-23-21 @ 02:39):    No Growth Final      Medications Current and PRN:  MEDICATIONS  (STANDING):  albumin human 25% IVPB 100 milliLiter(s) IV Intermittent every 6 hours  ALBUTerol    90 MICROgram(s) HFA Inhaler 2 Puff(s) Inhalation every 6 hours  caspofungin IVPB      caspofungin IVPB 50 milliGRAM(s) IV Intermittent every 24 hours  chlorhexidine 4% Liquid 1 Application(s) Topical <User Schedule>  clotrimazole 1% Cream 1 Application(s) Topical two times a day  collagenase Ointment 1 Application(s) Topical daily  dexMEDEtomidine Infusion 0.202 MICROgram(s)/kG/Hr (3 mL/Hr) IV Continuous <Continuous>  dextrose 40% Gel 15 Gram(s) Oral once  dextrose 5%. 1000 milliLiter(s) (50 mL/Hr) IV Continuous <Continuous>  dextrose 5%. 1000 milliLiter(s) (100 mL/Hr) IV Continuous <Continuous>  dextrose 5%. 1000 milliLiter(s) (50 mL/Hr) IV Continuous <Continuous>  dextrose 5%. 1000 milliLiter(s) (50 mL/Hr) IV Continuous <Continuous>  dextrose 5%. 1000 milliLiter(s) (100 mL/Hr) IV Continuous <Continuous>  dextrose 50% Injectable 25 Gram(s) IV Push once  dextrose 50% Injectable 12.5 Gram(s) IV Push once  dextrose 50% Injectable 25 Gram(s) IV Push once  ferrous    sulfate Liquid 300 milliGRAM(s) Enteral Tube daily  gabapentin   Solution 100 milliGRAM(s) Oral three times a day  glucagon  Injectable 1 milliGRAM(s) IntraMuscular once  glucagon  Injectable 1 milliGRAM(s) IntraMuscular once  insulin glargine Injectable (LANTUS) 20 Unit(s) SubCutaneous every morning  insulin lispro (ADMELOG) corrective regimen sliding scale   SubCutaneous every 4 hours  ipratropium 17 MICROgram(s) HFA Inhaler 2 Puff(s) Inhalation every 6 hours  linezolid  IVPB      linezolid  IVPB 600 milliGRAM(s) IV Intermittent every 12 hours  meropenem  IVPB 1000 milliGRAM(s) IV Intermittent every 24 hours  multivitamin/minerals/iron Oral Solution (CENTRUM) 15 milliLiter(s) Enteral Tube daily  nystatin Cream 1 Application(s) Topical two times a day  pantoprazole  Injectable 40 milliGRAM(s) IV Push two times a day  petrolatum Ophthalmic Ointment 1 Application(s) Both EYES three times a day  phenylephrine    Infusion 1 MICROgram(s)/kG/Min (11.1 mL/Hr) IV Continuous <Continuous>  propofol Infusion 10.101 MICROgram(s)/kG/Min (3.6 mL/Hr) IV Continuous <Continuous>  sodium bicarbonate  Injectable 50 milliEquivalent(s) IV Push <User Schedule>  sodium chloride 0.9%. 1000 milliLiter(s) (10 mL/Hr) IV Continuous <Continuous>  vasopressin Infusion 0.04 Unit(s)/Min (2.4 mL/Hr) IV Continuous <Continuous>    MEDICATIONS  (PRN):  ondansetron Injectable 4 milliGRAM(s) IV Push every 4 hours PRN Nausea and/or Vomiting      Radiology:    CTH non contrast 4/12/2021  IMPRESSION:    1.  No evidence of acute intracranial pathology. No evidence of acute territorial infarct, intracranial hemorrhage, mass effect or midline shift.  2.  Mild parenchymal volume loss and chronic microvascular ischemic changes.    CT Chest non-contrast 4/12/2021    IMPRESSION:    1. Redemonstrated loculated right pneumothorax without significant change. Interval removal of 2/3 right chest tubes.    2. Diffuse groundglass and consolidative opacities with interlobular septal thickening and traction bronchiectasis in the bases. Findings likely reflecting a combination of pulmonary edema and post inflammatory/infectious changes with some degree of organization.    3. Interval development abdominopelvic ascites and anasarca consistent with fluid overload.    4. Nonspecific diffuse colonic wall thickening which could reflect colitis versus edema. No bowel obstruction.    5. Interval resolution of previously seen pneumomediastinum.    CT Abdomen and Pelvis with Oral contrast 4/12/2021    IMPRESSION:    1. Redemonstrated loculated right pneumothorax without significant change. Interval removal of 2/3 right chest tubes.    2. Diffuse groundglass and consolidative opacities with interlobular septal thickening and traction bronchiectasis in the bases. Findings likely reflecting a combination of pulmonary edema and post inflammatory/infectious changes with some degree of organization.    3. Interval development abdominopelvic ascites and anasarca consistent with fluid overload.    4. Nonspecific diffuse colonic wall thickening which could reflect colitis versus edema. No bowel obstruction.    5. Interval resolution of previously seen pneumomediastinum.      Assessment: 85 year old (Montserratian speaking) Female with PMHx of dementia, osteoarthritis admitted for acute hypoxemic respiratory failure. Hospital course complicated by septic shock, GI bleed, thrombocytopenia, and MENDOZA, S/p R VATS evacuation of empyema, s/p Trach and PEG. Upon neurologic exam this evening, patient was able to breathe above the recommended vent settings, eyes were open spontaneously, corneal reflexes were intact b/l, but UE 0/5 strength and LE notable for toe movement to noxious stimuli when applied to thighs b/l. 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 In addition, non-contrast CTH 4/12 did not reveal intracranial pathology, but labs are significant for increase BUN of 105 and Creatinine of 2.2, with elevated white blood cell count of 17. Patient may benefit from correction of metabolic derangements before further neurologic assessment. Once the metabolic abnormalities have been corrected, may consider MRI of the brain w/o contrast however, can perform sooner if stable. NCC team to follow up with the patient during morning rounds.     Plan:  #Neuro:  - CTH non-contrast (revealed no acute intracranial pathology)  - MRI of the brain w/o contrast         STAR Damon  Please feel free to contact for any questions or concerns. Thank you. Attending attestation to follow.   Extension: #[phone]

## 2021-04-13 NOTE — PROGRESS NOTE ADULT - SUBJECTIVE AND OBJECTIVE BOX
CTU Attending Progress Daily Note     13 Apr 2021 07:49  Admited 03-11-21, Hospital Day 33d  POD# -     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, osteoarithtis, [place]-speaking presenting with cough and fever.    As per daughter, patient has been having dry consistent cough since 2 months. Today, she was being evaluated for knee injections when she was found to be febrile. She also reports progressing generalized weakness with decreased appetite and PO intake.  No URT symptoms, no chest pain, no leg pain, no diarrhea, no urinary symptoms no sick contacts, no recent travel. In ED was hypotensive, was given IVF, started on levophed 0.04 called to evaluate (11 Mar 2021 03:21)    Home Medications:  donepezil 5 mg oral tablet: 1 tab(s) orally once a day (at bedtime) (11 Mar 2021 03:24)  gabapentin 300 mg oral capsule: 1 cap(s) orally 3 times a day, As Needed (11 Mar 2021 03:24)  meloxicam 15 mg oral tablet: 1 tab(s) orally once a day, As Needed (11 Mar 2021 03:24)  zolpidem 10 mg oral tablet: 1 tab(s) orally once a day (at bedtime), As Needed (11 Mar 2021 03:24)    FAMILY HISTORY:    PAST MEDICAL & SURGICAL HISTORY:  Dementia    Osteoarthritis    No significant past surgical history      Interval event for past 24 hr:  DESTIN TERRY  85y had no event.   Current Complains:  DESTIN TERRY has no new complains  Allergies    clindamycin (Hives)    Intolerances      OBJECTIVE:  Vitals last 24 hrs  ICU Vital Signs Last 24 Hrs  T(C): 36.1 (13 Apr 2021 04:00), Max: 36.1 (12 Apr 2021 20:00)  T(F): 96.9 (13 Apr 2021 04:00), Max: 96.9 (12 Apr 2021 20:00)  HR: 82 (13 Apr 2021 07:00) (70 - 97)  BP: 118/59 (12 Apr 2021 14:30) (118/59 - 131/82)  BP(mean): 83 (12 Apr 2021 14:30) (83 - 102)  ABP: 120/54 (13 Apr 2021 07:00) (104/49 - 149/70)  ABP(mean): 77 (13 Apr 2021 07:00) (68 - 104)  RR: 36 (13 Apr 2021 07:00) (35 - 43)  SpO2: 100% (13 Apr 2021 07:00) (91% - 100%)    T(C): 36.1 (04-13-21 @ 04:00), Max: 36.1 (04-12-21 @ 20:00)  T(F): 96.9 (04-13-21 @ 04:00), Max: 96.9 (04-12-21 @ 20:00)  HR: 82 (04-13-21 @ 07:00) (70 - 97)  BP: 118/59 (04-12-21 @ 14:30) (118/59 - 131/82)  ABP: 120/54 (04-13-21 @ 07:00) (104/49 - 149/70)  ABP(mean): 77 (04-13-21 @ 07:00) (68 - 104)  RR: 36 (04-13-21 @ 07:00) (35 - 43)  SpO2: 100% (04-13-21 @ 07:00) (91% - 100%)  CVP(mm Hg): --  CI: --  PA: --  PA(direct): --  PCWP: --  SVR: --  PVR: --    04-12-21 @ 07:01  -  04-13-21 @ 07:00  --------------------------------------------------------  IN:    Albumin 25%  -  50 mL: 300 mL    dextrose 5%: 1200 mL    Enteral Tube Flush: 120 mL    IV PiggyBack: 900 mL    Oral Fluid: 700 mL    Peptamen A.F.: 300 mL  Total IN: 3520 mL    OUT:    Chest Tube (mL): 80 mL    Indwelling Catheter - Urethral (mL): 1185 mL    Rectal Tube (mL): 700 mL  Total OUT: 1965 mL    Total NET: 1555 mL        Mode: AC/ CMV (Assist Control/ Continuous Mandatory Ventilation)  RR (machine): 36  TV (machine): 400  FiO2: 40  PEEP: 5  ITime: 1  MAP: 12  PIP: 36     Mode: AC/ CMV (Assist Control/ Continuous Mandatory Ventilation), RR (machine): 36, TV (machine): 400, FiO2: 40, PEEP: 5, ITime: 1, MAP: 12, PIP: 36  CAPILLARY BLOOD GLUCOSE      POCT Blood Glucose.: 143 mg/dL (13 Apr 2021 06:59)  POCT Blood Glucose.: 140 mg/dL (13 Apr 2021 01:57)  POCT Blood Glucose.: 166 mg/dL (12 Apr 2021 22:38)  POCT Blood Glucose.: 148 mg/dL (12 Apr 2021 17:54)  POCT Blood Glucose.: 133 mg/dL (12 Apr 2021 13:45)    LABS:  ABG - ( 13 Apr 2021 04:07 )  pH, Arterial: 7.30  pH, Blood: x     /  pCO2: 46    /  pO2: 88    / HCO3: 22    / Base Excess: -4.0  /  SaO2: 98              Blood Gas Arterial, Lactate: 2.4 mmoL/L *H* (04-13-21 @ 04:07)                          7.5    13.80 )-----------( 57       ( 13 Apr 2021 02:00 )             24.0     Hemoglobin: 7.5 g/dL (04-13 @ 02:00)  Hemoglobin: 7.6 g/dL (04-12 @ 01:30)  Hemoglobin: 7.0 g/dL (04-11 @ 07:00)  Hemoglobin: 7.3 g/dL (04-11 @ 02:00)    04-13    148<H>  |  111<H>  |  101<HH>  ----------------------------<  143<H>  3.4<L>   |  21  |  2.0<H>    Ca    9.0      13 Apr 2021 02:00  Mg     2.2     04-13    TPro  5.5<L>  /  Alb  4.1  /  TBili  2.3<H>  /  DBili  1.7<H>  /  AST  47<H>  /  ALT  13  /  AlkPhos  191<H>  04-13    Creatinine, Serum: 2.0 mg/dL (04-13 @ 02:00)  Creatinine, Serum: 2.2 mg/dL (04-12 @ 01:30)  Creatinine, Serum: 2.2 mg/dL (04-11 @ 02:00)  Creatinine, Serum: 2.4 mg/dL (04-10 @ 02:00)          HOSPITAL MEDICATIONS:  MEDICATIONS  (STANDING):  ALBUTerol    90 MICROgram(s) HFA Inhaler 2 Puff(s) Inhalation every 6 hours  caspofungin IVPB      caspofungin IVPB 50 milliGRAM(s) IV Intermittent every 24 hours  chlorhexidine 4% Liquid 1 Application(s) Topical <User Schedule>  clotrimazole 1% Cream 1 Application(s) Topical two times a day  collagenase Ointment 1 Application(s) Topical daily  dexMEDEtomidine Infusion 0.202 MICROgram(s)/kG/Hr (3 mL/Hr) IV Continuous <Continuous>  dextrose 40% Gel 15 Gram(s) Oral once  dextrose 5%. 1000 milliLiter(s) (50 mL/Hr) IV Continuous <Continuous>  dextrose 5%. 1000 milliLiter(s) (50 mL/Hr) IV Continuous <Continuous>  dextrose 5%. 1000 milliLiter(s) (100 mL/Hr) IV Continuous <Continuous>  dextrose 5%. 1000 milliLiter(s) (50 mL/Hr) IV Continuous <Continuous>  dextrose 5%. 1000 milliLiter(s) (100 mL/Hr) IV Continuous <Continuous>  dextrose 50% Injectable 25 Gram(s) IV Push once  dextrose 50% Injectable 12.5 Gram(s) IV Push once  dextrose 50% Injectable 25 Gram(s) IV Push once  ferrous    sulfate Liquid 300 milliGRAM(s) Enteral Tube daily  gabapentin   Solution 100 milliGRAM(s) Oral three times a day  glucagon  Injectable 1 milliGRAM(s) IntraMuscular once  glucagon  Injectable 1 milliGRAM(s) IntraMuscular once  insulin glargine Injectable (LANTUS) 20 Unit(s) SubCutaneous every morning  insulin lispro (ADMELOG) corrective regimen sliding scale   SubCutaneous every 4 hours  ipratropium 17 MICROgram(s) HFA Inhaler 2 Puff(s) Inhalation every 6 hours  linezolid  IVPB      linezolid  IVPB 600 milliGRAM(s) IV Intermittent every 12 hours  meropenem  IVPB 1000 milliGRAM(s) IV Intermittent every 24 hours  multivitamin/minerals/iron Oral Solution (CENTRUM) 15 milliLiter(s) Enteral Tube daily  nystatin Cream 1 Application(s) Topical two times a day  pantoprazole  Injectable 40 milliGRAM(s) IV Push two times a day  petrolatum Ophthalmic Ointment 1 Application(s) Both EYES three times a day  phenylephrine    Infusion 1 MICROgram(s)/kG/Min (11.1 mL/Hr) IV Continuous <Continuous>  propofol Infusion 10.101 MICROgram(s)/kG/Min (3.6 mL/Hr) IV Continuous <Continuous>  sodium bicarbonate  Injectable 50 milliEquivalent(s) IV Push <User Schedule>  sodium chloride 0.9%. 1000 milliLiter(s) (10 mL/Hr) IV Continuous <Continuous>  vasopressin Infusion 0.04 Unit(s)/Min (2.4 mL/Hr) IV Continuous <Continuous>    MEDICATIONS  (PRN):  ondansetron Injectable 4 milliGRAM(s) IV Push every 4 hours PRN Nausea and/or Vomiting      REVIEW OF SYSTEMS:  CONSTITUTIONAL: [X] all negative; [ ] weakness, [ ] fevers, [ ] chills  EYES/ENT: [X] all negative; [ ] visual changes, [ ] vertigo, [ ] throat pain, [ ] eye pain  NECK: [X] all negative; [ ] pain, [ ] stiffness  RESPIRATORY: [ ] all negative, [ ] cough, [ ] wheezing, [ ] hemoptysis, [ ] shortness of breath, [  ] chest pain  CARDIOVASCULAR: [X] all negative; [ ] anginal chest pain, [ ] palpitations, [ ] orthopnea  GASTROINTESTINAL: [X] all negative; [ ]abdominal pain, [ ] nausea, [ ] vomiting, [ ] hematemesis, [ ] diarrhea, [ ] constipation, [ ] melena, [ ] hematochezia.  GENITOURINARY: [X] all negative; [ ] dysuria, [ ] frequency, [ ] hematuria  NEUROLOGICAL: [X] all negative; [ ] numbness, [ ] weakness, [ ] paresthesias  MUSCULOSKELETAL: [X] all negative, [ ] joint pain, [ ] joint swelling, [ ] joint redness, [ ] bone pain  SKIN: [X] all negative; [ ] itching, [ ] burning, [ ] rashes, [ ] lesions   All other review of systems is negative unless indicated above.    [  ] Unable to assess ROS because     PHYSICAL EXAM:          CONSTITUTIONAL: Well-developed; well-nourished; in no acute distress.   	SKIN: warm, dry, no rashes or lesions  	HEENT: Atraumatic. Normocephalic. PERRL. Moist membranes, no conjunctival injection, sclera clear  	NECK: Supple; non tender.  No JVD. No lymphadenopathy.  	CARD: Normal S1, S2. Rate and Rhythm are regular. No murmurs.  	RESP: Good air entry bilaterally, no wheezes, no rales no rhonchi.  	ABD: Soft, not tender, not distended, no CVA tenderness, no rebound no guarding, bowel sounds present  	EXT: Normal ROM.  No clubbing, no cyanosis, no pedal edema, no calf pain b/l, Peripheral pulses intact.  	LYMPH: No acute cervical adenopathy.  	NEURO: Alert, awake, motor 5/5 R, 5/5 L, sensation intact bilat, CN 2-12 intact,          PSYCH: Cooperative, appropriate. Alert & oriented x 3    RADIOLOGY:  X Reviewed and interpreted by me  CxR from 04-13-21 shows mild congestion, no pneumothorax, no effusion, no cardiomegaly,   ETT above dk in good position, NGT in place, TLC in place, S-G Catheter in place, Chest Tubes in place,     ECG:  X Reviewed and interpreted by me CTU Attending Progress Daily Note     13 Apr 2021 07:49  Admited 03-11-21, Hospital Day 33d  POD# - 22    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, osteoarithtis, [place]-speaking presenting with cough and fever.    As per daughter, patient has been having dry consistent cough since 2 months. Today, she was being evaluated for knee injections when she was found to be febrile. She also reports progressing generalized weakness with decreased appetite and PO intake.  No URT symptoms, no chest pain, no leg pain, no diarrhea, no urinary symptoms no sick contacts, no recent travel. In ED was hypotensive, was given IVF, started on levophed 0.04 called to evaluate (11 Mar 2021 03:21)    Home Medications:  donepezil 5 mg oral tablet: 1 tab(s) orally once a day (at bedtime) (11 Mar 2021 03:24)  gabapentin 300 mg oral capsule: 1 cap(s) orally 3 times a day, As Needed (11 Mar 2021 03:24)  meloxicam 15 mg oral tablet: 1 tab(s) orally once a day, As Needed (11 Mar 2021 03:24)  zolpidem 10 mg oral tablet: 1 tab(s) orally once a day (at bedtime), As Needed (11 Mar 2021 03:24)    FAMILY HISTORY:    PAST MEDICAL & SURGICAL HISTORY:  Dementia    Osteoarthritis    No significant past surgical history      Interval event for past 24 hr:  DESTIN TERRY  85y had no event.   Current Complains:  DESTIN TERRY has no new complains  Allergies    clindamycin (Hives)    Intolerances      OBJECTIVE:  Vitals last 24 hrs  ICU Vital Signs Last 24 Hrs  T(C): 36.1 (13 Apr 2021 04:00), Max: 36.1 (12 Apr 2021 20:00)  T(F): 96.9 (13 Apr 2021 04:00), Max: 96.9 (12 Apr 2021 20:00)  HR: 82 (13 Apr 2021 07:00) (70 - 97)  BP: 118/59 (12 Apr 2021 14:30) (118/59 - 131/82)  BP(mean): 83 (12 Apr 2021 14:30) (83 - 102)  ABP: 120/54 (13 Apr 2021 07:00) (104/49 - 149/70)  ABP(mean): 77 (13 Apr 2021 07:00) (68 - 104)  RR: 36 (13 Apr 2021 07:00) (35 - 43)  SpO2: 100% (13 Apr 2021 07:00) (91% - 100%)    04-12-21 @ 07:01  -  04-13-21 @ 07:00  --------------------------------------------------------  IN:    Albumin 25%  -  50 mL: 300 mL    dextrose 5%: 1200 mL    Enteral Tube Flush: 120 mL    IV PiggyBack: 900 mL    Oral Fluid: 700 mL    Peptamen A.F.: 300 mL  Total IN: 3520 mL    OUT:    Chest Tube (mL): 80 mL    Indwelling Catheter - Urethral (mL): 1185 mL    Rectal Tube (mL): 700 mL  Total OUT: 1965 mL    Total NET: 1555 mL        Mode: AC/ CMV (Assist Control/ Continuous Mandatory Ventilation)  RR (machine): 36  TV (machine): 400  FiO2: 40  PEEP: 5  ITime: 1  MAP: 12  PIP: 36     Mode: AC/ CMV (Assist Control/ Continuous Mandatory Ventilation), RR (machine): 36, TV (machine): 400, FiO2: 40, PEEP: 5, ITime: 1, MAP: 12, PIP: 36  CAPILLARY BLOOD GLUCOSE      POCT Blood Glucose.: 143 mg/dL (13 Apr 2021 06:59)  POCT Blood Glucose.: 140 mg/dL (13 Apr 2021 01:57)  POCT Blood Glucose.: 166 mg/dL (12 Apr 2021 22:38)  POCT Blood Glucose.: 148 mg/dL (12 Apr 2021 17:54)  POCT Blood Glucose.: 133 mg/dL (12 Apr 2021 13:45)    LABS:  ABG - ( 13 Apr 2021 04:07 )  pH, Arterial: 7.30  pH, Blood: x     /  pCO2: 46    /  pO2: 88    / HCO3: 22    / Base Excess: -4.0  /  SaO2: 98              Blood Gas Arterial, Lactate: 2.4 mmoL/L *H* (04-13-21 @ 04:07)                          7.5    13.80 )-----------( 57       ( 13 Apr 2021 02:00 )             24.0     Hemoglobin: 7.5 g/dL (04-13 @ 02:00)  Hemoglobin: 7.6 g/dL (04-12 @ 01:30)  Hemoglobin: 7.0 g/dL (04-11 @ 07:00)  Hemoglobin: 7.3 g/dL (04-11 @ 02:00)    04-13    148<H>  |  111<H>  |  101<HH>  ----------------------------<  143<H>  3.4<L>   |  21  |  2.0<H>    Ca    9.0      13 Apr 2021 02:00  Mg     2.2     04-13    TPro  5.5<L>  /  Alb  4.1  /  TBili  2.3<H>  /  DBili  1.7<H>  /  AST  47<H>  /  ALT  13  /  AlkPhos  191<H>  04-13    Creatinine, Serum: 2.0 mg/dL (04-13 @ 02:00)  Creatinine, Serum: 2.2 mg/dL (04-12 @ 01:30)  Creatinine, Serum: 2.2 mg/dL (04-11 @ 02:00)  Creatinine, Serum: 2.4 mg/dL (04-10 @ 02:00)          HOSPITAL MEDICATIONS:  MEDICATIONS  (STANDING):  ALBUTerol    90 MICROgram(s) HFA Inhaler 2 Puff(s) Inhalation every 6 hours  caspofungin IVPB      caspofungin IVPB 50 milliGRAM(s) IV Intermittent every 24 hours  chlorhexidine 4% Liquid 1 Application(s) Topical <User Schedule>  clotrimazole 1% Cream 1 Application(s) Topical two times a day  collagenase Ointment 1 Application(s) Topical daily  dexMEDEtomidine Infusion 0.202 MICROgram(s)/kG/Hr (3 mL/Hr) IV Continuous <Continuous>  dextrose 40% Gel 15 Gram(s) Oral once  dextrose 5%. 1000 milliLiter(s) (50 mL/Hr) IV Continuous <Continuous>  dextrose 5%. 1000 milliLiter(s) (50 mL/Hr) IV Continuous <Continuous>  dextrose 5%. 1000 milliLiter(s) (100 mL/Hr) IV Continuous <Continuous>  dextrose 5%. 1000 milliLiter(s) (50 mL/Hr) IV Continuous <Continuous>  dextrose 5%. 1000 milliLiter(s) (100 mL/Hr) IV Continuous <Continuous>  dextrose 50% Injectable 25 Gram(s) IV Push once  dextrose 50% Injectable 12.5 Gram(s) IV Push once  dextrose 50% Injectable 25 Gram(s) IV Push once  ferrous    sulfate Liquid 300 milliGRAM(s) Enteral Tube daily  gabapentin   Solution 100 milliGRAM(s) Oral three times a day  glucagon  Injectable 1 milliGRAM(s) IntraMuscular once  glucagon  Injectable 1 milliGRAM(s) IntraMuscular once  insulin glargine Injectable (LANTUS) 20 Unit(s) SubCutaneous every morning  insulin lispro (ADMELOG) corrective regimen sliding scale   SubCutaneous every 4 hours  ipratropium 17 MICROgram(s) HFA Inhaler 2 Puff(s) Inhalation every 6 hours  linezolid  IVPB      linezolid  IVPB 600 milliGRAM(s) IV Intermittent every 12 hours  meropenem  IVPB 1000 milliGRAM(s) IV Intermittent every 24 hours  multivitamin/minerals/iron Oral Solution (CENTRUM) 15 milliLiter(s) Enteral Tube daily  nystatin Cream 1 Application(s) Topical two times a day  pantoprazole  Injectable 40 milliGRAM(s) IV Push two times a day  petrolatum Ophthalmic Ointment 1 Application(s) Both EYES three times a day  phenylephrine    Infusion 1 MICROgram(s)/kG/Min (11.1 mL/Hr) IV Continuous <Continuous>  propofol Infusion 10.101 MICROgram(s)/kG/Min (3.6 mL/Hr) IV Continuous <Continuous>  sodium bicarbonate  Injectable 50 milliEquivalent(s) IV Push <User Schedule>  sodium chloride 0.9%. 1000 milliLiter(s) (10 mL/Hr) IV Continuous <Continuous>  vasopressin Infusion 0.04 Unit(s)/Min (2.4 mL/Hr) IV Continuous <Continuous>    MEDICATIONS  (PRN):  ondansetron Injectable 4 milliGRAM(s) IV Push every 4 hours PRN Nausea and/or Vomiting      REVIEW OF SYSTEMS:  CONSTITUTIONAL:   [X] Unable to assess ROS because of unresponsiveness     PHYSICAL EXAM:          CONSTITUTIONAL: Well-developed; well-nourished; in no acute distress.   	SKIN: warm, dry, no rashes or lesions Sacral DTI  	HEENT: Atraumatic. Normocephalic. PERRL. Moist membranes, no conjunctival injection, sclera clear  	NECK: Supple; non tender.  No JVD. No lymphadenopathy.  	CARD: Normal S1, S2. Rate and Rhythm are regular. No murmurs.  	RESP: Good air entry bilaterally, no wheezes, + rales + rhonchi.  	ABD: Soft, not tender, not distended, no CVA tenderness, no rebound no guarding, bowel sounds present  	EXT: Normal ROM.  No clubbing, no cyanosis, +++ pedal edema, no calf pain b/l, Peripheral pulses intact.  	LYMPH: No acute cervical adenopathy.  	NEURO: opening eye to verbal stimuli     RADIOLOGY:  X Reviewed and interpreted by me  CxR from 04-13-21 shows mild congestion, no pneumothorax, no effusion, no cardiomegaly,   Tracheostomy tube in good position, Chest Tubes in place,     ECG:

## 2021-04-13 NOTE — PROGRESS NOTE ADULT - ASSESSMENT
PROBLEMS:  I spent 45 minutes of critical care time examining patient, reviewing vitals, labs, medications, imaging and discussing with the team goals of care to prevent life-threatening in this patient who is at high risk for deterioration or death due to:      Septic Shock - continue IV antibiotics, now off pressors - CT of Chest, ID agree to stop caspofungin and linezolid   anemia stable with guaiac positive stool - observe  absent bowel sounds - CT of abdomen - oral contrast  Thrombocytopenia - possibly due to linezolid - stop it  Pleural air leak - keep CT,   unresponsiveness - CT of Head and Neurology consult  MENDOZA  - now Stable - monitor  rising BUN - probably due to albumin IV  FLuid overload - Mild diuresis ( Lasix 40 and Zaroxolyn 2.5 )  Protein malnutrition - continue Peptamen Feeding -goal of 65 cc/hr  Sacral decubiti - local care       Case and plan discussed with CT Surgeons and CTU PAs.  Continue CTU supportive care and ongoing plan of care as per continuing CTU rounds.   Continue DVT/GI prophylaxis.  Incentive Spirometry 10 times an hour.  Continue to advance physical activity as tolerated and continue PT/OT as directed.    PLAN  Neuro: move all 4 extremities. no sensory or motor deficits  Pain management.   dexMEDEtomidine Infusion 0.202 MICROgram(s)/kG/Hr IV Continuous <Continuous>  gabapentin   Solution 100 milliGRAM(s) Oral three times a day  ondansetron Injectable 4 milliGRAM(s) IV Push every 4 hours PRN  propofol Infusion 10.101 MICROgram(s)/kG/Min IV Continuous <Continuous>    Pulm: Wean off supplemental oxygen as able. Daily CXR. Encourage coughing, deep breathing and use of incentive spirometry.   ALBUTerol    90 MICROgram(s) HFA Inhaler 2 Puff(s) Inhalation every 6 hours  ipratropium 17 MICROgram(s) HFA Inhaler 2 Puff(s) Inhalation every 6 hours    Cardio: Monitor telemetry/alarms. Continue supportive care   metolazone 2.5 milliGRAM(s) Oral once  phenylephrine    Infusion 1 MICROgram(s)/kG/Min IV Continuous <Continuous>    GI: Continue stool softeners.    pantoprazole  Injectable 40 milliGRAM(s) IV Push two times a day    Nutrition: Continue present diet  Endocrine and glucose control:   dextrose 40% Gel 15 Gram(s) Oral once  dextrose 50% Injectable 25 Gram(s) IV Push once  dextrose 50% Injectable 12.5 Gram(s) IV Push once  dextrose 50% Injectable 25 Gram(s) IV Push once  glucagon  Injectable 1 milliGRAM(s) IntraMuscular once  glucagon  Injectable 1 milliGRAM(s) IntraMuscular once  insulin glargine Injectable (LANTUS) 20 Unit(s) SubCutaneous every morning  insulin lispro (ADMELOG) corrective regimen sliding scale   SubCutaneous every 4 hours  vasopressin Infusion 0.04 Unit(s)/Min IV Continuous <Continuous>    Renal: monitor urine output, supplement electrolytes as needed,     Vasc: Heparin SC and/or SCDs for DVT prophylaxis    ID: Stable, no fever , no chills.   meropenem  IVPB 1000 milliGRAM(s) IV Intermittent every 24 hours    Tubes: Monitor Chest tube output  Therapy: OOB/ambulate  Disposition: start planing discharge home or placement    Pertinent clinical, laboratory, radiographic, hemodynamic, echocardiographic, respiratory data, microbiologic data and chart were reviewed and analyzed frequently throughout the course of the day and night. GI and DVT prophylaxis, glycemic control, head of bed elevation and skin care issues were addressed.  Patient seen, examined and plan discussed with CT Surgery / CTICU team during rounds.    [x ] The patient remains in critical and unstable condition, and requires ICU care and monitoring  [ ] The patient is improving but requires continued monitoring and adjustment of therapy

## 2021-04-13 NOTE — CONSULT NOTE ADULT - ATTENDING COMMENTS
I, Piyush Downs, saw, examined and reviewed the diagnostic images on patient Saniya Syed on 0319/21 and agreed with the resident clinical note, physical exam findings and treatment plan.  85 year-old female admitted with diagnosis of right necrotizing pneumonia, receiving IV antibiotics with partial clinical improvement, due to persistent significant leukocytosis and episodes of fever repeat CT was obtained which revealed multiloculated pleural fluid collections and associated right pneumonia.  Patient is hemodynamically stable, complaining of mild right chest discomfort and pressure, no respiratory distress.  I discussed CT findings with primary team, patient's daughter (Geni) and recommended thoracoscopic decortication.  I explained the procedure, risks and possible complications.  Patient's daughter planned to come to the hospital and explain to her mother this plan and make a final decision.  Plan:  Await for family decision regarding surgery  Scheduled for right VATS/decortication for 03/22/21
Patient seen today with neurocritical care ACP team.    I was physically present for the key portions of the evaluation and management (E/M) service provided.  I agree with the above history, physical, and plan with the following additions/modifications     Patient with persistently poor exam - no motor responses to noxious, does open eyes minimally to stimulaiton however.  CTH negative for structural injury.  Given her complex course there are many active and residual issues controibuting to her poor neurologic exam - acute uremia, baseline dementia (described as mild but will amplify any encephalopathy), and prolonged sedation and paralysis likely causing some degree of critical illness neuropathy/myopathy.  Would correct acute uremia, continue to hold any sedatives, continue supportive care; any improvement will be slow.  There is no indication at this time that she cannot have a good neurologic recovery, though such recovery will be very protracted. If no improvement at all once BUN is near normal for a few days then would consider CEEG.     Chano Mathur MD  Attending Neurointensivist
Patient with bleeding through gastrostomy tube after PEG placement. Check coagulation profile. EGD if no improvement in bleeding.

## 2021-04-14 NOTE — PROGRESS NOTE ADULT - SUBJECTIVE AND OBJECTIVE BOX
Progress Note: General Surgery  Patient: DESTIN TERRY , 85y (1935)Female   MRN: [id number]  Location: [address]  Visit: 03-11-21 Inpatient  Date: 04-14-21 @ 08:13    Admit Diagnosis/Chief Complaint: Acute respiratory failure with hypoxia        Procedure/Diagnosis: Acute respiratory failure with hypoxia     S/P Bronchoscopy, at bedside    Open tracheostomy    Insertion, PEG tube      Events/ 24h: over breathing the vent, started propofol. Pain controlled.    Vitals: T(F): 98.5 (04-14-21 @ 04:00), Max: 98.5 (04-14-21 @ 04:00)  HR: 93 (04-14-21 @ 07:00)  BP: 99/55 (04-14-21 @ 00:45) (79/49 - 112/66)  RR: 37 (04-14-21 @ 07:00)  SpO2: 99% (04-14-21 @ 07:00)  RR (machine): 36, TV (machine): 400, FiO2: 40, PEEP: 5, PIP: 24  In:   04-13-21 @ 07:01  -  04-14-21 @ 07:00  --------------------------------------------------------  IN: 3486.2 mL      Out:   04-13-21 @ 07:01  -  04-14-21 @ 07:00  --------------------------------------------------------  OUT:    Chest Tube (mL): 40 mL    Indwelling Catheter - Urethral (mL): 1145 mL    Rectal Tube (mL): 600 mL  Total OUT: 1785 mL        Net:   04-13-21 @ 07:01  -  04-14-21 @ 07:00  --------------------------------------------------------  NET: 1701.2 mL        Diet: Diet, NPO with Tube Feed:   Tube Feeding Modality: Gastrostomy  Peptamen A.F. Formula  Total Volume for 24 Hours (mL): 1560  Continuous  Until Goal Tube Feed Rate (mL per Hour): 65  Tube Feed Duration (in Hours): 24  Tube Feed Start Time: 20:15 (04-13-21 @ 20:14)    IV Fluids: dextrose 5%. 1000 milliLiter(s) (100 mL/Hr) IV Continuous <Continuous>  dextrose 5%. 1000 milliLiter(s) (50 mL/Hr) IV Continuous <Continuous>  dextrose 5%. 1000 milliLiter(s) (100 mL/Hr) IV Continuous <Continuous>  dextrose 5%. 1000 milliLiter(s) (50 mL/Hr) IV Continuous <Continuous>  dextrose 5%. 1000 milliLiter(s) (50 mL/Hr) IV Continuous <Continuous>  ferrous    sulfate Liquid 300 milliGRAM(s) Enteral Tube daily  multivitamin/minerals/iron Oral Solution (CENTRUM) 15 milliLiter(s) Enteral Tube daily  potassium chloride  20 mEq/100 mL IVPB 20 milliEquivalent(s) IV Intermittent every 2 hours  sodium bicarbonate  Injectable 50 milliEquivalent(s) IV Push <User Schedule>  sodium chloride 0.9%. 1000 milliLiter(s) (10 mL/Hr) IV Continuous <Continuous>      PHYSICAL EXAM:  GENERAL: NAD, on vent  CHEST/LUNG: Clear to auscultation bilaterally; chest tube on suction, +airleak  HEART: S1 and S2 noted  ABDOMEN: Soft, Nontender, Nondistended;   PERIPHERAL VASCULAR: Extremities are normal in color, size and temperature.   PSYCH: AAOx3  NEUROLOGY: non-focal deficits      Medications: [Standing]  ALBUTerol    90 MICROgram(s) HFA Inhaler 2 Puff(s) Inhalation every 6 hours  chlorhexidine 4% Liquid 1 Application(s) Topical <User Schedule>  clotrimazole 1% Cream 1 Application(s) Topical two times a day  collagenase Ointment 1 Application(s) Topical daily  dexMEDEtomidine Infusion 0.202 MICROgram(s)/kG/Hr (3 mL/Hr) IV Continuous <Continuous>  dextrose 40% Gel 15 Gram(s) Oral once  dextrose 5%. 1000 milliLiter(s) (100 mL/Hr) IV Continuous <Continuous>  dextrose 5%. 1000 milliLiter(s) (50 mL/Hr) IV Continuous <Continuous>  dextrose 5%. 1000 milliLiter(s) (50 mL/Hr) IV Continuous <Continuous>  dextrose 5%. 1000 milliLiter(s) (100 mL/Hr) IV Continuous <Continuous>  dextrose 5%. 1000 milliLiter(s) (50 mL/Hr) IV Continuous <Continuous>  dextrose 50% Injectable 25 Gram(s) IV Push once  dextrose 50% Injectable 12.5 Gram(s) IV Push once  dextrose 50% Injectable 25 Gram(s) IV Push once  ferrous    sulfate Liquid 300 milliGRAM(s) Enteral Tube daily  gabapentin   Solution 100 milliGRAM(s) Oral three times a day  glucagon  Injectable 1 milliGRAM(s) IntraMuscular once  glucagon  Injectable 1 milliGRAM(s) IntraMuscular once  insulin glargine Injectable (LANTUS) 20 Unit(s) SubCutaneous every morning  insulin lispro (ADMELOG) corrective regimen sliding scale   SubCutaneous every 4 hours  ipratropium 17 MICROgram(s) HFA Inhaler 2 Puff(s) Inhalation every 6 hours  meropenem  IVPB 1000 milliGRAM(s) IV Intermittent every 24 hours  multivitamin/minerals/iron Oral Solution (CENTRUM) 15 milliLiter(s) Enteral Tube daily  nystatin Cream 1 Application(s) Topical two times a day  pantoprazole  Injectable 40 milliGRAM(s) IV Push two times a day  petrolatum Ophthalmic Ointment 1 Application(s) Both EYES three times a day  phenylephrine    Infusion 1 MICROgram(s)/kG/Min (11.1 mL/Hr) IV Continuous <Continuous>  potassium chloride  20 mEq/100 mL IVPB 20 milliEquivalent(s) IV Intermittent every 2 hours  propofol Infusion 10.101 MICROgram(s)/kG/Min (3.6 mL/Hr) IV Continuous <Continuous>  sodium bicarbonate  Injectable 50 milliEquivalent(s) IV Push <User Schedule>  sodium chloride 0.9%. 1000 milliLiter(s) (10 mL/Hr) IV Continuous <Continuous>  vasopressin Infusion 0.04 Unit(s)/Min (2.4 mL/Hr) IV Continuous <Continuous>    DVT Prophylaxis:   GI Prophylaxis: pantoprazole  Injectable 40 milliGRAM(s) IV Push two times a day    Antibiotics: meropenem  IVPB 1000 milliGRAM(s) IV Intermittent every 24 hours    Anticoagulation:   Medications:[PRN]  ondansetron Injectable 4 milliGRAM(s) IV Push every 4 hours PRN      Labs:                        8.5    15.03 )-----------( 48       ( 14 Apr 2021 01:25 )             26.9     04-14    150<H>  |  122<H>  |  82<HH>  ----------------------------<  164<H>  3.3<L>   |  15<L>  |  1.6<H>    Ca    6.5<L>      14 Apr 2021 01:25  Mg     1.6     04-14    TPro  4.0<L>  /  Alb  2.7<L>  /  TBili  0.9  /  DBili  0.6<H>  /  AST  34  /  ALT  10  /  AlkPhos  176<H>  04-14    LIVER FUNCTIONS - ( 14 Apr 2021 01:25 )  Alb: 2.7 g/dL / Pro: 4.0 g/dL / ALK PHOS: 176 U/L / ALT: 10 U/L / AST: 34 U/L / GGT: x             ABG - ( 14 Apr 2021 03:32 )  pH: 7.28  /  pCO2: 46    /  pO2: 91    / HCO3: 21    / Base Excess: -5.2  /  SaO2: 97                      Urine/Micro:        Imaging:   ***  < from: Xray Chest 1 View- PORTABLE-Routine (Xray Chest 1 View- PORTABLE-Routine in AM.) (04.13.21 @ 06:09) >  Impression:    Right pneumothorax not definitely identified on current x-ray. Unchanged bilateral opacities.    Support devices as above.    < end of copied text >

## 2021-04-14 NOTE — PROVIDER CONTACT NOTE (EICU) - ACTION/TREATMENT ORDERED:
[facility] notified. According to their coordinator Mr. Craven, pt is ruled out 2/2 age/neuro status.  ref# [id number].

## 2021-04-14 NOTE — PROGRESS NOTE ADULT - SUBJECTIVE AND OBJECTIVE BOX
CTU Attending Progress Daily Note     14 Apr 2021 10:47  Admited 03-11-21, Hospital Day 34d  POD# - 23    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, osteoarithtis, Georgian-speaking presenting with cough and fever.    As per daughter, patient has been having dry consistent cough since 2 months. Today, she was being evaluated for knee injections when she was found to be febrile. She also reports progressing generalized weakness with decreased appetite and PO intake.  No URT symptoms, no chest pain, no leg pain, no diarrhea, no urinary symptoms no sick contacts, no recent travel. In ED was hypotensive, was given IVF, started on levophed 0.04 called to evaluate (11 Mar 2021 03:21)    Home Medications:  donepezil 5 mg oral tablet: 1 tab(s) orally once a day (at bedtime) (11 Mar 2021 03:24)  gabapentin 300 mg oral capsule: 1 cap(s) orally 3 times a day, As Needed (11 Mar 2021 03:24)  meloxicam 15 mg oral tablet: 1 tab(s) orally once a day, As Needed (11 Mar 2021 03:24)  zolpidem 10 mg oral tablet: 1 tab(s) orally once a day (at bedtime), As Needed (11 Mar 2021 03:24)    FAMILY HISTORY:    PAST MEDICAL & SURGICAL HISTORY:  Dementia    Osteoarthritis    No significant past surgical history      Interval event for past 24 hr:  DESTIN TERRY  85y had no event.   Current Complains:  DESTIN TERRY has no new complains  Allergies    clindamycin (Hives)    Intolerances      OBJECTIVE:  Vitals last 24 hrs  ICU Vital Signs Last 24 Hrs  T(C): 36 (14 Apr 2021 08:00), Max: 36.9 (14 Apr 2021 04:00)  T(F): 96.8 (14 Apr 2021 08:00), Max: 98.5 (14 Apr 2021 04:00)  HR: 90 (14 Apr 2021 09:32) (79 - 122)  BP: 124/62 (14 Apr 2021 08:15) (79/49 - 124/62)  BP(mean): 85 (14 Apr 2021 08:15) (59 - 85)  ABP: 133/61 (14 Apr 2021 09:30) (80/48 - 154/75)  ABP(mean): 87 (14 Apr 2021 09:30) (59 - 105)  RR: 40 (14 Apr 2021 09:30) (16 - 41)  SpO2: 97% (14 Apr 2021 09:32) (93% - 100%)    04-13-21 @ 07:01  -  04-14-21 @ 07:00  --------------------------------------------------------  IN:    dextrose 5%: 1150 mL    Enteral Tube Flush: 180 mL    IV PiggyBack: 100 mL    Peptamen A.F.: 1370 mL    Phenylephrine: 82 mL    PRBCs (Packed Red Blood Cells): 316 mL    Propofol: 145 mL    sodium chloride 0.9%: 110 mL    Vasopressin: 14 mL    Vasopressin: 19.2 mL  Total IN: 3486.2 mL    OUT:    Chest Tube (mL): 40 mL    Indwelling Catheter - Urethral (mL): 1145 mL    Rectal Tube (mL): 600 mL  Total OUT: 1785 mL    Total NET: 1701.2 mL        Mode: AC/ CMV (Assist Control/ Continuous Mandatory Ventilation)  RR (machine): 36  TV (machine): 400  FiO2: 40  PEEP: 5  ITime: 1  MAP: 19  PIP: 37     Mode: AC/ CMV (Assist Control/ Continuous Mandatory Ventilation), RR (machine): 36, TV (machine): 400, FiO2: 40, PEEP: 5, ITime: 1, MAP: 19, PIP: 37  CAPILLARY BLOOD GLUCOSE      POCT Blood Glucose.: 139 mg/dL (14 Apr 2021 06:41)  POCT Blood Glucose.: 176 mg/dL (14 Apr 2021 01:58)  POCT Blood Glucose.: 231 mg/dL (13 Apr 2021 22:36)  POCT Blood Glucose.: 184 mg/dL (13 Apr 2021 16:50)  POCT Blood Glucose.: 164 mg/dL (13 Apr 2021 11:15)    LABS:  ABG - ( 14 Apr 2021 09:10 )  pH, Arterial: 7.27  pH, Blood: x     /  pCO2: 48    /  pO2: 75    / HCO3: 22    / Base Excess: -4.5  /  SaO2: 94              Blood Gas Arterial, Lactate: 2.4 mmoL/L *H* (04-14-21 @ 09:10)  Blood Gas Arterial, Lactate: 2.7 mmoL/L *H* (04-14-21 @ 03:32)                          8.5    15.03 )-----------( 48       ( 14 Apr 2021 01:25 )             26.9     Hemoglobin: 8.5 g/dL (04-14 @ 01:25)  Hemoglobin: 7.5 g/dL (04-13 @ 02:00)  Hemoglobin: 7.6 g/dL (04-12 @ 01:30)    04-14    150<H>  |  122<H>  |  82<HH>  ----------------------------<  164<H>  3.3<L>   |  15<L>  |  1.6<H>    Ca    6.5<L>      14 Apr 2021 01:25  Mg     1.6     04-14    TPro  4.0<L>  /  Alb  2.7<L>  /  TBili  0.9  /  DBili  0.6<H>  /  AST  34  /  ALT  10  /  AlkPhos  176<H>  04-14    Creatinine, Serum: 1.6 mg/dL (04-14 @ 01:25)  Creatinine, Serum: 2.0 mg/dL (04-13 @ 02:00)  Creatinine, Serum: 2.2 mg/dL (04-12 @ 01:30)  Creatinine, Serum: 2.2 mg/dL (04-11 @ 02:00)          HOSPITAL MEDICATIONS:  MEDICATIONS  (STANDING):  ALBUTerol    90 MICROgram(s) HFA Inhaler 2 Puff(s) Inhalation every 6 hours  chlorhexidine 4% Liquid 1 Application(s) Topical <User Schedule>  clotrimazole 1% Cream 1 Application(s) Topical two times a day  collagenase Ointment 1 Application(s) Topical daily  dexMEDEtomidine Infusion 0.202 MICROgram(s)/kG/Hr (3 mL/Hr) IV Continuous <Continuous>  dextrose 40% Gel 15 Gram(s) Oral once  dextrose 5%. 1000 milliLiter(s) (50 mL/Hr) IV Continuous <Continuous>  dextrose 5%. 1000 milliLiter(s) (50 mL/Hr) IV Continuous <Continuous>  dextrose 5%. 1000 milliLiter(s) (100 mL/Hr) IV Continuous <Continuous>  dextrose 5%. 1000 milliLiter(s) (50 mL/Hr) IV Continuous <Continuous>  dextrose 5%. 1000 milliLiter(s) (100 mL/Hr) IV Continuous <Continuous>  dextrose 50% Injectable 25 Gram(s) IV Push once  dextrose 50% Injectable 12.5 Gram(s) IV Push once  dextrose 50% Injectable 25 Gram(s) IV Push once  ferrous    sulfate Liquid 300 milliGRAM(s) Enteral Tube daily  glucagon  Injectable 1 milliGRAM(s) IntraMuscular once  glucagon  Injectable 1 milliGRAM(s) IntraMuscular once  insulin glargine Injectable (LANTUS) 20 Unit(s) SubCutaneous every morning  insulin lispro (ADMELOG) corrective regimen sliding scale   SubCutaneous every 4 hours  ipratropium 17 MICROgram(s) HFA Inhaler 2 Puff(s) Inhalation every 6 hours  meropenem  IVPB 1000 milliGRAM(s) IV Intermittent every 24 hours  multivitamin/minerals/iron Oral Solution (CENTRUM) 15 milliLiter(s) Enteral Tube daily  nystatin Cream 1 Application(s) Topical two times a day  pantoprazole  Injectable 40 milliGRAM(s) IV Push two times a day  petrolatum Ophthalmic Ointment 1 Application(s) Both EYES three times a day  phenylephrine    Infusion 1 MICROgram(s)/kG/Min (11.1 mL/Hr) IV Continuous <Continuous>  sodium bicarbonate  Injectable 50 milliEquivalent(s) IV Push <User Schedule>  sodium chloride 0.9%. 1000 milliLiter(s) (10 mL/Hr) IV Continuous <Continuous>  vasopressin Infusion 0.04 Unit(s)/Min (2.4 mL/Hr) IV Continuous <Continuous>    MEDICATIONS  (PRN):  ondansetron Injectable 4 milliGRAM(s) IV Push every 4 hours PRN Nausea and/or Vomiting      REVIEW OF SYSTEMS:    [ x ] Unable to assess ROS     PHYSICAL EXAM:          CONSTITUTIONAL: Well-developed; well-nourished; in no acute distress.   	SKIN: warm, dry, no rashes or lesions,  sacral and heals DTI  	HEENT: Atraumatic. Normocephalic. PERRL. Moist membranes, no conjunctival injection, sclera clear  	NECK: Supple; non tender.  No JVD. No lymphadenopathy.  	CARD: Normal S1, S2. Rate and Rhythm are regular. No murmurs.  	RESP: Good air entry bilaterally, no wheezes, + rales + rhonchi.  	ABD: Soft, not tender, not distended, no CVA tenderness, no rebound no guarding, bowel sounds decreased   	EXT: Normal ROM.  No clubbing, no cyanosis, ++++pedal edema, no calf pain b/l, Peripheral pulses intact.  	LYMPH: No acute cervical adenopathy.  	NEURO: minimal grymasing to tactile stimuli, patient on propofol     RADIOLOGY:  X Reviewed and interpreted by me  CxR from 04-14-21 shows moderate congestion, bilateral infiltrates no pneumothorax, no effusion, no cardiomegaly,       ECG:

## 2021-04-14 NOTE — PROVIDER CONTACT NOTE (EICU) - ASSESSMENT
current GCS=5( M1 V1 E3). corneal, gag and cough reflexes positive as per primary RN. pt currently on ventilator support.

## 2021-04-14 NOTE — PROGRESS NOTE ADULT - ASSESSMENT
Assessment:  85y Female s/p R VATS evacuation of empyema, 1 chest tube in place, s/p Trach and PEG    Plan:  neurology note appreciated, recommending MRI of the brain  Chest tube to suction  Daily AM cxr  follow up Chest tube output  monitor for airleak  monitor O2 sat  Continue IV antibiotics and follow up cultures and ID recommendations     DVT prophylaxis   Follow up palliative care/family meeting regarding goals of care.            Date/Time: 04-14-21 @ 08:13

## 2021-04-14 NOTE — PROVIDER CONTACT NOTE (EICU) - BACKGROUND
Plan to terminally wean patient. Pt admitted with severe pulmonary sepsis. s/p VATS for large loculated pleural effusion.

## 2021-04-14 NOTE — PROGRESS NOTE ADULT - ASSESSMENT
PROBLEMS:  I spent 45 minutes of critical care time examining patient, reviewing vitals, labs, medications, imaging and discussing with the team goals of care to prevent life-threatening in this patient who is at high risk for deterioration or death due to:      Septic Shock - continue IV antibiotics, now jusr on few cc of neosynephrine   anemia stable with guaiac positive stool - observe got 1 PRBC   decreased bowel sounds - CT of abdomen - OK, add reglan  Thrombocytopenia - possibly due to linezolid - stop it  Pleural air leak - keep CT,   unresponsiveness - not focal. Stop neurantin  MENDOZA  - now Stable - monitor  rising BUN - probably due to albumin IV  FLuid overload - Mild diuresis ( Lasix 40 and Zaroxolyn 2.5 )  Protein malnutrition - continue Peptamen Feeding -goal of 65 cc/hr  Sacral decubiti - local care       Case and plan discussed with CT Surgeons and CTU PAs.  Continue CTU supportive care and ongoing plan of care as per continuing CTU rounds.   Continue DVT/GI prophylaxis.  Incentive Spirometry 10 times an hour.  Continue to advance physical activity as tolerated and continue PT/OT as directed.    PLAN  Neuro: move all 4 extremities. no sensory or motor deficits  Pain management.   dexMEDEtomidine Infusion 0.202 MICROgram(s)/kG/Hr IV Continuous <Continuous>  fentaNYL    Injectable 50 MICROGram(s) IV Push once  ondansetron Injectable 4 milliGRAM(s) IV Push every 4 hours PRN    Pulm: Wean off supplemental oxygen as able. Daily CXR. Encourage coughing, deep breathing and use of incentive spirometry.   ALBUTerol    90 MICROgram(s) HFA Inhaler 2 Puff(s) Inhalation every 6 hours  ipratropium 17 MICROgram(s) HFA Inhaler 2 Puff(s) Inhalation every 6 hours    Cardio: Monitor telemetry/alarms. Continue supportive care   phenylephrine    Infusion 1 MICROgram(s)/kG/Min IV Continuous <Continuous>    GI: Continue stool softeners.    pantoprazole  Injectable 40 milliGRAM(s) IV Push two times a day    Nutrition: Continue present diet  Endocrine and glucose control:   dextrose 40% Gel 15 Gram(s) Oral once  dextrose 50% Injectable 25 Gram(s) IV Push once  dextrose 50% Injectable 12.5 Gram(s) IV Push once  dextrose 50% Injectable 25 Gram(s) IV Push once  glucagon  Injectable 1 milliGRAM(s) IntraMuscular once  glucagon  Injectable 1 milliGRAM(s) IntraMuscular once  insulin glargine Injectable (LANTUS) 20 Unit(s) SubCutaneous every morning  insulin lispro (ADMELOG) corrective regimen sliding scale   SubCutaneous every 4 hours  vasopressin Infusion 0.04 Unit(s)/Min IV Continuous <Continuous>    Renal: monitor urine output, supplement electrolytes as needed,     Vasc: Heparin SC and/or SCDs for DVT prophylaxis    ID: Stable, no fever , no chills.   meropenem  IVPB 1000 milliGRAM(s) IV Intermittent every 24 hours    Tubes: Monitor Chest tube output  Therapy: OOB/ambulate  Disposition: start planing discharge home or placement    Pertinent clinical, laboratory, radiographic, hemodynamic, echocardiographic, respiratory data, microbiologic data and chart were reviewed and analyzed frequently throughout the course of the day and night. GI and DVT prophylaxis, glycemic control, head of bed elevation and skin care issues were addressed.  Patient seen, examined and plan discussed with CT Surgery / CTICU team during rounds.    [x ] The patient remains in critical and unstable condition, and requires ICU care and monitoring  [ ] The patient is improving but requires continued monitoring and adjustment of therapy

## 2021-04-15 NOTE — PROGRESS NOTE ADULT - SUBJECTIVE AND OBJECTIVE BOX
CTU Attending Progress Daily Note     15 Apr 2021 07:58  Admited 03-11-21, Hospital Day 35d  POD# -     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, osteoarithtis, [place]an-speaking presenting with cough and fever.    As per daughter, patient has been having dry consistent cough since 2 months. Today, she was being evaluated for knee injections when she was found to be febrile. She also reports progressing generalized weakness with decreased appetite and PO intake.  No URT symptoms, no chest pain, no leg pain, no diarrhea, no urinary symptoms no sick contacts, no recent travel. In ED was hypotensive, was given IVF, started on levophed 0.04 called to evaluate (11 Mar 2021 03:21)    Home Medications:  donepezil 5 mg oral tablet: 1 tab(s) orally once a day (at bedtime) (11 Mar 2021 03:24)  gabapentin 300 mg oral capsule: 1 cap(s) orally 3 times a day, As Needed (11 Mar 2021 03:24)  meloxicam 15 mg oral tablet: 1 tab(s) orally once a day, As Needed (11 Mar 2021 03:24)  zolpidem 10 mg oral tablet: 1 tab(s) orally once a day (at bedtime), As Needed (11 Mar 2021 03:24)    FAMILY HISTORY:    PAST MEDICAL & SURGICAL HISTORY:  Dementia    Osteoarthritis    No significant past surgical history      Interval event for past 24 hr:  DESTIN TERRY  85y had no event.   Current Complains:  DESTIN TERRY has no new complains  Allergies    clindamycin (Hives)    Intolerances      OBJECTIVE:  Vitals last 24 hrs  ICU Vital Signs Last 24 Hrs  T(C): 36.7 (15 Apr 2021 04:00), Max: 36.9 (15 Apr 2021 00:00)  T(F): 98.1 (15 Apr 2021 04:00), Max: 98.5 (15 Apr 2021 00:00)  HR: 92 (15 Apr 2021 07:00) (81 - 97)  BP: 124/62 (14 Apr 2021 08:15) (124/62 - 124/62)  BP(mean): 85 (14 Apr 2021 08:15) (85 - 85)  ABP: 113/52 (15 Apr 2021 07:00) (93/47 - 153/76)  ABP(mean): 75 (15 Apr 2021 07:00) (62 - 106)  RR: 36 (15 Apr 2021 07:00) (16 - 42)  SpO2: 96% (15 Apr 2021 07:00) (94% - 100%)    T(C): 36.7 (04-15-21 @ 04:00), Max: 36.9 (04-15-21 @ 00:00)  T(F): 98.1 (04-15-21 @ 04:00), Max: 98.5 (04-15-21 @ 00:00)  HR: 92 (04-15-21 @ 07:00) (81 - 97)  BP: 124/62 (04-14-21 @ 08:15) (124/62 - 124/62)  ABP: 113/52 (04-15-21 @ 07:00) (93/47 - 153/76)  ABP(mean): 75 (04-15-21 @ 07:00) (62 - 106)  RR: 36 (04-15-21 @ 07:00) (16 - 42)  SpO2: 96% (04-15-21 @ 07:00) (94% - 100%)  CVP(mm Hg): --  CI: --  PA: --  PA(direct): --  PCWP: --  SVR: --  PVR: --    04-14-21 @ 07:01  -  04-15-21 @ 07:00  --------------------------------------------------------  IN:    dextrose 5%: 1200 mL    Enteral Tube Flush: 110 mL    IV PiggyBack: 300 mL    Peptamen A.F.: 780 mL    Phenylephrine: 195.6 mL    Propofol: 20 mL  Total IN: 2605.6 mL    OUT:    Chest Tube (mL): 90 mL    Gastric Aspirate - Discarded (mL): 350 mL    Indwelling Catheter - Urethral (mL): 1325 mL    Rectal Tube (mL): 580 mL    Vasopressin: 0 mL  Total OUT: 2345 mL    Total NET: 260.6 mL        Mode: AC/ CMV (Assist Control/ Continuous Mandatory Ventilation)  RR (machine): 36  TV (machine): 400  FiO2: 40  PEEP: 5  ITime: 1  MAP: 14  PIP: 36     Mode: AC/ CMV (Assist Control/ Continuous Mandatory Ventilation), RR (machine): 36, TV (machine): 400, FiO2: 40, PEEP: 5, ITime: 1, MAP: 14, PIP: 36  CAPILLARY BLOOD GLUCOSE  88 (15 Apr 2021 06:00)  92 (15 Apr 2021 04:00)  91 (15 Apr 2021 02:00)  86 (15 Apr 2021 00:00)  74 (14 Apr 2021 23:00)  31 (14 Apr 2021 22:00)      POCT Blood Glucose.: 88 mg/dL (15 Apr 2021 05:46)  POCT Blood Glucose.: 92 mg/dL (15 Apr 2021 04:02)  POCT Blood Glucose.: 66 mg/dL (15 Apr 2021 03:59)  POCT Blood Glucose.: 91 mg/dL (15 Apr 2021 02:04)  POCT Blood Glucose.: 86 mg/dL (15 Apr 2021 00:28)  POCT Blood Glucose.: 74 mg/dL (14 Apr 2021 23:34)  POCT Blood Glucose.: 44 mg/dL (14 Apr 2021 22:55)  POCT Blood Glucose.: 31 mg/dL (14 Apr 2021 22:07)  POCT Blood Glucose.: 89 mg/dL (14 Apr 2021 18:34)  POCT Blood Glucose.: 129 mg/dL (14 Apr 2021 13:30)  POCT Blood Glucose.: 158 mg/dL (14 Apr 2021 11:28)    LABS:  ABG - ( 15 Apr 2021 05:07 )  pH, Arterial: 7.34  pH, Blood: x     /  pCO2: 46    /  pO2: 143   / HCO3: 24    / Base Excess: -1.5  /  SaO2: 99              Blood Gas Arterial, Lactate: 1.3 mmoL/L (04-15-21 @ 05:07)  Blood Gas Arterial, Lactate: 2.4 mmoL/L *H* (04-14-21 @ 09:10)                          8.3    16.89 )-----------( 42       ( 15 Apr 2021 02:00 )             26.3     Hemoglobin: 8.3 g/dL (04-15 @ 02:00)  Hemoglobin: 8.5 g/dL (04-14 @ 01:25)  Hemoglobin: 7.5 g/dL (04-13 @ 02:00)    04-15    146  |  110  |  107<HH>  ----------------------------<  86  4.5   |  21  |  2.0<H>    Ca    8.6      15 Apr 2021 04:25  Mg     1.0     04-15    TPro  5.1<L>  /  Alb  3.2<L>  /  TBili  1.4<H>  /  DBili  x   /  AST  42<H>  /  ALT  11  /  AlkPhos  269<H>  04-15    Creatinine, Serum: 2.0 mg/dL (04-15 @ 04:25)  Creatinine, Serum: 0.7 mg/dL (04-15 @ 02:00)  Creatinine, Serum: 1.6 mg/dL (04-14 @ 01:25)  Creatinine, Serum: 2.0 mg/dL (04-13 @ 02:00)  Creatinine, Serum: 2.2 mg/dL (04-12 @ 01:30)          HOSPITAL MEDICATIONS:  MEDICATIONS  (STANDING):  ALBUTerol    90 MICROgram(s) HFA Inhaler 2 Puff(s) Inhalation every 6 hours  chlorhexidine 4% Liquid 1 Application(s) Topical <User Schedule>  clotrimazole 1% Cream 1 Application(s) Topical two times a day  collagenase Ointment 1 Application(s) Topical daily  dexMEDEtomidine Infusion 0.202 MICROgram(s)/kG/Hr (3 mL/Hr) IV Continuous <Continuous>  dextrose 40% Gel 15 Gram(s) Oral once  dextrose 5%. 1000 milliLiter(s) (50 mL/Hr) IV Continuous <Continuous>  dextrose 5%. 1000 milliLiter(s) (50 mL/Hr) IV Continuous <Continuous>  dextrose 5%. 1000 milliLiter(s) (100 mL/Hr) IV Continuous <Continuous>  dextrose 5%. 1000 milliLiter(s) (50 mL/Hr) IV Continuous <Continuous>  dextrose 5%. 1000 milliLiter(s) (100 mL/Hr) IV Continuous <Continuous>  dextrose 50% Injectable 25 Gram(s) IV Push once  dextrose 50% Injectable 12.5 Gram(s) IV Push once  dextrose 50% Injectable 25 Gram(s) IV Push once  dextrose 50% Injectable 50 milliLiter(s) IV Push once  ferrous    sulfate Liquid 300 milliGRAM(s) Enteral Tube daily  glucagon  Injectable 1 milliGRAM(s) IntraMuscular once  glucagon  Injectable 1 milliGRAM(s) IntraMuscular once  insulin glargine Injectable (LANTUS) 20 Unit(s) SubCutaneous every morning  insulin lispro (ADMELOG) corrective regimen sliding scale   SubCutaneous every 4 hours  ipratropium 17 MICROgram(s) HFA Inhaler 2 Puff(s) Inhalation every 6 hours  meropenem  IVPB 1000 milliGRAM(s) IV Intermittent every 24 hours  multivitamin/minerals/iron Oral Solution (CENTRUM) 15 milliLiter(s) Enteral Tube daily  nystatin Cream 1 Application(s) Topical two times a day  pantoprazole  Injectable 40 milliGRAM(s) IV Push two times a day  petrolatum Ophthalmic Ointment 1 Application(s) Both EYES three times a day  phenylephrine    Infusion 1 MICROgram(s)/kG/Min (11.1 mL/Hr) IV Continuous <Continuous>  sodium bicarbonate  Injectable 50 milliEquivalent(s) IV Push <User Schedule>  sodium chloride 0.9%. 1000 milliLiter(s) (10 mL/Hr) IV Continuous <Continuous>  vasopressin Infusion 0.04 Unit(s)/Min (2.4 mL/Hr) IV Continuous <Continuous>    MEDICATIONS  (PRN):  ondansetron Injectable 4 milliGRAM(s) IV Push every 4 hours PRN Nausea and/or Vomiting      REVIEW OF SYSTEMS:  CONSTITUTIONAL: [X] all negative; [ ] weakness, [ ] fevers, [ ] chills  EYES/ENT: [X] all negative; [ ] visual changes, [ ] vertigo, [ ] throat pain, [ ] eye pain  NECK: [X] all negative; [ ] pain, [ ] stiffness  RESPIRATORY: [ ] all negative, [ ] cough, [ ] wheezing, [ ] hemoptysis, [ ] shortness of breath, [  ] chest pain  CARDIOVASCULAR: [X] all negative; [ ] anginal chest pain, [ ] palpitations, [ ] orthopnea  GASTROINTESTINAL: [X] all negative; [ ]abdominal pain, [ ] nausea, [ ] vomiting, [ ] hematemesis, [ ] diarrhea, [ ] constipation, [ ] melena, [ ] hematochezia.  GENITOURINARY: [X] all negative; [ ] dysuria, [ ] frequency, [ ] hematuria  NEUROLOGICAL: [X] all negative; [ ] numbness, [ ] weakness, [ ] paresthesias  MUSCULOSKELETAL: [X] all negative, [ ] joint pain, [ ] joint swelling, [ ] joint redness, [ ] bone pain  SKIN: [X] all negative; [ ] itching, [ ] burning, [ ] rashes, [ ] lesions   All other review of systems is negative unless indicated above.    [  ] Unable to assess ROS because     PHYSICAL EXAM:          CONSTITUTIONAL: Well-developed; well-nourished; in no acute distress.   	SKIN: warm, dry, no rashes or lesions  	HEENT: Atraumatic. Normocephalic. PERRL. Moist membranes, no conjunctival injection, sclera clear  	NECK: Supple; non tender.  No JVD. No lymphadenopathy.  	CARD: Normal S1, S2. Rate and Rhythm are regular. No murmurs.  	RESP: Good air entry bilaterally, no wheezes, no rales no rhonchi.  	ABD: Soft, not tender, not distended, no CVA tenderness, no rebound no guarding, bowel sounds present  	EXT: Normal ROM.  No clubbing, no cyanosis, no pedal edema, no calf pain b/l, Peripheral pulses intact.  	LYMPH: No acute cervical adenopathy.  	NEURO: Alert, awake, motor 5/5 R, 5/5 L, sensation intact bilat, CN 2-12 intact,          PSYCH: Cooperative, appropriate. Alert & oriented x 3    RADIOLOGY:  X Reviewed and interpreted by me  CxR from 04-15-21 shows mild congestion, no pneumothorax, no effusion, no cardiomegaly,   ETT above dk in good position, NGT in place, TLC in place, S-G Catheter in place, Chest Tubes in place,     ECG:  X Reviewed and interpreted by me CTU Attending Progress Daily Note     15 Apr 2021 07:58  Admited 03-11-21, Hospital Day 35d  POD# - 24    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, osteoarithtis, [place]ian-speaking presenting with cough and fever.    As per daughter, patient has been having dry consistent cough since 2 months. Today, she was being evaluated for knee injections when she was found to be febrile. She also reports progressing generalized weakness with decreased appetite and PO intake.  No URT symptoms, no chest pain, no leg pain, no diarrhea, no urinary symptoms no sick contacts, no recent travel. In ED was hypotensive, was given IVF, started on levophed 0.04 called to evaluate (11 Mar 2021 03:21)    Home Medications:  donepezil 5 mg oral tablet: 1 tab(s) orally once a day (at bedtime) (11 Mar 2021 03:24)  gabapentin 300 mg oral capsule: 1 cap(s) orally 3 times a day, As Needed (11 Mar 2021 03:24)  meloxicam 15 mg oral tablet: 1 tab(s) orally once a day, As Needed (11 Mar 2021 03:24)  zolpidem 10 mg oral tablet: 1 tab(s) orally once a day (at bedtime), As Needed (11 Mar 2021 03:24)    FAMILY HISTORY:    PAST MEDICAL & SURGICAL HISTORY:  Dementia    Osteoarthritis    No significant past surgical history      Interval event for past 24 hr:  DESTIN TERRY  85y had no event.   Current Complains:  DESTIN TERRY has no new complains  Allergies    clindamycin (Hives)    Intolerances      OBJECTIVE:  Vitals last 24 hrs  ICU Vital Signs Last 24 Hrs  T(C): 36.7 (15 Apr 2021 04:00), Max: 36.9 (15 Apr 2021 00:00)  T(F): 98.1 (15 Apr 2021 04:00), Max: 98.5 (15 Apr 2021 00:00)  HR: 92 (15 Apr 2021 07:00) (81 - 97)  BP: 124/62 (14 Apr 2021 08:15) (124/62 - 124/62)  BP(mean): 85 (14 Apr 2021 08:15) (85 - 85)  ABP: 113/52 (15 Apr 2021 07:00) (93/47 - 153/76)  ABP(mean): 75 (15 Apr 2021 07:00) (62 - 106)  RR: 36 (15 Apr 2021 07:00) (16 - 42)  SpO2: 96% (15 Apr 2021 07:00) (94% - 100%)    04-14-21 @ 07:01  -  04-15-21 @ 07:00  --------------------------------------------------------  IN:    dextrose 5%: 1200 mL    Enteral Tube Flush: 110 mL    IV PiggyBack: 300 mL    Peptamen A.F.: 780 mL    Phenylephrine: 195.6 mL    Propofol: 20 mL  Total IN: 2605.6 mL    OUT:    Chest Tube (mL): 90 mL    Gastric Aspirate - Discarded (mL): 350 mL    Indwelling Catheter - Urethral (mL): 1325 mL    Rectal Tube (mL): 580 mL    Vasopressin: 0 mL  Total OUT: 2345 mL    Total NET: 260.6 mL        Mode: AC/ CMV (Assist Control/ Continuous Mandatory Ventilation)  RR (machine): 36  TV (machine): 400  FiO2: 40  PEEP: 5  ITime: 1  MAP: 14  PIP: 36     Mode: AC/ CMV (Assist Control/ Continuous Mandatory Ventilation), RR (machine): 36, TV (machine): 400, FiO2: 40, PEEP: 5, ITime: 1, MAP: 14, PIP: 36  CAPILLARY BLOOD GLUCOSE  88 (15 Apr 2021 06:00)  92 (15 Apr 2021 04:00)  91 (15 Apr 2021 02:00)  86 (15 Apr 2021 00:00)  74 (14 Apr 2021 23:00)  31 (14 Apr 2021 22:00)      POCT Blood Glucose.: 88 mg/dL (15 Apr 2021 05:46)  POCT Blood Glucose.: 92 mg/dL (15 Apr 2021 04:02)  POCT Blood Glucose.: 66 mg/dL (15 Apr 2021 03:59)  POCT Blood Glucose.: 91 mg/dL (15 Apr 2021 02:04)  POCT Blood Glucose.: 86 mg/dL (15 Apr 2021 00:28)  POCT Blood Glucose.: 74 mg/dL (14 Apr 2021 23:34)  POCT Blood Glucose.: 44 mg/dL (14 Apr 2021 22:55)  POCT Blood Glucose.: 31 mg/dL (14 Apr 2021 22:07)  POCT Blood Glucose.: 89 mg/dL (14 Apr 2021 18:34)  POCT Blood Glucose.: 129 mg/dL (14 Apr 2021 13:30)  POCT Blood Glucose.: 158 mg/dL (14 Apr 2021 11:28)    LABS:  ABG - ( 15 Apr 2021 05:07 )  pH, Arterial: 7.34  pH, Blood: x     /  pCO2: 46    /  pO2: 143   / HCO3: 24    / Base Excess: -1.5  /  SaO2: 99          Blood Gas Arterial, Lactate: 1.3 mmoL/L (04-15-21 @ 05:07)  Blood Gas Arterial, Lactate: 2.4 mmoL/L *H* (04-14-21 @ 09:10)                          8.3    16.89 )-----------( 42       ( 15 Apr 2021 02:00 )             26.3     Hemoglobin: 8.3 g/dL (04-15 @ 02:00)  Hemoglobin: 8.5 g/dL (04-14 @ 01:25)  Hemoglobin: 7.5 g/dL (04-13 @ 02:00)    04-15    146  |  110  |  107<HH>  ----------------------------<  86  4.5   |  21  |  2.0<H>    Ca    8.6      15 Apr 2021 04:25  Mg     1.0     04-15    TPro  5.1<L>  /  Alb  3.2<L>  /  TBili  1.4<H>  /  DBili  x   /  AST  42<H>  /  ALT  11  /  AlkPhos  269<H>  04-15    Creatinine, Serum: 2.0 mg/dL (04-15 @ 04:25)  Creatinine, Serum: 0.7 mg/dL (04-15 @ 02:00)  Creatinine, Serum: 1.6 mg/dL (04-14 @ 01:25)  Creatinine, Serum: 2.0 mg/dL (04-13 @ 02:00)  Creatinine, Serum: 2.2 mg/dL (04-12 @ 01:30)          HOSPITAL MEDICATIONS:  MEDICATIONS  (STANDING):  ALBUTerol    90 MICROgram(s) HFA Inhaler 2 Puff(s) Inhalation every 6 hours  chlorhexidine 4% Liquid 1 Application(s) Topical <User Schedule>  clotrimazole 1% Cream 1 Application(s) Topical two times a day  collagenase Ointment 1 Application(s) Topical daily  dexMEDEtomidine Infusion 0.202 MICROgram(s)/kG/Hr (3 mL/Hr) IV Continuous <Continuous>  dextrose 40% Gel 15 Gram(s) Oral once  dextrose 5%. 1000 milliLiter(s) (50 mL/Hr) IV Continuous <Continuous>  dextrose 5%. 1000 milliLiter(s) (50 mL/Hr) IV Continuous <Continuous>  dextrose 5%. 1000 milliLiter(s) (100 mL/Hr) IV Continuous <Continuous>  dextrose 5%. 1000 milliLiter(s) (50 mL/Hr) IV Continuous <Continuous>  dextrose 5%. 1000 milliLiter(s) (100 mL/Hr) IV Continuous <Continuous>  dextrose 50% Injectable 25 Gram(s) IV Push once  dextrose 50% Injectable 12.5 Gram(s) IV Push once  dextrose 50% Injectable 25 Gram(s) IV Push once  dextrose 50% Injectable 50 milliLiter(s) IV Push once  ferrous    sulfate Liquid 300 milliGRAM(s) Enteral Tube daily  glucagon  Injectable 1 milliGRAM(s) IntraMuscular once  glucagon  Injectable 1 milliGRAM(s) IntraMuscular once  insulin glargine Injectable (LANTUS) 20 Unit(s) SubCutaneous every morning  insulin lispro (ADMELOG) corrective regimen sliding scale   SubCutaneous every 4 hours  ipratropium 17 MICROgram(s) HFA Inhaler 2 Puff(s) Inhalation every 6 hours  meropenem  IVPB 1000 milliGRAM(s) IV Intermittent every 24 hours  multivitamin/minerals/iron Oral Solution (CENTRUM) 15 milliLiter(s) Enteral Tube daily  nystatin Cream 1 Application(s) Topical two times a day  pantoprazole  Injectable 40 milliGRAM(s) IV Push two times a day  petrolatum Ophthalmic Ointment 1 Application(s) Both EYES three times a day  phenylephrine    Infusion 1 MICROgram(s)/kG/Min (11.1 mL/Hr) IV Continuous <Continuous>  sodium bicarbonate  Injectable 50 milliEquivalent(s) IV Push <User Schedule>  sodium chloride 0.9%. 1000 milliLiter(s) (10 mL/Hr) IV Continuous <Continuous>  vasopressin Infusion 0.04 Unit(s)/Min (2.4 mL/Hr) IV Continuous <Continuous>    MEDICATIONS  (PRN):  ondansetron Injectable 4 milliGRAM(s) IV Push every 4 hours PRN Nausea and/or Vomiting      REVIEW OF SYSTEMS:    [ x ] Unable to assess ROS because of unresponsiveness     PHYSICAL EXAM:          CONSTITUTIONAL: Well-developed; well-nourished; in no acute distress.   	SKIN: warm, dry, no rashes or lesions - sacral and heel DTI  	HEENT: Atraumatic. Normocephalic. PERRL. Moist membranes, no conjunctival injection, sclera clear  	NECK: Supple; non tender.  No JVD. No lymphadenopathy.  	CARD: Normal S1, S2. Rate and Rhythm are regular. No murmurs.  	RESP: Good air entry bilaterally, no wheezes, + rales + rhonchi.  	ABD: Soft, not tender, not distended, no CVA tenderness, no rebound no guarding, bowel sounds decreased   	EXT: Normal ROM.  No clubbing, no cyanosis, +++ pedal edema, no calf pain b/l, Peripheral pulses intact.  	LYMPH: No acute cervical adenopathy.  	NEURO: opens her eyes     RADIOLOGY:  X Reviewed and interpreted by me  CxR from 04-15-21 shows moderate congestion, bilateral infiltrates, no pneumothorax, no effusion, no cardiomegaly,   ETT above dk in good position, NGT in place, Chest Tubes in place,     ECG:

## 2021-04-15 NOTE — PROGRESS NOTE ADULT - SUBJECTIVE AND OBJECTIVE BOX
MARA, MIGNONIKI  85y, Female  Allergy: clindamycin (Hives)      LOS  35d    CHIEF COMPLAINT: empyema (06 Apr 2021 09:24)      INTERVAL EVENTS/HPI  - T(F): , Max: 98.5 (04-15-21 @ 00:00)  - events reviewed  - WBC Count: 16.89 (04-15-21 @ 02:00)  WBC Count: 15.03 (04-14-21 @ 01:25)     - Creatinine, Serum: 2.0 (04-15-21 @ 04:25)  Creatinine, Serum: 0.7 (04-15-21 @ 02:00)       ROS  General: Denies rigors, nightsweats  HEENT: Denies headache, rhinorrhea, sore throat, eye pain  CV: Denies CP, palpitations  PULM: Denies wheezing, hemoptysis  GI: Denies hematemesis, hematochezia, melena  : Denies discharge, hematuria  MSK: Denies arthralgias, myalgias  SKIN: Denies rash, lesions  NEURO: Denies paresthesias, weakness  PSYCH: Denies depression, anxiety    VITALS:  T(F): 98.1, Max: 98.5 (04-15-21 @ 00:00)  HR: 92  BP: 124/62  RR: 36Vital Signs Last 24 Hrs  T(C): 36.7 (15 Apr 2021 04:00), Max: 36.9 (15 Apr 2021 00:00)  T(F): 98.1 (15 Apr 2021 04:00), Max: 98.5 (15 Apr 2021 00:00)  HR: 92 (15 Apr 2021 07:00) (81 - 97)  BP: 124/62 (14 Apr 2021 08:15) (124/62 - 124/62)  BP(mean): 85 (14 Apr 2021 08:15) (85 - 85)  RR: 36 (15 Apr 2021 07:00) (16 - 42)  SpO2: 96% (15 Apr 2021 07:00) (94% - 100%)    PHYSICAL EXAM:  Gen: NAD, resting in bed  HEENT: Normocephalic, atraumatic  Neck: supple, no lymphadenopathy  CV: Regular rate & regular rhythm  Lungs: decreased BS at bases, no fremitus  Abdomen: Soft, BS present  Ext: Warm, well perfused  Neuro: non focal, awake  Skin: no rash, no erythema  Lines: no phlebitis    FH: Non-contributory  Social Hx: Non-contributory    TESTS & MEASUREMENTS:                        8.3    16.89 )-----------( 42       ( 15 Apr 2021 02:00 )             26.3     04-15    146  |  110  |  107<HH>  ----------------------------<  86  4.5   |  21  |  2.0<H>    Ca    8.6      15 Apr 2021 04:25  Mg     1.0     04-15    TPro  5.1<L>  /  Alb  3.2<L>  /  TBili  1.4<H>  /  DBili  x   /  AST  42<H>  /  ALT  11  /  AlkPhos  269<H>  04-15    eGFR if Non African American: 23 mL/min/1.73M2 (04-15-21 @ 04:25)  eGFR if African American: 26 mL/min/1.73M2 (04-15-21 @ 04:25)  eGFR if Non African American: 80 mL/min/1.73M2 (04-15-21 @ 02:00)  eGFR if : 93 mL/min/1.73M2 (04-15-21 @ 02:00)    LIVER FUNCTIONS - ( 15 Apr 2021 04:25 )  Alb: 3.2 g/dL / Pro: 5.1 g/dL / ALK PHOS: 269 U/L / ALT: 11 U/L / AST: 42 U/L / GGT: x               Culture - Blood (collected 04-03-21 @ 13:21)  Source: .Blood Blood  Final Report (04-08-21 @ 20:01):    No Growth Final    Culture - Blood (collected 03-31-21 @ 10:52)  Source: .Blood Blood  Final Report (04-05-21 @ 23:01):    No Growth Final    Culture - Acid Fast - Tissue w/Smear (collected 03-22-21 @ 13:33)  Source: .Tissue PLEURAL PEEL  Preliminary Report (03-31-21 @ 15:03):    No growth at 1 week.    Culture - Fungal, Tissue (collected 03-22-21 @ 13:33)  Source: .Tissue None  Preliminary Report (03-31-21 @ 15:02):    No growth    Culture - Tissue with Gram Stain (collected 03-22-21 @ 13:33)  Source: .Tissue None  Gram Stain (03-23-21 @ 04:38):    Rare polymorphonuclear leukocytes seen per low power field    Few White blood cells seen per low power field    Few Gram positive cocci in pairs per oil power field  Final Report (04-01-21 @ 16:29):    No growth    Organism seen in Gram stain is non-viable after prolonged    incubation and repeated subculture.    Culture - Fungal, Bronchial (collected 03-22-21 @ 12:58)  Source: .Bronchial None  Preliminary Report (03-25-21 @ 11:20):    Rare Yeast    Culture - Acid Fast - Bronchial w/Smear (collected 03-22-21 @ 12:58)  Source: Bronch Wash None  Preliminary Report (03-31-21 @ 15:03):    No growth at 1 week.    Culture - Bronchial (collected 03-22-21 @ 12:58)  Source: .Bronchial None  Gram Stain (03-23-21 @ 07:58):    Numerous polymorphonuclear leukocytes per low power field    No Squamous epithelial cells per low power field    No organisms seen per oil power field  Final Report (03-24-21 @ 22:11):    Normal Respiratory Narda present    Culture - Fungal, Body Fluid (collected 03-22-21 @ 09:26)  Source: .Body Fluid None  Preliminary Report (03-31-21 @ 15:02):    No growth    Culture - Acid Fast - Body Fluid w/Smear (collected 03-22-21 @ 09:26)  Source: .Body Fluid None  Preliminary Report (03-31-21 @ 15:03):    No growth at 1 week.    Culture - Body Fluid with Gram Stain (collected 03-22-21 @ 09:26)  Source: .Body Fluid None  Gram Stain (03-23-21 @ 04:38):    polymorphonuclear leukocytes seen    No organisms seen    by cytocentrifuge  Final Report (03-27-21 @ 17:22):    No growth at 5 days    Culture - Blood (collected 03-17-21 @ 16:00)  Source: .Blood Blood-Peripheral  Final Report (03-23-21 @ 02:39):    No Growth Final            INFECTIOUS DISEASES TESTING  COVID-19 PCR: NotDetec (04-05-21 @ 10:12)  COVID-19 PCR: NotDetec (04-04-21 @ 14:03)  Fungitell: <31 (04-01-21 @ 01:03)  Aspergillus Galactomannan Antigen: <0.500 (04-01-21 @ 01:03)  COVID-19 PCR: NotDetec (03-29-21 @ 14:17)  Procalcitonin, Serum: 2.29 (03-27-21 @ 10:50)  MRSA PCR Result.: Negative (03-20-21 @ 21:38)  COVID-19 PCR: NotDetec (03-20-21 @ 12:30)  Fungitell: 46 (03-17-21 @ 16:00)  Procalcitonin, Serum: 0.29 (03-17-21 @ 11:30)  Procalcitonin, Serum: 0.31 (03-16-21 @ 10:15)  Legionella Antigen, Urine: Negative (03-12-21 @ 10:30)  Streptococcus Pneumoniae Ag Urine: Negative (03-12-21 @ 10:30)  MRSA PCR Result.: Negative (03-12-21 @ 10:30)  Legionella Antigen, Urine: Negative (03-11-21 @ 08:10)  Streptococcus Pneumoniae Ag Urine: Negative (03-11-21 @ 08:10)  Procalcitonin, Serum: 1.75 (03-11-21 @ 06:31)  Rapid RVP Result: NotDetec (03-10-21 @ 22:10)      INFLAMMATORY MARKERS      RADIOLOGY & ADDITIONAL TESTS:  I have personally reviewed the last available Chest xray  CXR      CT  CT Chest No Cont:   EXAM:  CT ABDOMEN AND PELVIS OC        EXAM:  CT CHEST            PROCEDURE DATE:  04/12/2021            INTERPRETATION:  CLINICAL STATEMENT: Cough. Fever. Status post VATS.    TECHNIQUE: Contiguous axial CT images were obtained from the thoracic inlet to the pubic symphysis following administration of 100c Optiray 320 intravenous contrast.  Oral contrast was administered.  Reformatted images in the coronal and sagittal planes were acquired.    COMPARISON: CT CHEST ABDOMEN PELVIS DATED 4/1/2021.    FINDINGS:      CHEST:    TUBES/LINES: Tracheostomy in place. Interval removal of 2 chest tubes. Persistent right anterior chest tube terminating along the apex.    LUNGS/PLEURA/AIRWAYS: Essentially unchanged loculated right-sided pneumothorax. Increased left pleural effusion tracking into the fissure. Extensive groundglass and consolidative changes all 5 lobes, essentially unchanged on the right and slightly decreased in the left upper lobe with areas of interlobular septal thickening (crazy paving). There is a interspersed traction bronchiectasis best appreciated along the basal consolidative opacities. Interspersed mild subpleural reticular changes also present on the right with lung surface thickening.    MEDIASTINUM/THORACIC NODES: Nolymphadenopathy. Interval resolution of previously seen pneumomediastinum. Few subcentimeter prominent mediastinal lymph nodes, nonspecific and without significant change.    HEART/GREAT VESSELS: Unremarkable.      ABDOMEN/PELVIS:    Evaluation of intra-abdominal organs is limited due to lack of intravenous contrast.    TUBES/LINES: Gastrostomy tube in place.    HEPATOBILIARY: Punctate calcified hepatic granulomas. Mild pericholecystic edema/fluid is likely secondary.    SPLEEN: Unremarkable.    PANCREAS: Unremarkable.    ADRENAL GLANDS: Unremarkable.    KIDNEYS: No hydronephrosis.    ABDOMINOPELVIC NODES: No lymphadenopathy.    PELVIC ORGANS: Urinary bladder collapsed around a Adams catheter.    PERITONEUM/MESENTERY/BOWEL: Interval development of moderate abdominopelvic ascites. No evidence of bowel obstruction or intraperitoneal free air. Diffuse colonic wall thickening. Oral contrast seen reaching the rectum. Rectal tube in place. Appendix not well-visualized. PEG tube terminating in the stomach.    BONES/SOFT TISSUES: Interval development of diffuse anasarca. Scoliosis. Degenerative changes of the spine.    OTHER: Vascular calcifications.      IMPRESSION:    1. Redemonstrated loculated right pneumothorax without significant change. Interval removal of 2/3 right chest tubes.    2. Diffuse groundglass and consolidative opacities with interlobular septal thickening and traction bronchiectasis in the bases. Findings likely reflecting a combination of pulmonary edema and post inflammatory/infectious changes with some degree of organization.    3. Interval development abdominopelvic ascites and anasarca consistent with fluid overload.    4. Nonspecific diffuse colonic wall thickening which could reflect colitis versus edema. No bowel obstruction.    5. Interval resolution of previously seen pneumomediastinum.                  ERIC TATUM M.D., RESIDENT RADIOLOGIST  This document has been electronically signed.  OLIVE ANDRADE MD; Attending Radiologist  This document has been electronically signed. Apr 12 2021  9:28PM (04-12-21 @ 17:17)      CARDIOLOGY TESTING  12 Lead ECG:   Ventricular Rate 89 BPM    Atrial Rate 89 BPM    P-R Interval 132 ms    QRS Duration 84 ms    Q-T Interval 364 ms    QTC Calculation(Bazett) 442 ms    P Axis 58 degrees    R Axis 23 degrees    T Axis -48 degrees    Diagnosis Line Sinus rhythm with marked sinus arrhythmia with Premature atrial complexes  Nonspecific ST and T wave abnormality  Abnormal ECG    Confirmed by Job Bains ([id number]) on 4/7/2021 11:26:01 AM (04-07-21 @ 09:41)      MEDICATIONS  ALBUTerol    90 MICROgram(s) HFA Inhaler 2 Inhalation every 6 hours  chlorhexidine 4% Liquid 1 Topical <User Schedule>  clotrimazole 1% Cream 1 Topical two times a day  collagenase Ointment 1 Topical daily  dexMEDEtomidine Infusion 0.202 IV Continuous <Continuous>  dextrose 40% Gel 15 Oral once  dextrose 5%. 1000 IV Continuous <Continuous>  dextrose 5%. 1000 IV Continuous <Continuous>  dextrose 5%. 1000 IV Continuous <Continuous>  dextrose 5%. 1000 IV Continuous <Continuous>  dextrose 5%. 1000 IV Continuous <Continuous>  dextrose 50% Injectable 25 IV Push once  dextrose 50% Injectable 12.5 IV Push once  dextrose 50% Injectable 25 IV Push once  ferrous    sulfate Liquid 300 Enteral Tube daily  glucagon  Injectable 1 IntraMuscular once  glucagon  Injectable 1 IntraMuscular once  insulin glargine Injectable (LANTUS) 20 SubCutaneous every morning  insulin lispro (ADMELOG) corrective regimen sliding scale  SubCutaneous every 4 hours  ipratropium 17 MICROgram(s) HFA Inhaler 2 Inhalation every 6 hours  meropenem  IVPB 1000 IV Intermittent every 24 hours  multivitamin/minerals/iron Oral Solution (CENTRUM) 15 Enteral Tube daily  nystatin Cream 1 Topical two times a day  pantoprazole  Injectable 40 IV Push two times a day  petrolatum Ophthalmic Ointment 1 Both EYES three times a day  phenylephrine    Infusion 1 IV Continuous <Continuous>  potassium chloride  20 mEq/100 mL IVPB 20 IV Intermittent once  sodium bicarbonate  Injectable 50 IV Push <User Schedule>  sodium chloride 0.9%. 1000 IV Continuous <Continuous>  vasopressin Infusion 0.04 IV Continuous <Continuous>      WEIGHT  Weight (kg): 67.5 (04-07-21 @ 06:00)  Creatinine, Serum: 2.0 mg/dL (04-15-21 @ 04:25)  Creatinine, Serum: 0.7 mg/dL (04-15-21 @ 02:00)      ANTIBIOTICS:  meropenem  IVPB 1000 milliGRAM(s) IV Intermittent every 24 hours      All available historical records have been reviewed

## 2021-04-15 NOTE — PROGRESS NOTE ADULT - ASSESSMENT
Chest tube to waterseal  Daily chest xray  Monitor chest tube out put  Monitor for airleaks  Monitor 02 saturation  DVT/GI prophylaxis  Pain management

## 2021-04-15 NOTE — PROGRESS NOTE ADULT - CRITICAL CARE SERVICES PROVIDED
Critical care services provided

## 2021-04-15 NOTE — PROGRESS NOTE ADULT - ASSESSMENT
ASSESSMENT/PLAN  85y Female patient admitted 3/11  s/p R VATS evacuation of empyema, x3 chest tubes in place post operatively remains intubated   ARDS  shock/ pressors  anemia  bleeding from GT --> anemia  hypernatremia  hypomagnesemia  hyperglycemia      PLAN  - continue with current tube feed  - glycemic control   check bmp/phos/mg and correct lytes

## 2021-04-15 NOTE — PROGRESS NOTE ADULT - SUBJECTIVE AND OBJECTIVE BOX
DESTIN TERRY  85y Female   [id number]    Hospital Day: 36  Post Operative Day:14  Procedure:s/p right vats , trach and peg   Patient is a 85y old  Female who presents with a chief complaint of empyema (2021 09:24)    PAST MEDICAL & SURGICAL HISTORY:  Dementia    Osteoarthritis    No significant past surgical history        Events of the Last 24h:  Vital Signs Last 24 Hrs  T(C): 36.1 (2021 16:00), Max: 36.9 (2021 04:00)  T(F): 96.9 (2021 16:00), Max: 98.5 (2021 04:00)  HR: 83 (2021 23:00) (81 - 117)  BP: 124/62 (2021 08:15) (124/62 - 124/62)  BP(mean): 85 (2021 08:15) (85 - 85)  RR: 36 (2021 23:00) (16 - 42)  SpO2: 94% (2021 23:00) (94% - 100%)    Mode: AC/ CMV (Assist Control/ Continuous Mandatory Ventilation), RR (machine): 36, TV (machine): 450, FiO2: 60, PEEP: 5, ITime: 1, MAP: 14, PIP: 36    Diet, NPO with Tube Feed:   Tube Feeding Modality: Gastrostomy  Peptamen A.F. Formula  Total Volume for 24 Hours (mL): 1560  Continuous  Until Goal Tube Feed Rate (mL per Hour): 65  Tube Feed Duration (in Hours): 24  Tube Feed Start Time: 20:15 (21 @ 20:14)      I&O's Summary    2021 07:01  -  2021 07:00  --------------------------------------------------------  IN: 3486.2 mL / OUT: 1785 mL / NET: 1701.2 mL    2021 07:01  -  15 2021 00:58  --------------------------------------------------------  IN: 1493.6 mL / OUT: 1810 mL / NET: -316.4 mL     I&O's Detail    2021 07:01  -  2021 07:00  --------------------------------------------------------  IN:    dextrose 5%: 1150 mL    Enteral Tube Flush: 180 mL    IV PiggyBack: 100 mL    Peptamen A.F.: 1370 mL    Phenylephrine: 82 mL    PRBCs (Packed Red Blood Cells): 316 mL    Propofol: 145 mL    sodium chloride 0.9%: 110 mL    Vasopressin: 14 mL    Vasopressin: 19.2 mL  Total IN: 3486.2 mL    OUT:    Chest Tube (mL): 40 mL    Indwelling Catheter - Urethral (mL): 1145 mL    Rectal Tube (mL): 600 mL  Total OUT: 1785 mL    Total NET: 1701.2 mL      2021 07:01  -  15 2021 00:58  --------------------------------------------------------  IN:    dextrose 5%: 850 mL    Enteral Tube Flush: 50 mL    IV PiggyBack: 100 mL    Peptamen A.F.: 325 mL    Phenylephrine: 148.6 mL    Propofol: 20 mL  Total IN: 1493.6 mL    OUT:    Chest Tube (mL): 70 mL    Gastric Aspirate - Discarded (mL): 350 mL    Indwelling Catheter - Urethral (mL): 890 mL    Rectal Tube (mL): 500 mL    Vasopressin: 0 mL  Total OUT: 1810 mL    Total NET: -316.4 mL          MEDICATIONS  (STANDING):  ALBUTerol    90 MICROgram(s) HFA Inhaler 2 Puff(s) Inhalation every 6 hours  chlorhexidine 4% Liquid 1 Application(s) Topical <User Schedule>  clotrimazole 1% Cream 1 Application(s) Topical two times a day  collagenase Ointment 1 Application(s) Topical daily  dexMEDEtomidine Infusion 0.202 MICROgram(s)/kG/Hr (3 mL/Hr) IV Continuous <Continuous>  dextrose 40% Gel 15 Gram(s) Oral once  dextrose 5%. 1000 milliLiter(s) (50 mL/Hr) IV Continuous <Continuous>  dextrose 5%. 1000 milliLiter(s) (50 mL/Hr) IV Continuous <Continuous>  dextrose 5%. 1000 milliLiter(s) (100 mL/Hr) IV Continuous <Continuous>  dextrose 5%. 1000 milliLiter(s) (50 mL/Hr) IV Continuous <Continuous>  dextrose 5%. 1000 milliLiter(s) (100 mL/Hr) IV Continuous <Continuous>  dextrose 50% Injectable 25 Gram(s) IV Push once  dextrose 50% Injectable 12.5 Gram(s) IV Push once  dextrose 50% Injectable 25 Gram(s) IV Push once  ferrous    sulfate Liquid 300 milliGRAM(s) Enteral Tube daily  glucagon  Injectable 1 milliGRAM(s) IntraMuscular once  glucagon  Injectable 1 milliGRAM(s) IntraMuscular once  insulin glargine Injectable (LANTUS) 20 Unit(s) SubCutaneous every morning  insulin lispro (ADMELOG) corrective regimen sliding scale   SubCutaneous every 4 hours  ipratropium 17 MICROgram(s) HFA Inhaler 2 Puff(s) Inhalation every 6 hours  meropenem  IVPB 1000 milliGRAM(s) IV Intermittent every 24 hours  multivitamin/minerals/iron Oral Solution (CENTRUM) 15 milliLiter(s) Enteral Tube daily  nystatin Cream 1 Application(s) Topical two times a day  pantoprazole  Injectable 40 milliGRAM(s) IV Push two times a day  petrolatum Ophthalmic Ointment 1 Application(s) Both EYES three times a day  phenylephrine    Infusion 1 MICROgram(s)/kG/Min (11.1 mL/Hr) IV Continuous <Continuous>  sodium bicarbonate  Injectable 50 milliEquivalent(s) IV Push <User Schedule>  sodium chloride 0.9%. 1000 milliLiter(s) (10 mL/Hr) IV Continuous <Continuous>  vasopressin Infusion 0.04 Unit(s)/Min (2.4 mL/Hr) IV Continuous <Continuous>    MEDICATIONS  (PRN):  ondansetron Injectable 4 milliGRAM(s) IV Push every 4 hours PRN Nausea and/or Vomiting      PHYSICAL EXAM:    GENERAL: NAD    HEENT: NCAT    CHEST/LUNGS: CTAB, right chest tube waterseal    HEART: RRR,  No murmurs, rubs, or gallops    ABDOMEN: SNTND +BS    EXTREMITIES:  FROM, No clubbing, cyanosis, or edema, palpable pulse    NEURO: No focal neurological deficits    SKIN: No rashes or lesions    INCISION/WOUNDS:                          8.5    15.03 )-----------( 48       ( 2021 01:25 )             26.9        CBC Full  -  ( 2021 01:25 )  WBC Count : 15.03 K/uL  RBC Count : 2.91 M/uL  Hemoglobin : 8.5 g/dL  Hematocrit : 26.9 %  Platelet Count - Automated : 48 K/uL  Mean Cell Volume : 92.4 fL  Mean Cell Hemoglobin : 29.2 pg  Mean Cell Hemoglobin Concentration : 31.6 g/dL  Auto Neutrophil # : x  Auto Lymphocyte # : x  Auto Monocyte # : x  Auto Eosinophil # : x  Auto Basophil # : x  Auto Neutrophil % : x  Auto Lymphocyte % : x  Auto Monocyte % : x  Auto Eosinophil % : x  Auto Basophil % : x               150   |  122   |  82                 Ca: 6.5    BMP:   ----------------------------< 164    M.6   (21 @ 01:25)             3.3    |  15    | 1.6                Ph: x        LFT:     TPro: 4.0 / Alb: 2.7 / TBili: 0.9 / DBili: 0.6 / AST: 34 / ALT: 10 / AlkPhos: 176   (21 @ 01:25)    LIVER FUNCTIONS - ( 2021 01:25 )  Alb: 2.7 g/dL / Pro: 4.0 g/dL / ALK PHOS: 176 U/L / ALT: 10 U/L / AST: 34 U/L / GGT: x                     < from: Xray Chest 1 View- PORTABLE-Routine (Xray Chest 1 View- PORTABLE-Routine in AM.) (21 @ 06:18) >  Support devices: Right apical chest tube and tracheostomy tube are stable.    Cardiac/mediastinum/hilum: Stable.    Lung parenchyma/Pleura: No significant change in bilateral airspace opacities. No pneumothorax.    Skeleton/soft tissues: Stable.      IMPRESSION:    No significant change since one day earlier.    < end of copied text >

## 2021-04-15 NOTE — PROGRESS NOTE ADULT - ASSESSMENT
PROBLEMS:  I spent 45 minutes of critical care time examining patient, reviewing vitals, labs, medications, imaging and discussing with the team goals of care to prevent life-threatening in this patient who is at high risk for deterioration or death due to:      Septic Shock - continue IV antibiotics, now jusr on few cc of neosynephrine   anemia stable with guaiac positive stool -   decreased bowel sounds - CT of abdomen - OK, add reglan  Thrombocytopenia - possibly due to linezolid - stop it - observe  Pleural air leak - keep CT,   unresponsiveness - not focal.   MENDOZA  - now Stable - monitor  rising BUN - probably due to albumin IV  FLuid overload - monitor  Protein malnutrition - continue Peptamen Feeding -goal of 65 cc/hr  Sacral decubiti - local care   Hypoglycemia - stop lantus      Case and plan discussed with CT Surgeons and CTU PAs.  Continue CTU supportive care and ongoing plan of care as per continuing CTU rounds.   Continue DVT/GI prophylaxis.  Incentive Spirometry 10 times an hour.  Continue to advance physical activity as tolerated and continue PT/OT as directed.    PLAN  Neuro: move all 4 extremities. no sensory or motor deficits  Pain management.   dexMEDEtomidine Infusion 0.202 MICROgram(s)/kG/Hr IV Continuous <Continuous>  ondansetron Injectable 4 milliGRAM(s) IV Push every 4 hours PRN    Pulm: Wean off supplemental oxygen as able. Daily CXR. Encourage coughing, deep breathing and use of incentive spirometry.   ALBUTerol    90 MICROgram(s) HFA Inhaler 2 Puff(s) Inhalation every 6 hours  ipratropium 17 MICROgram(s) HFA Inhaler 2 Puff(s) Inhalation every 6 hours    Cardio: Monitor telemetry/alarms. Continue supportive care   phenylephrine    Infusion 1 MICROgram(s)/kG/Min IV Continuous <Continuous>    GI: Continue stool softeners.    pantoprazole  Injectable 40 milliGRAM(s) IV Push two times a day    Nutrition: Continue present diet  Endocrine and glucose control:   dextrose 40% Gel 15 Gram(s) Oral once  dextrose 50% Injectable 25 Gram(s) IV Push once  dextrose 50% Injectable 12.5 Gram(s) IV Push once  dextrose 50% Injectable 25 Gram(s) IV Push once  glucagon  Injectable 1 milliGRAM(s) IntraMuscular once  glucagon  Injectable 1 milliGRAM(s) IntraMuscular once  insulin lispro (ADMELOG) corrective regimen sliding scale   SubCutaneous every 4 hours  vasopressin Infusion 0.04 Unit(s)/Min IV Continuous <Continuous>    Renal: monitor urine output, supplement electrolytes as needed,     Vasc: Heparin SC and/or SCDs for DVT prophylaxis    ID: Stable, no fever , no chills.   meropenem  IVPB 1000 milliGRAM(s) IV Intermittent every 24 hours    Tubes: Monitor Chest tube output  Therapy: OOB/ambulate  Disposition: start planing discharge home or placement    Pertinent clinical, laboratory, radiographic, hemodynamic, echocardiographic, respiratory data, microbiologic data and chart were reviewed and analyzed frequently throughout the course of the day and night. GI and DVT prophylaxis, glycemic control, head of bed elevation and skin care issues were addressed.  Patient seen, examined and plan discussed with CT Surgery / CTICU team during rounds.    [x ] The patient remains in critical and unstable condition, and requires ICU care and monitoring  [ ] The patient is improving but requires continued monitoring and adjustment of therapy

## 2021-04-15 NOTE — PROGRESS NOTE ADULT - ASSESSMENT
ASSESSMENT  85 year old lady known to have dementia, osteoarithtis, [place]-speaking presenting with cough and fever.      IMPRESSION  #Sepsis present on admission - secondary to CAP  -  CT Chest No Cont (03.11.21 @ 00:54): Areas of right lung consolidation which can be seen in pneumonia. Numerous air-fluid levels throughout the right lung compatible with an infectious process. Suggestion of split pleura at the lung base for which empyema is favored although inherently limited on this noncontrast exam. Consideration can be given to contrast administration if feasiblefor better delineation. Areas of bilateral interlobular septal thickening and mild layering groundglass attenuation may reflect a component of edema.  - Urine Legionella negative  - Urine Strep negative  - BLood Cx 3/10 and 3/13 negative  - Procalcitonin 1.15   - CT Chest No Cont (03.17.21 @ 16:19): Since 3/11/2021. Enlarging loculated right pleural fluid collection with multiple air bubbles consistent with empyema.   Associated compressive atelectasis right lung is seen. Scattered groundglass opacities involving multiple lumbar segments.  - s/p VATS 3/22 -- right empyema with 800 cc purulent fluid in pleural space, multiple pockers with dense adhesions -- necrotizing pneumonia of the middle lobe  - VATS Cx 3/22 with rare yeast, otherwise no growth  -  CT Chest No Cont (03.29.21 @ 12:37): Since March 29, 2021, resolved right pleural effusion; persistent moderate right pneumothorax with 2 chest tubes in place. Increased left greater than right diffuse bilateral groundglass opacities and interlobular septal thickening. Findings are suspicious for a multifocal infection. Superimposed edema is also a possibility.  Enlarging mediastinal lymph nodes, likely reactive.  - CT Chest/Abdomen and Pelvis No Cont (04.01.21 @ 20:19): Stable residual right-sided pneumothorax. Three left-sided chest tubes are in place. New pneumomediastinum is noted, especially in the anterior mediastinum.  Stable bilateral diffuse areas of groundglass opacity. Areas of traction bronchiectasis noted throughout the left lung field and at the right lung base. No evidence of bowel obstruction or intra-abdominal or pelvic inflammatory process  - Fungitell: <31 4/1  - Aspergillus Galactomannan Antigen: <0.500 (04.01.21 @ 01:03)  - Blood Cx 3/31 and 4/3 NG  - CT Chest/Abd/Pelvis No Cont (04.12.21 @ 17:17): Redemonstrated loculated right pneumothorax without significant change. Interval removal of 2/3 right chest tubes.  Diffuse groundglass and consolidative opacities with interlobular septal thickening and traction bronchiectasis in the bases. Findings likely reflecting a combination of pulmonary edema and post inflammatory/infectious changes with some degree of organization.  Interval development abdominopelvic ascites and anasarca consistent with fluid overload.  Nonspecific diffuse colonic wall thickening which could reflect colitis versus edema. No bowel obstruction. Interval resolution of previously seen pneumomediastinum.    #GI Bleed from PEG 4/3 -- EGD held as improved/stable    #Dementia  #Osteoarthritis  #Obesity BMI (kg/m2): 23.4  #Abx allergy: clindamycin (Hives)    Creatinine, Serum: 2.0 mg/dL (04.15.21 @ 04:25)  Weight (kg): 60 (11 Mar 2021 01:17)  CrCl 21     RECOMMENDATIONS  - continue meropenem  - trend WBC     This is a preliminary incomplete pended note, all final recommendations to follow after interview and examination of the patient.      Please call or message on [employer] Teams if with any questions.  Spectra [phone]

## 2021-04-15 NOTE — PROGRESS NOTE ADULT - SUBJECTIVE AND OBJECTIVE BOX
Patient is a 85y old  Female who presents with a chief complaint of empyema (06 Apr 2021 09:24)  pt remains vented via trach   on peg tube feed   ICU Vital Signs Last 24 Hrs  T(C): 36.2 (15 Apr 2021 12:00), Max: 36.9 (15 Apr 2021 00:00)  T(F): 97.1 (15 Apr 2021 12:00), Max: 98.5 (15 Apr 2021 00:00)  HR: 85 (15 Apr 2021 13:00) (81 - 94)  ABP: 108/50 (15 Apr 2021 13:00) (96/44 - 153/76)  ABP(mean): 72 (15 Apr 2021 13:00) (62 - 106)  RR: 36 (15 Apr 2021 13:00) (30 - 42)  SpO2: 93% (15 Apr 2021 13:00) (93% - 100%)      Drug Dosing Weight  Height (cm): 160 (30 Mar 2021 07:39)  Weight (kg): 67.5 (07 Apr 2021 06:00)  BMI (kg/m2): 26.4 (07 Apr 2021 06:00)  BSA (m2): 1.71 (07 Apr 2021 06:00)  14 Apr 2021 07:01  -  15 Apr 2021 07:00  --------------------------------------------------------  IN:    dextrose 5%: 1200 mL    Enteral Tube Flush: 110 mL    IV PiggyBack: 300 mL    Peptamen A.F.: 780 mL    Phenylephrine: 195.6 mL    Propofol: 20 mL  Total IN: 2605.6 mL    OUT:    Chest Tube (mL): 90 mL    Gastric Aspirate - Discarded (mL): 350 mL    Indwelling Catheter - Urethral (mL): 1325 mL    Rectal Tube (mL): 580 mL    Vasopressin: 0 mL  Total OUT: 2345 mL    Total NET: 260.6 mL      15 Apr 2021 07:01  -  15 Apr 2021 14:29  --------------------------------------------------------  IN:    dextrose 5%: 350 mL    Peptamen A.F.: 455 mL    Phenylephrine: 44 mL  Total IN: 849 mL    OUT:    Chest Tube (mL): 40 mL    Indwelling Catheter - Urethral (mL): 405 mL    Rectal Tube (mL): 750 mL  Total OUT: 1195 mL    Total NET: -346 mL    PHYSICAL EXAM:  Constitutional:  remain vented via trach  Gastrointestinal:  +peg tube in place  soft  + rectal tube in place  MEDICATIONS  (STANDING):  ALBUTerol    90 MICROgram(s) HFA Inhaler 2 Puff(s) Inhalation every 6 hours  chlorhexidine 4% Liquid 1 Application(s) Topical <User Schedule>  clotrimazole 1% Cream 1 Application(s) Topical two times a day  collagenase Ointment 1 Application(s) Topical daily  dexMEDEtomidine Infusion 0.202 MICROgram(s)/kG/Hr (3 mL/Hr) IV Continuous <Continuous>  dextrose 40% Gel 15 Gram(s) Oral once  dextrose 5%. 1000 milliLiter(s) (50 mL/Hr) IV Continuous <Continuous>  dextrose 5%. 1000 milliLiter(s) (100 mL/Hr) IV Continuous <Continuous>  dextrose 5%. 1000 milliLiter(s) (50 mL/Hr) IV Continuous <Continuous>  dextrose 5%. 1000 milliLiter(s) (50 mL/Hr) IV Continuous <Continuous>  dextrose 5%. 1000 milliLiter(s) (100 mL/Hr) IV Continuous <Continuous>  dextrose 50% Injectable 25 Gram(s) IV Push once  dextrose 50% Injectable 12.5 Gram(s) IV Push once  dextrose 50% Injectable 25 Gram(s) IV Push once  ferrous    sulfate Liquid 300 milliGRAM(s) Enteral Tube daily  glucagon  Injectable 1 milliGRAM(s) IntraMuscular once  glucagon  Injectable 1 milliGRAM(s) IntraMuscular once  insulin lispro (ADMELOG) corrective regimen sliding scale   SubCutaneous every 4 hours  ipratropium 17 MICROgram(s) HFA Inhaler 2 Puff(s) Inhalation every 6 hours  meropenem  IVPB 1000 milliGRAM(s) IV Intermittent every 24 hours  multivitamin/minerals/iron Oral Solution (CENTRUM) 15 milliLiter(s) Enteral Tube daily  nystatin Cream 1 Application(s) Topical two times a day  pantoprazole  Injectable 40 milliGRAM(s) IV Push two times a day  petrolatum Ophthalmic Ointment 1 Application(s) Both EYES three times a day  phenylephrine    Infusion 1 MICROgram(s)/kG/Min (11.1 mL/Hr) IV Continuous <Continuous>  sodium bicarbonate  Injectable 50 milliEquivalent(s) IV Push <User Schedule>  vasopressin Infusion 0.04 Unit(s)/Min (2.4 mL/Hr) IV Continuous <Continuous>      Diet, NPO with Tube Feed:   Tube Feeding Modality: Gastrostomy  Peptamen A.F. Formula  Total Volume for 24 Hours (mL): 1560  Continuous  Until Goal Tube Feed Rate (mL per Hour): 65  Tube Feed Duration (in Hours): 24  Tube Feed Start Time: 20:15 (04-13-21 @ 20:14)      LABS  04-15    146  |  110  |  107<HH>  ----------------------------<  86  4.5   |  21  |  2.0<H>    Ca    8.6      15 Apr 2021 04:25  Mg     1.0     04-15    TPro  5.1<L>  /  Alb  3.2<L>  /  TBili  1.4<H>  /  DBili  x   /  AST  42<H>  /  ALT  11  /  AlkPhos  269<H>  04-15                          8.3    16.89 )-----------( 42       ( 15 Apr 2021 02:00 )             26.3     CAPILLARY BLOOD GLUCOSE  88 (15 Apr 2021 06:00)  92 (15 Apr 2021 04:00)  91 (15 Apr 2021 02:00)  86 (15 Apr 2021 00:00)  74 (14 Apr 2021 23:00)  31 (14 Apr 2021 22:00)   RADIOLOGY STUDIES  < from: Xray Chest 1 View- PORTABLE-Routine (Xray Chest 1 View- PORTABLE-Routine in AM.) (04.14.21 @ 06:18) >  IMPRESSION:  No significant change since one day earlier.

## 2021-04-16 NOTE — PROGRESS NOTE ADULT - SUBJECTIVE AND OBJECTIVE BOX
DESTIN TERRY  85y Female   [id number]    Hospital Day: 37  Post Operative Day:17  Procedure:s/p VATS   Patient is a 85y old  Female who presents with a chief complaint of empyema (06 Apr 2021 09:24)    PAST MEDICAL & SURGICAL HISTORY:  Dementia    Osteoarthritis    No significant past surgical history        Events of the Last 24h:  Vital Signs Last 24 Hrs  T(C): 36.8 (15 Apr 2021 20:00), Max: 36.8 (15 Apr 2021 20:00)  T(F): 98.2 (15 Apr 2021 20:00), Max: 98.2 (15 Apr 2021 20:00)  HR: 82 (16 Apr 2021 00:00) (80 - 112)  BP: --  BP(mean): --  RR: 39 (16 Apr 2021 00:00) (31 - 42)  SpO2: 99% (16 Apr 2021 00:00) (93% - 100%)    Mode: AC/ CMV (Assist Control/ Continuous Mandatory Ventilation), RR (machine): 36, TV (machine): 400, FiO2: 40, PEEP: 5, ITime: 1, MAP: 17, PIP: 37    Diet, NPO with Tube Feed:   Tube Feeding Modality: Gastrostomy  Peptamen A.F. Formula  Total Volume for 24 Hours (mL): 1560  Continuous  Until Goal Tube Feed Rate (mL per Hour): 65  Tube Feed Duration (in Hours): 24  Tube Feed Start Time: 20:15 (04-13-21 @ 20:14)      I&O's Summary    14 Apr 2021 07:01  -  15 Apr 2021 07:00  --------------------------------------------------------  IN: 2605.6 mL / OUT: 2345 mL / NET: 260.6 mL    15 Apr 2021 07:01  -  16 Apr 2021 00:46  --------------------------------------------------------  IN: 2058 mL / OUT: 2177 mL / NET: -119 mL     I&O's Detail    14 Apr 2021 07:01  -  15 Apr 2021 07:00  --------------------------------------------------------  IN:    dextrose 5%: 1200 mL    Enteral Tube Flush: 110 mL    IV PiggyBack: 300 mL    Peptamen A.F.: 780 mL    Phenylephrine: 195.6 mL    Propofol: 20 mL  Total IN: 2605.6 mL    OUT:    Chest Tube (mL): 90 mL    Gastric Aspirate - Discarded (mL): 350 mL    Indwelling Catheter - Urethral (mL): 1325 mL    Rectal Tube (mL): 580 mL    Vasopressin: 0 mL  Total OUT: 2345 mL    Total NET: 260.6 mL      15 Apr 2021 07:01  -  16 Apr 2021 00:46  --------------------------------------------------------  IN:    dextrose 5%: 850 mL    Peptamen A.F.: 1105 mL    Phenylephrine: 103 mL  Total IN: 2058 mL    OUT:    Chest Tube (mL): 57 mL    Indwelling Catheter - Urethral (mL): 970 mL    Rectal Tube (mL): 1150 mL  Total OUT: 2177 mL    Total NET: -119 mL          MEDICATIONS  (STANDING):  ALBUTerol    90 MICROgram(s) HFA Inhaler 2 Puff(s) Inhalation every 6 hours  chlorhexidine 4% Liquid 1 Application(s) Topical <User Schedule>  clotrimazole 1% Cream 1 Application(s) Topical two times a day  collagenase Ointment 1 Application(s) Topical daily  dexMEDEtomidine Infusion 0.202 MICROgram(s)/kG/Hr (3 mL/Hr) IV Continuous <Continuous>  dextrose 40% Gel 15 Gram(s) Oral once  dextrose 5%. 1000 milliLiter(s) (50 mL/Hr) IV Continuous <Continuous>  dextrose 5%. 1000 milliLiter(s) (100 mL/Hr) IV Continuous <Continuous>  dextrose 5%. 1000 milliLiter(s) (50 mL/Hr) IV Continuous <Continuous>  dextrose 5%. 1000 milliLiter(s) (50 mL/Hr) IV Continuous <Continuous>  dextrose 5%. 1000 milliLiter(s) (100 mL/Hr) IV Continuous <Continuous>  dextrose 50% Injectable 25 Gram(s) IV Push once  dextrose 50% Injectable 12.5 Gram(s) IV Push once  dextrose 50% Injectable 25 Gram(s) IV Push once  ferrous    sulfate Liquid 300 milliGRAM(s) Enteral Tube daily  glucagon  Injectable 1 milliGRAM(s) IntraMuscular once  glucagon  Injectable 1 milliGRAM(s) IntraMuscular once  insulin lispro (ADMELOG) corrective regimen sliding scale   SubCutaneous every 4 hours  ipratropium 17 MICROgram(s) HFA Inhaler 2 Puff(s) Inhalation every 6 hours  meropenem  IVPB 1000 milliGRAM(s) IV Intermittent every 24 hours  multivitamin/minerals/iron Oral Solution (CENTRUM) 15 milliLiter(s) Enteral Tube daily  nystatin Cream 1 Application(s) Topical two times a day  pantoprazole  Injectable 40 milliGRAM(s) IV Push two times a day  petrolatum Ophthalmic Ointment 1 Application(s) Both EYES three times a day  phenylephrine    Infusion 1 MICROgram(s)/kG/Min (11.1 mL/Hr) IV Continuous <Continuous>  sodium bicarbonate  Injectable 50 milliEquivalent(s) IV Push <User Schedule>  vasopressin Infusion 0.04 Unit(s)/Min (2.4 mL/Hr) IV Continuous <Continuous>    MEDICATIONS  (PRN):  ondansetron Injectable 4 milliGRAM(s) IV Push every 4 hours PRN Nausea and/or Vomiting      PHYSICAL EXAM:    GENERAL: NAD    HEENT: NCAT    CHEST/LUNGS: CTAB, right chest tube to waterseal     HEART: RRR,  No murmurs, rubs, or gallops    ABDOMEN: SNTND +BS    EXTREMITIES:  FROM, No clubbing, cyanosis, or edema, palpable pulse    NEURO: No focal neurological deficits    SKIN: No rashes or lesions    INCISION/WOUNDS:                          8.3    16.89 )-----------( 42       ( 15 Apr 2021 02:00 )             26.3        CBC Full  -  ( 15 Apr 2021 02:00 )  WBC Count : 16.89 K/uL  RBC Count : 2.88 M/uL  Hemoglobin : 8.3 g/dL  Hematocrit : 26.3 %  Platelet Count - Automated : 42 K/uL  Mean Cell Volume : 91.3 fL  Mean Cell Hemoglobin : 28.8 pg  Mean Cell Hemoglobin Concentration : 31.6 g/dL  Auto Neutrophil # : x  Auto Lymphocyte # : x  Auto Monocyte # : x  Auto Eosinophil # : x  Auto Basophil # : x  Auto Neutrophil % : x  Auto Lymphocyte % : x  Auto Monocyte % : x  Auto Eosinophil % : x  Auto Basophil % : x               146   |  110   |  107                Ca: 8.6    BMP:   ----------------------------< 86     Mg: x     (04-15-21 @ 04:25)             4.5    |  21    | 2.0                Ph: x        LFT:     TPro: 5.1 / Alb: 3.2 / TBili: 1.4 / DBili: x / AST: 42 / ALT: 11 / AlkPhos: 269   (04-15-21 @ 04:25)    LIVER FUNCTIONS - ( 15 Apr 2021 04:25 )  Alb: 3.2 g/dL / Pro: 5.1 g/dL / ALK PHOS: 269 U/L / ALT: 11 U/L / AST: 42 U/L / GGT: x

## 2021-04-16 NOTE — PROGRESS NOTE ADULT - SUBJECTIVE AND OBJECTIVE BOX
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    As per daughter, patient has been having dry consistent cough since 2 months. Today, she was being evaluated for knee injections when she was found to be febrile. She also reports progressing generalized weakness with decreased appetite and PO intake.  No URT symptoms, no chest pain, no leg pain, no diarrhea, no urinary symptoms no sick contacts, no recent travel. In ED was hypotensive, was given IVF, started on levophed 0.04 called to evaluate (11 Mar 2021 03:21)      SUBJECTIVE:  PAtient seen and examined at bedside. She has trach and peg. not able to participate.  Met with son and daughter at bedside. Grandcortez duran was on the phone. Please see [facility] note below.     ROS: unable to assess as patient is lethargic and on vent   DYSPNEA:  N	  NAUS/VOM:  N	  SECRETIONS:  N	  AGITATION: N  Pain (Y/N):   NA     -Provocation/Palliation:  -Quality/Quantity:  -Radiating:  -Severity:  -Timing/Frequency:  -Impact on ADLs:    OTHER REVIEW OF SYSTEMS:  UNABLE TO OBTAIN  due to: lethargic and vented     PEx:  85y              General: trach and vent in place.   HEENT:  NCAT PERRL EOMI Non icteric   Neck: Supple no masses  CVS: RR S1S2 No M/G/R  Resp: vented bs   GI:  Soft NT ND BS+, peg in place   : Adams  Musc: No C/C/E    Neuro: lethargic   Psych: not able to assess   Skin: Non jaundiced, no rash   Lymph: Normal    Last BM:   04-10-21 @ 07:01  -  04-11-21 @ 07:00  --------------------------------------------------------  OUT: 1450 mL    04-11-21 @ 07:01  -  04-12-21 @ 07:00  --------------------------------------------------------  OUT: 300 mL    04-12-21 @ 07:01  -  04-13-21 @ 07:00  --------------------------------------------------------  OUT: 700 mL    04-13-21 @ 07:01  -  04-14-21 @ 07:00  --------------------------------------------------------  OUT: 600 mL    04-14-21 @ 07:01  -  04-15-21 @ 07:00  --------------------------------------------------------  OUT: 580 mL    04-15-21 @ 07:01  -  04-16-21 @ 07:00  --------------------------------------------------------  OUT: 1650 mL    04-16-21 @ 07:01  -  04-16-21 @ 13:18  --------------------------------------------------------  OUT: 0 mL        ALLERGIES:     OPIATE NAÏVE (Y/N): y  MEDICATIONS: REVIEWED  MEDICATIONS  (STANDING):  ALBUTerol    90 MICROgram(s) HFA Inhaler 2 Puff(s) Inhalation every 6 hours  chlorhexidine 4% Liquid 1 Application(s) Topical <User Schedule>  clotrimazole 1% Cream 1 Application(s) Topical two times a day  collagenase Ointment 1 Application(s) Topical daily  dexMEDEtomidine Infusion 0.202 MICROgram(s)/kG/Hr (3 mL/Hr) IV Continuous <Continuous>  dextrose 40% Gel 15 Gram(s) Oral once  dextrose 5%. 1000 milliLiter(s) (50 mL/Hr) IV Continuous <Continuous>  dextrose 5%. 1000 milliLiter(s) (100 mL/Hr) IV Continuous <Continuous>  dextrose 5%. 1000 milliLiter(s) (50 mL/Hr) IV Continuous <Continuous>  dextrose 5%. 1000 milliLiter(s) (100 mL/Hr) IV Continuous <Continuous>  dextrose 50% Injectable 25 Gram(s) IV Push once  dextrose 50% Injectable 12.5 Gram(s) IV Push once  dextrose 50% Injectable 25 Gram(s) IV Push once  ferrous    sulfate Liquid 300 milliGRAM(s) Enteral Tube daily  glucagon  Injectable 1 milliGRAM(s) IntraMuscular once  glucagon  Injectable 1 milliGRAM(s) IntraMuscular once  insulin lispro (ADMELOG) corrective regimen sliding scale   SubCutaneous every 4 hours  ipratropium 17 MICROgram(s) HFA Inhaler 2 Puff(s) Inhalation every 6 hours  meropenem  IVPB 1000 milliGRAM(s) IV Intermittent every 24 hours  multivitamin/minerals/iron Oral Solution (CENTRUM) 15 milliLiter(s) Enteral Tube daily  nystatin Cream 1 Application(s) Topical two times a day  pantoprazole  Injectable 40 milliGRAM(s) IV Push two times a day  petrolatum Ophthalmic Ointment 1 Application(s) Both EYES three times a day  phenylephrine    Infusion 1 MICROgram(s)/kG/Min (11.1 mL/Hr) IV Continuous <Continuous>  sodium bicarbonate  Injectable 50 milliEquivalent(s) IV Push <User Schedule>  vasopressin Infusion 0.04 Unit(s)/Min (2.4 mL/Hr) IV Continuous <Continuous>    MEDICATIONS  (PRN):  ondansetron Injectable 4 milliGRAM(s) IV Push every 4 hours PRN Nausea and/or Vomiting    LABS: REVIEWED  CBC:                        7.4    15.54 )-----------( 47       ( 16 Apr 2021 04:17 )             24.1     CMP:    04-16    143  |  108  |  109<HH>  ----------------------------<  190<H>  4.4   |  23  |  2.1<H>    Ca    8.3<L>      16 Apr 2021 04:17  Mg     2.5     04-16    TPro  5.1<L>  /  Alb  3.2<L>  /  TBili  1.4<H>  /  DBili  x   /  AST  42<H>  /  ALT  11  /  AlkPhos  269<H>  04-15  Albumin, Serum: 3.2 g/dL (04-15-21 @ 04:25)    IMAGING: REVIEWED    EKG reviewed     ADVANCED DIRECTIVES:          FULL CODE            LIVING WILL            DPOA       HCP       MOLST    DECISION MAKER:  daughter   LEGAL SURROGATE: daughter     PSYCHOSOCIAL-SPIRITUAL ASSESSMENT:       Reviewed       Care plan unchanged       GOALS OF CARE DISCUSSION       Palliative care info/counseling provided	           Family meeting       Advanced Directives addressed please see Advance Care Planning Note	           See previous Palliative Medicine Note       Documentation of GOC: awaiting family meeting     CURRENT DISPO PLAN:     WILL REMAIN IN HOSPITAL      REFERRALS	        Palliative Med        Unit SW/Case Miranda

## 2021-04-16 NOTE — PROGRESS NOTE ADULT - ASSESSMENT
PROBLEMS:  I spent 45 minutes of time examining patient, reviewing vitals, labs, medications, imaging and discussing with the team goals of care to prevent life-threatening in this patient who is at high risk for deterioration or death due to:      Septic Shock - better - continue IV antibiotics, now jusr on few cc of neosynephrine   anemia stable consider transfusion of 1 PRBC  decreased bowel sounds - CT of abdomen - OK, add reglan  Thrombocytopenia - mild improvement   Pleural air leak - keep CT,   unresponsiveness - not focal - metabolic encephalopathy ( high BUN ) with ICU polyneuropathy   MENDOZA  - now Stable - monitor  FLuid overload - monitor  Protein malnutrition - continue Peptamen Feeding -goal of 65 cc/hr  Sacral decubiti - local care         Case and plan discussed with CT Surgeons and CTU PAs.  Continue CTU supportive care and ongoing plan of care as per continuing CTU rounds.   Continue DVT/GI prophylaxis.  Incentive Spirometry 10 times an hour.  Continue to advance physical activity as tolerated and continue PT/OT as directed.    PLAN  Neuro: move all 4 extremities. no sensory or motor deficits  Pain management.   dexMEDEtomidine Infusion 0.202 MICROgram(s)/kG/Hr IV Continuous <Continuous>  ondansetron Injectable 4 milliGRAM(s) IV Push every 4 hours PRN    Pulm: Wean off supplemental oxygen as able. Daily CXR. Encourage coughing, deep breathing and use of incentive spirometry.   ALBUTerol    90 MICROgram(s) HFA Inhaler 2 Puff(s) Inhalation every 6 hours  ipratropium 17 MICROgram(s) HFA Inhaler 2 Puff(s) Inhalation every 6 hours    Cardio: Monitor telemetry/alarms. Continue supportive care   phenylephrine    Infusion 1 MICROgram(s)/kG/Min IV Continuous <Continuous>    GI: Continue stool softeners.    pantoprazole  Injectable 40 milliGRAM(s) IV Push two times a day    Nutrition: Continue present diet  Endocrine and glucose control:   dextrose 40% Gel 15 Gram(s) Oral once  dextrose 50% Injectable 25 Gram(s) IV Push once  dextrose 50% Injectable 12.5 Gram(s) IV Push once  dextrose 50% Injectable 25 Gram(s) IV Push once  glucagon  Injectable 1 milliGRAM(s) IntraMuscular once  glucagon  Injectable 1 milliGRAM(s) IntraMuscular once  insulin lispro (ADMELOG) corrective regimen sliding scale   SubCutaneous every 4 hours  vasopressin Infusion 0.04 Unit(s)/Min IV Continuous <Continuous>    Renal: monitor urine output, supplement electrolytes as needed,     Vasc: Heparin SC and/or SCDs for DVT prophylaxis    ID: Stable, no fever , no chills.  meropenem  IVPB 1000 milliGRAM(s) IV Intermittent every 24 hours    Tubes: Monitor Chest tube output  Therapy: OOB/ambulate  Disposition: start planing discharge home or placement    Pertinent clinical, laboratory, radiographic, hemodynamic, echocardiographic, respiratory data, microbiologic data and chart were reviewed and analyzed frequently throughout the course of the day and night. GI and DVT prophylaxis, glycemic control, head of bed elevation and skin care issues were addressed.  Patient seen, examined and plan discussed with CT Surgery / CTICU team during rounds.    [ ] The patient remains in critical and unstable condition, and requires ICU care and monitoring  [x ] The patient is improving but requires continued monitoring and adjustment of therapy

## 2021-04-16 NOTE — PROGRESS NOTE ADULT - SUBJECTIVE AND OBJECTIVE BOX
CTU Attending Progress Daily Note     16 Apr 2021 07:48  Admited 03-11-21, Hospital Day 36d  POD# -     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, osteoarithtis, Martiniquais-speaking presenting with cough and fever.    As per daughter, patient has been having dry consistent cough since 2 months. Today, she was being evaluated for knee injections when she was found to be febrile. She also reports progressing generalized weakness with decreased appetite and PO intake.  No URT symptoms, no chest pain, no leg pain, no diarrhea, no urinary symptoms no sick contacts, no recent travel. In ED was hypotensive, was given IVF, started on levophed 0.04 called to evaluate (11 Mar 2021 03:21)    Home Medications:  donepezil 5 mg oral tablet: 1 tab(s) orally once a day (at bedtime) (11 Mar 2021 03:24)  gabapentin 300 mg oral capsule: 1 cap(s) orally 3 times a day, As Needed (11 Mar 2021 03:24)  meloxicam 15 mg oral tablet: 1 tab(s) orally once a day, As Needed (11 Mar 2021 03:24)  zolpidem 10 mg oral tablet: 1 tab(s) orally once a day (at bedtime), As Needed (11 Mar 2021 03:24)    FAMILY HISTORY:    PAST MEDICAL & SURGICAL HISTORY:  Dementia    Osteoarthritis    No significant past surgical history      Interval event for past 24 hr:  DESTIN TERRY  85y had no event.   Current Complains:  DESTIN TERRY has no new complains  Allergies    clindamycin (Hives)    Intolerances      OBJECTIVE:  Vitals last 24 hrs  ICU Vital Signs Last 24 Hrs  T(C): 36 (16 Apr 2021 04:00), Max: 36.8 (15 Apr 2021 20:00)  T(F): 96.8 (16 Apr 2021 04:00), Max: 98.2 (15 Apr 2021 20:00)  HR: 83 (16 Apr 2021 07:00) (80 - 112)  BP: 106/58 (16 Apr 2021 07:00) (106/58 - 106/58)  BP(mean): 78 (16 Apr 2021 07:00) (78 - 78)  ABP: 114/50 (16 Apr 2021 07:00) (101/48 - 135/68)  ABP(mean): 70 (16 Apr 2021 07:00) (69 - 96)  RR: 42 (16 Apr 2021 07:00) (0 - 42)  SpO2: 100% (16 Apr 2021 07:00) (93% - 100%)    T(C): 36 (04-16-21 @ 04:00), Max: 36.8 (04-15-21 @ 20:00)  T(F): 96.8 (04-16-21 @ 04:00), Max: 98.2 (04-15-21 @ 20:00)  HR: 83 (04-16-21 @ 07:00) (80 - 112)  BP: 106/58 (04-16-21 @ 07:00) (106/58 - 106/58)  ABP: 114/50 (04-16-21 @ 07:00) (101/48 - 135/68)  ABP(mean): 70 (04-16-21 @ 07:00) (69 - 96)  RR: 42 (04-16-21 @ 07:00) (0 - 42)  SpO2: 100% (04-16-21 @ 07:00) (93% - 100%)  CVP(mm Hg): --  CI: --  PA: --  PA(direct): --  PCWP: --  SVR: --  PVR: --    04-15-21 @ 07:01  -  04-16-21 @ 07:00  --------------------------------------------------------  IN:    dextrose 5%: 1200 mL    Enteral Tube Flush: 150 mL    IV PiggyBack: 100 mL    Peptamen A.F.: 1560 mL    Phenylephrine: 104 mL  Total IN: 3114 mL    OUT:    Chest Tube (mL): 75 mL    Indwelling Catheter - Urethral (mL): 1495 mL    Rectal Tube (mL): 1650 mL  Total OUT: 3220 mL    Total NET: -106 mL        Mode: AC/ CMV (Assist Control/ Continuous Mandatory Ventilation)  RR (machine): 36  TV (machine): 400  FiO2: 40  PEEP: 5  ITime: 1  MAP: 17  PIP: 37     Mode: AC/ CMV (Assist Control/ Continuous Mandatory Ventilation), RR (machine): 36, TV (machine): 400, FiO2: 40, PEEP: 5, ITime: 1, MAP: 17, PIP: 37  CAPILLARY BLOOD GLUCOSE  168 (16 Apr 2021 06:00)  181 (16 Apr 2021 02:00)  161 (15 Apr 2021 22:00)      POCT Blood Glucose.: 168 mg/dL (16 Apr 2021 06:07)  POCT Blood Glucose.: 181 mg/dL (16 Apr 2021 02:14)  POCT Blood Glucose.: 161 mg/dL (15 Apr 2021 21:42)  POCT Blood Glucose.: 192 mg/dL (15 Apr 2021 17:31)  POCT Blood Glucose.: 185 mg/dL (15 Apr 2021 15:09)  POCT Blood Glucose.: 134 mg/dL (15 Apr 2021 10:06)    LABS:  ABG - ( 16 Apr 2021 03:35 )  pH, Arterial: 7.33  pH, Blood: x     /  pCO2: 49    /  pO2: 97    / HCO3: 26    / Base Excess: -0.4  /  SaO2: 97              Blood Gas Arterial, Lactate: 1.2 mmoL/L (04-16-21 @ 03:35)                          7.4    15.54 )-----------( 47       ( 16 Apr 2021 04:17 )             24.1     Hemoglobin: 7.4 g/dL (04-16 @ 04:17)  Hemoglobin: 8.3 g/dL (04-15 @ 02:00)  Hemoglobin: 8.5 g/dL (04-14 @ 01:25)    04-16    143  |  108  |  109<HH>  ----------------------------<  190<H>  4.4   |  23  |  2.1<H>    Ca    8.3<L>      16 Apr 2021 04:17  Mg     2.5     04-16    TPro  5.1<L>  /  Alb  3.2<L>  /  TBili  1.4<H>  /  DBili  x   /  AST  42<H>  /  ALT  11  /  AlkPhos  269<H>  04-15    Creatinine, Serum: 2.1 mg/dL (04-16 @ 04:17)  Creatinine, Serum: 2.0 mg/dL (04-15 @ 04:25)  Creatinine, Serum: 0.7 mg/dL (04-15 @ 02:00)  Creatinine, Serum: 1.6 mg/dL (04-14 @ 01:25)  Creatinine, Serum: 2.0 mg/dL (04-13 @ 02:00)          HOSPITAL MEDICATIONS:  MEDICATIONS  (STANDING):  ALBUTerol    90 MICROgram(s) HFA Inhaler 2 Puff(s) Inhalation every 6 hours  chlorhexidine 4% Liquid 1 Application(s) Topical <User Schedule>  clotrimazole 1% Cream 1 Application(s) Topical two times a day  collagenase Ointment 1 Application(s) Topical daily  dexMEDEtomidine Infusion 0.202 MICROgram(s)/kG/Hr (3 mL/Hr) IV Continuous <Continuous>  dextrose 40% Gel 15 Gram(s) Oral once  dextrose 5%. 1000 milliLiter(s) (50 mL/Hr) IV Continuous <Continuous>  dextrose 5%. 1000 milliLiter(s) (50 mL/Hr) IV Continuous <Continuous>  dextrose 5%. 1000 milliLiter(s) (100 mL/Hr) IV Continuous <Continuous>  dextrose 5%. 1000 milliLiter(s) (50 mL/Hr) IV Continuous <Continuous>  dextrose 5%. 1000 milliLiter(s) (100 mL/Hr) IV Continuous <Continuous>  dextrose 50% Injectable 25 Gram(s) IV Push once  dextrose 50% Injectable 12.5 Gram(s) IV Push once  dextrose 50% Injectable 25 Gram(s) IV Push once  ferrous    sulfate Liquid 300 milliGRAM(s) Enteral Tube daily  glucagon  Injectable 1 milliGRAM(s) IntraMuscular once  glucagon  Injectable 1 milliGRAM(s) IntraMuscular once  insulin lispro (ADMELOG) corrective regimen sliding scale   SubCutaneous every 4 hours  ipratropium 17 MICROgram(s) HFA Inhaler 2 Puff(s) Inhalation every 6 hours  meropenem  IVPB 1000 milliGRAM(s) IV Intermittent every 24 hours  multivitamin/minerals/iron Oral Solution (CENTRUM) 15 milliLiter(s) Enteral Tube daily  nystatin Cream 1 Application(s) Topical two times a day  pantoprazole  Injectable 40 milliGRAM(s) IV Push two times a day  petrolatum Ophthalmic Ointment 1 Application(s) Both EYES three times a day  phenylephrine    Infusion 1 MICROgram(s)/kG/Min (11.1 mL/Hr) IV Continuous <Continuous>  sodium bicarbonate  Injectable 50 milliEquivalent(s) IV Push <User Schedule>  vasopressin Infusion 0.04 Unit(s)/Min (2.4 mL/Hr) IV Continuous <Continuous>    MEDICATIONS  (PRN):  ondansetron Injectable 4 milliGRAM(s) IV Push every 4 hours PRN Nausea and/or Vomiting      REVIEW OF SYSTEMS:  CONSTITUTIONAL: [X] all negative; [ ] weakness, [ ] fevers, [ ] chills  EYES/ENT: [X] all negative; [ ] visual changes, [ ] vertigo, [ ] throat pain, [ ] eye pain  NECK: [X] all negative; [ ] pain, [ ] stiffness  RESPIRATORY: [ ] all negative, [ ] cough, [ ] wheezing, [ ] hemoptysis, [ ] shortness of breath, [  ] chest pain  CARDIOVASCULAR: [X] all negative; [ ] anginal chest pain, [ ] palpitations, [ ] orthopnea  GASTROINTESTINAL: [X] all negative; [ ]abdominal pain, [ ] nausea, [ ] vomiting, [ ] hematemesis, [ ] diarrhea, [ ] constipation, [ ] melena, [ ] hematochezia.  GENITOURINARY: [X] all negative; [ ] dysuria, [ ] frequency, [ ] hematuria  NEUROLOGICAL: [X] all negative; [ ] numbness, [ ] weakness, [ ] paresthesias  MUSCULOSKELETAL: [X] all negative, [ ] joint pain, [ ] joint swelling, [ ] joint redness, [ ] bone pain  SKIN: [X] all negative; [ ] itching, [ ] burning, [ ] rashes, [ ] lesions   All other review of systems is negative unless indicated above.    [  ] Unable to assess ROS because     PHYSICAL EXAM:          CONSTITUTIONAL: Well-developed; well-nourished; in no acute distress.   	SKIN: warm, dry, no rashes or lesions  	HEENT: Atraumatic. Normocephalic. PERRL. Moist membranes, no conjunctival injection, sclera clear  	NECK: Supple; non tender.  No JVD. No lymphadenopathy.  	CARD: Normal S1, S2. Rate and Rhythm are regular. No murmurs.  	RESP: Good air entry bilaterally, no wheezes, no rales no rhonchi.  	ABD: Soft, not tender, not distended, no CVA tenderness, no rebound no guarding, bowel sounds present  	EXT: Normal ROM.  No clubbing, no cyanosis, no pedal edema, no calf pain b/l, Peripheral pulses intact.  	LYMPH: No acute cervical adenopathy.  	NEURO: Alert, awake, motor 5/5 R, 5/5 L, sensation intact bilat, CN 2-12 intact,          PSYCH: Cooperative, appropriate. 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    As per daughter, patient has been having dry consistent cough since 2 months. Today, she was being evaluated for knee injections when she was found to be febrile. She also reports progressing generalized weakness with decreased appetite and PO intake.  No URT symptoms, no chest pain, no leg pain, no diarrhea, no urinary symptoms no sick contacts, no recent travel. In ED was hypotensive, was given IVF, started on levophed 0.04 called to evaluate (11 Mar 2021 03:21)    Home Medications:  donepezil 5 mg oral tablet: 1 tab(s) orally once a day (at bedtime) (11 Mar 2021 03:24)  gabapentin 300 mg oral capsule: 1 cap(s) orally 3 times a day, As Needed (11 Mar 2021 03:24)  meloxicam 15 mg oral tablet: 1 tab(s) orally once a day, As Needed (11 Mar 2021 03:24)  zolpidem 10 mg oral tablet: 1 tab(s) orally once a day (at bedtime), As Needed (11 Mar 2021 03:24)    FAMILY HISTORY:    PAST MEDICAL & SURGICAL HISTORY:  Dementia    Osteoarthritis    No significant past surgical history      Interval event for past 24 hr:  DESTIN TERRY  85y had no event.   Current Complains:  DESTIN TERRY has no new complains  Allergies    clindamycin (Hives)    Intolerances      OBJECTIVE:  Vitals last 24 hrs  ICU Vital Signs Last 24 Hrs  T(C): 36 (16 Apr 2021 04:00), Max: 36.8 (15 Apr 2021 20:00)  T(F): 96.8 (16 Apr 2021 04:00), Max: 98.2 (15 Apr 2021 20:00)  HR: 83 (16 Apr 2021 07:00) (80 - 112)  BP: 106/58 (16 Apr 2021 07:00) (106/58 - 106/58)  BP(mean): 78 (16 Apr 2021 07:00) (78 - 78)  ABP: 114/50 (16 Apr 2021 07:00) (101/48 - 135/68)  ABP(mean): 70 (16 Apr 2021 07:00) (69 - 96)  RR: 42 (16 Apr 2021 07:00) (0 - 42)  SpO2: 100% (16 Apr 2021 07:00) (93% - 100%)    04-15-21 @ 07:01  -  04-16-21 @ 07:00  --------------------------------------------------------  IN:    dextrose 5%: 1200 mL    Enteral Tube Flush: 150 mL    IV PiggyBack: 100 mL    Peptamen A.F.: 1560 mL    Phenylephrine: 104 mL  Total IN: 3114 mL    OUT:    Chest Tube (mL): 75 mL    Indwelling Catheter - Urethral (mL): 1495 mL    Rectal Tube (mL): 1650 mL  Total OUT: 3220 mL    Total NET: -106 mL        Mode: AC/ CMV (Assist Control/ Continuous Mandatory Ventilation)  RR (machine): 36  TV (machine): 400  FiO2: 40  PEEP: 5  ITime: 1  MAP: 17  PIP: 37     Mode: AC/ CMV (Assist Control/ Continuous Mandatory Ventilation), RR (machine): 36, TV (machine): 400, FiO2: 40, PEEP: 5, ITime: 1, MAP: 17, PIP: 37  CAPILLARY BLOOD GLUCOSE  168 (16 Apr 2021 06:00)  181 (16 Apr 2021 02:00)  161 (15 Apr 2021 22:00)      POCT Blood Glucose.: 168 mg/dL (16 Apr 2021 06:07)  POCT Blood Glucose.: 181 mg/dL (16 Apr 2021 02:14)  POCT Blood Glucose.: 161 mg/dL (15 Apr 2021 21:42)  POCT Blood Glucose.: 192 mg/dL (15 Apr 2021 17:31)  POCT Blood Glucose.: 185 mg/dL (15 Apr 2021 15:09)  POCT Blood Glucose.: 134 mg/dL (15 Apr 2021 10:06)    LABS:  ABG - ( 16 Apr 2021 03:35 )  pH, Arterial: 7.33  pH, Blood: x     /  pCO2: 49    /  pO2: 97    / HCO3: 26    / Base Excess: -0.4  /  SaO2: 97              Blood Gas Arterial, Lactate: 1.2 mmoL/L (04-16-21 @ 03:35)                          7.4    15.54 )-----------( 47       ( 16 Apr 2021 04:17 )             24.1     Hemoglobin: 7.4 g/dL (04-16 @ 04:17)  Hemoglobin: 8.3 g/dL (04-15 @ 02:00)  Hemoglobin: 8.5 g/dL (04-14 @ 01:25)    04-16    143  |  108  |  109<HH>  ----------------------------<  190<H>  4.4   |  23  |  2.1<H>    Ca    8.3<L>      16 Apr 2021 04:17  Mg     2.5     04-16    TPro  5.1<L>  /  Alb  3.2<L>  /  TBili  1.4<H>  /  DBili  x   /  AST  42<H>  /  ALT  11  /  AlkPhos  269<H>  04-15    Creatinine, Serum: 2.1 mg/dL (04-16 @ 04:17)  Creatinine, Serum: 2.0 mg/dL (04-15 @ 04:25)  Creatinine, Serum: 0.7 mg/dL (04-15 @ 02:00)  Creatinine, Serum: 1.6 mg/dL (04-14 @ 01:25)  Creatinine, Serum: 2.0 mg/dL (04-13 @ 02:00)          HOSPITAL MEDICATIONS:  MEDICATIONS  (STANDING):  ALBUTerol    90 MICROgram(s) HFA Inhaler 2 Puff(s) Inhalation every 6 hours  chlorhexidine 4% Liquid 1 Application(s) Topical <User Schedule>  clotrimazole 1% Cream 1 Application(s) Topical two times a day  collagenase Ointment 1 Application(s) Topical daily  dexMEDEtomidine Infusion 0.202 MICROgram(s)/kG/Hr (3 mL/Hr) IV Continuous <Continuous>  dextrose 40% Gel 15 Gram(s) Oral once  dextrose 5%. 1000 milliLiter(s) (50 mL/Hr) IV Continuous <Continuous>  dextrose 5%. 1000 milliLiter(s) (50 mL/Hr) IV Continuous <Continuous>  dextrose 5%. 1000 milliLiter(s) (100 mL/Hr) IV Continuous <Continuous>  dextrose 5%. 1000 milliLiter(s) (50 mL/Hr) IV Continuous <Continuous>  dextrose 5%. 1000 milliLiter(s) (100 mL/Hr) IV Continuous <Continuous>  dextrose 50% Injectable 25 Gram(s) IV Push once  dextrose 50% Injectable 12.5 Gram(s) IV Push once  dextrose 50% Injectable 25 Gram(s) IV Push once  ferrous    sulfate Liquid 300 milliGRAM(s) Enteral Tube daily  glucagon  Injectable 1 milliGRAM(s) IntraMuscular once  glucagon  Injectable 1 milliGRAM(s) IntraMuscular once  insulin lispro (ADMELOG) corrective regimen sliding scale   SubCutaneous every 4 hours  ipratropium 17 MICROgram(s) HFA Inhaler 2 Puff(s) Inhalation every 6 hours  meropenem  IVPB 1000 milliGRAM(s) IV Intermittent every 24 hours  multivitamin/minerals/iron Oral Solution (CENTRUM) 15 milliLiter(s) Enteral Tube daily  nystatin Cream 1 Application(s) Topical two times a day  pantoprazole  Injectable 40 milliGRAM(s) IV Push two times a day  petrolatum Ophthalmic Ointment 1 Application(s) Both EYES three times a day  phenylephrine    Infusion 1 MICROgram(s)/kG/Min (11.1 mL/Hr) IV Continuous <Continuous>  sodium bicarbonate  Injectable 50 milliEquivalent(s) IV Push <User Schedule>  vasopressin Infusion 0.04 Unit(s)/Min (2.4 mL/Hr) IV Continuous <Continuous>    MEDICATIONS  (PRN):  ondansetron Injectable 4 milliGRAM(s) IV Push every 4 hours PRN Nausea and/or Vomiting      REVIEW OF SYSTEMS:    [x  ] Unable to assess ROS because unresponsive    PHYSICAL EXAM:          CONSTITUTIONAL: Well-developed; ; hyperventilating   	SKIN: warm, dry, no rashes or lesions - Sacral and Feet DTI   	HEENT: Atraumatic. Normocephalic. PERRL. Moist membranes, no conjunctival injection, sclera clear  	NECK: Supple; non tender.  No JVD. No lymphadenopathy.  	CARD: Normal S1, S2. Rate and Rhythm are regular. No murmurs.  	RESP: Good air entry bilaterally, no wheezes, no rales + rhonchi.  	ABD: Soft, not tender, not distended, no CVA tenderness, no rebound no guarding, bowel sounds decreased   	EXT: Normal ROM.  No clubbing, no cyanosis, +++ pedal edema, anasarca, no calf pain b/l, Peripheral pulses intact.  	LYMPH: No acute cervical adenopathy.  	NEURO: minimal response to verbal stimuli, Flaccid     RADIOLOGY:  X Reviewed and interpreted by me  CxR from 04-16-21 shows moderate congestion with bilateral infiltrates , no pneumothorax, no effusion, no cardiomegaly,   Chest Tubes in place,     ECG:  X Reviewed and interpreted by me

## 2021-04-16 NOTE — PROGRESS NOTE ADULT - SUBJECTIVE AND OBJECTIVE BOX
MIGNON TERRYDULCE  85y, Female  Allergy: clindamycin (Hives)      LOS  36d    CHIEF COMPLAINT: Fever (16 Apr 2021 13:15)      INTERVAL EVENTS/HPI  - No acute events overnight  - T(F): , Max: 98.2 (04-15-21 @ 20:00)  - WBC Count: 15.54 (04-16-21 @ 04:17)  WBC Count: 16.89 (04-15-21 @ 02:00)     - Creatinine, Serum: 2.1 (04-16-21 @ 04:17)  Creatinine, Serum: 2.0 (04-15-21 @ 04:25)       ROS  unable to obtain history secondary to patient's mental status and/or sedation    VITALS:  T(F): 97.5, Max: 98.2 (04-15-21 @ 20:00)  HR: 85  BP: 106/58  RR: 32Vital Signs Last 24 Hrs  T(C): 36.4 (16 Apr 2021 17:00), Max: 36.8 (15 Apr 2021 20:00)  T(F): 97.5 (16 Apr 2021 17:00), Max: 98.2 (15 Apr 2021 20:00)  HR: 85 (16 Apr 2021 17:00) (80 - 112)  BP: 106/58 (16 Apr 2021 07:00) (106/58 - 106/58)  BP(mean): 78 (16 Apr 2021 07:00) (78 - 78)  RR: 32 (16 Apr 2021 17:00) (0 - 42)  SpO2: 99% (16 Apr 2021 17:00) (95% - 100%)    PHYSICAL EXAM:  Gen: vent/trach  HEENT: Normocephalic, atraumatic  Neck: supple, no lymphadenopathy  CV: Regular rate & regular rhythm  Lungs: decreased BS at bases, no fremitus  Abdomen: Soft, BS present  Ext: Warm, well perfused  Neuro: non focal  Skin: no rash, no erythema  Lines: no phlebitis    FH: Non-contributory  Social Hx: Non-contributory    TESTS & MEASUREMENTS:                        7.4    15.54 )-----------( 47       ( 16 Apr 2021 04:17 )             24.1     04-16    143  |  108  |  109<HH>  ----------------------------<  190<H>  4.4   |  23  |  2.1<H>    Ca    8.3<L>      16 Apr 2021 04:17  Mg     2.5     04-16    TPro  5.1<L>  /  Alb  3.2<L>  /  TBili  1.4<H>  /  DBili  x   /  AST  42<H>  /  ALT  11  /  AlkPhos  269<H>  04-15    eGFR if Non African American: 21 mL/min/1.73M2 (04-16-21 @ 04:17)  eGFR if African American: 25 mL/min/1.73M2 (04-16-21 @ 04:17)    LIVER FUNCTIONS - ( 15 Apr 2021 04:25 )  Alb: 3.2 g/dL / Pro: 5.1 g/dL / ALK PHOS: 269 U/L / ALT: 11 U/L / AST: 42 U/L / GGT: x               Culture - Blood (collected 04-03-21 @ 13:21)  Source: .Blood Blood  Final Report (04-08-21 @ 20:01):    No Growth Final    Culture - Blood (collected 03-31-21 @ 10:52)  Source: .Blood Blood  Final Report (04-05-21 @ 23:01):    No Growth Final    Culture - Acid Fast - Tissue w/Smear (collected 03-22-21 @ 13:33)  Source: .Tissue PLEURAL PEEL  Preliminary Report (03-31-21 @ 15:03):    No growth at 1 week.    Culture - Fungal, Tissue (collected 03-22-21 @ 13:33)  Source: .Tissue None  Preliminary Report (03-31-21 @ 15:02):    No growth    Culture - Tissue with Gram Stain (collected 03-22-21 @ 13:33)  Source: .Tissue None  Gram Stain (03-23-21 @ 04:38):    Rare polymorphonuclear leukocytes seen per low power field    Few White blood cells seen per low power field    Few Gram positive cocci in pairs per oil power field  Final Report (04-01-21 @ 16:29):    No growth    Organism seen in Gram stain is non-viable after prolonged    incubation and repeated subculture.    Culture - Fungal, Bronchial (collected 03-22-21 @ 12:58)  Source: .Bronchial None  Preliminary Report (03-25-21 @ 11:20):    Rare Yeast    Culture - Acid Fast - Bronchial w/Smear (collected 03-22-21 @ 12:58)  Source: Bronch Wash None  Preliminary Report (03-31-21 @ 15:03):    No growth at 1 week.    Culture - Bronchial (collected 03-22-21 @ 12:58)  Source: .Bronchial None  Gram Stain (03-23-21 @ 07:58):    Numerous polymorphonuclear leukocytes per low power field    No Squamous epithelial cells per low power field    No organisms seen per oil power field  Final Report (03-24-21 @ 22:11):    Normal Respiratory Narda present    Culture - Fungal, Body Fluid (collected 03-22-21 @ 09:26)  Source: .Body Fluid None  Preliminary Report (03-31-21 @ 15:02):    No growth    Culture - Acid Fast - Body Fluid w/Smear (collected 03-22-21 @ 09:26)  Source: .Body Fluid None  Preliminary Report (03-31-21 @ 15:03):    No growth at 1 week.    Culture - Body Fluid with Gram Stain (collected 03-22-21 @ 09:26)  Source: .Body Fluid None  Gram Stain (03-23-21 @ 04:38):    polymorphonuclear leukocytes seen    No organisms seen    by cytocentrifuge  Final Report (03-27-21 @ 17:22):    No growth at 5 days            INFECTIOUS DISEASES TESTING  COVID-19 PCR: NotDetec (04-05-21 @ 10:12)  COVID-19 PCR: NotDetec (04-04-21 @ 14:03)  Fungitell: <31 (04-01-21 @ 01:03)  Aspergillus Galactomannan Antigen: <0.500 (04-01-21 @ 01:03)  COVID-19 PCR: NotDetec (03-29-21 @ 14:17)  Procalcitonin, Serum: 2.29 (03-27-21 @ 10:50)  MRSA PCR Result.: Negative (03-20-21 @ 21:38)  COVID-19 PCR: NotDetec (03-20-21 @ 12:30)  Fungitell: 46 (03-17-21 @ 16:00)  Procalcitonin, Serum: 0.29 (03-17-21 @ 11:30)  Procalcitonin, Serum: 0.31 (03-16-21 @ 10:15)  Legionella Antigen, Urine: Negative (03-12-21 @ 10:30)  Streptococcus Pneumoniae Ag Urine: Negative (03-12-21 @ 10:30)  MRSA PCR Result.: Negative (03-12-21 @ 10:30)  Legionella Antigen, Urine: Negative (03-11-21 @ 08:10)  Streptococcus Pneumoniae Ag Urine: Negative (03-11-21 @ 08:10)  Procalcitonin, Serum: 1.75 (03-11-21 @ 06:31)  Rapid RVP Result: NotDetec (03-10-21 @ 22:10)      INFLAMMATORY MARKERS      RADIOLOGY & ADDITIONAL TESTS:  I have personally reviewed the last available Chest xray  CXR      CT      CARDIOLOGY TESTING  12 Lead ECG:   Ventricular Rate 89 BPM    Atrial Rate 89 BPM    P-R Interval 132 ms    QRS Duration 84 ms    Q-T Interval 364 ms    QTC Calculation(Bazett) 442 ms    P Axis 58 degrees    R Axis 23 degrees    T Axis -48 degrees    Diagnosis Line Sinus rhythm with marked sinus arrhythmia with Premature atrial complexes  Nonspecific ST and T wave abnormality  Abnormal ECG    Confirmed by Job Bains ([id number]) on 4/7/2021 11:26:01 AM (04-07-21 @ 09:41)      MEDICATIONS  ALBUTerol    90 MICROgram(s) HFA Inhaler 2 Inhalation every 6 hours  chlorhexidine 4% Liquid 1 Topical <User Schedule>  clotrimazole 1% Cream 1 Topical two times a day  collagenase Ointment 1 Topical daily  dexMEDEtomidine Infusion 0.202 IV Continuous <Continuous>  dextrose 40% Gel 15 Oral once  dextrose 5%. 1000 IV Continuous <Continuous>  dextrose 5%. 1000 IV Continuous <Continuous>  dextrose 5%. 1000 IV Continuous <Continuous>  dextrose 5%. 1000 IV Continuous <Continuous>  dextrose 50% Injectable 25 IV Push once  dextrose 50% Injectable 12.5 IV Push once  dextrose 50% Injectable 25 IV Push once  ferrous    sulfate Liquid 300 Enteral Tube daily  glucagon  Injectable 1 IntraMuscular once  glucagon  Injectable 1 IntraMuscular once  insulin lispro (ADMELOG) corrective regimen sliding scale  SubCutaneous every 4 hours  ipratropium 17 MICROgram(s) HFA Inhaler 2 Inhalation every 6 hours  meropenem  IVPB 1000 IV Intermittent every 24 hours  multivitamin/minerals/iron Oral Solution (CENTRUM) 15 Enteral Tube daily  nystatin Cream 1 Topical two times a day  pantoprazole  Injectable 40 IV Push two times a day  petrolatum Ophthalmic Ointment 1 Both EYES three times a day  phenylephrine    Infusion 1 IV Continuous <Continuous>  sodium bicarbonate  Injectable 50 IV Push <User Schedule>  vasopressin Infusion 0.04 IV Continuous <Continuous>      WEIGHT  Weight (kg): 67.5 (04-07-21 @ 06:00)  Creatinine, Serum: 2.1 mg/dL (04-16-21 @ 04:17)      ANTIBIOTICS:  meropenem  IVPB 1000 milliGRAM(s) IV Intermittent every 24 hours      All available historical records have been reviewed

## 2021-04-16 NOTE — PROGRESS NOTE ADULT - ASSESSMENT
85yFemale being evaluated for goals of care. Patient has PMH of dementia, osteoarithtis, Bulgarian-speaking presenting with R empyma had VAT on R and developed ARDS sever sepsis. Patient's hosptial course complicated by ARDS, patient now has tracheostomy, MENDOZA, septic shock on pressors, acute hypoxic hypercapnic resp failure dur to ards/pnumonia.    Family meeting today, please see goc note above.     MEDD (morphine equivalent daily dose):0      See Recs below.    Please call [phone] with questions or concerns 24/7.   We will continue to follow.     Discussed with CTU team, vent team resident Dr Williamson, bedside RN and family

## 2021-04-16 NOTE — PROGRESS NOTE ADULT - ASSESSMENT
ASSESSMENT  85 year old lady known to have dementia, osteoarithtis, Yemeni-speaking presenting with cough and fever.      IMPRESSION  #Sepsis present on admission - secondary to CAP  -  CT Chest No Cont (03.11.21 @ 00:54): Areas of right lung consolidation which can be seen in pneumonia. Numerous air-fluid levels throughout the right lung compatible with an infectious process. Suggestion of split pleura at the lung base for which empyema is favored although inherently limited on this noncontrast exam. Consideration can be given to contrast administration if feasiblefor better delineation. Areas of bilateral interlobular septal thickening and mild layering groundglass attenuation may reflect a component of edema.  - Urine Legionella negative  - Urine Strep negative  - BLood Cx 3/10 and 3/13 negative  - Procalcitonin 1.15   - CT Chest No Cont (03.17.21 @ 16:19): Since 3/11/2021. Enlarging loculated right pleural fluid collection with multiple air bubbles consistent with empyema.   Associated compressive atelectasis right lung is seen. Scattered groundglass opacities involving multiple lumbar segments.  - s/p VATS 3/22 -- right empyema with 800 cc purulent fluid in pleural space, multiple pockers with dense adhesions -- necrotizing pneumonia of the middle lobe  - VATS Cx 3/22 with rare yeast, otherwise no growth  -  CT Chest No Cont (03.29.21 @ 12:37): Since March 29, 2021, resolved right pleural effusion; persistent moderate right pneumothorax with 2 chest tubes in place. Increased left greater than right diffuse bilateral groundglass opacities and interlobular septal thickening. Findings are suspicious for a multifocal infection. Superimposed edema is also a possibility.  Enlarging mediastinal lymph nodes, likely reactive.  - CT Chest/Abdomen and Pelvis No Cont (04.01.21 @ 20:19): Stable residual right-sided pneumothorax. Three left-sided chest tubes are in place. New pneumomediastinum is noted, especially in the anterior mediastinum.  Stable bilateral diffuse areas of groundglass opacity. Areas of traction bronchiectasis noted throughout the left lung field and at the right lung base. No evidence of bowel obstruction or intra-abdominal or pelvic inflammatory process  - Fungitell: <31 4/1  - Aspergillus Galactomannan Antigen: <0.500 (04.01.21 @ 01:03)  - Blood Cx 3/31 and 4/3 NG  - CT Chest/Abd/Pelvis No Cont (04.12.21 @ 17:17): Redemonstrated loculated right pneumothorax without significant change. Interval removal of 2/3 right chest tubes.  Diffuse groundglass and consolidative opacities with interlobular septal thickening and traction bronchiectasis in the bases. Findings likely reflecting a combination of pulmonary edema and post inflammatory/infectious changes with some degree of organization.  Interval development abdominopelvic ascites and anasarca consistent with fluid overload.  Nonspecific diffuse colonic wall thickening which could reflect colitis versus edema. No bowel obstruction. Interval resolution of previously seen pneumomediastinum.    #GI Bleed from PEG 4/3 -- EGD held as improved/stable    #Dementia  #Osteoarthritis  #Obesity BMI (kg/m2): 23.4  #Abx allergy: clindamycin (Hives)    Creatinine, Serum: 2.0 mg/dL (04.15.21 @ 04:25)  Weight (kg): 60 (11 Mar 2021 01:17)  CrCl 21     RECOMMENDATIONS  - noted GOC discussion  - continue meropenem for now -- would plan for 6 weeks from time of debridement (end date 5/3) - can transition to ertapenem 500 mg daily eventually  - will follow peripherally  - please call if with concern for worsening infection     I will be away from 4/18-4/24.  Please call Dr. Prado or Dr. Calhoun if with further questions during those days.   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      IMPRESSION  #Sepsis present on admission - secondary to CAP  -  CT Chest No Cont (03.11.21 @ 00:54): Areas of right lung consolidation which can be seen in pneumonia. Numerous air-fluid levels throughout the right lung compatible with an infectious process. Suggestion of split pleura at the lung base for which empyema is favored although inherently limited on this noncontrast exam. Consideration can be given to contrast administration if feasiblefor better delineation. Areas of bilateral interlobular septal thickening and mild layering groundglass attenuation may reflect a component of edema.  - Urine Legionella negative  - Urine Strep negative  - BLood Cx 3/10 and 3/13 negative  - Procalcitonin 1.15   - CT Chest No Cont (03.17.21 @ 16:19): Since 3/11/2021. Enlarging loculated right pleural fluid collection with multiple air bubbles consistent with empyema.   Associated compressive atelectasis right lung is seen. Scattered groundglass opacities involving multiple lumbar segments.  - s/p VATS 3/22 -- right empyema with 800 cc purulent fluid in pleural space, multiple pockers with dense adhesions -- necrotizing pneumonia of the middle lobe  - VATS Cx 3/22 with rare yeast, otherwise no growth  -  CT Chest No Cont (03.29.21 @ 12:37): Since March 29, 2021, resolved right pleural effusion; persistent moderate right pneumothorax with 2 chest tubes in place. Increased left greater than right diffuse bilateral groundglass opacities and interlobular septal thickening. Findings are suspicious for a multifocal infection. Superimposed edema is also a possibility.  Enlarging mediastinal lymph nodes, likely reactive.  - CT Chest/Abdomen and Pelvis No Cont (04.01.21 @ 20:19): Stable residual right-sided pneumothorax. Three left-sided chest tubes are in place. New pneumomediastinum is noted, especially in the anterior mediastinum.  Stable bilateral diffuse areas of groundglass opacity. Areas of traction bronchiectasis noted throughout the left lung field and at the right lung base. No evidence of bowel obstruction or intra-abdominal or pelvic inflammatory process  - Fungitell: <31 4/1  - Aspergillus Galactomannan Antigen: <0.500 (04.01.21 @ 01:03)  - Blood Cx 3/31 and 4/3 NG  - CT Chest/Abd/Pelvis No Cont (04.12.21 @ 17:17): Redemonstrated loculated right pneumothorax without significant change. Interval removal of 2/3 right chest tubes.  Diffuse groundglass and consolidative opacities with interlobular septal thickening and traction bronchiectasis in the bases. Findings likely reflecting a combination of pulmonary edema and post inflammatory/infectious changes with some degree of organization.  Interval development abdominopelvic ascites and anasarca consistent with fluid overload.  Nonspecific diffuse colonic wall thickening which could reflect colitis versus edema. No bowel obstruction. Interval resolution of previously seen pneumomediastinum.    #GI Bleed from PEG 4/3 -- EGD held as improved/stable    #Dementia  #Osteoarthritis  #Obesity BMI (kg/m2): 23.4  #Abx allergy: clindamycin (Hives)    Creatinine, Serum: 2.0 mg/dL (04.15.21 @ 04:25)  Weight (kg): 60 (11 Mar 2021 01:17)  CrCl 21     RECOMMENDATIONS  - noted GOC discussion  - continue meropenem for now -- would plan for 6 weeks from time of debridement (end date 5/3) - can transition to ertapenem 500 mg daily eventually  - will follow peripherally  - please call if with concern for worsening infection     I will be away from 4/18-4/24.  Please call Dr. Clark or Dr. Calhoun if with further questions during those days.   Spectra [phone]

## 2021-04-16 NOTE — PROGRESS NOTE ADULT - CONVERSATION DETAILS
Met with patient's daughter and son at bedside and ariel Givens was present on the phone. Patient was downgraded to the ventilator unit. We discussed patient's hospitalization and current condition with the family. Patient is off sedation, has tracheostomy and is on a ventilator. We explained that patient is on life supporting measures. Addressed DNR with daughter, explained that patient is critically ill at this point. Daughter wanted to know long term plan. We explained that if patient remains on the vent then she might have to go to long term care facility but ONLY if she her other acute issues resolve. We then discussed that if patient does not improve there is an option of palliative withdrawal of ventilator and comfort measures only. We explained these processes in detail to family. Daughter was tearful, emotional support provided. She expressed understanding.   Ariel Givens states he will speak to his mother and discuss in more detail.  CTU team to meet with family later also.

## 2021-04-17 NOTE — PROGRESS NOTE ADULT - ASSESSMENT
85-year-old female with diagnosis of necrotizing pneumonia and right empyema, s/p thoracoscopic right lung decortication.  Patient with right chest tube in place.    Plan:  - Continue chest tube to waterseal  - Daily chest xray  - Monitor chest tube out put  - Monitor for airleaks  - Monitor O2 saturation  - DVT/GI prophylaxis  - Pain management

## 2021-04-17 NOTE — PROGRESS NOTE ADULT - SUBJECTIVE AND OBJECTIVE BOX
GENERAL SURGERY PROGRESS NOTE     DESTIN TERRY  85y  Female  Hospital day :37d  POD:  Procedure: Bronchoscopy, at bedside  Open tracheostomy  Insertion, PEG tube    OVERNIGHT EVENTS: No acute overnight events.    T(F): 97.2 (04-17-21 @ 00:00), Max: 98.2 (04-16-21 @ 11:00)  HR: 90 (04-17-21 @ 01:00) (82 - 93)  BP: 106/58 (04-16-21 @ 07:00) (106/58 - 106/58)  ABP: 115/54 (04-17-21 @ 01:00) (104/50 - 169/169)  ABP(mean): 72 (04-17-21 @ 01:00) (69 - 169)  RR: 31 (04-17-21 @ 01:00) (0 - 42)  SpO2: 98% (04-17-21 @ 01:00) (95% - 100%)    DIET/FLUIDS: dextrose 5%. 1000 milliLiter(s) IV Continuous <Continuous>  dextrose 5%. 1000 milliLiter(s) IV Continuous <Continuous>  dextrose 5%. 1000 milliLiter(s) IV Continuous <Continuous>  dextrose 5%. 1000 milliLiter(s) IV Continuous <Continuous>  ferrous    sulfate Liquid 300 milliGRAM(s) Enteral Tube daily  multivitamin/minerals/iron Oral Solution (CENTRUM) 15 milliLiter(s) Enteral Tube daily  sodium bicarbonate  Injectable 50 milliEquivalent(s) IV Push <User Schedule>       BM:   04-15-21 @ 07:01  -  04-16-21 @ 07:00  --------------------------------------------------------  OUT: 1650 mL      EMESIS:     URINE:   04-15-21 @ 07:01  -  04-16-21 @ 07:00  --------------------------------------------------------  OUT: 1495 mL       GI proph:  pantoprazole  Injectable 40 milliGRAM(s) IV Push two times a day    AC/ proph:   ABx: meropenem  IVPB 1000 milliGRAM(s) IV Intermittent every 24 hours      PHYSICAL EXAM:  GENERAL: NAD  CHEST/LUNG: Right chest tube in place with empyema, to waterseal  HEART: Regular rate and rhythm  ABDOMEN: Soft, Nontender, Nondistended;   EXTREMITIES:  No clubbing, cyanosis, or edema      LABS  CAPILLARY BLOOD GLUCOSE  168 (16 Apr 2021 06:00)  181 (16 Apr 2021 02:00)      POCT Blood Glucose.: 165 mg/dL (16 Apr 2021 21:26)  POCT Blood Glucose.: 158 mg/dL (16 Apr 2021 17:23)  POCT Blood Glucose.: 155 mg/dL (16 Apr 2021 13:40)  POCT Blood Glucose.: 148 mg/dL (16 Apr 2021 10:00)  POCT Blood Glucose.: 129 mg/dL (16 Apr 2021 07:54)  POCT Blood Glucose.: 168 mg/dL (16 Apr 2021 06:07)  POCT Blood Glucose.: 181 mg/dL (16 Apr 2021 02:14)                          7.4    15.54 )-----------( 47       ( 16 Apr 2021 04:17 )             24.1         04-16    143  |  108  |  109<HH>  ----------------------------<  190<H>  4.4   |  23  |  2.1<H>      Calcium, Total Serum: 8.3 mg/dL (04-16-21 @ 04:17)      LFTs:             5.1  | 1.4  | 42       ------------------[269     ( 15 Apr 2021 04:25 )  3.2  | x    | 11          Lipase:x      Amylase:x         Blood Gas Arterial, Lactate: 1.2 mmoL/L (04-16-21 @ 03:35)  Blood Gas Arterial, Lactate: 1.3 mmoL/L (04-15-21 @ 05:07)  Blood Gas Arterial, Lactate: 2.4 mmoL/L (04-14-21 @ 09:10)  Blood Gas Arterial, Lactate: 2.7 mmoL/L (04-14-21 @ 03:32)    ABG - ( 16 Apr 2021 03:35 )  pH: 7.33  /  pCO2: 49    /  pO2: 97    / HCO3: 26    / Base Excess: -0.4  /  SaO2: 97              ABG - ( 15 Apr 2021 05:07 )  pH: 7.34  /  pCO2: 46    /  pO2: 143   / HCO3: 24    / Base Excess: -1.5  /  SaO2: 99              ABG - ( 14 Apr 2021 09:10 )  pH: 7.27  /  pCO2: 48    /  pO2: 75    / HCO3: 22    / Base Excess: -4.5  /  SaO2: 94          RADIOLOGY & ADDITIONAL TESTS:  < from: Xray Chest 1 View- PORTABLE-Routine (Xray Chest 1 View- PORTABLE-Routine in AM.) (04.16.21 @ 06:45) >  Impression:  Unchanged bilateral parenchymal airspace opacities.  Stable support devices.  < end of copied text >

## 2021-04-17 NOTE — PROGRESS NOTE ADULT - SUBJECTIVE AND OBJECTIVE BOX
OVERNIGHT EVENTS: events noted, transferred from CTU, ID/ palliative care reviewed    Vital Signs Last 24 Hrs  T(C): 36 (17 Apr 2021 04:00), Max: 36.8 (16 Apr 2021 11:00)  T(F): 96.8 (17 Apr 2021 04:00), Max: 98.2 (16 Apr 2021 11:00)  HR: 89 (17 Apr 2021 07:00) (82 - 95)  BP: --  BP(mean): --  RR: 29 (17 Apr 2021 07:00) (29 - 38)  SpO2: 100% (17 Apr 2021 07:00) (95% - 100%)    PHYSICAL EXAMINATION:    GENERAL: ill looking    HEENT: Head is normocephalic and atraumatic. trach  NECK: Supple.    LUNGS: bl rhonchi    HEART: ISMAEL 3/6    ABDOMEN: Soft, nontender, and nondistended.      EXTREMITIES: Without any cyanosis, clubbing, rash, lesions or edema.    NEUROLOGIC:    SKIN:      LABS:                        7.4    15.54 )-----------( 47       ( 16 Apr 2021 04:17 )             24.1     04-16    143  |  108  |  109<HH>  ----------------------------<  190<H>  4.4   |  23  |  2.1<H>    Ca    8.3<L>      16 Apr 2021 04:17  Mg     2.5     04-16          ABG - ( 17 Apr 2021 02:49 )  pH, Arterial: x     pH, Blood: 7.43  /  pCO2: 42    /  pO2: 45    / HCO3: 28    / Base Excess: 3.3   /  SaO2: 81                                04-16-21 @ 07:01  -  04-17-21 @ 07:00  --------------------------------------------------------  IN: 2010 mL / OUT: 1530 mL / NET: 480 mL        MICROBIOLOGY:      MEDICATIONS  (STANDING):  ALBUTerol    90 MICROgram(s) HFA Inhaler 2 Puff(s) Inhalation every 6 hours  chlorhexidine 4% Liquid 1 Application(s) Topical <User Schedule>  clotrimazole 1% Cream 1 Application(s) Topical two times a day  collagenase Ointment 1 Application(s) Topical daily  dexMEDEtomidine Infusion 0.202 MICROgram(s)/kG/Hr (3 mL/Hr) IV Continuous <Continuous>  dextrose 40% Gel 15 Gram(s) Oral once  dextrose 5%. 1000 milliLiter(s) (50 mL/Hr) IV Continuous <Continuous>  dextrose 5%. 1000 milliLiter(s) (100 mL/Hr) IV Continuous <Continuous>  dextrose 5%. 1000 milliLiter(s) (50 mL/Hr) IV Continuous <Continuous>  dextrose 5%. 1000 milliLiter(s) (100 mL/Hr) IV Continuous <Continuous>  dextrose 50% Injectable 25 Gram(s) IV Push once  dextrose 50% Injectable 12.5 Gram(s) IV Push once  dextrose 50% Injectable 25 Gram(s) IV Push once  ferrous    sulfate Liquid 300 milliGRAM(s) Enteral Tube daily  glucagon  Injectable 1 milliGRAM(s) IntraMuscular once  glucagon  Injectable 1 milliGRAM(s) IntraMuscular once  insulin lispro (ADMELOG) corrective regimen sliding scale   SubCutaneous every 4 hours  ipratropium 17 MICROgram(s) HFA Inhaler 2 Puff(s) Inhalation every 6 hours  meropenem  IVPB 1000 milliGRAM(s) IV Intermittent every 24 hours  multivitamin/minerals/iron Oral Solution (CENTRUM) 15 milliLiter(s) Enteral Tube daily  nystatin Cream 1 Application(s) Topical two times a day  pantoprazole  Injectable 40 milliGRAM(s) IV Push two times a day  petrolatum Ophthalmic Ointment 1 Application(s) Both EYES three times a day  phenylephrine    Infusion 1 MICROgram(s)/kG/Min (11.1 mL/Hr) IV Continuous <Continuous>  sodium bicarbonate  Injectable 50 milliEquivalent(s) IV Push <User Schedule>  vasopressin Infusion 0.04 Unit(s)/Min (2.4 mL/Hr) IV Continuous <Continuous>    MEDICATIONS  (PRN):  ondansetron Injectable 4 milliGRAM(s) IV Push every 4 hours PRN Nausea and/or Vomiting      RADIOLOGY & ADDITIONAL STUDIES:         OVERNIGHT EVENTS: events noted, transferred from CTU, ID/ palliative care reviewed    Vital Signs Last 24 Hrs  T(C): 36 (17 Apr 2021 04:00), Max: 36.8 (16 Apr 2021 11:00)  T(F): 96.8 (17 Apr 2021 04:00), Max: 98.2 (16 Apr 2021 11:00)  HR: 89 (17 Apr 2021 07:00) (82 - 95)  RR: 29 (17 Apr 2021 07:00) (29 - 38)  SpO2: 100% (17 Apr 2021 07:00) (95% - 100%)    PHYSICAL EXAMINATION:    GENERAL: ill looking    HEENT: Head is normocephalic and atraumatic. trach    LUNGS: bl rhonchi    HEART: ISMAEL 3/6    ABDOMEN: Soft, nontender, and nondistended.  peg tube    EXTREMITIES: Without any cyanosis, clubbing, rash, lesions or edema.    NEUROLOGIC:  not following  commands    SKIN: ulcer      LABS:                        7.4    15.54 )-----------( 47       ( 16 Apr 2021 04:17 )             24.1     04-16    143  |  108  |  109<HH>  ----------------------------<  190<H>  4.4   |  23  |  2.1<H>    Ca    8.3<L>      16 Apr 2021 04:17  Mg     2.5     04-16          ABG - ( 17 Apr 2021 02:49 )  pH, Arterial: x     pH, Blood: 7.43  /  pCO2: 42    /  pO2: 45    / HCO3: 28    / Base Excess: 3.3   /  SaO2: 81        36/400/65/5                        04-16-21 @ 07:01  -  04-17-21 @ 07:00  --------------------------------------------------------  IN: 2010 mL / OUT: 1530 mL / NET: 480 mL        MICROBIOLOGY:      MEDICATIONS  (STANDING):  ALBUTerol    90 MICROgram(s) HFA Inhaler 2 Puff(s) Inhalation every 6 hours  chlorhexidine 4% Liquid 1 Application(s) Topical <User Schedule>  clotrimazole 1% Cream 1 Application(s) Topical two times a day  collagenase Ointment 1 Application(s) Topical daily  dexMEDEtomidine Infusion 0.202 MICROgram(s)/kG/Hr (3 mL/Hr) IV Continuous <Continuous>  dextrose 40% Gel 15 Gram(s) Oral once  dextrose 5%. 1000 milliLiter(s) (50 mL/Hr) IV Continuous <Continuous>  dextrose 5%. 1000 milliLiter(s) (100 mL/Hr) IV Continuous <Continuous>  dextrose 5%. 1000 milliLiter(s) (50 mL/Hr) IV Continuous <Continuous>  dextrose 5%. 1000 milliLiter(s) (100 mL/Hr) IV Continuous <Continuous>  dextrose 50% Injectable 25 Gram(s) IV Push once  dextrose 50% Injectable 12.5 Gram(s) IV Push once  dextrose 50% Injectable 25 Gram(s) IV Push once  ferrous    sulfate Liquid 300 milliGRAM(s) Enteral Tube daily  glucagon  Injectable 1 milliGRAM(s) IntraMuscular once  glucagon  Injectable 1 milliGRAM(s) IntraMuscular once  insulin lispro (ADMELOG) corrective regimen sliding scale   SubCutaneous every 4 hours  ipratropium 17 MICROgram(s) HFA Inhaler 2 Puff(s) Inhalation every 6 hours  meropenem  IVPB 1000 milliGRAM(s) IV Intermittent every 24 hours  multivitamin/minerals/iron Oral Solution (CENTRUM) 15 milliLiter(s) Enteral Tube daily  nystatin Cream 1 Application(s) Topical two times a day  pantoprazole  Injectable 40 milliGRAM(s) IV Push two times a day  petrolatum Ophthalmic Ointment 1 Application(s) Both EYES three times a day  phenylephrine    Infusion 1 MICROgram(s)/kG/Min (11.1 mL/Hr) IV Continuous <Continuous>  sodium bicarbonate  Injectable 50 milliEquivalent(s) IV Push <User Schedule>  vasopressin Infusion 0.04 Unit(s)/Min (2.4 mL/Hr) IV Continuous <Continuous>    MEDICATIONS  (PRN):  ondansetron Injectable 4 milliGRAM(s) IV Push every 4 hours PRN Nausea and/or Vomiting      RADIOLOGY & ADDITIONAL STUDIES:

## 2021-04-17 NOTE — PROGRESS NOTE ADULT - ASSESSMENT
Impression    Severe necrotizing CAP   Right sided parapneumonic effusion s/p VATS and chest tube sp trac    Recommendations     Impression    Severe necrotizing CAP   Right sided parapneumonic effusion s/p VATS and chest tube sp trac/ peg  Renal failure  thrombocytopenia    PLAN:    CNS : avoid CNS depressant    HEENT:  Oral care    PULMONARY:  HOB @ 45 degrees, dec FIO2 keep SAO2 92 to 96%, pul toiley    CARDIOVASCULAR: keep equal balnce    GI: GI prophylaxis                                          Feeding peg    RENAL:  F/u  lytes.  Correct as needed. accurate I/O    INFECTIOUS DISEASE: abx per ID    HEMATOLOGICAL:  DVT prophylaxis.    ENDOCRINE:  Follow up FS.  Insulin protocol if needed.    all over poor prognosis

## 2021-04-18 NOTE — PROGRESS NOTE ADULT - SUBJECTIVE AND OBJECTIVE BOX
GENERAL SURGERY PROGRESS NOTE     DESTIN TERRY  85y  Female  Hospital day :38d  POD:  Procedure: Bronchoscopy, at bedside    Open tracheostomy    Insertion, PEG tube      OVERNIGHT EVENTS:    T(F): 97.5 (04-17-21 @ 16:00), Max: 97.5 (04-17-21 @ 08:01)  HR: 91 (04-17-21 @ 20:15) (82 - 91)  BP: --  ABP: 124/58 (04-17-21 @ 18:00) (104/48 - 130/60)  ABP(mean): 76 (04-17-21 @ 18:00) (69 - 173)  RR: 29 (04-17-21 @ 07:00) (29 - 29)  SpO2: 100% (04-17-21 @ 20:15) (98% - 100%)    DIET/FLUIDS: dextrose 5%. 1000 milliLiter(s) IV Continuous <Continuous>  dextrose 5%. 1000 milliLiter(s) IV Continuous <Continuous>  dextrose 5%. 1000 milliLiter(s) IV Continuous <Continuous>  dextrose 5%. 1000 milliLiter(s) IV Continuous <Continuous>  ferrous    sulfate Liquid 300 milliGRAM(s) Enteral Tube daily  multivitamin/minerals/iron Oral Solution (CENTRUM) 15 milliLiter(s) Enteral Tube daily  sodium bicarbonate  Injectable 50 milliEquivalent(s) IV Push <User Schedule>    NG:                                                                                DRAINS:     BM:   04-16-21 @ 07:01  -  04-17-21 @ 07:00  --------------------------------------------------------  OUT: 0 mL      EMESIS:     URINE:   04-16-21 @ 07:01  -  04-17-21 @ 07:00  --------------------------------------------------------  OUT: 1530 mL       GI proph:  pantoprazole  Injectable 40 milliGRAM(s) IV Push two times a day    AC/ proph:   ABx: meropenem  IVPB 1000 milliGRAM(s) IV Intermittent every 24 hours      PHYSICAL EXAM:  GENERAL: NAD  CHEST/LUNG: Right chest tube in place with empyema, to waterseal  HEART: Regular rate and rhythm  ABDOMEN: Soft, Nontender, Nondistended;   EXTREMITIES:  No clubbing, cyanosis, or edema        LABS  Labs:  CAPILLARY BLOOD GLUCOSE      POCT Blood Glucose.: 178 mg/dL (18 Apr 2021 05:26)  POCT Blood Glucose.: 190 mg/dL (17 Apr 2021 22:33)  POCT Blood Glucose.: 185 mg/dL (17 Apr 2021 18:15)  POCT Blood Glucose.: 169 mg/dL (17 Apr 2021 14:45)  POCT Blood Glucose.: 169 mg/dL (17 Apr 2021 10:16)                          7.0    10.84 )-----------( 53       ( 17 Apr 2021 12:39 )             21.9       Auto Neutrophil %: 69.9 % (04-17-21 @ 12:39)  Auto Immature Granulocyte %: 1.1 % (04-17-21 @ 12:39)    04-17    151<H>  |  113<H>  |  119<HH>  ----------------------------<  136<H>  4.2   |  26  |  1.9<H>      Calcium, Total Serum: 8.3 mg/dL (04-17-21 @ 12:39)      LFTs:             5.0  | 0.8  | 57       ------------------[356     ( 17 Apr 2021 12:39 )  2.6  | x    | 12          Lipase:x      Amylase:x         Blood Gas Arterial, Lactate: 1.2 mmoL/L (04-16-21 @ 03:35)    ABG - ( 17 Apr 2021 02:49 )  pH: x     /  pCO2: 42    /  pO2: 45    / HCO3: 28    / Base Excess: 3.3   /  SaO2: 81              ABG - ( 16 Apr 2021 03:35 )  pH: 7.33  /  pCO2: 49    /  pO2: 97    / HCO3: 26    / Base Excess: -0.4  /  SaO2: 97              ABG - ( 15 Apr 2021 05:07 )  pH: 7.34  /  pCO2: 46    /  pO2: 143   / HCO3: 24    / Base Excess: -1.5  /  SaO2: 99                Coags:                    RADIOLOGY & ADDITIONAL TESTS:      A/P

## 2021-04-18 NOTE — PROGRESS NOTE ADULT - SUBJECTIVE AND OBJECTIVE BOX
Over Night Events: events noted, low grade fever      PHYSICAL EXAM    ICU Vital Signs Last 24 Hrs  T(C): 37.2 (18 Apr 2021 08:00), Max: 37.6 (17 Apr 2021 20:00)  T(F): 99 (18 Apr 2021 08:00), Max: 99.6 (17 Apr 2021 20:00)  HR: 99 (18 Apr 2021 08:00) (82 - 101)  ABP: 121/57 (18 Apr 2021 08:00) (107/56 - 130/60)  ABP(mean): 80 (18 Apr 2021 08:00) (70 - 88)  RR: 36 (18 Apr 2021 08:00) (36 - 36)  SpO2: 100% (18 Apr 2021 08:00) (98% - 100%)      General: ill looking  HEENT: trach           Lungs: Bilateral rhonchi, r side CT  Cardiovascular: ISMAEL 3.6  Abdomen: Soft, Positive BS, PEG  Skin: ulcers  Neurological: do not follow commands      04-17-21 @ 07:01  -  04-18-21 @ 07:00  --------------------------------------------------------  IN:    Peptamen A.F.: 1560 mL  Total IN: 1560 mL    OUT:    Chest Tube (mL): 192 mL    Indwelling Catheter - Urethral (mL): 1375 mL  Total OUT: 1567 mL    Total NET: -7 mL          LABS:                          6.9    11.67 )-----------( 60       ( 18 Apr 2021 04:30 )             22.0                                               04-18    156<H>  |  116<H>  |  117<HH>  ----------------------------<  158<H>  4.4   |  30  |  1.8<H>    Ca    8.2<L>      18 Apr 2021 04:30  Phos  3.4     04-18  Mg     2.5     04-18    TPro  4.9<L>  /  Alb  2.6<L>  /  TBili  0.7  /  DBili  x   /  AST  68<H>  /  ALT  17  /  AlkPhos  416<H>  04-18      PT/INR - ( 18 Apr 2021 04:30 )   PT: 13.20 sec;   INR: 1.15 ratio                                                                                              LIVER FUNCTIONS - ( 18 Apr 2021 04:30 )  Alb: 2.6 g/dL / Pro: 4.9 g/dL / ALK PHOS: 416 U/L / ALT: 17 U/L / AST: 68 U/L / GGT: x                                                                                               Mode: AC/ CMV (Assist Control/ Continuous Mandatory Ventilation)  RR (machine): 36  TV (machine): 400  FiO2: 50  PEEP: 5  ITime: 1  MAP: 15  PIP: 41                                      ABG - ( 17 Apr 2021 02:49 )  pH, Arterial: x     pH, Blood: 7.43  /  pCO2: 42    /  pO2: 45    / HCO3: 28    / Base Excess: 3.3   /  SaO2: 81                  MEDICATIONS  (STANDING):  ALBUTerol    90 MICROgram(s) HFA Inhaler 2 Puff(s) Inhalation every 6 hours  chlorhexidine 4% Liquid 1 Application(s) Topical <User Schedule>  clotrimazole 1% Cream 1 Application(s) Topical two times a day  collagenase Ointment 1 Application(s) Topical daily  dexMEDEtomidine Infusion 0.202 MICROgram(s)/kG/Hr (3 mL/Hr) IV Continuous <Continuous>  dextrose 40% Gel 15 Gram(s) Oral once  dextrose 5%. 1000 milliLiter(s) (100 mL/Hr) IV Continuous <Continuous>  dextrose 5%. 1000 milliLiter(s) (50 mL/Hr) IV Continuous <Continuous>  dextrose 5%. 1000 milliLiter(s) (100 mL/Hr) IV Continuous <Continuous>  dextrose 5%. 1000 milliLiter(s) (50 mL/Hr) IV Continuous <Continuous>  dextrose 50% Injectable 25 Gram(s) IV Push once  dextrose 50% Injectable 12.5 Gram(s) IV Push once  dextrose 50% Injectable 25 Gram(s) IV Push once  ferrous    sulfate Liquid 300 milliGRAM(s) Enteral Tube daily  glucagon  Injectable 1 milliGRAM(s) IntraMuscular once  glucagon  Injectable 1 milliGRAM(s) IntraMuscular once  insulin lispro (ADMELOG) corrective regimen sliding scale   SubCutaneous every 6 hours  ipratropium 17 MICROgram(s) HFA Inhaler 2 Puff(s) Inhalation every 6 hours  meropenem  IVPB 1000 milliGRAM(s) IV Intermittent every 24 hours  multivitamin/minerals/iron Oral Solution (CENTRUM) 15 milliLiter(s) Enteral Tube daily  nystatin Cream 1 Application(s) Topical two times a day  pantoprazole  Injectable 40 milliGRAM(s) IV Push two times a day  petrolatum Ophthalmic Ointment 1 Application(s) Both EYES three times a day  phenylephrine    Infusion 1 MICROgram(s)/kG/Min (11.1 mL/Hr) IV Continuous <Continuous>  sodium bicarbonate  Injectable 50 milliEquivalent(s) IV Push <User Schedule>  vasopressin Infusion 0.04 Unit(s)/Min (2.4 mL/Hr) IV Continuous <Continuous>    MEDICATIONS  (PRN):  ondansetron Injectable 4 milliGRAM(s) IV Push every 4 hours PRN Nausea and/or Vomiting      Xrays:    reviewed

## 2021-04-18 NOTE — PROGRESS NOTE ADULT - ASSESSMENT
Impression    Severe necrotizing CAP   Right sided parapneumonic effusion s/p VATS and chest tube sp trac/ peg  Renal failure  thrombocytopenia/ Anemia  hypernatremia    PLAN:    CNS : avoid CNS depressant    HEENT:  Oral care    PULMONARY:  HOB @ 45 degrees, dec FIO2 keep SAO2 92 to 96%, pul toiley    CARDIOVASCULAR: D5 W/ free wated    GI: GI prophylaxis                                          Feeding peg    RENAL:  F/u  lytes.  Correct as needed. accurate I/O, repeat CMPT    INFECTIOUS DISEASE: abx per ID    HEMATOLOGICAL:  DVT prophylaxis. transfuse 1 unit PRBC, serial CBC    ENDOCRINE:  Follow up FS.  Insulin protocol if needed.    all over poor prognosis    Palliative care f/up  vent unit Impression    Severe necrotizing CAP   Right sided parapneumonic effusion s/p VATS and chest tube sp trac/ peg  Renal failure worsnening  thrombocytopenia/ Anemia  hypernatremia    PLAN:    CNS : avoid CNS depressant    HEENT:  Oral care    PULMONARY:  HOB @ 45 degrees, dec FIO2 keep SAO2 92 to 96%, pul toiley    CARDIOVASCULAR: D5 W/ free wated    GI: GI prophylaxis                                          Feeding peg    RENAL:  F/u  lytes.  Correct as needed. accurate I/O, repeat CMPT    INFECTIOUS DISEASE: abx per ID    HEMATOLOGICAL:  DVT prophylaxis. transfuse 1 unit PRBC, serial CBC    ENDOCRINE:  Follow up FS.  Insulin protocol if needed.    all over poor prognosis    Palliative care f/up  vent unit

## 2021-04-19 NOTE — PROGRESS NOTE ADULT - SUBJECTIVE AND OBJECTIVE BOX
Progress Note: General Surgery  Patient: DESTIN TERRY , 85y (1935)Female   MRN: [id number]  Location: [address]  Visit: 03-11-21 Inpatient  Date: 04-19-21 @ 04:16    Admit Diagnosis/Chief Complaint: Acute respiratory failure with hypoxia        Procedure/Diagnosis: Acute respiratory failure with hypoxia     S/P Bronchoscopy, at bedside    Open tracheostomy    Insertion, PEG tube        Events/ 24h: Patient seen and examined at bedside. No acute events overnight. Pain controlled. Afebrile, VSS.    Vitals: T(F): 96.4 (04-19-21 @ 00:00), Max: 99.3 (04-18-21 @ 06:00)  HR: 68 (04-19-21 @ 03:05)  BP: 115/67 (04-18-21 @ 18:00) (101/50 - 131/61)  RR: 36 (04-19-21 @ 02:00)  SpO2: 99% (04-19-21 @ 03:05)  RR (machine): 36, TV (machine): 400, FiO2: 50, PEEP: 5, PIP: 44  In:   04-17-21 @ 07:01  -  04-18-21 @ 07:00  --------------------------------------------------------  IN: 1560 mL    04-18-21 @ 07:01  -  04-19-21 @ 04:16  --------------------------------------------------------  IN: 3553.2 mL      Out:   04-17-21 @ 07:01  -  04-18-21 @ 07:00  --------------------------------------------------------  OUT:    Chest Tube (mL): 192 mL    Indwelling Catheter - Urethral (mL): 1375 mL  Total OUT: 1567 mL      04-18-21 @ 07:01  -  04-19-21 @ 04:16  --------------------------------------------------------  OUT:    Indwelling Catheter - Urethral (mL): 1260 mL    Rectal Tube (mL): 1000 mL  Total OUT: 2260 mL        Net:   04-17-21 @ 07:01  -  04-18-21 @ 07:00  --------------------------------------------------------  NET: -7 mL    04-18-21 @ 07:01  -  04-19-21 @ 04:16  --------------------------------------------------------  NET: 1293.2 mL        Diet: Diet, NPO with Tube Feed:   Tube Feeding Modality: Gastrostomy  Peptamen A.F. Formula  Total Volume for 24 Hours (mL): 1560  Continuous  Until Goal Tube Feed Rate (mL per Hour): 65  Tube Feed Duration (in Hours): 24  Tube Feed Start Time: 20:15 (04-13-21 @ 20:14)    IV Fluids: dextrose 5%. 1000 milliLiter(s) (100 mL/Hr) IV Continuous <Continuous>  dextrose 5%. 1000 milliLiter(s) (50 mL/Hr) IV Continuous <Continuous>  dextrose 5%. 1000 milliLiter(s) (50 mL/Hr) IV Continuous <Continuous>  dextrose 5%. 1000 milliLiter(s) (100 mL/Hr) IV Continuous <Continuous>  dextrose 5%. 1000 milliLiter(s) (75 mL/Hr) IV Continuous <Continuous>  ferrous    sulfate Liquid 300 milliGRAM(s) Enteral Tube daily  multivitamin/minerals/iron Oral Solution (CENTRUM) 15 milliLiter(s) Enteral Tube daily  sodium bicarbonate  Injectable 50 milliEquivalent(s) IV Push <User Schedule>      Physical Examination:  General Appearance: NAD   HEENT: EOMI, sclera anicteric.  Heart: RRR   Lungs: Symmetric chest wall expansion, equal rise and fall.  Abdomen:  Soft, nontender, nondistended.   MSK/Extremities: Warm & well-perfused.   Skin: Warm, dry. No jaundice.       Medications: [Standing]  ALBUTerol    90 MICROgram(s) HFA Inhaler 2 Puff(s) Inhalation every 6 hours  chlorhexidine 4% Liquid 1 Application(s) Topical <User Schedule>  clotrimazole 1% Cream 1 Application(s) Topical two times a day  collagenase Ointment 1 Application(s) Topical daily  dexMEDEtomidine Infusion 0.202 MICROgram(s)/kG/Hr (3 mL/Hr) IV Continuous <Continuous>  dextrose 40% Gel 15 Gram(s) Oral once  dextrose 5%. 1000 milliLiter(s) (100 mL/Hr) IV Continuous <Continuous>  dextrose 5%. 1000 milliLiter(s) (50 mL/Hr) IV Continuous <Continuous>  dextrose 5%. 1000 milliLiter(s) (100 mL/Hr) IV Continuous <Continuous>  dextrose 5%. 1000 milliLiter(s) (75 mL/Hr) IV Continuous <Continuous>  dextrose 5%. 1000 milliLiter(s) (50 mL/Hr) IV Continuous <Continuous>  dextrose 50% Injectable 25 Gram(s) IV Push once  dextrose 50% Injectable 12.5 Gram(s) IV Push once  dextrose 50% Injectable 25 Gram(s) IV Push once  ferrous    sulfate Liquid 300 milliGRAM(s) Enteral Tube daily  glucagon  Injectable 1 milliGRAM(s) IntraMuscular once  glucagon  Injectable 1 milliGRAM(s) IntraMuscular once  insulin lispro (ADMELOG) corrective regimen sliding scale   SubCutaneous every 6 hours  ipratropium 17 MICROgram(s) HFA Inhaler 2 Puff(s) Inhalation every 6 hours  meropenem  IVPB 1000 milliGRAM(s) IV Intermittent every 24 hours  multivitamin/minerals/iron Oral Solution (CENTRUM) 15 milliLiter(s) Enteral Tube daily  nystatin Cream 1 Application(s) Topical two times a day  pantoprazole  Injectable 40 milliGRAM(s) IV Push every 12 hours  petrolatum Ophthalmic Ointment 1 Application(s) Both EYES three times a day  phenylephrine    Infusion 1 MICROgram(s)/kG/Min (11.1 mL/Hr) IV Continuous <Continuous>  sodium bicarbonate  Injectable 50 milliEquivalent(s) IV Push <User Schedule>  vasopressin Infusion 0.04 Unit(s)/Min (2.4 mL/Hr) IV Continuous <Continuous>    DVT Prophylaxis:   GI Prophylaxis: pantoprazole  Injectable 40 milliGRAM(s) IV Push every 12 hours    Antibiotics: meropenem  IVPB 1000 milliGRAM(s) IV Intermittent every 24 hours    Anticoagulation:   Medications:[PRN]  ondansetron Injectable 4 milliGRAM(s) IV Push every 4 hours PRN      Labs:                        7.7    10.62 )-----------( 61       ( 18 Apr 2021 21:02 )             24.4     04-18    156<H>  |  117<H>  |  115<HH>  ----------------------------<  160<H>  4.2   |  25  |  1.7<H>    Ca    7.9<L>      18 Apr 2021 21:02  Phos  3.4     04-18  Mg     2.5     04-18    TPro  4.8<L>  /  Alb  2.5<L>  /  TBili  0.6  /  DBili  x   /  AST  52<H>  /  ALT  16  /  AlkPhos  338<H>  04-18    LIVER FUNCTIONS - ( 18 Apr 2021 21:02 )  Alb: 2.5 g/dL / Pro: 4.8 g/dL / ALK PHOS: 338 U/L / ALT: 16 U/L / AST: 52 U/L / GGT: x           PT/INR - ( 18 Apr 2021 21:02 )   PT: 12.60 sec;   INR: 1.10 ratio           ABG - ( 19 Apr 2021 03:24 )  pH: x     /  pCO2: 42    /  pO2: 79    / HCO3: 29    / Base Excess: 4.8   /  SaO2: 96                      Urine/Micro:        Imaging:

## 2021-04-19 NOTE — PROGRESS NOTE ADULT - SUBJECTIVE AND OBJECTIVE BOX
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    As per daughter, patient has been having dry consistent cough since 2 months. Today, she was being evaluated for knee injections when she was found to be febrile. She also reports progressing generalized weakness with decreased appetite and PO intake.  No URT symptoms, no chest pain, no leg pain, no diarrhea, no urinary symptoms no sick contacts, no recent travel. In ED was hypotensive, was given IVF, started on levophed 0.04 called to evaluate (11 Mar 2021 03:21)    SUBJECTIVE:    ROS: no non verbal indicators of the following  DYSPNEA: Y / N	  NAUS/VOM: Y / N	  SECRETIONS: Y / N	  AGITATION: Y / N  Pain (Y/N):       -Provocation/Palliation:  -Quality/Quantity:  -Radiating:  -Severity:  -Timing/Frequency:  -Impact on ADLs:    OTHER REVIEW OF SYSTEMS:  UNABLE TO OBTAIN  due to: obtunded vent dependent    PEx:  85y            85y  ICU Vital Signs Last 24 Hrs  T(C): 36 (19 Apr 2021 04:00), Max: 37.2 (18 Apr 2021 16:00)  T(F): 96.8 (19 Apr 2021 04:00), Max: 99 (18 Apr 2021 16:00)  HR: 78 (19 Apr 2021 06:00) (68 - 99)  BP: 115/67 (18 Apr 2021 18:00) (115/57 - 130/62)  BP(mean): 86 (18 Apr 2021 18:00) (86 - 88)  ABP: 91/84 (19 Apr 2021 06:00) (91/84 - 154/83)  ABP(mean): 86 (19 Apr 2021 06:00) (61 - 109)  RR: 38 (19 Apr 2021 06:00) (36 - 39)  SpO2: 100% (19 Apr 2021 06:00) (96% - 100%)    General: obtunded; debility NAD,  HEENT:  NCAT  CVS: SR  Resp: vent dependent 40% PEEP 5  GI:  obese rectal tube  :   [place]ams   Musc: No C/C/E    Neuro: obtundend  Skin: Non jaundiced, per nursing    Last BM: rectal tube  04-12-21 @ 07:01  -  04-13-21 @ 07:00  --------------------------------------------------------  OUT: 700 mL    04-13-21 @ 07:01  -  04-14-21 @ 07:00  --------------------------------------------------------  OUT: 600 mL    04-14-21 @ 07:01  -  04-15-21 @ 07:00  --------------------------------------------------------  OUT: 580 mL    04-15-21 @ 07:01  -  04-16-21 @ 07:00  --------------------------------------------------------  OUT: 1650 mL    04-16-21 @ 07:01  -  04-17-21 @ 07:00  --------------------------------------------------------  OUT: 0 mL    04-18-21 @ 07:01  -  04-19-21 @ 07:00  --------------------------------------------------------  OUT: 1000 mL        ALLERGIES: Female    OPIATE NAÏVE (Y/N): y    MEDICATIONS: REVIEWED  MEDICATIONS  (STANDING):  ALBUTerol    90 MICROgram(s) HFA Inhaler 2 Puff(s) Inhalation every 6 hours  chlorhexidine 4% Liquid 1 Application(s) Topical <User Schedule>  clotrimazole 1% Cream 1 Application(s) Topical two times a day  collagenase Ointment 1 Application(s) Topical daily  dexMEDEtomidine Infusion 0.202 MICROgram(s)/kG/Hr (3 mL/Hr) IV Continuous <Continuous> - good response per nursing  dextrose 40% Gel 15 Gram(s) Oral once  dextrose 5%. 1000 milliLiter(s) (100 mL/Hr) IV Continuous <Continuous>  dextrose 5%. 1000 milliLiter(s) (50 mL/Hr) IV Continuous <Continuous>  dextrose 5%. 1000 milliLiter(s) (100 mL/Hr) IV Continuous <Continuous>  dextrose 5%. 1000 milliLiter(s) (50 mL/Hr) IV Continuous <Continuous>  dextrose 5%. 1000 milliLiter(s) (100 mL/Hr) IV Continuous <Continuous>  dextrose 50% Injectable 25 Gram(s) IV Push once  dextrose 50% Injectable 12.5 Gram(s) IV Push once  dextrose 50% Injectable 25 Gram(s) IV Push once  ferrous    sulfate Liquid 300 milliGRAM(s) Enteral Tube daily  glucagon  Injectable 1 milliGRAM(s) IntraMuscular once  glucagon  Injectable 1 milliGRAM(s) IntraMuscular once  insulin lispro (ADMELOG) corrective regimen sliding scale   SubCutaneous every 6 hours  ipratropium 17 MICROgram(s) HFA Inhaler 2 Puff(s) Inhalation every 6 hours  meropenem  IVPB 1000 milliGRAM(s) IV Intermittent every 24 hours  multivitamin/minerals/iron Oral Solution (CENTRUM) 15 milliLiter(s) Enteral Tube daily  nystatin Cream 1 Application(s) Topical two times a day  pantoprazole  Injectable 40 milliGRAM(s) IV Push every 12 hours  petrolatum Ophthalmic Ointment 1 Application(s) Both EYES three times a day  phenylephrine    Infusion 1 MICROgram(s)/kG/Min (11.1 mL/Hr) IV Continuous <Continuous> not infusing during encounter  QUEtiapine 50 milliGRAM(s) Oral two times a day  sodium bicarbonate  Injectable 50 milliEquivalent(s) IV Push <User Schedule>  vasopressin Infusion 0.04 Unit(s)/Min (2.4 mL/Hr) IV Continuous <Continuous> not infusing during encounter    MEDICATIONS  (PRN):  ondansetron Injectable 4 milliGRAM(s) IV Push every 4 hours PRN Nausea and/or Vomiting      LABS: REVIEWED  CBC:                        8.2    13.70 )-----------( 74       ( 19 Apr 2021 04:20 )             25.8     CMP:    04-19    154<H>  |  115<H>  |  108<HH>  ----------------------------<  199<H>  4.1   |  28  |  1.5    Ca    8.0<L>      19 Apr 2021 11:32  Phos  3.4     04-18  Mg     2.4     04-19    TPro  5.0<L>  /  Alb  2.5<L>  /  TBili  0.5  /  DBili  x   /  AST  60<H>  /  ALT  19  /  AlkPhos  367<H>  04-19  Albumin, Serum: 2.5 g/dL (04-19-21 @ 11:32)      IMAGING: REVIEWED    ADVANCED DIRECTIVES:            FULL CODE            DECISION MAKER:   LEGAL SURROGATE: dgt and grandson - multiple family members    GOALS OF CARE DISCUSSION       no family at bedside; left VM for grandson    CURRENT DISPO PLAN:     WILL REMAIN IN HOSPITAL

## 2021-04-19 NOTE — PROGRESS NOTE ADULT - ASSESSMENT
· Assessment	  85-year-old female with diagnosis of necrotizing pneumonia and right empyema, s/p thoracoscopic right lung decortication.  Patient with right chest tube in place.     Patient seen and examined at bedside. NAD.   Tolerating:  Diet, NPO with Tube Feed:   Tube Feeding Modality: Gastrostomy  Peptamen A.F. Formula  Total Volume for 24 Hours (mL): 1560  Continuous  Until Goal Tube Feed Rate (mL per Hour): 65  Tube Feed Duration (in Hours): 24  Tube Feed Start Time: 20:15 (04-13-21 @ 20:14)    ambulating  BM/g  voiding  pain    Plan:  -f/u family plan for GOC  - Monitor vitals  - Monitor labs and replete as necessary  - Monitor for bowel function      Date/Time: 04-19-21 @ 04:16

## 2021-04-19 NOTE — PROGRESS NOTE ADULT - ASSESSMENT
Impression    Severe necrotizing CAP   Right sided parapneumonic effusion s/p VATS and chest tube  SP trac/ peg  MENDOZA improving   Thrombocytopenia/ Anemia  hypernatremia improving     PLAN:    CNS:  Seroquel.  Monitor QTC.  SAT     HEENT:  Oral care, Trach care     PULMONARY:  HOB @ 45 degrees, RR 20 .  .  PS 2 hours     CARDIOVASCULAR: D5 W/ free water    GI: GI prophylaxis                                          Feeding peg    RENAL:  F/u  lytes.  Correct as needed. accurate I/O, repeat CMPT    INFECTIOUS DISEASE: abx per ID.     HEMATOLOGICAL:  DVT prophylaxis. FU CBC and HIT and DIC     ENDOCRINE:  Follow up FS.  Insulin protocol if needed.    all over poor prognosis    Palliative care f/up  vent unit

## 2021-04-19 NOTE — PROGRESS NOTE ADULT - ASSESSMENT
85yFemale being evaluated for goals of care. Patient has PMH of dementia, osteoarithtis, Bahamian-speaking presenting with R kemiyma had VAT on R and developed ARDS sever sepsis. Patient's hosptial course complicated by ARDS, patient now has tracheostomy, MENDOZA, septic shock on pressors, acute hypoxic hypercapnic resp failure dur to ards/pnumonia.    Family meeting today, please see go note above.     MEDD (morphine equivalent daily dose):0      See Recs below.    Please call [phone] with questions or concerns 24/7.   We will continue to follow

## 2021-04-20 NOTE — PROGRESS NOTE ADULT - SUBJECTIVE AND OBJECTIVE BOX
DESTIN TERRY  85y Female   [id number]    Hospital Day: 41  Post Operative Day:21  Procedure:s/p right vats , s/p trach and peg   Patient is a 85y old  Female who presents with a chief complaint of sob (18 Apr 2021 09:17)    PAST MEDICAL & SURGICAL HISTORY:  Dementia    Osteoarthritis    No significant past surgical history        Events of the Last 24h:  Vital Signs Last 24 Hrs  T(C): 36.6 (20 Apr 2021 00:00), Max: 36.6 (20 Apr 2021 00:00)  T(F): 97.9 (20 Apr 2021 00:00), Max: 97.9 (20 Apr 2021 00:00)  HR: 85 (20 Apr 2021 02:00) (68 - 88)  BP: 131/72 (19 Apr 2021 20:00) (131/72 - 131/72)  BP(mean): 88 (19 Apr 2021 20:00) (88 - 88)  RR: 38 (19 Apr 2021 06:00) (36 - 38)  SpO2: 95% (20 Apr 2021 02:00) (87% - 100%)    Mode: AC/ CMV (Assist Control/ Continuous Mandatory Ventilation), RR (machine): 36, TV (machine): 400, FiO2: 40, PEEP: 5, ITime: 1, MAP: 17, PIP: 38    Diet, NPO with Tube Feed:   Tube Feeding Modality: Gastrostomy  Peptamen A.F. Formula  Total Volume for 24 Hours (mL): 1560  Continuous  Until Goal Tube Feed Rate (mL per Hour): 65  Tube Feed Duration (in Hours): 24  Tube Feed Start Time: 20:15 (04-13-21 @ 20:14)      I&O's Summary    18 Apr 2021 07:01  -  19 Apr 2021 07:00  --------------------------------------------------------  IN: 4413.2 mL / OUT: 2600 mL / NET: 1813.2 mL    19 Apr 2021 07:01  -  20 Apr 2021 02:05  --------------------------------------------------------  IN: 3193.8 mL / OUT: 1050 mL / NET: 2143.8 mL     I&O's Detail    18 Apr 2021 07:01  -  19 Apr 2021 07:00  --------------------------------------------------------  IN:    Dexmedetomidine: 53.2 mL    dextrose 5%: 1350 mL    Enteral Tube Flush: 1200 mL    Peptamen A.F.: 1560 mL    PRBCs (Packed Red Blood Cells): 250 mL  Total IN: 4413.2 mL    OUT:    Chest Tube (mL): 10 mL    Indwelling Catheter - Urethral (mL): 1590 mL    Rectal Tube (mL): 1000 mL  Total OUT: 2600 mL    Total NET: 1813.2 mL      19 Apr 2021 07:01  -  20 Apr 2021 02:05  --------------------------------------------------------  IN:    Dexmedetomidine: 58.8 mL    dextrose 5%: 700 mL    Enteral Tube Flush: 1200 mL    Peptamen A.F.: 1235 mL  Total IN: 3193.8 mL    OUT:    Indwelling Catheter - Urethral (mL): 1050 mL  Total OUT: 1050 mL    Total NET: 2143.8 mL          MEDICATIONS  (STANDING):  ALBUTerol    90 MICROgram(s) HFA Inhaler 2 Puff(s) Inhalation every 6 hours  chlorhexidine 4% Liquid 1 Application(s) Topical <User Schedule>  clotrimazole 1% Cream 1 Application(s) Topical two times a day  collagenase Ointment 1 Application(s) Topical daily  dexMEDEtomidine Infusion 0.202 MICROgram(s)/kG/Hr (3 mL/Hr) IV Continuous <Continuous>  dextrose 40% Gel 15 Gram(s) Oral once  dextrose 5%. 1000 milliLiter(s) (50 mL/Hr) IV Continuous <Continuous>  dextrose 5%. 1000 milliLiter(s) (100 mL/Hr) IV Continuous <Continuous>  dextrose 5%. 1000 milliLiter(s) (50 mL/Hr) IV Continuous <Continuous>  dextrose 5%. 1000 milliLiter(s) (100 mL/Hr) IV Continuous <Continuous>  dextrose 5%. 1000 milliLiter(s) (100 mL/Hr) IV Continuous <Continuous>  dextrose 50% Injectable 25 Gram(s) IV Push once  dextrose 50% Injectable 12.5 Gram(s) IV Push once  dextrose 50% Injectable 25 Gram(s) IV Push once  ferrous    sulfate Liquid 300 milliGRAM(s) Enteral Tube daily  glucagon  Injectable 1 milliGRAM(s) IntraMuscular once  glucagon  Injectable 1 milliGRAM(s) IntraMuscular once  insulin lispro (ADMELOG) corrective regimen sliding scale   SubCutaneous every 6 hours  ipratropium 17 MICROgram(s) HFA Inhaler 2 Puff(s) Inhalation every 6 hours  meropenem  IVPB 1000 milliGRAM(s) IV Intermittent every 24 hours  multivitamin/minerals/iron Oral Solution (CENTRUM) 15 milliLiter(s) Enteral Tube daily  nystatin Cream 1 Application(s) Topical two times a day  pantoprazole  Injectable 40 milliGRAM(s) IV Push every 12 hours  petrolatum Ophthalmic Ointment 1 Application(s) Both EYES three times a day  phenylephrine    Infusion 1 MICROgram(s)/kG/Min (11.1 mL/Hr) IV Continuous <Continuous>  QUEtiapine 50 milliGRAM(s) Oral two times a day  sodium bicarbonate  Injectable 50 milliEquivalent(s) IV Push <User Schedule>  vasopressin Infusion 0.04 Unit(s)/Min (2.4 mL/Hr) IV Continuous <Continuous>    MEDICATIONS  (PRN):  ondansetron Injectable 4 milliGRAM(s) IV Push every 4 hours PRN Nausea and/or Vomiting      PHYSICAL EXAM:    GENERAL: NAD    HEENT: NCAT    CHEST/LUNGS: CTAB, right chest tube to waterseal     HEART: RRR,  No murmurs, rubs, or gallops    ABDOMEN: SNTND +BS    EXTREMITIES:  FROM, No clubbing, cyanosis, or edema, palpable pulse    NEURO: No focal neurological deficits    SKIN: No rashes or lesions    INCISION/WOUNDS:                          8.2    13.70 )-----------( 74       ( 19 Apr 2021 04:20 )             25.8        CBC Full  -  ( 19 Apr 2021 04:20 )  WBC Count : 13.70 K/uL  RBC Count : 2.91 M/uL  Hemoglobin : 8.2 g/dL  Hematocrit : 25.8 %  Platelet Count - Automated : 74 K/uL  Mean Cell Volume : 88.7 fL  Mean Cell Hemoglobin : 28.2 pg  Mean Cell Hemoglobin Concentration : 31.8 g/dL  Auto Neutrophil # : 9.57 K/uL  Auto Lymphocyte # : 2.42 K/uL  Auto Monocyte # : 1.11 K/uL  Auto Eosinophil # : 0.45 K/uL  Auto Basophil # : 0.03 K/uL  Auto Neutrophil % : 69.8 %  Auto Lymphocyte % : 17.7 %  Auto Monocyte % : 8.1 %  Auto Eosinophil % : 3.3 %  Auto Basophil % : 0.2 %               151   |  113   |  111                Ca: 7.9    BMP:   ----------------------------< 212    Mg: x     (04-19-21 @ 18:44)             4.1    |  28    | 1.7                Ph: x        LFT:     TPro: 4.8 / Alb: 2.4 / TBili: 0.5 / DBili: x / AST: 58 / ALT: 21 / AlkPhos: 393   (04-19-21 @ 18:44)    LIVER FUNCTIONS - ( 19 Apr 2021 18:44 )  Alb: 2.4 g/dL / Pro: 4.8 g/dL / ALK PHOS: 393 U/L / ALT: 21 U/L / AST: 58 U/L / GGT: x           PT/INR - ( 19 Apr 2021 11:32 )   PT: 12.20 sec;   INR: 1.06 ratio         PTT - ( 19 Apr 2021 11:32 )  PTT:33.7 sec          Culture - Blood (collected 18 Apr 2021 06:16)  Source: .Blood None  Preliminary Report (19 Apr 2021 13:02):    No growth to date.

## 2021-04-20 NOTE — CONSULT NOTE ADULT - ASSESSMENT
Impression  Pt with severe necrotizing CAP  Right sided parapneumonic effusion s/p VATS and chest tube sp trach/peg  MENDOZA, improving  Thrombocytopenia, negative HIT & DIC panels  Anemia of Chronic Disease, s/p 1U PRBC 4/18, stable  Hypernatremia  HO bleeding from PEG site    Stage 3 pressure ulcer   GOC conversations with PC and family     Rec: Santyl / Hydrogel and dressing - allevyn   Offloading

## 2021-04-20 NOTE — PROGRESS NOTE ADULT - PROBLEM SELECTOR PLAN 4
Likely due to underlying disease. patient had ct head, and neuro consult.
continue per primary team
Likely due to underlying disease. patient had ct head, and neuro consult.
Likely due to underlying disease. patient had ct head, and neuro consult.

## 2021-04-20 NOTE — PROGRESS NOTE ADULT - ATTENDING COMMENTS
Impression    Severe necrotizing CAP  Right sided parapneumonic effusion s/p VATS and chest tube sp trach/peg  MENDOZA, improving  Thrombocytopenia, negative HIT & DIC panels  Anemia of Chronic Disease, s/p 1U PRBC 4/18, stable  Hypernatremia  HO bleeding from PEG site    Recommendation;    SAT  Adjust vent to RR 20 .  Pulmonary toilet   ABX per 	ID.  Meropenem for now end date 5-3

## 2021-04-20 NOTE — CONSULT NOTE ADULT - CONSULT REQUESTED DATE/TIME
09-Apr-2021 15:26
15-Mar-2021 16:03
20-Apr-2021 14:23
05-Apr-2021 13:59
04-Apr-2021 10:45
13-Apr-2021 00:02
19-Mar-2021 18:14
29-Mar-2021

## 2021-04-20 NOTE — CONSULT NOTE ADULT - PROVIDER SPECIALTY LIST ADULT
Gastroenterology
Infectious Disease
Surgery
Burn
Neurology
Palliative Care
Nutrition Support
Nephrology

## 2021-04-20 NOTE — PROGRESS NOTE ADULT - SUBJECTIVE AND OBJECTIVE BOX
DESTIN TERRY             MRN-[id number]    CC: fever     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, osteoarithtis, Haitian-speaking presenting with cough and fever.    As per daughter, patient has been having dry consistent cough since 2 months. Today, she was being evaluated for knee injections when she was found to be febrile. She also reports progressing generalized weakness with decreased appetite and PO intake.  No URT symptoms, no chest pain, no leg pain, no diarrhea, no urinary symptoms no sick contacts, no recent travel. In ED was hypotensive, was given IVF, started on levophed 0.04 called to evaluate (11 Mar 2021 03:21)      SUBJECTIVE:  PAtient seen and examined at bedside. She has trach and peg. not able to participate.  No family at bedside.     ROS: unable to assess as patient is lethargic and on vent   DYSPNEA:  N	  NAUS/VOM:  N	  SECRETIONS:  N	  AGITATION: N  Pain (Y/N):   NA     -Provocation/Palliation:  -Quality/Quantity:  -Radiating:  -Severity:  -Timing/Frequency:  -Impact on ADLs:    OTHER REVIEW OF SYSTEMS:  UNABLE TO OBTAIN  due to: lethargic and vented     PEx:  85y              General: trach and vent in place.   HEENT:  NCAT PERRL EOMI Non icteric   Neck: Supple no masses  CVS: RR S1S2 No M/G/R  Resp: vented bs   GI:  Soft NT ND BS+, peg in place   : Adasm  Musc: No C/C/E    Neuro: lethargic   Psych: not able to assess   Skin: Non jaundiced, no rash   Lymph: Normal    Last BM:   04-13-21 @ 07:01  -  04-14-21 @ 07:00  --------------------------------------------------------  OUT: 600 mL    04-14-21 @ 07:01  -  04-15-21 @ 07:00  --------------------------------------------------------  OUT: 580 mL    04-15-21 @ 07:01  -  04-16-21 @ 07:00  --------------------------------------------------------  OUT: 1650 mL    04-16-21 @ 07:01  -  04-17-21 @ 07:00  --------------------------------------------------------  OUT: 0 mL    04-18-21 @ 07:01  -  04-19-21 @ 07:00  --------------------------------------------------------  OUT: 1000 mL    04-20-21 @ 07:01  -  04-20-21 @ 12:57  --------------------------------------------------------  OUT: 800 mL        ALLERGIES:     OPIATE NAÏVE (Y/N): y    LABS: REVIEWED  MEDICATIONS: REVIEWED  MEDICATIONS  (STANDING):  ALBUTerol    90 MICROgram(s) HFA Inhaler 2 Puff(s) Inhalation every 6 hours  chlorhexidine 4% Liquid 1 Application(s) Topical <User Schedule>  clotrimazole 1% Cream 1 Application(s) Topical two times a day  collagenase Ointment 1 Application(s) Topical daily  dexMEDEtomidine Infusion 0.202 MICROgram(s)/kG/Hr (3 mL/Hr) IV Continuous <Continuous>  dextrose 40% Gel 15 Gram(s) Oral once  dextrose 5%. 1000 milliLiter(s) (50 mL/Hr) IV Continuous <Continuous>  dextrose 5%. 1000 milliLiter(s) (100 mL/Hr) IV Continuous <Continuous>  dextrose 5%. 1000 milliLiter(s) (50 mL/Hr) IV Continuous <Continuous>  dextrose 5%. 1000 milliLiter(s) (100 mL/Hr) IV Continuous <Continuous>  dextrose 5%. 1000 milliLiter(s) (50 mL/Hr) IV Continuous <Continuous>  dextrose 50% Injectable 25 Gram(s) IV Push once  dextrose 50% Injectable 12.5 Gram(s) IV Push once  dextrose 50% Injectable 25 Gram(s) IV Push once  ferrous    sulfate Liquid 300 milliGRAM(s) Enteral Tube daily  glucagon  Injectable 1 milliGRAM(s) IntraMuscular once  glucagon  Injectable 1 milliGRAM(s) IntraMuscular once  insulin lispro (ADMELOG) corrective regimen sliding scale   SubCutaneous every 6 hours  ipratropium 17 MICROgram(s) HFA Inhaler 2 Puff(s) Inhalation every 6 hours  meropenem  IVPB 1000 milliGRAM(s) IV Intermittent every 24 hours  multivitamin/minerals/iron Oral Solution (CENTRUM) 15 milliLiter(s) Enteral Tube daily  nystatin Cream 1 Application(s) Topical two times a day  pantoprazole  Injectable 40 milliGRAM(s) IV Push every 12 hours  petrolatum Ophthalmic Ointment 1 Application(s) Both EYES three times a day  phenylephrine    Infusion 1 MICROgram(s)/kG/Min (11.1 mL/Hr) IV Continuous <Continuous>  QUEtiapine 50 milliGRAM(s) Oral two times a day  sodium bicarbonate  Injectable 50 milliEquivalent(s) IV Push <User Schedule>  vasopressin Infusion 0.04 Unit(s)/Min (2.4 mL/Hr) IV Continuous <Continuous>    MEDICATIONS  (PRN):  ondansetron Injectable 4 milliGRAM(s) IV Push every 4 hours PRN Nausea and/or Vomiting      LABS: REVIEWED  CBC:                        8.7    16.39 )-----------( 106      ( 20 Apr 2021 06:30 )             27.7     CMP:    04-20    149<H>  |  114<H>  |  107<HH>  ----------------------------<  210<H>  4.0   |  25  |  1.5    Ca    7.8<L>      20 Apr 2021 06:30  Mg     2.3     04-20    TPro  4.9<L>  /  Alb  2.4<L>  /  TBili  0.5  /  DBili  x   /  AST  58<H>  /  ALT  25  /  AlkPhos  392<H>  04-20  Albumin, Serum: 2.4 g/dL (04-20-21 @ 06:30)        IMAGING: REVIEWED    EKG reviewed     ADVANCED DIRECTIVES:          FULL CODE            LIVING WILL            DPOA       HCP       MOL    DECISION MAKER:  daughter   LEGAL SURROGATE: daughter     PSYCHOSOCIAL-SPIRITUAL ASSESSMENT:       Reviewed       Care plan unchanged       GOALS OF CARE DISCUSSION       Palliative care info/counseling provided	           Family meeting       Advanced Directives addressed please see Advance Care Planning Note	           See previous Palliative Medicine Note       Documentation of GOC: please see prior notes     CURRENT DISPO PLAN:     WILL REMAIN IN HOSPITAL      REFERRALS	        Palliative Med        Unit SW/Case Mgmt

## 2021-04-20 NOTE — PROGRESS NOTE ADULT - ASSESSMENT
85yFemale being evaluated for goals of care. Patient has PMH of dementia, osteoarithtis, Chadian-speaking presenting with R empyma had VAT on R and developed ARDS and severe sepsis. Patient's hosptial course complicated by ARDS, patient now has tracheostomy, MENDOZA, septic shock on pressors, acute hypoxic hypercapnic resp failure dur to ards/pnumonia.    Family meeting prior and had address DNR and EOL.  Called daughter today and left a VM with palliative number.     MEDD (morphine equivalent daily dose):0      See Recs below.    Please call [phone] with questions or concerns 24/7.   We will continue to follow.     Discussed with vent team resident,  bedside RN

## 2021-04-20 NOTE — PROGRESS NOTE ADULT - SUBJECTIVE AND OBJECTIVE BOX
Patient is a 85y old  Female who presents with a chief complaint of sob (18 Apr 2021 09:17)    Over Night Events:    ROS:  All ROS are negative except HPI     PHYSICAL EXAM  ICU Vital Signs Last 24 Hrs  T(C): 35.7 (20 Apr 2021 08:00), Max: 36.6 (20 Apr 2021 00:00)  T(F): 96.2 (20 Apr 2021 08:00), Max: 97.9 (20 Apr 2021 00:00)  HR: 85 (20 Apr 2021 09:00) (75 - 91)  BP: 131/72 (19 Apr 2021 20:00) (131/72 - 131/72)  BP(mean): 88 (19 Apr 2021 20:00) (88 - 88)  ABP: 137/61 (20 Apr 2021 09:00) (117/57 - 149/73)  ABP(mean): 89 (20 Apr 2021 09:00) (62 - 96)  SpO2: 97% (20 Apr 2021 08:00) (95% - 98%)    CONSTITUTIONAL:  Well nourished  Ill appearing. In NAD    ENT:   Airway patent,   Mouth with normal mucosa.   No thrush    EYES:   Pupils equal,   Round and reactive to light.    CARDIAC:   Normal rate,   Regular rhythm.    edema      Vascular:  Normal systolic impulse  No Carotid bruits    RESPIRATORY:   No wheezing  Bilateral BS  Normal chest expansion  Not tachypneic,  No use of accessory muscles    GASTROINTESTINAL:  Abdomen soft,   Non-tender,   No guarding,   + BS    MUSCULOSKELETAL:   Range of motion is not limited,  No clubbing, cyanosis    NEUROLOGICAL:   Alert DOesnot follow commands     SKIN:   Skin normal color for race,   Warm and dry   No evidence of rash.    PSYCHIATRIC:    No apparent risk to self or others.    HEMATOLOGICAL:  No cervical  lymphadenopathy.  no inguinal lymphadenopathy      04-19-21 @ 07:01  -  04-20-21 @ 07:00  --------------------------------------------------------  IN:    Dexmedetomidine: 100.8 mL    dextrose 5%: 1200 mL    Enteral Tube Flush: 1500 mL    Peptamen A.F.: 1560 mL  Total IN: 4360.8 mL    OUT:    Indwelling Catheter - Urethral (mL): 1435 mL  Total OUT: 1435 mL    Total NET: 2925.8 mL      04-20-21 @ 07:01  -  04-20-21 @ 10:15  --------------------------------------------------------  IN:    Dexmedetomidine: 16.8 mL    dextrose 5%: 200 mL    Peptamen A.F.: 130 mL  Total IN: 346.8 mL    OUT:    Indwelling Catheter - Urethral (mL): 300 mL    Rectal Tube (mL): 700 mL  Total OUT: 1000 mL    Total NET: -653.2 mL          LABS:                            8.7    16.39 )-----------( 106      ( 20 Apr 2021 06:30 )             27.7                                               04-20    149<H>  |  114<H>  |  107<HH>  ----------------------------<  210<H>  4.0   |  25  |  1.5    Ca    7.8<L>      20 Apr 2021 06:30  Mg     2.3     04-20    TPro  4.9<L>  /  Alb  2.4<L>  /  TBili  0.5  /  DBili  x   /  AST  58<H>  /  ALT  25  /  AlkPhos  392<H>  04-20      PT/INR - ( 20 Apr 2021 06:30 )   PT: 12.20 sec;   INR: 1.06 ratio      PTT - ( 19 Apr 2021 11:32 )  PTT:33.7 sec                                             LIVER FUNCTIONS - ( 20 Apr 2021 06:30 )  Alb: 2.4 g/dL / Pro: 4.9 g/dL / ALK PHOS: 392 U/L / ALT: 25 U/L / AST: 58 U/L / GGT: x                                                Culture - Blood (collected 18 Apr 2021 06:16)  Source: .Blood None  Preliminary Report (19 Apr 2021 13:02):    No growth to date.    Mode: AC/ CMV (Assist Control/ Continuous Mandatory Ventilation)  RR (machine): 36  TV (machine): 400  FiO2: 40  PEEP: 5  ITime: 1  MAP: 16  PIP: 38    ABG - ( 20 Apr 2021 02:44 )  pH, Arterial: 7.42  pH, Blood: x     /  pCO2: 45    /  pO2: 64    / HCO3: 29    / Base Excess: 4.0   /  SaO2: 94        MEDICATIONS  (STANDING):  ALBUTerol    90 MICROgram(s) HFA Inhaler 2 Puff(s) Inhalation every 6 hours  chlorhexidine 4% Liquid 1 Application(s) Topical <User Schedule>  clotrimazole 1% Cream 1 Application(s) Topical two times a day  collagenase Ointment 1 Application(s) Topical daily  dexMEDEtomidine Infusion 0.202 MICROgram(s)/kG/Hr (3 mL/Hr) IV Continuous <Continuous>  dextrose 40% Gel 15 Gram(s) Oral once  dextrose 5%. 1000 milliLiter(s) (50 mL/Hr) IV Continuous <Continuous>  dextrose 5%. 1000 milliLiter(s) (100 mL/Hr) IV Continuous <Continuous>  dextrose 5%. 1000 milliLiter(s) (100 mL/Hr) IV Continuous <Continuous>  dextrose 5%. 1000 milliLiter(s) (50 mL/Hr) IV Continuous <Continuous>  dextrose 5%. 1000 milliLiter(s) (100 mL/Hr) IV Continuous <Continuous>  dextrose 50% Injectable 25 Gram(s) IV Push once  dextrose 50% Injectable 12.5 Gram(s) IV Push once  dextrose 50% Injectable 25 Gram(s) IV Push once  ferrous    sulfate Liquid 300 milliGRAM(s) Enteral Tube daily  glucagon  Injectable 1 milliGRAM(s) IntraMuscular once  glucagon  Injectable 1 milliGRAM(s) IntraMuscular once  insulin lispro (ADMELOG) corrective regimen sliding scale   SubCutaneous every 6 hours  ipratropium 17 MICROgram(s) HFA Inhaler 2 Puff(s) Inhalation every 6 hours  meropenem  IVPB 1000 milliGRAM(s) IV Intermittent every 24 hours  multivitamin/minerals/iron Oral Solution (CENTRUM) 15 milliLiter(s) Enteral Tube daily  nystatin Cream 1 Application(s) Topical two times a day  pantoprazole  Injectable 40 milliGRAM(s) IV Push every 12 hours  petrolatum Ophthalmic Ointment 1 Application(s) Both EYES three times a day  phenylephrine    Infusion 1 MICROgram(s)/kG/Min (11.1 mL/Hr) IV Continuous <Continuous>  QUEtiapine 50 milliGRAM(s) Oral two times a day  sodium bicarbonate  Injectable 50 milliEquivalent(s) IV Push <User Schedule>  vasopressin Infusion 0.04 Unit(s)/Min (2.4 mL/Hr) IV Continuous <Continuous>    MEDICATIONS  (PRN):  ondansetron Injectable 4 milliGRAM(s) IV Push every 4 hours PRN Nausea and/or Vomiting      New X-rays reviewed:                                                                                  ECHO    CXR interpreted by me:  bilateral infiltrates, lower lobe atelectasis, trach     Patient is a 85y old  Female who presents with a chief complaint of sob (18 Apr 2021 09:17)    Over Night Events:  No overnight events.  Remains on MV.  Precedex 0.5.  Off pressors    ROS:  All ROS are negative except HPI     PHYSICAL EXAM  ICU Vital Signs Last 24 Hrs  T(C): 35.7 (20 Apr 2021 08:00), Max: 36.6 (20 Apr 2021 00:00)  T(F): 96.2 (20 Apr 2021 08:00), Max: 97.9 (20 Apr 2021 00:00)  HR: 85 (20 Apr 2021 09:00) (75 - 91)  BP: 131/72 (19 Apr 2021 20:00) (131/72 - 131/72)  BP(mean): 88 (19 Apr 2021 20:00) (88 - 88)  ABP: 137/61 (20 Apr 2021 09:00) (117/57 - 149/73)  ABP(mean): 89 (20 Apr 2021 09:00) (62 - 96)  SpO2: 97% (20 Apr 2021 08:00) (95% - 98%)    CONSTITUTIONAL:  Well nourished  Ill appearing. In NAD    ENT:   Airway patent,   Mouth with normal mucosa.   No thrush    EYES:   Pupils equal,   Round and reactive to light.    CARDIAC:   Normal rate,   Regular rhythm.    edema      Vascular:  Normal systolic impulse  No Carotid bruits    RESPIRATORY:   No wheezing  Bilateral BS  Normal chest expansion  Not tachypneic,  No use of accessory muscles    GASTROINTESTINAL:  Abdomen soft,   Non-tender,   No guarding,   + BS    MUSCULOSKELETAL:   Range of motion is not limited,  No clubbing, cyanosis    NEUROLOGICAL:   Alert DOesnot follow commands     SKIN:   Skin normal color for race,   Warm and dry   No evidence of rash.    PSYCHIATRIC:    No apparent risk to self or others.    HEMATOLOGICAL:  No cervical  lymphadenopathy.  no inguinal lymphadenopathy      04-19-21 @ 07:01  -  04-20-21 @ 07:00  --------------------------------------------------------  IN:    Dexmedetomidine: 100.8 mL    dextrose 5%: 1200 mL    Enteral Tube Flush: 1500 mL    Peptamen A.F.: 1560 mL  Total IN: 4360.8 mL    OUT:    Indwelling Catheter - Urethral (mL): 1435 mL  Total OUT: 1435 mL    Total NET: 2925.8 mL      04-20-21 @ 07:01  -  04-20-21 @ 10:15  --------------------------------------------------------  IN:    Dexmedetomidine: 16.8 mL    dextrose 5%: 200 mL    Peptamen A.F.: 130 mL  Total IN: 346.8 mL    OUT:    Indwelling Catheter - Urethral (mL): 300 mL    Rectal Tube (mL): 700 mL  Total OUT: 1000 mL    Total NET: -653.2 mL          LABS:                            8.7    16.39 )-----------( 106      ( 20 Apr 2021 06:30 )             27.7                                               04-20    149<H>  |  114<H>  |  107<HH>  ----------------------------<  210<H>  4.0   |  25  |  1.5    Ca    7.8<L>      20 Apr 2021 06:30  Mg     2.3     04-20    TPro  4.9<L>  /  Alb  2.4<L>  /  TBili  0.5  /  DBili  x   /  AST  58<H>  /  ALT  25  /  AlkPhos  392<H>  04-20      PT/INR - ( 20 Apr 2021 06:30 )   PT: 12.20 sec;   INR: 1.06 ratio      PTT - ( 19 Apr 2021 11:32 )  PTT:33.7 sec                                             LIVER FUNCTIONS - ( 20 Apr 2021 06:30 )  Alb: 2.4 g/dL / Pro: 4.9 g/dL / ALK PHOS: 392 U/L / ALT: 25 U/L / AST: 58 U/L / GGT: x                                                Culture - Blood (collected 18 Apr 2021 06:16)  Source: .Blood None  Preliminary Report (19 Apr 2021 13:02):    No growth to date.    Mode: AC/ CMV (Assist Control/ Continuous Mandatory Ventilation)  RR (machine): 36  TV (machine): 400  FiO2: 40  PEEP: 5  ITime: 1  MAP: 16  PIP: 38    ABG - ( 20 Apr 2021 02:44 )  pH, Arterial: 7.42  pH, Blood: x     /  pCO2: 45    /  pO2: 64    / HCO3: 29    / Base Excess: 4.0   /  SaO2: 94        MEDICATIONS  (STANDING):  ALBUTerol    90 MICROgram(s) HFA Inhaler 2 Puff(s) Inhalation every 6 hours  chlorhexidine 4% Liquid 1 Application(s) Topical <User Schedule>  clotrimazole 1% Cream 1 Application(s) Topical two times a day  collagenase Ointment 1 Application(s) Topical daily  dexMEDEtomidine Infusion 0.202 MICROgram(s)/kG/Hr (3 mL/Hr) IV Continuous <Continuous>  dextrose 40% Gel 15 Gram(s) Oral once  dextrose 5%. 1000 milliLiter(s) (50 mL/Hr) IV Continuous <Continuous>  dextrose 5%. 1000 milliLiter(s) (100 mL/Hr) IV Continuous <Continuous>  dextrose 5%. 1000 milliLiter(s) (100 mL/Hr) IV Continuous <Continuous>  dextrose 5%. 1000 milliLiter(s) (50 mL/Hr) IV Continuous <Continuous>  dextrose 5%. 1000 milliLiter(s) (100 mL/Hr) IV Continuous <Continuous>  dextrose 50% Injectable 25 Gram(s) IV Push once  dextrose 50% Injectable 12.5 Gram(s) IV Push once  dextrose 50% Injectable 25 Gram(s) IV Push once  ferrous    sulfate Liquid 300 milliGRAM(s) Enteral Tube daily  glucagon  Injectable 1 milliGRAM(s) IntraMuscular once  glucagon  Injectable 1 milliGRAM(s) IntraMuscular once  insulin lispro (ADMELOG) corrective regimen sliding scale   SubCutaneous every 6 hours  ipratropium 17 MICROgram(s) HFA Inhaler 2 Puff(s) Inhalation every 6 hours  meropenem  IVPB 1000 milliGRAM(s) IV Intermittent every 24 hours  multivitamin/minerals/iron Oral Solution (CENTRUM) 15 milliLiter(s) Enteral Tube daily  nystatin Cream 1 Application(s) Topical two times a day  pantoprazole  Injectable 40 milliGRAM(s) IV Push every 12 hours  petrolatum Ophthalmic Ointment 1 Application(s) Both EYES three times a day  phenylephrine    Infusion 1 MICROgram(s)/kG/Min (11.1 mL/Hr) IV Continuous <Continuous>  QUEtiapine 50 milliGRAM(s) Oral two times a day  sodium bicarbonate  Injectable 50 milliEquivalent(s) IV Push <User Schedule>  vasopressin Infusion 0.04 Unit(s)/Min (2.4 mL/Hr) IV Continuous <Continuous>    MEDICATIONS  (PRN):  ondansetron Injectable 4 milliGRAM(s) IV Push every 4 hours PRN Nausea and/or Vomiting      New X-rays reviewed:                                                                                  ECHO    CXR interpreted by me:  bilateral infiltrates, lower lobe atelectasis, trach

## 2021-04-20 NOTE — PROGRESS NOTE ADULT - ASSESSMENT
Impression    Severe necrotizing CAP  Right sided parapneumonic effusion s/p VATS and chest tube sp trach/peg  MENDOZA, improving  Thrombocytopenia, negative HIT & DIC panels  Anemia of Chronic Disease, s/p 1U PRBC 4/18, stable  Hypernatremia  HO bleeding from PEG site      PLAN:    CNS : avoid CNS depressant    HEENT:  Oral care    PULMONARY:  HOB @ 45 degrees.  Keep SAO2 92 to 96%.  No vent changes.  Pulmonary toilet.    CARDIOVASCULAR:  c/w D5W 60cc/hr.  Free water 300 q6h.  Keep I = O.    GI: GI prophylaxis.  PEG feeds.  Bowel regimen.    RENAL:  F/u  lytes.  Correct as needed.  Monitor UO.  Repeat CMP.    INFECTIOUS DISEASE:  ABX per ID, c/w Meropenem.  ID FU.  FU cultures.    HEMATOLOGICAL:  SCDs for DVT prophylaxis.  Trend CBC.  Keep Hb > 7    ENDOCRINE:  Follow up FS.  Insulin protocol if needed.    A line, keep for now  Very poor overall prognosis  Palliative care FU  Full Code for now, Advanced directives and goals of care Impression    Severe necrotizing CAP  Right sided parapneumonic effusion s/p VATS and chest tube sp trach/peg  MENDOZA, improving  Thrombocytopenia, negative HIT & DIC panels  Anemia of Chronic Disease, s/p 1U PRBC 4/18, stable  Hypernatremia  HO bleeding from PEG site      PLAN:    CNS:  Precedex    HEENT:  Oral care    PULMONARY:  HOB @ 45 degrees.  Keep SAO2 92 to 96%.  No vent changes.  Pulmonary toilet.    CARDIOVASCULAR:  c/w D5W 60cc/hr.  Free water 300 q6h.  Keep I = O.    GI: GI prophylaxis.  PEG feeds.  Bowel regimen.    RENAL:  F/u  lytes.  Correct as needed.  Monitor UO.  Repeat CMP.    INFECTIOUS DISEASE:  ABX per ID, c/w Meropenem.  MRSA nares.  Procalcitonin.  ID FU.  FU cultures.    HEMATOLOGICAL:  SCDs for DVT prophylaxis.  Trend CBC.  Keep Hb > 7    ENDOCRINE:  Follow up FS.  Insulin protocol if needed.    A line, keep for now  Very poor overall prognosis  Palliative care FU  Full Code for now, Advanced directives and goals of care Impression    Severe necrotizing CAP  Right sided parapneumonic effusion s/p VATS and chest tube sp trach/peg  MENDOZA, improving  Thrombocytopenia, negative HIT & DIC panels  Anemia of Chronic Disease, s/p 1U PRBC 4/18, stable  Hypernatremia  HO bleeding from PEG site      PLAN:    CNS:  Precedex    HEENT:  Oral care    PULMONARY:  HOB @ 45 degrees.  Keep SAO2 92 to 96%.  RR 20 .  Pulmonary toilet.    CARDIOVASCULAR:  c/w D5W 60cc/hr.  Free water 300 q6h.  Keep I = O.    GI: GI prophylaxis.  PEG feeds.  Bowel regimen.    RENAL:  F/u  lytes.  Correct as needed.  Monitor UO.  Repeat CMP.    INFECTIOUS DISEASE:  ABX per ID, c/w Meropenem.  MRSA nares.  Procalcitonin.  ID FU.  FU cultures.    HEMATOLOGICAL:  SCDs for DVT prophylaxis.  Trend CBC.  Keep Hb > 7    ENDOCRINE:  Follow up FS.  Insulin protocol if needed.    A line, keep for now  Very poor overall prognosis  Palliative care FU  Full Code for now, Advanced directives and goals of care

## 2021-04-20 NOTE — CONSULT NOTE ADULT - CONSULT REASON
GI bleed
empyema
not following commands when off sedation
MENDOZA
end of life care
Sacral ulcer
tube feed
Pneumonia

## 2021-04-20 NOTE — CONSULT NOTE ADULT - SUBJECTIVE AND OBJECTIVE BOX
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, osteoarithtis, [place]-speaking presenting with cough and fever.    As per daughter, patient has been having dry consistent cough since 2 months. Today, she was being evaluated for knee injections when she was found to be febrile. She also reports progressing generalized weakness with decreased appetite and PO intake.  No URT symptoms, no chest pain, no leg pain, no diarrhea, no urinary symptoms no sick contacts, no recent travel. In ED was hypotensive, was given IVF, started on levophed 0.04 called to evaluate (11 Mar 2021 03:21)      PAST MEDICAL & SURGICAL HISTORY:  Dementia  Osteoarthritis  No significant past surgical history    BURN consult requested for pressure ulcer                          8.7    16.39 )-----------( 106      ( 20 Apr 2021 06:30 )             27.7     Pt - no response to verbal stimuli. S/p trach     EXAm - sacrum - 5 x 6 cm site  with areas of soft gray eschar and pink areas

## 2021-04-21 NOTE — PROGRESS NOTE ADULT - ATTENDING COMMENTS
IMPRESSION:    Severe necrotizing CAP  Right sided parapneumonic effusion s/p VATS and chest tube sp trach/peg  MENDOZA, improving  Thrombocytopenia, negative HIT & DIC panels  Anemia of Chronic Disease, s/p 1U PRBC 4/18, stable  Hypernatremia  HO bleeding from PEG site    Trial of PS wean today   Continue present management   Prognosis extremely poor

## 2021-04-21 NOTE — PROGRESS NOTE ADULT - SUBJECTIVE AND OBJECTIVE BOX
Patient is a 85y old  Female who presents with a chief complaint of Fever (20 Apr 2021 12:56)    Over Night Events: No overnight events.  Remains on MV.  Precedex  Off pressors      ROS:   All ROS are negative except HPI       PHYSICAL EXAM  ICU Vital Signs Last 24 Hrs  T(C): 36 (21 Apr 2021 04:00), Max: 36.3 (21 Apr 2021 00:00)  T(F): 96.8 (21 Apr 2021 04:00), Max: 97.3 (21 Apr 2021 00:00)  HR: 76 (21 Apr 2021 09:00) (69 - 90)  BP: 117/65 (20 Apr 2021 12:00) (117/65 - 117/65)  BP(mean): 85 (20 Apr 2021 12:00) (85 - 85)  ABP: 115/62 (21 Apr 2021 09:00) (97/52 - 140/67)  ABP(mean): 77 (21 Apr 2021 09:00) (58 - 93)  RR: 40 (21 Apr 2021 09:00) (29 - 43)  SpO2: 98% (21 Apr 2021 09:00) (95% - 100%)    CONSTITUTIONAL:  Well nourished  Ill appearing. In NAD    ENT:   Airway patent,   Mouth with normal mucosa.   No thrush    EYES:   Pupils equal,   Round and reactive to light.    CARDIAC:   Normal rate,   Regular rhythm.    edema    Vascular:  Normal systolic impulse  No Carotid bruits    RESPIRATORY:   No wheezing  Bilateral BS  Normal chest expansion  Not tachypneic,  No use of accessory muscles    GASTROINTESTINAL:  Abdomen soft,   Non-tender,   No guarding,   + BS    MUSCULOSKELETAL:   Range of motion is not limited,  No clubbing, cyanosis    NEUROLOGICAL:   Alert  Doesnot follow commands     SKIN:   Skin normal color for race,   Warm and dry   No evidence of rash.    PSYCHIATRIC:    No apparent risk to self or others.    HEMATOLOGICAL:  No cervical  lymphadenopathy.  no inguinal lymphadenopathy      04-20-21 @ 07:01  -  04-21-21 @ 07:00  --------------------------------------------------------  IN:    Dexmedetomidine: 242.4 mL    dextrose 5%: 1050 mL    dextrose 5%: 300 mL    Enteral Tube Flush: 600 mL    Peptamen A.F.: 1560 mL  Total IN: 3752.4 mL    OUT:    Chest Tube (mL): 10 mL    Indwelling Catheter - Urethral (mL): 1950 mL    Rectal Tube (mL): 800 mL  Total OUT: 2760 mL    Total NET: 992.4 mL      04-21-21 @ 07:01  -  04-21-21 @ 09:41  --------------------------------------------------------  IN:    Dexmedetomidine: 11 mL    dextrose 5%: 50 mL  Total IN: 61 mL    OUT:    Indwelling Catheter - Urethral (mL): 80 mL  Total OUT: 80 mL    Total NET: -19 mL          LABS:                            7.7    16.94 )-----------( 131      ( 21 Apr 2021 05:30 )             24.8                                               04-21    150<H>  |  113<H>  |  102<HH>  ----------------------------<  177<H>  4.3   |  26  |  1.4    Ca    8.1<L>      21 Apr 2021 05:30  Mg     2.2     04-21    TPro  5.0<L>  /  Alb  2.3<L>  /  TBili  0.4  /  DBili  x   /  AST  51<H>  /  ALT  27  /  AlkPhos  361<H>  04-21      PT/INR - ( 21 Apr 2021 05:30 )   PT: 12.30 sec;   INR: 1.07 ratio         PTT - ( 19 Apr 2021 11:32 )  PTT:33.7 sec                                                                                     LIVER FUNCTIONS - ( 21 Apr 2021 05:30 )  Alb: 2.3 g/dL / Pro: 5.0 g/dL / ALK PHOS: 361 U/L / ALT: 27 U/L / AST: 51 U/L / GGT: x                                                                                               Mode: AC/ CMV (Assist Control/ Continuous Mandatory Ventilation)  RR (machine): 26  TV (machine): 350  FiO2: 40  PEEP: 5  ITime: 1  MAP: 10  PIP: 29                                      ABG - ( 21 Apr 2021 02:55 )  pH, Arterial: 7.38  pH, Blood: x     /  pCO2: 49    /  pO2: 72    / HCO3: 29    / Base Excess: 3.7   /  SaO2: 96            MEDICATIONS  (STANDING):  ALBUTerol    90 MICROgram(s) HFA Inhaler 2 Puff(s) Inhalation every 6 hours  chlorhexidine 4% Liquid 1 Application(s) Topical <User Schedule>  clotrimazole 1% Cream 1 Application(s) Topical two times a day  collagenase Ointment 1 Application(s) Topical daily  dexMEDEtomidine Infusion 0.202 MICROgram(s)/kG/Hr (3 mL/Hr) IV Continuous <Continuous>  dextrose 40% Gel 15 Gram(s) Oral once  dextrose 5%. 1000 milliLiter(s) (50 mL/Hr) IV Continuous <Continuous>  dextrose 5%. 1000 milliLiter(s) (100 mL/Hr) IV Continuous <Continuous>  dextrose 5%. 1000 milliLiter(s) (50 mL/Hr) IV Continuous <Continuous>  dextrose 5%. 1000 milliLiter(s) (50 mL/Hr) IV Continuous <Continuous>  dextrose 5%. 1000 milliLiter(s) (100 mL/Hr) IV Continuous <Continuous>  dextrose 50% Injectable 25 Gram(s) IV Push once  dextrose 50% Injectable 12.5 Gram(s) IV Push once  dextrose 50% Injectable 25 Gram(s) IV Push once  ferrous    sulfate Liquid 300 milliGRAM(s) Enteral Tube daily  glucagon  Injectable 1 milliGRAM(s) IntraMuscular once  glucagon  Injectable 1 milliGRAM(s) IntraMuscular once  insulin lispro (ADMELOG) corrective regimen sliding scale   SubCutaneous every 6 hours  ipratropium 17 MICROgram(s) HFA Inhaler 2 Puff(s) Inhalation every 6 hours  meropenem  IVPB 1000 milliGRAM(s) IV Intermittent every 24 hours  multivitamin/minerals/iron Oral Solution (CENTRUM) 15 milliLiter(s) Enteral Tube daily  nystatin Cream 1 Application(s) Topical two times a day  pantoprazole  Injectable 40 milliGRAM(s) IV Push every 12 hours  petrolatum Ophthalmic Ointment 1 Application(s) Both EYES three times a day  phenylephrine    Infusion 1 MICROgram(s)/kG/Min (11.1 mL/Hr) IV Continuous <Continuous>  QUEtiapine 50 milliGRAM(s) Oral two times a day  sodium bicarbonate  Injectable 50 milliEquivalent(s) IV Push <User Schedule>  vasopressin Infusion 0.04 Unit(s)/Min (2.4 mL/Hr) IV Continuous <Continuous>    MEDICATIONS  (PRN):  ondansetron Injectable 4 milliGRAM(s) IV Push every 4 hours PRN Nausea and/or Vomiting    New X-rays reviewed:                                                                                  ECHO    CXR interpreted by me:  bilateral infiltrates, lower lobe atelectasis, trach     Patient is a 85y old  Female who presents with a chief complaint of Fever (20 Apr 2021 12:56)    Over Night Events: No overnight events.  Remains on MV.  Precedex  Off pressors      ROS:   All ROS are negative except HPI       PHYSICAL EXAM  ICU Vital Signs Last 24 Hrs  T(C): 36 (21 Apr 2021 04:00), Max: 36.3 (21 Apr 2021 00:00)  T(F): 96.8 (21 Apr 2021 04:00), Max: 97.3 (21 Apr 2021 00:00)  HR: 76 (21 Apr 2021 09:00) (69 - 90)  BP: 117/65 (20 Apr 2021 12:00) (117/65 - 117/65)  BP(mean): 85 (20 Apr 2021 12:00) (85 - 85)  ABP: 115/62 (21 Apr 2021 09:00) (97/52 - 140/67)  ABP(mean): 77 (21 Apr 2021 09:00) (58 - 93)  RR: 40 (21 Apr 2021 09:00) (29 - 43)  SpO2: 98% (21 Apr 2021 09:00) (95% - 100%)    CONSTITUTIONAL:  Well nourished  Ill appearing. In NAD    ENT:   Airway patent,   Mouth with normal mucosa.   No thrush    EYES:   Pupils equal,   Round and reactive to light.    CARDIAC:   Normal rate,   Regular rhythm.    edema    Vascular:  Normal systolic impulse  No Carotid bruits    RESPIRATORY:   No wheezing  Bilateral BS  Normal chest expansion  Not tachypneic,  No use of accessory muscles    GASTROINTESTINAL:  Abdomen soft,   Non-tender,   No guarding,   + BS    MUSCULOSKELETAL:   Range of motion is not limited,  No clubbing, cyanosis    NEUROLOGICAL:   Alert  Doesnot follow commands     SKIN:   Skin normal color for race,   Warm and dry   No evidence of rash.  Unstageable sacral ulcer    PSYCHIATRIC:    No apparent risk to self or others.    HEMATOLOGICAL:  No cervical  lymphadenopathy.  no inguinal lymphadenopathy      04-20-21 @ 07:01  -  04-21-21 @ 07:00  --------------------------------------------------------  IN:    Dexmedetomidine: 242.4 mL    dextrose 5%: 1050 mL    dextrose 5%: 300 mL    Enteral Tube Flush: 600 mL    Peptamen A.F.: 1560 mL  Total IN: 3752.4 mL    OUT:    Chest Tube (mL): 10 mL    Indwelling Catheter - Urethral (mL): 1950 mL    Rectal Tube (mL): 800 mL  Total OUT: 2760 mL    Total NET: 992.4 mL      04-21-21 @ 07:01  -  04-21-21 @ 09:41  --------------------------------------------------------  IN:    Dexmedetomidine: 11 mL    dextrose 5%: 50 mL  Total IN: 61 mL    OUT:    Indwelling Catheter - Urethral (mL): 80 mL  Total OUT: 80 mL    Total NET: -19 mL          LABS:                            7.7    16.94 )-----------( 131      ( 21 Apr 2021 05:30 )             24.8                                               04-21    150<H>  |  113<H>  |  102<HH>  ----------------------------<  177<H>  4.3   |  26  |  1.4    Ca    8.1<L>      21 Apr 2021 05:30  Mg     2.2     04-21    TPro  5.0<L>  /  Alb  2.3<L>  /  TBili  0.4  /  DBili  x   /  AST  51<H>  /  ALT  27  /  AlkPhos  361<H>  04-21      PT/INR - ( 21 Apr 2021 05:30 )   PT: 12.30 sec;   INR: 1.07 ratio         PTT - ( 19 Apr 2021 11:32 )  PTT:33.7 sec                                                                                     LIVER FUNCTIONS - ( 21 Apr 2021 05:30 )  Alb: 2.3 g/dL / Pro: 5.0 g/dL / ALK PHOS: 361 U/L / ALT: 27 U/L / AST: 51 U/L / GGT: x                                                                                               Mode: AC/ CMV (Assist Control/ Continuous Mandatory Ventilation)  RR (machine): 26  TV (machine): 350  FiO2: 40  PEEP: 5  ITime: 1  MAP: 10  PIP: 29                                      ABG - ( 21 Apr 2021 02:55 )  pH, Arterial: 7.38  pH, Blood: x     /  pCO2: 49    /  pO2: 72    / HCO3: 29    / Base Excess: 3.7   /  SaO2: 96            MEDICATIONS  (STANDING):  ALBUTerol    90 MICROgram(s) HFA Inhaler 2 Puff(s) Inhalation every 6 hours  chlorhexidine 4% Liquid 1 Application(s) Topical <User Schedule>  clotrimazole 1% Cream 1 Application(s) Topical two times a day  collagenase Ointment 1 Application(s) Topical daily  dexMEDEtomidine Infusion 0.202 MICROgram(s)/kG/Hr (3 mL/Hr) IV Continuous <Continuous>  dextrose 40% Gel 15 Gram(s) Oral once  dextrose 5%. 1000 milliLiter(s) (50 mL/Hr) IV Continuous <Continuous>  dextrose 5%. 1000 milliLiter(s) (100 mL/Hr) IV Continuous <Continuous>  dextrose 5%. 1000 milliLiter(s) (50 mL/Hr) IV Continuous <Continuous>  dextrose 5%. 1000 milliLiter(s) (50 mL/Hr) IV Continuous <Continuous>  dextrose 5%. 1000 milliLiter(s) (100 mL/Hr) IV Continuous <Continuous>  dextrose 50% Injectable 25 Gram(s) IV Push once  dextrose 50% Injectable 12.5 Gram(s) IV Push once  dextrose 50% Injectable 25 Gram(s) IV Push once  ferrous    sulfate Liquid 300 milliGRAM(s) Enteral Tube daily  glucagon  Injectable 1 milliGRAM(s) IntraMuscular once  glucagon  Injectable 1 milliGRAM(s) IntraMuscular once  insulin lispro (ADMELOG) corrective regimen sliding scale   SubCutaneous every 6 hours  ipratropium 17 MICROgram(s) HFA Inhaler 2 Puff(s) Inhalation every 6 hours  meropenem  IVPB 1000 milliGRAM(s) IV Intermittent every 24 hours  multivitamin/minerals/iron Oral Solution (CENTRUM) 15 milliLiter(s) Enteral Tube daily  nystatin Cream 1 Application(s) Topical two times a day  pantoprazole  Injectable 40 milliGRAM(s) IV Push every 12 hours  petrolatum Ophthalmic Ointment 1 Application(s) Both EYES three times a day  phenylephrine    Infusion 1 MICROgram(s)/kG/Min (11.1 mL/Hr) IV Continuous <Continuous>  QUEtiapine 50 milliGRAM(s) Oral two times a day  sodium bicarbonate  Injectable 50 milliEquivalent(s) IV Push <User Schedule>  vasopressin Infusion 0.04 Unit(s)/Min (2.4 mL/Hr) IV Continuous <Continuous>    MEDICATIONS  (PRN):  ondansetron Injectable 4 milliGRAM(s) IV Push every 4 hours PRN Nausea and/or Vomiting    New X-rays reviewed:                                                                                  ECHO    CXR interpreted by me:  bilateral infiltrates, lower lobe atelectasis, trach

## 2021-04-21 NOTE — PROGRESS NOTE ADULT - SUBJECTIVE AND OBJECTIVE BOX
DESTIN TERRY  85y Female   [id number]    Hospital Day: 42  Post Operative Day:22  Procedure:s/p right vats with chest tube placement   Patient is a 85y old  Female who presents with a chief complaint of Fever (20 Apr 2021 12:56)    PAST MEDICAL & SURGICAL HISTORY:  Dementia    Osteoarthritis    No significant past surgical history        Events of the Last 24h:  Vital Signs Last 24 Hrs  T(C): 36.3 (21 Apr 2021 00:00), Max: 36.3 (21 Apr 2021 00:00)  T(F): 97.3 (21 Apr 2021 00:00), Max: 97.3 (21 Apr 2021 00:00)  HR: 88 (21 Apr 2021 01:00) (69 - 91)  BP: 117/65 (20 Apr 2021 12:00) (117/65 - 117/65)  BP(mean): 85 (20 Apr 2021 12:00) (85 - 85)  RR: 30 (21 Apr 2021 01:00) (29 - 43)  SpO2: 100% (21 Apr 2021 01:00) (95% - 100%)    Mode: AC/ CMV (Assist Control/ Continuous Mandatory Ventilation), RR (machine): 26, TV (machine): 350, FiO2: 40, PEEP: 5, ITime: 1, MAP: 10, PIP: 29    Diet, NPO with Tube Feed:   Tube Feeding Modality: Gastrostomy  Peptamen A.F. Formula  Total Volume for 24 Hours (mL): 1560  Continuous  Until Goal Tube Feed Rate (mL per Hour): 65  Tube Feed Duration (in Hours): 24  Tube Feed Start Time: 20:15 (04-13-21 @ 20:14)      I&O's Summary    19 Apr 2021 07:01  -  20 Apr 2021 07:00  --------------------------------------------------------  IN: 4360.8 mL / OUT: 1435 mL / NET: 2925.8 mL    20 Apr 2021 07:01  -  21 Apr 2021 00:45  --------------------------------------------------------  IN: 2876.7 mL / OUT: 2280 mL / NET: 596.7 mL     I&O's Detail    19 Apr 2021 07:01  -  20 Apr 2021 07:00  --------------------------------------------------------  IN:    Dexmedetomidine: 100.8 mL    dextrose 5%: 1200 mL    Enteral Tube Flush: 1500 mL    Peptamen A.F.: 1560 mL  Total IN: 4360.8 mL    OUT:    Indwelling Catheter - Urethral (mL): 1435 mL  Total OUT: 1435 mL    Total NET: 2925.8 mL      20 Apr 2021 07:01  -  21 Apr 2021 00:45  --------------------------------------------------------  IN:    Dexmedetomidine: 171.7 mL    dextrose 5%: 300 mL    dextrose 5%: 700 mL    Enteral Tube Flush: 600 mL    Peptamen A.F.: 1105 mL  Total IN: 2876.7 mL    OUT:    Chest Tube (mL): 10 mL    Indwelling Catheter - Urethral (mL): 1470 mL    Rectal Tube (mL): 800 mL  Total OUT: 2280 mL    Total NET: 596.7 mL          MEDICATIONS  (STANDING):  ALBUTerol    90 MICROgram(s) HFA Inhaler 2 Puff(s) Inhalation every 6 hours  chlorhexidine 4% Liquid 1 Application(s) Topical <User Schedule>  clotrimazole 1% Cream 1 Application(s) Topical two times a day  collagenase Ointment 1 Application(s) Topical daily  dexMEDEtomidine Infusion 0.202 MICROgram(s)/kG/Hr (3 mL/Hr) IV Continuous <Continuous>  dextrose 40% Gel 15 Gram(s) Oral once  dextrose 5%. 1000 milliLiter(s) (50 mL/Hr) IV Continuous <Continuous>  dextrose 5%. 1000 milliLiter(s) (100 mL/Hr) IV Continuous <Continuous>  dextrose 5%. 1000 milliLiter(s) (50 mL/Hr) IV Continuous <Continuous>  dextrose 5%. 1000 milliLiter(s) (100 mL/Hr) IV Continuous <Continuous>  dextrose 5%. 1000 milliLiter(s) (50 mL/Hr) IV Continuous <Continuous>  dextrose 50% Injectable 25 Gram(s) IV Push once  dextrose 50% Injectable 12.5 Gram(s) IV Push once  dextrose 50% Injectable 25 Gram(s) IV Push once  ferrous    sulfate Liquid 300 milliGRAM(s) Enteral Tube daily  glucagon  Injectable 1 milliGRAM(s) IntraMuscular once  glucagon  Injectable 1 milliGRAM(s) IntraMuscular once  insulin lispro (ADMELOG) corrective regimen sliding scale   SubCutaneous every 6 hours  ipratropium 17 MICROgram(s) HFA Inhaler 2 Puff(s) Inhalation every 6 hours  meropenem  IVPB 1000 milliGRAM(s) IV Intermittent every 24 hours  multivitamin/minerals/iron Oral Solution (CENTRUM) 15 milliLiter(s) Enteral Tube daily  nystatin Cream 1 Application(s) Topical two times a day  pantoprazole  Injectable 40 milliGRAM(s) IV Push every 12 hours  petrolatum Ophthalmic Ointment 1 Application(s) Both EYES three times a day  phenylephrine    Infusion 1 MICROgram(s)/kG/Min (11.1 mL/Hr) IV Continuous <Continuous>  QUEtiapine 50 milliGRAM(s) Oral two times a day  sodium bicarbonate  Injectable 50 milliEquivalent(s) IV Push <User Schedule>  vasopressin Infusion 0.04 Unit(s)/Min (2.4 mL/Hr) IV Continuous <Continuous>    MEDICATIONS  (PRN):  ondansetron Injectable 4 milliGRAM(s) IV Push every 4 hours PRN Nausea and/or Vomiting      PHYSICAL EXAM:    GENERAL: NAD    HEENT: NCAT    CHEST/LUNGS: CTAB, right chest tube to suction     HEART: RRR,  No murmurs, rubs, or gallops    ABDOMEN: SNTND +BS    EXTREMITIES:  FROM, No clubbing, cyanosis, or edema, palpable pulse    NEURO: No focal neurological deficits    SKIN: No rashes or lesions    INCISION/WOUNDS:                          8.1    17.95 )-----------( 204      ( 20 Apr 2021 16:21 )             25.9        CBC Full  -  ( 20 Apr 2021 16:21 )  WBC Count : 17.95 K/uL  RBC Count : 2.91 M/uL  Hemoglobin : 8.1 g/dL  Hematocrit : 25.9 %  Platelet Count - Automated : 204 K/uL  Mean Cell Volume : 89.0 fL  Mean Cell Hemoglobin : 27.8 pg  Mean Cell Hemoglobin Concentration : 31.3 g/dL  Auto Neutrophil # : 13.68 K/uL  Auto Lymphocyte # : 2.44 K/uL  Auto Monocyte # : 1.00 K/uL  Auto Eosinophil # : 0.66 K/uL  Auto Basophil # : 0.03 K/uL  Auto Neutrophil % : 76.1 %  Auto Lymphocyte % : 13.6 %  Auto Monocyte % : 5.6 %  Auto Eosinophil % : 3.7 %  Auto Basophil % : 0.2 %               150   |  113   |  104                Ca: 7.9    BMP:   ----------------------------< 167    Mg: x     (04-20-21 @ 16:21)             3.9    |  27    | 1.4                Ph: x        LFT:     TPro: 4.9 / Alb: 2.4 / TBili: 0.4 / DBili: x / AST: 63 / ALT: 29 / AlkPhos: 398   (04-20-21 @ 16:21)    LIVER FUNCTIONS - ( 20 Apr 2021 16:21 )  Alb: 2.4 g/dL / Pro: 4.9 g/dL / ALK PHOS: 398 U/L / ALT: 29 U/L / AST: 63 U/L / GGT: x           PT/INR - ( 20 Apr 2021 06:30 )   PT: 12.20 sec;   INR: 1.06 ratio         PTT - ( 19 Apr 2021 11:32 )  PTT:33.7 sec          Culture - Blood (collected 18 Apr 2021 06:16)  Source: .Blood None  Preliminary Report (19 Apr 2021 13:02):    No growth to date.      < from: Xray Chest 1 View- PORTABLE-Routine (Xray Chest 1 View- PORTABLE-Routine in AM.) (04.20.21 @ 05:47) >    IMPRESSION:    Stable diffuse bilateral opacities.    < end of copied text >

## 2021-04-21 NOTE — PROGRESS NOTE ADULT - ASSESSMENT
IMPRESSION:    Severe necrotizing CAP  Right sided parapneumonic effusion s/p VATS and chest tube sp trach/peg  MENDOZA, improving  Thrombocytopenia, negative HIT & DIC panels  Anemia of Chronic Disease, s/p 1U PRBC 4/18, stable  Hypernatremia  HO bleeding from PEG site      PLAN:    CNS:  SAT    HEENT:  Oral care    PULMONARY:  HOB @ 45 degrees.  Keep SAO2 92 to 96%.   SBT, PS 5 & 5.  Pulmonary toilet.  Chest tube per CT sx    CARDIOVASCULAR:  c/w D5W 60cc/hr.  Free water 300 q6h.  Keep I = O.    GI: GI prophylaxis.  PEG feeds.  Bowel regimen.    RENAL:  F/u  lytes.  Correct as needed.  Monitor UO.   Repeat CMP.    INFECTIOUS DISEASE:  ABX per ID, c/w Meropenem.  MRSA nares.  Procalcitonin.  ID FU.  FU cultures.    HEMATOLOGICAL:  SCDs for DVT prophylaxis.  Repeat CBC & T&S.  Keep Hb > 7    ENDOCRINE:  Follow up FS.  Insulin protocol if needed.    D/c A line  Very poor overall prognosis  Palliative care FU  Full Code for now, Advanced directives and goals of care IMPRESSION:    Severe necrotizing CAP  Right sided parapneumonic effusion s/p VATS and chest tube sp trach/peg  MENDOZA, improving  Thrombocytopenia, negative HIT & DIC panels  Anemia of Chronic Disease, s/p 1U PRBC 4/18, stable  Hypernatremia  HO bleeding from PEG site      PLAN:    CNS:  SAT    HEENT:  Oral care    PULMONARY:  HOB @ 45 degrees.  Keep SAO2 92 to 96%.   SBT, PS 5 & 5.  Pulmonary toilet.  Chest tube per CT sx    CARDIOVASCULAR:  c/w D5W 60cc/hr.  Free water 300 q6h.  Keep I = O.    GI: GI prophylaxis.  PEG feeds.  Bowel regimen.    RENAL:  F/u  lytes.  Correct as needed.  Monitor UO.   Repeat CMP.    INFECTIOUS DISEASE:  ABX per ID, c/w Meropenem.  MRSA nares.  Procalcitonin.  ID FU.  FU cultures.    HEMATOLOGICAL:  SCDs for DVT prophylaxis.  Repeat CBC & T&S.  Keep Hb > 7    ENDOCRINE:  Follow up FS.  Insulin protocol if needed.    MUSCULOSKELETAL: bed rest, pain control for sacral ulcer    D/c A line  Very poor overall prognosis  Palliative care FU  Full Code for now, Advanced directives and goals of care IMPRESSION:    Severe necrotizing CAP  Right sided parapneumonic effusion s/p VATS and chest tube sp trach/peg  MENDOZA, improving  Thrombocytopenia, negative HIT & DIC panels  Anemia of Chronic Disease, s/p 1U PRBC 4/18, stable  Hypernatremia  HO bleeding from PEG site      PLAN:    CNS:  SAT    HEENT:  Oral care    PULMONARY:  HOB @ 45 degrees.  Keep SAO2 92 to 96%.    PS 5 & 5.  Pulmonary toilet.  Chest tube per CT sx    CARDIOVASCULAR:  c/w D5W 60cc/hr.  Free water 300 q6h.  Keep I = O.    GI: GI prophylaxis.  PEG feeds.  Bowel regimen.    RENAL:  F/u  lytes.  Correct as needed.  Monitor UO.   Repeat CMP.    INFECTIOUS DISEASE:  ABX per ID, c/w Meropenem.  MRSA nares.  Procalcitonin.  ID FU.  FU cultures.    HEMATOLOGICAL:  SCDs for DVT prophylaxis.  Repeat CBC & T&S.  Keep Hb > 7    ENDOCRINE:  Follow up FS.  Insulin protocol if needed.    MUSCULOSKELETAL: bed rest, pain control for sacral ulcer    D/c A line  Very poor overall prognosis  Palliative care FU  Full Code for now, Advanced directives and goals of care

## 2021-04-21 NOTE — PROGRESS NOTE ADULT - ASSESSMENT
Chest tube to suction  Daily chest xray  Monitor chest tube out put  Monitor for airleaks  Monitor 02 saturation  Monitor vent settings  Medical management vent unit management  Follow up family   DVT/GI prophylaxis  Pain management

## 2021-04-22 NOTE — PROGRESS NOTE ADULT - SUBJECTIVE AND OBJECTIVE BOX
Patient is a 85y old  Female who presents with a chief complaint of Fever (20 Apr 2021 12:56)    Over Night Events:  Remains on MV.  Precedex.  Off pressors.    ROS:   All ROS are negative except HPI     PHYSICAL EXAM  ICU Vital Signs Last 24 Hrs  T(C): 37 (22 Apr 2021 08:30), Max: 37.1 (21 Apr 2021 11:00)  T(F): 98.6 (22 Apr 2021 08:30), Max: 98.8 (21 Apr 2021 11:00)  HR: 86 (22 Apr 2021 09:00) (66 - 89)  ABP: 126/58 (22 Apr 2021 09:00) (102/59 - 135/62)  ABP(mean): 78 (22 Apr 2021 07:00) (68 - 92)  RR: 32 (22 Apr 2021 09:00) (25 - 42)  SpO2: 96% (22 Apr 2021 09:00) (92% - 100%)      CONSTITUTIONAL:  Well nourished  Ill appearing. In NAD    ENT:   Airway patent,   Mouth with normal mucosa.   No thrush    EYES:   Pupils equal,   Round and reactive to light.    CARDIAC:   Normal rate,   Regular rhythm.    edema    Vascular:  Normal systolic impulse  No Carotid bruits    RESPIRATORY:   No wheezing  Bilateral BS  Normal chest expansion  Not tachypneic,  No use of accessory muscles    GASTROINTESTINAL:  Abdomen soft,   Non-tender,   No guarding,   + BS    MUSCULOSKELETAL:   Range of motion is not limited,  No clubbing, cyanosis    NEUROLOGICAL:   Alert  Does not follow commands     SKIN:   Skin normal color for race,   Warm and dry   No evidence of rash.  Unstageable sacral ulcer    PSYCHIATRIC:    No apparent risk to self or others.    HEMATOLOGICAL:  No cervical  lymphadenopathy.  no inguinal lymphadenopathy      04-21-21 @ 07:01  -  04-22-21 @ 07:00  --------------------------------------------------------  IN:    Dexmedetomidine: 348 mL    dextrose 5%: 1150 mL    Enteral Tube Flush: 300 mL    Peptamen A.F.: 455 mL  Total IN: 2253 mL    OUT:    Chest Tube (mL): 10 mL    Indwelling Catheter - Urethral (mL): 1865 mL    Rectal Tube (mL): 200 mL  Total OUT: 2075 mL    Total NET: 178 mL        LABS:                            7.3    17.37 )-----------( 148      ( 22 Apr 2021 06:30 )             24.0     145  |  110  |  100<HH>  ----------------------------<  202<H>  4.6   |  27  |  1.3    Ca    7.8<L>      22 Apr 2021 06:30  Mg     2.3     04-22    TPro  4.8<L>  /  Alb  2.3<L>  /  TBili  0.4  /  DBili  x   /  AST  33  /  ALT  20  /  AlkPhos  300<H>  04-22      PT/INR - ( 22 Apr 2021 06:30 )   PT: 12.40 sec;   INR: 1.08 ratio         LIVER FUNCTIONS - ( 22 Apr 2021 06:30 )  Alb: 2.3 g/dL / Pro: 4.8 g/dL / ALK PHOS: 300 U/L / ALT: 20 U/L / AST: 33 U/L / GGT: x                                                Mode: AC/ CMV (Assist Control/ Continuous Mandatory Ventilation)  RR (machine): 26  TV (machine): 350  FiO2: 40  PEEP: 5  ITime: 1  MAP: 16  PIP: 42    ABG - ( 22 Apr 2021 03:58 )  pH, Arterial: 7.37  pH, Blood: x     /  pCO2: 51    /  pO2: 57    / HCO3: 30    / Base Excess: 3.5   /  SaO2: 89        MEDICATIONS  (STANDING):  ALBUTerol    90 MICROgram(s) HFA Inhaler 2 Puff(s) Inhalation every 6 hours  chlorhexidine 4% Liquid 1 Application(s) Topical <User Schedule>  clotrimazole 1% Cream 1 Application(s) Topical two times a day  collagenase Ointment 1 Application(s) Topical daily  dexMEDEtomidine Infusion 0.202 MICROgram(s)/kG/Hr (3 mL/Hr) IV Continuous <Continuous>  dextrose 40% Gel 15 Gram(s) Oral once  dextrose 5%. 1000 milliLiter(s) (50 mL/Hr) IV Continuous <Continuous>  dextrose 5%. 1000 milliLiter(s) (100 mL/Hr) IV Continuous <Continuous>  dextrose 5%. 1000 milliLiter(s) (50 mL/Hr) IV Continuous <Continuous>  dextrose 5%. 1000 milliLiter(s) (50 mL/Hr) IV Continuous <Continuous>  dextrose 5%. 1000 milliLiter(s) (100 mL/Hr) IV Continuous <Continuous>  dextrose 50% Injectable 25 Gram(s) IV Push once  dextrose 50% Injectable 12.5 Gram(s) IV Push once  dextrose 50% Injectable 25 Gram(s) IV Push once  ferrous    sulfate Liquid 300 milliGRAM(s) Enteral Tube daily  glucagon  Injectable 1 milliGRAM(s) IntraMuscular once  glucagon  Injectable 1 milliGRAM(s) IntraMuscular once  insulin lispro (ADMELOG) corrective regimen sliding scale   SubCutaneous every 6 hours  ipratropium 17 MICROgram(s) HFA Inhaler 2 Puff(s) Inhalation every 6 hours  meropenem  IVPB 1000 milliGRAM(s) IV Intermittent every 24 hours  multivitamin/minerals/iron Oral Solution (CENTRUM) 15 milliLiter(s) Enteral Tube daily  nystatin Cream 1 Application(s) Topical two times a day  pantoprazole  Injectable 40 milliGRAM(s) IV Push every 12 hours  petrolatum Ophthalmic Ointment 1 Application(s) Both EYES three times a day  phenylephrine    Infusion 1 MICROgram(s)/kG/Min (11.1 mL/Hr) IV Continuous <Continuous>  QUEtiapine 50 milliGRAM(s) Oral two times a day  sodium bicarbonate  Injectable 50 milliEquivalent(s) IV Push <User Schedule>  vasopressin Infusion 0.04 Unit(s)/Min (2.4 mL/Hr) IV Continuous <Continuous>    MEDICATIONS  (PRN):  morphine  - Injectable 2 milliGRAM(s) IV Push every 6 hours PRN Severe Pain (7 - 10)  ondansetron Injectable 4 milliGRAM(s) IV Push every 4 hours PRN Nausea and/or Vomiting      New X-rays reviewed:                                                                                  ECHO    CXR interpreted by me:   bilateral opacifications

## 2021-04-22 NOTE — PROGRESS NOTE ADULT - ATTENDING COMMENTS
IMPRESSION:    Severe necrotizing CAP  Right sided parapneumonic effusion s/p VATS and chest tube sp trach/peg  MENDOZA, improving  Thrombocytopenia, negative HIT & DIC panels  Anemia of Chronic Disease, s/p 1U PRBC 4/18, stable  Hypernatremia, improving    Continue present management  DC planning

## 2021-04-22 NOTE — PROGRESS NOTE ADULT - ASSESSMENT
IMPRESSION:    Severe necrotizing CAP  Right sided parapneumonic effusion s/p VATS and chest tube sp trach/peg  MENDOZA, improving  Thrombocytopenia, negative HIT & DIC panels  Anemia of Chronic Disease, s/p 1U PRBC 4/18, stable  Hypernatremia, improving  HO bleeding from PEG site      PLAN:    CNS:  D/c Precedex.  Seroquel 50mg bid.  SAT.    HEENT:  Oral care    PULMONARY:  HOB @ 45 degrees.  Keep SAO2 92 to 96%.   FiO2 50%.  Pulmonary toilet.  Chest tube per CT sx.    CARDIOVASCULAR:  d/c IVF.  Free water 300 q6h.  Keep I = O.    GI: GI prophylaxis.  PEG feeds.  Bowel regimen.    RENAL:  F/u  lytes.  Correct as needed.  Monitor UO.    INFECTIOUS DISEASE:  ABX per ID, c/w Meropenem.  Procalcitonin.  ID FU.  FU cultures.    HEMATOLOGICAL:   Repeat CBC & T&S.  SCDs for DVT prophylaxis.  Keep Hb > 7    ENDOCRINE:  Follow up FS.  Insulin protocol if needed.    MUSCULOSKELETAL: bed rest, pain control for sacral ulcer    Advanced directives and goals of care  Very poor overall prognosis  Palliative care FU  Full Code for now

## 2021-04-23 NOTE — PROGRESS NOTE ADULT - PROBLEM SELECTOR PLAN 2
patient is on vent with tracheostomy.  Can use opioids if needed to assist with dyspnea
will continue to follow  VM left for Roldan george  full code
patient is on vent with tracheostomy.  Can use opioids if needed to assist with dyspnea and for pre-med prior to wound care
Spoke to Art perkins and then to grand Roldanson - is a nurse here night shift 3e [phone]  They are open to family meeting tomorrow. There are multiple family members involved - they will call back with time.   will follow
patient is on vent with tracheostomy.  Can use opioids if needed to assist with dyspnea
patient is on vent with tracheostomy.  Can use opioids if needed to assist with dyspnea

## 2021-04-23 NOTE — PROGRESS NOTE ADULT - PROBLEM SELECTOR PLAN 1
vent dependent; PEG  continue management per primary team
vent dependent; PEG  continue management per primary team.
vent dependent; PEG; precedex  continue management per primary team.
remains dependent on vent  good response to dexMEDEtomidine I  overall prognosis poor
vent dependent; PEG  continue management per primary team.
vent dependent; PEG  continue management per primary team.

## 2021-04-23 NOTE — PROGRESS NOTE ADULT - ASSESSMENT
85-year-old female with diagnosis of necrotizing pneumonia and right empyema, s/p thoracoscopic right lung decortication.  Patient with right chest tube in place.  Patient seen and examined at bedside. NAD. .   Tolerating:  Diet, NPO with Tube Feed:   Tube Feeding Modality: Gastrostomy  Peptamen A.F. Formula  Total Volume for 24 Hours (mL): 1560  Continuous  Until Goal Tube Feed Rate (mL per Hour): 65  Tube Feed Duration (in Hours): 24  Tube Feed Start Time: 20:15 (04-13-21 @ 20:14)      Plan:  - CT management  - will remove today  - Daily chest xray  - Monitor chest tube out put  - Monitor for airleaks  - Monitor O2 saturation  - DVT/GI prophylaxis  - Pain management  - Monitor vitals  - Monitor labs and replete as necessary      Date/Time: 04-23-21 @ 07:20

## 2021-04-23 NOTE — PROGRESS NOTE ADULT - ASSESSMENT
85yFemale being evaluated for goals of care. Patient has PMH of dementia, osteoarithtis, [place]-speaking presenting with R empyma had VAT on R and developed ARDS and severe sepsis. Patient's hosptial course complicated by ARDS, patient now has tracheostomy, MENDOZA, septic shock on pressors, acute hypoxic hypercapnic resp failure dur to ards/pnumonia.    Family meeting prior and had address DNR and EOL.  Messages left for daughter and grandson this week - no return call   Day 42    MEDD (morphine equivalent daily dose):0      See Recs below.    Please call [phone] with questions or concerns 24/7.   We will continue to follow.     Discussed with vent team resident,  bedside RN - will sign off. Reconsult PRN

## 2021-04-23 NOTE — PROGRESS NOTE ADULT - PROBLEM SELECTOR PROBLEM 3
Unresponsiveness
Palliative care by specialist

## 2021-04-23 NOTE — PROGRESS NOTE ADULT - SUBJECTIVE AND OBJECTIVE BOX
DESTIN TERYR             MRN-[id number]    CC:remains vent dependent on precedix - primary team titrating settings down    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, osteoarithtis, Egyptian-speaking presenting with cough and fever.    As per daughter, patient has been having dry consistent cough since 2 months. Today, she was being evaluated for knee injections when she was found to be febrile. She also reports progressing generalized weakness with decreased appetite and PO intake.  No URT symptoms, no chest pain, no leg pain, no diarrhea, no urinary symptoms no sick contacts, no recent travel. In ED was hypotensive, was given IVF, started on levophed 0.04 called to evaluate (11 Mar 2021 03:21)      SUBJECTIVE:  PAtient seen and examined at bedside. She has trach and peg. not able to participate.  No family at bedside.     ROS: unable to assess as patient is noncommunicative and on vent d/w nursing - indications of pain with wound care - feel current order of morphine meets need  DYSPNEA:  N	  NAUS/VOM:  N	  SECRETIONS:  N	  AGITATION: N  Pain (Y/N):   NA     -Provocation/Palliation:  -Quality/Quantity:  -Radiating:  -Severity:  -Timing/Frequency:  -Impact on ADLs:    OTHER REVIEW OF SYSTEMS:  UNABLE TO OBTAIN  due to: lethargic and vented     PEx:  85y  T(C): 36.6 (04-23-21 @ 12:00), Max: 37.3 (04-22-21 @ 18:00)  T(F): 97.8 (04-23-21 @ 12:00), Max: 99.1 (04-22-21 @ 18:00)  HR: 82 (04-23-21 @ 12:00) (68 - 95)  BP: --  RR: 38 (04-23-21 @ 12:00) (35 - 45)  SpO2: 99% (04-23-21 @ 12:00) (92% - 100%)  Wt(kg): --              General: trach and vent in place.   HEENT:  NCAT c   CVS: AF  Resp: vented no secretions  GI:  obese peg in place   : Adams  Musc: No C/C/E    Neuro: non  communicative; precedix   Psych: not able to assess   Skin: Non jaundiced, wounds per nursing       Last BM:   04-13-21 @ 07:01  -  04-14-21 @ 07:00  --------------------------------------------------------  OUT: 600 mL    04-14-21 @ 07:01  -  04-15-21 @ 07:00  --------------------------------------------------------  OUT: 580 mL    04-15-21 @ 07:01  -  04-16-21 @ 07:00  --------------------------------------------------------  OUT: 1650 mL    04-16-21 @ 07:01  -  04-17-21 @ 07:00  --------------------------------------------------------  OUT: 0 mL    04-18-21 @ 07:01  -  04-19-21 @ 07:00  --------------------------------------------------------  OUT: 1000 mL    04-20-21 @ 07:01  -  04-20-21 @ 12:57  --------------------------------------------------------  OUT: 800 mL        ALLERGIES:     OPIATE NAÏVE (Y/N): y    LABS: REVIEWED  MEDICATIONS: REVIEWED  MEDICATIONS  (STANDING):  ALBUTerol    90 MICROgram(s) HFA Inhaler 2 Puff(s) Inhalation every 6 hours  chlorhexidine 4% Liquid 1 Application(s) Topical <User Schedule>  clotrimazole 1% Cream 1 Application(s) Topical two times a day  collagenase Ointment 1 Application(s) Topical daily  dexMEDEtomidine Infusion 0.202 MICROgram(s)/kG/Hr (3 mL/Hr) IV Continuous <Continuous>  dextrose 40% Gel 15 Gram(s) Oral once  dextrose 5%. 1000 milliLiter(s) (50 mL/Hr) IV Continuous <Continuous>  dextrose 5%. 1000 milliLiter(s) (100 mL/Hr) IV Continuous <Continuous>  dextrose 5%. 1000 milliLiter(s) (50 mL/Hr) IV Continuous <Continuous>  dextrose 5%. 1000 milliLiter(s) (100 mL/Hr) IV Continuous <Continuous>  dextrose 5%. 1000 milliLiter(s) (50 mL/Hr) IV Continuous <Continuous>  dextrose 50% Injectable 25 Gram(s) IV Push once  dextrose 50% Injectable 12.5 Gram(s) IV Push once  dextrose 50% Injectable 25 Gram(s) IV Push once  ferrous    sulfate Liquid 300 milliGRAM(s) Enteral Tube daily  glucagon  Injectable 1 milliGRAM(s) IntraMuscular once  glucagon  Injectable 1 milliGRAM(s) IntraMuscular once  insulin lispro (ADMELOG) corrective regimen sliding scale   SubCutaneous every 6 hours  ipratropium 17 MICROgram(s) HFA Inhaler 2 Puff(s) Inhalation every 6 hours  meropenem  IVPB 1000 milliGRAM(s) IV Intermittent every 24 hours  multivitamin/minerals/iron Oral Solution (CENTRUM) 15 milliLiter(s) Enteral Tube daily  nystatin Cream 1 Application(s) Topical two times a day  pantoprazole  Injectable 40 milliGRAM(s) IV Push every 12 hours  petrolatum Ophthalmic Ointment 1 Application(s) Both EYES three times a day  phenylephrine    Infusion 1 MICROgram(s)/kG/Min (11.1 mL/Hr) IV Continuous <Continuous>  QUEtiapine 50 milliGRAM(s) Oral two times a day  sodium bicarbonate  Injectable 50 milliEquivalent(s) IV Push <User Schedule>  vasopressin Infusion 0.04 Unit(s)/Min (2.4 mL/Hr) IV Continuous <Continuous>    MEDICATIONS  (PRN):  ondansetron Injectable 4 milliGRAM(s) IV Push every 4 hours PRN Nausea and/or Vomiting      LABS: REVIEWED  CBC:                        8.7    16.39 )-----------( 106      ( 20 Apr 2021 06:30 )             27.7     CMP:    04-20    149<H>  |  114<H>  |  107<HH>  ----------------------------<  210<H>  4.0   |  25  |  1.5    Ca    7.8<L>      20 Apr 2021 06:30  Mg     2.3     04-20    TPro  4.9<L>  /  Alb  2.4<L>  /  TBili  0.5  /  DBili  x   /  AST  58<H>  /  ALT  25  /  AlkPhos  392<H>  04-20  Albumin, Serum: 2.4 g/dL (04-20-21 @ 06:30)        IMAGING: REVIEWED    EKG reviewed     ADVANCED DIRECTIVES:          DNR    DECISION MAKER:  daughter   LEGAL SURROGATE: daughter     PSYCHOSOCIAL-SPIRITUAL ASSESSMENT:       Reviewed       Care plan unchanged       GOALS OF CARE DISCUSSION       Palliative care info/counseling provided	           Family meeting       Advanced Directives addressed please see Advance Care Planning Note	           See previous Palliative Medicine Note       Documentation of GOC: please see prior notes     CURRENT DISPO PLAN:     WILL REMAIN IN HOSPITAL      REFERRALS	        Palliative Med        Unit SW/Case Mgmt

## 2021-04-23 NOTE — PROGRESS NOTE ADULT - PROBLEM SELECTOR PLAN 3
Family meeting prior.  discussed code status and cmo.  Family to discuss amongst themselves and will let us know. They have our contact.   Will sign off. Reconsult PRN
continue management per primary team  Head CT; neuro c/s
Daughter asking to schedule family meeting Thursday or friday. She ill call back with time.
Family meeting prior.  discussed code status and cmo.  Family to discuss amongst themselves and will let us know.
Family meeting as above.  discussed code status and cmo.  Family to discuss amongst themselves and will let us know.

## 2021-04-23 NOTE — PROGRESS NOTE ADULT - ASSESSMENT
IMPRESSION:    Severe necrotizing CAP  Right sided parapneumonic effusion s/p VATS and chest tube sp trach/peg  MENDOZA, improving  Thrombocytopenia, negative HIT & DIC panels  Anemia of Chronic Disease, s/p 1U PRBC 4/18, stable  Hypernatremia  HO bleeding from PEG site      PLAN:    CNS:  D/c Precedex.  Seroquel 50mg bid.  Morphine prn.  SAT.    HEENT:  Oral care    PULMONARY:  HOB @ 45 degrees.  Keep SAO2 92 to 96%.  Decrease FiO2 40%.  Pulmonary toilet.  Chest tube per CT sx.    CARDIOVASCULAR:  D5W @ 60cc/hr.  Free water 300 q6h.  Keep I = O.    GI: GI prophylaxis.  PEG feeds.  Bowel regimen.    RENAL:  F/u  lytes.  Correct as needed.  Monitor UO.    INFECTIOUS DISEASE:  ABX per ID, c/w Meropenem.  Procalcitonin.  ID FU.  FU cultures.  PICC line.    HEMATOLOGICAL:   Repeat CBC & T&S.  SCDs for DVT prophylaxis.  Keep Hb > 7    ENDOCRINE:  Follow up FS.  Insulin protocol if needed.    MUSCULOSKELETAL: bed rest, pain control for sacral ulcer    Advanced directives and goals of care  Very poor overall prognosis  Palliative care FU, Hospice eval  Discharge planning  Full Code for now

## 2021-04-23 NOTE — PROGRESS NOTE ADULT - SUBJECTIVE AND OBJECTIVE BOX
Progress Note: General Surgery  Patient: DESTIN TERRY , 85y (1935)Female   MRN: [id number]  Location: [address]  Visit: 03-11-21 Inpatient  Date: 04-23-21 @ 07:20    Admit Diagnosis/Chief Complaint: Acute respiratory failure with hypoxia        Procedure/Diagnosis: Acute respiratory failure with hypoxia     S/P Bronchoscopy, at bedside    Open tracheostomy    Insertion, PEG tube        Events/ 24h: Patient seen and examined at bedside. No acute events overnight. Afebrile, VSS.    Vitals: T(F): 98.2 (04-23-21 @ 04:00), Max: 99.1 (04-22-21 @ 18:00)  HR: 80 (04-23-21 @ 07:00)  BP: 137/64 (04-22-21 @ 09:00) (137/64 - 137/64)  RR: 39 (04-23-21 @ 07:00)  SpO2: 96% (04-23-21 @ 07:00)  RR (machine): 26, TV (machine): 350, FiO2: 60, PEEP: 5, PIP: 39  In:   04-22-21 @ 07:01  -  04-23-21 @ 07:00  --------------------------------------------------------  IN: 2095 mL      Out:   04-22-21 @ 07:01  -  04-23-21 @ 07:00  --------------------------------------------------------  OUT:    Indwelling Catheter - Urethral (mL): 2485 mL  Total OUT: 2485 mL        Net:   04-22-21 @ 07:01  -  04-23-21 @ 07:00  --------------------------------------------------------  NET: -390 mL        Diet: Diet, NPO with Tube Feed:   Tube Feeding Modality: Gastrostomy  Peptamen A.F. Formula  Total Volume for 24 Hours (mL): 1560  Continuous  Until Goal Tube Feed Rate (mL per Hour): 65  Tube Feed Duration (in Hours): 24  Tube Feed Start Time: 20:15 (04-13-21 @ 20:14)    IV Fluids: dextrose 5%. 1000 milliLiter(s) (50 mL/Hr) IV Continuous <Continuous>  dextrose 5%. 1000 milliLiter(s) (100 mL/Hr) IV Continuous <Continuous>  dextrose 5%. 1000 milliLiter(s) (50 mL/Hr) IV Continuous <Continuous>  dextrose 5%. 1000 milliLiter(s) (100 mL/Hr) IV Continuous <Continuous>  ferrous    sulfate Liquid 300 milliGRAM(s) Enteral Tube daily  multivitamin/minerals/iron Oral Solution (CENTRUM) 15 milliLiter(s) Enteral Tube daily  sodium bicarbonate  Injectable 50 milliEquivalent(s) IV Push <User Schedule>      Physical Examination:  General Appearance: NAD   HEENT: EOMI, sclera anicteric.  Heart: RRR   Lungs: Symmetric chest wall expansion, equal rise and fall.  Abdomen:  Soft, nontender, nondistended.   MSK/Extremities: Warm & well-perfused.   Skin: Warm, dry. No jaundice.       Medications: [Standing]  ALBUTerol    90 MICROgram(s) HFA Inhaler 2 Puff(s) Inhalation every 6 hours  chlorhexidine 4% Liquid 1 Application(s) Topical <User Schedule>  clotrimazole 1% Cream 1 Application(s) Topical two times a day  collagenase Ointment 1 Application(s) Topical daily  dexMEDEtomidine Infusion 0.202 MICROgram(s)/kG/Hr (3 mL/Hr) IV Continuous <Continuous>  dextrose 40% Gel 15 Gram(s) Oral once  dextrose 5%. 1000 milliLiter(s) (50 mL/Hr) IV Continuous <Continuous>  dextrose 5%. 1000 milliLiter(s) (100 mL/Hr) IV Continuous <Continuous>  dextrose 5%. 1000 milliLiter(s) (50 mL/Hr) IV Continuous <Continuous>  dextrose 5%. 1000 milliLiter(s) (100 mL/Hr) IV Continuous <Continuous>  dextrose 50% Injectable 25 Gram(s) IV Push once  dextrose 50% Injectable 12.5 Gram(s) IV Push once  dextrose 50% Injectable 25 Gram(s) IV Push once  ferrous    sulfate Liquid 300 milliGRAM(s) Enteral Tube daily  glucagon  Injectable 1 milliGRAM(s) IntraMuscular once  glucagon  Injectable 1 milliGRAM(s) IntraMuscular once  insulin lispro (ADMELOG) corrective regimen sliding scale   SubCutaneous every 6 hours  ipratropium 17 MICROgram(s) HFA Inhaler 2 Puff(s) Inhalation every 6 hours  meropenem  IVPB 1000 milliGRAM(s) IV Intermittent every 24 hours  multivitamin/minerals/iron Oral Solution (CENTRUM) 15 milliLiter(s) Enteral Tube daily  nystatin Cream 1 Application(s) Topical two times a day  pantoprazole  Injectable 40 milliGRAM(s) IV Push every 12 hours  petrolatum Ophthalmic Ointment 1 Application(s) Both EYES three times a day  phenylephrine    Infusion 1 MICROgram(s)/kG/Min (11.1 mL/Hr) IV Continuous <Continuous>  QUEtiapine 50 milliGRAM(s) Oral two times a day  sodium bicarbonate  Injectable 50 milliEquivalent(s) IV Push <User Schedule>  vasopressin Infusion 0.04 Unit(s)/Min (2.4 mL/Hr) IV Continuous <Continuous>    DVT Prophylaxis:   GI Prophylaxis: pantoprazole  Injectable 40 milliGRAM(s) IV Push every 12 hours    Antibiotics: meropenem  IVPB 1000 milliGRAM(s) IV Intermittent every 24 hours    Anticoagulation:   Medications:[PRN]  midazolam Injectable 2 milliGRAM(s) IV Push every 2 hours PRN  morphine  - Injectable 2 milliGRAM(s) IV Push every 6 hours PRN  ondansetron Injectable 4 milliGRAM(s) IV Push every 4 hours PRN      Labs:                        7.3    17.63 )-----------( 174      ( 23 Apr 2021 06:40 )             23.9     04-22    145  |  110  |  100<HH>  ----------------------------<  202<H>  4.6   |  27  |  1.3    Ca    7.8<L>      22 Apr 2021 06:30  Mg     2.3     04-22    TPro  4.8<L>  /  Alb  2.3<L>  /  TBili  0.4  /  DBili  x   /  AST  33  /  ALT  20  /  AlkPhos  300<H>  04-22    LIVER FUNCTIONS - ( 22 Apr 2021 06:30 )  Alb: 2.3 g/dL / Pro: 4.8 g/dL / ALK PHOS: 300 U/L / ALT: 20 U/L / AST: 33 U/L / GGT: x           PT/INR - ( 22 Apr 2021 06:30 )   PT: 12.40 sec;   INR: 1.08 ratio           ABG - ( 23 Apr 2021 04:53 )  pH: 7.37  /  pCO2: 50    /  pO2: 69    / HCO3: 29    / Base Excess: 2.8   /  SaO2: 94                      Urine/Micro:        Imaging:

## 2021-04-23 NOTE — PROGRESS NOTE ADULT - SUBJECTIVE AND OBJECTIVE BOX
Patient is a 85y old  Female who presents with a chief complaint of Fever (20 Apr 2021 12:56)    Over Night Events:  Remains on MV.  Overbreathing during attempts to wean off Precedex.  Currently on Precedex.  Off pressors    ROS:   All ROS are negative except HPI     PHYSICAL EXAM  ICU Vital Signs Last 24 Hrs  T(C): 36.8 (23 Apr 2021 04:00), Max: 37.3 (22 Apr 2021 18:00)  T(F): 98.2 (23 Apr 2021 04:00), Max: 99.1 (22 Apr 2021 18:00)  HR: 80 (23 Apr 2021 07:00) (68 - 92)  BP: 137/64 (22 Apr 2021 09:00) (137/64 - 137/64)  BP(mean): 92 (22 Apr 2021 09:00) (92 - 92)  ABP: 122/64 (23 Apr 2021 07:00) (108/49 - 149/67)  ABP(mean): 81 (23 Apr 2021 07:00) (69 - 100)  RR: 39 (23 Apr 2021 07:00) (32 - 45)  SpO2: 96% (23 Apr 2021 07:00) (92% - 100%)      CONSTITUTIONAL:  Well nourished  Ill appearing. In NAD    ENT:   Airway patent,   Mouth with normal mucosa.   No thrush    EYES:   Pupils equal,   Round and reactive to light.    CARDIAC:   Normal rate,   Regular rhythm.    edema    Vascular:  Normal systolic impulse  No Carotid bruits    RESPIRATORY:   No wheezing  Bilateral BS  Normal chest expansion  Not tachypneic,  No use of accessory muscles    GASTROINTESTINAL:  Abdomen soft,   Non-tender,   No guarding,   + BS    MUSCULOSKELETAL:   Range of motion is not limited,  No clubbing, cyanosis    NEUROLOGICAL:   Alert  Does not follow commands     SKIN:   Skin normal color for race,   Warm and dry   No evidence of rash.  Unstageable sacral ulcer    PSYCHIATRIC:    No apparent risk to self or others.    HEMATOLOGICAL:  No cervical  lymphadenopathy.  no inguinal lymphadenopathy      04-22-21 @ 07:01  -  04-23-21 @ 07:00  --------------------------------------------------------  IN:    Enteral Tube Flush: 600 mL    Peptamen A.F.: 1495 mL  Total IN: 2095 mL    OUT:    Indwelling Catheter - Urethral (mL): 2485 mL  Total OUT: 2485 mL    Total NET: -390 mL      LABS:                        7.3    17.63 )-----------( 174      ( 23 Apr 2021 06:40 )             23.9                                               04-23    152<H>  |  112<H>  |  x   ----------------------------<  191<H>  4.7   |  29  |  1.2    Ca    8.0<L>      23 Apr 2021 06:40  Mg     2.3     04-23    TPro  4.7<L>  /  Alb  2.1<L>  /  TBili  0.3  /  DBili  x   /  AST  35  /  ALT  20  /  AlkPhos  262<H>  04-23    PT/INR - ( 23 Apr 2021 06:40 )   PT: 12.40 sec;   INR: 1.08 ratio       LIVER FUNCTIONS - ( 23 Apr 2021 06:40 )  Alb: 2.1 g/dL / Pro: 4.7 g/dL / ALK PHOS: 262 U/L / ALT: 20 U/L / AST: 35 U/L / GGT: x                                              Mode: AC/ CMV (Assist Control/ Continuous Mandatory Ventilation)  RR (machine): 26  TV (machine): 350  FiO2: 60  PEEP: 5  ITime: 0.6  MAP: 28  PIP: 39    ABG - ( 23 Apr 2021 04:53 )  pH, Arterial: 7.37  pH, Blood: x     /  pCO2: 50    /  pO2: 69    / HCO3: 29    / Base Excess: 2.8   /  SaO2: 94          MEDICATIONS  (STANDING):  ALBUTerol    90 MICROgram(s) HFA Inhaler 2 Puff(s) Inhalation every 6 hours  chlorhexidine 4% Liquid 1 Application(s) Topical <User Schedule>  clotrimazole 1% Cream 1 Application(s) Topical two times a day  collagenase Ointment 1 Application(s) Topical daily  dexMEDEtomidine Infusion 0.202 MICROgram(s)/kG/Hr (3 mL/Hr) IV Continuous <Continuous>  dextrose 40% Gel 15 Gram(s) Oral once  dextrose 5%. 1000 milliLiter(s) (50 mL/Hr) IV Continuous <Continuous>  dextrose 5%. 1000 milliLiter(s) (100 mL/Hr) IV Continuous <Continuous>  dextrose 5%. 1000 milliLiter(s) (50 mL/Hr) IV Continuous <Continuous>  dextrose 5%. 1000 milliLiter(s) (100 mL/Hr) IV Continuous <Continuous>  dextrose 50% Injectable 25 Gram(s) IV Push once  dextrose 50% Injectable 12.5 Gram(s) IV Push once  dextrose 50% Injectable 25 Gram(s) IV Push once  ferrous    sulfate Liquid 300 milliGRAM(s) Enteral Tube daily  glucagon  Injectable 1 milliGRAM(s) IntraMuscular once  glucagon  Injectable 1 milliGRAM(s) IntraMuscular once  insulin lispro (ADMELOG) corrective regimen sliding scale   SubCutaneous every 6 hours  ipratropium 17 MICROgram(s) HFA Inhaler 2 Puff(s) Inhalation every 6 hours  meropenem  IVPB 1000 milliGRAM(s) IV Intermittent every 24 hours  multivitamin/minerals/iron Oral Solution (CENTRUM) 15 milliLiter(s) Enteral Tube daily  nystatin Cream 1 Application(s) Topical two times a day  pantoprazole  Injectable 40 milliGRAM(s) IV Push every 12 hours  petrolatum Ophthalmic Ointment 1 Application(s) Both EYES three times a day  phenylephrine    Infusion 1 MICROgram(s)/kG/Min (11.1 mL/Hr) IV Continuous <Continuous>  QUEtiapine 50 milliGRAM(s) Oral two times a day  sodium bicarbonate  Injectable 50 milliEquivalent(s) IV Push <User Schedule>  vasopressin Infusion 0.04 Unit(s)/Min (2.4 mL/Hr) IV Continuous <Continuous>    MEDICATIONS  (PRN):  midazolam Injectable 2 milliGRAM(s) IV Push every 2 hours PRN sedation  morphine  - Injectable 2 milliGRAM(s) IV Push every 6 hours PRN Severe Pain (7 - 10)  ondansetron Injectable 4 milliGRAM(s) IV Push every 4 hours PRN Nausea and/or Vomiting      New X-rays reviewed:  CXR interpreted by me:  chronic extensive bilateral opacifications

## 2021-04-23 NOTE — PROGRESS NOTE ADULT - ATTENDING COMMENTS
IMPRESSION:    Severe necrotizing CAP  Right sided parapneumonic effusion s/p VATS and chest tube sp trach/peg  MENDOZA, improving  Thrombocytopenia, negative HIT & DIC panels  Anemia of Chronic Disease, s/p 1U PRBC 4/18, stable  Hypernatremia  HO bleeding from PEG site    DC planning  DW CTS   Continue ABX

## 2021-04-23 NOTE — PROGRESS NOTE ADULT - PROBLEM SELECTOR PROBLEM 2
Palliative care by specialist
Dyspnea, unspecified type
Palliative care by specialist
Dyspnea, unspecified type

## 2021-04-24 NOTE — PROGRESS NOTE ADULT - SUBJECTIVE AND OBJECTIVE BOX
Progress Note: General Surgery  Patient: DESTIN TERRY , 85y (1935)Female   MRN: [id number]  Location: [address]  Visit: 03-11-21 Inpatient  Date: 04-24-21 @ 05:49    Admit Diagnosis/Chief Complaint: Acute respiratory failure with hypoxia        Procedure/Diagnosis: Acute respiratory failure with hypoxia     S/P Bronchoscopy, at bedside    Open tracheostomy    Insertion, PEG tube        Events/ 24h: No acute events overnight. Pain controlled.    Vitals: T(F): 98.5 (04-24-21 @ 00:00), Max: 98.9 (04-23-21 @ 18:00)  HR: 85 (04-24-21 @ 03:00)  BP: --  RR: 37 (04-24-21 @ 03:00)  SpO2: 95% (04-24-21 @ 03:00)  RR (machine): 26, TV (machine): 350, FiO2: 50, PEEP: 5, PIP: 35  In:   04-22-21 @ 07:01  -  04-23-21 @ 07:00  --------------------------------------------------------  IN: 2095 mL    04-23-21 @ 07:01  -  04-24-21 @ 05:49  --------------------------------------------------------  IN: 2792 mL      Out:   04-22-21 @ 07:01  -  04-23-21 @ 07:00  --------------------------------------------------------  OUT:    Indwelling Catheter - Urethral (mL): 2485 mL  Total OUT: 2485 mL      04-23-21 @ 07:01  -  04-24-21 @ 05:49  --------------------------------------------------------  OUT:    Chest Tube (mL): 0 mL    Indwelling Catheter - Urethral (mL): 2010 mL    Rectal Tube (mL): 1200 mL  Total OUT: 3210 mL        Net:   04-22-21 @ 07:01  -  04-23-21 @ 07:00  --------------------------------------------------------  NET: -390 mL    04-23-21 @ 07:01  -  04-24-21 @ 05:49  --------------------------------------------------------  NET: -418 mL        Diet: Diet, NPO with Tube Feed:   Tube Feeding Modality: Gastrostomy  Peptamen A.F. Formula  Total Volume for 24 Hours (mL): 1560  Continuous  Until Goal Tube Feed Rate (mL per Hour): 65  Tube Feed Duration (in Hours): 24  Tube Feed Start Time: 20:15 (04-13-21 @ 20:14)    IV Fluids: dextrose 5%. 1000 milliLiter(s) (50 mL/Hr) IV Continuous <Continuous>  dextrose 5%. 1000 milliLiter(s) (100 mL/Hr) IV Continuous <Continuous>  dextrose 5%. 1000 milliLiter(s) (50 mL/Hr) IV Continuous <Continuous>  dextrose 5%. 1000 milliLiter(s) (100 mL/Hr) IV Continuous <Continuous>  ferrous    sulfate Liquid 300 milliGRAM(s) Enteral Tube daily  multivitamin/minerals/iron Oral Solution (CENTRUM) 15 milliLiter(s) Enteral Tube daily  sodium bicarbonate  Injectable 50 milliEquivalent(s) IV Push <User Schedule>      Physical Examination:  General Appearance: NAD   HEENT: EOMI, sclera anicteric.  Heart: RRR   Lungs: Symmetric chest wall expansion, equal rise and fall.  Abdomen:  Soft, nontender, nondistended.   MSK/Extremities: Warm & well-perfused.   Skin: Warm, dry. No jaundice.         Medications: [Standing]  ALBUTerol    90 MICROgram(s) HFA Inhaler 2 Puff(s) Inhalation every 6 hours  chlorhexidine 4% Liquid 1 Application(s) Topical <User Schedule>  clotrimazole 1% Cream 1 Application(s) Topical two times a day  collagenase Ointment 1 Application(s) Topical daily  dexMEDEtomidine Infusion 0.202 MICROgram(s)/kG/Hr (3 mL/Hr) IV Continuous <Continuous>  dextrose 40% Gel 15 Gram(s) Oral once  dextrose 5%. 1000 milliLiter(s) (50 mL/Hr) IV Continuous <Continuous>  dextrose 5%. 1000 milliLiter(s) (100 mL/Hr) IV Continuous <Continuous>  dextrose 5%. 1000 milliLiter(s) (50 mL/Hr) IV Continuous <Continuous>  dextrose 5%. 1000 milliLiter(s) (100 mL/Hr) IV Continuous <Continuous>  dextrose 50% Injectable 25 Gram(s) IV Push once  dextrose 50% Injectable 12.5 Gram(s) IV Push once  dextrose 50% Injectable 25 Gram(s) IV Push once  ferrous    sulfate Liquid 300 milliGRAM(s) Enteral Tube daily  glucagon  Injectable 1 milliGRAM(s) IntraMuscular once  glucagon  Injectable 1 milliGRAM(s) IntraMuscular once  insulin lispro (ADMELOG) corrective regimen sliding scale   SubCutaneous every 6 hours  ipratropium 17 MICROgram(s) HFA Inhaler 2 Puff(s) Inhalation every 6 hours  meropenem  IVPB 1000 milliGRAM(s) IV Intermittent every 24 hours  multivitamin/minerals/iron Oral Solution (CENTRUM) 15 milliLiter(s) Enteral Tube daily  nystatin Cream 1 Application(s) Topical two times a day  pantoprazole  Injectable 40 milliGRAM(s) IV Push every 12 hours  petrolatum Ophthalmic Ointment 1 Application(s) Both EYES three times a day  phenylephrine    Infusion 1 MICROgram(s)/kG/Min (11.1 mL/Hr) IV Continuous <Continuous>  QUEtiapine 50 milliGRAM(s) Oral two times a day  sodium bicarbonate  Injectable 50 milliEquivalent(s) IV Push <User Schedule>  vasopressin Infusion 0.04 Unit(s)/Min (2.4 mL/Hr) IV Continuous <Continuous>    DVT Prophylaxis:   GI Prophylaxis: pantoprazole  Injectable 40 milliGRAM(s) IV Push every 12 hours    Antibiotics: meropenem  IVPB 1000 milliGRAM(s) IV Intermittent every 24 hours    Anticoagulation:   Medications:[PRN]  morphine  - Injectable 2 milliGRAM(s) IV Push every 6 hours PRN  ondansetron Injectable 4 milliGRAM(s) IV Push every 4 hours PRN      Labs:                        7.4    22.87 )-----------( 209      ( 24 Apr 2021 04:30 )             25.3     04-24    148<H>  |  110  |  95<HH>  ----------------------------<  182<H>  4.8   |  26  |  1.1    Ca    8.1<L>      24 Apr 2021 04:30  Mg     2.3     04-24    TPro  5.3<L>  /  Alb  2.3<L>  /  TBili  0.3  /  DBili  x   /  AST  31  /  ALT  19  /  AlkPhos  260<H>  04-24    LIVER FUNCTIONS - ( 24 Apr 2021 04:30 )  Alb: 2.3 g/dL / Pro: 5.3 g/dL / ALK PHOS: 260 U/L / ALT: 19 U/L / AST: 31 U/L / GGT: x           PT/INR - ( 24 Apr 2021 04:30 )   PT: 12.40 sec;   INR: 1.08 ratio           ABG - ( 24 Apr 2021 04:27 )  pH: 7.36  /  pCO2: 53    /  pO2: 69    / HCO3: 30    / Base Excess: 4.3   /  SaO2: 95                      Urine/Micro:        Imaging:   ***    f/u AM CXR

## 2021-04-24 NOTE — PROGRESS NOTE ADULT - ASSESSMENT
Assessment:  85-year-old female with diagnosis of necrotizing pneumonia and right empyema, s/p thoracoscopic right lung decortication.  Patient with right chest tube in place.  Patient seen and examined at bedside. NAD. .   Tolerating:  Diet, NPO with Tube Feed:   Tube Feeding Modality: Gastrostomy  Peptamen A.F. Formula  Total Volume for 24 Hours (mL): 1560  Continuous  Until Goal Tube Feed Rate (mL per Hour): 65  Tube Feed Duration (in Hours): 24  Tube Feed Start Time: 20:15 (04-13-21 @ 20:14)      Plan:  - CT management  - Daily chest xray  - Monitor chest tube out put  - Monitor for airleaks  - Monitor O2 saturation  - DVT/GI prophylaxis  - Pain management  - Monitor vitals  - Monitor labs and replete as necessary            Date/Time: 04-24-21 @ 05:49

## 2021-04-24 NOTE — PROGRESS NOTE ADULT - SUBJECTIVE AND OBJECTIVE BOX
Patient is a 85y old  Female who presents with a chief complaint of Fever (23 Apr 2021 16:23)      Over Night Events:  Patient seen and examined.  on vent 50%      ROS:  See HPI    PHYSICAL EXAM    ICU Vital Signs Last 24 Hrs  T(C): 36.8 (24 Apr 2021 04:00), Max: 37.2 (23 Apr 2021 18:00)  T(F): 98.2 (24 Apr 2021 04:00), Max: 98.9 (23 Apr 2021 18:00)  HR: 73 (24 Apr 2021 07:00) (63 - 85)  BP: --  BP(mean): --  ABP: 113/47 (24 Apr 2021 07:00) (97/53 - 153/80)  ABP(mean): 69 (24 Apr 2021 07:00) (64 - 106)  RR: 37 (24 Apr 2021 07:00) (33 - 40)  SpO2: 98% (24 Apr 2021 07:00) (93% - 99%)      General: open eyes   HEENT:         trach       Lymph Nodes: NO cervical LN   Lungs: Bilateral BS  Cardiovascular: Regular   Abdomen: Soft, Positive BS  Extremities: No clubbing   Skin: warm   Neurological:   Musculoskeletal: move all ext     I&O's Detail    23 Apr 2021 07:01  -  24 Apr 2021 07:00  --------------------------------------------------------  IN:    Dexmedetomidine: 602 mL    Enteral Tube Flush: 630 mL    Free Water: 600 mL    IV PiggyBack: 50 mL    Peptamen A.F.: 1560 mL  Total IN: 3442 mL    OUT:    Chest Tube (mL): 0 mL    Indwelling Catheter - Urethral (mL): 2270 mL    Rectal Tube (mL): 1400 mL  Total OUT: 3670 mL    Total NET: -228 mL          LABS:                          7.4    22.87 )-----------( 209      ( 24 Apr 2021 04:30 )             25.3         24 Apr 2021 04:30    148    |  110    |  95     ----------------------------<  182    4.8     |  26     |  1.1      Ca    8.1        24 Apr 2021 04:30  Mg     2.3       24 Apr 2021 04:30    TPro  5.3    /  Alb  2.3    /  TBili  0.3    /  DBili  x      /  AST  31     /  ALT  19     /  AlkPhos  260    24 Apr 2021 04:30  Amylase x     lipase x                                                 PT/INR - ( 24 Apr 2021 04:30 )   PT: 12.40 sec;   INR: 1.08 ratio                                                                                                                                                         Mode: AC/ CMV (Assist Control/ Continuous Mandatory Ventilation)  RR (machine): 26  TV (machine): 350  FiO2: 50  PEEP: 5  MAP: 15  PIP: 35                                      ABG - ( 24 Apr 2021 04:27 )  pH, Arterial: 7.36  pH, Blood: x     /  pCO2: 53    /  pO2: 69    / HCO3: 30    / Base Excess: 4.3   /  SaO2: 95                  MEDICATIONS  (STANDING):  ALBUTerol    90 MICROgram(s) HFA Inhaler 2 Puff(s) Inhalation every 6 hours  chlorhexidine 4% Liquid 1 Application(s) Topical <User Schedule>  clotrimazole 1% Cream 1 Application(s) Topical two times a day  collagenase Ointment 1 Application(s) Topical daily  dexMEDEtomidine Infusion 0.202 MICROgram(s)/kG/Hr (3 mL/Hr) IV Continuous <Continuous>  dextrose 40% Gel 15 Gram(s) Oral once  dextrose 5%. 1000 milliLiter(s) (50 mL/Hr) IV Continuous <Continuous>  dextrose 5%. 1000 milliLiter(s) (100 mL/Hr) IV Continuous <Continuous>  dextrose 5%. 1000 milliLiter(s) (50 mL/Hr) IV Continuous <Continuous>  dextrose 5%. 1000 milliLiter(s) (100 mL/Hr) IV Continuous <Continuous>  dextrose 50% Injectable 25 Gram(s) IV Push once  dextrose 50% Injectable 12.5 Gram(s) IV Push once  dextrose 50% Injectable 25 Gram(s) IV Push once  ferrous    sulfate Liquid 300 milliGRAM(s) Enteral Tube daily  glucagon  Injectable 1 milliGRAM(s) IntraMuscular once  glucagon  Injectable 1 milliGRAM(s) IntraMuscular once  insulin lispro (ADMELOG) corrective regimen sliding scale   SubCutaneous every 6 hours  ipratropium 17 MICROgram(s) HFA Inhaler 2 Puff(s) Inhalation every 6 hours  meropenem  IVPB 1000 milliGRAM(s) IV Intermittent every 24 hours  multivitamin/minerals/iron Oral Solution (CENTRUM) 15 milliLiter(s) Enteral Tube daily  nystatin Cream 1 Application(s) Topical two times a day  pantoprazole  Injectable 40 milliGRAM(s) IV Push every 12 hours  petrolatum Ophthalmic Ointment 1 Application(s) Both EYES three times a day  phenylephrine    Infusion 1 MICROgram(s)/kG/Min (11.1 mL/Hr) IV Continuous <Continuous>  QUEtiapine 50 milliGRAM(s) Oral two times a day  sodium bicarbonate  Injectable 50 milliEquivalent(s) IV Push <User Schedule>  vasopressin Infusion 0.04 Unit(s)/Min (2.4 mL/Hr) IV Continuous <Continuous>    MEDICATIONS  (PRN):  morphine  - Injectable 2 milliGRAM(s) IV Push every 6 hours PRN Severe Pain (7 - 10)  ondansetron Injectable 4 milliGRAM(s) IV Push every 4 hours PRN Nausea and/or Vomiting          Xrays:  TLC:  OG:  ET tube:                                                                                    stable b/l opacity more left right chest tube    ECHO:  [facility] ICU:

## 2021-04-24 NOTE — PROGRESS NOTE ADULT - ASSESSMENT
IMPRESSION:    Severe necrotizing CAP  Right sided parapneumonic effusion s/p VATS and chest tube sp trach/peg  MENDOZA, improving  Thrombocytopenia, negative HIT & DIC panels  Anemia of Chronic Disease, s/p 1U PRBC 4/18, stable  Hypernatremia  HO bleeding from PEG site      PLAN:    CNS:  .  Seroquel 50mg bid.  Morphine prn.  SAT.    HEENT:  Oral care    PULMONARY:  HOB @ 45 degrees.  Keep SAO2 92 to 96%.  Decrease FiO2 40%.  Pulmonary toilet.  Chest tube per CT sx.    CARDIOVASCULAR: continue  D5W @ 60cc/hr.  Free water 300 q6h.  Keep I = O.    GI: GI prophylaxis.  PEG feeds.  Bowel regimen.    RENAL:  F/u  lytes.  Correct as needed.  Monitor UO.    INFECTIOUS DISEASE:  ABX per ID, c/w Meropenem.  Procalcitonin.  ID FU.  FU cultures.  PICC line.    HEMATOLOGICAL:   Repeat CBC & T&S.  SCDs for DVT prophylaxis.  Keep Hb > 7    ENDOCRINE:  Follow up FS.  Insulin protocol if needed.    MUSCULOSKELETAL: bed rest, pain control for sacral ulcer    Advanced directives and goals of care  Very poor overall prognosis  Palliative care FU, Hospice eval

## 2021-04-25 NOTE — PROGRESS NOTE ADULT - SUBJECTIVE AND OBJECTIVE BOX
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    As per daughter, patient has been having dry consistent cough since 2 months. Today, she was being evaluated for knee injections when she was found to be febrile. She also reports progressing generalized weakness with decreased appetite and PO intake.  No URT symptoms, no chest pain, no leg pain, no diarrhea, no urinary symptoms no sick contacts, no recent travel. In ED was hypotensive, was given IVF, started on levophed 0.04 called to evaluate (11 Mar 2021 03:21)      Pt evaluated on AM rounds.  I reviewed the radiology tests and hospital record prior to visiting the patient.    Interval Events: No overnight events.    REVIEW OF SYSTEMS:   see HPI      OBJECTIVE:  ICU Vital Signs Last 24 Hrs  T(C): 37 (25 Apr 2021 03:00), Max: 37.4 (24 Apr 2021 23:33)  T(F): 98.6 (25 Apr 2021 03:00), Max: 99.3 (24 Apr 2021 23:33)  HR: 85 (25 Apr 2021 08:00) (72 - 90)  BP: 131/75 (25 Apr 2021 00:00) (131/75 - 131/75)  BP(mean): 97 (25 Apr 2021 00:00) (97 - 97)  ABP: 133/63 (25 Apr 2021 08:00) (102/45 - 158/77)  ABP(mean): 88 (25 Apr 2021 08:00) (64 - 109)  RR: 43 (25 Apr 2021 08:00) (30 - 43)  SpO2: 96% (25 Apr 2021 08:00) (93% - 99%)    Mode: AC/ CMV (Assist Control/ Continuous Mandatory Ventilation), RR (machine): 26, TV (machine): 350, FiO2: 50, PEEP: 5, MAP: 14, PIP: 37    04-24 @ 07:01  -  04-25 @ 07:00  --------------------------------------------------------  IN: 3435 mL / OUT: 2870 mL / NET: 565 mL      CAPILLARY BLOOD GLUCOSE      POCT Blood Glucose.: 176 mg/dL (25 Apr 2021 05:17)        PHYSICAL EXAM:     · CONSTITUTIONAL:   not septic appearing,   well nourished,   NAD    · ENMT:   Airway patent,   Nasal mucosa clear.  Mouth with normal mucosa.   No thrush    · EYES:   Clear bilaterally,   pupils equal,   round and reactive to light.    · CARDIAC:   Normal rate,   regular rhythm.    Heart sounds S1, S2.   No murmurs, no rubs or gallops on auscultation  no edema        CAROTID:   normal systolic impulse  no bruits    · RESPIRATORY:   rales  normal chest expansion  no retractions or use of accessory muscles  palpation of chest is normal with no fremitus  percussion of chest demonstrates no hyperresonance or dullness    · GASTROINTESTINAL:  Abdomen soft,   non-tender,   + BS  liver/spleen not palpable    · MUSCULOSKELETAL:   no clubbing, cyanosis      · NEUROLOGICAL:   awake alert oriented  no obvious cranial nerve abnormalities      · SKIN:   Skin normal color for race,   warm, dry   No evidence of rash.        · HEME LYMPH:   no splenomegaly.  No cervical  lymphadenopathy.  no inguinal lymphadenopathy    HOSPITAL MEDICATIONS:  MEDICATIONS  (STANDING):  ALBUTerol    90 MICROgram(s) HFA Inhaler 2 Puff(s) Inhalation every 6 hours  chlorhexidine 4% Liquid 1 Application(s) Topical <User Schedule>  clotrimazole 1% Cream 1 Application(s) Topical two times a day  collagenase Ointment 1 Application(s) Topical daily  dexMEDEtomidine Infusion 0.202 MICROgram(s)/kG/Hr (3 mL/Hr) IV Continuous <Continuous>  dextrose 40% Gel 15 Gram(s) Oral once  dextrose 5%. 1000 milliLiter(s) (50 mL/Hr) IV Continuous <Continuous>  dextrose 5%. 1000 milliLiter(s) (100 mL/Hr) IV Continuous <Continuous>  dextrose 5%. 1000 milliLiter(s) (50 mL/Hr) IV Continuous <Continuous>  dextrose 5%. 1000 milliLiter(s) (100 mL/Hr) IV Continuous <Continuous>  dextrose 50% Injectable 25 Gram(s) IV Push once  dextrose 50% Injectable 12.5 Gram(s) IV Push once  dextrose 50% Injectable 25 Gram(s) IV Push once  ferrous    sulfate Liquid 300 milliGRAM(s) Enteral Tube daily  glucagon  Injectable 1 milliGRAM(s) IntraMuscular once  glucagon  Injectable 1 milliGRAM(s) IntraMuscular once  insulin lispro (ADMELOG) corrective regimen sliding scale   SubCutaneous every 6 hours  ipratropium 17 MICROgram(s) HFA Inhaler 2 Puff(s) Inhalation every 6 hours  meropenem  IVPB 1000 milliGRAM(s) IV Intermittent every 24 hours  multivitamin/minerals/iron Oral Solution (CENTRUM) 15 milliLiter(s) Enteral Tube daily  nystatin Cream 1 Application(s) Topical two times a day  pantoprazole  Injectable 40 milliGRAM(s) IV Push every 12 hours  petrolatum Ophthalmic Ointment 1 Application(s) Both EYES three times a day  phenylephrine    Infusion 1 MICROgram(s)/kG/Min (11.1 mL/Hr) IV Continuous <Continuous>  QUEtiapine 50 milliGRAM(s) Oral two times a day  sodium bicarbonate  Injectable 50 milliEquivalent(s) IV Push <User Schedule>  vasopressin Infusion 0.04 Unit(s)/Min (2.4 mL/Hr) IV Continuous <Continuous>    MEDICATIONS  (PRN):  morphine  - Injectable 2 milliGRAM(s) IV Push every 6 hours PRN Severe Pain (7 - 10)  ondansetron Injectable 4 milliGRAM(s) IV Push every 4 hours PRN Nausea and/or Vomiting    sodium chloride 0.9% Bolus:   1000 milliLiter(s), IV Bolus, once, infuse over 60 Minute(s), Stop After 1 Doses  Provider's Contact #: [phone]  sodium chloride 0.9% Bolus:   500 milliLiter(s), IV Bolus, once, infuse over 30 Minute(s), Stop After 1 Doses  Provider's Contact #: [phone]  sodium chloride 0.9%.: Solution, 1000 milliLiter(s) infuse at 10 mL/Hr  Provider's Contact #: [phone]  sodium chloride 0.9% Bolus:   500 milliLiter(s), IV Bolus, once, infuse over 30 Minute(s), Stop After 1 Doses  Provider's Contact #: [phone]      LABS:                        7.0    20.45 )-----------( 251      ( 25 Apr 2021 04:30 )             23.4     04-25    146  |  110  |  93<HH>  ----------------------------<  167<H>  4.8   |  31  |  1.0    Ca    7.9<L>      25 Apr 2021 04:30  Mg     2.3     04-25    TPro  5.4<L>  /  Alb  2.1<L>  /  TBili  0.3  /  DBili  x   /  AST  30  /  ALT  19  /  AlkPhos  217<H>  04-25    PT/INR - ( 25 Apr 2021 04:30 )   PT: 12.40 sec;   INR: 1.08 ratio             Arterial Blood Gas:  04-25 @ 06:04  7.34/52/101/28/98/1.6  ABG lactate: --  Arterial Blood Gas:  04-24 @ 04:27  7.36/53/69/30/95/4.3  ABG lactate: --          COVID-19 PCR: NotDetec (05 Apr 2021 10:12)  COVID-19 PCR: NotDetec (04 Apr 2021 14:03)  COVID-19 PCR: NotDetec (29 Mar 2021 14:17)  COVID-19 PCR: NotDetec (20 Mar 2021 12:30)  SARS-CoV-2: NotDetec (10 Mar 2021 22:10)    Mode: AC/ CMV (Assist Control/ Continuous Mandatory Ventilation)  RR (machine): 26  TV (machine): 350  FiO2: 50  PEEP: 5  MAP: 14  PIP: 37      ABG - ( 25 Apr 2021 06:04 )  pH, Arterial: 7.34  pH, Blood: x     /  pCO2: 52    /  pO2: 101   / HCO3: 28    / Base Excess: 1.6   /  SaO2: 98                    RADIOLOGY: Today I personally reviewed latest CXR and other pertinent films.

## 2021-04-25 NOTE — PROCEDURE NOTE - NSANESTHESIA_GEN_A_CORE
1% lidocaine
1% lidocaine/with EPI
no anesthesia administered

## 2021-04-25 NOTE — PROCEDURE NOTE - NSINDICATIONS_GEN_A_CORE
blood sampling/critical patient/monitoring purposes
critical illness/hemodynamic monitoring/hypertonic/irritant infusion/venous access/volume resuscitation
venous access
Subcutaneous tissue/devitalized or nonviable tissue at the level of:/prepare site for appropriate surgical interventions to optimize wound healing

## 2021-04-25 NOTE — PROCEDURE NOTE - NSEQUIPUSED_SKIN_A_CORE
gloves/forceps/adhesive tape/antiseptic cleaning solution/gauze pads/normal saline solution/suture scissors

## 2021-04-25 NOTE — PROCEDURE NOTE - NSSITEPREP_SKIN_A_CORE
chlorhexidine
povidone iodine (if allergic to chlorhexidine)/Adherence to aseptic technique: hand hygiene prior to donning barriers (gown, gloves), don cap and mask, sterile drape over patient
chlorhexidine
chlorhexidine

## 2021-04-25 NOTE — PROCEDURE NOTE - NSPOSTCAREGUIDE_GEN_A_CORE
Care for catheter as per unit/ICU protocols
Verbal/written post procedure instructions were given to patient/caregiver

## 2021-04-25 NOTE — PROGRESS NOTE ADULT - ASSESSMENT
IMPRESSION:    Severe necrotizing CAP  multi lobar pneumonia  Right sided parapneumonic effusion s/p VATS and chest tube sp trach/peg  MENDOZA, improving  Thrombocytopenia, negative HIT & DIC panels  Anemia of Chronic Disease, s/p 1U PRBC 4/18, stable  Hypernatremia  HO bleeding from PEG site      PLAN:    CNS:  .  Seroquel 50mg bid.  Morphine prn.  SAT.    HEENT:  Oral care    PULMONARY:  HOB @ 45 degrees.  Keep SAO2 92 to 96%.  Decrease FiO2 40%.    Pulmonary toilet.    Chest tube per CT sx.    CARDIOVASCULAR: continue  D5W @ 60cc/hr.  Free water 300 q6h.  Keep I = O.    GI: GI prophylaxis.  PEG feeds.  Bowel regimen.    RENAL:  F/u  lytes.  Correct as needed.  Monitor UO.    INFECTIOUS DISEASE:  ABX per ID, c/w Meropenem.    Procalcitonin.    ID FU.  FU cultures.    PICC line.    HEMATOLOGICAL:   Repeat CBC & T&S.    SCDs for DVT prophylaxis.  Keep Hb > 7    ENDOCRINE:  Follow up FS.  Insulin protocol if needed.    MUSCULOSKELETAL: bed rest, pain control for sacral ulcer    Advanced directives and goals of care  Very poor overall prognosis  Palliative care FU, Hospice eval

## 2021-04-25 NOTE — PROCEDURE NOTE - PROCEDURE DATE TIME, MLM
23-Mar-2021 09:30
12-Apr-2021 17:32
03-Apr-2021 11:45
03-Apr-2021 12:00
25-Apr-2021 11:30
23-Mar-2021 10:30

## 2021-04-25 NOTE — PROCEDURE NOTE - NSINFORMCONSENT_GEN_A_CORE
No IV Access in Critical Care/This was an emergent procedure.
Daughter over the Phone/Benefits, risks, and possible complications of procedure explained to patient/caregiver who verbalized understanding and gave verbal consent.
Daughter over the phone/Benefits, risks, and possible complications of procedure explained to patient/caregiver who verbalized understanding and gave verbal consent.
Benefits, risks, and possible complications of procedure explained to patient/caregiver who verbalized understanding and gave written consent.
Benefits, risks, and possible complications of procedure explained to patient/caregiver who verbalized understanding and gave verbal consent.
Benefits, risks, and possible complications of procedure explained to patient/caregiver who verbalized understanding and gave written consent.

## 2021-04-25 NOTE — PROGRESS NOTE ADULT - ASSESSMENT
A/P:  DESTIN TERRY is a 85yFemale w/ right sided CT removed yesterday. no pneumo on CXR.    Plan:   no further CT surgery mgmt  mgmt per primary team  signing off

## 2021-04-25 NOTE — PROGRESS NOTE ADULT - SUBJECTIVE AND OBJECTIVE BOX
GENERAL SURGERY PROGRESS NOTE     DESTIN TERRY  [address] [place] [address] day :47d  POD:  Procedure: Bronchoscopy, at bedside  Open tracheostomy  Insertion, PEG tube    Surgical Attending: Abiel  Overnight events: CT removed yesterday. no pneumo on CXR    T(F): 99.3 (04-24-21 @ 23:33), Max: 99.3 (04-24-21 @ 23:33)  HR: 82 (04-24-21 @ 23:33) (72 - 87)  BP: --  ABP: 147/73 (04-24-21 @ 23:33) (102/45 - 151/68)  ABP(mean): 97 (04-24-21 @ 18:00) (64 - 99)  RR: 30 (04-24-21 @ 23:33) (30 - 41)  SpO2: 96% (04-24-21 @ 21:06) (93% - 99%)      04-23-21 @ 07:01  -  04-24-21 @ 07:00  --------------------------------------------------------  IN:    Dexmedetomidine: 602 mL    Enteral Tube Flush: 630 mL    Free Water: 600 mL    IV PiggyBack: 50 mL    Peptamen A.F.: 1560 mL  Total IN: 3442 mL    OUT:    Chest Tube (mL): 0 mL    Indwelling Catheter - Urethral (mL): 2345 mL    Rectal Tube (mL): 1400 mL  Total OUT: 3745 mL    Total NET: -303 mL      04-24-21 @ 07:01  -  04-25-21 @ 03:25  --------------------------------------------------------  IN:    Dexmedetomidine: 300 mL    Free Water: 600 mL    Peptamen A.F.: 780 mL  Total IN: 1680 mL    OUT:    Indwelling Catheter - Urethral (mL): 1170 mL  Total OUT: 1170 mL    Total NET: 510 mL        DIET/FLUIDS: dextrose 5%. 1000 milliLiter(s) IV Continuous <Continuous>  dextrose 5%. 1000 milliLiter(s) IV Continuous <Continuous>  dextrose 5%. 1000 milliLiter(s) IV Continuous <Continuous>  dextrose 5%. 1000 milliLiter(s) IV Continuous <Continuous>  ferrous    sulfate Liquid 300 milliGRAM(s) Enteral Tube daily  multivitamin/minerals/iron Oral Solution (CENTRUM) 15 milliLiter(s) Enteral Tube daily  sodium bicarbonate  Injectable 50 milliEquivalent(s) IV Push <User Schedule>    NG:                                                                                DRAINS:     BM:   04-23-21 @ 07:01  -  04-24-21 @ 07:00  --------------------------------------------------------  OUT: 1400 mL      EMESIS:     URINE:   04-23-21 @ 07:01  -  04-24-21 @ 07:00  --------------------------------------------------------  OUT: 2345 mL       GI proph:  pantoprazole  Injectable 40 milliGRAM(s) IV Push every 12 hours    AC/ proph:   ABx: meropenem  IVPB 1000 milliGRAM(s) IV Intermittent every 24 hours      GEN: NAD,  HEENT: NC/AT  CHEST: Symmetric chest wall expansion. Regular heart rate  ABD: S/D, non-tender,    LABS  Labs:  CAPILLARY BLOOD GLUCOSE      POCT Blood Glucose.: 201 mg/dL (24 Apr 2021 23:44)  POCT Blood Glucose.: 199 mg/dL (24 Apr 2021 18:27)  POCT Blood Glucose.: 167 mg/dL (24 Apr 2021 12:37)  POCT Blood Glucose.: 172 mg/dL (24 Apr 2021 06:16)                          7.4    22.87 )-----------( 209      ( 24 Apr 2021 04:30 )             25.3       Auto Neutrophil %: 81.6 % (04-24-21 @ 04:30)  Auto Immature Granulocyte %: 0.5 % (04-24-21 @ 04:30)    04-24    148<H>  |  110  |  95<HH>  ----------------------------<  182<H>  4.8   |  26  |  1.1      Calcium, Total Serum: 8.1 mg/dL (04-24-21 @ 04:30)      LFTs:             5.3  | 0.3  | 31       ------------------[260     ( 24 Apr 2021 04:30 )  2.3  | x    | 19          Lipase:x      Amylase:x         Blood Gas Arterial, Lactate: 0.7 mmoL/L (04-24-21 @ 04:27)  Blood Gas Arterial, Lactate: 0.7 mmoL/L (04-23-21 @ 04:53)  Blood Gas Arterial, Lactate: 0.7 mmoL/L (04-22-21 @ 18:50)  Blood Gas Arterial, Lactate: 0.7 mmoL/L (04-22-21 @ 03:58)    ABG - ( 24 Apr 2021 04:27 )  pH: 7.36  /  pCO2: 53    /  pO2: 69    / HCO3: 30    / Base Excess: 4.3   /  SaO2: 95              ABG - ( 23 Apr 2021 04:53 )  pH: 7.37  /  pCO2: 50    /  pO2: 69    / HCO3: 29    / Base Excess: 2.8   /  SaO2: 94              ABG - ( 22 Apr 2021 18:50 )  pH: 7.41  /  pCO2: 48    /  pO2: 86    / HCO3: 30    / Base Excess: 5.1   /  SaO2: 98                Coags:     12.40  ----< 1.08    ( 24 Apr 2021 04:30 )     x                           RADIOLOGY & ADDITIONAL TESTS:

## 2021-04-26 NOTE — PROGRESS NOTE ADULT - SUBJECTIVE AND OBJECTIVE BOX
OVERNIGHT EVENTS: events noted, on fentanyl    Vital Signs Last 24 Hrs  T(C): 36.6 (26 Apr 2021 08:00), Max: 37 (25 Apr 2021 12:00)  T(F): 97.9 (26 Apr 2021 08:00), Max: 98.6 (25 Apr 2021 12:00)  HR: 93 (26 Apr 2021 08:00) (69 - 107)  RR: 32 (26 Apr 2021 08:00) (30 - 39)  SpO2: 100% (26 Apr 2021 08:00) (93% - 100%)    PHYSICAL EXAMINATION:    GENERAL: not following comamnds  HEENT: Head is normocephalic and atraumatic.    NECK: Supple.    LUNGS: bl crackles    HEART: Regular rate and rhythm without murmur.    ABDOMEN: Soft, nontender, and nondistended.      EXTREMITIES: Without any cyanosis, clubbing, rash, lesions or edema.    NEUROLOGIC: not following commands    SKIN: sacral ulcer      LABS:                        7.5    23.52 )-----------( 334      ( 26 Apr 2021 05:30 )             26.3     04-26    150<H>  |  112<H>  |  88<HH>  ----------------------------<  178<H>  4.9   |  28  |  0.9    Ca    8.3<L>      26 Apr 2021 05:30  Mg     2.3     04-26    TPro  6.1  /  Alb  2.5<L>  /  TBili  0.3  /  DBili  x   /  AST  32  /  ALT  23  /  AlkPhos  266<H>  04-26    PT/INR - ( 26 Apr 2021 05:30 )   PT: 11.70 sec;   INR: 1.02 ratio             ABG - ( 26 Apr 2021 03:22 )  pH, Arterial: 7.31  pH, Blood: x     /  pCO2: 63    /  pO2: 113   / HCO3: 31    / Base Excess: 4.1   /  SaO2: 99          26/350/50/5                      04-25-21 @ 07:01  -  04-26-21 @ 07:00  --------------------------------------------------------  IN: 2824 mL / OUT: 3080 mL / NET: -256 mL    04-26-21 @ 07:01  -  04-26-21 @ 08:48  --------------------------------------------------------  IN: 170.6 mL / OUT: 100 mL / NET: 70.6 mL        MICROBIOLOGY:      MEDICATIONS  (STANDING):  ALBUTerol    90 MICROgram(s) HFA Inhaler 2 Puff(s) Inhalation every 6 hours  chlorhexidine 4% Liquid 1 Application(s) Topical <User Schedule>  clotrimazole 1% Cream 1 Application(s) Topical two times a day  collagenase Ointment 1 Application(s) Topical daily  fentaNYL   Infusion. 0.5 MICROgram(s)/kG/Hr (3.38 mL/Hr) IV Continuous <Continuous>  ferrous    sulfate Liquid 300 milliGRAM(s) Enteral Tube daily  insulin lispro (ADMELOG) corrective regimen sliding scale   SubCutaneous every 6 hours  ipratropium 17 MICROgram(s) HFA Inhaler 2 Puff(s) Inhalation every 6 hours  meropenem  IVPB 1000 milliGRAM(s) IV Intermittent every 24 hours  multivitamin/minerals/iron Oral Solution (CENTRUM) 15 milliLiter(s) Enteral Tube daily  nystatin Cream 1 Application(s) Topical two times a day  pantoprazole  Injectable 40 milliGRAM(s) IV Push every 12 hours  petrolatum Ophthalmic Ointment 1 Application(s) Both EYES three times a day  phenylephrine    Infusion 1 MICROgram(s)/kG/Min (11.1 mL/Hr) IV Continuous <Continuous>  QUEtiapine 50 milliGRAM(s) Oral two times a day  sodium bicarbonate  Injectable 50 milliEquivalent(s) IV Push <User Schedule>    MEDICATIONS  (PRN):  morphine  - Injectable 2 milliGRAM(s) IV Push every 6 hours PRN Severe Pain (7 - 10)  ondansetron Injectable 4 milliGRAM(s) IV Push every 4 hours PRN Nausea and/or Vomiting      RADIOLOGY & ADDITIONAL STUDIES:

## 2021-04-26 NOTE — PROGRESS NOTE ADULT - SUBJECTIVE AND OBJECTIVE BOX
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    As per daughter, patient has been having dry consistent cough since 2 months. Today, she was being evaluated for knee injections when she was found to be febrile. She also reports progressing generalized weakness with decreased appetite and PO intake.  No URT symptoms, no chest pain, no leg pain, no diarrhea, no urinary symptoms no sick contacts, no recent travel. In ED was hypotensive, was given IVF, started on levophed 0.04 called to evaluate (11 Mar 2021 03:21)      INTERVAL HISTORY:  AM rounds  s/p bedside debridement of sacrum  afebrile  no acute events      Events past 24 hrs***  Vital Signs Last 24 Hrs  T(C): 36.6 (26 Apr 2021 08:00), Max: 36.6 (26 Apr 2021 08:00)  T(F): 97.9 (26 Apr 2021 08:00), Max: 97.9 (26 Apr 2021 08:00)  HR: 85 (26 Apr 2021 21:00) (85 - 107)  RR: 35 (26 Apr 2021 21:00) (30 - 38)  SpO2: 100% (26 Apr 2021 21:00) (94% - 100%)    I&O's Detail    25 Apr 2021 07:01  -  26 Apr 2021 07:00  --------------------------------------------------------  IN:    Dexmedetomidine: 75 mL    FentaNYL: 289 mL    Free Water: 900 mL    Peptamen A.F.: 1560 mL  Total IN: 2824 mL    OUT:    Indwelling Catheter - Urethral (mL): 2880 mL    Rectal Tube (mL): 200 mL  Total OUT: 3080 mL    Total NET: -256 mL      26 Apr 2021 07:01  -  26 Apr 2021 20:27  --------------------------------------------------------  IN:    Enteral Tube Flush: 60 mL    FentaNYL: 284.2 mL    Free Water: 900 mL    Peptamen A.F.: 910 mL  Total IN: 2154.2 mL    OUT:    Indwelling Catheter - Urethral (mL): 1075 mL  Total OUT: 1075 mL    Total NET: 1079.2 mL            MEDICATIONS  (STANDING):  ALBUTerol    90 MICROgram(s) HFA Inhaler 2 Puff(s) Inhalation every 6 hours  chlorhexidine 4% Liquid 1 Application(s) Topical <User Schedule>  clotrimazole 1% Cream 1 Application(s) Topical two times a day  collagenase Ointment 1 Application(s) Topical daily  fentaNYL   Infusion. 0.5 MICROgram(s)/kG/Hr (3.38 mL/Hr) IV Continuous <Continuous>  ferrous    sulfate Liquid 300 milliGRAM(s) Enteral Tube daily  insulin lispro (ADMELOG) corrective regimen sliding scale   SubCutaneous every 6 hours  ipratropium 17 MICROgram(s) HFA Inhaler 2 Puff(s) Inhalation every 6 hours  meropenem  IVPB 1000 milliGRAM(s) IV Intermittent every 24 hours  multivitamin/minerals/iron Oral Solution (CENTRUM) 15 milliLiter(s) Enteral Tube daily  nystatin Cream 1 Application(s) Topical two times a day  pantoprazole  Injectable 40 milliGRAM(s) IV Push every 12 hours  petrolatum Ophthalmic Ointment 1 Application(s) Both EYES three times a day  phenylephrine    Infusion 1 MICROgram(s)/kG/Min (11.1 mL/Hr) IV Continuous <Continuous>  QUEtiapine 50 milliGRAM(s) Oral two times a day  sodium bicarbonate  Injectable 50 milliEquivalent(s) IV Push <User Schedule>    MEDICATIONS  (PRN):  morphine  - Injectable 2 milliGRAM(s) IV Push every 6 hours PRN Severe Pain (7 - 10)  ondansetron Injectable 4 milliGRAM(s) IV Push every 4 hours PRN Nausea and/or Vomiting    Allergies    clindamycin (Hives)    Intolerances        Lab Results:                        7.5    23.52 )-----------( 334      ( 26 Apr 2021 05:30 )             26.3     04-26    150<H>  |  112<H>  |  88<HH>  ----------------------------<  178<H>  4.9   |  28  |  0.9    Ca    8.3<L>      26 Apr 2021 05:30  Mg     2.3     04-26    TPro  6.1  /  Alb  2.5<L>  /  TBili  0.3  /  DBili  x   /  AST  32  /  ALT  23  /  AlkPhos  266<H>  04-26    PT/INR - ( 26 Apr 2021 05:30 )   PT: 11.70 sec;   INR: 1.02 ratio             LIVER FUNCTIONS - ( 26 Apr 2021 05:30 )  Alb: 2.5 g/dL / Pro: 6.1 g/dL / ALK PHOS: 266 U/L / ALT: 23 U/L / AST: 32 U/L / GGT: x           CAPILLARY BLOOD GLUCOSE      POCT Blood Glucose.: 170 mg/dL (26 Apr 2021 19:01)  POCT Blood Glucose.: 158 mg/dL (26 Apr 2021 12:24)  POCT Blood Glucose.: 189 mg/dL (26 Apr 2021 05:14)  POCT Blood Glucose.: 261 mg/dL (26 Apr 2021 00:22)    ABG - ( 26 Apr 2021 15:41 )  pH: 7.38  /  pCO2: 55    /  pO2: 88    / HCO3: 33    / Base Excess: 6.3   /  SaO2: 98                Mode: AC/ CMV (Assist Control/ Continuous Mandatory Ventilation)  RR (machine): 34  TV (machine): 380  FiO2: 50  PEEP: 5  ITime: 1  MAP: 17  PIP: 46      EXAM: Sacrum: ~3x5cm clean wound, with granulation tissue, minimal bleeding    Dressing applied. Pt kostas well

## 2021-04-26 NOTE — PROGRESS NOTE ADULT - ASSESSMENT
86 y/o female with Stage 3 sacral pressure s/p bedside debridement    Plan:  - offload / reposition  - wound care: soap & water BID; santyl/hydrogel/allevyn BID  - no further surgery at this time

## 2021-04-26 NOTE — PROGRESS NOTE ADULT - ASSESSMENT
IMPRESSION:    Severe necrotizing CAP  multi lobar pneumonia  Right sided parapneumonic effusion s/p VATS and chest tube sp trach/peg  MENDOZA, improving  Thrombocytopenia, negative HIT & DIC panels  Anemia of Chronic Disease, s/p 1U PRBC 4/18, stable  Hypernatremia  HO bleeding from PEG site      PLAN:    CNS:  .  Seroquel 50mg bid.  fentanyl    HEENT:  Oral care    PULMONARY:  HOB @ 45 degrees.  Keep SAO2 92 to 96%.  FiO2 40%.    Pulmonary toilet.    Chest tube per CT sx.    CARDIOVASCULAR: continue .  Free water 300 q4h.      GI: GI prophylaxis.  PEG feeds.  Bowel regimen.    RENAL:  F/u  lytes.  Correct as needed.  Monitor UO.    INFECTIOUS DISEASE:  ABX per ID, c/w Meropenem.    Procalcitonin.    ID FU.  FU cultures.    PICC line.    HEMATOLOGICAL:   Repeat CBC & T&S.    SCDs for DVT prophylaxis.  Keep Hb > 7    ENDOCRINE:  Follow up FS.  Insulin protocol if needed.    MUSCULOSKELETAL: bed rest, pain control for sacral ulcer    Advanced directives and goals of care  Very poor overall prognosis  Palliative care FU

## 2021-04-27 NOTE — PROGRESS NOTE ADULT - ASSESSMENT
IMPRESSION:    Severe necrotizing CAP  multi lobar pneumonia  Right sided parapneumonic effusion s/p VATS and chest tube sp trach/peg  MENDOZA, improving  Thrombocytopenia, negative HIT & DIC panels  Anemia of Chronic Disease, s/p 1U PRBC 4/18, stable  Hypernatremia  HO bleeding from PEG site      PLAN:    CNS:  .  Seroquel 50mg bid.  fentanyl    HEENT:  Oral care    PULMONARY:  HOB @ 45 degrees.  Keep SAO2 92 to 96%.  FiO2 40%.    Pulmonary toilet.    Chest tube per CT sx.    CARDIOVASCULAR: continue .  Free water 300 q4h.      GI: GI prophylaxis.  PEG feeds.  Bowel regimen.    RENAL:  F/u  lytes.  Correct as needed.  Monitor UO.    INFECTIOUS DISEASE:  ABX per ID, c/w Meropenem.    Procalcitonin.    ID FU.     HEMATOLOGICAL:   Repeat CBC & T&S.    SCDs for DVT prophylaxis.  Keep Hb > 7    ENDOCRINE:  Follow up FS.  Insulin protocol if needed.    MUSCULOSKELETAL: bed rest, pain control for sacral ulcer    Advanced directives and goals of care  Very poor overall prognosis  Palliative care FU

## 2021-04-27 NOTE — PROGRESS NOTE ADULT - SUBJECTIVE AND OBJECTIVE BOX
OVERNIGHT EVENTS: events noted, fentanyl, afebrile    Vital Signs Last 24 Hrs  T(C): 36.8 (27 Apr 2021 08:00), Max: 36.8 (27 Apr 2021 08:00)  T(F): 98.2 (27 Apr 2021 08:00), Max: 98.2 (27 Apr 2021 08:00)  HR: 94 (27 Apr 2021 08:00) (84 - 101)  RR: 34 (27 Apr 2021 08:00) (32 - 38)  SpO2: 100% (27 Apr 2021 08:00) (100% - 100%)    PHYSICAL EXAMINATION:    GENERAL: ill looking    HEENT: Head is normocephalic and atraumatic,  trach    NECK: Supple.    LUNGS: bl rhonchi    HEART: ISMAEL 3/6    ABDOMEN: Soft,     EXTREMITIES: Without any cyanosis, clubbing, rash, lesions or edema.    NEUROLOGIC: NON FOCAL  SKIN: ulcer      LABS:                        7.1    18.93 )-----------( 318      ( 27 Apr 2021 06:54 )             24.3     04-26    150<H>  |  112<H>  |  88<HH>  ----------------------------<  178<H>  4.9   |  28  |  0.9    Ca    8.3<L>      26 Apr 2021 05:30  Mg     2.3     04-26    TPro  6.1  /  Alb  2.5<L>  /  TBili  0.3  /  DBili  x   /  AST  32  /  ALT  23  /  AlkPhos  266<H>  04-26    PT/INR - ( 27 Apr 2021 06:54 )   PT: 12.00 sec;   INR: 1.04 ratio             ABG - ( 27 Apr 2021 05:38 )  pH, Arterial: 7.26  pH, Blood: x     /  pCO2: 74    /  pO2: 97    / HCO3: 33    / Base Excess: 5.2   /  SaO2: 97        34/300/5/50  P/P39/36                        04-26-21 @ 07:01  -  04-27-21 @ 07:00  --------------------------------------------------------  IN: 2942.2 mL / OUT: 2325 mL / NET: 617.2 mL        MICROBIOLOGY:      MEDICATIONS  (STANDING):  ALBUTerol    90 MICROgram(s) HFA Inhaler 2 Puff(s) Inhalation every 6 hours  chlorhexidine 4% Liquid 1 Application(s) Topical <User Schedule>  clotrimazole 1% Cream 1 Application(s) Topical two times a day  collagenase Ointment 1 Application(s) Topical daily  fentaNYL   Infusion. 0.5 MICROgram(s)/kG/Hr (3.38 mL/Hr) IV Continuous <Continuous>  ferrous    sulfate Liquid 300 milliGRAM(s) Enteral Tube daily  insulin lispro (ADMELOG) corrective regimen sliding scale   SubCutaneous every 6 hours  ipratropium 17 MICROgram(s) HFA Inhaler 2 Puff(s) Inhalation every 6 hours  meropenem  IVPB 1000 milliGRAM(s) IV Intermittent every 24 hours  multivitamin/minerals/iron Oral Solution (CENTRUM) 15 milliLiter(s) Enteral Tube daily  nystatin Cream 1 Application(s) Topical two times a day  pantoprazole  Injectable 40 milliGRAM(s) IV Push every 12 hours  petrolatum Ophthalmic Ointment 1 Application(s) Both EYES three times a day  phenylephrine    Infusion 1 MICROgram(s)/kG/Min (11.1 mL/Hr) IV Continuous <Continuous>  QUEtiapine 50 milliGRAM(s) Oral two times a day  sodium bicarbonate  Injectable 50 milliEquivalent(s) IV Push <User Schedule>    MEDICATIONS  (PRN):  morphine  - Injectable 2 milliGRAM(s) IV Push every 6 hours PRN Severe Pain (7 - 10)  ondansetron Injectable 4 milliGRAM(s) IV Push every 4 hours PRN Nausea and/or Vomiting      RADIOLOGY & ADDITIONAL STUDIES:

## 2021-04-28 NOTE — PROGRESS NOTE ADULT - SUBJECTIVE AND OBJECTIVE BOX
Patient is a 85y old  Female who presents with a chief complaint of sob (28 Apr 2021 07:43)  pt remains  vented via trach  on peg tube feed    ICU Vital Signs Last 24 Hrs  T(C): 36.3 (28 Apr 2021 08:00), Max: 37 (27 Apr 2021 21:18)  T(F): 97.3 (28 Apr 2021 08:00), Max: 98.6 (27 Apr 2021 21:18)  HR: 90 (28 Apr 2021 11:00) (81 - 101)  ABP: 109/83 (28 Apr 2021 11:00) (86/40 - 141/63)  ABP(mean): 87 (28 Apr 2021 11:00) (52 - 93)  RR: 36 (28 Apr 2021 11:00) (34 - 37)  SpO2: 100% (28 Apr 2021 11:00) (93% - 100%)      Drug Dosing Weight  Height (cm): 160 (30 Mar 2021 07:39)  Weight (kg): 67.5 (07 Apr 2021 06:00)  BMI (kg/m2): 26.4 (07 Apr 2021 06:00)  BSA (m2): 1.71 (07 Apr 2021 06:00)    27 Apr 2021 07:01  -  28 Apr 2021 07:00  --------------------------------------------------------  IN:    dextrose 5%: 1350 mL    Free Water: 1200 mL    Peptamen A.F.: 1260 mL  Total IN: 3810 mL    OUT:    Indwelling Catheter - Urethral (mL): 2085 mL  Total OUT: 2085 mL    Total NET: 1725 mL      28 Apr 2021 07:01  -  28 Apr 2021 16:11  --------------------------------------------------------  IN:  Total IN: 0 mL    OUT:    Indwelling Catheter - Urethral (mL): 75 mL  Total OUT: 75 mL    Total NET: -75 mL     PHYSICAL EXAM:  Constitutional:  remain vented via trach  Gastrointestinal:  +peg tube in place   MEDICATIONS  (STANDING):  ALBUTerol    90 MICROgram(s) HFA Inhaler 2 Puff(s) Inhalation every 6 hours  chlorhexidine 4% Liquid 1 Application(s) Topical <User Schedule>  clotrimazole 1% Cream 1 Application(s) Topical two times a day  collagenase Ointment 1 Application(s) Topical daily  dextrose 5%. 1000 milliLiter(s) (50 mL/Hr) IV Continuous <Continuous>  fentaNYL   Infusion. 0.5 MICROgram(s)/kG/Hr (3.38 mL/Hr) IV Continuous <Continuous>  fentaNYL   Patch  25 MICROgram(s)/Hr 1 Patch Transdermal every 72 hours  ferrous    sulfate Liquid 300 milliGRAM(s) Enteral Tube daily  insulin lispro (ADMELOG) corrective regimen sliding scale   SubCutaneous every 6 hours  ipratropium 17 MICROgram(s) HFA Inhaler 2 Puff(s) Inhalation every 6 hours  meropenem  IVPB 1000 milliGRAM(s) IV Intermittent every 24 hours  multivitamin/minerals/iron Oral Solution (CENTRUM) 15 milliLiter(s) Enteral Tube daily  nystatin Cream 1 Application(s) Topical two times a day  pantoprazole  Injectable 40 milliGRAM(s) IV Push every 12 hours  petrolatum Ophthalmic Ointment 1 Application(s) Both EYES three times a day  phenylephrine    Infusion 1 MICROgram(s)/kG/Min (11.1 mL/Hr) IV Continuous <Continuous>  QUEtiapine 50 milliGRAM(s) Oral two times a day  sodium bicarbonate  Injectable 50 milliEquivalent(s) IV Push <User Schedule>      Diet, NPO with Tube Feed:   Tube Feeding Modality: Gastrostomy  Peptamen A.F. Formula  Total Volume for 24 Hours (mL): 1560  Continuous  Until Goal Tube Feed Rate (mL per Hour): 65  Tube Feed Duration (in Hours): 24  Tube Feed Start Time: 20:15 (04-13-21 @ 20:14)      LABS  04-28    152<H>  |  112<H>  |  79<HH>  ----------------------------<  157<H>  4.4   |  34<H>  |  0.7    Ca    8.1<L>      28 Apr 2021 04:30  Mg     2.1     04-28    TPro  5.5<L>  /  Alb  2.3<L>  /  TBili  0.2  /  DBili  x   /  AST  24  /  ALT  20  /  AlkPhos  201<H>  04-28                          6.4    15.91 )-----------( 336      ( 28 Apr 2021 04:30 )             22.7     CAPILLARY BLOOD GLUCOSE  POCT Blood Glucose.: 182 mg/dL (28 Apr 2021 11:44)  POCT Blood Glucose.: 162 mg/dL (28 Apr 2021 06:26)  POCT Blood Glucose.: 189 mg/dL (27 Apr 2021 23:28)   RADIOLOGY STUDIES    < from: Xray Chest 1 View- PORTABLE-Routine (Xray Chest 1 View- PORTABLE-Routine in AM.) (04.28.21 @ 06:57) >  Impression:  Bilateral opacifications. Support devices as described.   Patient is a 85y old  Female who presents with a chief complaint of sob (28 Apr 2021 07:43)  pt remains  vented via trach  on peg feedings    ICU Vital Signs Last 24 Hrs  T(C): 36.3 (28 Apr 2021 08:00), Max: 37 (27 Apr 2021 21:18)  T(F): 97.3 (28 Apr 2021 08:00), Max: 98.6 (27 Apr 2021 21:18)  HR: 90 (28 Apr 2021 11:00) (81 - 101)  ABP: 109/83 (28 Apr 2021 11:00) (86/40 - 141/63)  ABP(mean): 87 (28 Apr 2021 11:00) (52 - 93)  RR: 36 (28 Apr 2021 11:00) (34 - 37)  SpO2: 100% (28 Apr 2021 11:00) (93% - 100%)    Drug Dosing Weight  Height (cm): 160 (30 Mar 2021 07:39)  Weight (kg): 67.5 (07 Apr 2021 06:00)  BMI (kg/m2): 26.4 (07 Apr 2021 06:00)  BSA (m2): 1.71 (07 Apr 2021 06:00)    27 Apr 2021 07:01  -  28 Apr 2021 07:00  --------------------------------------------------------  IN:    dextrose 5%: 1350 mL    Free Water: 1200 mL    Peptamen A.F.: 1260 mL  Total IN: 3810 mL    OUT:    Indwelling Catheter - Urethral (mL): 2085 mL  Total OUT: 2085 mL    Total NET: 1725 mL     PHYSICAL EXAM:  Constitutional:  remain vented via trach  Gastrointestinal:  +peg tube in place     MEDICATIONS  (STANDING):  ALBUTerol    90 MICROgram(s) HFA Inhaler 2 Puff(s) Inhalation every 6 hours  chlorhexidine 4% Liquid 1 Application(s) Topical <User Schedule>  clotrimazole 1% Cream 1 Application(s) Topical two times a day  collagenase Ointment 1 Application(s) Topical daily  dextrose 5%. 1000 milliLiter(s) (50 mL/Hr) IV Continuous <Continuous>  fentaNYL   Infusion. 0.5 MICROgram(s)/kG/Hr (3.38 mL/Hr) IV Continuous <Continuous>  fentaNYL   Patch  25 MICROgram(s)/Hr 1 Patch Transdermal every 72 hours  ferrous    sulfate Liquid 300 milliGRAM(s) Enteral Tube daily  insulin lispro (ADMELOG) corrective regimen sliding scale   SubCutaneous every 6 hours  ipratropium 17 MICROgram(s) HFA Inhaler 2 Puff(s) Inhalation every 6 hours  meropenem  IVPB 1000 milliGRAM(s) IV Intermittent every 24 hours  multivitamin/minerals/iron Oral Solution (CENTRUM) 15 milliLiter(s) Enteral Tube daily  nystatin Cream 1 Application(s) Topical two times a day  pantoprazole  Injectable 40 milliGRAM(s) IV Push every 12 hours  petrolatum Ophthalmic Ointment 1 Application(s) Both EYES three times a day  phenylephrine    Infusion 1 MICROgram(s)/kG/Min (11.1 mL/Hr) IV Continuous <Continuous>  QUEtiapine 50 milliGRAM(s) Oral two times a day  sodium bicarbonate  Injectable 50 milliEquivalent(s) IV Push <User Schedule>    Diet, NPO with Tube Feed:   Tube Feeding Modality: Gastrostomy  Peptamen A.F. Formula  Total Volume for 24 Hours (mL): 1560  Continuous  Until Goal Tube Feed Rate (mL per Hour): 65  Tube Feed Duration (in Hours): 24  Tube Feed Start Time: 20:15 (04-13-21 @ 20:14)    LABS  04-28    152<H>  |  112<H>  |  79<HH>  ----------------------------<  157<H>  4.4   |  34<H>  |  0.7    Ca    8.1<L>      28 Apr 2021 04:30  Mg     2.1     04-28    TPro  5.5<L>  /  Alb  2.3<L>  /  TBili  0.2  /  DBili  x   /  AST  24  /  ALT  20  /  AlkPhos  201<H>  04-28                          6.4    15.91 )-----------( 336      ( 28 Apr 2021 04:30 )             22.7     CAPILLARY BLOOD GLUCOSE  POCT Blood Glucose.: 182 mg/dL (28 Apr 2021 11:44)  POCT Blood Glucose.: 162 mg/dL (28 Apr 2021 06:26)  POCT Blood Glucose.: 189 mg/dL (27 Apr 2021 23:28)   RADIOLOGY STUDIES    < from: Xray Chest 1 View- PORTABLE-Routine (Xray Chest 1 View- PORTABLE-Routine in AM.) (04.28.21 @ 06:57) >  Impression:  Bilateral opacifications. Support devices as described.

## 2021-04-28 NOTE — PROGRESS NOTE ADULT - SUBJECTIVE AND OBJECTIVE BOX
OVERNIGHT EVENTS: events noted, on fentanyl, no pressors    Vital Signs Last 24 Hrs  T(C): 37 (27 Apr 2021 21:18), Max: 37 (27 Apr 2021 21:18)  T(F): 98.6 (27 Apr 2021 21:18), Max: 98.6 (27 Apr 2021 21:18)  HR: 84 (28 Apr 2021 07:00) (79 - 101)  BP: --  BP(mean): --  RR: 37 (28 Apr 2021 07:00) (34 - 37)  SpO2: 100% (28 Apr 2021 07:00) (93% - 100%)    PHYSICAL EXAMINATION:    GENERAL: ill looking  HEENT: Head is normocephalic and atraumatic. trach    NECK: Supple.    LUNGS: dec bs both bases    HEART: Regular rate and rhythm without murmur.    ABDOMEN: Soft, nontender, and nondistended.      EXTREMITIES: Without any cyanosis, clubbing, rash, lesions or edema.    NEUROLOGIC: not following commands    SKIN: ulcer      LABS:                        7.1    18.93 )-----------( 318      ( 27 Apr 2021 06:54 )             24.3     04-27    150<H>  |  113<H>  |  79<HH>  ----------------------------<  170<H>  4.5   |  32  |  0.9    Ca    8.1<L>      27 Apr 2021 06:54  Mg     2.1     04-27    TPro  5.9<L>  /  Alb  2.3<L>  /  TBili  0.2  /  DBili  x   /  AST  28  /  ALT  22  /  AlkPhos  205<H>  04-27    PT/INR - ( 27 Apr 2021 06:54 )   PT: 12.00 sec;   INR: 1.04 ratio             ABG - ( 28 Apr 2021 03:23 )  pH, Arterial: 7.27  pH, Blood: x     /  pCO2: 71    /  pO2: 64    / HCO3: 33    / Base Excess: 4.6   /  SaO2: 91          300/34/40/5  plateau 34                      04-27-21 @ 07:01  -  04-28-21 @ 07:00  --------------------------------------------------------  IN: 3810 mL / OUT: 2085 mL / NET: 1725 mL        MICROBIOLOGY:      MEDICATIONS  (STANDING):  ALBUTerol    90 MICROgram(s) HFA Inhaler 2 Puff(s) Inhalation every 6 hours  chlorhexidine 4% Liquid 1 Application(s) Topical <User Schedule>  clotrimazole 1% Cream 1 Application(s) Topical two times a day  collagenase Ointment 1 Application(s) Topical daily  dextrose 5%. 1000 milliLiter(s) (50 mL/Hr) IV Continuous <Continuous>  fentaNYL   Infusion. 0.5 MICROgram(s)/kG/Hr (3.38 mL/Hr) IV Continuous <Continuous>  fentaNYL   Patch  25 MICROgram(s)/Hr 1 Patch Transdermal every 72 hours  ferrous    sulfate Liquid 300 milliGRAM(s) Enteral Tube daily  insulin lispro (ADMELOG) corrective regimen sliding scale   SubCutaneous every 6 hours  ipratropium 17 MICROgram(s) HFA Inhaler 2 Puff(s) Inhalation every 6 hours  meropenem  IVPB 1000 milliGRAM(s) IV Intermittent every 24 hours  multivitamin/minerals/iron Oral Solution (CENTRUM) 15 milliLiter(s) Enteral Tube daily  nystatin Cream 1 Application(s) Topical two times a day  pantoprazole  Injectable 40 milliGRAM(s) IV Push every 12 hours  petrolatum Ophthalmic Ointment 1 Application(s) Both EYES three times a day  phenylephrine    Infusion 1 MICROgram(s)/kG/Min (11.1 mL/Hr) IV Continuous <Continuous>  QUEtiapine 50 milliGRAM(s) Oral two times a day  sodium bicarbonate  Injectable 50 milliEquivalent(s) IV Push <User Schedule>    MEDICATIONS  (PRN):  ondansetron Injectable 4 milliGRAM(s) IV Push every 4 hours PRN Nausea and/or Vomiting      RADIOLOGY & ADDITIONAL STUDIES:

## 2021-04-28 NOTE — PROGRESS NOTE ADULT - ASSESSMENT
IMPRESSION:    Severe necrotizing CAP  multi lobar pneumonia  Right sided parapneumonic effusion s/p VATS and chest tube sp trach/peg  MENDOZA, improving  Thrombocytopenia, negative HIT & DIC panels  Anemia of Chronic Disease, s/p 1U PRBC 4/18, stable  Hypernatremia  HO bleeding from PEG site      PLAN:    CNS:  .  Seroquel 50mg bid.  fentanyl    HEENT:  Oral care    PULMONARY:  HOB @ 45 degrees.  Keep SAO2 92 to 96%.  FiO2 40%.    Pulmonary toilet.        CARDIOVASCULAR: continue .  Free water 300 q4h.  d5 w    GI: GI prophylaxis.  PEG feeds.  Bowel regimen.    RENAL:  F/u  lytes.  Correct as needed.  Monitor UO.    INFECTIOUS DISEASE:  ABX per ID, c/w Meropenem.      ID FU.     HEMATOLOGICAL:   Repeat CBC & T&S.    SCDs for DVT prophylaxis.  Keep Hb > 7    ENDOCRINE:  Follow up FS.  Insulin protocol if needed.    MUSCULOSKELETAL: bed rest, pain control for sacral ulcer    Advanced directives and goals of care  Very poor overall prognosis  Palliative care FU

## 2021-04-28 NOTE — PROGRESS NOTE ADULT - ASSESSMENT
ASSESSMENT/PLAN  Severe necrotizing CAP  multi lobar pneumonia  Right sided parapneumonic effusion s/p VATS and chest tube   sp trach/peg  MENDOZA, improving  Thrombocytopenia, negative HIT & DIC panels  Anemia of Chronic Disease, s/p 1U PRBC 4/18, stable  Hypernatremia  HO bleeding from PEG site  elevated WBC  will change tube feed to glucerna 1.2 at 360ml q8hrs   add prosource TF 2 packets daily  check bmp/phos/mg      ASSESSMENT/PLAN  Severe necrotizing CAP  multi lobar pneumonia  Right sided parapneumonic effusion s/p VATS and chest tube   sp trach/peg  MENDOZA, improving  Thrombocytopenia, negative HIT & DIC panels  Anemia of Chronic Disease, s/p 1U PRBC 4/18, stable  Hypernatremia  HO bleeding from PEG site  elevated WBC    no longer need hydrolyzed enteral formula  will change tube feed to Glucerna 1.2 at 60 ml/h and add Prosource TF 3/d (to account for diff in protein content)  after 24-48 h, once polymeric formula tolerated, will change to intermittent gravily method of feeding - Glucerna 1.2 360 ml over 30-40 min x 4 feeds/d (+ cont the Prosource TF)  f/u poc glucose and lytes and phos

## 2021-04-29 NOTE — PROGRESS NOTE ADULT - SUBJECTIVE AND OBJECTIVE BOX
OVERNIGHT EVENTS: events noted, d5 w 50 cc/h, fentanyl, a line removed    Vital Signs Last 24 Hrs  T(C): 36.4 (29 Apr 2021 06:00), Max: 36.6 (29 Apr 2021 00:07)  T(F): 97.5 (29 Apr 2021 06:00), Max: 97.8 (29 Apr 2021 00:07)  HR: 84 (29 Apr 2021 07:00) (77 - 99)  BP: 131/65 (29 Apr 2021 07:00) (121/58 - 140/73)  BP(mean): 78 (29 Apr 2021 07:00) (78 - 91)  RR: 36 (29 Apr 2021 07:00) (34 - 37)  SpO2: 100% (29 Apr 2021 07:00) (95% - 100%)    PHYSICAL EXAMINATION:    GENERAL: ill looking    HEENT: Head is normocephalic and atraumatic. trac    NECK: Supple.    LUNGS: bl rhonchi    HEART ISMAEL 3/6    ABDOMEN: Soft, nontender, and nondistended.      EXTREMITIES: Without any cyanosis, clubbing, rash, lesions or edema.    NEUROLOGIC: not following commands  SKIN: sacral ulcer      LABS:                        6.4    15.91 )-----------( 336      ( 28 Apr 2021 04:30 )             22.7     04-28    152<H>  |  112<H>  |  79<HH>  ----------------------------<  157<H>  4.4   |  34<H>  |  0.7    Ca    8.1<L>      28 Apr 2021 04:30  Mg     2.1     04-28    TPro  5.5<L>  /  Alb  2.3<L>  /  TBili  0.2  /  DBili  x   /  AST  24  /  ALT  20  /  AlkPhos  201<H>  04-28        ABG - ( 29 Apr 2021 02:01 )  pH, Arterial: 7.29  pH, Blood: x     /  pCO2: 68    /  pO2: 70    / HCO3: 33    / Base Excess: 5.8   /  SaO2: 94        34/300/40/5    p 34                        04-28-21 @ 07:01  -  04-29-21 @ 07:00  --------------------------------------------------------  IN: 1920 mL / OUT: 570 mL / NET: 1350 mL        MICROBIOLOGY:  Culture Results:   No growth to date. (04-27 @ 06:16)      MEDICATIONS  (STANDING):  ALBUTerol    90 MICROgram(s) HFA Inhaler 2 Puff(s) Inhalation every 6 hours  chlorhexidine 4% Liquid 1 Application(s) Topical <User Schedule>  clotrimazole 1% Cream 1 Application(s) Topical two times a day  collagenase Ointment 1 Application(s) Topical daily  dextrose 5%. 1000 milliLiter(s) (50 mL/Hr) IV Continuous <Continuous>  fentaNYL   Infusion. 0.5 MICROgram(s)/kG/Hr (3.38 mL/Hr) IV Continuous <Continuous>  fentaNYL   Patch  25 MICROgram(s)/Hr 1 Patch Transdermal every 72 hours  ferrous    sulfate Liquid 300 milliGRAM(s) Enteral Tube daily  insulin lispro (ADMELOG) corrective regimen sliding scale   SubCutaneous every 6 hours  ipratropium 17 MICROgram(s) HFA Inhaler 2 Puff(s) Inhalation every 6 hours  meropenem  IVPB 1000 milliGRAM(s) IV Intermittent every 24 hours  multivitamin/minerals/iron Oral Solution (CENTRUM) 15 milliLiter(s) Enteral Tube daily  nystatin Cream 1 Application(s) Topical two times a day  pantoprazole  Injectable 40 milliGRAM(s) IV Push every 12 hours  petrolatum Ophthalmic Ointment 1 Application(s) Both EYES three times a day  phenylephrine    Infusion 1 MICROgram(s)/kG/Min (11.1 mL/Hr) IV Continuous <Continuous>  QUEtiapine 50 milliGRAM(s) Oral two times a day  sodium bicarbonate  Injectable 50 milliEquivalent(s) IV Push <User Schedule>    MEDICATIONS  (PRN):  ondansetron Injectable 4 milliGRAM(s) IV Push every 4 hours PRN Nausea and/or Vomiting      RADIOLOGY & ADDITIONAL STUDIES:

## 2021-04-29 NOTE — PROGRESS NOTE ADULT - ASSESSMENT
IMPRESSION:    Severe necrotizing CAP  multi lobar pneumonia  Right sided parapneumonic effusion s/p VATS and chest tube sp trach/peg  MENDOZA, improving  Thrombocytopenia, negative HIT & DIC panels  Anemia of Chronic Disease, s/p 1U PRBC 4/18, stable  Hypernatremia  HO bleeding from PEG site  dec hb      PLAN:    CNS:  .  Seroquel 50mg bid.  fentanyl    HEENT:  Oral care    PULMONARY:  HOB @ 45 degrees.  Keep SAO2 92 to 96%.  FiO2 40%.    Pulmonary toilet.        CARDIOVASCULAR: continue .  Free water 300 q4h.  d5 w    GI: GI prophylaxis.  PEG feeds.  Bowel regimen.    RENAL:  F/u  lytes.  Correct as needed.  Monitor UO.    INFECTIOUS DISEASE:  ABX per ID, c/w Meropenem.      ID FU.     HEMATOLOGICAL:   Repeat CBC & T&S.    SCDs for DVT prophylaxis.  Keep Hb > 7    ENDOCRINE:  Follow up FS.  Insulin protocol if needed.    MUSCULOSKELETAL: bed rest, pain control for sacral ulcer    Advanced directives and goals of care  Very poor overall prognosis  Palliative care FU

## 2021-04-29 NOTE — PROGRESS NOTE ADULT - ASSESSMENT
ASSESSMENT/PLAN  Severe necrotizing CAP  multi lobar pneumonia  Right sided parapneumonic effusion s/p VATS and chest tube   sp trach/peg  MENDOZA, improving  Thrombocytopenia, negative HIT & DIC panels  Anemia of Chronic Disease, s/p 1U PRBC 4/18, stable  Hypernatremia  HO bleeding from PEG site  elevated WBC       will change to intermittent gravily method of feeding - Glucerna 1.2 360 ml over 30-40 min x 4 feeds/d (+ cont the Prosource TF)  f/u poc glucose and lytes and phos

## 2021-04-29 NOTE — PROGRESS NOTE ADULT - SUBJECTIVE AND OBJECTIVE BOX
Patient is a 85y old  Female who presents with a chief complaint of sob (29 Apr 2021 07:42)  pt remains vented via trach  on peg tube feed    ICU Vital Signs Last 24 Hrs  T(C): 36.7 (29 Apr 2021 08:00), Max: 36.7 (29 Apr 2021 08:00)  T(F): 98 (29 Apr 2021 08:00), Max: 98 (29 Apr 2021 08:00)  HR: 85 (29 Apr 2021 15:00) (77 - 91)  BP: 121/59 (29 Apr 2021 15:00) (102/51 - 140/73)  BP(mean): 74 (29 Apr 2021 15:00) (72 - 92)  ABP: 116/58 (28 Apr 2021 18:00) (116/58 - 116/70)  ABP(mean): 83 (28 Apr 2021 18:00) (83 - 86)  RR: 35 (29 Apr 2021 15:00) (34 - 37)  SpO2: 98% (29 Apr 2021 15:00) (95% - 100%)      Drug Dosing Weight  Height (cm): 160 (30 Mar 2021 07:39)  Weight (kg): 67.5 (07 Apr 2021 06:00)  BMI (kg/m2): 26.4 (07 Apr 2021 06:00)  BSA (m2): 1.71 (07 Apr 2021 06:00)    I&O's Detail    28 Apr 2021 07:01  -  29 Apr 2021 07:00  --------------------------------------------------------  IN:    dextrose 5%: 600 mL    Free Water: 600 mL    Peptamen A.F.: 720 mL  Total IN: 1920 mL    OUT:    Indwelling Catheter - Urethral (mL): 570 mL  Total OUT: 570 mL    Total NET: 1350 mL      29 Apr 2021 07:01  -  29 Apr 2021 16:07  --------------------------------------------------------  IN:    dextrose 5%: 650 mL    FentaNYL: 136.2 mL    Free Water: 900 mL    Glucerna: 1080 mL  Total IN: 2766.2 mL    OUT:    Indwelling Catheter - Urethral (mL): 1050 mL    Rectal Tube (mL): 50 mL  Total OUT: 1100 mL    Total NET: 1666.2 mL    PHYSICAL EXAM:  Constitutional:    remain vented via trach  Gastrointestinal:  soft  +peg tube in place  MEDICATIONS  (STANDING):  ALBUTerol    90 MICROgram(s) HFA Inhaler 2 Puff(s) Inhalation every 6 hours  chlorhexidine 4% Liquid 1 Application(s) Topical <User Schedule>  clotrimazole 1% Cream 1 Application(s) Topical two times a day  collagenase Ointment 1 Application(s) Topical daily  dextrose 5%. 1000 milliLiter(s) (75 mL/Hr) IV Continuous <Continuous>  fentaNYL   Infusion. 0.5 MICROgram(s)/kG/Hr (3.38 mL/Hr) IV Continuous <Continuous>  fentaNYL   Patch  25 MICROgram(s)/Hr 1 Patch Transdermal every 72 hours  ferrous    sulfate Liquid 300 milliGRAM(s) Enteral Tube daily  furosemide   Injectable 40 milliGRAM(s) IV Push daily  insulin lispro (ADMELOG) corrective regimen sliding scale   SubCutaneous every 6 hours  ipratropium 17 MICROgram(s) HFA Inhaler 2 Puff(s) Inhalation every 6 hours  meropenem  IVPB 1000 milliGRAM(s) IV Intermittent every 24 hours  multivitamin/minerals/iron Oral Solution (CENTRUM) 15 milliLiter(s) Enteral Tube daily  nystatin Cream 1 Application(s) Topical two times a day  pantoprazole  Injectable 40 milliGRAM(s) IV Push every 12 hours  petrolatum Ophthalmic Ointment 1 Application(s) Both EYES three times a day  phenylephrine    Infusion 1 MICROgram(s)/kG/Min (11.1 mL/Hr) IV Continuous <Continuous>  QUEtiapine 50 milliGRAM(s) Oral two times a day  sodium bicarbonate  Injectable 50 milliEquivalent(s) IV Push <User Schedule>      Diet, NPO with Tube Feed:   Tube Feeding Modality: Gastrostomy  Glucerna 1.2 Fabio  Total Volume for 24 Hours (mL): 1440  Bolus  Total Volume of Bolus (mL):  360  Total # of Feeds: 4  Tube Feed Frequency: Every 6 hours   Tube Feed Start Time: 17:00  Bolus Feed Rate (mL per Hour): 500   Bolus Feed Duration (in Hours): 0.5  No Carb Prosource TF     Qty per Day:  3 (04-29-21 @ 16:07)      LABS  04-29    147<H>  |  107  |  73<HH>  ----------------------------<  154<H>  4.2   |  32  |  0.7    Ca    8.3<L>      29 Apr 2021 11:00  Mg     2.1     04-29    TPro  6.2  /  Alb  2.5<L>  /  TBili  0.2  /  DBili  x   /  AST  27  /  ALT  24  /  AlkPhos  224<H>  04-29                          7.2    19.39 )-----------( 343      ( 29 Apr 2021 11:00 )             25.4     CAPILLARY BLOOD GLUCOSE  POCT Blood Glucose.: 162 mg/dL (29 Apr 2021 12:05)  POCT Blood Glucose.: 175 mg/dL (29 Apr 2021 05:45)   RADIOLOGY STUDIES  < from: Xray Chest 1 View- PORTABLE-Routine (Xray Chest 1 View- PORTABLE-Routine in AM.) (04.28.21 @ 06:57) >  Impression:  Bilateral opacifications. Support devices as described.  Unchanged.

## 2021-04-30 NOTE — PROGRESS NOTE ADULT - ASSESSMENT
IMPRESSION:    Severe necrotizing CAP  multi lobar pneumonia  Right sided parapneumonic effusion s/p VATS and chest tube sp trach/peg  MENDOZA, improving  Thrombocytopenia, negative HIT & DIC panels  Anemia of Chronic Disease, s/p 1U PRBC 4/18, stable  Hypernatremia  HO bleeding from PEG site  dec hb      PLAN:    CNS:  .  Seroquel 50mg bid.  fentanyl dc, ativan as needed    HEENT:  Oral care    PULMONARY:  HOB @ 45 degrees.  Keep SAO2 92 to 96%.  FiO2 40%.    Pulmonary toilet.        CARDIOVASCULAR: continue .  Free water 300 q4h.  d5 w    GI: GI prophylaxis.  PEG feeds.  Bowel regimen.    RENAL:  F/u  lytes.  Correct as needed.  Monitor UO.    INFECTIOUS DISEASE:  ABX per ID, c/w Meropenem.      ID FU.     HEMATOLOGICAL:   Repeat CBC & T&S.    SCDs for DVT prophylaxis.  Keep Hb > 7    ENDOCRINE:  Follow up FS.  Insulin protocol if needed.    MUSCULOSKELETAL: bed rest, pain control for sacral ulcer    Advanced directives and goals of care  Very poor overall prognosis  Palliative care FU

## 2021-04-30 NOTE — PROGRESS NOTE ADULT - SUBJECTIVE AND OBJECTIVE BOX
OVERNIGHT EVENTS: events noted, on fentanyl, afebrile, d 5w    Vital Signs Last 24 Hrs  T(C): 36.8 (30 Apr 2021 04:00), Max: 36.9 (29 Apr 2021 12:00)  T(F): 98.2 (30 Apr 2021 04:00), Max: 98.4 (29 Apr 2021 12:00)  HR: 89 (30 Apr 2021 07:00) (75 - 90)  BP: 144/68 (30 Apr 2021 07:00) (102/51 - 144/68)  BP(mean): 92 (30 Apr 2021 07:00) (72 - 92)  RR: 36 (30 Apr 2021 07:00) (34 - 38)  SpO2: 96% (30 Apr 2021 07:00) (94% - 100%)    PHYSICAL EXAMINATION:    GENERAL: ill looking    HEENT: Head is normocephalic and atraumatic. trach    NECK: Supple.    LUNGS: bl crackles    HEART: Regular rate and rhythm without murmur.    ABDOMEN: Soft, nontender, and nondistended.      EXTREMITIES: Without any cyanosis, clubbing, rash, lesions or edema.    NEUROLOGIC: not following commands    SKIN: ulcer      LABS:                        7.2    19.39 )-----------( 343      ( 29 Apr 2021 11:00 )             25.4     04-29    147<H>  |  107  |  73<HH>  ----------------------------<  154<H>  4.2   |  32  |  0.7    Ca    8.3<L>      29 Apr 2021 11:00  Mg     2.1     04-29    TPro  6.2  /  Alb  2.5<L>  /  TBili  0.2  /  DBili  x   /  AST  27  /  ALT  24  /  AlkPhos  224<H>  04-29        ABG - ( 30 Apr 2021 03:37 )  pH, Arterial: 7.37  pH, Blood: x     /  pCO2: 63    /  pO2: 71    / HCO3: 36    / Base Excess: 9.2   /  SaO2: 95            34/300/40/5  plateau 34                    04-29-21 @ 07:01  -  04-30-21 @ 07:00  --------------------------------------------------------  IN: 4329.7 mL / OUT: 2800 mL / NET: 1529.7 mL        MICROBIOLOGY:  Culture Results:   No growth to date. (04-27 @ 06:16)      MEDICATIONS  (STANDING):  ALBUTerol    90 MICROgram(s) HFA Inhaler 2 Puff(s) Inhalation every 6 hours  chlorhexidine 4% Liquid 1 Application(s) Topical <User Schedule>  clotrimazole 1% Cream 1 Application(s) Topical two times a day  collagenase Ointment 1 Application(s) Topical daily  dextrose 5%. 1000 milliLiter(s) (75 mL/Hr) IV Continuous <Continuous>  fentaNYL   Infusion. 0.5 MICROgram(s)/kG/Hr (3.38 mL/Hr) IV Continuous <Continuous>  fentaNYL   Patch  25 MICROgram(s)/Hr 1 Patch Transdermal every 72 hours  ferrous    sulfate Liquid 300 milliGRAM(s) Enteral Tube daily  furosemide   Injectable 40 milliGRAM(s) IV Push daily  insulin lispro (ADMELOG) corrective regimen sliding scale   SubCutaneous every 6 hours  ipratropium 17 MICROgram(s) HFA Inhaler 2 Puff(s) Inhalation every 6 hours  meropenem  IVPB 1000 milliGRAM(s) IV Intermittent every 24 hours  multivitamin/minerals/iron Oral Solution (CENTRUM) 15 milliLiter(s) Enteral Tube daily  nystatin Cream 1 Application(s) Topical two times a day  pantoprazole  Injectable 40 milliGRAM(s) IV Push every 12 hours  petrolatum Ophthalmic Ointment 1 Application(s) Both EYES three times a day  phenylephrine    Infusion 1 MICROgram(s)/kG/Min (11.1 mL/Hr) IV Continuous <Continuous>  QUEtiapine 50 milliGRAM(s) Oral two times a day  sodium bicarbonate  Injectable 50 milliEquivalent(s) IV Push <User Schedule>    MEDICATIONS  (PRN):  ondansetron Injectable 4 milliGRAM(s) IV Push every 4 hours PRN Nausea and/or Vomiting      RADIOLOGY & ADDITIONAL STUDIES:

## 2021-05-01 NOTE — PROGRESS NOTE ADULT - SUBJECTIVE AND OBJECTIVE BOX
Patient is a 85y old  Female who presents with a chief complaint of sob (30 Apr 2021 07:40)        Over Night Events:  On MV.  Off pressors.  Sedated.  NO events overnight         ROS:     All ROS are negative except HPI         PHYSICAL EXAM    ICU Vital Signs Last 24 Hrs  T(C): 36.1 (01 May 2021 00:08), Max: 37.2 (30 Apr 2021 18:00)  T(F): 96.9 (01 May 2021 00:08), Max: 99 (30 Apr 2021 18:00)  HR: 74 (01 May 2021 09:00) (71 - 99)  BP: 85/46 (01 May 2021 09:00) (85/46 - 121/58)  BP(mean): 60 (01 May 2021 09:00) (60 - 81)  ABP: --  ABP(mean): --  RR: 34 (01 May 2021 09:00) (25 - 37)  SpO2: 98% (01 May 2021 09:00) (91% - 99%)      CONSTITUTIONAL:  Ill appearing in NAD    ENT:   Airway patent,   Mouth with normal mucosa.   No thrush    EYES:   Pupils equal,   Round and reactive to light.    CARDIAC:   Normal rate,   Regular rhythm.    edema      Vascular:  Normal systolic impulse  No Carotid bruits    RESPIRATORY:   No wheezing  Bilateral BS  Normal chest expansion  Not tachypneic,  No use of accessory muscles    GASTROINTESTINAL:  Abdomen soft,   Non-tender,   No guarding,   + BS    MUSCULOSKELETAL:   Range of motion is not limited,  No clubbing, cyanosis    NEUROLOGICAL:   Sedated     SKIN:   Skin normal color for race,   Warm and dry  No evidence of rash.    PSYCHIATRIC:   Sedated   No apparent risk to self or others.    HEMATOLOGICAL:  No cervical  lymphadenopathy.  no inguinal lymphadenopathy      04-30-21 @ 07:01  -  05-01-21 @ 07:00  --------------------------------------------------------  IN:    dextrose 5%: 900 mL    FentaNYL: 179.4 mL    Free Water: 900 mL    Glucerna: 720 mL  Total IN: 2699.4 mL    OUT:    Indwelling Catheter - Urethral (mL): 2530 mL    Rectal Tube (mL): 200 mL  Total OUT: 2730 mL    Total NET: -30.6 mL      05-01-21 @ 07:01  -  05-01-21 @ 10:16  --------------------------------------------------------  IN:    dextrose 5%: 225 mL    FentaNYL: 36 mL    Free Water: 300 mL    Glucerna: 180 mL  Total IN: 741 mL    OUT:    Indwelling Catheter - Urethral (mL): 475 mL  Total OUT: 475 mL    Total NET: 266 mL          LABS:                            6.7    12.58 )-----------( 300      ( 01 May 2021 08:20 )             23.1                                               05-01    143  |  102  |  59<H>  ----------------------------<  175<H>  3.8   |  32  |  0.6<L>    Ca    7.7<L>      01 May 2021 08:20  Mg     1.8     05-01    TPro  4.8<L>  /  Alb  1.9<L>  /  TBili  0.2  /  DBili  x   /  AST  22  /  ALT  20  /  AlkPhos  235<H>  05-01                                                                                           LIVER FUNCTIONS - ( 01 May 2021 08:20 )  Alb: 1.9 g/dL / Pro: 4.8 g/dL / ALK PHOS: 235 U/L / ALT: 20 U/L / AST: 22 U/L / GGT: x                                                                                               Mode: AC/ CMV (Assist Control/ Continuous Mandatory Ventilation)  RR (machine): 34  TV (machine): 300  FiO2: 60  PEEP: 5  ITime: 1  MAP: 15  PIP: 33                                      ABG - ( 01 May 2021 02:45 )  pH, Arterial: 7.38  pH, Blood: x     /  pCO2: 61    /  pO2: 63     / HCO3: 36    / Base Excess: 9.9   /  SaO2: 94    Lac 1.4.  PPL               MEDICATIONS  (STANDING):  ALBUTerol    90 MICROgram(s) HFA Inhaler 2 Puff(s) Inhalation every 6 hours  chlorhexidine 4% Liquid 1 Application(s) Topical <User Schedule>  clotrimazole 1% Cream 1 Application(s) Topical two times a day  collagenase Ointment 1 Application(s) Topical daily  dextrose 5%. 1000 milliLiter(s) (75 mL/Hr) IV Continuous <Continuous>  fentaNYL   Infusion. 0.5 MICROgram(s)/kG/Hr (3.38 mL/Hr) IV Continuous <Continuous>  ferrous    sulfate Liquid 300 milliGRAM(s) Enteral Tube daily  furosemide   Injectable 40 milliGRAM(s) IV Push daily  insulin lispro (ADMELOG) corrective regimen sliding scale   SubCutaneous every 6 hours  ipratropium 17 MICROgram(s) HFA Inhaler 2 Puff(s) Inhalation every 6 hours  LORazepam   Injectable 2 milliGRAM(s) IV Push every 4 hours  meropenem  IVPB 1000 milliGRAM(s) IV Intermittent every 24 hours  multivitamin/minerals/iron Oral Solution (CENTRUM) 15 milliLiter(s) Enteral Tube daily  nystatin Cream 1 Application(s) Topical two times a day  pantoprazole  Injectable 40 milliGRAM(s) IV Push every 12 hours  petrolatum Ophthalmic Ointment 1 Application(s) Both EYES three times a day  phenylephrine    Infusion 1 MICROgram(s)/kG/Min (11.1 mL/Hr) IV Continuous <Continuous>  QUEtiapine 50 milliGRAM(s) Oral two times a day  sodium bicarbonate  Injectable 50 milliEquivalent(s) IV Push <User Schedule>    MEDICATIONS  (PRN):  ondansetron Injectable 4 milliGRAM(s) IV Push every 4 hours PRN Nausea and/or Vomiting      New X-rays reviewed:                                                                                  ECHO    CXR interpreted by me:  Trach OK> Bilateral infiltrates

## 2021-05-01 NOTE — PROGRESS NOTE ADULT - ASSESSMENT
IMPRESSION:    Severe necrotizing CAP  multi lobar pneumonia  Right sided parapneumonic effusion s/p VATS and chest tube sp trach/peg  MENDOZA, improving  Thrombocytopenia, negative HIT & DIC panels  HO bleeding from PEG site  dec hb      PLAN:    CNS:  .  Seroquel 50mg bid.  Adequate sedation     HEENT:  Oral care.  Trach care     PULMONARY:  HOB @ 45 degrees.  Keep SAO2 92 to 96%.  PEEP 7.    Pulmonary toilet.      CARDIOVASCULAR: DC free H2O.      GI: GI prophylaxis.  PEG feeds.  PEG lavage     RENAL:  F/u  lytes.  Correct as needed.  Monitor UO.    INFECTIOUS DISEASE:  ABX per ID, c/w Meropenem.  ID FU.     HEMATOLOGICAL:   Repeat CBC & T&S.  Transfuse one Unit PRBC     ENDOCRINE:  Follow up FS.  Insulin protocol if needed.    MUSCULOSKELETAL: bed rest, pain control for sacral ulcer    Advanced directives and goals of care  Very poor overall prognosis  Palliative care FU

## 2021-05-02 NOTE — PROGRESS NOTE ADULT - ASSESSMENT
IMPRESSION:    Severe necrotizing CAP  multi lobar pneumonia  Right sided parapneumonic effusion s/p VATS and chest tube sp trach/peg  MENDOZA, improving  Thrombocytopenia, negative HIT & DIC panels  HO bleeding from PEG site  dec hb(s/p 1 unit of prbcs on 5/1/2021)      PLAN:    CNS:  .  Seroquel 50mg bid.  Adequate sedation     HEENT:  Oral care.  Trach care     PULMONARY:  HOB @ 45 degrees.  Keep SAO2 92 to 96%.  PEEP 7.    Pulmonary toilet.      CARDIOVASCULAR: DC d5w.     GI: GI prophylaxis.  PEG feeds.  PEG lavage     RENAL:  F/u  lytes.  Correct as needed.  Monitor UO.    INFECTIOUS DISEASE:  ABX per ID, c/w Meropenem.  ID FU.     HEMATOLOGICAL:   Repeat CBC & T&S. s/p 1 unit of (PRBCs on 5/1/2021)    ENDOCRINE:  Follow up FS.  Insulin protocol if needed.    MUSCULOSKELETAL: bed rest, pain control for sacral ulcer    Advanced directives and goals of care  Very poor overall prognosis  Palliative care FU IMPRESSION:    Severe necrotizing CAP  multi lobar pneumonia  Right sided parapneumonic effusion s/p VATS and chest tube sp trach/peg  MENDOZA, improving  Thrombocytopenia, negative HIT & DIC panels  HO bleeding from PEG site  dec hb(s/p 1 unit of prbcs on 5/1/2021)      PLAN:    CNS:  .  Seroquel 50mg bid.  Adequate sedation     HEENT:  Oral care.  Trach care     PULMONARY:  HOB @ 45 degrees.  Keep SAO2 92 to 96%.  PEEP 7.  PS wilfrido for few hours   Pulmonary toilet.      CARDIOVASCULAR: DC d5w.     GI: GI prophylaxis.  PEG feeds.  PEG lavage     RENAL:  F/u  lytes.  Correct as needed.  Monitor UO.    INFECTIOUS DISEASE:  ABX per ID, c/w Meropenem.  ID FU.     HEMATOLOGICAL:   Repeat CBC & T&S. s/p 1 unit of (PRBCs on 5/1/2021)    ENDOCRINE:  Follow up FS.  Insulin protocol if needed.    MUSCULOSKELETAL: bed rest, pain control for sacral ulcer    Advanced directives and goals of care  Very poor overall prognosis  Palliative care FU

## 2021-05-02 NOTE — PROGRESS NOTE ADULT - SUBJECTIVE AND OBJECTIVE BOX
Patient is a 85y old  Female who presents with a chief complaint of sob (30 Apr 2021 07:40)        Over Night Events:    off sedation, not following commands    ROS:     All ROS are negative except HPI         PHYSICAL EXAM    ICU Vital Signs Last 24 Hrs  T(C): 35.6 (02 May 2021 08:00), Max: 36.1 (01 May 2021 14:00)  T(F): 96 (02 May 2021 08:00), Max: 97 (01 May 2021 14:00)  HR: 90 (02 May 2021 11:00) (69 - 105)  BP: 106/58 (02 May 2021 11:00) (76/48 - 135/56)  BP(mean): 79 (02 May 2021 08:00) (58 - 93)  ABP: --  ABP(mean): --  RR: 34 (02 May 2021 11:00) (25 - 38)  SpO2: 92% (02 May 2021 11:00) (92% - 100%)      CONSTITUTIONAL:  Well nourished.  NAD    ENT:   Airway patent,   Mouth with normal mucosa.   No thrush    EYES:   Pupils equal,   Round and reactive to light.    CARDIAC:   Normal rate,   regular rhythm.    +ve edema      Vascular:  Normal systolic impulse  No Carotid bruits    RESPIRATORY:   No wheezing  Bilateral BS  Normal chest expansion  Not tachypneic,  No use of accessory muscles    GASTROINTESTINAL:  Abdomen soft,   Non-tender,   No guarding,   + BS    MUSCULOSKELETAL:   Range of motion is not limited,  No clubbing, cyanosis    NEUROLOGICAL:   responds to sternal rub.    SKIN:   skin intact      05-01-21 @ 07:01  -  05-02-21 @ 07:00  --------------------------------------------------------  IN:    dextrose 5%: 900 mL    FentaNYL: 90 mL    Free Water: 900 mL    Glucerna: 660 mL  Total IN: 2550 mL    OUT:    Indwelling Catheter - Urethral (mL): 1500 mL  Total OUT: 1500 mL    Total NET: 1050 mL      05-02-21 @ 07:01  -  05-02-21 @ 10:54  --------------------------------------------------------  IN:    dextrose 5%: 225 mL    Free Water: 300 mL    Glucerna: 180 mL  Total IN: 705 mL    OUT:    FentaNYL: 0 mL    Indwelling Catheter - Urethral (mL): 375 mL  Total OUT: 375 mL    Total NET: 330 mL          LABS:                            7.7    12.68 )-----------( 268      ( 02 May 2021 04:30 )             25.4                                               05-02    141  |  98  |  56<H>  ----------------------------<  143<H>  3.5   |  34<H>  |  0.6<L>    Ca    7.9<L>      02 May 2021 04:30  Mg     1.7     05-02    TPro  5.1<L>  /  Alb  1.9<L>  /  TBili  0.2  /  DBili  x   /  AST  56<H>  /  ALT  39  /  AlkPhos  342<H>  05-02                                                                                           LIVER FUNCTIONS - ( 02 May 2021 04:30 )  Alb: 1.9 g/dL / Pro: 5.1 g/dL / ALK PHOS: 342 U/L / ALT: 39 U/L / AST: 56 U/L / GGT: x                                                                                               Mode: AC/ CMV (Assist Control/ Continuous Mandatory Ventilation)  RR (machine): 34  TV (machine): 300  FiO2: 50  PEEP: 7  ITime: 1  MAP: 17  PIP: 34                                      ABG - ( 01 May 2021 02:45 )  pH, Arterial: 7.38  pH, Blood: x     /  pCO2: 61    /  pO2: x     / HCO3: 36    / Base Excess: 9.9   /  SaO2: 94                  MEDICATIONS  (STANDING):  ALBUTerol    90 MICROgram(s) HFA Inhaler 2 Puff(s) Inhalation every 6 hours  chlorhexidine 4% Liquid 1 Application(s) Topical <User Schedule>  clotrimazole 1% Cream 1 Application(s) Topical two times a day  collagenase Ointment 1 Application(s) Topical daily  dextrose 5%. 1000 milliLiter(s) (75 mL/Hr) IV Continuous <Continuous>  fentaNYL   Infusion. 0.5 MICROgram(s)/kG/Hr (3.38 mL/Hr) IV Continuous <Continuous>  ferrous    sulfate Liquid 300 milliGRAM(s) Enteral Tube daily  furosemide   Injectable 40 milliGRAM(s) IV Push daily  insulin lispro (ADMELOG) corrective regimen sliding scale   SubCutaneous every 6 hours  ipratropium 17 MICROgram(s) HFA Inhaler 2 Puff(s) Inhalation every 6 hours  LORazepam   Injectable 2 milliGRAM(s) IV Push every 4 hours  meropenem  IVPB 1000 milliGRAM(s) IV Intermittent every 24 hours  multivitamin/minerals/iron Oral Solution (CENTRUM) 15 milliLiter(s) Enteral Tube daily  nystatin Cream 1 Application(s) Topical two times a day  pantoprazole  Injectable 40 milliGRAM(s) IV Push every 12 hours  petrolatum Ophthalmic Ointment 1 Application(s) Both EYES three times a day  phenylephrine    Infusion 1 MICROgram(s)/kG/Min (11.1 mL/Hr) IV Continuous <Continuous>  QUEtiapine 50 milliGRAM(s) Oral two times a day  sodium bicarbonate  Injectable 50 milliEquivalent(s) IV Push <User Schedule>    MEDICATIONS  (PRN):  ondansetron Injectable 4 milliGRAM(s) IV Push every 4 hours PRN Nausea and/or Vomiting      New X-rays reviewed:                                                                                  ECHO    CXR interpreted by me:  B/l opacities L>R.

## 2021-05-02 NOTE — PROGRESS NOTE ADULT - ATTENDING COMMENTS
IMPRESSION:    Severe necrotizing CAP  multi lobar pneumonia  Right sided parapneumonic effusion s/p VATS and chest tube sp trach/peg  MENDOZA, improving  Thrombocytopenia, negative HIT & DIC panels  HO bleeding from PEG site  dec hb(s/p 1 unit of prbcs on 5/1/2021)    ABX per ID   FU CBC  PS wean

## 2021-05-03 NOTE — PROGRESS NOTE ADULT - ASSESSMENT
ASSESSMENT  85 year old lady known to have dementia, osteoarithtis, Nigerian-speaking presenting with cough and fever.      IMPRESSION  #Sepsis present on admission - secondary to CAP  -  CT Chest No Cont (03.11.21 @ 00:54): Areas of right lung consolidation which can be seen in pneumonia. Numerous air-fluid levels throughout the right lung compatible with an infectious process. Suggestion of split pleura at the lung base for which empyema is favored although inherently limited on this noncontrast exam. Consideration can be given to contrast administration if feasiblefor better delineation. Areas of bilateral interlobular septal thickening and mild layering groundglass attenuation may reflect a component of edema.  - Urine Legionella negative  - Urine Strep negative  - BLood Cx 3/10 and 3/13 negative  - Procalcitonin 1.15   - CT Chest No Cont (03.17.21 @ 16:19): Since 3/11/2021. Enlarging loculated right pleural fluid collection with multiple air bubbles consistent with empyema.   Associated compressive atelectasis right lung is seen. Scattered groundglass opacities involving multiple lumbar segments.  - s/p VATS 3/22 -- right empyema with 800 cc purulent fluid in pleural space, multiple pockers with dense adhesions -- necrotizing pneumonia of the middle lobe  - VATS Cx 3/22 with rare yeast, otherwise no growth  -  CT Chest No Cont (03.29.21 @ 12:37): Since March 29, 2021, resolved right pleural effusion; persistent moderate right pneumothorax with 2 chest tubes in place. Increased left greater than right diffuse bilateral groundglass opacities and interlobular septal thickening. Findings are suspicious for a multifocal infection. Superimposed edema is also a possibility.  Enlarging mediastinal lymph nodes, likely reactive.  - CT Chest/Abdomen and Pelvis No Cont (04.01.21 @ 20:19): Stable residual right-sided pneumothorax. Three left-sided chest tubes are in place. New pneumomediastinum is noted, especially in the anterior mediastinum.  Stable bilateral diffuse areas of groundglass opacity. Areas of traction bronchiectasis noted throughout the left lung field and at the right lung base. No evidence of bowel obstruction or intra-abdominal or pelvic inflammatory process  - Fungitell: <31 4/1  - Aspergillus Galactomannan Antigen: <0.500 (04.01.21 @ 01:03)  - Blood Cx 3/31 and 4/3 NG  - CT Chest/Abd/Pelvis No Cont (04.12.21 @ 17:17): Redemonstrated loculated right pneumothorax without significant change. Interval removal of 2/3 right chest tubes.  Diffuse groundglass and consolidative opacities with interlobular septal thickening and traction bronchiectasis in the bases. Findings likely reflecting a combination of pulmonary edema and post inflammatory/infectious changes with some degree of organization.  Interval development abdominopelvic ascites and anasarca consistent with fluid overload.  Nonspecific diffuse colonic wall thickening which could reflect colitis versus edema. No bowel obstruction. Interval resolution of previously seen pneumomediastinum.  - completed 6 week course from debridement (end date 5/3)    #GI Bleed from PEG 4/3 -- EGD held as improved/stable    #Dementia  #Osteoarthritis  #Obesity BMI (kg/m2): 23.4  #Abx allergy: clindamycin (Hives)    Creatinine, Serum: 0.6 mg/dL (05.03.21 @ 04:30  Weight (kg): 60 (11 Mar 2021 01:17)  CrCl 21     RECOMMENDATIONS  - CXR with worsening left lobe infiltrates -- please obtain deep tracheal cultures, MRSA nares, and procalcitonin  - can start linezolid 600 mg q 12 hours (monitor PLTs, previously on linezolid empirically but discontinued to to thrombocytopenia)  - continue meropenem for now  - very poor prognosis -- CXR have not improved despite prolonged meropenem     Please call or message on [employer] Teams if with any questions.  Spectra [phone]

## 2021-05-03 NOTE — ADVANCED PRACTICE NURSE CONSULT - ASSESSMENT
85 yr female w/  PMH dementia, osteoarithtis, Georgian-speaking presenting (3/11)  with cough and fever.   In ED was hypotensive, was given IVF, started on levophed 0.04. Pt admitted for severe Sepsis present on admission due to community-acquired pneumonia/necrotizing on levophed; MENDOZA ( Likely Prerenal, sepsis-induced, NSAID-associated).   Hospital Day: 29 Post Operative Day: 9; Procedure: s/p right VATS right chest tubes placed , s/p trach and peg   Patient currently in CTU, being managed for Sepsis present on admission - secondary to CAP; GI Bleed from PEG 4/3; Dementia; Osteoarthritis; Obesity BMI.    Received patient in CTU, laying supine in bed, turned to left side (pillow under right side, offloading boots to BLEs in place), HOB elevated 30 degrees. Pt vented via trach, sedated, multiple drips infusing including pressors, chest tubes x 2 in place. Primary RN Abner at bedside, made aware of purpose of WOCN visit, agreeable to consult.   Foam Allevyn dressings to medial R/L feet in place, dressings removed for assessment.     Type of wound: Deep tissue pressure injury (DTPIs)  Location: right & left medial foot (bunion area)    Wound measurements: R-1cm x 2cm x 0cm; L-2cm x 2cm x 0cm; true depth indeterminable at this time   Tunneling/Undermining: none  Wound bed: intact deep purple tissue   Wound edges: attached, flush, irregular   Periwound: blanchable erythema   Wound exudate: none  Wound odor: none  Induration, warmth: none  Wound pain: no signs present     With assistance from RN, turned patient completely to left side for skin assessment. Foam Allevyn dressing to sacrococcygeal in place, dressing removed.     Type of wound: Deep tissue pressure injury (DTPI); pressure component w/ likely hypoperfusion r/t pressure usage, sepsis, ? skin failure   Location: sacrococcygeal   Wound measurements: 3 areas in close proximity to each other & measured all together at 10cm x 7cm x 0.2cm; true depth indeterminable at this time   Tunneling/Undermining: none  Wound bed: opening deep purple tissue w/ brown necrotic tissue   Wound edges: attached, flush, irregular   Periwound: blanchable erythema; moisture associated skin damage (MASD) with fungal component still present to b/l buttock-intergluteal cleft area, healing; skin denuded, scattered areas of peeling skin, satellite lesions to periphery, some areas crusting over  Wound exudate: minimal amount of serosanguinous drainage present on removed dressing   Wound odor: none  Induration, warmth: none  Wound pain: no signs present    Patient bedbound, immobile, + montaño, +DigniShield/fecal management system in place, recieving TF via G tube. 
85 yr female w/  PMH dementia, osteoarithtis, Taiwanese-speaking presenting (3/11)  with cough and fever.   In ED was hypotensive, was given IVF, started on levophed 0.04. Pt admitted for severe Sepsis present on admission due to community-acquired pneumonia/necrotizing on levophed; MENDOZA ( Likely Prerenal, sepsis-induced, NSAID-associated).   Hospital Day 21; pt s/p right VATS -3/22 , 3 chest tubes; right empyema with 800 cc purulent fluid in pleural space, multiple pockers with dense adhesions -- necrotizing pneumonia of the middle lobe; trach and peg-3/30. Patient currently in CTU, being managed for Sepsis present on admission - secondary to CAP; Dementia; Osteoarthritis; Obesity BMI.    Received patient in CTU, laying supine in bed, turned to right side (pillow under left side, offloading boots to BLEs in place), HOB elevated 30 degrees. Pt vented via trach, sedated, paralyzed  multiple drips infusing including pressors, chest tubes x 3 in place. Primary RN Omer at bedside, made aware of purpose of WOCN visit, agreeable to consult. With assistance from RN, turned patient completely to left side for skin assessment. Skin assessment as below.     Moisture associated skin damage (MASD) with fungal component to b/l buttock-intergluteal cleft area, skin denuded, macerated, scattered areas of peeling skin, partial thickness skin loss ~1.5cm x 1cm x 0.1cm to right buttock, open wound bed w/ dark pink tissue, satellite lesions to periphery, some areas crusting over.     Patient bedbound, immobile, + montaño, +DigniShield/fecal management system in place, recieving TF via G tube. 
85 yr female w/  PMH dementia, osteoarithtis, Bermudian-speaking presenting (3/11)  with cough and fever.   In ED was hypotensive, was given IVF, started on levophed 0.04. Pt admitted for severe Sepsis present on admission due to community-acquired pneumonia/necrotizing on levophed; MENDOZA ( Likely Prerenal, sepsis-induced, NSAID-associated).   Hospital Day: 53 Post Operative Day: 32 ; Procedure: s/p right VATS right chest tubes placed , s/p trach and peg     Patient now in VENT unit, being managed for Severe necrotizing CAP; multi lobar pneumonia; Right sided parapneumonic effusion s/p VATS and chest tube sp trach/peg; MENDOZA, improving; Thrombocytopenia, negative HIT & DIC panels; HO bleeding from PEG site; dec hb(s/p 1 unit of prbcs on 5/1/2021)  Sacral wound now being managed by burn MD team-s/p debridement 4/25.     Received patient in VENT, laying supine in bed, turned to left side (pillow under right side, offloading boots to BLEs in place), HOB elevated 30 degrees. Pt vented via trach, sedated, eyes closed, nonresponsive. Primary RN Rodrick at bedside, made aware of purpose of WOCN visit, agreeable to consult.   Foam Allevyn dressings to medial R/L feet in place, dressings removed for assessment.     Type of wound: Deep tissue pressure injury (DTPIs); healing   Location: right & left medial foot (bunion area)    Wound measurements: R-1cm x 1cm x 0cm; L-1cm x 1cm x 0cm; true depth indeterminable at this time   Tunneling/Undermining: none  Wound bed: now presenting as intact dark red-light purple tissue   Wound edges: attached, flush, irregular   Periwound: intact  Wound exudate: none  Wound odor: none    Patient bedbound, immobile, + montaño, +DigniShield/fecal management system in place, recieving TF via G tube. 
85 yr female w/  PMH dementia, osteoarithtis, Portuguese-speaking presenting (3/11)  with cough and fever.   In ED was hypotensive, was given IVF, started on levophed 0.04. Pt admitted for severe Sepsis present on admission due to community-acquired pneumonia/necrotizing on levophed; MENDOZA ( Likely Prerenal, sepsis-induced, NSAID-associated).   Hospital Day: 38 Post Operative Day: 14 ; Procedure: s/p right VATS right chest tubes placed , s/p trach and peg     Patient remains in CTU, Septic Shock; anemia-stable; decreased bowel sounds  CT of abdomen; Thrombocytopenia; Pleural air leak; unresponsiveness ; MENDOZA; rising BUN; Fluid overload; Protein malnutrition; Sacral decubiti; Hypoglycemia.     Received patient in CTU, laying supine in bed, turned to left side (pillow under right side, offloading boots to BLEs in place), HOB elevated 30 degrees. Pt vented via trach, eyes pen, nonresponsive, multiple drips infusing including pressors, chest tubes x 1 to L chest in place. Primary RN Rodrick at bedside, made aware of purpose of WOCN visit, agreeable to consult.   Foam Allevyn dressings to medial R/L feet in place, dressings removed for assessment.     Type of wound: Deep tissue pressure injury (DTPIs)  Location: right & left medial foot (bunion area)    Wound measurements: R-1cm x 2cm x 0cm; L-2cm x 2cm x 0cm; true depth indeterminable at this time   Tunneling/Undermining: none  Wound bed: still presenting as intact deep purple tissue   Wound edges: attached, flush, irregular   Periwound: blanchable erythema   Wound exudate: none  Wound odor: none  Induration, warmth: none  Wound pain: no signs present     With assistance from RN, turned patient completely to left side for skin assessment. ABD pad to sacrococcygeal in place, dressing removed.     Type of wound: Unstageable pressure injury; evolved from Deep tissue pressure injury (DTPI); pressure component w/ likely hypoperfusion r/t pressure usage, sepsis, ? skin failure   Location: sacrococcygeal   Wound measurements: now presenting as one wound measuring all 6.5cm x 7cm x 0.2cm; true depth indeterminable at this time   Tunneling/Undermining: none  Wound bed: tan-brown necrotic tissue   Wound edges: attached, flush, irregular   Periwound: full thickness ulcerations ~1.5cm x 1.5cm x 0.2cm to fleshy part of b/l buttock, wound bed w/ yellow subcutaneous tissue;  moisture associated skin damage (MASD) with fungal component to b/l buttock-intergluteal cleft area, skin healing; mildly denuded, scattered areas of peeling skin, satellite lesions to periphery, areas crusting over  Wound exudate: moderate amount of serosanguinous drainage present on removed dressing   Wound odor: none  Induration, warmth: none  Wound pain: no signs present    Patient bedbound, immobile, + montaño, +DigniShield/fecal management system in place, recieving TF via G tube.

## 2021-05-03 NOTE — PROGRESS NOTE ADULT - SUBJECTIVE AND OBJECTIVE BOX
Over Night Events:  no major events      ROS:  See HPI    PHYSICAL EXAM    ICU Vital Signs Last 24 Hrs  T(C): 36.6 (03 May 2021 08:00), Max: 37.1 (02 May 2021 23:00)  T(F): 97.8 (03 May 2021 08:00), Max: 98.7 (02 May 2021 23:00)  HR: 88 (03 May 2021 11:00) (76 - 106)  BP: 98/56 (03 May 2021 11:00) (83/48 - 126/68)  BP(mean): 79 (03 May 2021 06:00) (61 - 93)  RR: 34 (03 May 2021 11:00) (32 - 44)  SpO2: 96% (03 May 2021 11:00) (92% - 100%)      General: ill appearing  HEENT: NELSON             Lymph Nodes: No cervical LN   Lungs: Bilateral crackles  Cardiovascular: irregular   Abdomen: Soft, Positive BS  Extremities: No clubbing   Skin: sacral ulcer(coccyx)  Neurological: doesnt follow commands      05-02-21 @ 07:01  -  05-03-21 @ 07:00  --------------------------------------------------------  IN:    dextrose 5%: 225 mL    Free Water: 600 mL    Glucerna: 720 mL  Total IN: 1545 mL    OUT:    FentaNYL: 0 mL    Indwelling Catheter - Urethral (mL): 1200 mL  Total OUT: 1200 mL    Total NET: 345 mL      05-03-21 @ 07:01  -  05-03-21 @ 10:59  --------------------------------------------------------  IN:    Glucerna: 120 mL  Total IN: 120 mL    OUT:  Total OUT: 0 mL    Total NET: 120 mL          LABS:                          7.5    12.24 )-----------( 251      ( 03 May 2021 04:30 )             24.4                                               05-03    143  |  97<L>  |  58<H>  ----------------------------<  123<H>  3.5   |  39<H>  |  0.6<L>    Ca    8.2<L>      03 May 2021 04:30  Mg     1.8     05-03    TPro  5.6<L>  /  Alb  1.9<L>  /  TBili  0.2  /  DBili  x   /  AST  28  /  ALT  29  /  AlkPhos  325<H>  05-03                                                                                           LIVER FUNCTIONS - ( 03 May 2021 04:30 )  Alb: 1.9 g/dL / Pro: 5.6 g/dL / ALK PHOS: 325 U/L / ALT: 29 U/L / AST: 28 U/L / GGT: x                                                                                               Mode: AC/ CMV (Assist Control/ Continuous Mandatory Ventilation)  RR (machine): 34  TV (machine): 300  FiO2: 50  PEEP: 7  ITime: 1  MAP: 20  PIP: 36                                          MEDICATIONS  (STANDING):  ALBUTerol    90 MICROgram(s) HFA Inhaler 2 Puff(s) Inhalation every 6 hours  chlorhexidine 4% Liquid 1 Application(s) Topical <User Schedule>  clotrimazole 1% Cream 1 Application(s) Topical two times a day  collagenase Ointment 1 Application(s) Topical daily  ferrous    sulfate Liquid 300 milliGRAM(s) Enteral Tube daily  furosemide   Injectable 40 milliGRAM(s) IV Push daily  insulin lispro (ADMELOG) corrective regimen sliding scale   SubCutaneous every 6 hours  ipratropium 17 MICROgram(s) HFA Inhaler 2 Puff(s) Inhalation every 6 hours  LORazepam   Injectable 2 milliGRAM(s) IV Push every 4 hours  meropenem  IVPB 1000 milliGRAM(s) IV Intermittent every 8 hours  multivitamin/minerals/iron Oral Solution (CENTRUM) 15 milliLiter(s) Enteral Tube daily  nystatin Cream 1 Application(s) Topical two times a day  pantoprazole  Injectable 40 milliGRAM(s) IV Push every 12 hours  petrolatum Ophthalmic Ointment 1 Application(s) Both EYES three times a day  phenylephrine    Infusion 1 MICROgram(s)/kG/Min (11.1 mL/Hr) IV Continuous <Continuous>  potassium chloride   Powder 40 milliEquivalent(s) Oral once  QUEtiapine 50 milliGRAM(s) Oral two times a day  sodium bicarbonate  Injectable 50 milliEquivalent(s) IV Push <User Schedule>    MEDICATIONS  (PRN):  ondansetron Injectable 4 milliGRAM(s) IV Push every 4 hours PRN Nausea and/or Vomiting      Xrays:                                                                                     ECHO

## 2021-05-03 NOTE — ADVANCED PRACTICE NURSE CONSULT - RECOMMEDATIONS
1. DTPI /L medial foot -Continue w/ previous recs- Apply silicone foam Allevyn dressing to cushion area, decrease friction and shear, and prevent further skin breakdown. Change dressing q2d and prn for soiling.  -Assess skin/wound qshift, report changes to primary provider.     Additional recs/PI prevention:  -Continue turning/positioning patient from side-to-side q2h while in bed, or in accordance w/ pt's plan of care. Continue utilizing pillows and/or z-gely fluidized positioner to assist w/ turning/positioning.  -Continue to offload heels from bed surface with offloading boots to BLEs.   -Continue utilizing one underpad underneath patient to contain incontinence episodes; change pad when saturated/soiled.   -When/if montaño d/c'ed, consider utilizing female incontinence device/PrimaFit (if patient candidate) to contain urinary incontinence.  -Continue utilizing fecal management system/rectal tube/DigniShield (if patient candidate; MD order required) to contain loose fecal incontinence.   -Continue nutrition consult for optimal wound healing & nutritional status.     Plan of Care: Primary RN Zacarias lea at bedside & made aware of above recs. Spoke w/ covering/primary Hung in regards to above. No further needs/recs from [facility] service at this time. Staff RN to perform routine skin/wound assessment and manage wound care. Questions or concerns or if wound worsens and reconsult needed, please contact [facility], [facility][id number].  
1. DTPI /L medial foot -Continue w/ previous recs- Apply silicone foam Allevyn dressing to cushion area, decrease friction and shear, and prevent further skin breakdown. Change dressing q2d and prn for soiling.  2. Unstageable sacrococcygeal pressure injury- Continue w/ previous recs-Cleanse wound bed w/ normal saline, gently pat dry.  Apply Cavilon no-sting barrier film to wound edges and periwound to prevent maceration.  Apply Collagenase Santyl nickel-thick to entire wound bed to enzymatically debride devitalized tissue. Apply saline moistened gauze on top of wound bed for moisture to activate debriding enzymes. Cover w/ foam Allevyn dressing. Apply Collagenase and change dressing daily and prn for strike-through drainage or soiling.  3. MASD b/l buttock-intergluteal cleft-Continue w/ precvious recs- Cleanse skin w/ perineal cleanser, gently pat dry. Apply Nystatin cream and Coloplast Violeta Protect moisture barrier cream bid & prn after any soiling/incontinent episode.    -Assess skin/wound qshift, report changes to primary provider.     Additional recs/PI prevention:  -Continue turning/positioning patient from side-to-side q2h while in bed, or in accordance w/ pt's plan of care. Continue utilizing pillows and/or z-gely fluidized positioner to assist w/ turning/positioning.  -Continue to offload heels from bed surface with offloading boots to BLEs.   -Continue utilizing one underpad underneath patient to contain incontinence episodes; change pad when saturated/soiled.   -When/if montaño d/c'ed, consider utilizing female incontinence device/PrimaFit (if patient candidate) to contain urinary incontinence.  -Continue utilizing fecal management system/rectal tube/DigniShield (if patient candidate; MD order required) to contain loose fecal incontinence.   -Continue nutrition consult for optimal wound healing & nutritional status.     Plan of Care: Primary RN Rodrick at bedside & made aware of above recs. Spoke w/ covering/primary CTU STAR Dominguez in regards to above. No further needs/recs from [facility] service at this time. Staff RN to perform routine skin/wound assessment and manage wound care. Questions or concerns or if wound worsens and reconsult needed, please contact [facility], [facility] #[phone].  
1. DTPI /L medial foot -Continue applying silicone foam Allevyn dressing to cushion area, decrease friction and shear, and prevent further skin breakdown. Change dressing q2d and prn for soiling.  2. DTPI sacrococcygeal- Cleanse wound bed w/ normal saline, gently pat dry.  Apply Cavilon no-sting barrier film to wound edges and periwound to prevent maceration.  Apply Collagenase Santyl nickel-thick to entire wound bed to enzymatically debride devitalized tissue. Apply saline moistened gauze on top of wound bed for moisture to activate debriding enzymes. Cover w/ foam Allevyn dressing. Apply Collagenase and change dressing daily and prn for strike-through drainage or soiling.  3. MASD b/l buttock-intergluteal cleft-Continue w/ precvious recs- Cleanse skin w/ perineal cleanser, gently pat dry. Apply Nystatin cream and Coloplast Violeta Protect moisture barrier cream bid & prn after any soiling/incontinent episode.    -Assess skin/wound qshift, report changes to primary provider.     Additional recs/PI prevention:  -Continue turning/positioning patient from side-to-side q2h while in bed, or in accordance w/ pt's plan of care. Continue utilizing pillows and/or z-gely fluidized positioner to assist w/ turning/positioning.  -Continue to offload heels from bed surface with offloading boots to BLEs.   -Continue utilizing one underpad underneath patient to contain incontinence episodes; change pad when saturated/soiled.   -When/if montaño d/c'ed, consider utilizing female incontinence device/PrimaFit (if patient candidate) to contain urinary incontinence.  -Continue utilizing fecal management system/rectal tube/DigniShield (if patient candidate; MD order required) to contain loose fecal incontinence.   -Continue nutrition consult for optimal wound healing & nutritional status.     Plan of Care: Primary RN Abner at bedside & made aware of above recs. Spoke w/ covering/primary CTU Radha Dominguez  & Gautam in regards to above. No further needs/recs from [facility] service at this time. Staff RN to perform routine skin/wound assessment and manage wound care. Questions or concerns or if wound worsens and reconsult needed, please contact [facility], [facility][phone].  
1. MASD b/l buttock-intergluteal cleft- Cleanse skin w/ perineal cleanser, gently pat dry. Apply Nystatin cream and Coloplast Violeta Protect moisture barrier cream bid & prn after any soiling/incontinent episode.    -Assess skin/wound qshift, report changes to primary provider.     Additional recs/PI prevention:  -Continue turning/positioning patient from side-to-side q2h while in bed, or in accordance w/ pt's plan of care. Continue utilizing pillows and/or z-gely fluidized positioner to assist w/ turning/positioning.  -Continue to offload heels from bed surface with offloading boots to BLEs.   -Continue utilizing one underpad underneath patient to contain incontinence episodes; change pad when saturated/soiled.   -When/if montaño d/c'ed, consider utilizing female incontinence device/PrimaFit (if patient candidate) to contain urinary incontinence.  -Continue utilizing fecal management system/rectal tube/DigniShield (if patient candidate; MD order required) to contain loose fecal incontinence.   -Continue nutrition consult for optimal wound healing & nutritional status.     Plan of Care: Primary RN Omer at bedside & made aware of above recs. Spoke w/ covering/primary CTU MD (at 2147) in regards to above. Signing off on patient, no further needs/recs from [facility] service at this time. Staff RN to perform routine skin/wound assessment and manage wound care. Questions or concerns or if wound worsens and reconsult needed, please contact [facility], [facility][phone].

## 2021-05-03 NOTE — PROGRESS NOTE ADULT - SUBJECTIVE AND OBJECTIVE BOX
SHAYLAMIGNON WESTBROOKDULCE  85y, Female  Allergy: clindamycin (Hives)      LOS  53d    CHIEF COMPLAINT: sob (30 Apr 2021 07:40)      INTERVAL EVENTS/HPI  - Reconsulted for antibiotics  - T(F): , Max: 98.7 (05-02-21 @ 23:00)  - WBC Count: 12.24 (05-03-21 @ 04:30)  WBC Count: 12.68 (05-02-21 @ 04:30)     - Creatinine, Serum: 0.6 (05-03-21 @ 04:30)  Creatinine, Serum: 0.6 (05-02-21 @ 04:30)       ROS  unable to obtain history secondary to patient's mental status     VITALS:  T(F): 98.6, Max: 98.7 (05-02-21 @ 23:00)  HR: 88  BP: 106/56  RR: 34Vital Signs Last 24 Hrs  T(C): 37 (03 May 2021 12:00), Max: 37.1 (02 May 2021 23:00)  T(F): 98.6 (03 May 2021 12:00), Max: 98.7 (02 May 2021 23:00)  HR: 88 (03 May 2021 16:00) (76 - 106)  BP: 106/56 (03 May 2021 16:00) (83/48 - 126/68)  BP(mean): 79 (03 May 2021 06:00) (61 - 93)  RR: 34 (03 May 2021 16:00) (32 - 44)  SpO2: 99% (03 May 2021 16:00) (95% - 100%)    PHYSICAL EXAM:  Gen: vent/trach   HEENT: Normocephalic, atraumatic  Neck: supple, no lymphadenopathy  CV: Regular rate & regular rhythm  Lungs: decreased BS at bases, no fremitus  Abdomen: Soft, BS present  Ext: Warm, well perfused  Neuro: non focal  Skin: no rash, no erythema  Lines: no phlebitis    FH: Non-contributory  Social Hx: Non-contributory    TESTS & MEASUREMENTS:                        7.5    12.24 )-----------( 251      ( 03 May 2021 04:30 )             24.4     05-03    143  |  97<L>  |  58<H>  ----------------------------<  123<H>  3.5   |  39<H>  |  0.6<L>    Ca    8.2<L>      03 May 2021 04:30  Mg     1.8     05-03    TPro  5.6<L>  /  Alb  1.9<L>  /  TBili  0.2  /  DBili  x   /  AST  28  /  ALT  29  /  AlkPhos  325<H>  05-03    eGFR if Non African American: 84 mL/min/1.73M2 (05-03-21 @ 04:30)  eGFR if African American: 98 mL/min/1.73M2 (05-03-21 @ 04:30)    LIVER FUNCTIONS - ( 03 May 2021 04:30 )  Alb: 1.9 g/dL / Pro: 5.6 g/dL / ALK PHOS: 325 U/L / ALT: 29 U/L / AST: 28 U/L / GGT: x               Culture - Blood (collected 04-27-21 @ 06:16)  Source: .Blood None  Final Report (05-02-21 @ 13:01):    No Growth Final    Culture - Blood (collected 04-18-21 @ 06:16)  Source: .Blood None  Final Report (04-23-21 @ 13:01):    No Growth Final            INFECTIOUS DISEASES TESTING  COVID-19 PCR: NotDetec (04-24-21 @ 18:15)  MRSA PCR Result.: Negative (04-21-21 @ 16:00)  Procalcitonin, Serum: 0.49 (04-21-21 @ 16:00)  MRSA PCR Result.: Negative (04-20-21 @ 16:39)  Procalcitonin, Serum: 0.58 (04-20-21 @ 16:21)  Procalcitonin, Serum: 0.60 (04-20-21 @ 11:00)  COVID-19 PCR: NotDetec (04-05-21 @ 10:12)  COVID-19 PCR: NotDetec (04-04-21 @ 14:03)  Fungitell: <31 (04-01-21 @ 01:03)  Aspergillus Galactomannan Antigen: <0.500 (04-01-21 @ 01:03)  COVID-19 PCR: NotDetec (03-29-21 @ 14:17)  Procalcitonin, Serum: 2.29 (03-27-21 @ 10:50)  MRSA PCR Result.: Negative (03-20-21 @ 21:38)  COVID-19 PCR: NotDetec (03-20-21 @ 12:30)  Fungitell: 46 (03-17-21 @ 16:00)  Procalcitonin, Serum: 0.29 (03-17-21 @ 11:30)  Procalcitonin, Serum: 0.31 (03-16-21 @ 10:15)  Legionella Antigen, Urine: Negative (03-12-21 @ 10:30)  Streptococcus Pneumoniae Ag Urine: Negative (03-12-21 @ 10:30)  MRSA PCR Result.: Negative (03-12-21 @ 10:30)  Legionella Antigen, Urine: Negative (03-11-21 @ 08:10)  Streptococcus Pneumoniae Ag Urine: Negative (03-11-21 @ 08:10)  Procalcitonin, Serum: 1.75 (03-11-21 @ 06:31)  Rapid RVP Result: NotDetec (03-10-21 @ 22:10)      INFLAMMATORY MARKERS      RADIOLOGY & ADDITIONAL TESTS:  I have personally reviewed the last available Chest xray  CXR      CT      CARDIOLOGY TESTING  12 Lead ECG:   Ventricular Rate 96 BPM    Atrial Rate 96 BPM    P-R Interval 146 ms    QRS Duration 80 ms    Q-T Interval 282 ms    QTC Calculation(Bazett) 356 ms    P Axis 24 degrees    R Axis 5 degrees    T Axis -76 degrees    Diagnosis Line Normal sinus rhythmwith sinus arrhythmia  Normal ECG    Confirmed by Chriss Fofana ([id number]) on 4/29/2021 1:18:24 PM (04-29-21 @ 10:55)  12 Lead ECG:   Ventricular Rate 83 BPM    Atrial Rate 83 BPM    P-R Interval 148 ms    QRS Duration 76 ms    Q-T Interval 358 ms    QTC Calculation(Bazett) 420 ms    P Axis 21 degrees    R Axis 13 degrees    T Axis 6 degrees    Diagnosis Line Normal sinus rhythm  Normal ECG    Confirmed by Chriss Fofana ([id number]) on 4/22/2021 9:31:06 AM (04-22-21 @ 06:13)      MEDICATIONS  ALBUTerol    90 MICROgram(s) HFA Inhaler 2 Inhalation every 6 hours  chlorhexidine 4% Liquid 1 Topical <User Schedule>  clotrimazole 1% Cream 1 Topical two times a day  collagenase Ointment 1 Topical daily  ferrous    sulfate Liquid 300 Enteral Tube daily  furosemide   Injectable 40 IV Push daily  insulin lispro (ADMELOG) corrective regimen sliding scale  SubCutaneous every 6 hours  ipratropium 17 MICROgram(s) HFA Inhaler 2 Inhalation every 6 hours  LORazepam   Injectable 2 IV Push every 4 hours  meropenem  IVPB 1000 IV Intermittent every 8 hours  multivitamin/minerals/iron Oral Solution (CENTRUM) 15 Enteral Tube daily  nystatin Cream 1 Topical two times a day  pantoprazole  Injectable 40 IV Push every 12 hours  petrolatum Ophthalmic Ointment 1 Both EYES three times a day  phenylephrine    Infusion 1 IV Continuous <Continuous>  potassium chloride   Powder 40 Oral once  QUEtiapine 50 Oral two times a day  sodium bicarbonate  Injectable 50 IV Push <User Schedule>      WEIGHT  Weight (kg): 67.5 (04-07-21 @ 06:00)  Creatinine, Serum: 0.6 mg/dL (05-03-21 @ 04:30)      ANTIBIOTICS:  meropenem  IVPB 1000 milliGRAM(s) IV Intermittent every 8 hours      All available historical records have been reviewed

## 2021-05-03 NOTE — ADVANCED PRACTICE NURSE CONSULT - REASON FOR CONSULT
Pressure injury reassessment/follow-up
WOCN consult requested by staff RN for ? buttock skin assessment 
WOCN reconsult requested for ? pressure injury assessment & recommendations 
Pressure injury reassessment/follow-up

## 2021-05-03 NOTE — PROGRESS NOTE ADULT - ATTENDING COMMENTS
IMPRESSION:    Severe necrotizing CAP  multi lobar pneumonia  Right sided parapneumonic effusion s/p VATS and chest tube sp trach/peg  MENDOZA, improving  Thrombocytopenia, negative HIT & DIC panels  HO bleeding from PEG site  dec hb(s/p 1 unit of prbcs on 5/1/2021)    Recommendations:    FU CBC   Wean O2 as tolerated  HOB at 45 degrees   Pulmonary toilet

## 2021-05-03 NOTE — PROGRESS NOTE ADULT - ASSESSMENT
IMPRESSION:    Severe necrotizing CAP  multi lobar pneumonia  Right sided parapneumonic effusion s/p VATS and chest tube sp trach/peg  MENDOZA, improving  Thrombocytopenia, negative HIT & DIC panels  HO bleeding from PEG site  dec hb(s/p 1 unit of prbcs on 5/1/2021)      PLAN:    CNS:  .  Seroquel 50mg bid.  Adequate sedation     HEENT:  Oral care.  Trach care     PULMONARY:  HOB @ 45 degrees.  Keep SAO2 92 to 96%.  check ABGs, increase Fio2 to 60%   Pulmonary toilet.      CARDIOVASCULAR: I=<O    GI: GI prophylaxis.  PEG feeds.  PEG lavage     RENAL:  F/u  lytes.  Correct as needed.  Monitor UO.    INFECTIOUS DISEASE:  ABX per ID, c/w Meropenem.  ID FU.     HEMATOLOGICAL:   Repeat CBC & T&S.     ENDOCRINE:  Follow up FS.  Insulin protocol if needed.    MUSCULOSKELETAL: bed rest, pain control for sacral ulcer    Advanced directives and goals of care  Very poor overall prognosis  Palliative care FU IMPRESSION:    Severe necrotizing CAP  multi lobar pneumonia  Right sided parapneumonic effusion s/p VATS and chest tube sp trach/peg  MENDOZA, improving  Thrombocytopenia, negative HIT & DIC panels  HO bleeding from PEG site  dec hb(s/p 1 unit of prbcs on 5/1/2021)      PLAN:    CNS:  .  Seroquel 50mg bid.  Adequate sedation     HEENT:  Oral care.  Trach care     PULMONARY:  HOB @ 45 degrees.  Keep SAO2 92 to 96%.  check ABGs, increase Fio2 to 60%   Pulmonary toilet.      CARDIOVASCULAR: I=<O    GI: GI prophylaxis.  PEG feeds.      RENAL:  F/u  lytes.  Correct as needed.  Monitor UO.    INFECTIOUS DISEASE:  ABX per ID, c/w Meropenem.  ID FU.     HEMATOLOGICAL:   Repeat CBC & T&S.     ENDOCRINE:  Follow up FS.  Insulin protocol if needed.    MUSCULOSKELETAL: bed rest, pain control for sacral ulcer    Advanced directives and goals of care  Very poor overall prognosis  Palliative care FU

## 2021-05-04 NOTE — PROGRESS NOTE ADULT - SUBJECTIVE AND OBJECTIVE BOX
ALEAHKARSTEN MIGNONDULCE  85y, Female  Allergy: clindamycin (Hives)      LOS  54d    CHIEF COMPLAINT: sob (30 Apr 2021 07:40)      INTERVAL EVENTS/HPI  - No acute events overnight  - T(F): , Max: 98.7 (05-04-21 @ 08:00)  - WBC Count: 15.81 (05-04-21 @ 12:09)  WBC Count: 13.49 (05-04-21 @ 04:30)     - Creatinine, Serum: 0.7 (05-04-21 @ 12:09)  Creatinine, Serum: 0.6 (05-04-21 @ 04:30)       ROS  unable to obtain history secondary to patient's mental status     VITALS:  T(F): 98.3, Max: 98.7 (05-04-21 @ 08:00)  HR: 83  BP: 112/55  RR: 34Vital Signs Last 24 Hrs  T(C): 36.8 (04 May 2021 12:00), Max: 37.1 (04 May 2021 08:00)  T(F): 98.3 (04 May 2021 12:00), Max: 98.7 (04 May 2021 08:00)  HR: 83 (04 May 2021 15:00) (72 - 91)  BP: 112/55 (04 May 2021 15:00) (88/52 - 121/65)  BP(mean): 85 (04 May 2021 00:00) (66 - 85)  RR: 34 (04 May 2021 15:00) (34 - 35)  SpO2: 95% (04 May 2021 15:00) (93% - 99%)    PHYSICAL EXAM:  Gen: vent/trach   HEENT: Normocephalic, atraumatic  Neck: supple, no lymphadenopathy  CV: Regular rate & regular rhythm  Lungs: decreased BS at bases, no fremitus  Abdomen: Soft, BS present  Ext: Warm, well perfused  Neuro: non focal  Skin: no rash, no erythema  Lines: no phlebitis    FH: Non-contributory  Social Hx: Non-contributory    TESTS & MEASUREMENTS:                        8.0    15.81 )-----------( 235      ( 04 May 2021 12:09 )             26.5     05-04    143  |  96<L>  |  50<H>  ----------------------------<  160<H>  3.1<L>   |  42<HH>  |  0.7    Ca    8.2<L>      04 May 2021 12:09  Phos  4.8     05-04  Mg     1.6     05-04    TPro  5.4<L>  /  Alb  1.8<L>  /  TBili  0.3  /  DBili  x   /  AST  24  /  ALT  23  /  AlkPhos  284<H>  05-04    eGFR if Non African American: 80 mL/min/1.73M2 (05-04-21 @ 12:09)  eGFR if : 93 mL/min/1.73M2 (05-04-21 @ 12:09)  eGFR if Non African American: 84 mL/min/1.73M2 (05-04-21 @ 04:30)  eGFR if African American: 98 mL/min/1.73M2 (05-04-21 @ 04:30)    LIVER FUNCTIONS - ( 04 May 2021 12:09 )  Alb: 1.8 g/dL / Pro: 5.4 g/dL / ALK PHOS: 284 U/L / ALT: 23 U/L / AST: 24 U/L / GGT: x               Culture - Blood (collected 04-27-21 @ 06:16)  Source: .Blood None  Final Report (05-02-21 @ 13:01):    No Growth Final    Culture - Blood (collected 04-18-21 @ 06:16)  Source: .Blood None  Final Report (04-23-21 @ 13:01):    No Growth Final            INFECTIOUS DISEASES TESTING  COVID-19 PCR: NotDetec (04-24-21 @ 18:15)  MRSA PCR Result.: Negative (04-21-21 @ 16:00)  Procalcitonin, Serum: 0.49 (04-21-21 @ 16:00)  MRSA PCR Result.: Negative (04-20-21 @ 16:39)  Procalcitonin, Serum: 0.58 (04-20-21 @ 16:21)  Procalcitonin, Serum: 0.60 (04-20-21 @ 11:00)  COVID-19 PCR: NotDetec (04-05-21 @ 10:12)  COVID-19 PCR: NotDetec (04-04-21 @ 14:03)  Fungitell: <31 (04-01-21 @ 01:03)  Aspergillus Galactomannan Antigen: <0.500 (04-01-21 @ 01:03)  COVID-19 PCR: NotDetec (03-29-21 @ 14:17)  Procalcitonin, Serum: 2.29 (03-27-21 @ 10:50)  MRSA PCR Result.: Negative (03-20-21 @ 21:38)  COVID-19 PCR: NotDetec (03-20-21 @ 12:30)  Fungitell: 46 (03-17-21 @ 16:00)  Procalcitonin, Serum: 0.29 (03-17-21 @ 11:30)  Procalcitonin, Serum: 0.31 (03-16-21 @ 10:15)  Legionella Antigen, Urine: Negative (03-12-21 @ 10:30)  Streptococcus Pneumoniae Ag Urine: Negative (03-12-21 @ 10:30)  MRSA PCR Result.: Negative (03-12-21 @ 10:30)  Legionella Antigen, Urine: Negative (03-11-21 @ 08:10)  Streptococcus Pneumoniae Ag Urine: Negative (03-11-21 @ 08:10)  Procalcitonin, Serum: 1.75 (03-11-21 @ 06:31)  Rapid RVP Result: NotDetec (03-10-21 @ 22:10)      INFLAMMATORY MARKERS      RADIOLOGY & ADDITIONAL TESTS:  I have personally reviewed the last available Chest xray  CXR      CT      CARDIOLOGY TESTING  12 Lead ECG:   Ventricular Rate 96 BPM    Atrial Rate 96 BPM    P-R Interval 146 ms    QRS Duration 80 ms    Q-T Interval 282 ms    QTC Calculation(Bazett) 356 ms    P Axis 24 degrees    R Axis 5 degrees    T Axis -76 degrees    Diagnosis Line Normal sinus rhythmwith sinus arrhythmia  Normal ECG    Confirmed by Chriss Fofana ([id number]) on 4/29/2021 1:18:24 PM (04-29-21 @ 10:55)  12 Lead ECG:   Ventricular Rate 83 BPM    Atrial Rate 83 BPM    P-R Interval 148 ms    QRS Duration 76 ms    Q-T Interval 358 ms    QTC Calculation(Bazett) 420 ms    P Axis 21 degrees    R Axis 13 degrees    T Axis 6 degrees    Diagnosis Line Normal sinus rhythm  Normal ECG    Confirmed by Chriss Fofana ([id number]) on 4/22/2021 9:31:06 AM (04-22-21 @ 06:13)      MEDICATIONS  ALBUTerol    90 MICROgram(s) HFA Inhaler 2 Inhalation every 6 hours  chlorhexidine 4% Liquid 1 Topical <User Schedule>  clotrimazole 1% Cream 1 Topical two times a day  collagenase Ointment 1 Topical daily  fentaNYL   Infusion. 0.501 IV Continuous <Continuous>  ferrous    sulfate Liquid 300 Enteral Tube daily  furosemide   Injectable 40 IV Push daily  insulin lispro (ADMELOG) corrective regimen sliding scale  SubCutaneous every 6 hours  ipratropium 17 MICROgram(s) HFA Inhaler 2 Inhalation every 6 hours  linezolid  IVPB     linezolid  IVPB 600 IV Intermittent every 12 hours  LORazepam   Injectable 2 IV Push every 4 hours  magnesium sulfate  IVPB 2 IV Intermittent every 2 hours  meropenem  IVPB 1000 IV Intermittent every 8 hours  multivitamin/minerals/iron Oral Solution (CENTRUM) 15 Enteral Tube daily  nystatin Cream 1 Topical two times a day  pantoprazole  Injectable 40 IV Push every 12 hours  petrolatum Ophthalmic Ointment 1 Both EYES three times a day  phenylephrine    Infusion 1 IV Continuous <Continuous>  potassium chloride   Powder 40 Oral every 4 hours  QUEtiapine 50 Oral two times a day  sodium bicarbonate  Injectable 50 IV Push <User Schedule>      WEIGHT  Weight (kg): 67.5 (04-07-21 @ 06:00)  Creatinine, Serum: 0.7 mg/dL (05-04-21 @ 12:09)  Creatinine, Serum: 0.6 mg/dL (05-04-21 @ 04:30)      ANTIBIOTICS:  linezolid  IVPB      linezolid  IVPB 600 milliGRAM(s) IV Intermittent every 12 hours  meropenem  IVPB 1000 milliGRAM(s) IV Intermittent every 8 hours      All available historical records have been reviewed

## 2021-05-04 NOTE — PHARMACOTHERAPY INTERVENTION NOTE - COMMENTS
Spoke to MD [id number]. MD is aware of interaction between phenylephrine and linezolid. However as patient is being closely monitored in the vent unit, MD wishes to continue with therapy

## 2021-05-04 NOTE — PROGRESS NOTE ADULT - SUBJECTIVE AND OBJECTIVE BOX
Patient is a 85y old  Female who presents with a chief complaint of sob (30 Apr 2021 07:40)        Over Night Events:    on fentanyl, on MV    ROS:     All ROS are negative except HPI         PHYSICAL EXAM    ICU Vital Signs Last 24 Hrs  T(C): 37.1 (04 May 2021 08:00), Max: 37.1 (04 May 2021 08:00)  T(F): 98.7 (04 May 2021 08:00), Max: 98.7 (04 May 2021 08:00)  HR: 75 (04 May 2021 08:00) (75 - 90)  BP: 92/53 (04 May 2021 08:00) (88/52 - 121/65)  BP(mean): 85 (04 May 2021 00:00) (66 - 85)  RR: 34 (04 May 2021 08:00) (34 - 35)  SpO2: 98% (04 May 2021 08:00) (93% - 99%)      CONSTITUTIONAL:  Chronically ill    ENT:   trach    EYES:   Pupils equal,   Round and reactive to light.    CARDIAC:   Normal rate,   Regular rhythm.    edema      Vascular:  Normal systolic impulse  No Carotid bruits    RESPIRATORY:   No wheezing  Bilateral BS  Normal chest expansion  Not tachypneic,  No use of accessory muscles    GASTROINTESTINAL:  PEG        NEUROLOGICAL:   responds to voice. lethargic  generalized weakness.    SKIN:   stage 4.        05-03-21 @ 07:01  -  05-04-21 @ 07:00  --------------------------------------------------------  IN:    Enteral Tube Flush: 300 mL    Free Water: 600 mL    Glucerna: 840 mL  Total IN: 1740 mL    OUT:    Indwelling Catheter - Urethral (mL): 3900 mL  Total OUT: 3900 mL    Total NET: -2160 mL      05-04-21 @ 07:01  -  05-04-21 @ 10:32  --------------------------------------------------------  IN:    FentaNYL: 18.8 mL    Free Water: 300 mL    Glucerna: 120 mL  Total IN: 438.8 mL    OUT:  Total OUT: 0 mL    Total NET: 438.8 mL          LABS:                            7.5    12.24 )-----------( 251      ( 03 May 2021 04:30 )             24.4                                               05-03    143  |  97<L>  |  58<H>  ----------------------------<  123<H>  3.5   |  39<H>  |  0.6<L>    Ca    8.2<L>      03 May 2021 04:30  Mg     1.8     05-03    TPro  5.6<L>  /  Alb  1.9<L>  /  TBili  0.2  /  DBili  x   /  AST  28  /  ALT  29  /  AlkPhos  325<H>  05-03                                                                                           LIVER FUNCTIONS - ( 03 May 2021 04:30 )  Alb: 1.9 g/dL / Pro: 5.6 g/dL / ALK PHOS: 325 U/L / ALT: 29 U/L / AST: 28 U/L / GGT: x                                                                                               Mode: AC/ CMV (Assist Control/ Continuous Mandatory Ventilation)  RR (machine): 34  TV (machine): 300  FiO2: 50  PEEP: 7  ITime: 1  MAP: 18  PIP: 49                                      ABG - ( 04 May 2021 02:27 )  pH, Arterial: 7.48  pH, Blood: x     /  pCO2: 60    /  pO2: 73    / HCO3: 44    / Base Excess: 18.8  /  SaO2: 96                  MEDICATIONS  (STANDING):  ALBUTerol    90 MICROgram(s) HFA Inhaler 2 Puff(s) Inhalation every 6 hours  chlorhexidine 4% Liquid 1 Application(s) Topical <User Schedule>  clotrimazole 1% Cream 1 Application(s) Topical two times a day  collagenase Ointment 1 Application(s) Topical daily  fentaNYL   Infusion. 0.501 MICROgram(s)/kG/Hr (3.38 mL/Hr) IV Continuous <Continuous>  ferrous    sulfate Liquid 300 milliGRAM(s) Enteral Tube daily  furosemide   Injectable 40 milliGRAM(s) IV Push daily  insulin lispro (ADMELOG) corrective regimen sliding scale   SubCutaneous every 6 hours  ipratropium 17 MICROgram(s) HFA Inhaler 2 Puff(s) Inhalation every 6 hours  linezolid  IVPB      linezolid  IVPB 600 milliGRAM(s) IV Intermittent every 12 hours  LORazepam   Injectable 2 milliGRAM(s) IV Push every 4 hours  meropenem  IVPB 1000 milliGRAM(s) IV Intermittent every 8 hours  multivitamin/minerals/iron Oral Solution (CENTRUM) 15 milliLiter(s) Enteral Tube daily  nystatin Cream 1 Application(s) Topical two times a day  pantoprazole  Injectable 40 milliGRAM(s) IV Push every 12 hours  petrolatum Ophthalmic Ointment 1 Application(s) Both EYES three times a day  phenylephrine    Infusion 1 MICROgram(s)/kG/Min (11.1 mL/Hr) IV Continuous <Continuous>  potassium chloride   Powder 40 milliEquivalent(s) Oral once  QUEtiapine 50 milliGRAM(s) Oral two times a day  sodium bicarbonate  Injectable 50 milliEquivalent(s) IV Push <User Schedule>    MEDICATIONS  (PRN):  ondansetron Injectable 4 milliGRAM(s) IV Push every 4 hours PRN Nausea and/or Vomiting      New X-rays reviewed:                                                                                  ECHO    CXR interpreted by me:  B/l infiltrates     Patient is a 85y old  Female who presents with a chief complaint of sob (30 Apr 2021 07:40)        Over Night Events:  Remains on MV, sedated     ROS:     All ROS are negative except HPI         PHYSICAL EXAM    ICU Vital Signs Last 24 Hrs  T(C): 37.1 (04 May 2021 08:00), Max: 37.1 (04 May 2021 08:00)  T(F): 98.7 (04 May 2021 08:00), Max: 98.7 (04 May 2021 08:00)  HR: 75 (04 May 2021 08:00) (75 - 90)  BP: 92/53 (04 May 2021 08:00) (88/52 - 121/65)  BP(mean): 85 (04 May 2021 00:00) (66 - 85)  RR: 34 (04 May 2021 08:00) (34 - 35)  SpO2: 98% (04 May 2021 08:00) (93% - 99%)      CONSTITUTIONAL:  Chronically ill    ENT:   trach    EYES:   Pupils equal,   Round and reactive to light.    CARDIAC:   Normal rate,   Regular rhythm.    edema      Vascular:  Normal systolic impulse  No Carotid bruits    RESPIRATORY:   No wheezing  Bilateral BS  Normal chest expansion  Not tachypneic,  No use of accessory muscles    GASTROINTESTINAL:  Positive BS  No organomegaly     NEUROLOGICAL:   responds to voice. lethargic  generalized weakness.    SKIN:   stage 4 sacral decub         05-03-21 @ 07:01  -  05-04-21 @ 07:00  --------------------------------------------------------  IN:    Enteral Tube Flush: 300 mL    Free Water: 600 mL    Glucerna: 840 mL  Total IN: 1740 mL    OUT:    Indwelling Catheter - Urethral (mL): 3900 mL  Total OUT: 3900 mL    Total NET: -2160 mL      05-04-21 @ 07:01  -  05-04-21 @ 10:32  --------------------------------------------------------  IN:    FentaNYL: 18.8 mL    Free Water: 300 mL    Glucerna: 120 mL  Total IN: 438.8 mL    OUT:  Total OUT: 0 mL    Total NET: 438.8 mL          LABS:                            7.5    12.24 )-----------( 251      ( 03 May 2021 04:30 )             24.4                                               05-03    143  |  97<L>  |  58<H>  ----------------------------<  123<H>  3.5   |  39<H>  |  0.6<L>    Ca    8.2<L>      03 May 2021 04:30  Mg     1.8     05-03    TPro  5.6<L>  /  Alb  1.9<L>  /  TBili  0.2  /  DBili  x   /  AST  28  /  ALT  29  /  AlkPhos  325<H>  05-03                                                                                           LIVER FUNCTIONS - ( 03 May 2021 04:30 )  Alb: 1.9 g/dL / Pro: 5.6 g/dL / ALK PHOS: 325 U/L / ALT: 29 U/L / AST: 28 U/L / GGT: x                                                                                               Mode: AC/ CMV (Assist Control/ Continuous Mandatory Ventilation)  RR (machine): 34  TV (machine): 300  FiO2: 50  PEEP: 7  ITime: 1  MAP: 18  PIP: 49                                      ABG - ( 04 May 2021 02:27 )  pH, Arterial: 7.48  pH, Blood: x     /  pCO2: 60    /  pO2: 73    / HCO3: 44    / Base Excess: 18.8  /  SaO2: 96                  MEDICATIONS  (STANDING):  ALBUTerol    90 MICROgram(s) HFA Inhaler 2 Puff(s) Inhalation every 6 hours  chlorhexidine 4% Liquid 1 Application(s) Topical <User Schedule>  clotrimazole 1% Cream 1 Application(s) Topical two times a day  collagenase Ointment 1 Application(s) Topical daily  fentaNYL   Infusion. 0.501 MICROgram(s)/kG/Hr (3.38 mL/Hr) IV Continuous <Continuous>  ferrous    sulfate Liquid 300 milliGRAM(s) Enteral Tube daily  furosemide   Injectable 40 milliGRAM(s) IV Push daily  insulin lispro (ADMELOG) corrective regimen sliding scale   SubCutaneous every 6 hours  ipratropium 17 MICROgram(s) HFA Inhaler 2 Puff(s) Inhalation every 6 hours  linezolid  IVPB      linezolid  IVPB 600 milliGRAM(s) IV Intermittent every 12 hours  LORazepam   Injectable 2 milliGRAM(s) IV Push every 4 hours  meropenem  IVPB 1000 milliGRAM(s) IV Intermittent every 8 hours  multivitamin/minerals/iron Oral Solution (CENTRUM) 15 milliLiter(s) Enteral Tube daily  nystatin Cream 1 Application(s) Topical two times a day  pantoprazole  Injectable 40 milliGRAM(s) IV Push every 12 hours  petrolatum Ophthalmic Ointment 1 Application(s) Both EYES three times a day  phenylephrine    Infusion 1 MICROgram(s)/kG/Min (11.1 mL/Hr) IV Continuous <Continuous>  potassium chloride   Powder 40 milliEquivalent(s) Oral once  QUEtiapine 50 milliGRAM(s) Oral two times a day  sodium bicarbonate  Injectable 50 milliEquivalent(s) IV Push <User Schedule>    MEDICATIONS  (PRN):  ondansetron Injectable 4 milliGRAM(s) IV Push every 4 hours PRN Nausea and/or Vomiting      New X-rays reviewed:                                                                                  ECHO    CXR interpreted by me:  B/l infiltrates

## 2021-05-04 NOTE — PROGRESS NOTE ADULT - ASSESSMENT
ASSESSMENT  85 year old lady known to have dementia, osteoarithtis, Citizen of the [place]-speaking presenting with cough and fever.      IMPRESSION  #Sepsis present on admission - secondary to CAP  -  CT Chest No Cont (03.11.21 @ 00:54): Areas of right lung consolidation which can be seen in pneumonia. Numerous air-fluid levels throughout the right lung compatible with an infectious process. Suggestion of split pleura at the lung base for which empyema is favored although inherently limited on this noncontrast exam. Consideration can be given to contrast administration if feasiblefor better delineation. Areas of bilateral interlobular septal thickening and mild layering groundglass attenuation may reflect a component of edema.  - Urine Legionella negative  - Urine Strep negative  - BLood Cx 3/10 and 3/13 negative  - Procalcitonin 1.15   - CT Chest No Cont (03.17.21 @ 16:19): Since 3/11/2021. Enlarging loculated right pleural fluid collection with multiple air bubbles consistent with empyema.   Associated compressive atelectasis right lung is seen. Scattered groundglass opacities involving multiple lumbar segments.  - s/p VATS 3/22 -- right empyema with 800 cc purulent fluid in pleural space, multiple pockers with dense adhesions -- necrotizing pneumonia of the middle lobe  - VATS Cx 3/22 with rare yeast, otherwise no growth  -  CT Chest No Cont (03.29.21 @ 12:37): Since March 29, 2021, resolved right pleural effusion; persistent moderate right pneumothorax with 2 chest tubes in place. Increased left greater than right diffuse bilateral groundglass opacities and interlobular septal thickening. Findings are suspicious for a multifocal infection. Superimposed edema is also a possibility.  Enlarging mediastinal lymph nodes, likely reactive.  - CT Chest/Abdomen and Pelvis No Cont (04.01.21 @ 20:19): Stable residual right-sided pneumothorax. Three left-sided chest tubes are in place. New pneumomediastinum is noted, especially in the anterior mediastinum.  Stable bilateral diffuse areas of groundglass opacity. Areas of traction bronchiectasis noted throughout the left lung field and at the right lung base. No evidence of bowel obstruction or intra-abdominal or pelvic inflammatory process  - Fungitell: <31 4/1  - Aspergillus Galactomannan Antigen: <0.500 (04.01.21 @ 01:03)  - Blood Cx 3/31 and 4/3 NG  - CT Chest/Abd/Pelvis No Cont (04.12.21 @ 17:17): Redemonstrated loculated right pneumothorax without significant change. Interval removal of 2/3 right chest tubes.  Diffuse groundglass and consolidative opacities with interlobular septal thickening and traction bronchiectasis in the bases. Findings likely reflecting a combination of pulmonary edema and post inflammatory/infectious changes with some degree of organization.  Interval development abdominopelvic ascites and anasarca consistent with fluid overload.  Nonspecific diffuse colonic wall thickening which could reflect colitis versus edema. No bowel obstruction. Interval resolution of previously seen pneumomediastinum.  - completed 6 week course from debridement (end date 5/3)    #GI Bleed from PEG 4/3 -- EGD held as improved/stable    #Dementia  #Osteoarthritis  #Obesity BMI (kg/m2): 23.4  #Abx allergy: clindamycin (Hives)    Creatinine, Serum: 0.6 mg/dL (05.03.21 @ 04:30  Weight (kg): 60 (11 Mar 2021 01:17)  CrCl 21     RECOMMENDATIONS  - please obtain deep tracheal cultures, MRSA nares  - follow-up procalcitonin  - continue linezolid 600 mg q 12 hours (monitor PLTs, previously on linezolid empirically but discontinued to to thrombocytopenia)  - continue meropenem for now  - very poor prognosis -- CXR have not improved despite prolonged meropenem     I will be away tomorrow and will be available on Thursday.   I will be unavailable by phone. Please message on Teams if with questions.  Spectra [phone]

## 2021-05-04 NOTE — PROGRESS NOTE ADULT - ASSESSMENT
IMPRESSION:    Severe necrotizing CAP  multi lobar pneumonia  Right sided parapneumonic effusion s/p VATS and chest tube sp trach/peg  MENDOZA, improving  Thrombocytopenia, negative HIT & DIC panels  HO bleeding from PEG site  dec hb(s/p 1 unit of prbcs on 5/1/2021)      PLAN:    CNS:  .  Seroquel 50mg bid.  Adequate sedation     HEENT:  Oral care.  Trach care     PULMONARY:  HOB @ 45 degrees.  Keep SAO2 92 to 96%.  no change in vent   Pulmonary toilet.      CARDIOVASCULAR: I=<O    GI: GI prophylaxis.  PEG feeds.      RENAL:  F/u  lytes.  Correct as needed.  Monitor UO.    INFECTIOUS DISEASE:  ABX per ID, c/w Meropenem add zyvox.  ID FU.     HEMATOLOGICAL:   Repeat CBC & T&S.     ENDOCRINE:  Follow up FS.  Insulin protocol if needed.    MUSCULOSKELETAL: bed rest, pain control for sacral ulcer    Advanced directives and goals of care  Very poor overall prognosis  Palliative care FU IMPRESSION:    Severe necrotizing CAP  Right sided parapneumonic effusion s/p VATS and chest tube sp trach/peg  MENDOZA, improving  Thrombocytopenia, negative HIT & DIC panels  HO bleeding from PEG site  dec hb(s/p 1 unit of prbcs on 5/1/2021)      PLAN:    CNS:  .  Seroquel 50mg bid.  Adequate sedation     HEENT:  Oral care.  Trach care     PULMONARY:  HOB @ 45 degrees.  Keep SAO2 92 to 96%.  no change in vent   Pulmonary toilet.      CARDIOVASCULAR: I=<O.  BNP     GI: GI prophylaxis.  PEG feeds.      RENAL:  F/u  lytes.  Correct as needed.  Monitor UO.    INFECTIOUS DISEASE:  ABX per ID.  CW Meropenem add Zyvox.  ID FU.     HEMATOLOGICAL:   Repeat CBC & T&S.     ENDOCRINE:  Follow up FS.  Insulin protocol if needed.    MUSCULOSKELETAL: bed rest, pain control for sacral ulcer    Advanced directives and goals of care  Very poor overall prognosis  Palliative care FU

## 2021-05-05 NOTE — PROGRESS NOTE ADULT - ASSESSMENT
IMPRESSION:    Severe necrotizing CAP  Right sided parapneumonic effusion s/p VATS and chest tube sp trach/peg  MENDOZA, improving  Thrombocytopenia, negative HIT & DIC panels  HO bleeding from PEG site  dec hb(s/p 1 unit of prbcs on 5/1/2021)      PLAN:    CNS:  Seroquel 50mg bid.  Adequate sedation     HEENT:  Oral care.  Trach care     PULMONARY:  HOB @ 45 degrees.  Keep SAO2 92 to 96%.  Wean O2.        CARDIOVASCULAR: I=<O.       GI: GI prophylaxis.  PEG feeds.      RENAL:  F/u  lytes.  Correct as needed.  Monitor UO.    INFECTIOUS DISEASE:  ABX per ID.  CW Meropenem add Zyvox.  ID FU.     HEMATOLOGICAL:   Repeat CBC & T&S.     ENDOCRINE:  Follow up FS.  Insulin protocol if needed.    MUSCULOSKELETAL: bed rest    Advanced directives and goals of care  Very poor overall prognosis  Palliative care FU

## 2021-05-05 NOTE — PROGRESS NOTE ADULT - SUBJECTIVE AND OBJECTIVE BOX
Patient is a 85y old  Female who presents with a chief complaint of sob (30 Apr 2021 07:40)        Over Night Events:  Remains on MV.  Off Pressors.          ROS:     All ROS are negative except HPI         PHYSICAL EXAM    ICU Vital Signs Last 24 Hrs  T(C): 35.6 (05 May 2021 04:00), Max: 36.8 (04 May 2021 12:00)  T(F): 96 (05 May 2021 04:00), Max: 98.3 (04 May 2021 12:00)  HR: 79 (05 May 2021 09:00) (72 - 93)  BP: 98/55 (05 May 2021 09:00) (77/47 - 150/84)  BP(mean): 69 (05 May 2021 08:00) (57 - 104)  ABP: --  ABP(mean): --  RR: 34 (05 May 2021 09:00) (34 - 36)  SpO2: 98% (05 May 2021 09:00) (93% - 99%)      CONSTITUTIONAL:  Well nourished.  Ill appearing  NAD    ENT:   Airway patent,   Mouth with normal mucosa.   No thrush    EYES:   Pupils equal,   Round and reactive to light.    CARDIAC:   Normal rate,   Regular rhythm.    No edema      Vascular:  Normal systolic impulse  No Carotid bruits    RESPIRATORY:   No wheezing  Bilateral BS  Normal chest expansion  Not tachypneic,  No use of accessory muscles    GASTROINTESTINAL:  Abdomen soft,   Non-tender,   No guarding,   + BS    MUSCULOSKELETAL:   Range of motion is not limited,  No clubbing, cyanosis    NEUROLOGICAL:   Sedated     SKIN:   Skin normal color for race,   Warm and dry.   No evidence of rash.    PSYCHIATRIC:   Sedated   No apparent risk to self or others.    HEMATOLOGICAL:  No cervical  lymphadenopathy.  no inguinal lymphadenopathy      05-04-21 @ 07:01  -  05-05-21 @ 07:00  --------------------------------------------------------  IN:    FentaNYL: 141 mL    Free Water: 600 mL    Glucerna: 1380 mL  Total IN: 2121 mL    OUT:    Indwelling Catheter - Urethral (mL): 3700 mL  Total OUT: 3700 mL    Total NET: -1579 mL      05-05-21 @ 07:01  -  05-05-21 @ 09:29  --------------------------------------------------------  IN:    FentaNYL: 18.8 mL    Free Water: 300 mL    Glucerna: 120 mL  Total IN: 438.8 mL    OUT:    Indwelling Catheter - Urethral (mL): 250 mL    Norepinephrine: 0 mL  Total OUT: 250 mL    Total NET: 188.8 mL          LABS:                            7.7    15.40 )-----------( 219      ( 05 May 2021 07:09 )             25.9                                               05-05    145  |  94<L>  |  55<H>  ----------------------------<  166<H>  4.1   |  39<H>  |  0.7    Ca    8.2<L>      05 May 2021 07:09  Phos  4.4     05-05  Mg     2.2     05-05    TPro  5.4<L>  /  Alb  1.8<L>  /  TBili  0.3  /  DBili  x   /  AST  23  /  ALT  20  /  AlkPhos  295<H>  05-05                                                                                           LIVER FUNCTIONS - ( 05 May 2021 07:09 )  Alb: 1.8 g/dL / Pro: 5.4 g/dL / ALK PHOS: 295 U/L / ALT: 20 U/L / AST: 23 U/L / GGT: x                                                                                               Mode: AC/ CMV (Assist Control/ Continuous Mandatory Ventilation)  RR (machine): 34  TV (machine): 300  FiO2: 50  PEEP: 7  ITime: 1  MAP: 20  PIP: 53                                      ABG - ( 04 May 2021 02:27 )  pH, Arterial: 7.48  pH, Blood: x     /  pCO2: 60    /  pO2: 73    / HCO3: 44    / Base Excess: 18.8  /  SaO2: 96                  MEDICATIONS  (STANDING):  ALBUTerol    90 MICROgram(s) HFA Inhaler 2 Puff(s) Inhalation every 6 hours  chlorhexidine 0.12% Liquid 15 milliLiter(s) Oral Mucosa every 12 hours  chlorhexidine 4% Liquid 1 Application(s) Topical <User Schedule>  clotrimazole 1% Cream 1 Application(s) Topical two times a day  collagenase Ointment 1 Application(s) Topical daily  fentaNYL   Infusion. 0.501 MICROgram(s)/kG/Hr (3.38 mL/Hr) IV Continuous <Continuous>  ferrous    sulfate Liquid 300 milliGRAM(s) Enteral Tube daily  furosemide   Injectable 40 milliGRAM(s) IV Push daily  insulin lispro (ADMELOG) corrective regimen sliding scale   SubCutaneous every 6 hours  ipratropium 17 MICROgram(s) HFA Inhaler 2 Puff(s) Inhalation every 6 hours  LORazepam   Injectable 2 milliGRAM(s) IV Push every 4 hours  meropenem  IVPB 1000 milliGRAM(s) IV Intermittent every 8 hours  multivitamin/minerals/iron Oral Solution (CENTRUM) 15 milliLiter(s) Enteral Tube daily  norepinephrine Infusion 0.088 MICROgram(s)/kG/Min (5.57 mL/Hr) IV Continuous <Continuous>  nystatin Cream 1 Application(s) Topical two times a day  pantoprazole  Injectable 40 milliGRAM(s) IV Push every 12 hours  petrolatum Ophthalmic Ointment 1 Application(s) Both EYES three times a day  phenylephrine    Infusion 1 MICROgram(s)/kG/Min (11.1 mL/Hr) IV Continuous <Continuous>  QUEtiapine 50 milliGRAM(s) Oral two times a day  sodium bicarbonate  Injectable 50 milliEquivalent(s) IV Push <User Schedule>  vancomycin  IVPB 750 milliGRAM(s) IV Intermittent once  vancomycin  IVPB 750 milliGRAM(s) IV Intermittent every 12 hours  vancomycin  IVPB        MEDICATIONS  (PRN):  ondansetron Injectable 4 milliGRAM(s) IV Push every 4 hours PRN Nausea and/or Vomiting      New X-rays reviewed:                                                                                  ECHO    CXR interpreted by me:  Bilateral infiltrates

## 2021-05-05 NOTE — PROGRESS NOTE ADULT - SUBJECTIVE AND OBJECTIVE BOX
Patient is a 85y old  Female who presents with a chief complaint of sob (30 Apr 2021 07:40)  pt seen and evaluated  remain vented via trach /sedated   on peg tube feed    ICU Vital Signs Last 24 Hrs  T(C): 35.6 (05 May 2021 04:00), Max: 36.8 (04 May 2021 12:00)  T(F): 96 (05 May 2021 04:00), Max: 98.3 (04 May 2021 12:00)  HR: 79 (05 May 2021 09:00) (72 - 93)  BP: 98/55 (05 May 2021 09:00) (77/47 - 150/84)  BP(mean): 69 (05 May 2021 08:00) (57 - 104)  RR: 34 (05 May 2021 09:00) (34 - 36)  SpO2: 98% (05 May 2021 09:00) (93% - 98%)      Drug Dosing Weight  Height (cm): 160 (30 Mar 2021 07:39)  Weight (kg): 67.5 (07 Apr 2021 06:00)  BMI (kg/m2): 26.4 (07 Apr 2021 06:00)  BSA (m2): 1.71 (07 Apr 2021 06:00)  04 May 2021 07:01  -  05 May 2021 07:00  --------------------------------------------------------  IN:    FentaNYL: 141 mL    Free Water: 600 mL    Glucerna: 1380 mL  Total IN: 2121 mL    OUT:    Indwelling Catheter - Urethral (mL): 3700 mL  Total OUT: 3700 mL    Total NET: -1579 mL      05 May 2021 07:01  -  05 May 2021 10:15  --------------------------------------------------------  IN:    FentaNYL: 18.8 mL    Free Water: 300 mL    Glucerna: 120 mL  Total IN: 438.8 mL    OUT:    Indwelling Catheter - Urethral (mL): 250 mL    Norepinephrine: 0 mL  Total OUT: 250 mL    Total NET: 188.8 mL    PHYSICAL EXAM:  Constitutional:  remain vented via trach  Gastrointestinal:  +peg tube in place  soft   rectal tube in place  MEDICATIONS  (STANDING):  ALBUTerol    90 MICROgram(s) HFA Inhaler 2 Puff(s) Inhalation every 6 hours  chlorhexidine 0.12% Liquid 15 milliLiter(s) Oral Mucosa every 12 hours  chlorhexidine 4% Liquid 1 Application(s) Topical <User Schedule>  clotrimazole 1% Cream 1 Application(s) Topical two times a day  collagenase Ointment 1 Application(s) Topical daily  fentaNYL   Infusion. 0.501 MICROgram(s)/kG/Hr (3.38 mL/Hr) IV Continuous <Continuous>  ferrous    sulfate Liquid 300 milliGRAM(s) Enteral Tube daily  furosemide   Injectable 40 milliGRAM(s) IV Push daily  insulin lispro (ADMELOG) corrective regimen sliding scale   SubCutaneous every 6 hours  ipratropium 17 MICROgram(s) HFA Inhaler 2 Puff(s) Inhalation every 6 hours  linezolid  IVPB 600 milliGRAM(s) IV Intermittent every 12 hours  meropenem  IVPB 1000 milliGRAM(s) IV Intermittent every 8 hours  multivitamin/minerals/iron Oral Solution (CENTRUM) 15 milliLiter(s) Enteral Tube daily  norepinephrine Infusion 0.088 MICROgram(s)/kG/Min (5.57 mL/Hr) IV Continuous <Continuous>  nystatin Cream 1 Application(s) Topical two times a day  pantoprazole  Injectable 40 milliGRAM(s) IV Push every 12 hours  petrolatum Ophthalmic Ointment 1 Application(s) Both EYES three times a day  phenylephrine    Infusion 1 MICROgram(s)/kG/Min (11.1 mL/Hr) IV Continuous <Continuous>  QUEtiapine 50 milliGRAM(s) Oral two times a day  sodium bicarbonate  Injectable 50 milliEquivalent(s) IV Push <User Schedule>      Diet, NPO with Tube Feed:   Tube Feeding Modality: Gastrostomy  Glucerna 1.2 Fabio  Total Volume for 24 Hours (mL): 1440  Bolus  Total Volume of Bolus (mL):  360  Total # of Feeds: 4  Tube Feed Frequency: Every 6 hours   Tube Feed Start Time: 17:00  Bolus Feed Rate (mL per Hour): 500   Bolus Feed Duration (in Hours): 0.5  No Carb Prosource TF     Qty per Day:  3 (04-29-21 @ 16:07)      LABS  05-05    145  |  94<L>  |  55<H>  ----------------------------<  166<H>  4.1   |  39<H>  |  0.7    Ca    8.2<L>      05 May 2021 07:09  Phos  4.4     05-05  Mg     2.2     05-05    TPro  5.4<L>  /  Alb  1.8<L>  /  TBili  0.3  /  DBili  x   /  AST  23  /  ALT  20  /  AlkPhos  295<H>  05-05                          7.7    15.40 )-----------( 219      ( 05 May 2021 07:09 )             25.9     CAPILLARY BLOOD GLUCOSE  POCT Blood Glucose.: 175 mg/dL (05 May 2021 06:45)  POCT Blood Glucose.: 166 mg/dL (05 May 2021 00:23)  POCT Blood Glucose.: 168 mg/dL (04 May 2021 11:34)   RADIOLOGY STUDIES

## 2021-05-05 NOTE — PROGRESS NOTE ADULT - ASSESSMENT
ASSESSMENT/PLAN  Severe necrotizing CAP  multi lobar pneumonia  Right sided parapneumonic effusion s/p VATS and chest tube   sp trach/peg  MENDOZA, improving  Thrombocytopenia, negative HIT & DIC panels  Anemia of Chronic Disease, s/p 1U PRBC 4/18, stable  Hypernatremia  HO bleeding from PEG site  elevated WBC       continue with - Glucerna 1.2 at 360 ml over 30-40 min x 4 feeds/d (+ cont the Prosource TF)  f/u poc glucose and lytes and phos

## 2021-05-06 NOTE — PROGRESS NOTE ADULT - SUBJECTIVE AND OBJECTIVE BOX
Patient is a 85y old  Female who presents with a chief complaint of sob (30 Apr 2021 07:40)        Over Night Events: no major events, off levo, on fentanyl 0.7        ROS:     All ROS are negative except HPI         PHYSICAL EXAM    ICU Vital Signs Last 24 Hrs  T(C): 35.8 (06 May 2021 04:00), Max: 36 (05 May 2021 15:37)  T(F): 96.4 (06 May 2021 04:00), Max: 96.8 (05 May 2021 15:37)  HR: 86 (06 May 2021 07:00) (74 - 103)  BP: 103/54 (06 May 2021 07:00) (96/54 - 140/68)  BP(mean): 76 (06 May 2021 07:00) (72 - 98)  RR: 30 (06 May 2021 07:00) (28 - 35)  SpO2: 86% (06 May 2021 07:00) (86% - 100%)      CONSTITUTIONAL:  Chronically ill.  NAD    ENT:   Airway patent,   Mouth with normal mucosa.   No thrush  Trach    EYES:   Pupils equal,   Round and reactive to light.    CARDIAC:   Normal rate,   Regular rhythm.    No edema      Vascular:  Normal systolic impulse  No Carotid bruits    RESPIRATORY:   No wheezing  Bilateral crackles  Normal chest expansion  Not tachypneic,  No use of accessory muscles    GASTROINTESTINAL:  Abdomen soft,   Non-tender,   No guarding,   + BS    MUSCULOSKELETAL:   Range of motion is not limited,  No clubbing, cyanosis    NEUROLOGICAL:   Alert/awake, confused  generalized weakness    SKIN:   Skin normal color for race,   Warm and dry and intact.   No evidence of rash.  Wound: left foot.  Sacral decubitus     PSYCHIATRIC:   Normal mood and affect.   No apparent risk to self or others.    HEMATOLOGICAL:  No cervical  lymphadenopathy.  no inguinal lymphadenopathy      05-05-21 @ 07:01  -  05-06-21 @ 07:00  --------------------------------------------------------  IN:    FentaNYL: 94 mL    Free Water: 1200 mL    Glucerna: 1320 mL  Total IN: 2614 mL    OUT:    Indwelling Catheter - Urethral (mL): 1995 mL    Norepinephrine: 0 mL  Total OUT: 1995 mL    Total NET: 619 mL          LABS:                            8.3    18.52 )-----------( 218      ( 06 May 2021 08:05 )             29.1                                               05-05    145  |  94<L>  |  55<H>  ----------------------------<  166<H>  4.1   |  39<H>  |  0.7    Ca    8.2<L>      05 May 2021 07:09  Phos  4.4     05-05  Mg     2.2     05-05    TPro  5.4<L>  /  Alb  1.8<L>  /  TBili  0.3  /  DBili  x   /  AST  23  /  ALT  20  /  AlkPhos  295<H>  05-05                                                                                           LIVER FUNCTIONS - ( 05 May 2021 07:09 )  Alb: 1.8 g/dL / Pro: 5.4 g/dL / ALK PHOS: 295 U/L / ALT: 20 U/L / AST: 23 U/L / GGT: x                                                                                               Mode: AC/ CMV (Assist Control/ Continuous Mandatory Ventilation)  RR (machine): 28  TV (machine): 300  FiO2: 55  PEEP: 7  ITime: 1  MAP: 19  PIP: 55                                          MEDICATIONS  (STANDING):  ALBUTerol    90 MICROgram(s) HFA Inhaler 2 Puff(s) Inhalation every 6 hours  chlorhexidine 0.12% Liquid 15 milliLiter(s) Oral Mucosa every 12 hours  chlorhexidine 4% Liquid 1 Application(s) Topical <User Schedule>  clotrimazole 1% Cream 1 Application(s) Topical two times a day  collagenase Ointment 1 Application(s) Topical daily  fentaNYL   Infusion. 0.501 MICROgram(s)/kG/Hr (3.38 mL/Hr) IV Continuous <Continuous>  ferrous    sulfate Liquid 300 milliGRAM(s) Enteral Tube daily  furosemide   Injectable 40 milliGRAM(s) IV Push daily  insulin lispro (ADMELOG) corrective regimen sliding scale   SubCutaneous every 6 hours  ipratropium 17 MICROgram(s) HFA Inhaler 2 Puff(s) Inhalation every 6 hours  linezolid  IVPB 600 milliGRAM(s) IV Intermittent every 12 hours  meropenem  IVPB 1000 milliGRAM(s) IV Intermittent every 8 hours  multivitamin/minerals/iron Oral Solution (CENTRUM) 15 milliLiter(s) Enteral Tube daily  norepinephrine Infusion 0.088 MICROgram(s)/kG/Min (5.57 mL/Hr) IV Continuous <Continuous>  nystatin Cream 1 Application(s) Topical two times a day  pantoprazole  Injectable 40 milliGRAM(s) IV Push every 12 hours  petrolatum Ophthalmic Ointment 1 Application(s) Both EYES three times a day  phenylephrine    Infusion 1 MICROgram(s)/kG/Min (11.1 mL/Hr) IV Continuous <Continuous>  QUEtiapine 50 milliGRAM(s) Oral two times a day  sodium bicarbonate  Injectable 50 milliEquivalent(s) IV Push <User Schedule>    MEDICATIONS  (PRN):  ondansetron Injectable 4 milliGRAM(s) IV Push every 4 hours PRN Nausea and/or Vomiting      New X-rays reviewed:                                                                                  ECHO    CXR interpreted by me:  ERNESTO/l infiltrates

## 2021-05-06 NOTE — PROGRESS NOTE ADULT - ASSESSMENT
ASSESSMENT  85 year old lady known to have dementia, osteoarithtis, [place]divian-speaking presenting with cough and fever.      IMPRESSION  #Sepsis present on admission - secondary to CAP  -  CT Chest No Cont (03.11.21 @ 00:54): Areas of right lung consolidation which can be seen in pneumonia. Numerous air-fluid levels throughout the right lung compatible with an infectious process. Suggestion of split pleura at the lung base for which empyema is favored although inherently limited on this noncontrast exam. Consideration can be given to contrast administration if feasiblefor better delineation. Areas of bilateral interlobular septal thickening and mild layering groundglass attenuation may reflect a component of edema.  - Urine Legionella negative  - Urine Strep negative  - BLood Cx 3/10 and 3/13 negative  - Procalcitonin 1.15   - CT Chest No Cont (03.17.21 @ 16:19): Since 3/11/2021. Enlarging loculated right pleural fluid collection with multiple air bubbles consistent with empyema.   Associated compressive atelectasis right lung is seen. Scattered groundglass opacities involving multiple lumbar segments.  - s/p VATS 3/22 -- right empyema with 800 cc purulent fluid in pleural space, multiple pockers with dense adhesions -- necrotizing pneumonia of the middle lobe  - VATS Cx 3/22 with rare yeast, otherwise no growth  -  CT Chest No Cont (03.29.21 @ 12:37): Since March 29, 2021, resolved right pleural effusion; persistent moderate right pneumothorax with 2 chest tubes in place. Increased left greater than right diffuse bilateral groundglass opacities and interlobular septal thickening. Findings are suspicious for a multifocal infection. Superimposed edema is also a possibility.  Enlarging mediastinal lymph nodes, likely reactive.  - CT Chest/Abdomen and Pelvis No Cont (04.01.21 @ 20:19): Stable residual right-sided pneumothorax. Three left-sided chest tubes are in place. New pneumomediastinum is noted, especially in the anterior mediastinum.  Stable bilateral diffuse areas of groundglass opacity. Areas of traction bronchiectasis noted throughout the left lung field and at the right lung base. No evidence of bowel obstruction or intra-abdominal or pelvic inflammatory process  - Fungitell: <31 4/1  - Aspergillus Galactomannan Antigen: <0.500 (04.01.21 @ 01:03)  - Blood Cx 3/31 and 4/3 NG  - CT Chest/Abd/Pelvis No Cont (04.12.21 @ 17:17): Redemonstrated loculated right pneumothorax without significant change. Interval removal of 2/3 right chest tubes.  Diffuse groundglass and consolidative opacities with interlobular septal thickening and traction bronchiectasis in the bases. Findings likely reflecting a combination of pulmonary edema and post inflammatory/infectious changes with some degree of organization.  Interval development abdominopelvic ascites and anasarca consistent with fluid overload.  Nonspecific diffuse colonic wall thickening which could reflect colitis versus edema. No bowel obstruction. Interval resolution of previously seen pneumomediastinum.  - completed 6 week course from debridement (end date 5/3)    #GI Bleed from PEG 4/3 -- EGD held as improved/stable    #Dementia  #Osteoarthritis  #Obesity BMI (kg/m2): 23.4  #Abx allergy: clindamycin (Hives)    Creatinine, Serum: 0.6 mg/dL (05.03.21 @ 04:30  Weight (kg): 60 (11 Mar 2021 01:17)  CrCl 21     RECOMMENDATIONS  - linezolid for 5 days   - can narrow meropenem to ceftriaxone 2g daily and flagyl 500 mg TID   - please obtain deep tracheal cultures if able  - very poor prognosis -- CXR have not improved despite prolonged meropenem     Please call or message on [employer] Teams if with any questions.  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      IMPRESSION  #Sepsis present on admission - secondary to CAP  -  CT Chest No Cont (03.11.21 @ 00:54): Areas of right lung consolidation which can be seen in pneumonia. Numerous air-fluid levels throughout the right lung compatible with an infectious process. Suggestion of split pleura at the lung base for which empyema is favored although inherently limited on this noncontrast exam. Consideration can be given to contrast administration if feasiblefor better delineation. Areas of bilateral interlobular septal thickening and mild layering groundglass attenuation may reflect a component of edema.  - Urine Legionella negative  - Urine Strep negative  - BLood Cx 3/10 and 3/13 negative  - Procalcitonin 1.15   - CT Chest No Cont (03.17.21 @ 16:19): Since 3/11/2021. Enlarging loculated right pleural fluid collection with multiple air bubbles consistent with empyema.   Associated compressive atelectasis right lung is seen. Scattered groundglass opacities involving multiple lumbar segments.  - s/p VATS 3/22 -- right empyema with 800 cc purulent fluid in pleural space, multiple pockers with dense adhesions -- necrotizing pneumonia of the middle lobe  - VATS Cx 3/22 with rare yeast, otherwise no growth  -  CT Chest No Cont (03.29.21 @ 12:37): Since March 29, 2021, resolved right pleural effusion; persistent moderate right pneumothorax with 2 chest tubes in place. Increased left greater than right diffuse bilateral groundglass opacities and interlobular septal thickening. Findings are suspicious for a multifocal infection. Superimposed edema is also a possibility.  Enlarging mediastinal lymph nodes, likely reactive.  - CT Chest/Abdomen and Pelvis No Cont (04.01.21 @ 20:19): Stable residual right-sided pneumothorax. Three left-sided chest tubes are in place. New pneumomediastinum is noted, especially in the anterior mediastinum.  Stable bilateral diffuse areas of groundglass opacity. Areas of traction bronchiectasis noted throughout the left lung field and at the right lung base. No evidence of bowel obstruction or intra-abdominal or pelvic inflammatory process  - Fungitell: <31 4/1  - Aspergillus Galactomannan Antigen: <0.500 (04.01.21 @ 01:03)  - Blood Cx 3/31 and 4/3 NG  - CT Chest/Abd/Pelvis No Cont (04.12.21 @ 17:17): Redemonstrated loculated right pneumothorax without significant change. Interval removal of 2/3 right chest tubes.  Diffuse groundglass and consolidative opacities with interlobular septal thickening and traction bronchiectasis in the bases. Findings likely reflecting a combination of pulmonary edema and post inflammatory/infectious changes with some degree of organization.  Interval development abdominopelvic ascites and anasarca consistent with fluid overload.  Nonspecific diffuse colonic wall thickening which could reflect colitis versus edema. No bowel obstruction. Interval resolution of previously seen pneumomediastinum.  - completed 6 week course from debridement (end date 5/3)    #GI Bleed from PEG 4/3 -- EGD held as improved/stable    #Dementia  #Osteoarthritis  #Obesity BMI (kg/m2): 23.4  #Abx allergy: clindamycin (Hives)    Creatinine, Serum: 0.6 mg/dL (05.03.21 @ 04:30  Weight (kg): 60 (11 Mar 2021 01:17)  CrCl 21     RECOMMENDATIONS  - please obtain deep tracheal cultures  - continue linezolid/meropenem for now  - if deep tracheal cultures are NG, will plan to narrow   - very poor prognosis -- CXR have not improved despite prolonged meropenem     Please call or message on [employer] Teams if with any questions.  Spectra [phone]

## 2021-05-06 NOTE — PROGRESS NOTE ADULT - ASSESSMENT
IMPRESSION:    Severe necrotizing CAP  Right sided parapneumonic effusion s/p VATS and chest tube sp trach/peg  MENDOZA, improving  Thrombocytopenia, negative HIT & DIC panels  HO bleeding from PEG site  dec hb(s/p 1 unit of prbcs on 5/1/2021)      PLAN:    CNS:  Seroquel 50mg bid.  Wean fentanyl    HEENT:  Oral care.  Trach care     PULMONARY:  HOB @ 45 degrees.  Keep SAO2 92 to 96%.  Wean O2.        CARDIOVASCULAR: I<O.       GI: GI prophylaxis.  PEG feeds.      RENAL:  F/u  lytes.  Correct as needed.  Monitor UO.    INFECTIOUS DISEASE:  ABX per ID.  CW Meropenem / Zyvox.  ID FU.     HEMATOLOGICAL:   Repeat CBC & T&S.     ENDOCRINE:  Follow up FS.  Insulin protocol if needed.    MUSCULOSKELETAL: bed rest    Advanced directives and goals of care  Very poor overall prognosis  Palliative care FU

## 2021-05-06 NOTE — PROGRESS NOTE ADULT - SUBJECTIVE AND OBJECTIVE BOX
ALEAHKARSTEN MIGNONDULCE  85y, Female  Allergy: clindamycin (Hives)      LOS  56d    CHIEF COMPLAINT: sob (30 Apr 2021 07:40)      INTERVAL EVENTS/HPI  - No acute events overnight  - T(F): , Max: 96.8 (05-05-21 @ 15:37)  - WBC Count: 15.40 (05-05-21 @ 07:09)  WBC Count: 15.81 (05-04-21 @ 12:09)     - Creatinine, Serum: 0.7 (05-05-21 @ 07:09)  Creatinine, Serum: 0.7 (05-04-21 @ 12:09)       ROS  unable to obtain history secondary to patient's mental status     VITALS:  T(F): 96.4, Max: 96.8 (05-05-21 @ 15:37)  HR: 86  BP: 103/54  RR: 30Vital Signs Last 24 Hrs  T(C): 35.8 (06 May 2021 04:00), Max: 36 (05 May 2021 15:37)  T(F): 96.4 (06 May 2021 04:00), Max: 96.8 (05 May 2021 15:37)  HR: 86 (06 May 2021 07:00) (74 - 103)  BP: 103/54 (06 May 2021 07:00) (96/54 - 140/68)  BP(mean): 76 (06 May 2021 07:00) (69 - 98)  RR: 30 (06 May 2021 07:00) (28 - 35)  SpO2: 86% (06 May 2021 07:00) (86% - 100%)    PHYSICAL EXAM:  Gen: NAD, resting in bed  HEENT: Normocephalic, atraumatic  Neck: supple, no lymphadenopathy  CV: Regular rate & regular rhythm  Lungs: decreased BS at bases, no fremitus  Abdomen: Soft, BS present  Ext: Warm, well perfused  Neuro: non focal, awake  Skin: no rash, no erythema  Lines: no phlebitis    FH: Non-contributory  Social Hx: Non-contributory    TESTS & MEASUREMENTS:                        7.7    15.40 )-----------( 219      ( 05 May 2021 07:09 )             25.9     05-05    145  |  94<L>  |  55<H>  ----------------------------<  166<H>  4.1   |  39<H>  |  0.7    Ca    8.2<L>      05 May 2021 07:09  Phos  4.4     05-05  Mg     2.2     05-05    TPro  5.4<L>  /  Alb  1.8<L>  /  TBili  0.3  /  DBili  x   /  AST  23  /  ALT  20  /  AlkPhos  295<H>  05-05      LIVER FUNCTIONS - ( 05 May 2021 07:09 )  Alb: 1.8 g/dL / Pro: 5.4 g/dL / ALK PHOS: 295 U/L / ALT: 20 U/L / AST: 23 U/L / GGT: x               Culture - Blood (collected 04-27-21 @ 06:16)  Source: .Blood None  Final Report (05-02-21 @ 13:01):    No Growth Final    Culture - Blood (collected 04-18-21 @ 06:16)  Source: .Blood None  Final Report (04-23-21 @ 13:01):    No Growth Final            INFECTIOUS DISEASES TESTING  Procalcitonin, Serum: 0.34 (05-04-21 @ 04:30)  COVID-19 PCR: NotDetec (04-24-21 @ 18:15)  MRSA PCR Result.: Negative (04-21-21 @ 16:00)  Procalcitonin, Serum: 0.49 (04-21-21 @ 16:00)  MRSA PCR Result.: Negative (04-20-21 @ 16:39)  Procalcitonin, Serum: 0.58 (04-20-21 @ 16:21)  Procalcitonin, Serum: 0.60 (04-20-21 @ 11:00)  COVID-19 PCR: NotDetec (04-05-21 @ 10:12)  COVID-19 PCR: NotDetec (04-04-21 @ 14:03)  Fungitell: <31 (04-01-21 @ 01:03)  Aspergillus Galactomannan Antigen: <0.500 (04-01-21 @ 01:03)  COVID-19 PCR: NotDetec (03-29-21 @ 14:17)  Procalcitonin, Serum: 2.29 (03-27-21 @ 10:50)  MRSA PCR Result.: Negative (03-20-21 @ 21:38)  COVID-19 PCR: NotDetec (03-20-21 @ 12:30)  Fungitell: 46 (03-17-21 @ 16:00)  Procalcitonin, Serum: 0.29 (03-17-21 @ 11:30)  Procalcitonin, Serum: 0.31 (03-16-21 @ 10:15)  Legionella Antigen, Urine: Negative (03-12-21 @ 10:30)  Streptococcus Pneumoniae Ag Urine: Negative (03-12-21 @ 10:30)  MRSA PCR Result.: Negative (03-12-21 @ 10:30)  Legionella Antigen, Urine: Negative (03-11-21 @ 08:10)  Streptococcus Pneumoniae Ag Urine: Negative (03-11-21 @ 08:10)  Procalcitonin, Serum: 1.75 (03-11-21 @ 06:31)  Rapid RVP Result: NotDetec (03-10-21 @ 22:10)      INFLAMMATORY MARKERS      RADIOLOGY & ADDITIONAL TESTS:  I have personally reviewed the last available Chest xray  CXR      CT      CARDIOLOGY TESTING  12 Lead ECG:   Ventricular Rate 96 BPM    Atrial Rate 96 BPM    P-R Interval 146 ms    QRS Duration 80 ms    Q-T Interval 282 ms    QTC Calculation(Bazett) 356 ms    P Axis 24 degrees    R Axis 5 degrees    T Axis -76 degrees    Diagnosis Line Normal sinus rhythmwith sinus arrhythmia  Normal ECG    Confirmed by Chriss Fofana ([id number]) on 4/29/2021 1:18:24 PM (04-29-21 @ 10:55)      MEDICATIONS  ALBUTerol    90 MICROgram(s) HFA Inhaler 2 Inhalation every 6 hours  chlorhexidine 0.12% Liquid 15 Oral Mucosa every 12 hours  chlorhexidine 4% Liquid 1 Topical <User Schedule>  clotrimazole 1% Cream 1 Topical two times a day  collagenase Ointment 1 Topical daily  fentaNYL   Infusion. 0.501 IV Continuous <Continuous>  ferrous    sulfate Liquid 300 Enteral Tube daily  furosemide   Injectable 40 IV Push daily  insulin lispro (ADMELOG) corrective regimen sliding scale  SubCutaneous every 6 hours  ipratropium 17 MICROgram(s) HFA Inhaler 2 Inhalation every 6 hours  linezolid  IVPB 600 IV Intermittent every 12 hours  meropenem  IVPB 1000 IV Intermittent every 8 hours  multivitamin/minerals/iron Oral Solution (CENTRUM) 15 Enteral Tube daily  norepinephrine Infusion 0.088 IV Continuous <Continuous>  nystatin Cream 1 Topical two times a day  pantoprazole  Injectable 40 IV Push every 12 hours  petrolatum Ophthalmic Ointment 1 Both EYES three times a day  phenylephrine    Infusion 1 IV Continuous <Continuous>  QUEtiapine 50 Oral two times a day  sodium bicarbonate  Injectable 50 IV Push <User Schedule>      WEIGHT  Weight (kg): 67.5 (04-07-21 @ 06:00)      ANTIBIOTICS:  linezolid  IVPB 600 milliGRAM(s) IV Intermittent every 12 hours  meropenem  IVPB 1000 milliGRAM(s) IV Intermittent every 8 hours      All available historical records have been reviewed

## 2021-05-07 NOTE — PROGRESS NOTE ADULT - SUBJECTIVE AND OBJECTIVE BOX
Patient is a 85y old  Female who presents with a chief complaint of sob (30 Apr 2021 07:40)        Over Night Events: no major events overnight        ROS:     All ROS are negative except HPI         PHYSICAL EXAM    ICU Vital Signs Last 24 Hrs  T(C): 35.7 (07 May 2021 04:00), Max: 37.4 (06 May 2021 20:00)  T(F): 96.3 (07 May 2021 04:00), Max: 99.3 (06 May 2021 20:00)  HR: 98 (07 May 2021 07:00) (90 - 132)  BP: 121/58 (07 May 2021 07:00) (97/52 - 138/69)  BP(mean): 81 (07 May 2021 07:00) (69 - 98)  ABP: --  ABP(mean): --  RR: 30 (07 May 2021 07:00) (30 - 38)  SpO2: 96% (07 May 2021 07:00) (90% - 98%)      CONSTITUTIONAL:  NAD. chronically ill    ENT:   Airway patent,   Mouth with normal mucosa.   No thrush    EYES:   Pupils equal,   Round and reactive to light.    CARDIAC:   Normal rate,   Regular rhythm.    edema      Vascular:  Normal systolic impulse  No Carotid bruits    RESPIRATORY:   No wheezing  Bilateral crackles  Normal chest expansion  Not tachypneic,  No use of accessory muscles    GASTROINTESTINAL:  Abdomen soft,   Non-tender,   No guarding,   + BS    MUSCULOSKELETAL:   Range of motion is not limited,  No clubbing, cyanosis    NEUROLOGICAL:   Alert and oriented   No motor  deficits.    SKIN:   Skin normal color for race,   Warm and dry and intact.   No evidence of rash.  sacral ulcer    PSYCHIATRIC:   Normal mood and affect.   No apparent risk to self or others.    HEMATOLOGICAL:  No cervical  lymphadenopathy.  no inguinal lymphadenopathy      05-06-21 @ 07:01  -  05-07-21 @ 07:00  --------------------------------------------------------  IN:    FentaNYL: 81.8 mL    Free Water: 600 mL    Glucerna: 1380 mL  Total IN: 2061.8 mL    OUT:    Indwelling Catheter - Urethral (mL): 2120 mL  Total OUT: 2120 mL    Total NET: -58.2 mL      05-07-21 @ 07:01  -  05-07-21 @ 09:42  --------------------------------------------------------  IN:    FentaNYL: 16.8 mL    Glucerna: 120 mL  Total IN: 136.8 mL    OUT:  Total OUT: 0 mL    Total NET: 136.8 mL          LABS:                            7.9    20.63 )-----------( 192      ( 07 May 2021 08:57 )             26.5                                               05-06    144  |  96<L>  |  49<H>  ----------------------------<  213<H>  4.0   |  39<H>  |  0.6<L>    Ca    8.3<L>      06 May 2021 08:05  Phos  4.2     05-06  Mg     2.0     05-06    TPro  5.8<L>  /  Alb  2.0<L>  /  TBili  0.3  /  DBili  x   /  AST  25  /  ALT  20  /  AlkPhos  335<H>  05-06                                                                                           LIVER FUNCTIONS - ( 06 May 2021 08:05 )  Alb: 2.0 g/dL / Pro: 5.8 g/dL / ALK PHOS: 335 U/L / ALT: 20 U/L / AST: 25 U/L / GGT: x                                                  Culture - Sputum (collected 06 May 2021 10:09)  Source: .Sputum Deep Tracheal Aspirate  Gram Stain (06 May 2021 23:27):    Few polymorphonuclear leukocytes per low power field    No Squamous epithelial cells per low power field    Numerous Gram Negative Coccobacilli per oil power field                                                   Mode: AC/ CMV (Assist Control/ Continuous Mandatory Ventilation)  RR (machine): 28  TV (machine): 300  FiO2: 80  PEEP: 5  ITime: 0.66  MAP: 45  PIP: 45                                      ABG - ( 07 May 2021 04:24 )  pH, Arterial: 7.44  pH, Blood: x     /  pCO2: 74    /  pO2: 68    / HCO3: 50    / Base Excess: 22.9  /  SaO2: 95                  MEDICATIONS  (STANDING):  ALBUTerol    90 MICROgram(s) HFA Inhaler 2 Puff(s) Inhalation every 6 hours  chlorhexidine 0.12% Liquid 15 milliLiter(s) Oral Mucosa every 12 hours  chlorhexidine 4% Liquid 1 Application(s) Topical <User Schedule>  clotrimazole 1% Cream 1 Application(s) Topical two times a day  collagenase Ointment 1 Application(s) Topical daily  fentaNYL   Infusion. 0.501 MICROgram(s)/kG/Hr (3.38 mL/Hr) IV Continuous <Continuous>  ferrous    sulfate Liquid 300 milliGRAM(s) Enteral Tube daily  furosemide   Injectable 40 milliGRAM(s) IV Push daily  insulin lispro (ADMELOG) corrective regimen sliding scale   SubCutaneous every 6 hours  ipratropium 17 MICROgram(s) HFA Inhaler 2 Puff(s) Inhalation every 6 hours  linezolid  IVPB 600 milliGRAM(s) IV Intermittent every 12 hours  meropenem  IVPB 1000 milliGRAM(s) IV Intermittent every 8 hours  multivitamin/minerals/iron Oral Solution (CENTRUM) 15 milliLiter(s) Enteral Tube daily  norepinephrine Infusion 0.088 MICROgram(s)/kG/Min (5.57 mL/Hr) IV Continuous <Continuous>  nystatin Cream 1 Application(s) Topical two times a day  pantoprazole  Injectable 40 milliGRAM(s) IV Push every 12 hours  petrolatum Ophthalmic Ointment 1 Application(s) Both EYES three times a day  phenylephrine    Infusion 1 MICROgram(s)/kG/Min (11.1 mL/Hr) IV Continuous <Continuous>  QUEtiapine 50 milliGRAM(s) Oral two times a day    MEDICATIONS  (PRN):  ondansetron Injectable 4 milliGRAM(s) IV Push every 4 hours PRN Nausea and/or Vomiting      New X-rays reviewed:                                                                                  ECHO  B/l inifltrates  CXR interpreted by me:

## 2021-05-07 NOTE — PROGRESS NOTE ADULT - TIME-BASED BILLING (NON-CRITICAL CARE)
Time-based billing (NON-critical care)

## 2021-05-07 NOTE — PROGRESS NOTE ADULT - ASSESSMENT
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      IMPRESSION  #Sepsis present on admission - secondary to CAP  -  CT Chest No Cont (03.11.21 @ 00:54): Areas of right lung consolidation which can be seen in pneumonia. Numerous air-fluid levels throughout the right lung compatible with an infectious process. Suggestion of split pleura at the lung base for which empyema is favored although inherently limited on this noncontrast exam. Consideration can be given to contrast administration if feasiblefor better delineation. Areas of bilateral interlobular septal thickening and mild layering groundglass attenuation may reflect a component of edema.  - Urine Legionella negative  - Urine Strep negative  - BLood Cx 3/10 and 3/13 negative  - Procalcitonin 1.15   - CT Chest No Cont (03.17.21 @ 16:19): Since 3/11/2021. Enlarging loculated right pleural fluid collection with multiple air bubbles consistent with empyema.   Associated compressive atelectasis right lung is seen. Scattered groundglass opacities involving multiple lumbar segments.  - s/p VATS 3/22 -- right empyema with 800 cc purulent fluid in pleural space, multiple pockers with dense adhesions -- necrotizing pneumonia of the middle lobe  - VATS Cx 3/22 with rare yeast, otherwise no growth  -  CT Chest No Cont (03.29.21 @ 12:37): Since March 29, 2021, resolved right pleural effusion; persistent moderate right pneumothorax with 2 chest tubes in place. Increased left greater than right diffuse bilateral groundglass opacities and interlobular septal thickening. Findings are suspicious for a multifocal infection. Superimposed edema is also a possibility.  Enlarging mediastinal lymph nodes, likely reactive.  - CT Chest/Abdomen and Pelvis No Cont (04.01.21 @ 20:19): Stable residual right-sided pneumothorax. Three left-sided chest tubes are in place. New pneumomediastinum is noted, especially in the anterior mediastinum.  Stable bilateral diffuse areas of groundglass opacity. Areas of traction bronchiectasis noted throughout the left lung field and at the right lung base. No evidence of bowel obstruction or intra-abdominal or pelvic inflammatory process  - Fungitell: <31 4/1  - Aspergillus Galactomannan Antigen: <0.500 (04.01.21 @ 01:03)  - Blood Cx 3/31 and 4/3 NG  - CT Chest/Abd/Pelvis No Cont (04.12.21 @ 17:17): Redemonstrated loculated right pneumothorax without significant change. Interval removal of 2/3 right chest tubes.  Diffuse groundglass and consolidative opacities with interlobular septal thickening and traction bronchiectasis in the bases. Findings likely reflecting a combination of pulmonary edema and post inflammatory/infectious changes with some degree of organization.  Interval development abdominopelvic ascites and anasarca consistent with fluid overload.  Nonspecific diffuse colonic wall thickening which could reflect colitis versus edema. No bowel obstruction. Interval resolution of previously seen pneumomediastinum.  - completed 6 week course from debridement (end date 5/3)    #GI Bleed from PEG 4/3 -- EGD held as improved/stable    #Dementia  #Osteoarthritis  #Obesity BMI (kg/m2): 23.4  #Abx allergy: clindamycin (Hives)    Creatinine, Serum: 0.6 mg/dL (05.03.21 @ 04:30  Weight (kg): 60 (11 Mar 2021 01:17)  CrCl 21     RECOMMENDATIONS  - follow-up deep tracheal cultures from 5/6  - continue linezolid/meropenem for now  - if deep tracheal cultures are NG, will plan to narrow   - very poor prognosis -- CXR have not improved despite prolonged meropenem     Please call or message on [employer] Teams if with any questions.  Spectra [phone]

## 2021-05-07 NOTE — PROGRESS NOTE ADULT - SUBJECTIVE AND OBJECTIVE BOX
pt remains intubated, s/p trach  +GT, abd soft, ND, site clean  + gaunt, + loss of LBM due to being bedbound and unresponsive  Vital Signs Last 24 Hrs  T(C): 37 (07 May 2021 12:00), Max: 37.4 (06 May 2021 20:00)  T(F): 98.6 (07 May 2021 12:00), Max: 99.3 (06 May 2021 20:00)  HR: 123 (07 May 2021 15:00) (91 - 132)  BP: 144/67 (07 May 2021 15:00) (97/52 - 144/67)  BP(mean): 92 (07 May 2021 15:00) (69 - 98)  RR: 38 (07 May 2021 15:00) (30 - 38)  SpO2: 92% (07 May 2021 15:00) (90% - 99%)    MEDICATIONS  (STANDING):  ALBUTerol    90 MICROgram(s) HFA Inhaler 2 Puff(s) Inhalation every 6 hours  chlorhexidine 0.12% Liquid 15 milliLiter(s) Oral Mucosa every 12 hours  chlorhexidine 4% Liquid 1 Application(s) Topical <User Schedule>  clotrimazole 1% Cream 1 Application(s) Topical two times a day  collagenase Ointment 1 Application(s) Topical daily  dexMEDEtomidine Infusion 0.2 MICROgram(s)/kG/Hr (3.38 mL/Hr) IV Continuous <Continuous>  fentaNYL   Infusion. 0.501 MICROgram(s)/kG/Hr (3.38 mL/Hr) IV Continuous <Continuous>  ferrous    sulfate Liquid 300 milliGRAM(s) Enteral Tube daily  furosemide   Injectable 40 milliGRAM(s) IV Push two times a day  insulin lispro (ADMELOG) corrective regimen sliding scale   SubCutaneous every 6 hours  ipratropium 17 MICROgram(s) HFA Inhaler 2 Puff(s) Inhalation every 6 hours  linezolid  IVPB 600 milliGRAM(s) IV Intermittent every 12 hours  meropenem  IVPB 1000 milliGRAM(s) IV Intermittent every 8 hours  multivitamin/minerals/iron Oral Solution (CENTRUM) 15 milliLiter(s) Enteral Tube daily  norepinephrine Infusion 0.088 MICROgram(s)/kG/Min (5.57 mL/Hr) IV Continuous <Continuous>  nystatin Cream 1 Application(s) Topical two times a day  pantoprazole  Injectable 40 milliGRAM(s) IV Push every 12 hours  petrolatum Ophthalmic Ointment 1 Application(s) Both EYES three times a day  phenylephrine    Infusion 1 MICROgram(s)/kG/Min (11.1 mL/Hr) IV Continuous <Continuous>  QUEtiapine 50 milliGRAM(s) Oral two times a day                        7.9    20.63 )-----------( 192      ( 07 May 2021 08:57 )             26.5   05-07    145  |  93<L>  |  49<H>  ----------------------------<  172<H>  4.0   |  43<HH>  |  0.6<L>    Ca    8.7      07 May 2021 08:57  Phos  4.2     05-07  Mg     2.1     05-07    TPro  6.5  /  Alb  2.1<L>  /  TBili  0.3  /  DBili  x   /  AST  32  /  ALT  21  /  AlkPhos  291<H>  05-07  ABG - ( 07 May 2021 04:24 )  pH, Arterial: 7.44  pH, Blood: x     /  pCO2: 74    /  pO2: 68    / HCO3: 50    / Base Excess: 22.9  /  SaO2: 95        POCT Blood Glucose.: 150 mg/dL (07 May 2021 11:12)  POCT Blood Glucose.: 174 mg/dL (07 May 2021 06:38)  POCT Blood Glucose.: 189 mg/dL (06 May 2021 23:40)  POCT Blood Glucose.: 186 mg/dL (06 May 2021 17:54)

## 2021-05-07 NOTE — PROGRESS NOTE ADULT - TIME BILLING
Discussed with primary team.
Discussed with primary team.
85-year-old female with diagnosis of necrotizing pneumonia and right empyema, POD 8 s/p thoracoscopic right lung decortication.  Hemodynamically stable but still requiring low dose vasopressor support, on ventilatory support FIO2: 0.6, PEEP 5, PaO2:85.  Adequate urinary output, no fever.  Chest tubes in place, scant output, intermittent air leak from one of the tubes.  CXR: adequate right lung expansion, basal pneumothorax, left lung with infiltrates in both lobes.  Decreasing leukocytosis.  Overall, slowly improving.   CT chest, fluid overload and left lung infiltrates suggesting infectious process.  Small right pneumothorax, no residual un-drained fluid collections.  I discussed clinical condition with patient's daughter and recommended to proceed with tracheostomy to facilitate weaning off ventilatory support.  I also recommended PEG for nutritional support.  I described the procedure, risks and possible complications, patient's daughter understood and agreed with the procedure.    Plan:   Continue chest tubes to suction   Continue IV antibiotics and follow up cultures and ID recommendations   Wean vasopressors and ventilatory support as tolerated   DVT prophylaxis   OR tomorrow PEG/Trach
I have personally seen and examined this patient.    I have reviewed all pertinent clinical information and reviewed all relevant imaging and diagnostic studies personally.   I counseled the patient about diagnostic testing and treatment plan. All questions were answered.   I discussed recommendations with the primary team.
85-year-old female with diagnosis of necrotizing pneumonia and right empyema, POD 10 s/p thoracoscopic right lung decortication.  POD 1 s/p tracheostomy/PEG.  Hemodynamically stable but still requiring low dose vasopressor support, on ventilatory support FIO2: 0.9, PEEP 5, PaO2:85.  Adequate urinary output, no fever.  Chest tubes in place, scant output, intermittent air leak from one of the tubes.  CXR: adequate right lung expansion, left field with diffuse opacity suggesting pleural effusion.  Decreasing leukocytosis.     Worsening ventilatory condition, criteria for ARDS    Plan:     Increase PEEP, paralysis, permissive hypercapnia    Continue chest tubes to suction     Continue IV antibiotics and follow up cultures and ID recommendations     Wean vasopressors and ventilatory support as tolerated     DVT prophylaxis
85-year-old female with diagnosis of necrotizing pneumonia and right empyema, POD 12s/p thoracoscopic right lung decortication.  Hemodynamically stable but still requiring low dose vasopressor support, on ventilatory support FIO2: 0.8, PEEP 8, PaO2:85.  Decreasing urinary output, no fever.  Chest tubes in place, scant output, intermittent air leak from one of the tubes.  CXR: adequate right lung expansion, left field with diffuse opacity suggesting pleural effusion.  Worsening leukocytosis.   Patient with clinical deterioration given by worsening leukocytosis, decreasing urine output and increased ventilatory support with worsening oxygenation.     Plan:   Muscle relaxant  Increase PEEP, ARDS ventilation protocol  Transfuse 2 u PRBC   NS 1000 cc IV bolus  Continue chest tubes to suction   Serial BUN/Cr  Continue IV antibiotics and follow up cultures and ID recommendations   Wean vasopressors and ventilatory support as tolerated   DVT prophylaxis
85-year-old female with diagnosis of necrotizing pneumonia and right empyema, POD 4 s/p thoracoscopic right lung decortication.  Hemodynamically stable but still requiring low dose vasopressor support, on ventilatory support FIO2: 0.6, PEEP 5, PaO2:88.  Adequate urinary output, no fever.  Chest tubes in place, scant output, intermittent air leak from one of the tubes.  CXR: adequate right lung expansion, left field with diffuse opacity suggesting pleural effusion.  Decreasing leukocytosis.     Overall, slowly improving.     Plan:     Continue chest tubes to suction     Continue IV antibiotics and follow up cultures and ID recommendations     Wean vasopressors and ventilatory support as tolerated     DVT prophylaxis
85-year-old female with diagnosis of necrotizing pneumonia and right empyema, POD 9 s/p thoracoscopic right lung decortication.  Hemodynamically stable but still requiring low dose vasopressor support, on ventilatory support FIO2: 0.6, PEEP 5, PaO2:85.  Adequate urinary output, no fever.  Chest tubes in place, scant output, intermittent air leak from one of the tubes.  CXR: adequate right lung expansion, left field with diffuse opacity suggesting pleural effusion.  Decreasing leukocytosis.     Overall, slowly improving.     Plan:     Tracheostomy/PEG today    Continue chest tubes to suction     Continue IV antibiotics and follow up cultures and ID recommendations     Wean vasopressors and ventilatory support as tolerated     DVT prophylaxis
85-year-old female with diagnosis of necrotizing pneumonia and right empyema, s/p thoracoscopic right lung decortication.  Hemodynamically stable off vasopressor support, on ventilatory support FIO2: 0.4, PEEP 5, PaO2:117.  Adequate urinary output, no fever.  Chest tube in place, scant output, intermittent air leak.  CXR: adequate right lung expansion, left lung improving consolidation.  Stable leukocytosis.  Overall, slowly improving. No evidence of UGI bleed.  Decreasing leukocytosis and improving creatinine.  Stable Hb, no signs of GI bleed.  Not responding to stimuli after 48 hours off sedation.  Head CT within normal limits.    Plan:   Continue chest tube to water seal  Continue IV antibiotics and follow up cultures and ID recommendations     DVT prophylaxis   Follow up palliative care/family meeting regarding goals of care
Discussed with primary team
Discussed with primary team
Discussed with primary team.
I have personally seen and examined this patient.    I have reviewed all pertinent clinical information and reviewed all relevant imaging and diagnostic studies personally.   I counseled the patient about diagnostic testing and treatment plan. All questions were answered.   I discussed recommendations with the primary team.
I have personally seen and examined this patient.    I have reviewed all pertinent clinical information and reviewed all relevant imaging and diagnostic studies personally.   I counseled the patient about diagnostic testing and treatment plan. All questions were answered.   I discussed recommendations with the primary team.
discussed with primary team
85 y.o F POD 2 s/p right VATS/decortication for empyema, requiring ventilatory support, FIO2: 60% PEEP: 5, failed spontaneous breathing trial.  Moderate amount of purulent respiratory secretions, chest tubes to suction, patent, scant serosanguinous output, small intermittent air leak in one of the tubes, CXR: with good lung re-expansion, no evidence of residual spaces or fluid collections.
85 year-old female with diagnosis of necrotizing pneumonia and right empyema, POD 3 s/p thoracoscopic right lung decortication.  Hemodynamically stable but still requiring low dose vasopressor support, on ventilatory support FIO2: 0.6, PEEP 5, PaO2:85.  Adequate urinary output, no fever.  Chest tubes in place, scant output, intermittent air leak from one of the tubes.  CXR: adequate right lung expansion, left field with diffuse opacity suggesting pleural effusion.  Decreasing leukocytosis.  Overall, slowly improving.  Plan:  Transfuse 1 u PRBC  Continue chest tubes to suction  Continue IV antibiotics and follow up cultures and ID recommendations  Wean vasopressors and ventilatory support as tolerated  DVT prophylaxis
85-year-old female with diagnosis of necrotizing pneumonia and right empyema, POD 18 s/p thoracoscopic right lung decortication.  Hemodynamically stable but still requiring low dose vasopressor support, on ventilatory support FIO2: 0.4, PEEP 5, PaO2:88.  Adequate urinary output, no fever.  Chest tubes in place, scant output, intermittent air leak from one of the tubes.  CXR: adequate right lung expansion, left lung improving consolidation.  Increasing leukocytosis.  Overall, slowly improving. No evidence of UGI bleed     Plan:    Continue chest tubes to suction, remove chest tube number 2   Continue IV antibiotics and follow up cultures and ID recommendations    Wean vasopressors and ventilatory support as tolerated    DVT prophylaxis  I have a long conversation with the patient's daughter and discussed clinical condition and prognosis, I disclosed reserved prognosis, recommended Palliative Care consultation and discussion of goals of care, patient understood and agreed with the consultation.
85-year-old female with diagnosis of necrotizing pneumonia and right empyema, POD 3 s/p thoracoscopic right lung decortication.  Hemodynamically stable but still requiring low dose vasopressor support, on ventilatory support FIO2: 0.5, PEEP 10, PaO2:85.  poor urinary output for 6 hours, no fever.  Chest tubes in place, scant output, intermittent air leak from one of the tubes.  CXR: adequate right lung expansion, left field with diffuse opacity suggesting pleural effusion.  Worsening clinical condition, increase in vasopressors, increasing leukocytosis, unclear source.   I disclosed clinical deterioration to patient's daughter and my concerns due to advanced age and septic shock.  Will discuss with ID and ICU teams regarding broadening coverage.    Plan:     Transfuse 2 u PRBC   Continue chest tubes to suction   Adjust IV antibiotics and follow up cultures and ID recommendations   Wean vasopressors and ventilatory support as tolerated   DVT prophylaxis   Ches/abd/pelvis CT
85-year-old female with diagnosis of necrotizing pneumonia and right empyema, s/p thoracoscopic right lung decortication.  Hemodynamically stable off vasopressor support, on ventilatory support FIO2: 0.4, PEEP 5, PaO2:117.  Adequate urinary output, no fever.  Chest tube in place, scant output, intermittent air leak.  CXR: adequate right lung expansion, left lung improving consolidation.  Stable leukocytosis.  Overall, slowly improving. No evidence of UGI bleed.  Decreasing leukocytosis.  Drop in Hb, positive blood in stool.  Not responding to stimuli after 48 hours off sedation.    Plan:   Head CT and neurology consultation regarding mental status  Serial Hb/Hct    Continue chest tube to water seal  Continue IV antibiotics and follow up cultures and ID recommendations     DVT prophylaxis   Follow up palliative care
Discussed with primary team.
Discussed with primary team.
I have personally seen and examined this patient.    I have reviewed all pertinent clinical information and reviewed all relevant imaging and diagnostic studies personally.   I counseled the patient about diagnostic testing and treatment plan. All questions were answered.   I discussed recommendations with the primary team.
85-year-old female with diagnosis of necrotizing pneumonia and right empyema,  s/p thoracoscopic right lung decortication.  Hemodynamically stable but still requiring low dose vasopressor support, on ventilatory support FIO2: 0.4, PEEP 5, PaO2:88.  Adequate urinary output, no fever.  Chest tube in place, scant output, intermittent air leak.  CXR: adequate right lung expansion, left lung improving consolidation.  Stable leukocytosis.  Overall, slowly improving. No evidence of UGI bleed     Plan:      Keep chest tube to water seal     Continue IV antibiotics and follow up cultures and ID recommendations      Wean ventilatory support as tolerated      DVT prophylaxis
85-year-old female with diagnosis of necrotizing pneumonia and right empyema,  s/p thoracoscopic right lung decortication.  Hemodynamically stable but still requiring low dose vasopressor support, on ventilatory support FIO2: 0.4, PEEP 5, PaO2:88.  Adequate urinary output, no fever.  Chest tube in place, scant output, no air leak.  CXR: adequate right lung expansion, left lung improving consolidation.  Stable leukocytosis.  Overall, slowly improving.    Plan:       Chest tube clamp trial  Continue IV antibiotics and follow up cultures and ID recommendations       Wean ventilatory support as tolerated       DVT prophylaxis
85-year-old female with diagnosis of necrotizing pneumonia and right empyema, POD 12s/p thoracoscopic right lung decortication.  Hemodynamically stable but still requiring low dose vasopressor support, on ventilatory support FIO2: 0.5, PEEP 10, PaO2:85.  Slowly improving urinary output but worsening Cr, no fever.  Chest tubes in place, scant output, intermittent air leak from one of the tubes.  CXR: adequate right lung expansion, left field with diffuse opacity suggesting pleural effusion.  Slightly improved leukocytosis.   Patient with some clinical improvement given by slightly decrease in leukocytosis, increasing urine output and able to decrease ventilatory support with improving oxygenation.  had episode of coffee ground drainage from PEG, increased PPI and plan for EGD by GI.    Plan:   Wean off paralysis as tolerated  Renal Consult  Continue chest tubes to suction   Serial Hb/Hct  Continue IV antibiotics and follow up cultures and ID recommendations   Wean vasopressors and ventilatory support as tolerated   DVT prophylaxis   Follow up GI regarding UGI bleed
85-year-old female with diagnosis of necrotizing pneumonia and right empyema, POD 18 s/p thoracoscopic right lung decortication.  Hemodynamically stable but still requiring low dose vasopressor support, on ventilatory support FIO2: 0.4, PEEP 5, PaO2:88.  Adequate urinary output, no fever.  Chest tubes in place, scant output, intermittent air leak from one of the tubes.  CXR: adequate right lung expansion, left lung improving consolidation.  Increasing leukocytosis.  Overall, slowly improving. No evidence of UGI bleed     Plan:    Continue chest tubes to suction, remove chest tube number 2   Continue IV antibiotics and follow up cultures and ID recommendations    Wean vasopressors and ventilatory support as tolerated    DVT prophylaxis
85-year-old female with diagnosis of necrotizing pneumonia and right empyema, POD 18 s/p thoracoscopic right lung decortication.  Hemodynamically stable but still requiring low dose vasopressor support, on ventilatory support FIO2: 0.4, PEEP 5, PaO2:88.  Adequate urinary output, no fever.  Chest tubes in place, scant output, intermittent air leak from one of the tubes.  CXR: adequate right lung expansion, left lung improving consolidation.  Stable leukocytosis.  Overall, slowly improving. No evidence of UGI bleed       Plan:       Clamp one chest tube, keep the tube with air leak to water seal      Continue IV antibiotics and follow up cultures and ID recommendations       Wean vasopressors and ventilatory support as tolerated       DVT prophylaxis     I have a long conversation with the patient's daughter and discussed clinical condition and prognosis, I disclosed reserved prognosis, recommended Palliative Care consultation and discussion of goals of care, patient’s daughter understood and agreed with the consultation.
85-year-old female with diagnosis of necrotizing pneumonia and right empyema, POD 18 s/p thoracoscopic right lung decortication.  Hemodynamically stable but still requiring low dose vasopressor support, on ventilatory support FIO2: 0.4, PEEP 5, PaO2:88.  Adequate urinary output, no fever.  Chest tubes in place, scant output, intermittent air leak from one of the tubes.  CXR: adequate right lung expansion, left lung improving consolidation.  Stable leukocytosis.  Overall, slowly improving. No evidence of UGI bleed       Plan:       Keep chest tube to water seal      Continue IV antibiotics and follow up cultures and ID recommendations       Wean vasopressors and ventilatory support as tolerated       DVT prophylaxis     I have a long conversation with the patient's daughter and discussed clinical condition and prognosis, I disclosed reserved prognosis, recommended Palliative Care consultation and discussion of goals of care, patient’s daughter understood and agreed with the consultation.   Patient recovering from septic shock, stabilizing creatinine, increasing urine output, improving oxygenation, decreasing leukocytosis.  I updated patient's daughter about her critical condition despite mild improvement.  Will continue support measures given response.  Awaiting for family meeting with palliative care.
85-year-old female with diagnosis of necrotizing pneumonia and right empyema, POD 18 s/p thoracoscopic right lung decortication.  Hemodynamically stable but still requiring low dose vasopressor support, on ventilatory support FIO2: 0.4, PEEP 5, PaO2:88.  Adequate urinary output, no fever.  Chest tubes in place, scant output, intermittent air leak from one of the tubes.  CXR: adequate right lung expansion, left lung improving consolidation.  Stable leukocytosis.  Overall, slowly improving. No evidence of UGI bleed       Plan:       Keep chest tube to water seal      Continue IV antibiotics and follow up cultures and ID recommendations       Wean vasopressors and ventilatory support as tolerated       DVT prophylaxis     I have a long conversation with the patient's daughter and discussed clinical condition and prognosis, I disclosed reserved prognosis, recommended Palliative Care consultation and discussion of goals of care, patient’s daughter understood and agreed with the consultation.   Patient recovering from septic shock, stabilizing creatinine, increasing urine output, improving oxygenation, decreasing leukocytosis.  I updated patient's daughter about her critical condition despite mild improvement.  Will continue support measures given response.  Awaiting for family meeting with palliative care.
85-year-old female with diagnosis of necrotizing pneumonia and right empyema, POD 3 s/p thoracoscopic right lung decortication.  Hemodynamically stable but still requiring low dose vasopressor support, on ventilatory support FIO2: 0.4, PEEP 5, PaO2:140.  Adequate urinary output, no fever.  Chest tubes in place, scant output, intermittent air leak from one of the tubes.  CXR: adequate right lung expansion, left lung improving consolidation.  Decreasing leukocytosis.  Overall, slowly improving. No evidence of UGI bleed    Plan:   D/C EGD  Discontinue muscle relaxant  Continue chest tubes to suction, remove chest tube number 2  Continue IV antibiotics and follow up cultures and ID recommendations   Wean vasopressors and ventilatory support as tolerated   DVT prophylaxis     Patient recovering from septic shock, stabilizing creatinine, increasing urine output, improving oxygenation, decreasing leukocytosis.  I updated patient's daughter about her critical condition despite mild improvement.  Will continue support measures given response.
85-year-old female with diagnosis of necrotizing pneumonia and right empyema, s/p thoracoscopic right lung decortication.  Hemodynamically stable off vasopressor support, on ventilatory support FIO2: 0.4, PEEP 5, PaO2:117.  Adequate urinary output, no fever.  Chest tube in place, scant output, intermittent air leak.  CXR: adequate right lung expansion, left lung improving consolidation.  Stable leukocytosis.  Overall, slowly improving. No evidence of UGI bleed.  Decreasing leukocytosis.    Plan:     Continue chest tube to water seal  Continue IV antibiotics and follow up cultures and ID recommendations     DVT prophylaxis     I have a long conversation with the patient's daughter and discussed clinical condition and prognosis, I disclosed reserved prognosis, recommended Palliative Care consultation and discussion of goals of care, patient’s daughter understood and agreed with the consultation.
Discussed with primary team.
Discussed with primary team.
I have personally seen and examined this patient.    I have reviewed all pertinent clinical information and reviewed all relevant imaging and diagnostic studies personally.   I counseled the patient about diagnostic testing and treatment plan. All questions were answered.   I discussed recommendations with the primary team.
I have personally seen and examined this patient.    I have reviewed all pertinent clinical information and reviewed all relevant imaging and diagnostic studies personally.   I counseled the patient about diagnostic testing and treatment plan. All questions were answered.   I discussed recommendations with the primary team.
85-year-old female with diagnosis of necrotizing pneumonia and right empyema, POD 3 s/p thoracoscopic right lung decortication.  Hemodynamically stable but still requiring low dose vasopressor support, on ventilatory support FIO2: 0.4, PEEP 5, PaO2:90.  Adequate urinary output, no fever.  Chest tubes in place, scant output, intermittent air leak from one of the tubes.  CXR: adequate right lung expansion, left lung improving consolidation.  Decreasing leukocytosis.  Overall, slowly improving. No evidence of UGI bleed     Plan:    Continue chest tubes to suction  Continue IV antibiotics and follow up cultures and ID recommendations    Wean vasopressors and ventilatory support as tolerated    DVT prophylaxis   Increase tube feeds to goal
care coordination
Discussed with primary team.

## 2021-05-07 NOTE — PROGRESS NOTE ADULT - SUBJECTIVE AND OBJECTIVE BOX
DESTIN TERRY  85y, Female  Allergy: clindamycin (Hives)      LOS  57d    CHIEF COMPLAINT: sob (30 Apr 2021 07:40)      INTERVAL EVENTS/HPI  - No acute events overnight  - T(F): , Max: 99.3 (05-06-21 @ 20:00)  - continued thick secretions; dignishield in place, no diarrhea   - WBC Count: 20.63 (05-07-21 @ 08:57)  WBC Count: 18.52 (05-06-21 @ 08:05)     - Creatinine, Serum: 0.6 (05-07-21 @ 08:57)  Creatinine, Serum: 0.6 (05-06-21 @ 08:05)       ROS  unable to obtain history secondary to patient's mental status    VITALS:  T(F): 98.6, Max: 99.3 (05-06-21 @ 20:00)  HR: 123  BP: 144/67  RR: 38Vital Signs Last 24 Hrs  T(C): 37 (07 May 2021 12:00), Max: 37.4 (06 May 2021 20:00)  T(F): 98.6 (07 May 2021 12:00), Max: 99.3 (06 May 2021 20:00)  HR: 123 (07 May 2021 15:00) (91 - 132)  BP: 144/67 (07 May 2021 15:00) (97/52 - 144/67)  BP(mean): 92 (07 May 2021 15:00) (69 - 98)  RR: 38 (07 May 2021 15:00) (30 - 38)  SpO2: 92% (07 May 2021 15:00) (90% - 99%)    PHYSICAL EXAM:  Gen: NAD, resting in bed  HEENT: Normocephalic, atraumatic  Neck: supple, no lymphadenopathy  CV: Regular rate & regular rhythm  Lungs: decreased BS at bases, no fremitus  Abdomen: Soft, BS present  Ext: Warm, well perfused  Neuro: non focal, awake  Skin: no rash, no erythema  Lines: no phlebitis    FH: Non-contributory  Social Hx: Non-contributory    TESTS & MEASUREMENTS:                        7.9    20.63 )-----------( 192      ( 07 May 2021 08:57 )             26.5     05-07    145  |  93<L>  |  49<H>  ----------------------------<  172<H>  4.0   |  43<HH>  |  0.6<L>    Ca    8.7      07 May 2021 08:57  Phos  4.2     05-07  Mg     2.1     05-07    TPro  6.5  /  Alb  2.1<L>  /  TBili  0.3  /  DBili  x   /  AST  32  /  ALT  21  /  AlkPhos  291<H>  05-07    eGFR if Non African American: 84 mL/min/1.73M2 (05-07-21 @ 08:57)  eGFR if : 98 mL/min/1.73M2 (05-07-21 @ 08:57)    LIVER FUNCTIONS - ( 07 May 2021 08:57 )  Alb: 2.1 g/dL / Pro: 6.5 g/dL / ALK PHOS: 291 U/L / ALT: 21 U/L / AST: 32 U/L / GGT: x               Culture - Sputum (collected 05-06-21 @ 10:09)  Source: .Sputum Deep Tracheal Aspirate  Gram Stain (05-06-21 @ 23:27):    Few polymorphonuclear leukocytes per low power field    No Squamous epithelial cells per low power field    Numerous Gram Negative Coccobacilli per oil power field    Culture - Blood (collected 04-27-21 @ 06:16)  Source: .Blood None  Final Report (05-02-21 @ 13:01):    No Growth Final    Culture - Blood (collected 04-18-21 @ 06:16)  Source: .Blood None  Final Report (04-23-21 @ 13:01):    No Growth Final            INFECTIOUS DISEASES TESTING  Procalcitonin, Serum: 0.34 (05-04-21 @ 04:30)  COVID-19 PCR: NotDetec (04-24-21 @ 18:15)  MRSA PCR Result.: Negative (04-21-21 @ 16:00)  Procalcitonin, Serum: 0.49 (04-21-21 @ 16:00)  MRSA PCR Result.: Negative (04-20-21 @ 16:39)  Procalcitonin, Serum: 0.58 (04-20-21 @ 16:21)  Procalcitonin, Serum: 0.60 (04-20-21 @ 11:00)  COVID-19 PCR: NotDetec (04-05-21 @ 10:12)  COVID-19 PCR: NotDetec (04-04-21 @ 14:03)  Fungitell: <31 (04-01-21 @ 01:03)  Aspergillus Galactomannan Antigen: <0.500 (04-01-21 @ 01:03)  COVID-19 PCR: NotDetec (03-29-21 @ 14:17)  Procalcitonin, Serum: 2.29 (03-27-21 @ 10:50)  MRSA PCR Result.: Negative (03-20-21 @ 21:38)  COVID-19 PCR: NotDetec (03-20-21 @ 12:30)  Fungitell: 46 (03-17-21 @ 16:00)  Procalcitonin, Serum: 0.29 (03-17-21 @ 11:30)  Procalcitonin, Serum: 0.31 (03-16-21 @ 10:15)  Legionella Antigen, Urine: Negative (03-12-21 @ 10:30)  Streptococcus Pneumoniae Ag Urine: Negative (03-12-21 @ 10:30)  MRSA PCR Result.: Negative (03-12-21 @ 10:30)  Legionella Antigen, Urine: Negative (03-11-21 @ 08:10)  Streptococcus Pneumoniae Ag Urine: Negative (03-11-21 @ 08:10)  Procalcitonin, Serum: 1.75 (03-11-21 @ 06:31)  Rapid RVP Result: NotDetec (03-10-21 @ 22:10)      INFLAMMATORY MARKERS      RADIOLOGY & ADDITIONAL TESTS:  I have personally reviewed the last available Chest xray  CXR      CT      CARDIOLOGY TESTING  12 Lead ECG:   Ventricular Rate 96 BPM    Atrial Rate 96 BPM    P-R Interval 146 ms    QRS Duration 80 ms    Q-T Interval 282 ms    QTC Calculation(Bazett) 356 ms    P Axis 24 degrees    R Axis 5 degrees    T Axis -76 degrees    Diagnosis Line Normal sinus rhythmwith sinus arrhythmia  Normal ECG    Confirmed by Chriss Fofana ([id number]) on 4/29/2021 1:18:24 PM (04-29-21 @ 10:55)      MEDICATIONS  ALBUTerol    90 MICROgram(s) HFA Inhaler 2 Inhalation every 6 hours  chlorhexidine 0.12% Liquid 15 Oral Mucosa every 12 hours  chlorhexidine 4% Liquid 1 Topical <User Schedule>  clotrimazole 1% Cream 1 Topical two times a day  collagenase Ointment 1 Topical daily  dexMEDEtomidine Infusion 0.2 IV Continuous <Continuous>  fentaNYL   Infusion. 0.501 IV Continuous <Continuous>  ferrous    sulfate Liquid 300 Enteral Tube daily  furosemide   Injectable 40 IV Push two times a day  insulin lispro (ADMELOG) corrective regimen sliding scale  SubCutaneous every 6 hours  ipratropium 17 MICROgram(s) HFA Inhaler 2 Inhalation every 6 hours  linezolid  IVPB 600 IV Intermittent every 12 hours  meropenem  IVPB 1000 IV Intermittent every 8 hours  multivitamin/minerals/iron Oral Solution (CENTRUM) 15 Enteral Tube daily  norepinephrine Infusion 0.088 IV Continuous <Continuous>  nystatin Cream 1 Topical two times a day  pantoprazole  Injectable 40 IV Push every 12 hours  petrolatum Ophthalmic Ointment 1 Both EYES three times a day  phenylephrine    Infusion 1 IV Continuous <Continuous>  QUEtiapine 50 Oral two times a day      WEIGHT  Weight (kg): 67.5 (04-07-21 @ 06:00)  Creatinine, Serum: 0.6 mg/dL (05-07-21 @ 08:57)      ANTIBIOTICS:  linezolid  IVPB 600 milliGRAM(s) IV Intermittent every 12 hours  meropenem  IVPB 1000 milliGRAM(s) IV Intermittent every 8 hours      All available historical records have been reviewed

## 2021-05-07 NOTE — PROGRESS NOTE ADULT - TIME-BASED
35
35
15
35
15
25
35
15
25
35
15
35
15
15
35

## 2021-05-07 NOTE — PROGRESS NOTE ADULT - ATTENDING COMMENTS
IMPRESSION:    Severe necrotizing CAP  Right sided parapneumonic effusion s/p VATS and chest tube sp trach/peg  MENDOZA, improving  Thrombocytopenia, negative HIT & DIC panels  HO bleeding from PEG site  dec hb(s/p 1 unit of prbcs on 5/1/2021)    Plan as outlined above

## 2021-05-07 NOTE — PROGRESS NOTE ADULT - ASSESSMENT
crIMPRESSION:    Severe necrotizing CAP  Right sided parapneumonic effusion s/p VATS and chest tube sp trach/peg  MENDOZA, improving  Thrombocytopenia, negative HIT & DIC panels  HO bleeding from PEG site  dec hb(s/p 1 unit of prbcs on 5/1/2021)      PLAN:    CNS:  Seroquel 50mg bid.  Wean fentanyl    HEENT:  Oral care.  Trach care     PULMONARY:  HOB @ 45 degrees.  Keep SAO2 92 to 96%.  Wean O2.        CARDIOVASCULAR: I<O.   increase lasix to 40q12.    GI: GI prophylaxis.  PEG feeds.      RENAL:  F/u  lytes.  Correct as needed.  Monitor UO.    INFECTIOUS DISEASE:  ABX per ID.  CW Meropenem / Zyvox.  ID FU. Check fungitell.    HEMATOLOGICAL:   DVt ppx    ENDOCRINE:  Follow up FS.  Insulin protocol if needed.    MUSCULOSKELETAL: bed rest    Advanced directives and goals of care  Very poor overall prognosis  Palliative care FU

## 2021-05-08 NOTE — PROGRESS NOTE ADULT - NSICDXPILOT_GEN_ALL_CORE
[place]

## 2021-05-08 NOTE — CHART NOTE - NSCHARTNOTEFT_GEN_A_CORE
CTU Transfer Note     16 Apr 2021 07:48  Admited 03-11-21, Hospital Day 36d  POD# - 25    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, osteoarithtis, Occitan-speaking presenting with cough and fever.    As per daughter, patient has been having dry consistent cough since 2 months. Today, she was being evaluated for knee injections when she was found to be febrile. She also reports progressing generalized weakness with decreased appetite and PO intake.  No URT symptoms, no chest pain, no leg pain, no diarrhea, no urinary symptoms no sick contacts, no recent travel. In ED was hypotensive, was given IVF, started on levophed 0.04 called to evaluate (11 Mar 2021 03:21)    Home Medications:  donepezil 5 mg oral tablet: 1 tab(s) orally once a day (at bedtime) (11 Mar 2021 03:24)  gabapentin 300 mg oral capsule: 1 cap(s) orally 3 times a day, As Needed (11 Mar 2021 03:24)  meloxicam 15 mg oral tablet: 1 tab(s) orally once a day, As Needed (11 Mar 2021 03:24)  zolpidem 10 mg oral tablet: 1 tab(s) orally once a day (at bedtime), As Needed (11 Mar 2021 03:24)    FAMILY HISTORY:    PAST MEDICAL & SURGICAL HISTORY:  Dementia    Osteoarthritis    No significant past surgical history      Interval event for past 24 hr:  DESTIN TERRY  85y had no event.   Current Complains:  DESTIN TERRY has no new complains  Allergies    clindamycin (Hives)    Intolerances      OBJECTIVE:  Vitals last 24 hrs  ICU Vital Signs Last 24 Hrs  T(C): 36 (16 Apr 2021 04:00), Max: 36.8 (15 Apr 2021 20:00)  T(F): 96.8 (16 Apr 2021 04:00), Max: 98.2 (15 Apr 2021 20:00)  HR: 83 (16 Apr 2021 07:00) (80 - 112)  BP: 106/58 (16 Apr 2021 07:00) (106/58 - 106/58)  BP(mean): 78 (16 Apr 2021 07:00) (78 - 78)  ABP: 114/50 (16 Apr 2021 07:00) (101/48 - 135/68)  ABP(mean): 70 (16 Apr 2021 07:00) (69 - 96)  RR: 42 (16 Apr 2021 07:00) (0 - 42)  SpO2: 100% (16 Apr 2021 07:00) (93% - 100%)    04-15-21 @ 07:01  -  04-16-21 @ 07:00  --------------------------------------------------------  IN:    dextrose 5%: 1200 mL    Enteral Tube Flush: 150 mL    IV PiggyBack: 100 mL    Peptamen A.F.: 1560 mL    Phenylephrine: 104 mL  Total IN: 3114 mL    OUT:    Chest Tube (mL): 75 mL    Indwelling Catheter - Urethral (mL): 1495 mL    Rectal Tube (mL): 1650 mL  Total OUT: 3220 mL    Total NET: -106 mL        Mode: AC/ CMV (Assist Control/ Continuous Mandatory Ventilation)  RR (machine): 36  TV (machine): 400  FiO2: 40  PEEP: 5  ITime: 1  MAP: 17  PIP: 37     Mode: AC/ CMV (Assist Control/ Continuous Mandatory Ventilation), RR (machine): 36, TV (machine): 400, FiO2: 40, PEEP: 5, ITime: 1, MAP: 17, PIP: 37  CAPILLARY BLOOD GLUCOSE  168 (16 Apr 2021 06:00)  181 (16 Apr 2021 02:00)  161 (15 Apr 2021 22:00)      POCT Blood Glucose.: 168 mg/dL (16 Apr 2021 06:07)  POCT Blood Glucose.: 181 mg/dL (16 Apr 2021 02:14)  POCT Blood Glucose.: 161 mg/dL (15 Apr 2021 21:42)  POCT Blood Glucose.: 192 mg/dL (15 Apr 2021 17:31)  POCT Blood Glucose.: 185 mg/dL (15 Apr 2021 15:09)  POCT Blood Glucose.: 134 mg/dL (15 Apr 2021 10:06)    LABS:  ABG - ( 16 Apr 2021 03:35 )  pH, Arterial: 7.33  pH, Blood: x     /  pCO2: 49    /  pO2: 97    / HCO3: 26    / Base Excess: -0.4  /  SaO2: 97              Blood Gas Arterial, Lactate: 1.2 mmoL/L (04-16-21 @ 03:35)                          7.4    15.54 )-----------( 47       ( 16 Apr 2021 04:17 )             24.1     Hemoglobin: 7.4 g/dL (04-16 @ 04:17)  Hemoglobin: 8.3 g/dL (04-15 @ 02:00)  Hemoglobin: 8.5 g/dL (04-14 @ 01:25)    04-16    143  |  108  |  109<HH>  ----------------------------<  190<H>  4.4   |  23  |  2.1<H>    Ca    8.3<L>      16 Apr 2021 04:17  Mg     2.5     04-16    TPro  5.1<L>  /  Alb  3.2<L>  /  TBili  1.4<H>  /  DBili  x   /  AST  42<H>  /  ALT  11  /  AlkPhos  269<H>  04-15    Creatinine, Serum: 2.1 mg/dL (04-16 @ 04:17)  Creatinine, Serum: 2.0 mg/dL (04-15 @ 04:25)  Creatinine, Serum: 0.7 mg/dL (04-15 @ 02:00)  Creatinine, Serum: 1.6 mg/dL (04-14 @ 01:25)  Creatinine, Serum: 2.0 mg/dL (04-13 @ 02:00)          HOSPITAL MEDICATIONS:  MEDICATIONS  (STANDING):  ALBUTerol    90 MICROgram(s) HFA Inhaler 2 Puff(s) Inhalation every 6 hours  chlorhexidine 4% Liquid 1 Application(s) Topical <User Schedule>  clotrimazole 1% Cream 1 Application(s) Topical two times a day  collagenase Ointment 1 Application(s) Topical daily  dexMEDEtomidine Infusion 0.202 MICROgram(s)/kG/Hr (3 mL/Hr) IV Continuous <Continuous>  dextrose 40% Gel 15 Gram(s) Oral once  dextrose 5%. 1000 milliLiter(s) (50 mL/Hr) IV Continuous <Continuous>  dextrose 5%. 1000 milliLiter(s) (50 mL/Hr) IV Continuous <Continuous>  dextrose 5%. 1000 milliLiter(s) (100 mL/Hr) IV Continuous <Continuous>  dextrose 5%. 1000 milliLiter(s) (50 mL/Hr) IV Continuous <Continuous>  dextrose 5%. 1000 milliLiter(s) (100 mL/Hr) IV Continuous <Continuous>  dextrose 50% Injectable 25 Gram(s) IV Push once  dextrose 50% Injectable 12.5 Gram(s) IV Push once  dextrose 50% Injectable 25 Gram(s) IV Push once  ferrous    sulfate Liquid 300 milliGRAM(s) Enteral Tube daily  glucagon  Injectable 1 milliGRAM(s) IntraMuscular once  glucagon  Injectable 1 milliGRAM(s) IntraMuscular once  insulin lispro (ADMELOG) corrective regimen sliding scale   SubCutaneous every 4 hours  ipratropium 17 MICROgram(s) HFA Inhaler 2 Puff(s) Inhalation every 6 hours  meropenem  IVPB 1000 milliGRAM(s) IV Intermittent every 24 hours  multivitamin/minerals/iron Oral Solution (CENTRUM) 15 milliLiter(s) Enteral Tube daily  nystatin Cream 1 Application(s) Topical two times a day  pantoprazole  Injectable 40 milliGRAM(s) IV Push two times a day  petrolatum Ophthalmic Ointment 1 Application(s) Both EYES three times a day  phenylephrine    Infusion 1 MICROgram(s)/kG/Min (11.1 mL/Hr) IV Continuous <Continuous>  sodium bicarbonate  Injectable 50 milliEquivalent(s) IV Push <User Schedule>  vasopressin Infusion 0.04 Unit(s)/Min (2.4 mL/Hr) IV Continuous <Continuous>    MEDICATIONS  (PRN):  ondansetron Injectable 4 milliGRAM(s) IV Push every 4 hours PRN Nausea and/or Vomiting      REVIEW OF SYSTEMS:    [x  ] Unable to assess ROS because unresponsive    PHYSICAL EXAM:          CONSTITUTIONAL: Well-developed; ; hyperventilating   	SKIN: warm, dry, no rashes or lesions - Sacral and Feet DTI   	HEENT: Atraumatic. Normocephalic. PERRL. Moist membranes, no conjunctival injection, sclera clear  	NECK: Supple; non tender.  No JVD. No lymphadenopathy.  	CARD: Normal S1, S2. Rate and Rhythm are regular. No murmurs.  	RESP: Good air entry bilaterally, no wheezes, no rales + rhonchi.  	ABD: Soft, not tender, not distended, no CVA tenderness, no rebound no guarding, bowel sounds decreased   	EXT: Normal ROM.  No clubbing, no cyanosis, +++ pedal edema, anasarca, no calf pain b/l, Peripheral pulses intact.  	LYMPH: No acute cervical adenopathy.  	NEURO: minimal response to verbal stimuli, Flaccid     RADIOLOGY:  X Reviewed and interpreted by me  CxR from 04-16-21 shows moderate congestion with bilateral infiltrates , no pneumothorax, no effusion, no cardiomegaly,   Chest Tubes in place,     ECG:  X Reviewed and interpreted by me    Assessment and Plan:   · Assessment	  PROBLEMS:  Septic Shock - better - continue IV antibiotics, now jusr on few cc of neosynephrine   anemia stable consider transfusion of 1 PRBC  decreased bowel sounds - CT of abdomen - OK, add reglan  Thrombocytopenia - mild improvement   Pleural air leak - keep CT,   unresponsiveness - not focal - metabolic encephalopathy ( high BUN ) with ICU polyneuropathy   MENDOZA  - now Stable - monitor  FLuid overload - monitor  Protein malnutrition - continue Peptamen Feeding -goal of 65 cc/hr  Sacral decubiti - local care         Case and plan discussed with CT Surgeons and CTU PAs.  Continue CTU supportive care and ongoing plan of care as per continuing CTU rounds.   Continue DVT/GI prophylaxis.  Incentive Spirometry 10 times an hour.  Continue to advance physical activity as tolerated and continue PT/OT as directed.    PLAN  Neuro: move all 4 extremities. no sensory or motor deficits  Pain management.   dexMEDEtomidine Infusion 0.202 MICROgram(s)/kG/Hr IV Continuous <Continuous>  ondansetron Injectable 4 milliGRAM(s) IV Push every 4 hours PRN    Pulm: Wean off supplemental oxygen as able. Daily CXR. Encourage coughing, deep breathing and use of incentive spirometry.   ALBUTerol    90 MICROgram(s) HFA Inhaler 2 Puff(s) Inhalation every 6 hours  ipratropium 17 MICROgram(s) HFA Inhaler 2 Puff(s) Inhalation every 6 hours    Cardio: Monitor telemetry/alarms. Continue supportive care   phenylephrine    Infusion 1 MICROgram(s)/kG/Min IV Continuous <Continuous>    GI: Continue stool softeners.    pantoprazole  Injectable 40 milliGRAM(s) IV Push two times a day    Nutrition: Continue present diet  Endocrine and glucose control:   dextrose 40% Gel 15 Gram(s) Oral once  dextrose 50% Injectable 25 Gram(s) IV Push once  dextrose 50% Injectable 12.5 Gram(s) IV Push once  dextrose 50% Injectable 25 Gram(s) IV Push once  glucagon  Injectable 1 milliGRAM(s) IntraMuscular once  glucagon  Injectable 1 milliGRAM(s) IntraMuscular once  insulin lispro (ADMELOG) corrective regimen sliding scale   SubCutaneous every 4 hours  vasopressin Infusion 0.04 Unit(s)/Min IV Continuous <Continuous>    Renal: monitor urine output, supplement electrolytes as needed,     Vasc: Heparin SC and/or SCDs for DVT prophylaxis    ID: Stable, no fever , no chills.  meropenem  IVPB 1000 milliGRAM(s) IV Intermittent every 24 hours    Tubes: Monitor Chest tube output  Therapy: OOB/ambulate  Disposition: start planing discharge home or placement    Pertinent clinical, laboratory, radiographic, hemodynamic, echocardiographic, respiratory data, microbiologic data and chart were reviewed and analyzed frequently throughout the course of the day and night. GI and DVT prophylaxis, glycemic control, head of bed elevation and skin care issues were addressed.  Patient seen, examined and plan discussed with CT Surgery / CTICU team during rounds.    [ ] The patient remains in critical and unstable condition, and requires ICU care and monitoring  [x ] The patient is improving but requires continued monitoring and adjustment of therapy        Patient accepted by Dr. Aguirre and verbal sign out given to resident Dr. Williamson @x [phone].
Family meeting tomorrow at 11 am with patient's daughter and son. CTU team informed
HOSPITAL COURSE:     85 year old woman known to have dementia, osteoarthritis, English-speaking presented with cough and fever.    As per daughter, patient had been having dry consistent cough for 2 months. On day of admission, she was being evaluated for knee injections when she was found to be febrile. She also reported progressive generalized weakness with decreased appetite and PO intake.  She reported no URT symptoms, no chest pain, no leg pain, no diarrhea, no urinary symptoms no sick contacts, no recent travel. In ED was hypotensive, was given IVF, started on levophed 0.04 and evaluated by ICU. Found to have patchy bilateral opacities, right greater than left suggestive of community acquired PNA. CT chest consistent with these findings. She was treated for severe sepsis and MENDOZA. MENDOZA has resolved with intravenous fluid administration. WBC count is downtrending. No longer requiring pressor support or supplemental oxygen. Remains on levofloxacin and cefepime as per critical care team. Blood cultures, legionella, and MRSA swab have been negative. Since MRSA PCR is negative, will discontinue vancomycin. Strep pneumo studies are pending. Echo showed EF 50-55% with grade I diastolic dysfunction. Some mild tricuspid and pulmonic valve regurgitation noted alongside borderline pulmonary HTN.     Patient seen and examined this morning - she is resting in bed and is fully alert. Appears in no distress. Converses in English, though she appears a bit confused at times. Not actively eating her meal at time of examination.     Overall patient appears stable and can be downgraded to medical floor. Plan discussed with Dr. Valdez who is in agreement.     PAST MEDICAL & SURGICAL HISTORY  PAST MEDICAL & SURGICAL HISTORY:  Osteoarthritis    Dementia    No significant past surgical history      SOCIAL HISTORY:    ALLERGIES:  clindamycin (Hives)    MEDICATIONS:  STANDING MEDICATIONS  cefepime   IVPB      cefepime   IVPB 2000 milliGRAM(s) IV Intermittent every 12 hours  heparin   Injectable 5000 Unit(s) SubCutaneous every 8 hours  levoFLOXacin IVPB 750 milliGRAM(s) IV Intermittent every 48 hours  polyethylene glycol 3350 17 Gram(s) Oral daily  senna 2 Tablet(s) Oral at bedtime  vancomycin  IVPB      vancomycin  IVPB 1250 milliGRAM(s) IV Intermittent every 12 hours    PRN MEDICATIONS  acetaminophen   Tablet .. 650 milliGRAM(s) Oral every 6 hours PRN    VITALS:   T(F): 98.6  HR: 89  BP: 118/59  RR: 21  SpO2: 93%    LABS:                        9.0    23.60 )-----------( 567      ( 13 Mar 2021 07:33 )             28.6     03-13    135  |  101  |  17  ----------------------------<  166<H>  4.4   |  25  |  0.8    Ca    8.1<L>      13 Mar 2021 07:33  Mg     1.9     03-13    TPro  5.8<L>  /  Alb  2.2<L>  /  TBili  0.3  /  DBili  x   /  AST  52<H>  /  ALT  30  /  AlkPhos  79  03-13        Culture - Blood (collected 10 Mar 2021 21:00)  Source: .Blood Blood-Peripheral  Preliminary Report (12 Mar 2021 03:01):    No growth to date.    Culture - Blood (collected 10 Mar 2021 21:00)  Source: .Blood Blood-Peripheral  Preliminary Report (12 Mar 2021 03:01):    No growth to date.      PHYSICAL EXAM:  GENERAL: NAD, gives one word answers to questions, no signs of respiratory distress  HEAD: Atraumatic  NECK: Supple  CHEST/LUNG: Clear to auscultation bilaterally; No wheeze or crackles  HEART: S1, S2; RRR; No murmurs, rubs, or gallops  ABDOMEN: BS+; Soft, Non-tender, Non-distended  EXTREMITIES:  2+ Peripheral Pulses, No clubbing, cyanosis, or edema  PSYCH: AAOx1  NEUROLOGY: not fully participatory   SKIN: No rashes or lesions      Assessment/Plan:     85 year old woman known to have dementia, osteoarthritis, English-speaking presented with cough and fever. Found to be in sepsis with evidence of CAP.     #) Sepsis secondary to community acquired pneumonia (CAP)   - Procal elevated (1.75)   - No longer requiring pressor support   - Stable on room air   - WBC count downtrending  - Continue cefepime and levofloxacin   - Discontinued vancomycin as MRSA negative     #) MENDOZA   - Resolved with IV fluids   - No longer receiving IV fluids   - Monitor with daily labs    #) Microcytic anemia   - Iron studies noted  - Will provide iron supplementation     #) Known dementia  - Patient appears a bit confused   - Not noted to be different from baseline    #) Osteoarthritis  - Doest not appear to be in any pain  - Monitor for now    #) Diet - Dysphagia III soft  #) DVT Prophylaxis - Heparin 5,000 Q8  #) Disposition- Floor  #) Activity- increase as tolerated  #) Code status - Full
Registered Dietitian Follow-Up     Patient Profile Reviewed                           Yes [x]   No []     Nutrition History Previously Obtained        Yes [x]  No []       Pertinent Subjective Information: Pt [place] speaking, confused and disoriented. Observed ensure enlive at bedside, only drank ~25%. Continue to consume <50% of her meals, requires assistance.      Pertinent Medical Interventions:  Sepsis secondary to Necrotizing PNA vs Empyema  OR on Monday for VATS   Microcytic anemia   Dementia      Diet order: dysphagia 3 soft, thins   ensure enlive TID     Anthropometrics:  - Ht. 63"  - Wt.  60kg/132lbs (3/11); dosing -- no new wts since adm   - %wt change  - BMI 23.4 -- based on dosing   - IBW 115lbs     Pertinent Lab Data: (3/20) H/H 9.8/31.9,      Pertinent Meds: lovenox, abx, ferrous sulfate, miralax, senna      Physical Findings:  - Appearance: confused/disoriented. No edema noted per RN flow sheets  - GI function: LBM 3/17, noted with fecal incontiece. No c/o N+V, looks comfortable during f/u assessment   - Tubes: N/A  - Oral/Mouth cavity: s/s bedside eval (3/11) recommends dysphagia 3 (soft, cut up) w/ thin liquids. Tolerating current diet consistent with no issues per unit staff.   - Skin: WDL      Nutrition Requirements  Weight Used: 60kg CBW -- continued from initial RD assessment (3/17)      kcal: 1236-1340kcal (MSJ x 1.2-1.3AF)   protein: 60-72g (1-1.2g/kg CBW)   fluids: 1ml/kcal or per LIP     Nutrient Intake: <50% + ~25% ensure enlive         [x] Previous Nutrition Diagnosis: inadequate protein energy intake            [x] Ongoing          [] Resolved       Nutrition Intervention: meals and snacks, medical food supplements, vitamin/mineral supplementation      Goal/Expected Outcome: Pt to meet greater than 75% of estimated needs within 3 days      Indicator/Monitoring: energy intake, body composition, NFPF    Recommendations:   Activate diet order from 3/17   Order Multivitamin with minerals q24hrs if not contraindicated   Provide max encouragement during meal times
Registered Dietitian Follow-Up     Patient Profile Reviewed                           Yes [x]   No []     Nutrition History Previously Obtained        Yes [x]  No []       Pertinent Subjective Information: Pt. now intubated, on pressor. NPO since 3/21. Previously on dysphagia 3 soft thin liquids with ~50% PO intake at meals.       Pertinent Medical Interventions: # Sepsis secondary to Necrotizing PNA vs Empyema.  S/p  Rt VATS. Intubated, chest tubes.  Microcytic anemia - KATIE, Hb stable. Mild dementia.     Diet order: NPO     Anthropometrics:  - Ht. 160cm  - Wt. 56.6kg on 3/23 vs. 60kg on 3/11 will continue to monitor wt trends   - %wt change  - BMI 23.4  - IBW 115lbs      Pertinent Lab Data: (3/23) WBC 30.22, RBC 3.57, Hg 9.0, Hct 27.4, BUN 22, glu 146     Pertinent Meds: Miralax, Senna, Levophed, Precedex, Pepcid, Ferrous sulfate, MVI, Zofran      Physical Findings:  - Appearance: intubated, no edema noted  - GI function: abd noted soft/nontender per flow sheets, last BM 3/21  - Tubes: OGT  - Oral/Mouth cavity: NPO  - Skin: incision      Nutrition Requirements  Weight Used: 60kg CBW -- continued from initial RD assessment (3/17)      Estimated Energy Needs    Continue []  Adjust [x] ~1127kcal (PSE 2003b)   Adjusted Energy Recommendations:   kcal/day        Estimated Protein Needs    Continue []  Adjust [x] 72-90g (1.2-1.5g/kg CBW)   Adjusted Protein Recommendations:   gm/day        Estimated Fluid Needs        Continue []  Adjust [x] per CTU team   Adjusted Fluid Recommendations:   mL/day     Nutrient Intake: NPO        [] Previous Nutrition Diagnosis:  inadequate protein energy intake- ongoing        Nutrition Intervention: enteral and parenteral nutrition    Rec: When medically feasible to initiate EN provide Vital AF @40ml/h for 1152kcal, 72g protein, 777ml free H2O. Initiate @20ml/h and increase by 10ml q6h or as tolerated. Additional free H2O per LIP. Feed only if MAP consistently >65. Maintain all aspiration precautions.      Goal/Expected Outcome: In 3 days TF to provide >85% est energy needs, but not exceed 105%     Indicator/Monitoring:  energy intake, body composition, NFPF, electrolyte profile, glucose profile
Registered Dietitian Follow-Up     Patient Profile Reviewed                           Yes [x]   No []     Nutrition History Previously Obtained        Yes [x]  No []       Pertinent Subjective Information: Pt. remains intubated, on pressors. TF increased to 60mlh over the weekend, but stopped at midnight for possible trach, PEG today. Ve: 10.1, Tmax 36.7, MAP 90.     Pertinent Medical Interventions: s/p R VATS evacuation of empyema, x3 chest tubes in place post operatively remains intubated due to tachypnea and desaturation while SBT. - Trach and PEG today if fails SBT. IV abx.      Diet order: NPO, Vital HP @60ml/h also active      Anthropometrics:  - Ht.160cm  - Wt. 59.5kg on 3/30 vs. 64.3kg on 3/25 vs. 56.6kg on 3/23 vs. 60kg on 3/11 pt. with edema  - %wt change  - BMI 23.2  - IBW 115lbs       Pertinent Lab Data: (3/30) WBC 19.99, Hg 10.8, Hct 34.0, BUN 27, creat 0.6, glu 176     Pertinent Meds: Lispro, Lasix, Fentanyl, Ferrous sulfate, MVI, Senna, Phenylephrine, Vasopressin, Pepcid, Levophed, Zofran, Miralax, Propofol @3.8ml/h (~100kcal)      Physical Findings:  - Appearance: intubated, no edema noted  - GI function: soft/nontender per flow sheets, last BM 3/30  - Tubes: OGT  - Oral/Mouth cavity: NPO  - Skin: incision      Nutrition Requirements (from RD note on 3/26)   Weight Used: 60kg      Estimated Energy Needs    Continue [x]  Adjust [] ~1208kcal (PSE 2003b)   Adjusted Energy Recommendations:   kcal/day        Estimated Protein Needs    Continue [x]  Adjust []  72-90g (1.2-1.5g/kg CBW)   Adjusted Protein Recommendations:   gm/day        Estimated Fluid Needs        Continue []  Adjust [] per CTU team   Adjusted Fluid Recommendations:   mL/day     Nutrient Intake: ~100kcal from Propofol. NPO. Active TF order to provide 1440kcal, 124g protein, 1166ml free H2O. (119% est calorie needs, 138% est protein needs)         [] Previous Nutrition Diagnosis: inadequate protein energy intake- previously resolved, now resumed                Nutrition Intervention:  enteral and parenteral nutrition     Rec: If pt. remains intubated and EN resumed provide Vital HP @40m/h for 960kcal, 83g protein, 777ml free H2O. TF + Propofol will provide 1060kcal. Feed only if MAP consistently >65. Additional free h2O per LIP. Maintain all aspiration precautions. Will monitor pt. closely and provide alternative recs if pt. trached/PEG.      Goal/Expected Outcome: In  days TF to provide >85% est energy needs, but not exceed 105%     Indicator/Monitoring:  energy intake, body composition, NFPF, electrolyte profile, glucose profile.
Registered dietitian limited note    Pt. s/p PEG placement on 3/30. When medically feasible to initiate EN provide Glucerna 1.2 @300ml q6h for 1440kcal, 71g protein, 972ml free H2O. Additional free H2O per LIP. Maintain all aspiration precautions.
Spoke with daughter , updated her about overall medical condition and prognosis. Went over blood work and Imaging as well hemodynamics. Pt has overall poor prognosis , medical care at this point is futile but will continue.
Surgery post-op Note 03-22-21 @ 20:43    Procedure:  s/p right VATS    S: 84 y/o Female s/p right VATS . remains intubated post op     O:   T(C): 36.3 (03-22-21 @ 18:00), Max: 37 (03-22-21 @ 00:50)  HR: 51 (03-22-21 @ 20:21) (51 - 86)  BP: 107/59 (03-22-21 @ 20:00) (67/51 - 128/62)  RR: 0 (03-22-21 @ 20:00) (0 - 20)  SpO2: 98% (03-22-21 @ 20:21) (97% - 100%)    Physical Exam:  General: NAD, intubated, sedated  Heart: S1 and S2 noted  Lungs: Clear to auscultation bilaterally. 3 CT on the right on suction, putting out serosangenous fluid. No bleeding or discharge noted from the incision site,   Abdomen: Soft, Non distended.   Extremities: Normal in color and temperature.     03-21-21 @ 07:01  -  03-22-21 @ 07:00  --------------------------------------------------------  IN: 600 mL / OUT: 0 mL / NET: 600 mL    03-22-21 @ 07:01  -  03-22-21 @ 20:43  --------------------------------------------------------  IN: 1660.7 mL / OUT: 484 mL / NET: 1176.7 mL    ALBUTerol    90 MICROgram(s) HFA Inhaler 2 Puff(s) Inhalation every 6 hours  ceFAZolin   IVPB 1000 milliGRAM(s) IV Intermittent every 8 hours  cefepime   IVPB 2000 milliGRAM(s) IV Intermittent every 12 hours  chlorhexidine 0.12% Liquid 5 milliLiter(s) Oral Mucosa two times a day  chlorhexidine 4% Liquid 1 Application(s) Topical <User Schedule>  dexMEDEtomidine Infusion 0.2 MICROgram(s)/kG/Hr IV Continuous <Continuous>  famotidine Injectable 20 milliGRAM(s) IV Push every 12 hours  ferrous    sulfate 325 milliGRAM(s) Oral daily  guaifenesin/dextromethorphan  Syrup 10 milliLiter(s) Oral every 4 hours PRN  ipratropium 17 MICROgram(s) HFA Inhaler 2 Puff(s) Inhalation every 6 hours  lactated ringers Bolus 1000 milliLiter(s) IV Bolus once  levoFLOXacin IVPB 750 milliGRAM(s) IV Intermittent every 48 hours  meperidine     Injectable 25 milliGRAM(s) IV Push once  metroNIDAZOLE  IVPB 500 milliGRAM(s) IV Intermittent every 8 hours  multivitamin 1 Tablet(s) Oral daily  norepinephrine Infusion 0.05 MICROgram(s)/kG/Min IV Continuous <Continuous>  ondansetron Injectable 4 milliGRAM(s) IV Push every 4 hours PRN  polyethylene glycol 3350 17 Gram(s) Oral daily  propofol Infusion 10 MICROgram(s)/kG/Min IV Continuous <Continuous>  senna 2 Tablet(s) Oral at bedtime  sodium chloride 0.9%. 1000 milliLiter(s) IV Continuous <Continuous>  vasopressin Infusion 0.04 Unit(s)/Min IV Continuous <Continuous>      Assessment:  84 y/o Female s/p right VATS . remains intubated post op on pressors    Plan:  CTU managment  Chest tubes to suction  Daily AM cxr  follow up Chest tube output  monitor for airleak  monitor O2 sat  IV antibiotics
CT surgery ([id number]) made aware of possible broken chest tube waterseal.
Patient and daughter visited by Dr. Jody Downs and . CT of chest findings discussed with daughter. Current plan is to pre-op for tracheostomy and peg in AM. Patient to be seen by Dr. Aguirre this evening. If he strongly feels after examination and review of CT she is weanable, case may be postponed.
Registered Dietitian Follow-Up     Patient Profile Reviewed                           Yes [x]   No []     Nutrition History Previously Obtained        Yes [x]  No []       Pertinent Subjective Information: Pt. remains intubated/sedated, on pressors. TF started, running at goal rate, + BM today. Ve: 10.6, Tmax 37.6, MAP 84.      Pertinent Medical Interventions: status-post R VATS for parapneumonic empyema: intubated/sedated. -Chest tubes to suction. Decreasing leukocytosis: ID following, abx. Wean vasopressors and ventilatory support as tolerated.      Diet order: Vital HP @40ml/h      Anthropometrics:  - Ht. 160cm  - Wt. 64.3kg on 3/25 vs. 56.6kg on 3/23 vs. 60kg on 3/11 pt. with edema, will continue to monitor wt trends   - %wt change  - BMI 23.4  - IBW 115lbs      Pertinent Lab Data: (3/26) WBC 19.88, RBC 3.24, Hg 8.1, hct 25.4, creat 0.5, glu 159     Pertinent Meds: Vasopressin, Levophed, Fentanyl, Lovenox, Vancomycin, Propofol @3.6ml/h (~95kcal)      Physical Findings:  - Appearance: intubated, sedated, 1+ generalized edema   - GI function: abd noted soft/nontender per flow sheets, last BM 3/26  - Tubes: OGT  - Oral/Mouth cavity: NPO  - Skin: bruised     Nutrition Requirements (from RD note on 3/23)   Weight Used: 60kg     Estimated Energy Needs    Continue [x]  Adjust [] ~1127kcal (PSE 2003b)   Adjusted Energy Recommendations:   kcal/day        Estimated Protein Needs    Continue [x]  Adjust []  72-90g (1.2-1.5g/kg CBW  Adjusted Protein Recommendations:   gm/day        Estimated Fluid Needs        Continue [x]  Adjust [] per CTU team   Adjusted Fluid Recommendations:   mL/day     Nutrient Intake: current TF regimen provides 960kcal, 83g protein, 777ml free H2O. TF+ Propofol provide 1055kcal (94% est calorie needs)        [] Previous Nutrition Diagnosis: inadequate protein energy intake            [] Ongoing          [x] Resolved    [x] No active nutrition diagnosis identified at this time     However, pt. remains intubated. Will continue to follow.      Nutrition Intervention: enteral and parenteral nutrition    Rec: Continue Vital HP @40m/h for 960kcal, 83g protein, 777ml free H2O. Feed only if MAP consistently >65. Additional free h2O per LIP. Maintain all aspiration precautions.      Goal/Expected Outcome: In 4 days TF to provide >85% est energy needs, but not exceed 105%     Indicator/Monitoring:  energy intake, body composition, NFPF, electrolyte profile, glucose profile

## 2021-05-08 NOTE — PROGRESS NOTE ADULT - SUBJECTIVE AND OBJECTIVE BOX
Patient is a 85y old  Female who presents with a chief complaint of sob (30 Apr 2021 07:40)      Over Night Events: peg tube placed to suction for concern for gi bleed, on fentanyl 1.5, precedex just turned off, remains on vent with trach    I&O's Detail    07 May 2021 07:01  -  08 May 2021 07:00  --------------------------------------------------------  IN:    Dexmedetomidine: 47.6 mL    FentaNYL: 206.6 mL    Free Water: 650 mL    Glucerna: 900 mL  Total IN: 1804.2 mL    OUT:    Indwelling Catheter - Urethral (mL): 1520 mL    PEG (Percutaneous Endoscopic Gastrostomy) Tube (mL): 200 mL    Rectal Tube (mL): 800 mL  Total OUT: 2520 mL    Total NET: -715.8 mL      08 May 2021 07:01  -  08 May 2021 10:15  --------------------------------------------------------  IN:    Dexmedetomidine: 10.2 mL    FentaNYL: 20.2 mL  Total IN: 30.4 mL    OUT:  Total OUT: 0 mL    Total NET: 30.4 mL          PHYSICAL EXAM    ICU Vital Signs Last 24 Hrs  T(C): 37.1 (08 May 2021 04:00), Max: 37.1 (08 May 2021 04:00)  T(F): 98.8 (08 May 2021 04:00), Max: 98.8 (08 May 2021 04:00)  HR: 84 (08 May 2021 09:00) (75 - 126)  BP: 70/48 (08 May 2021 09:00) (61/39 - 155/69)  BP(mean): 55 (08 May 2021 09:00) (46 - 99)  ABP: --  ABP(mean): --  RR: 28 (08 May 2021 09:00) (28 - 47)  SpO2: 99% (08 May 2021 09:00) (90% - 99%)      CONSTITUTIONAL:   Ill appearing.  Well nourished.  NAD    ENT:   Airway patent,   Mouth with normal mucosa.   No thrush    EYES:   Pupils equal,   Round and reactive to light.    CARDIAC:   Normal rate,   Regular rhythm.    No edema    Vascular:  Normal systolic impulse  No Carotid bruits    RESPIRATORY:   No wheezing  Bilateral BS  Normal chest expansion  Not tachypneic,  No use of accessory muscles    GASTROINTESTINAL:  Abdomen soft,   Non-tender,   No guarding,   + BS    GENITOURINARY  normal genitalia for sex  no edema    MUSCULOSKELETAL:   Range of motion is not limited,  No clubbing, cyanosis    NEUROLOGICAL:   Alert and oriented   No motor  deficits.  pertinent DTRs normal    SKIN:   stage 4 ulcer  Skin normal color for race,   Warm and dry  No evidence of rash.    PSYCHIATRIC:   Normal mood and affect.   No apparent risk to self or others.    HEMATOLOGICAL:  No cervical  lymphadenopathy.  no inguinal lymphadenopathy      LABS:                          6.8    24.84 )-----------( 151      ( 08 May 2021 04:30 )             23.0                                               05-08               6.8    24.84 )-----------( 151      ( 05-08 @ 04:30 )             23.0                7.9    20.63 )-----------( 192      ( 05-07 @ 08:57 )             26.5                8.3    18.52 )-----------( 218      ( 05-06 @ 08:05 )             29.1         145  |  94<L>  |  52<H>  ----------------------------<  160<H>  3.9   |  45<HH>  |  0.7    Ca    8.0<L>      08 May 2021 04:30  Phos  4.8     05-08  Mg     1.9     05-08    TPro  5.4<L>  /  Alb  1.8<L>  /  TBili  0.4  /  DBili  x   /  AST  31  /  ALT  18  /  AlkPhos  251<H>  05-08                                                                                           LIVER FUNCTIONS - ( 08 May 2021 04:30 )  Alb: 1.8 g/dL / Pro: 5.4 g/dL / ALK PHOS: 251 U/L / ALT: 18 U/L / AST: 31 U/L / GGT: x                                                  Culture - Sputum (collected 06 May 2021 10:09)  Source: .Sputum Deep Tracheal Aspirate  Gram Stain (06 May 2021 23:27):    Few polymorphonuclear leukocytes per low power field    No Squamous epithelial cells per low power field    Numerous Gram Negative Coccobacilli per oil power field  Preliminary Report (07 May 2021 18:26):    Numerous Acinetobacter baumannii/nosocomialis group    Procalcitonin, Serum: 0.34 ng/mL (05-04-21 @ 04:30)      Serum Pro-Brain Natriuretic Peptide: 16405 pg/mL (05-05-21 @ 11:00)                                            Mode: AC/ CMV (Assist Control/ Continuous Mandatory Ventilation)  RR (machine): 28  TV (machine): 300  FiO2: 80  PEEP: 5  ITime: 0.76  MAP: 16  PIP: 45                                      ABG - ( 08 May 2021 04:22 )  pH, Arterial: 7.45  pH, Blood: x     /  pCO2: 72    /  pO2: 80    / HCO3: 50    / Base Excess: 23.2  /  SaO2: 97        plat 40            MEDICATIONS  (STANDING):  ALBUTerol    90 MICROgram(s) HFA Inhaler 2 Puff(s) Inhalation every 6 hours  chlorhexidine 0.12% Liquid 15 milliLiter(s) Oral Mucosa every 12 hours  chlorhexidine 4% Liquid 1 Application(s) Topical <User Schedule>  clotrimazole 1% Cream 1 Application(s) Topical two times a day  collagenase Ointment 1 Application(s) Topical daily  dexMEDEtomidine Infusion 0.2 MICROgram(s)/kG/Hr (3.38 mL/Hr) IV Continuous <Continuous>  fentaNYL   Infusion. 0.501 MICROgram(s)/kG/Hr (3.38 mL/Hr) IV Continuous <Continuous>  ferrous    sulfate Liquid 300 milliGRAM(s) Enteral Tube daily  furosemide   Injectable 40 milliGRAM(s) IV Push two times a day  insulin lispro (ADMELOG) corrective regimen sliding scale   SubCutaneous every 6 hours  ipratropium 17 MICROgram(s) HFA Inhaler 2 Puff(s) Inhalation every 6 hours  linezolid  IVPB 600 milliGRAM(s) IV Intermittent every 12 hours  meropenem  IVPB 1000 milliGRAM(s) IV Intermittent every 8 hours  multivitamin/minerals/iron Oral Solution (CENTRUM) 15 milliLiter(s) Enteral Tube daily  norepinephrine Infusion 0.088 MICROgram(s)/kG/Min (5.57 mL/Hr) IV Continuous <Continuous>  nystatin Cream 1 Application(s) Topical two times a day  pantoprazole  Injectable 40 milliGRAM(s) IV Push every 12 hours  petrolatum Ophthalmic Ointment 1 Application(s) Both EYES three times a day  phenylephrine    Infusion 1 MICROgram(s)/kG/Min (11.1 mL/Hr) IV Continuous <Continuous>  QUEtiapine 50 milliGRAM(s) Oral two times a day    MEDICATIONS  (PRN):  ondansetron Injectable 4 milliGRAM(s) IV Push every 4 hours PRN Nausea and/or Vomiting      CXR interpreted by me:  bilateral opaitites, trach in place     Patient is a 85y old  Female who presents with a chief complaint of sob (30 Apr 2021 07:40)      Over Night Events: peg tube placed to suction for concern for gi bleed, on fentanyl 1.5, precedex just turned off, remains on vent with trach    I&O's Detail    07 May 2021 07:01  -  08 May 2021 07:00  --------------------------------------------------------  IN:    Dexmedetomidine: 47.6 mL    FentaNYL: 206.6 mL    Free Water: 650 mL    Glucerna: 900 mL  Total IN: 1804.2 mL    OUT:    Indwelling Catheter - Urethral (mL): 1520 mL    PEG (Percutaneous Endoscopic Gastrostomy) Tube (mL): 200 mL    Rectal Tube (mL): 800 mL  Total OUT: 2520 mL    Total NET: -715.8 mL      08 May 2021 07:01  -  08 May 2021 10:15  --------------------------------------------------------  IN:    Dexmedetomidine: 10.2 mL    FentaNYL: 20.2 mL  Total IN: 30.4 mL    OUT:  Total OUT: 0 mL    Total NET: 30.4 mL          PHYSICAL EXAM    ICU Vital Signs Last 24 Hrs  T(C): 37.1 (08 May 2021 04:00), Max: 37.1 (08 May 2021 04:00)  T(F): 98.8 (08 May 2021 04:00), Max: 98.8 (08 May 2021 04:00)  HR: 84 (08 May 2021 09:00) (75 - 126)  BP: 70/48 (08 May 2021 09:00) (61/39 - 155/69)  BP(mean): 55 (08 May 2021 09:00) (46 - 99)  RR: 28 (08 May 2021 09:00) (28 - 47)  SpO2: 99% (08 May 2021 09:00) (90% - 99%)      CONSTITUTIONAL:   Ill appearing.  cachectic    ENT:   Airway patent,   Mouth with normal mucosa.   No thrush    EYES:   Pupils equal,   Round and reactive to light.    CARDIAC:   ISMAEL 2/6      RESPIRATORY:   BL Crackles    GASTROINTESTINAL:  Abdomen soft,   Non-tender,   No guarding,   + BS    MUSCULOSKELETAL:   Range of motion is not limited,  No clubbing, cyanosis    NEUROLOGICAL:   Alert and oriented   No motor  deficits.  pertinent DTRs normal    SKIN:   stage 4 ulcer          LABS:                          6.8    24.84 )-----------( 151      ( 08 May 2021 04:30 )             23.0                                               05-08               6.8    24.84 )-----------( 151      ( 05-08 @ 04:30 )             23.0                7.9    20.63 )-----------( 192      ( 05-07 @ 08:57 )             26.5                8.3    18.52 )-----------( 218      ( 05-06 @ 08:05 )             29.1         145  |  94<L>  |  52<H>  ----------------------------<  160<H>  3.9   |  45<HH>  |  0.7    Ca    8.0<L>      08 May 2021 04:30  Phos  4.8     05-08  Mg     1.9     05-08    TPro  5.4<L>  /  Alb  1.8<L>  /  TBili  0.4  /  DBili  x   /  AST  31  /  ALT  18  /  AlkPhos  251<H>  05-08                                                                                           LIVER FUNCTIONS - ( 08 May 2021 04:30 )  Alb: 1.8 g/dL / Pro: 5.4 g/dL / ALK PHOS: 251 U/L / ALT: 18 U/L / AST: 31 U/L / GGT: x                                                  Culture - Sputum (collected 06 May 2021 10:09)  Source: .Sputum Deep Tracheal Aspirate  Gram Stain (06 May 2021 23:27):    Few polymorphonuclear leukocytes per low power field    No Squamous epithelial cells per low power field    Numerous Gram Negative Coccobacilli per oil power field  Preliminary Report (07 May 2021 18:26):    Numerous Acinetobacter baumannii/nosocomialis group    Procalcitonin, Serum: 0.34 ng/mL (05-04-21 @ 04:30)      Serum Pro-Brain Natriuretic Peptide: 92951 pg/mL (05-05-21 @ 11:00)                                            Mode: AC/ CMV (Assist Control/ Continuous Mandatory Ventilation)  RR (machine): 28  TV (machine): 300  FiO2: 80  PEEP: 5  ITime: 0.76  MAP: 16  PIP: 45                                      ABG - ( 08 May 2021 04:22 )  pH, Arterial: 7.45  pH, Blood: x     /  pCO2: 72    /  pO2: 80    / HCO3: 50    / Base Excess: 23.2  /  SaO2: 97        plat 40            MEDICATIONS  (STANDING):  ALBUTerol    90 MICROgram(s) HFA Inhaler 2 Puff(s) Inhalation every 6 hours  chlorhexidine 0.12% Liquid 15 milliLiter(s) Oral Mucosa every 12 hours  chlorhexidine 4% Liquid 1 Application(s) Topical <User Schedule>  clotrimazole 1% Cream 1 Application(s) Topical two times a day  collagenase Ointment 1 Application(s) Topical daily  dexMEDEtomidine Infusion 0.2 MICROgram(s)/kG/Hr (3.38 mL/Hr) IV Continuous <Continuous>  fentaNYL   Infusion. 0.501 MICROgram(s)/kG/Hr (3.38 mL/Hr) IV Continuous <Continuous>  ferrous    sulfate Liquid 300 milliGRAM(s) Enteral Tube daily  furosemide   Injectable 40 milliGRAM(s) IV Push two times a day  insulin lispro (ADMELOG) corrective regimen sliding scale   SubCutaneous every 6 hours  ipratropium 17 MICROgram(s) HFA Inhaler 2 Puff(s) Inhalation every 6 hours  linezolid  IVPB 600 milliGRAM(s) IV Intermittent every 12 hours  meropenem  IVPB 1000 milliGRAM(s) IV Intermittent every 8 hours  multivitamin/minerals/iron Oral Solution (CENTRUM) 15 milliLiter(s) Enteral Tube daily  norepinephrine Infusion 0.088 MICROgram(s)/kG/Min (5.57 mL/Hr) IV Continuous <Continuous>  nystatin Cream 1 Application(s) Topical two times a day  pantoprazole  Injectable 40 milliGRAM(s) IV Push every 12 hours  petrolatum Ophthalmic Ointment 1 Application(s) Both EYES three times a day  phenylephrine    Infusion 1 MICROgram(s)/kG/Min (11.1 mL/Hr) IV Continuous <Continuous>  QUEtiapine 50 milliGRAM(s) Oral two times a day    MEDICATIONS  (PRN):  ondansetron Injectable 4 milliGRAM(s) IV Push every 4 hours PRN Nausea and/or Vomiting      CXR interpreted by me:  bilateral opaitites, trach in place

## 2021-05-08 NOTE — PROGRESS NOTE ADULT - ASSESSMENT
IMPRESSION:    Acute hypoxic respiratory failure s/p trach and peg  ARDS  Severe necrotizing CAP/empyema s/p decrotication  MENDOZA, improving  VAP acinetobacter  Thrombocytopenia, negative HIT & DIC panels  HO bleeding from PEG site  dec hb(s/p 1 unit of prbcs on 5/1/2021)      PLAN:    CNS:  Seroquel 50mg bid.  keep fentanyl, dc precedex, light sedation    HEENT:  Oral care.  Trach care     PULMONARY:  HOB @ 45 degrees.  Keep SAO2 92 to 96%.  Wean O2.   vent changes: fio2 80, peep 8, , RR 32, monitor plat and DP     CARDIOVASCULAR: I<O.   lasix to 40q12.    GI: GI prophylaxis.  PEG feeds.      RENAL:  F/u  lytes.  Correct as needed.  Monitor UO.    INFECTIOUS DISEASE:  ABX per ID.  CW Meropenem / Zyvox.  ID FU. Check fungitell.    HEMATOLOGICAL:   DVt ppx    ENDOCRINE:  Follow up FS.  Insulin protocol if needed.    MUSCULOSKELETAL: bed rest    Advanced directives and goals of care  Very poor overall prognosis  Palliative care FU IMPRESSION:    Acute hypoxic respiratory failure s/p trach and peg  ARDS  Severe necrotizing CAP/empyema s/p decrotication  MENDOZA, improving  VAP acinetobacter  Thrombocytopenia, negative HIT & DIC panels  HO bleeding from PEG site  dec hb(s/p 1 unit of prbcs on 5/1/2021)      PLAN:    CNS:  Seroquel 50mg bid.  keep fentanyl, dc precedex, sedation well    HEENT:  Oral care.  Trach care     PULMONARY:  HOB @ 45 degrees.  Keep SAO2 92 to 96%.  Wean O2.   vent changes: fio2 80, peep 8, , RR 32, monitor plat and DP, abg 2 hrs     CARDIOVASCULAR: I<O.   lasix to 40q12.    GI: GI prophylaxis.  PEG feeds.      RENAL:  F/u  lytes.  Correct as needed.  Monitor UO.    INFECTIOUS DISEASE:  ABX per ID.  CW Meropenem / Zyvox.  ID FU. Check fungitell.    HEMATOLOGICAL:   DVt ppx 1 unit prbc, monitor cbc    ENDOCRINE:  Follow up FS.  Insulin protocol if needed.    MUSCULOSKELETAL: bed rest    Advanced directives and goals of care  Very poor overall prognosis  Palliative care FU IMPRESSION:    Acute hypoxic respiratory failure s/p trach and peg  ARDS  Severe necrotizing CAP/empyema s/p decrotication  MENDOZA, improving  VAP acinetobacter  Thrombocytopenia, negative HIT & DIC panels  HO bleeding from PEG site  dec hb(s/p 1 unit of prbcs on 5/1/2021)      PLAN:    CNS:  Seroquel 50mg bid.  keep fentanyl, dc precedex, sedation well    HEENT:  Oral care.  Trach care     PULMONARY:  HOB @ 45 degrees.  Keep SAO2 92 to 96%.  Wean O2.   vent changes: fio2 80, peep 8, , RR 32, monitor plat and DP, abg 2 hrs     CARDIOVASCULAR: I<O.   lasix to 40q12 hold today    GI: GI prophylaxis.  PEG feeds.      RENAL:  F/u  lytes.  Correct as needed.  Monitor UO.    INFECTIOUS DISEASE:  ABX per ID.  CW Meropenem / Zyvox.  ID FU. Check fungitell.    HEMATOLOGICAL:   DVt ppx 1 unit prbc, monitor cbc    ENDOCRINE:  Follow up FS.  Insulin protocol if needed.    MUSCULOSKELETAL: bed rest    Advanced directives and goals of care  Very poor overall prognosis  Palliative care FU

## 2021-05-08 NOTE — DISCHARGE NOTE FOR THE EXPIRED PATIENT - HOSPITAL COURSE
85 year old lady known to have dementia, osteoarithtis, Yoruba-speaking presenting with cough and fever.      IMPRESSION  #Sepsis present on admission - secondary to CAP  -  CT Chest No Cont (03.11.21 @ 00:54): Areas of right lung consolidation which can be seen in pneumonia. Numerous air-fluid levels throughout the right lung compatible with an infectious process. Suggestion of split pleura at the lung base for which empyema is favored although inherently limited on this noncontrast exam. Consideration can be given to contrast administration if feasiblefor better delineation. Areas of bilateral interlobular septal thickening and mild layering groundglass attenuation may reflect a component of edema.  - Urine Legionella negative  - Urine Strep negative  - BLood Cx 3/10 and 3/13 negative  - Procalcitonin 1.15   - CT Chest No Cont (03.17.21 @ 16:19): Since 3/11/2021. Enlarging loculated right pleural fluid collection with multiple air bubbles consistent with empyema.   Associated compressive atelectasis right lung is seen. Scattered groundglass opacities involving multiple lumbar segments.  - s/p VATS 3/22 -- right empyema with 800 cc purulent fluid in pleural space, multiple pockers with dense adhesions -- necrotizing pneumonia of the middle lobe  - VATS Cx 3/22 with rare yeast, otherwise no growth  -  CT Chest No Cont (03.29.21 @ 12:37): Since March 29, 2021, resolved right pleural effusion; persistent moderate right pneumothorax with 2 chest tubes in place. Increased left greater than right diffuse bilateral groundglass opacities and interlobular septal thickening. Findings are suspicious for a multifocal infection. Superimposed edema is also a possibility.  Enlarging mediastinal lymph nodes, likely reactive.  - CT Chest/Abdomen and Pelvis No Cont (04.01.21 @ 20:19): Stable residual right-sided pneumothorax. Three left-sided chest tubes are in place. New pneumomediastinum is noted, especially in the anterior mediastinum.  Stable bilateral diffuse areas of groundglass opacity. Areas of traction bronchiectasis noted throughout the left lung field and at the right lung base. No evidence of bowel obstruction or intra-abdominal or pelvic inflammatory process  - Fungitell: <31 4/1  - Aspergillus Galactomannan Antigen: <0.500 (04.01.21 @ 01:03)  - Blood Cx 3/31 and 4/3 NG  - CT Chest/Abd/Pelvis No Cont (04.12.21 @ 17:17): Redemonstrated loculated right pneumothorax without significant change. Interval removal of 2/3 right chest tubes.  Diffuse groundglass and consolidative opacities with interlobular septal thickening and traction bronchiectasis in the bases. Findings likely reflecting a combination of pulmonary edema and post inflammatory/infectious changes with some degree of organization.  Interval development abdominopelvic ascites and anasarca consistent with fluid overload.  Nonspecific diffuse colonic wall thickening which could reflect colitis versus edema. No bowel obstruction. Interval resolution of previously seen pneumomediastinum.  - completed 6 week course from debridement (end date 5/3)    #GI Bleed from PEG 4/3 -- EGD held as improved/stable    #Dementia  #Osteoarthritis  #Obesity BMI (kg/m2): 23.4

## 2021-05-09 LAB
-  AMIKACIN: SIGNIFICANT CHANGE UP
-  AMPICILLIN/SULBACTAM: SIGNIFICANT CHANGE UP
-  CEFEPIME: SIGNIFICANT CHANGE UP
-  CEFTAZIDIME: SIGNIFICANT CHANGE UP
-  CIPROFLOXACIN: SIGNIFICANT CHANGE UP
-  GENTAMICIN: SIGNIFICANT CHANGE UP
-  LEVOFLOXACIN: SIGNIFICANT CHANGE UP
-  MEROPENEM: SIGNIFICANT CHANGE UP
-  TOBRAMYCIN: SIGNIFICANT CHANGE UP
-  TRIMETHOPRIM/SULFAMETHOXAZOLE: SIGNIFICANT CHANGE UP
METHOD TYPE: SIGNIFICANT CHANGE UP
ORGANISM # SPEC MICROSCOPIC CNT: SIGNIFICANT CHANGE UP

## 2021-05-10 LAB — FUNGITELL: <31 PG/ML — SIGNIFICANT CHANGE UP

## 2021-05-12 LAB
CULTURE RESULTS: SIGNIFICANT CHANGE UP
SPECIMEN SOURCE: SIGNIFICANT CHANGE UP

## 2021-05-21 DIAGNOSIS — F03.90 UNSPECIFIED DEMENTIA WITHOUT BEHAVIORAL DISTURBANCE: ICD-10-CM

## 2021-05-21 DIAGNOSIS — I96 GANGRENE, NOT ELSEWHERE CLASSIFIED: ICD-10-CM

## 2021-05-21 DIAGNOSIS — M19.90 UNSPECIFIED OSTEOARTHRITIS, UNSPECIFIED SITE: ICD-10-CM

## 2021-05-21 DIAGNOSIS — N17.0 ACUTE KIDNEY FAILURE WITH TUBULAR NECROSIS: ICD-10-CM

## 2021-05-21 DIAGNOSIS — G93.41 METABOLIC ENCEPHALOPATHY: ICD-10-CM

## 2021-05-21 DIAGNOSIS — L89.154 PRESSURE ULCER OF SACRAL REGION, STAGE 4: ICD-10-CM

## 2021-05-21 DIAGNOSIS — R64 CACHEXIA: ICD-10-CM

## 2021-05-21 DIAGNOSIS — D62 ACUTE POSTHEMORRHAGIC ANEMIA: ICD-10-CM

## 2021-05-21 DIAGNOSIS — J91.8 PLEURAL EFFUSION IN OTHER CONDITIONS CLASSIFIED ELSEWHERE: ICD-10-CM

## 2021-05-21 DIAGNOSIS — I97.791 OTHER INTRAOPERATIVE CARDIAC FUNCTIONAL DISTURBANCES DURING OTHER SURGERY: ICD-10-CM

## 2021-05-21 DIAGNOSIS — J47.0 BRONCHIECTASIS WITH ACUTE LOWER RESPIRATORY INFECTION: ICD-10-CM

## 2021-05-21 DIAGNOSIS — I46.8 CARDIAC ARREST DUE TO OTHER UNDERLYING CONDITION: ICD-10-CM

## 2021-05-21 DIAGNOSIS — E46 UNSPECIFIED PROTEIN-CALORIE MALNUTRITION: ICD-10-CM

## 2021-05-21 DIAGNOSIS — J94.8 OTHER SPECIFIED PLEURAL CONDITIONS: ICD-10-CM

## 2021-05-21 DIAGNOSIS — R65.21 SEVERE SEPSIS WITH SEPTIC SHOCK: ICD-10-CM

## 2021-05-21 DIAGNOSIS — J85.0 GANGRENE AND NECROSIS OF LUNG: ICD-10-CM

## 2021-05-21 DIAGNOSIS — E87.1 HYPO-OSMOLALITY AND HYPONATREMIA: ICD-10-CM

## 2021-05-21 DIAGNOSIS — K94.21 GASTROSTOMY HEMORRHAGE: ICD-10-CM

## 2021-05-21 DIAGNOSIS — J93.9 PNEUMOTHORAX, UNSPECIFIED: ICD-10-CM

## 2021-05-21 DIAGNOSIS — J15.0 PNEUMONIA DUE TO KLEBSIELLA PNEUMONIAE: ICD-10-CM

## 2021-05-21 DIAGNOSIS — J98.59 OTHER DISEASES OF MEDIASTINUM, NOT ELSEWHERE CLASSIFIED: ICD-10-CM

## 2021-05-21 DIAGNOSIS — I95.89 OTHER HYPOTENSION: ICD-10-CM

## 2021-05-21 DIAGNOSIS — G72.81 CRITICAL ILLNESS MYOPATHY: ICD-10-CM

## 2021-05-21 DIAGNOSIS — J80 ACUTE RESPIRATORY DISTRESS SYNDROME: ICD-10-CM

## 2021-05-21 DIAGNOSIS — Z88.1 ALLERGY STATUS TO OTHER ANTIBIOTIC AGENTS STATUS: ICD-10-CM

## 2021-05-21 DIAGNOSIS — Z66 DO NOT RESUSCITATE: ICD-10-CM

## 2021-05-21 DIAGNOSIS — J85.1 ABSCESS OF LUNG WITH PNEUMONIA: ICD-10-CM

## 2021-05-21 DIAGNOSIS — E66.9 OBESITY, UNSPECIFIED: ICD-10-CM

## 2021-05-21 DIAGNOSIS — A41.9 SEPSIS, UNSPECIFIED ORGANISM: ICD-10-CM

## 2022-03-03 NOTE — ED PROVIDER NOTE - PROGRESS NOTE DETAILS
----- Message from Heaven Conteh MD sent at 3/2/2022 10:32 PM CST -----  Kidney functions unchanged febrile rectally, concern for sepsis- abx ordered as well as tylenol and IVF, RN to place 2nd line. RVP sent, CT chest ordered and d/w pt and daughter all results. BP remains low despite fluid resuscitation, will move pt to crit. s/o to  and crit lead/charge RN aware. will need central line and admission to ICU after CT read. called rad for expedited read. febrile rectally, concern for sepsis- abx ordered as well as tylenol and IVF, RN to place 2nd line. RVP sent, CT chest ordered as WBC and CXR concerning for empyema. will order noncon given cr 1.7. d/w pt and daughter all results.

## 2022-10-24 NOTE — ED PROVIDER NOTE - PROGRESS NOTE DETAILS
Universal Safety Interventions Pt c/o pain to the right upper leg x 1 week. Exam shows Lateral IT band tenderness. Plan: XR, US,

## 2022-10-26 NOTE — PROGRESS NOTE ADULT - PROVIDER SPECIALTY LIST ADULT
CT Surgery PAST SURGICAL HISTORY:  H/O chest tube placement 12/23/21    S/P pericardiocentesis 12/28/21    S/P tonsillectomy

## 2022-11-22 NOTE — PROGRESS NOTE ADULT - PROVIDER SPECIALTY LIST ADULT
Patient informed and requested that it be resent to his preferred [facility] pharmacy. Medication resent as requested. PAUL   CT Surgery

## 2023-01-27 NOTE — PROGRESS NOTE ADULT - SUBJECTIVE AND OBJECTIVE BOX
Progress Note: General Surgery  Patient: DESTIN TERRY , 85y (1935)Female   MRN: [id number]  Location: [address]  Visit: 03-11-21 Inpatient  Date: 04-11-21 @ 01:25    Admit Diagnosis/Chief Complaint: Acute respiratory failure with hypoxia        Procedure/Diagnosis: Acute respiratory failure with hypoxia     S/P Bronchoscopy, at bedside    Open tracheostomy    Insertion, PEG tube        Events/ 24h: Patient seen and examined at bedside. No acute events overnight. Afebrile, VSS.    Vitals: T(F): 97.2 (04-10-21 @ 20:00), Max: 99.4 (04-10-21 @ 08:00)  HR: 86 (04-11-21 @ 00:30)  BP: 131/61 (04-10-21 @ 19:30) (87/56 - 131/61)  RR: 38 (04-11-21 @ 00:30)  SpO2: 100% (04-11-21 @ 00:30)  RR (machine): 36, TV (machine): 400, FiO2: 40, PEEP: 5, PIP: 29  In:   04-09-21 @ 07:01  -  04-10-21 @ 07:00  --------------------------------------------------------  IN: 3158.8 mL    04-10-21 @ 07:01  -  04-11-21 @ 01:25  --------------------------------------------------------  IN: 2814.8 mL      Out:   04-09-21 @ 07:01  -  04-10-21 @ 07:00  --------------------------------------------------------  OUT:    Chest Tube (mL): 70 mL    Chest Tube (mL): 10 mL    Indwelling Catheter - Urethral (mL): 865 mL  Total OUT: 945 mL      04-10-21 @ 07:01  -  04-11-21 @ 01:25  --------------------------------------------------------  OUT:    Chest Tube (mL): 20 mL    Chest Tube (mL): 80 mL    Indwelling Catheter - Urethral (mL): 555 mL    Rectal Tube (mL): 1050 mL  Total OUT: 1705 mL        Net:   04-09-21 @ 07:01  -  04-10-21 @ 07:00  --------------------------------------------------------  NET: 2213.8 mL    04-10-21 @ 07:01  -  04-11-21 @ 01:25  --------------------------------------------------------  NET: 1109.8 mL        Diet: Diet, NPO with Tube Feed:   Tube Feeding Modality: Gastrostomy  Peptamen A.F. Formula  Total Volume for 24 Hours (mL): 1320  Continuous  Until Goal Tube Feed Rate (mL per Hour): 55  Tube Feed Duration (in Hours): 24  Tube Feed Start Time: 17:45 (04-07-21 @ 17:37)    IV Fluids: albumin human 25% IVPB 100 milliLiter(s) IV Intermittent every 6 hours  dextrose 5%. 1000 milliLiter(s) (100 mL/Hr) IV Continuous <Continuous>  dextrose 5%. 1000 milliLiter(s) (50 mL/Hr) IV Continuous <Continuous>  ferrous    sulfate Liquid 300 milliGRAM(s) Enteral Tube daily  multivitamin/minerals/iron Oral Solution (CENTRUM) 15 milliLiter(s) Enteral Tube daily  sodium bicarbonate  Injectable 50 milliEquivalent(s) IV Push <User Schedule>  sodium chloride 0.9%. 1000 milliLiter(s) (10 mL/Hr) IV Continuous <Continuous>      Physical Examination:  General Appearance: NAD, ill appearing, sedated on pressors  HEENT: EOMI, sclera anicteric. trach  Heart: RRR   Lungs: Symmetric chest wall expansion, equal rise and fall. right sided chest tubes x2  Abdomen:  Soft, nontender, nondistended.   MSK/Extremities: Warm & well-perfused.   Skin: Warm, dry. No jaundice.       Medications: [Standing]  albumin human 25% IVPB 100 milliLiter(s) IV Intermittent every 6 hours  ALBUTerol    90 MICROgram(s) HFA Inhaler 2 Puff(s) Inhalation every 6 hours  caspofungin IVPB      caspofungin IVPB 50 milliGRAM(s) IV Intermittent every 24 hours  chlorhexidine 4% Liquid 1 Application(s) Topical <User Schedule>  cisatracurium Infusion 3 MICROgram(s)/kG/Min (10.7 mL/Hr) IV Continuous <Continuous>  clotrimazole 1% Cream 1 Application(s) Topical two times a day  collagenase Ointment 1 Application(s) Topical daily  dexMEDEtomidine Infusion 0.202 MICROgram(s)/kG/Hr (3 mL/Hr) IV Continuous <Continuous>  dextrose 40% Gel 15 Gram(s) Oral once  dextrose 5%. 1000 milliLiter(s) (50 mL/Hr) IV Continuous <Continuous>  dextrose 5%. 1000 milliLiter(s) (100 mL/Hr) IV Continuous <Continuous>  dextrose 50% Injectable 25 Gram(s) IV Push once  dextrose 50% Injectable 12.5 Gram(s) IV Push once  dextrose 50% Injectable 25 Gram(s) IV Push once  ferrous    sulfate Liquid 300 milliGRAM(s) Enteral Tube daily  gabapentin   Solution 100 milliGRAM(s) Oral three times a day  glucagon  Injectable 1 milliGRAM(s) IntraMuscular once  heparin   Injectable 5000 Unit(s) SubCutaneous every 12 hours  insulin regular Infusion 1 Unit(s)/Hr (1 mL/Hr) IV Continuous <Continuous>  ipratropium 17 MICROgram(s) HFA Inhaler 2 Puff(s) Inhalation every 6 hours  linezolid  IVPB      linezolid  IVPB 600 milliGRAM(s) IV Intermittent every 12 hours  meropenem  IVPB 1000 milliGRAM(s) IV Intermittent every 24 hours  multivitamin/minerals/iron Oral Solution (CENTRUM) 15 milliLiter(s) Enteral Tube daily  norepinephrine Infusion 0.1 MICROgram(s)/kG/Min (5.57 mL/Hr) IV Continuous <Continuous>  nystatin Cream 1 Application(s) Topical two times a day  pantoprazole  Injectable 40 milliGRAM(s) IV Push two times a day  petrolatum Ophthalmic Ointment 1 Application(s) Both EYES three times a day  phenylephrine    Infusion 1 MICROgram(s)/kG/Min (11.1 mL/Hr) IV Continuous <Continuous>  propofol Infusion 10.101 MICROgram(s)/kG/Min (3.6 mL/Hr) IV Continuous <Continuous>  sodium bicarbonate  Injectable 50 milliEquivalent(s) IV Push <User Schedule>  sodium chloride 0.9%. 1000 milliLiter(s) (10 mL/Hr) IV Continuous <Continuous>  vasopressin Infusion 0.04 Unit(s)/Min (2.4 mL/Hr) IV Continuous <Continuous>    DVT Prophylaxis: heparin   Injectable 5000 Unit(s) SubCutaneous every 12 hours    GI Prophylaxis: pantoprazole  Injectable 40 milliGRAM(s) IV Push two times a day    Antibiotics: caspofungin IVPB      caspofungin IVPB 50 milliGRAM(s) IV Intermittent every 24 hours  linezolid  IVPB      linezolid  IVPB 600 milliGRAM(s) IV Intermittent every 12 hours  meropenem  IVPB 1000 milliGRAM(s) IV Intermittent every 24 hours    Anticoagulation:   Medications:[PRN]  ondansetron Injectable 4 milliGRAM(s) IV Push every 4 hours PRN      Labs:                        8.8    31.29 )-----------( 201      ( 10 Apr 2021 02:00 )             29.7     04-10    148<H>  |  114<H>  |  88<HH>  ----------------------------<  118<H>  4.4   |  21  |  2.4<H>    Ca    7.8<L>      10 Apr 2021 02:00  Mg     2.2     04-10    TPro  5.3<L>  /  Alb  1.9<L>  /  TBili  0.6  /  DBili  0.5<H>  /  AST  49<H>  /  ALT  18  /  AlkPhos  171<H>  04-10    LIVER FUNCTIONS - ( 10 Apr 2021 02:00 )  Alb: 1.9 g/dL / Pro: 5.3 g/dL / ALK PHOS: 171 U/L / ALT: 18 U/L / AST: 49 U/L / GGT: x             ABG - ( 10 Apr 2021 03:19 )  pH: 7.23  /  pCO2: 53    /  pO2: 85    / HCO3: 22    / Base Excess: -5.5  /  SaO2: 96          Imaging:   < from: Xray Chest 1 View-PORTABLE IMMEDIATE (Xray Chest 1 View-PORTABLE IMMEDIATE .) (04.10.21 @ 13:49) >    Impression:    Bilateral opacities unchanged. Small loculated basilar right pneumothorax. No pleural effusion.    < end of copied text >   Pt received seated at edge of bed in NAD, agreeable to OT eval, +tele, +BP cuff, +pulse oxi, left semi spangler in bed in NAD, all lines intact

## 2023-04-24 NOTE — ED PROVIDER NOTE - CLINICAL SUMMARY MEDICAL DECISION MAKING FREE TEXT BOX
Initial Transitional Care Contact    Source of Information: patient    Discharged From: Hospital    Discharge Date: 4/20/23    Discharge Diagnosis: Diabetic ulcer of right midfoot associated with diabetes mellitus of other type, unspecified ulcer stage     Medication Changes (Y or N): Yes, Changes documented as follows:  - Complete Antibiotic Course: Augmentin 875-125 mg bid x5 days starting day after discharge    Refills needed (Y or N): No    Immediate Concerns or needs (Y or N): No- redness improving and had no questions.     Follow up appointment scheduled: 4/25/23    Patient was advised to bring discharge instructions with them as well as all of their medications.      US Negative. XR no acute findings. Feels and appears well. No complaints at present. Eager to leave. Stable for discharge. Patient was given strict return and follow up precautions. The patient has been informed of all concerning signs and symptoms to return to Emergency Department, the necessity to follow up with PMD/Clinic/follow up provided within 2-3 days was explained, and the patient reports understanding of above with capacity and insight.

## 2023-08-23 NOTE — PROGRESS NOTE ADULT - PROVIDER SPECIALTY LIST ADULT
" Subjective   Brian Braden is a 50 y.o. male.     Chief Complaint   Patient presents with    Hypertension    Med Refill             History of Present Illness     Bp at home 140's over 80's.  Nephrologist added amlodipine, it caused leg edema  He works in sun, . Worries about staying hydrated.     Review of Systems   Constitutional:  Negative for chills, fatigue and fever.   HENT:  Negative for congestion, ear discharge, ear pain, facial swelling, hearing loss, postnasal drip, rhinorrhea, sinus pressure, sore throat, trouble swallowing and voice change.    Eyes:  Negative for discharge, redness and visual disturbance.   Respiratory:  Negative for cough, chest tightness, shortness of breath and wheezing.    Cardiovascular:  Negative for chest pain and palpitations.   Gastrointestinal:  Negative for abdominal pain, blood in stool, constipation, diarrhea, nausea and vomiting.   Endocrine: Negative for polydipsia and polyuria.   Genitourinary:  Negative for dysuria, flank pain, hematuria and urgency.   Musculoskeletal:  Negative for arthralgias, back pain, joint swelling and myalgias.   Skin:  Negative for rash.   Neurological:  Negative for dizziness, weakness, numbness and headaches.   Hematological:  Negative for adenopathy.   Psychiatric/Behavioral:  Negative for confusion and sleep disturbance. The patient is not nervous/anxious.          /76 (BP Location: Left arm, Patient Position: Sitting, Cuff Size: Adult)   Pulse 86   Temp 98.6 øF (37 øC) (Infrared)   Ht 175.5 cm (69.09\")   Wt 120 kg (265 lb 8 oz)   SpO2 97%   BMI 39.10 kg/mý       Objective     Physical Exam  Vitals and nursing note reviewed.   Constitutional:       Appearance: Normal appearance. He is well-developed.   HENT:      Head: Normocephalic and atraumatic.      Right Ear: External ear normal.      Left Ear: External ear normal.      Nose: Nose normal. No rhinorrhea.   Eyes:      General: No scleral icterus.     " Extraocular Movements: Extraocular movements intact.      Conjunctiva/sclera: Conjunctivae normal.      Pupils: Pupils are equal, round, and reactive to light.   Cardiovascular:      Rate and Rhythm: Normal rate and regular rhythm.      Heart sounds: Normal heart sounds.     No friction rub. No gallop.   Pulmonary:      Effort: Pulmonary effort is normal.      Breath sounds: Normal breath sounds.   Abdominal:      General: Bowel sounds are normal. There is no distension.      Palpations: Abdomen is soft.      Tenderness: There is no abdominal tenderness.   Musculoskeletal:         General: No deformity. Normal range of motion.      Cervical back: Normal range of motion and neck supple.   Skin:     General: Skin is warm and dry.      Findings: No erythema or rash.      Comments: Right forearm, red scaly plaque.    Neurological:      Mental Status: He is alert and oriented to person, place, and time.      Cranial Nerves: No cranial nerve deficit.   Psychiatric:         Behavior: Behavior normal.         Thought Content: Thought content normal.         Judgment: Judgment normal.           PAST MEDICAL HISTORY     Past Medical History:   Diagnosis Date    GERD (gastroesophageal reflux disease)     Hyperlipidemia     Hypertension     Kidney stone     JD on CPAP       PAST SURGICAL HISTORY   No past surgical history on file.   SOCIAL HISTORY     Social History     Socioeconomic History    Marital status:    Tobacco Use    Smoking status: Some Days     Types: Cigars     Start date: 2008     Passive exposure: Current    Smokeless tobacco: Never   Substance and Sexual Activity    Alcohol use: No    Drug use: No    Sexual activity: Yes     Partners: Female     Comment:       ALLERGIES   Ace inhibitors   MEDICATIONS     Current Outpatient Medications   Medication Sig Dispense Refill    Cholecalciferol 25 MCG (1000 UT) tablet dispersible Take 1 tablet by mouth.      traZODone (DESYREL) 50 MG tablet Take 1 tablet  Infectious Disease by mouth Every Night. 30 tablet 1    fluorouracil (Efudex) 5 % cream Apply 1 application  topically to the appropriate area as directed 2 (Two) Times a Day. 40 g 0    nebivolol (Bystolic) 5 MG tablet Take 1 tablet by mouth Daily. Stop losartan and chlorthalidone. 30 tablet 0    olmesartan (Benicar) 40 MG tablet Take 1 tablet by mouth Daily. 30 tablet 0     No current facility-administered medications for this visit.        The following portions of the patient's history were reviewed and updated as appropriate: allergies, current medications, past family history, past medical history, past social history, past surgical history and problem list.        Assessment & Plan   Diagnoses and all orders for this visit:    1. Hypertension, unspecified type (Primary)  -     CBC (No Diff)  -     Comprehensive Metabolic Panel  -     Lipid Panel    2. Screening for prostate cancer  -     PSA Screen    3. AK (actinic keratosis)    Other orders  -     olmesartan (Benicar) 40 MG tablet; Take 1 tablet by mouth Daily.  Dispense: 30 tablet; Refill: 0  -     nebivolol (Bystolic) 5 MG tablet; Take 1 tablet by mouth Daily. Stop losartan and chlorthalidone.  Dispense: 30 tablet; Refill: 0  -     fluorouracil (Efudex) 5 % cream; Apply 1 application  topically to the appropriate area as directed 2 (Two) Times a Day.  Dispense: 40 g; Refill: 0      Bp goal is less than 120 over less than 80  Stop chlorthalidone  Stop losartan    Start bystolic 5mg qd and olmesartan 40mg qd.   He is to call with bp results, better yet, come by for bp check and bring his monitor.     Use efudex cream in winter time and wear gloves applying the cream.  Apply until area starts to form ulcer after about 3-4 weeks.                  No follow-ups on file.                  This document has been electronically signed by Hosea Adame MD on August 23, 2023 10:08 CDT

## 2023-10-20 NOTE — PROCEDURE NOTE - NSANTIMICROB_VASC_A_CORE
10/19/23: Patient called to cancel her appointment on 10/23/23 w/Leydi. She also requested  her medical records to be sent to her new oncologist Dr. Jesus Patel, [facility].  Ov, op notes,surgical pathology, radiology and brca results faxed to [phone] No

## 2024-04-02 NOTE — ED PROVIDER NOTE - CPE EDP RESP NORM
04/02/24      Yola Randall  [address] [place], [place] [id number]        Your Primary Care Provider has ordered a colonoscopy for you and we would like to schedule that procedure. Your health is very important to us.    Please call Open Access Scheduling Patient Line [phone] at your earliest convenience.     If you have any questions or concerns, we would be more than happy to discuss them with you. We look forward to assisting you with your health care needs.      Sincerely,  Open Access Scheduling Patient Line [phone]  Surgery Scheduler for Open Access Colonoscopy Scheduling  [facility] Pre-Admit Department                 normal...

## 2024-08-06 NOTE — BRIEF OPERATIVE NOTE - NSICDXBRIEFPREOP_GEN_ALL_CORE_FT
RN ordered Suprep  
PRE-OP DIAGNOSIS:  Acute respiratory failure with hypoxia 30-Mar-2021 10:27:37  Marco Woody  
PRE-OP DIAGNOSIS:  Acute respiratory failure with hypoxia 30-Mar-2021 10:27:37  Marco Woody